# Patient Record
Sex: FEMALE | Race: WHITE | Employment: OTHER | URBAN - METROPOLITAN AREA
[De-identification: names, ages, dates, MRNs, and addresses within clinical notes are randomized per-mention and may not be internally consistent; named-entity substitution may affect disease eponyms.]

---

## 2017-01-12 ENCOUNTER — TELEPHONE (OUTPATIENT)
Dept: PULMONOLOGY | Facility: CLINIC | Age: 77
End: 2017-01-12

## 2017-02-03 ENCOUNTER — LAB ENCOUNTER (OUTPATIENT)
Dept: LAB | Age: 77
End: 2017-02-03
Attending: INTERNAL MEDICINE
Payer: MEDICARE

## 2017-02-03 ENCOUNTER — HOSPITAL ENCOUNTER (OUTPATIENT)
Dept: MAMMOGRAPHY | Age: 77
Discharge: HOME | End: 2017-02-03
Attending: INTERNAL MEDICINE
Payer: MEDICARE

## 2017-02-03 DIAGNOSIS — E03.9 ACQUIRED HYPOTHYROIDISM: ICD-10-CM

## 2017-02-03 DIAGNOSIS — Z12.31 VISIT FOR SCREENING MAMMOGRAM: ICD-10-CM

## 2017-02-03 DIAGNOSIS — Z51.81 ENCOUNTER FOR THERAPEUTIC DRUG MONITORING: ICD-10-CM

## 2017-02-03 DIAGNOSIS — M06.09 SERONEGATIVE RHEUMATOID ARTHRITIS OF MULTIPLE SITES (HCC): ICD-10-CM

## 2017-02-03 DIAGNOSIS — R73.09 ELEVATED GLUCOSE: ICD-10-CM

## 2017-02-03 LAB
ALBUMIN SERPL BCP-MCNC: 4.3 G/DL (ref 3.5–4.8)
AST SERPL-CCNC: 25 U/L (ref 15–41)
BASOPHILS # BLD: 0.1 K/UL (ref 0–0.2)
BASOPHILS NFR BLD: 2 %
CREAT SERPL-MCNC: 0.95 MG/DL (ref 0.5–1.5)
CRP SERPL-MCNC: 0.7 MG/DL (ref 0–0.9)
EOSINOPHIL # BLD: 0.1 K/UL (ref 0–0.7)
EOSINOPHIL NFR BLD: 4 %
ERYTHROCYTE [DISTWIDTH] IN BLOOD BY AUTOMATED COUNT: 14.3 % (ref 11–15)
ERYTHROCYTE [SEDIMENTATION RATE] IN BLOOD: 22 MM/HR (ref 0–30)
HBA1C MFR BLD: 5.6 % (ref 4–6)
HCT VFR BLD AUTO: 37.7 % (ref 35–48)
HGB BLD-MCNC: 12.5 G/DL (ref 12–16)
LYMPHOCYTES # BLD: 1.4 K/UL (ref 1–4)
LYMPHOCYTES NFR BLD: 40 %
MCH RBC QN AUTO: 33 PG (ref 27–32)
MCHC RBC AUTO-ENTMCNC: 33.2 G/DL (ref 32–37)
MCV RBC AUTO: 99.6 FL (ref 80–100)
MONOCYTES # BLD: 0.8 K/UL (ref 0–1)
MONOCYTES NFR BLD: 21 %
NEUTROPHILS # BLD AUTO: 1.2 K/UL (ref 1.8–7.7)
NEUTROPHILS NFR BLD: 34 %
PLATELET # BLD AUTO: 180 K/UL (ref 140–400)
PMV BLD AUTO: 8 FL (ref 7.4–10.3)
RBC # BLD AUTO: 3.78 M/UL (ref 3.7–5.4)
TSH SERPL-ACNC: 0.09 UIU/ML (ref 0.34–5.6)
WBC # BLD AUTO: 3.6 K/UL (ref 4–11)

## 2017-02-03 PROCEDURE — 82565 ASSAY OF CREATININE: CPT

## 2017-02-03 PROCEDURE — 86140 C-REACTIVE PROTEIN: CPT

## 2017-02-03 PROCEDURE — 82040 ASSAY OF SERUM ALBUMIN: CPT

## 2017-02-03 PROCEDURE — 85025 COMPLETE CBC W/AUTO DIFF WBC: CPT

## 2017-02-03 PROCEDURE — 83036 HEMOGLOBIN GLYCOSYLATED A1C: CPT

## 2017-02-03 PROCEDURE — 85652 RBC SED RATE AUTOMATED: CPT

## 2017-02-03 PROCEDURE — 84443 ASSAY THYROID STIM HORMONE: CPT

## 2017-02-03 PROCEDURE — 77067 SCR MAMMO BI INCL CAD: CPT

## 2017-02-03 PROCEDURE — 84450 TRANSFERASE (AST) (SGOT): CPT

## 2017-02-03 PROCEDURE — 36415 COLL VENOUS BLD VENIPUNCTURE: CPT

## 2017-02-10 ENCOUNTER — PRIOR ORIGINAL RECORDS (OUTPATIENT)
Dept: OTHER | Age: 77
End: 2017-02-10

## 2017-02-20 ENCOUNTER — OFFICE VISIT (OUTPATIENT)
Dept: RHEUMATOLOGY | Facility: CLINIC | Age: 77
End: 2017-02-20

## 2017-02-20 VITALS
DIASTOLIC BLOOD PRESSURE: 74 MMHG | HEIGHT: 64 IN | BODY MASS INDEX: 26.17 KG/M2 | SYSTOLIC BLOOD PRESSURE: 119 MMHG | HEART RATE: 60 BPM | WEIGHT: 153.31 LBS

## 2017-02-20 DIAGNOSIS — Z51.81 ENCOUNTER FOR THERAPEUTIC DRUG MONITORING: ICD-10-CM

## 2017-02-20 DIAGNOSIS — M06.09 SERONEGATIVE RHEUMATOID ARTHRITIS OF MULTIPLE SITES (HCC): Primary | ICD-10-CM

## 2017-02-20 DIAGNOSIS — M15.9 PRIMARY OSTEOARTHRITIS INVOLVING MULTIPLE JOINTS: ICD-10-CM

## 2017-02-20 PROCEDURE — 99214 OFFICE O/P EST MOD 30 MIN: CPT | Performed by: INTERNAL MEDICINE

## 2017-02-20 PROCEDURE — G0463 HOSPITAL OUTPT CLINIC VISIT: HCPCS | Performed by: INTERNAL MEDICINE

## 2017-02-20 RX ORDER — HYDROXYCHLOROQUINE SULFATE 200 MG/1
TABLET, FILM COATED ORAL
Qty: 180 TABLET | Refills: 3 | Status: SHIPPED | OUTPATIENT
Start: 2017-02-20 | End: 2018-04-25

## 2017-02-20 NOTE — PROGRESS NOTES
HPI:    Patient ID: Eleazar Centeno is a 67year old female. HPI Comments: Zana Jason is a 50-year-old patient of Dr. Jhonathan Duong with seronegative rheumatoid arthritis.  Since I saw her November 21st of 2016, she has continued Enbrel, methotrexate 20mg weekly, and Oral Tab TAKE 8 TABLETS BY MOUTH EVERY WEEK Disp: 96 tablet Rfl: 1   Hydroxychloroquine Sulfate 200 MG Oral Tab Take two daily. Disp: 180 tablet Rfl: 3   Levothyroxine Sodium 100 MCG Oral Tab Take 1 tablet (100 mcg total) by mouth daily.  Take with the 112 Disp:  Rfl:    aspirin 81 MG Oral Chew Tab Chew  by mouth.  take 1 tablet (81MG)  by oral route  every day Disp:  Rfl:    CENTRUM SILVER OR TABS 1 TABLET DAILY Disp:  Rfl:      Allergies:  Erythromycin Base         Pcn [Bicillin L-A]         PHYSICAL EXAM:

## 2017-02-27 ENCOUNTER — OFFICE VISIT (OUTPATIENT)
Dept: INTERNAL MEDICINE CLINIC | Facility: CLINIC | Age: 77
End: 2017-02-27

## 2017-02-27 VITALS
BODY MASS INDEX: 43.6 KG/M2 | WEIGHT: 255.38 LBS | HEART RATE: 57 BPM | HEIGHT: 64 IN | DIASTOLIC BLOOD PRESSURE: 79 MMHG | SYSTOLIC BLOOD PRESSURE: 136 MMHG | RESPIRATION RATE: 18 BRPM

## 2017-02-27 DIAGNOSIS — R26.9 GAIT ABNORMALITY: Primary | ICD-10-CM

## 2017-02-27 DIAGNOSIS — D84.9 IMMUNOSUPPRESSED STATUS (HCC): ICD-10-CM

## 2017-02-27 DIAGNOSIS — M06.09 SERONEGATIVE RHEUMATOID ARTHRITIS OF MULTIPLE SITES (HCC): ICD-10-CM

## 2017-02-27 DIAGNOSIS — N39.41 URGE INCONTINENCE: ICD-10-CM

## 2017-02-27 PROCEDURE — 99214 OFFICE O/P EST MOD 30 MIN: CPT | Performed by: INTERNAL MEDICINE

## 2017-02-27 PROCEDURE — 99212 OFFICE O/P EST SF 10 MIN: CPT | Performed by: INTERNAL MEDICINE

## 2017-02-27 NOTE — PROGRESS NOTES
HPI:    Patient ID: Brea Reyes is a 67year old female. HPI Comments: Her thyroid dose was too high so I reduced the dose. No problems with that. Her balance is bad. She went for PT. She started that and then she ended up doing heart tests.   Her Rfl: 1   Nebivolol HCl (BYSTOLIC) 5 MG Oral Tab Take 1 tablet (5 mg total) by mouth daily. Disp: 90 tablet Rfl: 1   lisinopril 20 MG Oral Tab Take 1 tablet (20 mg total) by mouth daily.  Disp: 90 tablet Rfl: 1   Pravastatin Sodium 40 MG Oral Tab Take 1 tabl respiratory distress. Neurological: She is alert and oriented to person, place, and time. ASSESSMENT/PLAN:   1.  Gait abnormality  Pt has pain due to RA and weakness due to deconditioning.  - PHYSICAL THERAPY - at [facility] eval and t

## 2017-03-05 RX ORDER — TOLTERODINE 4 MG/1
CAPSULE, EXTENDED RELEASE ORAL
Qty: 90 CAPSULE | Refills: 0 | Status: SHIPPED | OUTPATIENT
Start: 2017-03-05 | End: 2017-05-30

## 2017-03-08 ENCOUNTER — OFFICE VISIT (OUTPATIENT)
Dept: PHYSICAL THERAPY | Age: 77
End: 2017-03-08
Attending: INTERNAL MEDICINE
Payer: MEDICARE

## 2017-03-08 DIAGNOSIS — M06.09 SERONEGATIVE RHEUMATOID ARTHRITIS OF MULTIPLE SITES (HCC): ICD-10-CM

## 2017-03-08 DIAGNOSIS — R26.9 GAIT ABNORMALITY: Primary | ICD-10-CM

## 2017-03-08 PROCEDURE — 97162 PT EVAL MOD COMPLEX 30 MIN: CPT

## 2017-03-08 PROCEDURE — 97112 NEUROMUSCULAR REEDUCATION: CPT

## 2017-03-08 NOTE — PROGRESS NOTES
GAIT/BALANCE EVALUATION:   Referring Physician: Dr. Al Valladares  Date of Onset: A couple years ago. Date of Service: 3/8/2017       ICD-10-CM    1. Gait abnormality R26.9    2.  Serone[facility] ([facility] M[facility].09      PATIENT SUM with standing balance EC > EO with LOB with EC. Pt unable to maintain SLS. Altered gait mechanics with decreased step length and stride length, decreased stance time L to R, absent heel strike and guarded UE posture.  Caution with turns and changes to gait 4 Item Dynamic Gait Index Score: 6/12 Fall Risk: yes  [Scores of 10 or less indicate increased risk for falls]  1. Gait level surface:   Grading: Finesse the lowest category that applies.   (3)  Normal: Walks 20’, no assistive devices, good speed, no elke reaches for wall. 4. Gait with vertical head turns:   Grading: Finesse the lowest category that applies. (3) Normal: Performs head turns smoothly with no change in gait.   (2) Mild Impairment: Performs head turns smoothly with slight change in gait velocit of 10 visits over a 90 day period. Treatment will include: Neuromuscular Re-education; Gait Training; Therapeutic Exercises; Manual Therapy;  Patient education; Home exercise program instruction    Education or treatment limitation: None  Rehab Potential:go

## 2017-03-10 ENCOUNTER — OFFICE VISIT (OUTPATIENT)
Dept: PHYSICAL THERAPY | Age: 77
End: 2017-03-10
Attending: INTERNAL MEDICINE
Payer: MEDICARE

## 2017-03-10 PROCEDURE — 97112 NEUROMUSCULAR REEDUCATION: CPT

## 2017-03-10 PROCEDURE — 97110 THERAPEUTIC EXERCISES: CPT

## 2017-03-10 NOTE — PROGRESS NOTES
1.  Gait abnormality  R26.9      2.  Seronegative rheumatoid arthritis of multiple sites (HCC)  M06.09                  Authorized # of Visits:  2         Next MD visit: none scheduled  Fall Risk: standard         Precautions: n/a           Medication Meagan Castellon with comprehensive HEP to maintain progress achieved in PT              Plan: Assess effects of HEP progressions. Progress as able.     Charges: Neuro Re-ed x2, TherEx 1      Total Timed Treatment: 40 min  Total Treatment Time: 41 min

## 2017-03-13 ENCOUNTER — OFFICE VISIT (OUTPATIENT)
Dept: OTOLARYNGOLOGY | Facility: CLINIC | Age: 77
End: 2017-03-13

## 2017-03-13 VITALS
WEIGHT: 257.38 LBS | TEMPERATURE: 97 F | BODY MASS INDEX: 43.94 KG/M2 | DIASTOLIC BLOOD PRESSURE: 72 MMHG | HEIGHT: 64 IN | SYSTOLIC BLOOD PRESSURE: 130 MMHG

## 2017-03-13 DIAGNOSIS — H61.23 BILATERAL IMPACTED CERUMEN: Primary | ICD-10-CM

## 2017-03-13 PROCEDURE — 99213 OFFICE O/P EST LOW 20 MIN: CPT | Performed by: OTOLARYNGOLOGY

## 2017-03-13 PROCEDURE — 69210 REMOVE IMPACTED EAR WAX UNI: CPT | Performed by: OTOLARYNGOLOGY

## 2017-03-13 RX ORDER — NIACIN 750 MG/1
TABLET, EXTENDED RELEASE ORAL
COMMUNITY
Start: 2017-03-05 | End: 2017-06-26

## 2017-03-14 ENCOUNTER — OFFICE VISIT (OUTPATIENT)
Dept: PHYSICAL THERAPY | Age: 77
End: 2017-03-14
Attending: INTERNAL MEDICINE
Payer: MEDICARE

## 2017-03-14 PROCEDURE — 97112 NEUROMUSCULAR REEDUCATION: CPT

## 2017-03-14 PROCEDURE — 97110 THERAPEUTIC EXERCISES: CPT

## 2017-03-14 NOTE — PROGRESS NOTES
1.  Gait abnormality  R26.9      2.  Seronegative rheumatoid arthritis of multiple sites (HCC)  M06.09                  Authorized # of Visits:  3         Next MD visit: none scheduled  Fall Risk: standard         Precautions: n/a           Medication Jelly Roper be able to squat to  light objects around the house without loss of stability  4. Pt will improve functional hip strength to demonstrate ability to ascend/descend 1 flight of stairs reciprocally with use of UUE on handrail  5.  Pt will be independent

## 2017-03-17 ENCOUNTER — OFFICE VISIT (OUTPATIENT)
Dept: PHYSICAL THERAPY | Age: 77
End: 2017-03-17
Attending: INTERNAL MEDICINE
Payer: MEDICARE

## 2017-03-17 PROCEDURE — 97110 THERAPEUTIC EXERCISES: CPT

## 2017-03-17 PROCEDURE — 97112 NEUROMUSCULAR REEDUCATION: CPT

## 2017-03-17 NOTE — PROGRESS NOTES
1.  Gait abnormality  R26.9      2.  Seronegative rheumatoid arthritis of multiple sites (HCC)  M06.09                  Authorized # of Visits:  4         Next MD visit: none scheduled  Fall Risk: standard         Precautions: n/a           Medication Andi Washington ext 2x10 R/L        Fwd BOSU lunge 10x R/L            Assessment: Progressed to fwd step ups with 4 and 6\" steps. Pt remains cautious R >L although able to perform. Goals     • Therapy Goals      1.  Pt will demonstrate improved SLS to >3 seconds ARMINDA to

## 2017-03-21 ENCOUNTER — APPOINTMENT (OUTPATIENT)
Dept: PHYSICAL THERAPY | Age: 77
End: 2017-03-21
Attending: INTERNAL MEDICINE
Payer: MEDICARE

## 2017-03-23 ENCOUNTER — APPOINTMENT (OUTPATIENT)
Dept: PHYSICAL THERAPY | Age: 77
End: 2017-03-23
Attending: INTERNAL MEDICINE
Payer: MEDICARE

## 2017-03-24 ENCOUNTER — OFFICE VISIT (OUTPATIENT)
Dept: INTERNAL MEDICINE CLINIC | Facility: CLINIC | Age: 77
End: 2017-03-24

## 2017-03-24 VITALS
WEIGHT: 243.63 LBS | DIASTOLIC BLOOD PRESSURE: 58 MMHG | HEART RATE: 66 BPM | RESPIRATION RATE: 20 BRPM | SYSTOLIC BLOOD PRESSURE: 98 MMHG | TEMPERATURE: 99 F | BODY MASS INDEX: 41.59 KG/M2 | HEIGHT: 64 IN

## 2017-03-24 DIAGNOSIS — J06.9 UPPER RESPIRATORY TRACT INFECTION, UNSPECIFIED TYPE: Primary | ICD-10-CM

## 2017-03-24 PROCEDURE — 99213 OFFICE O/P EST LOW 20 MIN: CPT | Performed by: INTERNAL MEDICINE

## 2017-03-24 PROCEDURE — G0463 HOSPITAL OUTPT CLINIC VISIT: HCPCS | Performed by: INTERNAL MEDICINE

## 2017-03-24 RX ORDER — AZITHROMYCIN 250 MG/1
TABLET, FILM COATED ORAL
Qty: 6 TABLET | Refills: 0 | Status: SHIPPED | OUTPATIENT
Start: 2017-03-24 | End: 2017-03-28

## 2017-03-24 NOTE — PROGRESS NOTES
HPI:    Patient ID: Jennifer Bauer is a 67year old female. HPI  Patient is here complaining of 1 week of respiratory symptoms. Started with a cough and now she is feeling more weak. It is a nonproductive cough.   No sinus congestion, sneezing, or runny AmLODIPine Besylate 5 MG Oral Tab Take 1 tablet (5 mg total) by mouth daily.  Disp: 90 tablet Rfl: 1   PANTOPRAZOLE SODIUM 40 MG Oral Tab EC TAKE ONE TABLET BY MOUTH EVERY MORNING BEFORE BREAKFAST Disp: 30 tablet Rfl: 5   Etanercept (ENBREL SURECLICK) 50 today, then one daily.            Imaging & Referrals:  None         [id number]

## 2017-03-27 ENCOUNTER — TELEPHONE (OUTPATIENT)
Dept: INTERNAL MEDICINE CLINIC | Facility: CLINIC | Age: 77
End: 2017-03-27

## 2017-03-27 DIAGNOSIS — R05.9 COUGH: Primary | ICD-10-CM

## 2017-03-27 NOTE — TELEPHONE ENCOUNTER
Pt was seen in the office on 03/24/17 and has not gotten any better. Pt still has cough and feeling weak. Pt available for another appt if needed. She would like to know what to do?

## 2017-03-27 NOTE — TELEPHONE ENCOUNTER
Pt advised of orders and verbalized understanding. She will either go tonight or tomorrow am, advised ER if sxs increase, change, or worsen. Pt verbalized understanding and agrees with the plan.

## 2017-03-27 NOTE — TELEPHONE ENCOUNTER
F/U call seen by Dr Trista Bustamante 3/24/17 prescribed zpack today is day #4. Cough continues, feels lethargic, afebrile. Explained needs to give zpack more time as is effective fro 10 days following post ingestion.   Pt skeptical wants to be seen in clinic again

## 2017-03-27 NOTE — TELEPHONE ENCOUNTER
Patient should come into the first floor for a chest x-ray. I have generated the order. Please do that today or early tomorrow morning.

## 2017-03-28 ENCOUNTER — APPOINTMENT (OUTPATIENT)
Dept: PHYSICAL THERAPY | Age: 77
End: 2017-03-28
Attending: INTERNAL MEDICINE
Payer: MEDICARE

## 2017-03-28 ENCOUNTER — HOSPITAL ENCOUNTER (OUTPATIENT)
Dept: GENERAL RADIOLOGY | Facility: HOSPITAL | Age: 77
Discharge: HOME | End: 2017-03-28
Attending: INTERNAL MEDICINE
Payer: MEDICARE

## 2017-03-28 DIAGNOSIS — R05.9 COUGH: ICD-10-CM

## 2017-03-28 PROCEDURE — 71020 XR CHEST PA + LAT CHEST (CPT=71020): CPT

## 2017-03-30 ENCOUNTER — APPOINTMENT (OUTPATIENT)
Dept: PHYSICAL THERAPY | Age: 77
End: 2017-03-30
Attending: INTERNAL MEDICINE
Payer: MEDICARE

## 2017-04-03 ENCOUNTER — APPOINTMENT (OUTPATIENT)
Dept: PHYSICAL THERAPY | Age: 77
End: 2017-04-03
Attending: INTERNAL MEDICINE
Payer: MEDICARE

## 2017-04-06 ENCOUNTER — OFFICE VISIT (OUTPATIENT)
Dept: PHYSICAL THERAPY | Age: 77
End: 2017-04-06
Attending: INTERNAL MEDICINE
Payer: MEDICARE

## 2017-04-06 PROCEDURE — 97164 PT RE-EVAL EST PLAN CARE: CPT

## 2017-04-06 NOTE — PROGRESS NOTES
Patient Name: Brea Reyes, : 1940, MRN: [id number]   Date:  2017  Referring Physician:  Sherif Becker    Diagnosis: Gait abnormality (R26.9)  Seroneg[facility] (M06.09)    Progress Summary    Pt has EO: 30 sec              - Feet together EC: 30 sec              - Modified Tandem:  30 sec B              - Tandem Stance: 4 sec R foot fwd, 5 sec L foot fwd              - SLS: R 1 sec, L 1 sec                        - Compliant Surface:                   deviations, or no gait deviations but unable to achieve a significant change in velocity, or uses an assistive device.    (1)     Moderate Impairment: Makes only minor adjustments to walking speed, or accomplishes a change in speed with significant gait de demonstrate ability to ambulate safely in crowded and busy environments-NOT  MET  3. Pt will be able to squat to  light objects around the house without loss of stability-NOT MET  4.  Pt will improve functional hip strength to demonstrate ability to

## 2017-04-12 ENCOUNTER — OFFICE VISIT (OUTPATIENT)
Dept: PHYSICAL THERAPY | Age: 77
End: 2017-04-12
Attending: INTERNAL MEDICINE
Payer: MEDICARE

## 2017-04-12 PROCEDURE — 97110 THERAPEUTIC EXERCISES: CPT

## 2017-04-12 PROCEDURE — 97112 NEUROMUSCULAR REEDUCATION: CPT

## 2017-04-12 NOTE — PROGRESS NOTES
1.  Gait abnormality  R26.9      2.  Seronegative rheumatoid arthritis of multiple sites (HCC)  M06.09                  Authorized # of Visits:  6         Next MD visit: none scheduled  Fall Risk: standard         Precautions: n/a           Medication [place] [facility] Prim belt [id number] 2x Standing knee flex 10x R/L Standing hip abd 10x R/L     Standing step taps 6\" 10x R/L Standing on compliant surface + head turns Fwd step ups 4\" 5x R/L Standing hip ext 10x R/L     Seated marches 10x R/L  Fwd step ups 6\" 5x R/L Standing

## 2017-04-14 ENCOUNTER — OFFICE VISIT (OUTPATIENT)
Dept: PHYSICAL THERAPY | Age: 77
End: 2017-04-14
Attending: INTERNAL MEDICINE
Payer: MEDICARE

## 2017-04-14 PROCEDURE — 97112 NEUROMUSCULAR REEDUCATION: CPT

## 2017-04-14 PROCEDURE — 97110 THERAPEUTIC EXERCISES: CPT

## 2017-04-14 NOTE — PROGRESS NOTES
1.  Gait abnormality  R26.9      2.  Seronegative rheumatoid arthritis of multiple sites (HCC)  M06.09                  Authorized # of Visits:  7/10         Next MD visit: none scheduled  Fall Risk: standard         Precautions: n/a           Medication C lou DOYLE finger support 10x R/L    Cone weaving CGA and gait belt Cone weaving CGA and gait belt [id number] Standing hip ext 10x R/L Tandem stance SBA 2x R/L to tolerance Fwd step up 6\" step 2x5 R/L    Stepping over cones CGA and gait belt Stepping over con Total Timed Treatment: 42 min  Total Treatment Time: 43 min

## 2017-04-17 ENCOUNTER — OFFICE VISIT (OUTPATIENT)
Dept: PHYSICAL THERAPY | Age: 77
End: 2017-04-17
Attending: INTERNAL MEDICINE
Payer: MEDICARE

## 2017-04-17 PROCEDURE — 97110 THERAPEUTIC EXERCISES: CPT

## 2017-04-17 PROCEDURE — 97112 NEUROMUSCULAR REEDUCATION: CPT

## 2017-04-17 NOTE — PROGRESS NOTES
1.  Gait abnormality  R26.9      2.  Seronegative rheumatoid arthritis of multiple sites (HCC)  M06.09                  Authorized # of Visits:  8/10         Next MD visit: none scheduled  Fall Risk: standard         Precautions: n/a           Medication C surface rhomberg Standing rhomberg EC Tandem stance balance Seated marches Airex weight shift fwd/neutr and lat Shuttle B leg press 6B 2x15   Seated TrA 10x5\" Standing tandem Standing hip abd 10x R/L Standard and Rhomberg + head turns vert and horiz Airex around the house without loss of stability-NOT MET  4. Pt will improve functional hip strength to demonstrate ability to ascend/descend 1 flight of stairs reciprocally with use of UUE on handrail-NOT MET  5.  Pt will be independent and compliant with compre

## 2017-04-19 ENCOUNTER — OFFICE VISIT (OUTPATIENT)
Dept: PHYSICAL THERAPY | Age: 77
End: 2017-04-19
Attending: INTERNAL MEDICINE
Payer: MEDICARE

## 2017-04-19 PROCEDURE — 97110 THERAPEUTIC EXERCISES: CPT

## 2017-04-19 PROCEDURE — 97112 NEUROMUSCULAR REEDUCATION: CPT

## 2017-04-19 NOTE — PROGRESS NOTES
1.  Gait abnormality  R26.9      2.  Seronegative rheumatoid arthritis of multiple sites (HCC)  M06.09                  Authorized # of Visits:  9/12         Next MD visit: none scheduled  Fall Risk: standard         Precautions: n/a           Medication C marches Airex weight shift fwd/neutr and lat Shuttle B leg press 6B 2x15   Seated march 20x Standing tandem Standing hip abd 10x R/L Standard and Rhomberg + head turns vert and horiz Airex marches B finger support 10x R/L Shuttle SL leg press 2x15 R/L 4B UUE on handrail-NOT MET  5. Pt will be independent and compliant with comprehensive HEP to maintain progress achieved in PT-NOT MET              Plan: Continue to progress strengthening and balance per pt tolerance.     Charges: Neuro Re-ed x1, TherEx 2   T

## 2017-04-27 ENCOUNTER — OFFICE VISIT (OUTPATIENT)
Dept: PHYSICAL THERAPY | Age: 77
End: 2017-04-27
Attending: INTERNAL MEDICINE
Payer: MEDICARE

## 2017-04-27 PROCEDURE — 97110 THERAPEUTIC EXERCISES: CPT

## 2017-04-27 PROCEDURE — 97112 NEUROMUSCULAR REEDUCATION: CPT

## 2017-04-27 NOTE — PROGRESS NOTES
1.  Gait abnormality  R26.9      2.  Seronegative rheumatoid arthritis of multiple sites (HCC)  M06.09                  Authorized # of Visits:  10/12         Next MD visit: none scheduled  Fall Risk: standard         Precautions: n/a            Medication shift a/p and lat Tandem stance balance Seated marches Airex weight shift fwd/neutr and lat Shuttle B leg press 6B 2x15   Seated march 20x Airex neutral stance with head turns horiz and vert Standing hip abd 10x R/L Standard and Rhomberg + head turns vert  light objects around the house without loss of stability-NOT MET  4. Pt will improve functional hip strength to demonstrate ability to ascend/descend 1 flight of stairs reciprocally with use of UUE on handrail-NOT MET  5.  Pt will be independent and

## 2017-05-01 ENCOUNTER — TELEPHONE (OUTPATIENT)
Dept: PULMONOLOGY | Facility: CLINIC | Age: 77
End: 2017-05-01

## 2017-05-01 ENCOUNTER — OFFICE VISIT (OUTPATIENT)
Dept: PULMONOLOGY | Facility: CLINIC | Age: 77
End: 2017-05-01

## 2017-05-01 VITALS
SYSTOLIC BLOOD PRESSURE: 132 MMHG | HEIGHT: 64 IN | HEART RATE: 60 BPM | RESPIRATION RATE: 18 BRPM | WEIGHT: 247 LBS | DIASTOLIC BLOOD PRESSURE: 70 MMHG | BODY MASS INDEX: 42.17 KG/M2 | OXYGEN SATURATION: 96 %

## 2017-05-01 DIAGNOSIS — Z99.89 OSA ON CPAP: Primary | ICD-10-CM

## 2017-05-01 DIAGNOSIS — R06.00 DYSPNEA ON EXERTION: ICD-10-CM

## 2017-05-01 DIAGNOSIS — G47.33 OSA ON CPAP: Primary | ICD-10-CM

## 2017-05-01 PROCEDURE — 99213 OFFICE O/P EST LOW 20 MIN: CPT | Performed by: INTERNAL MEDICINE

## 2017-05-01 PROCEDURE — G0463 HOSPITAL OUTPT CLINIC VISIT: HCPCS | Performed by: INTERNAL MEDICINE

## 2017-05-01 NOTE — TELEPHONE ENCOUNTER
Radha calling to let RN know that they do not have a download for the pt - they mailed a card out to her

## 2017-05-01 NOTE — PROGRESS NOTES
HPI:    Patient ID: Andrew Dobson is a 67year old female.     HPI  Feels better / lost 10 lbs   Less NICOLE   No cough or wheezes   No cp or edema   Very compliant with cpap / no technical issue   Regular time in bed   No daytime sleepiness or fatigue   Revie Zolpidem Tartrate (AMBIEN) 5 MG Oral Tab Take 1 tablet (5 mg total) by mouth nightly as needed for Sleep. Disp: 10 tablet Rfl: 0   Loperamide HCl 2 MG Oral Cap  Disp:  Rfl:    folic acid 1 MG Oral Tab Take one daily.  Disp: 90 tablet Rfl: 3   acetaminophe Visit:  No prescriptions requested or ordered in this encounter    Imaging & Referrals:  None       VO#[id number]

## 2017-05-02 ENCOUNTER — OFFICE VISIT (OUTPATIENT)
Dept: PHYSICAL THERAPY | Age: 77
End: 2017-05-02
Attending: INTERNAL MEDICINE
Payer: MEDICARE

## 2017-05-02 ENCOUNTER — OFFICE VISIT (OUTPATIENT)
Dept: OBGYN CLINIC | Facility: CLINIC | Age: 77
End: 2017-05-02

## 2017-05-02 VITALS
SYSTOLIC BLOOD PRESSURE: 128 MMHG | WEIGHT: 244.63 LBS | DIASTOLIC BLOOD PRESSURE: 75 MMHG | BODY MASS INDEX: 42 KG/M2 | HEART RATE: 65 BPM

## 2017-05-02 DIAGNOSIS — N39.46 MIXED STRESS AND URGE URINARY INCONTINENCE: Primary | ICD-10-CM

## 2017-05-02 PROCEDURE — 99212 OFFICE O/P EST SF 10 MIN: CPT | Performed by: OBSTETRICS & GYNECOLOGY

## 2017-05-02 PROCEDURE — 97110 THERAPEUTIC EXERCISES: CPT

## 2017-05-02 PROCEDURE — 97112 NEUROMUSCULAR REEDUCATION: CPT

## 2017-05-02 NOTE — PROGRESS NOTES
1.  Gait abnormality  R26.9      2.  Seronegative rheumatoid arthritis of multiple sites (HCC)  M06.09                  Authorized # of Visits:  11/12         Next MD visit: none scheduled  Fall Risk: standard         Precautions: n/a            Medication handrail-NOT MET  5. Pt will be independent and compliant with comprehensive HEP to maintain progress achieved in PT-NOT MET              Plan: Re-assess next visit and PN. Continue 1x/wk for 4 weeks.      Charges: Neuro Re-ed x1, TherEx 2   Total Timed Earnest

## 2017-05-03 NOTE — PROGRESS NOTES
Modesta Dudley is a 67year old female  No LMP recorded. Patient has had a hysterectomy. Patient presents with:  Gyn Problem: New Patient- Incontinence    New pt. Referred by Dr Bryan De Luna.   Always had urinary incontinence, but in last 6 months, incont capsule Rfl: 0   methotrexate 2.5 MG Oral Tab TAKE 8 TABLETS BY MOUTH EVERY WEEK Disp: 96 tablet Rfl: 1   Hydroxychloroquine Sulfate 200 MG Oral Tab Take two daily.  Disp: 180 tablet Rfl: 3   Levothyroxine Sodium 100 MCG Oral Tab Take 1 tablet (100 mcg tota every day Disp:  Rfl:    CENTRUM SILVER OR TABS 1 TABLET DAILY Disp:  Rfl:        ALLERGIES:    Erythromycin Base         Pcn [Bicillin L-A]            PHYSICAL EXAM:   /75 mmHg  Pulse 65  Wt 244 lb 9.6 oz (110.95 kg)  Breastfeeding?  No  Body mass in

## 2017-05-04 ENCOUNTER — APPOINTMENT (OUTPATIENT)
Dept: PHYSICAL THERAPY | Age: 77
End: 2017-05-04
Attending: INTERNAL MEDICINE
Payer: MEDICARE

## 2017-05-09 ENCOUNTER — APPOINTMENT (OUTPATIENT)
Dept: PHYSICAL THERAPY | Age: 77
End: 2017-05-09
Attending: INTERNAL MEDICINE
Payer: MEDICARE

## 2017-05-11 ENCOUNTER — APPOINTMENT (OUTPATIENT)
Dept: PHYSICAL THERAPY | Age: 77
End: 2017-05-11
Attending: INTERNAL MEDICINE
Payer: MEDICARE

## 2017-05-16 ENCOUNTER — APPOINTMENT (OUTPATIENT)
Dept: PHYSICAL THERAPY | Age: 77
End: 2017-05-16
Attending: INTERNAL MEDICINE
Payer: MEDICARE

## 2017-05-17 ENCOUNTER — LAB ENCOUNTER (OUTPATIENT)
Dept: LAB | Age: 77
End: 2017-05-17
Attending: INTERNAL MEDICINE
Payer: MEDICARE

## 2017-05-17 DIAGNOSIS — Z51.81 ENCOUNTER FOR THERAPEUTIC DRUG MONITORING: ICD-10-CM

## 2017-05-17 DIAGNOSIS — M06.09 SERONEGATIVE RHEUMATOID ARTHRITIS OF MULTIPLE SITES (HCC): ICD-10-CM

## 2017-05-17 DIAGNOSIS — E03.9 ACQUIRED HYPOTHYROIDISM: ICD-10-CM

## 2017-05-17 PROCEDURE — 36415 COLL VENOUS BLD VENIPUNCTURE: CPT

## 2017-05-17 PROCEDURE — 84450 TRANSFERASE (AST) (SGOT): CPT

## 2017-05-17 PROCEDURE — 85652 RBC SED RATE AUTOMATED: CPT

## 2017-05-17 PROCEDURE — 82040 ASSAY OF SERUM ALBUMIN: CPT

## 2017-05-17 PROCEDURE — 84443 ASSAY THYROID STIM HORMONE: CPT

## 2017-05-17 PROCEDURE — 84439 ASSAY OF FREE THYROXINE: CPT

## 2017-05-17 PROCEDURE — 86140 C-REACTIVE PROTEIN: CPT

## 2017-05-17 PROCEDURE — 82565 ASSAY OF CREATININE: CPT

## 2017-05-17 PROCEDURE — 85025 COMPLETE CBC W/AUTO DIFF WBC: CPT

## 2017-05-22 ENCOUNTER — OFFICE VISIT (OUTPATIENT)
Dept: RHEUMATOLOGY | Facility: CLINIC | Age: 77
End: 2017-05-22

## 2017-05-22 VITALS
HEIGHT: 64 IN | BODY MASS INDEX: 41.94 KG/M2 | HEART RATE: 47 BPM | DIASTOLIC BLOOD PRESSURE: 71 MMHG | SYSTOLIC BLOOD PRESSURE: 147 MMHG | WEIGHT: 245.69 LBS

## 2017-05-22 DIAGNOSIS — Z51.81 ENCOUNTER FOR THERAPEUTIC DRUG MONITORING: ICD-10-CM

## 2017-05-22 DIAGNOSIS — M15.9 PRIMARY OSTEOARTHRITIS INVOLVING MULTIPLE JOINTS: ICD-10-CM

## 2017-05-22 DIAGNOSIS — M06.09 SERONEGATIVE RHEUMATOID ARTHRITIS OF MULTIPLE SITES (HCC): Primary | ICD-10-CM

## 2017-05-22 PROCEDURE — 99214 OFFICE O/P EST MOD 30 MIN: CPT | Performed by: INTERNAL MEDICINE

## 2017-05-22 PROCEDURE — G0463 HOSPITAL OUTPT CLINIC VISIT: HCPCS | Performed by: INTERNAL MEDICINE

## 2017-05-22 NOTE — PROGRESS NOTES
HPI:    Patient ID: Vj Sandhu is a 67year old female. HPI Comments: Harpreet Ball is a 69-year-old patient of Dr. Brent Hernandez with seronegative rheumatoid arthritis.  Since I saw her February 20th of 2017, she has continued Enbrel, methotrexate 20mg weekly, and (200 mcg total) by mouth before breakfast. Disp: 90 tablet Rfl: 0   Niacin ER, Antihyperlipidemic, 750 MG Oral Tab CR  Disp:  Rfl:    TOLTERODINE TARTRATE ER 4 MG Oral Capsule SR 24 Hr Take 1 capsule by mouth  daily Disp: 90 capsule Rfl: 0   methotrexate 2 Physical Exam   Constitutional: She is oriented to person, place, and time. She appears well-developed. HENT:   Head: Normocephalic. Eyes: Conjunctivae are normal.   Cardiovascular: Normal rate, regular rhythm and normal heart sounds.     Pulmonary/Ch

## 2017-05-23 ENCOUNTER — OFFICE VISIT (OUTPATIENT)
Dept: PHYSICAL THERAPY | Age: 77
End: 2017-05-23
Attending: INTERNAL MEDICINE
Payer: MEDICARE

## 2017-05-23 PROCEDURE — 97112 NEUROMUSCULAR REEDUCATION: CPT

## 2017-05-23 PROCEDURE — 97110 THERAPEUTIC EXERCISES: CPT

## 2017-05-23 NOTE — PROGRESS NOTES
Patient Name: Shashi Mccullough, : 1940, MRN: [id number]   Date:  2017  Referring Physician:  Effie Tong    Diagnosis: Gait abnormality (R26.9)  [facility] (M06.09)    Progress Summary    Pt together EC: 30 sec              - Modified Tandem:  30 sec B              - Tandem Stance: 21 sec R foot fwd, 30sec left foot fwd              - SLS: R 3 sec, L 3 sec                        - Compliant Surface:                          - Feet together: 30 mild gait deviations, or no gait deviations but unable to achieve a significant change in velocity, or uses an assistive device.    (1)     Moderate Impairment: Makes only minor adjustments to walking speed, or accomplishes a change in speed with significa 1. Pt will demonstrate improved SLS to >3 seconds ARMINDA to promote safety and decrease risk of falls on uneven surfaces such as grass-improved (3seconds B)  2.  Pt will perform 4-Item DGI with score of 10/12 or greater with least restrictive AD to demonst To:8/21/2017

## 2017-05-27 DIAGNOSIS — E78.2 MIXED HYPERLIPIDEMIA: ICD-10-CM

## 2017-05-27 DIAGNOSIS — I10 ESSENTIAL HYPERTENSION WITH GOAL BLOOD PRESSURE LESS THAN 140/90: ICD-10-CM

## 2017-05-30 ENCOUNTER — APPOINTMENT (OUTPATIENT)
Dept: PHYSICAL THERAPY | Age: 77
End: 2017-05-30
Attending: INTERNAL MEDICINE
Payer: MEDICARE

## 2017-05-30 RX ORDER — TOLTERODINE 4 MG/1
4 CAPSULE, EXTENDED RELEASE ORAL DAILY
Qty: 90 CAPSULE | Refills: 0 | Status: SHIPPED | OUTPATIENT
Start: 2017-05-30 | End: 2017-06-26

## 2017-05-30 RX ORDER — NEBIVOLOL 5 MG/1
5 TABLET ORAL DAILY
Qty: 90 TABLET | Refills: 0 | Status: SHIPPED | OUTPATIENT
Start: 2017-05-30 | End: 2017-06-26

## 2017-05-30 RX ORDER — CHLORTHALIDONE 25 MG/1
750 TABLET ORAL NIGHTLY
Qty: 90 TABLET | Refills: 0 | Status: SHIPPED | OUTPATIENT
Start: 2017-05-30 | End: 2017-06-26

## 2017-05-30 RX ORDER — PANTOPRAZOLE SODIUM 40 MG/1
40 TABLET, DELAYED RELEASE ORAL
Qty: 30 TABLET | Refills: 5 | Status: SHIPPED | OUTPATIENT
Start: 2017-05-30 | End: 2017-06-26

## 2017-05-30 NOTE — TELEPHONE ENCOUNTER
From: Aditi Norris  To: Yohannes Wilkins MD  Sent: 5/27/2017 3:31 PM CDT  Subject: Medication Renewal Request    Original authorizing provider: MD Aditi Ng would like a refill of the following medications:  PANTOPRAZOLE SODIUM 40 MG

## 2017-05-30 NOTE — TELEPHONE ENCOUNTER
From: Rosa Harding  To: Selina Saravia MD  Sent: 5/27/2017 3:30 PM CDT  Subject: Medication Renewal Request    Original authorizing provider: MD Rosa Crawford would like a refill of the following medications:  Niacin  MG Oral Tab

## 2017-06-26 ENCOUNTER — OFFICE VISIT (OUTPATIENT)
Dept: INTERNAL MEDICINE CLINIC | Facility: CLINIC | Age: 77
End: 2017-06-26

## 2017-06-26 VITALS
HEART RATE: 64 BPM | HEIGHT: 64 IN | SYSTOLIC BLOOD PRESSURE: 127 MMHG | RESPIRATION RATE: 18 BRPM | DIASTOLIC BLOOD PRESSURE: 73 MMHG | WEIGHT: 246.63 LBS | BODY MASS INDEX: 42.11 KG/M2

## 2017-06-26 DIAGNOSIS — E66.01 OBESITY, MORBID, BMI 40.0-49.9 (HCC): ICD-10-CM

## 2017-06-26 DIAGNOSIS — E03.9 ACQUIRED HYPOTHYROIDISM: ICD-10-CM

## 2017-06-26 DIAGNOSIS — D84.9 IMMUNOSUPPRESSED STATUS (HCC): ICD-10-CM

## 2017-06-26 DIAGNOSIS — Z91.81 AT RISK FOR FALLS: ICD-10-CM

## 2017-06-26 DIAGNOSIS — M06.09 SERONEGATIVE RHEUMATOID ARTHRITIS OF MULTIPLE SITES (HCC): ICD-10-CM

## 2017-06-26 DIAGNOSIS — I10 ESSENTIAL HYPERTENSION WITH GOAL BLOOD PRESSURE LESS THAN 140/90: ICD-10-CM

## 2017-06-26 DIAGNOSIS — M96.0 NONUNION OF SUBTALAR ARTHRODESIS: ICD-10-CM

## 2017-06-26 DIAGNOSIS — Z00.00 MEDICARE ANNUAL WELLNESS VISIT, SUBSEQUENT: Primary | ICD-10-CM

## 2017-06-26 DIAGNOSIS — E78.2 MIXED HYPERLIPIDEMIA: ICD-10-CM

## 2017-06-26 DIAGNOSIS — M85.80 OSTEOPENIA, UNSPECIFIED LOCATION: ICD-10-CM

## 2017-06-26 PROBLEM — Z51.81 ENCOUNTER FOR THERAPEUTIC DRUG MONITORING: Status: RESOLVED | Noted: 2017-02-20 | Resolved: 2017-06-26

## 2017-06-26 PROCEDURE — 96160 PT-FOCUSED HLTH RISK ASSMT: CPT | Performed by: INTERNAL MEDICINE

## 2017-06-26 PROCEDURE — G0439 PPPS, SUBSEQ VISIT: HCPCS | Performed by: INTERNAL MEDICINE

## 2017-06-26 RX ORDER — CHLORTHALIDONE 25 MG/1
750 TABLET ORAL NIGHTLY
Qty: 90 TABLET | Refills: 1 | Status: SHIPPED | OUTPATIENT
Start: 2017-06-26 | End: 2018-02-27

## 2017-06-26 RX ORDER — AMLODIPINE BESYLATE 5 MG/1
5 TABLET ORAL DAILY
Qty: 90 TABLET | Refills: 1 | Status: SHIPPED | OUTPATIENT
Start: 2017-06-26 | End: 2017-10-18

## 2017-06-26 RX ORDER — HYDROCHLOROTHIAZIDE 12.5 MG/1
12.5 TABLET ORAL DAILY
Qty: 90 TABLET | Refills: 1 | Status: SHIPPED | OUTPATIENT
Start: 2017-06-26 | End: 2018-04-30

## 2017-06-26 RX ORDER — FOLIC ACID 1 MG/1
TABLET ORAL
Qty: 90 TABLET | Refills: 3 | Status: CANCELLED | OUTPATIENT
Start: 2017-06-26

## 2017-06-26 RX ORDER — PANTOPRAZOLE SODIUM 40 MG/1
40 TABLET, DELAYED RELEASE ORAL
Qty: 90 TABLET | Refills: 1 | Status: SHIPPED | OUTPATIENT
Start: 2017-06-26 | End: 2017-12-19

## 2017-06-26 RX ORDER — TOLTERODINE 4 MG/1
4 CAPSULE, EXTENDED RELEASE ORAL DAILY
Qty: 90 CAPSULE | Refills: 1 | Status: SHIPPED | OUTPATIENT
Start: 2017-06-26 | End: 2017-06-29

## 2017-06-26 RX ORDER — PRAVASTATIN SODIUM 40 MG
TABLET ORAL
Qty: 90 TABLET | Refills: 1 | Status: SHIPPED | OUTPATIENT
Start: 2017-06-26 | End: 2017-12-19

## 2017-06-26 RX ORDER — LEVOTHYROXINE SODIUM 0.2 MG/1
200 TABLET ORAL
Qty: 90 TABLET | Refills: 0 | Status: SHIPPED | OUTPATIENT
Start: 2017-06-26 | End: 2017-08-20

## 2017-06-26 RX ORDER — LISINOPRIL 20 MG/1
20 TABLET ORAL DAILY
Qty: 90 TABLET | Refills: 1 | Status: SHIPPED | OUTPATIENT
Start: 2017-06-26 | End: 2017-12-19

## 2017-06-26 RX ORDER — NEBIVOLOL 5 MG/1
5 TABLET ORAL DAILY
Qty: 90 TABLET | Refills: 0 | Status: SHIPPED | OUTPATIENT
Start: 2017-06-26 | End: 2017-11-28

## 2017-06-26 RX ORDER — HYDROXYCHLOROQUINE SULFATE 200 MG/1
TABLET, FILM COATED ORAL
Qty: 180 TABLET | Refills: 3 | Status: CANCELLED | OUTPATIENT
Start: 2017-06-26

## 2017-06-26 NOTE — PROGRESS NOTES
HPI:    Patient ID: Rosa Harding is a 67year old female. Pt is fatigued. She tried PT which helped some. She has less endurance, even with the walker. Tired   This is a chronic problem. The current episode started more than 1 year ago.  The prob 1   hydrochlorothiazide 12.5 MG Oral Tab Take 1 tablet (12.5 mg total) by mouth daily.  Disp: 90 tablet Rfl: 1   Levothyroxine Sodium 200 MCG Oral Tab Take 1 tablet (200 mcg total) by mouth before breakfast. Disp: 90 tablet Rfl: 0   lisinopril 20 MG Oral Ta atraumatic.    Right Ear: Tympanic membrane and ear canal normal.   Left Ear: Tympanic membrane and ear canal normal.   Nose: Nose normal.   Mouth/Throat: Oropharynx is clear and moist.   Eyes: Conjunctivae and EOM are normal. Pupils are equal, round, and r mg total) by mouth daily. Levothyroxine Sodium 200 MCG Oral Tab 90 tablet 0      Sig: Take 1 tablet (200 mcg total) by mouth before breakfast.      lisinopril 20 MG Oral Tab 90 tablet 1      Sig: Take 1 tablet (20 mg total) by mouth daily.       Niacin

## 2017-06-26 NOTE — PATIENT INSTRUCTIONS
Do fasting labs in August.  Fast for 12 hours. Water is okay. Walk in. Please bring power of  and living will if you can find it. We will copy. Use generic Debrox ear drops for 2 weeks to dissolve the wax in your ears.         Recommended Webs bedrooms, and bathrooms.   · Put light switches at the top and bottom of stairs. · Be sure each room and flight of stairs has proper lighting. · Use shades or curtains to cut glare from windows. · Put flashlights in each room. Replace burned-out bulbs. professional medical care. Always follow your healthcare professional's instructions.

## 2017-06-26 NOTE — PROGRESS NOTES
HPI:   Daily Chang is a 67year old female who presents for a MA (Medicare Advantage) Supervisit (Once per calendar year).     SEE ABOVE FOR HPI    Annual Physical due on 11/29/2017        Patient Care Team: Patient Care Team:  Boom Stewart MD as PCP MG/ML Subcutaneous Solution Auto-injector Inject 50 mg into the skin once a week.    Levothyroxine Sodium 200 MCG Oral Tab Take 1 tablet (200 mcg total) by mouth before breakfast.   methotrexate 2.5 MG Oral Tab TAKE 8 TABLETS BY MOUTH EVERY WEEK   Hydroxych She  reports that she has never smoked. She has never used smokeless tobacco. She reports that she drinks about 1.0 oz of alcohol per week . She reports that she does not use drugs.      REVIEW OF SYSTEMS:   See other note    EXAM:   /73 (BP Locatio Influenza, Pneumococcal, Zoster, Tetanus     Immunization History   Administered Date(s) Administered   • Influenza 10/02/2012   • Influenza Vaccine, High Dose, Preserv Free 11/13/2015, 10/27/2016   • Pneumococcal (Prevnar 13) 09/28/2016   • Pneumovax 23 0 unspecified location  Stable. Continue present management. 9. Acquired hypothyroidism  Stable. Continue present management.  - TSH W REFLEX TO FREE T4; Future    10. At risk for falls  Tj written information given to pt via [employer]       Ms. Lali Smith without help    Are you able to afford your medications?: Yes    Hearing Problems?: No     Functional Status     Hearing Problems?: No    Vision Problems? : No    Difficulty walking?: Yes    Difficulty dressing or bathing?: No    Problems with daily [place] ----------  GLUCOSE (P) (mg/dL)   Date Value   10/13/2015 109 (H)   ----------       Cardiovascular Disease Screening     LDL Annually LDL Cholesterol (mg/dL)   Date Value   11/16/2016 84   10/13/2015 82        EKG - w/ Initial Preventative Physical Exam institutions for the mentally retarded   Persons who live in the same house as a HepB virus carrier   Homosexual men   Illicit injectable drug abusers     Tetanus Toxoid  Only covered with a cut with metal- TD and TDaP Not covered by Medicare Part B 12/20/

## 2017-06-29 ENCOUNTER — OFFICE VISIT (OUTPATIENT)
Dept: SURGERY | Facility: CLINIC | Age: 77
End: 2017-06-29

## 2017-06-29 VITALS
SYSTOLIC BLOOD PRESSURE: 147 MMHG | DIASTOLIC BLOOD PRESSURE: 82 MMHG | BODY MASS INDEX: 42 KG/M2 | HEART RATE: 58 BPM | WEIGHT: 246 LBS | HEIGHT: 64 IN

## 2017-06-29 DIAGNOSIS — N39.46 MIXED STRESS AND URGE URINARY INCONTINENCE: Primary | ICD-10-CM

## 2017-06-29 PROCEDURE — 99204 OFFICE O/P NEW MOD 45 MIN: CPT | Performed by: UROLOGY

## 2017-06-29 PROCEDURE — 99212 OFFICE O/P EST SF 10 MIN: CPT | Performed by: UROLOGY

## 2017-06-29 RX ORDER — SOLIFENACIN SUCCINATE 5 MG/1
5 TABLET, FILM COATED ORAL DAILY
Qty: 30 TABLET | Refills: 11 | Status: SHIPPED | OUTPATIENT
Start: 2017-06-29 | End: 2018-02-06

## 2017-06-29 NOTE — PROGRESS NOTES
SUBJECTIVE:  Petra Tucker is a 67year old female who presents for a consultation at the request of, and a copy of this note will be sent to, DR. Wynne, for evaluation of  Urinary incontinence. States can leak with coughing, getting uo from a chair.   Tino Casillas • Cancer Son      leukemia   • Breast Cancer Paternal Aunt      post menopause      Social History: Smoking status: Never Smoker                                                              Smokeless tobacco: Never Used                      Alcohol use: effect of this medication and asked that she follow-up in 4 weeks to review results. Patient understands plan and agrees and will follow up accordingly.   Meds This Visit:  No prescriptions requested or ordered in this encounter    Imaging & Referrals:  No

## 2017-07-26 ENCOUNTER — OFFICE VISIT (OUTPATIENT)
Dept: SURGERY | Facility: CLINIC | Age: 77
End: 2017-07-26

## 2017-07-26 VITALS
WEIGHT: 246 LBS | HEART RATE: 65 BPM | HEIGHT: 64 IN | BODY MASS INDEX: 42 KG/M2 | SYSTOLIC BLOOD PRESSURE: 125 MMHG | DIASTOLIC BLOOD PRESSURE: 81 MMHG | TEMPERATURE: 98 F

## 2017-07-26 DIAGNOSIS — R35.0 URINARY FREQUENCY: ICD-10-CM

## 2017-07-26 DIAGNOSIS — N39.46 MIXED STRESS AND URGE URINARY INCONTINENCE: Primary | ICD-10-CM

## 2017-07-26 PROCEDURE — G0463 HOSPITAL OUTPT CLINIC VISIT: HCPCS | Performed by: UROLOGY

## 2017-07-26 PROCEDURE — 99213 OFFICE O/P EST LOW 20 MIN: CPT | Performed by: UROLOGY

## 2017-07-26 NOTE — PROGRESS NOTES
Eleazar Centeno is a 67year old female.     HPI:   Patient presents with:  Urinary Symptoms (urologic): patient presents for f/u on incontinence currently taking vesicare 5mg with good relief of symptoms       48-year-old female presents in follow-up to a pr • Cancer Son      leukemia   • Breast Cancer Paternal Aunt      post menopause      Social History: Smoking status: Never Smoker                                                              Smokeless tobacco: Never Used                      Alcohol use: Rfl:    Cholecalciferol (D-5000) 5000 UNITS Oral Tab Take  by mouth. Take 1 cap. every day Disp:  Rfl:    aspirin 81 MG Oral Chew Tab Chew  by mouth.  take 1 tablet (81MG)  by oral route  every day Disp:  Rfl:    CENTRUM SILVER OR TABS 1 TABLET DAILY Disp:

## 2017-08-16 ENCOUNTER — LAB ENCOUNTER (OUTPATIENT)
Dept: LAB | Age: 77
End: 2017-08-16
Attending: INTERNAL MEDICINE
Payer: MEDICARE

## 2017-08-16 DIAGNOSIS — R35.0 URINARY FREQUENCY: ICD-10-CM

## 2017-08-16 DIAGNOSIS — E03.9 ACQUIRED HYPOTHYROIDISM: ICD-10-CM

## 2017-08-16 DIAGNOSIS — Z51.81 ENCOUNTER FOR THERAPEUTIC DRUG MONITORING: ICD-10-CM

## 2017-08-16 DIAGNOSIS — M06.09 SERONEGATIVE RHEUMATOID ARTHRITIS OF MULTIPLE SITES (HCC): ICD-10-CM

## 2017-08-16 LAB
BACTERIA UR QL AUTO: NEGATIVE /HPF
BILIRUB UR QL: NEGATIVE
CLARITY UR: CLEAR
COLOR UR: YELLOW
GLUCOSE UR-MCNC: NEGATIVE MG/DL
HGB UR QL STRIP.AUTO: NEGATIVE
KETONES UR-MCNC: NEGATIVE MG/DL
NITRITE UR QL STRIP.AUTO: NEGATIVE
PH UR: 6 [PH] (ref 5–8)
PROT UR-MCNC: NEGATIVE MG/DL
RBC #/AREA URNS AUTO: <1 /HPF
SP GR UR STRIP: 1.01 (ref 1–1.03)
UROBILINOGEN UR STRIP-ACNC: <2
VIT C UR-MCNC: NEGATIVE MG/DL
WBC #/AREA URNS AUTO: 3 /HPF

## 2017-08-16 PROCEDURE — 81001 URINALYSIS AUTO W/SCOPE: CPT

## 2017-08-16 PROCEDURE — 36415 COLL VENOUS BLD VENIPUNCTURE: CPT

## 2017-08-16 PROCEDURE — 84443 ASSAY THYROID STIM HORMONE: CPT

## 2017-08-16 PROCEDURE — 85025 COMPLETE CBC W/AUTO DIFF WBC: CPT

## 2017-08-16 PROCEDURE — 86140 C-REACTIVE PROTEIN: CPT

## 2017-08-16 PROCEDURE — 85652 RBC SED RATE AUTOMATED: CPT

## 2017-08-17 ENCOUNTER — LAB ENCOUNTER (OUTPATIENT)
Dept: LAB | Facility: HOSPITAL | Age: 77
End: 2017-08-17
Attending: INTERNAL MEDICINE
Payer: MEDICARE

## 2017-08-17 DIAGNOSIS — M06.09 RHEUMATOID ARTHRITIS OF MULTIPLE SITES WITHOUT RHEUMATOID FACTOR (HCC): ICD-10-CM

## 2017-08-17 DIAGNOSIS — Z51.81 ENCOUNTER FOR THERAPEUTIC DRUG MONITORING: ICD-10-CM

## 2017-08-17 DIAGNOSIS — E03.9 HYPOTHYROIDISM, ACQUIRED: ICD-10-CM

## 2017-08-17 LAB
ALBUMIN SERPL BCP-MCNC: 4.1 G/DL (ref 3.5–4.8)
AST SERPL-CCNC: 24 U/L (ref 15–41)
BASOPHILS # BLD: 0 K/UL (ref 0–0.2)
BASOPHILS NFR BLD: 1 %
CREAT SERPL-MCNC: 0.94 MG/DL (ref 0.5–1.5)
CRP SERPL-MCNC: 0.6 MG/DL (ref 0–0.9)
EOSINOPHIL # BLD: 0.1 K/UL (ref 0–0.7)
EOSINOPHIL NFR BLD: 4 %
ERYTHROCYTE [DISTWIDTH] IN BLOOD BY AUTOMATED COUNT: 14.2 % (ref 11–15)
ERYTHROCYTE [SEDIMENTATION RATE] IN BLOOD: 8 MM/HR (ref 0–30)
HCT VFR BLD AUTO: 38.2 % (ref 35–48)
HGB BLD-MCNC: 12.6 G/DL (ref 12–16)
LYMPHOCYTES # BLD: 1.9 K/UL (ref 1–4)
LYMPHOCYTES NFR BLD: 45 %
MCH RBC QN AUTO: 32.4 PG (ref 27–32)
MCHC RBC AUTO-ENTMCNC: 32.9 G/DL (ref 32–37)
MCV RBC AUTO: 98.4 FL (ref 80–100)
MONOCYTES # BLD: 0.6 K/UL (ref 0–1)
MONOCYTES NFR BLD: 15 %
NEUTROPHILS # BLD AUTO: 1.5 K/UL (ref 1.8–7.7)
NEUTROPHILS NFR BLD: 35 %
PLATELET # BLD AUTO: 168 K/UL (ref 140–400)
PMV BLD AUTO: 8.5 FL (ref 7.4–10.3)
RBC # BLD AUTO: 3.88 M/UL (ref 3.7–5.4)
T3 SERPL-MCNC: 0.69 NG/ML (ref 0.87–1.78)
T4 FREE SERPL-MCNC: 1.58 NG/DL (ref 0.58–1.64)
TSH SERPL-ACNC: 0.04 UIU/ML (ref 0.45–5.33)
WBC # BLD AUTO: 4.2 K/UL (ref 4–11)

## 2017-08-17 PROCEDURE — 84450 TRANSFERASE (AST) (SGOT): CPT

## 2017-08-17 PROCEDURE — 85652 RBC SED RATE AUTOMATED: CPT

## 2017-08-17 PROCEDURE — 84443 ASSAY THYROID STIM HORMONE: CPT

## 2017-08-17 PROCEDURE — 82040 ASSAY OF SERUM ALBUMIN: CPT

## 2017-08-17 PROCEDURE — 36415 COLL VENOUS BLD VENIPUNCTURE: CPT

## 2017-08-17 PROCEDURE — 85025 COMPLETE CBC W/AUTO DIFF WBC: CPT

## 2017-08-17 PROCEDURE — 84439 ASSAY OF FREE THYROXINE: CPT

## 2017-08-17 PROCEDURE — 86140 C-REACTIVE PROTEIN: CPT

## 2017-08-17 PROCEDURE — 84480 ASSAY TRIIODOTHYRONINE (T3): CPT

## 2017-08-17 PROCEDURE — 82565 ASSAY OF CREATININE: CPT

## 2017-08-21 ENCOUNTER — OFFICE VISIT (OUTPATIENT)
Dept: RHEUMATOLOGY | Facility: CLINIC | Age: 77
End: 2017-08-21

## 2017-08-21 VITALS
WEIGHT: 252.88 LBS | SYSTOLIC BLOOD PRESSURE: 163 MMHG | HEART RATE: 51 BPM | DIASTOLIC BLOOD PRESSURE: 78 MMHG | BODY MASS INDEX: 43.17 KG/M2 | HEIGHT: 64 IN

## 2017-08-21 DIAGNOSIS — M06.09 SERONEGATIVE RHEUMATOID ARTHRITIS OF MULTIPLE SITES (HCC): Primary | ICD-10-CM

## 2017-08-21 DIAGNOSIS — Z51.81 ENCOUNTER FOR THERAPEUTIC DRUG MONITORING: ICD-10-CM

## 2017-08-21 PROCEDURE — 99214 OFFICE O/P EST MOD 30 MIN: CPT | Performed by: INTERNAL MEDICINE

## 2017-08-21 PROCEDURE — G0463 HOSPITAL OUTPT CLINIC VISIT: HCPCS | Performed by: INTERNAL MEDICINE

## 2017-08-21 NOTE — PROGRESS NOTES
Sriram Bagley is a 79-year-old patient of Dr. Cecilia Ruano with seronegative rheumatoid arthritis. Since I saw her May 22nd of 2017, she has continued Enbrel, methotrexate 20mg weekly, and Plaquenil 400mg daily. She feels like her rheumatoid arthritis is controlled.  Kesha Mandujano Rfl: 3   Niacin  MG Oral Tab CR Take 1 tablet (750 mg total) by mouth nightly. Disp: 90 tablet Rfl: 1   Nebivolol HCl (BYSTOLIC) 5 MG Oral Tab Take 1 tablet (5 mg total) by mouth daily.  Disp: 90 tablet Rfl: 1   lisinopril 20 MG Oral Tab Take 1 tablet and no guarding. Musculoskeletal: She exhibits no edema. There is not active synovitis in her wrists or hands. Lymphadenopathy:     She has no cervical adenopathy. Neurological: She is alert and oriented to person, place, and time.    Skin: No rash

## 2017-09-20 ENCOUNTER — OFFICE VISIT (OUTPATIENT)
Dept: OTOLARYNGOLOGY | Facility: CLINIC | Age: 77
End: 2017-09-20

## 2017-09-20 VITALS
TEMPERATURE: 98 F | DIASTOLIC BLOOD PRESSURE: 82 MMHG | SYSTOLIC BLOOD PRESSURE: 150 MMHG | BODY MASS INDEX: 42.68 KG/M2 | HEIGHT: 64 IN | WEIGHT: 250 LBS

## 2017-09-20 DIAGNOSIS — H61.23 BILATERAL IMPACTED CERUMEN: Primary | ICD-10-CM

## 2017-09-20 PROCEDURE — 69210 REMOVE IMPACTED EAR WAX UNI: CPT | Performed by: OTOLARYNGOLOGY

## 2017-09-20 PROCEDURE — 99213 OFFICE O/P EST LOW 20 MIN: CPT | Performed by: OTOLARYNGOLOGY

## 2017-09-20 RX ORDER — NIACIN 750 MG/1
TABLET, EXTENDED RELEASE ORAL
COMMUNITY
Start: 2017-08-30 | End: 2018-03-15 | Stop reason: CLARIF

## 2017-09-20 NOTE — PROGRESS NOTES
Miguel Blackburn is a 67year old female. Patient presents with:  Ear Problem: cleaning         HISTORY OF PRESENT ILLNESS    4/8/16  Here for evaluation of  bilateral hearing loss.  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 Lymph Detail Normal     Eyes Normal Conjunctiva - Right: Normal, Left: Normal. Pupil - Right: Normal, Left: Normal. EOMI.  STACY   Ears Normal  normal to inspection ear canals are nl WEEK, Disp: 96 tablet, Rfl: 1  •  Hydroxychloroquine Sulfate 200 MG Oral Tab, Take two daily. , Disp: 180 tablet, Rfl: 3  •  Loperamide HCl 2 MG Oral Cap, , Disp: , Rfl:   •  folic acid 1 MG Oral Tab, Take one daily. , Disp: 90 tablet, Rfl: 3  •  acetaminoph

## 2017-09-26 ENCOUNTER — APPOINTMENT (OUTPATIENT)
Dept: LAB | Facility: HOSPITAL | Age: 77
End: 2017-09-26
Attending: INTERNAL MEDICINE
Payer: MEDICARE

## 2017-09-26 DIAGNOSIS — E78.2 MIXED HYPERLIPIDEMIA: ICD-10-CM

## 2017-09-26 LAB
ALBUMIN SERPL BCP-MCNC: 3.8 G/DL (ref 3.5–4.8)
ALBUMIN/GLOB SERPL: 1.3 {RATIO} (ref 1–2)
ALP SERPL-CCNC: 57 U/L (ref 32–100)
ALT SERPL-CCNC: 24 U/L (ref 14–54)
ANION GAP SERPL CALC-SCNC: 8 MMOL/L (ref 0–18)
AST SERPL-CCNC: 24 U/L (ref 15–41)
BILIRUB SERPL-MCNC: 0.9 MG/DL (ref 0.3–1.2)
BUN SERPL-MCNC: 16 MG/DL (ref 8–20)
BUN/CREAT SERPL: 18 (ref 10–20)
CALCIUM SERPL-MCNC: 9.3 MG/DL (ref 8.5–10.5)
CHLORIDE SERPL-SCNC: 107 MMOL/L (ref 95–110)
CHOLEST SERPL-MCNC: 166 MG/DL (ref 110–200)
CO2 SERPL-SCNC: 26 MMOL/L (ref 22–32)
CREAT SERPL-MCNC: 0.89 MG/DL (ref 0.5–1.5)
GLOBULIN PLAS-MCNC: 2.9 G/DL (ref 2.5–3.7)
GLUCOSE SERPL-MCNC: 120 MG/DL (ref 70–99)
HDLC SERPL-MCNC: 78 MG/DL
LDLC SERPL CALC-MCNC: 67 MG/DL (ref 0–99)
NONHDLC SERPL-MCNC: 88 MG/DL
OSMOLALITY UR CALC.SUM OF ELEC: 294 MOSM/KG (ref 275–295)
POTASSIUM SERPL-SCNC: 4.5 MMOL/L (ref 3.3–5.1)
PROT SERPL-MCNC: 6.7 G/DL (ref 5.9–8.4)
SODIUM SERPL-SCNC: 141 MMOL/L (ref 136–144)
TRIGL SERPL-MCNC: 103 MG/DL (ref 1–149)

## 2017-09-26 PROCEDURE — 80061 LIPID PANEL: CPT

## 2017-09-26 PROCEDURE — 80053 COMPREHEN METABOLIC PANEL: CPT

## 2017-09-28 RX ORDER — FOLIC ACID 1 MG/1
TABLET ORAL
Qty: 90 TABLET | Refills: 1 | Status: SHIPPED | OUTPATIENT
Start: 2017-09-28 | End: 2018-04-30

## 2017-09-29 NOTE — TELEPHONE ENCOUNTER
LOV: 6/26/17 Last Rx: 9/2/16     No protocol - Please advise in regards to refill request. Thank You

## 2017-10-13 NOTE — TELEPHONE ENCOUNTER
PDA:2-99  Last Filled:2-20, #96 with 1 refill  Labs:9-26, AST 24  Future Appointments  Date Time Provider [place]   11/20/2017 11:00 AM Donna Aguila MD 2014 [facility]       Please Advise

## 2017-10-13 NOTE — TELEPHONE ENCOUNTER
From: Srinath Montenegro  Sent: 10/13/2017 1:00 PM CDT  Subject: Medication Renewal Request    Merly Sutton.  Jesusita Davis would like a refill of the following medications:     methotrexate 2.5 MG Oral Tab Shelby Shelton MD]    Preferred pharmacy: [place] [id number] -

## 2017-10-18 DIAGNOSIS — E78.2 MIXED HYPERLIPIDEMIA: ICD-10-CM

## 2017-10-19 RX ORDER — AMLODIPINE BESYLATE 5 MG/1
TABLET ORAL
Qty: 90 TABLET | Refills: 0 | Status: SHIPPED | OUTPATIENT
Start: 2017-10-19 | End: 2018-02-27

## 2017-10-19 NOTE — TELEPHONE ENCOUNTER
Hypertensive Medications Protocol Passed  Hypertensive Medications  Protocol Criteria:  · Appointment scheduled in the past 6 months or in the next 3 months  · BMP or CMP in the past 12 months  · Creatinine result < 2  Recent Outpatient Visits            4

## 2017-11-15 ENCOUNTER — LAB ENCOUNTER (OUTPATIENT)
Dept: LAB | Facility: HOSPITAL | Age: 77
End: 2017-11-15
Attending: INTERNAL MEDICINE
Payer: MEDICARE

## 2017-11-15 DIAGNOSIS — Z51.81 ENCOUNTER FOR THERAPEUTIC DRUG MONITORING: ICD-10-CM

## 2017-11-15 DIAGNOSIS — E03.9 ACQUIRED HYPOTHYROIDISM: ICD-10-CM

## 2017-11-15 DIAGNOSIS — M06.09 SERONEGATIVE RHEUMATOID ARTHRITIS OF MULTIPLE SITES (HCC): ICD-10-CM

## 2017-11-15 PROCEDURE — 84443 ASSAY THYROID STIM HORMONE: CPT

## 2017-11-15 PROCEDURE — 84480 ASSAY TRIIODOTHYRONINE (T3): CPT

## 2017-11-15 PROCEDURE — 82565 ASSAY OF CREATININE: CPT

## 2017-11-15 PROCEDURE — 36415 COLL VENOUS BLD VENIPUNCTURE: CPT

## 2017-11-15 PROCEDURE — 84450 TRANSFERASE (AST) (SGOT): CPT

## 2017-11-15 PROCEDURE — 85025 COMPLETE CBC W/AUTO DIFF WBC: CPT

## 2017-11-15 PROCEDURE — 85652 RBC SED RATE AUTOMATED: CPT

## 2017-11-15 PROCEDURE — 86140 C-REACTIVE PROTEIN: CPT

## 2017-11-15 PROCEDURE — 82040 ASSAY OF SERUM ALBUMIN: CPT

## 2017-11-15 PROCEDURE — 84439 ASSAY OF FREE THYROXINE: CPT

## 2017-11-24 NOTE — PROGRESS NOTES
Sanjeev Houston is a 58-year-old patient of Dr. Aristides Gusman with seronegative rheumatoid arthritis. Since I saw her August 21st of 2017, she has continued Enbrel, methotrexate 20mg weekly, and Plaquenil 400mg daily. She feels like her rheumatoid arthritis is controlled. Sulfate 200 MG Oral Tab Take two daily. Disp: 180 tablet Rfl: 3   Niacin  MG Oral Tab CR Take 1 tablet (750 mg total) by mouth nightly. Disp: 90 tablet Rfl: 1   Nebivolol HCl (BYSTOLIC) 5 MG Oral Tab Take 1 tablet (5 mg total) by mouth daily.  Disp: 9 exhibits no mass. There is no tenderness. There is no rebound and no guarding. Musculoskeletal: She exhibits no edema. There is not active synovitis in her wrists or hands. Lymphadenopathy:     She has no cervical adenopathy.    Neurological: She is a

## 2017-11-27 ENCOUNTER — OFFICE VISIT (OUTPATIENT)
Dept: RHEUMATOLOGY | Facility: CLINIC | Age: 77
End: 2017-11-27

## 2017-11-27 VITALS
SYSTOLIC BLOOD PRESSURE: 137 MMHG | HEART RATE: 70 BPM | BODY MASS INDEX: 42.17 KG/M2 | HEIGHT: 64 IN | DIASTOLIC BLOOD PRESSURE: 82 MMHG | WEIGHT: 247 LBS

## 2017-11-27 DIAGNOSIS — M06.09 SERONEGATIVE RHEUMATOID ARTHRITIS OF MULTIPLE SITES (HCC): Primary | ICD-10-CM

## 2017-11-27 DIAGNOSIS — Z51.81 ENCOUNTER FOR THERAPEUTIC DRUG MONITORING: ICD-10-CM

## 2017-11-27 DIAGNOSIS — M15.9 PRIMARY OSTEOARTHRITIS INVOLVING MULTIPLE JOINTS: ICD-10-CM

## 2017-11-27 PROCEDURE — 99214 OFFICE O/P EST MOD 30 MIN: CPT | Performed by: INTERNAL MEDICINE

## 2017-11-27 PROCEDURE — G0463 HOSPITAL OUTPT CLINIC VISIT: HCPCS | Performed by: INTERNAL MEDICINE

## 2017-11-27 RX ORDER — RIFAXIMIN 550 MG/1
550 TABLET ORAL 3 TIMES DAILY
COMMUNITY
Start: 2017-11-15 | End: 2018-03-15 | Stop reason: ALTCHOICE

## 2017-11-28 DIAGNOSIS — I10 ESSENTIAL HYPERTENSION WITH GOAL BLOOD PRESSURE LESS THAN 140/90: ICD-10-CM

## 2017-11-29 RX ORDER — NEBIVOLOL HYDROCHLORIDE 5 MG/1
TABLET ORAL
Qty: 90 TABLET | Refills: 0 | Status: SHIPPED | OUTPATIENT
Start: 2017-11-29 | End: 2018-02-27

## 2017-11-29 NOTE — TELEPHONE ENCOUNTER
Hypertensive Medications  Protocol Criteria:  · Appointment scheduled in the past 6 months or in the next 3 months  · BMP or CMP in the past 12 months  · Creatinine result < 2  Recent Outpatient Visits            2 days ago Seronegative rheumatoid arthriti

## 2017-12-04 ENCOUNTER — TELEPHONE (OUTPATIENT)
Dept: RHEUMATOLOGY | Facility: CLINIC | Age: 77
End: 2017-12-04

## 2017-12-04 NOTE — TELEPHONE ENCOUNTER
Received fax from [address] [place] [address] that Specialty Pharmacy has been transitioned to [facility] and to send future refill request there.

## 2017-12-19 DIAGNOSIS — I10 ESSENTIAL HYPERTENSION WITH GOAL BLOOD PRESSURE LESS THAN 140/90: ICD-10-CM

## 2017-12-19 DIAGNOSIS — E78.2 MIXED HYPERLIPIDEMIA: ICD-10-CM

## 2017-12-19 RX ORDER — PRAVASTATIN SODIUM 40 MG
TABLET ORAL
Qty: 90 TABLET | Refills: 0 | Status: SHIPPED | OUTPATIENT
Start: 2017-12-19 | End: 2018-03-15 | Stop reason: CLARIF

## 2017-12-19 RX ORDER — PANTOPRAZOLE SODIUM 40 MG/1
TABLET, DELAYED RELEASE ORAL
Qty: 90 TABLET | Refills: 0 | Status: SHIPPED | OUTPATIENT
Start: 2017-12-19 | End: 2018-03-15

## 2017-12-19 RX ORDER — LISINOPRIL 20 MG/1
TABLET ORAL
Qty: 90 TABLET | Refills: 0 | Status: SHIPPED | OUTPATIENT
Start: 2017-12-19 | End: 2018-03-15

## 2017-12-19 NOTE — TELEPHONE ENCOUNTER
Signed Prescriptions Disp Refills    PRAVASTATIN SODIUM 40 MG Oral Tab 90 tablet 0      Sig: TAKE 1 TABLET BY MOUTH  NIGHTLY        Authorizing Provider: Juana Morfin        Ordering User: Arvrg Soda      PANTOPRAZOLE SODIUM 40 MG Oral Tab EC 90 t [facility] for Jay Arroyo MD    Office Visit    3 months ago Bilateral impacted cerumen    [facility] for Era Lopez MD    Office Visit    4 months [facility] for [place], [place]    Office Visit        Future Appointments       Provider Department Appt Notes    In 2 months Alexus Daily MD [facility], [address] [place] 3 Month

## 2017-12-22 ENCOUNTER — OFFICE VISIT (OUTPATIENT)
Dept: OTOLARYNGOLOGY | Facility: CLINIC | Age: 77
End: 2017-12-22

## 2017-12-22 VITALS — SYSTOLIC BLOOD PRESSURE: 146 MMHG | TEMPERATURE: 97 F | DIASTOLIC BLOOD PRESSURE: 74 MMHG

## 2017-12-22 DIAGNOSIS — H61.23 BILATERAL IMPACTED CERUMEN: Primary | ICD-10-CM

## 2017-12-22 PROCEDURE — 99213 OFFICE O/P EST LOW 20 MIN: CPT | Performed by: OTOLARYNGOLOGY

## 2017-12-22 PROCEDURE — 69210 REMOVE IMPACTED EAR WAX UNI: CPT | Performed by: OTOLARYNGOLOGY

## 2017-12-22 NOTE — PROGRESS NOTES
Mahamed Price is a 65year old female. Patient presents with: Other: pt here for ear cleaning        HISTORY OF PRESENT ILLNESS    4/8/16  Here for evaluation of  bilateral hearing loss.  Patient feels this has worsened over the las months and  is not  ass • Cancer ([facility]     melanoma on back   • Fracture, patella     repair   • High cholesterol    • Hypothyroidism    • Rheumatoid arthritis ([facility]    • Sx.7/21/15, [id number], R Triple Arthrodesis/Poss Medializing Calc Arthrodesis/Lapidus/MONA/FDL to Post Tib T grossly intact. Neck Exam Normal  normal to inspection   Psychiatric Normal   Appropriate mood and affect. Lymph Detail Normal     Eyes Normal Conjunctiva - Right: Normal, Left: Normal. Pupil - Right: Normal, Left: Normal. EOMI.  STACY   Ears Normal  nor total) by mouth daily. , Disp: 90 tablet, Rfl: 1  •  Niacin  MG Oral Tab CR, Take 1 tablet (750 mg total) by mouth nightly., Disp: 90 tablet, Rfl: 1  •  Hydroxychloroquine Sulfate 200 MG Oral Tab, Take two daily. , Disp: 180 tablet, Rfl: 3  •  [place]

## 2018-02-01 ENCOUNTER — LAB ENCOUNTER (OUTPATIENT)
Dept: LAB | Facility: HOSPITAL | Age: 78
End: 2018-02-01
Attending: INTERNAL MEDICINE
Payer: MEDICARE

## 2018-02-01 DIAGNOSIS — E03.9 ACQUIRED HYPOTHYROIDISM: ICD-10-CM

## 2018-02-01 DIAGNOSIS — M06.09 SERONEGATIVE RHEUMATOID ARTHRITIS OF MULTIPLE SITES (HCC): ICD-10-CM

## 2018-02-01 DIAGNOSIS — Z51.81 ENCOUNTER FOR THERAPEUTIC DRUG MONITORING: ICD-10-CM

## 2018-02-01 LAB
ALBUMIN SERPL BCP-MCNC: 4.2 G/DL (ref 3.5–4.8)
ALBUMIN: 4.2 G/DL
AST (SGOT): 27 U/L
AST SERPL-CCNC: 27 U/L (ref 15–41)
BASOPHILS # BLD: 0.1 K/UL (ref 0–0.2)
BASOPHILS NFR BLD: 1 %
CREAT SERPL-MCNC: 0.95 MG/DL (ref 0.5–1.5)
CREATININE, SERUM: 0.95 MG/DL
CRP SERPL-MCNC: 0.7 MG/DL (ref 0–0.9)
EOSINOPHIL # BLD: 0.2 K/UL (ref 0–0.7)
EOSINOPHIL NFR BLD: 4 %
ERYTHROCYTE [DISTWIDTH] IN BLOOD BY AUTOMATED COUNT: 14.9 % (ref 11–15)
ERYTHROCYTE [SEDIMENTATION RATE] IN BLOOD: 8 MM/HR (ref 0–30)
HCT VFR BLD AUTO: 39.4 % (ref 35–48)
HEMATOCRIT: 39.4 %
HEMOGLOBIN: 13.1 G/DL
HGB BLD-MCNC: 13.1 G/DL (ref 12–16)
LYMPHOCYTES # BLD: 1.6 K/UL (ref 1–4)
LYMPHOCYTES NFR BLD: 39 %
MCH RBC QN AUTO: 33.6 PG (ref 27–32)
MCHC RBC AUTO-ENTMCNC: 33.3 G/DL (ref 32–37)
MCV RBC AUTO: 100.8 FL (ref 80–100)
MONOCYTES # BLD: 0.6 K/UL (ref 0–1)
MONOCYTES NFR BLD: 14 %
NEUTROPHILS # BLD AUTO: 1.7 K/UL (ref 1.8–7.7)
NEUTROPHILS NFR BLD: 42 %
PLATELET # BLD AUTO: 163 K/UL (ref 140–400)
PLATELETS: 163 K/UL
PMV BLD AUTO: 8 FL (ref 7.4–10.3)
RBC # BLD AUTO: 3.91 M/UL (ref 3.7–5.4)
RED BLOOD COUNT: 3.91 X 10-6/U
SGOT (AST): 27 IU/L
THYROID STIMULATING HORMONE: 1.16 MLU/L
TSH SERPL-ACNC: 1.16 UIU/ML (ref 0.45–5.33)
WBC # BLD AUTO: 4.1 K/UL (ref 4–11)
WHITE BLOOD COUNT: 4.1 X 10-3/U

## 2018-02-01 PROCEDURE — 86140 C-REACTIVE PROTEIN: CPT

## 2018-02-01 PROCEDURE — 84443 ASSAY THYROID STIM HORMONE: CPT

## 2018-02-01 PROCEDURE — 82040 ASSAY OF SERUM ALBUMIN: CPT

## 2018-02-01 PROCEDURE — 36415 COLL VENOUS BLD VENIPUNCTURE: CPT

## 2018-02-01 PROCEDURE — 85025 COMPLETE CBC W/AUTO DIFF WBC: CPT

## 2018-02-01 PROCEDURE — 84450 TRANSFERASE (AST) (SGOT): CPT

## 2018-02-01 PROCEDURE — 82565 ASSAY OF CREATININE: CPT

## 2018-02-01 PROCEDURE — 85652 RBC SED RATE AUTOMATED: CPT

## 2018-02-02 ENCOUNTER — PRIOR ORIGINAL RECORDS (OUTPATIENT)
Dept: OTHER | Age: 78
End: 2018-02-02

## 2018-02-06 DIAGNOSIS — E03.9 ACQUIRED HYPOTHYROIDISM: ICD-10-CM

## 2018-02-06 RX ORDER — LEVOTHYROXINE SODIUM 137 UG/1
137 TABLET ORAL
Qty: 90 TABLET | Refills: 0 | Status: SHIPPED
Start: 2018-02-06 | End: 2018-03-15

## 2018-02-06 NOTE — TELEPHONE ENCOUNTER
From: Rosa Harding  Sent: 2/6/2018 7:56 AM CST  Subject: Medication Renewal Request    Lew Mckeon.  Carlito More would like a refill of the following medications:     Levothyroxine Sodium 137 MCG Oral Tab Ariana Gramajo MD]    Preferred pharmacy: [place] [facility] [id number]

## 2018-02-06 NOTE — TELEPHONE ENCOUNTER
rx refilled as per written protocol.     Hypothyroid Medications  Protocol Criteria:  Appointment scheduled in the past 12 months or the next 3 months  TSH resulted in the past 12 months that is normal  Recent Outpatient Visits            1 month ago Mansi

## 2018-02-08 ENCOUNTER — MED REC SCAN ONLY (OUTPATIENT)
Dept: INTERNAL MEDICINE CLINIC | Facility: CLINIC | Age: 78
End: 2018-02-08

## 2018-02-08 ENCOUNTER — LAB ENCOUNTER (OUTPATIENT)
Dept: LAB | Facility: HOSPITAL | Age: 78
End: 2018-02-08
Attending: INTERNAL MEDICINE
Payer: MEDICARE

## 2018-02-08 DIAGNOSIS — E78.00 PURE HYPERCHOLESTEROLEMIA: Primary | ICD-10-CM

## 2018-02-08 LAB
CHOLEST SERPL-MCNC: 180 MG/DL (ref 110–200)
HDLC SERPL-MCNC: 92 MG/DL
LDLC SERPL CALC-MCNC: 65 MG/DL (ref 0–99)
NONHDLC SERPL-MCNC: 88 MG/DL
TRIGL SERPL-MCNC: 114 MG/DL (ref 1–149)

## 2018-02-08 PROCEDURE — 80061 LIPID PANEL: CPT

## 2018-02-08 PROCEDURE — 36415 COLL VENOUS BLD VENIPUNCTURE: CPT

## 2018-02-08 PROCEDURE — 82306 VITAMIN D 25 HYDROXY: CPT

## 2018-02-08 NOTE — TELEPHONE ENCOUNTER
From: Aditi Norris  Sent: 2/6/2018 7:56 AM CST  Subject: Medication Renewal Request    Randhawa Knows.  Mojgan North would like a refill of the following medications:     Solifenacin Succinate (VESICARE) 5 MG Oral Tab Jeff Peralta MD]    [facility]erred pharmacy: [facility]

## 2018-02-08 NOTE — TELEPHONE ENCOUNTER
Dr. Jerome Mcburney, pt [address] 7/26/17 pt is contacting us through my chart for refill on her vesicare if you agree please review and sign med, thank you. I copied and pasted part of your last note below.     Patient presents with:  Urinary Symptoms (urologic): patient

## 2018-02-09 ENCOUNTER — TELEPHONE (OUTPATIENT)
Dept: INTERNAL MEDICINE CLINIC | Facility: CLINIC | Age: 78
End: 2018-02-09

## 2018-02-09 DIAGNOSIS — Z12.31 VISIT FOR SCREENING MAMMOGRAM: Primary | ICD-10-CM

## 2018-02-09 LAB — 25(OH)D3 SERPL-MCNC: 22 NG/ML

## 2018-02-12 RX ORDER — SOLIFENACIN SUCCINATE 5 MG/1
5 TABLET, FILM COATED ORAL DAILY
Qty: 30 TABLET | Refills: 11 | Status: SHIPPED
Start: 2018-02-12 | End: 2019-02-12

## 2018-02-12 NOTE — TELEPHONE ENCOUNTER
Contacted pt and informed her that mammogram has been approved, and contact information for central scheduling given.   Pt verbalized understanding

## 2018-02-12 NOTE — TELEPHONE ENCOUNTER
Please call the pt and tell her that I have sent the order to radiology in the computer.   Pt just needs to call [phone] to schedule the mammogram.

## 2018-02-15 ENCOUNTER — HOSPITAL ENCOUNTER (OUTPATIENT)
Dept: MAMMOGRAPHY | Age: 78
Discharge: HOME | End: 2018-02-15
Attending: INTERNAL MEDICINE
Payer: MEDICARE

## 2018-02-15 DIAGNOSIS — Z12.31 VISIT FOR SCREENING MAMMOGRAM: ICD-10-CM

## 2018-02-15 PROCEDURE — 77067 SCR MAMMO BI INCL CAD: CPT | Performed by: INTERNAL MEDICINE

## 2018-02-27 DIAGNOSIS — I10 ESSENTIAL HYPERTENSION WITH GOAL BLOOD PRESSURE LESS THAN 140/90: ICD-10-CM

## 2018-02-27 DIAGNOSIS — E78.2 MIXED HYPERLIPIDEMIA: ICD-10-CM

## 2018-02-28 RX ORDER — AMLODIPINE BESYLATE 5 MG/1
5 TABLET ORAL
Qty: 90 TABLET | Refills: 0 | Status: SHIPPED
Start: 2018-02-28 | End: 2018-03-15

## 2018-02-28 RX ORDER — NEBIVOLOL 5 MG/1
5 TABLET ORAL
Qty: 90 TABLET | Refills: 0 | Status: SHIPPED
Start: 2018-02-28 | End: 2018-03-15

## 2018-02-28 RX ORDER — CHLORTHALIDONE 25 MG/1
750 TABLET ORAL NIGHTLY
Qty: 90 TABLET | Refills: 0 | Status: SHIPPED
Start: 2018-02-28 | End: 2018-03-15

## 2018-02-28 NOTE — TELEPHONE ENCOUNTER
Signed Prescriptions Disp Refills    Niacin  MG Oral Tab CR 90 tablet 0      Sig: Take 1 tablet (750 mg total) by mouth nightly.         Authorizing Provider: Arsenio Savage        Ordering User: Giselle Everett      AmLODIPine Besylate 5 MG Oral Tab Dudley Chapin MD    Office Visit    5 months ago Bilateral impacted cerumen    [facility] for Alex Cuellar MD    Office Visit    6 months ago Seronegative rheumatoid arthritis of multiple sites [facility])    [facility], [facility]

## 2018-03-01 NOTE — PROGRESS NOTES
Jamia Mercedes is a 76-year-old patient of Dr. Tommy Goltz with seronegative rheumatoid arthritis. Since I saw her November 27th of 2017, she has continued Enbrel, methotrexate 20mg weekly, and Plaquenil 400mg daily.   She feels like her rheumatoid arthritis is controlle EVERY WEEK Disp: 96 tablet Rfl: 1   Hydroxychloroquine Sulfate 200 MG Oral Tab Take two daily. Disp: 180 tablet Rfl: 3   Niacin  MG Oral Tab CR Take 1 tablet (750 mg total) by mouth nightly.  Disp: 90 tablet Rfl: 1   Nebivolol HCl (BYSTOLIC) 5 MG Oral normal.   Abdominal: Soft. Bowel sounds are normal. She exhibits no mass. There is no tenderness. There is no rebound and no guarding. Musculoskeletal: She exhibits no edema. There is not active synovitis in her wrists or hands.    Lymphadenopathy:

## 2018-03-02 ENCOUNTER — OFFICE VISIT (OUTPATIENT)
Dept: RHEUMATOLOGY | Facility: CLINIC | Age: 78
End: 2018-03-02

## 2018-03-02 VITALS
DIASTOLIC BLOOD PRESSURE: 73 MMHG | BODY MASS INDEX: 42.82 KG/M2 | SYSTOLIC BLOOD PRESSURE: 137 MMHG | HEART RATE: 49 BPM | HEIGHT: 64 IN | WEIGHT: 250.81 LBS

## 2018-03-02 DIAGNOSIS — M15.9 PRIMARY OSTEOARTHRITIS INVOLVING MULTIPLE JOINTS: ICD-10-CM

## 2018-03-02 DIAGNOSIS — Z51.81 ENCOUNTER FOR THERAPEUTIC DRUG MONITORING: ICD-10-CM

## 2018-03-02 DIAGNOSIS — M06.09 SERONEGATIVE RHEUMATOID ARTHRITIS OF MULTIPLE SITES (HCC): Primary | ICD-10-CM

## 2018-03-02 PROCEDURE — G0463 HOSPITAL OUTPT CLINIC VISIT: HCPCS | Performed by: INTERNAL MEDICINE

## 2018-03-02 PROCEDURE — 99214 OFFICE O/P EST MOD 30 MIN: CPT | Performed by: INTERNAL MEDICINE

## 2018-03-15 ENCOUNTER — OFFICE VISIT (OUTPATIENT)
Dept: INTERNAL MEDICINE CLINIC | Facility: CLINIC | Age: 78
End: 2018-03-15

## 2018-03-15 VITALS
HEIGHT: 64 IN | WEIGHT: 254 LBS | HEART RATE: 53 BPM | BODY MASS INDEX: 43.36 KG/M2 | SYSTOLIC BLOOD PRESSURE: 124 MMHG | DIASTOLIC BLOOD PRESSURE: 76 MMHG

## 2018-03-15 DIAGNOSIS — K58.0 IRRITABLE BOWEL SYNDROME WITH DIARRHEA: ICD-10-CM

## 2018-03-15 DIAGNOSIS — E78.2 MIXED HYPERLIPIDEMIA: ICD-10-CM

## 2018-03-15 DIAGNOSIS — M06.09 SERONEGATIVE RHEUMATOID ARTHRITIS OF MULTIPLE SITES (HCC): ICD-10-CM

## 2018-03-15 DIAGNOSIS — E03.9 ACQUIRED HYPOTHYROIDISM: ICD-10-CM

## 2018-03-15 DIAGNOSIS — G47.33 OSA ON CPAP: ICD-10-CM

## 2018-03-15 DIAGNOSIS — Z00.00 MEDICARE ANNUAL WELLNESS VISIT, SUBSEQUENT: Primary | ICD-10-CM

## 2018-03-15 DIAGNOSIS — I10 ESSENTIAL HYPERTENSION WITH GOAL BLOOD PRESSURE LESS THAN 140/90: ICD-10-CM

## 2018-03-15 DIAGNOSIS — E66.01 MORBID OBESITY WITH BMI OF 40.0-44.9, ADULT (HCC): ICD-10-CM

## 2018-03-15 DIAGNOSIS — M47.812 CERVICAL SPINE ARTHRITIS: ICD-10-CM

## 2018-03-15 DIAGNOSIS — D84.9 IMMUNOSUPPRESSED STATUS (HCC): ICD-10-CM

## 2018-03-15 DIAGNOSIS — Z99.89 OSA ON CPAP: ICD-10-CM

## 2018-03-15 DIAGNOSIS — I70.0 ATHEROSCLEROSIS OF AORTIC ARCH (HCC): ICD-10-CM

## 2018-03-15 DIAGNOSIS — M15.9 PRIMARY OSTEOARTHRITIS INVOLVING MULTIPLE JOINTS: ICD-10-CM

## 2018-03-15 DIAGNOSIS — M96.0 NONUNION OF SUBTALAR ARTHRODESIS: ICD-10-CM

## 2018-03-15 PROCEDURE — 99397 PER PM REEVAL EST PAT 65+ YR: CPT | Performed by: INTERNAL MEDICINE

## 2018-03-15 PROCEDURE — 96160 PT-FOCUSED HLTH RISK ASSMT: CPT | Performed by: INTERNAL MEDICINE

## 2018-03-15 PROCEDURE — G0439 PPPS, SUBSEQ VISIT: HCPCS | Performed by: INTERNAL MEDICINE

## 2018-03-15 RX ORDER — PRAVASTATIN SODIUM 20 MG
20 TABLET ORAL NIGHTLY
COMMUNITY
End: 2018-03-15

## 2018-03-15 RX ORDER — PRAVASTATIN SODIUM 20 MG
20 TABLET ORAL NIGHTLY
Qty: 90 TABLET | Refills: 1 | Status: SHIPPED | OUTPATIENT
Start: 2018-03-15 | End: 2018-07-31

## 2018-03-15 RX ORDER — LISINOPRIL 20 MG/1
20 TABLET ORAL
Qty: 90 TABLET | Refills: 1 | Status: SHIPPED | OUTPATIENT
Start: 2018-03-15 | End: 2018-06-15

## 2018-03-15 RX ORDER — PANTOPRAZOLE SODIUM 40 MG/1
TABLET, DELAYED RELEASE ORAL
Qty: 90 TABLET | Refills: 1 | Status: SHIPPED | OUTPATIENT
Start: 2018-03-15 | End: 2018-07-31

## 2018-03-15 RX ORDER — CHLORTHALIDONE 25 MG/1
750 TABLET ORAL NIGHTLY
Qty: 90 TABLET | Refills: 1 | Status: SHIPPED | OUTPATIENT
Start: 2018-03-15 | End: 2018-07-31

## 2018-03-15 RX ORDER — HYDROCHLOROTHIAZIDE 12.5 MG/1
12.5 TABLET ORAL DAILY
Qty: 90 TABLET | Refills: 1 | Status: CANCELLED | OUTPATIENT
Start: 2018-03-15

## 2018-03-15 RX ORDER — NEBIVOLOL 5 MG/1
5 TABLET ORAL
Qty: 90 TABLET | Refills: 1 | Status: SHIPPED | OUTPATIENT
Start: 2018-03-15 | End: 2018-07-31

## 2018-03-15 RX ORDER — AMLODIPINE BESYLATE 5 MG/1
5 TABLET ORAL
Qty: 90 TABLET | Refills: 1 | Status: SHIPPED | OUTPATIENT
Start: 2018-03-15 | End: 2018-06-15

## 2018-03-15 RX ORDER — FOLIC ACID 1 MG/1
1 TABLET ORAL
Qty: 90 TABLET | Refills: 1 | Status: CANCELLED | OUTPATIENT
Start: 2018-03-15

## 2018-03-15 RX ORDER — LEVOTHYROXINE SODIUM 137 UG/1
137 TABLET ORAL
Qty: 90 TABLET | Refills: 1 | Status: SHIPPED | OUTPATIENT
Start: 2018-03-15 | End: 2018-07-31

## 2018-03-15 NOTE — ASSESSMENT & PLAN NOTE
Unremarkable exam.  Labs were reviewed  Pt is up-to-date on colonoscopy. Immunizations were reviewed. Fall risk assessment was negative.   Hearing assessment was normal.

## 2018-03-15 NOTE — PROGRESS NOTES
HPI:   Esthela Landaverde is a 65year old female who presents for a MA (Medicare Advantage) Supervisit (Once per calendar year). Pt has had a cold. She feels tired. Her arthritis waxes and wanes. Some days are good and some bad.       Annual Physical du medication list.   CAGE Alcohol screening   Rosa Harding was screened for Alcohol abuse and had a score of 0 so is at low risk.     Patient Care Team: Patient Care Team:  Selina Saravia MD as PCP - General (Internal Medicine)  Jesus Vera PT as tablet (5 mg total) by mouth once daily. Nebivolol HCl (BYSTOLIC) 5 MG Oral Tab Take 1 tablet (5 mg total) by mouth once daily. Niacin  MG Oral Tab CR Take 1 tablet (750 mg total) by mouth nightly.    Pravastatin Sodium 20 MG Oral Tab Take 1 table includes Breast Cancer in her paternal aunt and paternal cousin female; Breast Cancer (age of onset: 59) in her mother; Cancer in her father, mother, self, and son; Diabetes in her father; Ovarian Cancer (age of onset: 80) in her mother.    SOCIAL HISTORY: Sometimes I have trouble following the conversations when two or more people are talking at the same time:  No   I have trouble understanding things on the TV:  No I have to strain to understand conversations:  No   I have to worry about missing the teleph heard.  Pulmonary/Chest: Effort normal and breath sounds normal. No respiratory distress. She has no wheezes. She has no rales. Right breast exhibits no inverted nipple, no mass, no nipple discharge and no skin change.  Left breast exhibits no inverted nipp Addressed This Visit     Medicare annual wellness visit, subsequent - Primary     Unremarkable exam.  Labs were reviewed  Pt is up-to-date on colonoscopy. Immunizations were reviewed. Fall risk assessment was negative.   Hearing assessment was normal. PLAN:  The patient indicates understanding of these issues and agrees to the plan. Reinforced healthy diet, lifestyle, and exercise. No Follow-up on file.      Sumanth Ken MD, 3/15/2018     General Health     In the past six months, have you lost No flowsheet data found. Pap and Pelvic      Pap: Every 3 yrs age 21-68 or Pap+HPV every 5 yrs age 33-67, age 72 and older at high risk There are no preventive care reminders to display for this patient.  Update [employer] Money if applicable    Chl 0.82     Creatinine (mg/dL)   Date Value   02/01/2018 0.95     CREATININE (P) (mg/dL)   Date Value   08/17/2016 1.06    No flowsheet data found. Drug Serum Conc  Annually No results found for: DIGOXIN, DIG, VALP No flowsheet data found.                Te

## 2018-03-15 NOTE — PATIENT INSTRUCTIONS
Please get a copy of your power of  and living will for our records. Please get the new recombinant Shingles vaccine at a pharmacy. Follow a low carbohydrate diet. Examples of carbohydrates are sweets, bread, rice, potatoes, and pasta.   E Value   02/08/2018 180   10/13/2015 187     Triglycerides (mg/dL)   Date Value   02/08/2018 114   10/13/2015 168 (H)        EKG - covered if needed at Welcome to Medicare, and non-screening if indicated for medical reasons Electrocardiogram date Routine EK Summary   Pap and Pelvic      Pap: Every 3 yrs age 21-68 or Pap+HPV every 5 yrs age 33-67, Covered every 2 yrs up to age 79 or Yearly if High Risk   There are no preventive care reminders to display for this patient.      Chlamydia  Annually if high risk No Kevin Shen website. http://www. idph.[facility]. [place]/public/books/aggie.htm  A link to the [employer]. This site has a lot of good information including definitions of the different types of Advance Directives.  It also has the

## 2018-03-16 NOTE — ASSESSMENT & PLAN NOTE
Advised pt of the problem and the need to control cardiovascular risk factors including hypercholesterolemia and HTN.   The patient expressed understanding and agreed with the plan

## 2018-03-26 NOTE — TELEPHONE ENCOUNTER
LOV: 3-2-18  Last Refilled:#96, 1rf 10/13/17  Labs:AST 27  2/1/18    Future Appointments  Date Time Provider Taqueria Wise   6/8/2018 10:40 AM Andreina Hong MD [address] [place] [address]       Please advise.

## 2018-04-11 ENCOUNTER — OFFICE VISIT (OUTPATIENT)
Dept: OTOLARYNGOLOGY | Facility: CLINIC | Age: 78
End: 2018-04-11

## 2018-04-11 VITALS
DIASTOLIC BLOOD PRESSURE: 76 MMHG | SYSTOLIC BLOOD PRESSURE: 151 MMHG | TEMPERATURE: 97 F | WEIGHT: 250 LBS | BODY MASS INDEX: 42.16 KG/M2 | HEIGHT: 64.5 IN

## 2018-04-11 DIAGNOSIS — J01.00 ACUTE MAXILLARY SINUSITIS, RECURRENCE NOT SPECIFIED: Primary | ICD-10-CM

## 2018-04-11 DIAGNOSIS — H61.23 BILATERAL IMPACTED CERUMEN: ICD-10-CM

## 2018-04-11 PROCEDURE — 99213 OFFICE O/P EST LOW 20 MIN: CPT | Performed by: OTOLARYNGOLOGY

## 2018-04-11 PROCEDURE — 69210 REMOVE IMPACTED EAR WAX UNI: CPT | Performed by: OTOLARYNGOLOGY

## 2018-04-11 RX ORDER — DEXAMETHASONE 2 MG/1
TABLET ORAL
Qty: 9 TABLET | Refills: 0 | Status: SHIPPED | OUTPATIENT
Start: 2018-04-11 | End: 2019-04-09

## 2018-04-11 RX ORDER — LEVOFLOXACIN 500 MG/1
500 TABLET, FILM COATED ORAL DAILY
Qty: 7 TABLET | Refills: 0 | Status: SHIPPED | OUTPATIENT
Start: 2018-04-11 | End: 2018-04-18

## 2018-04-11 NOTE — PROGRESS NOTES
Darleen Torres is a 65year old female. Patient presents with:  Ear Wax: bilateral ear cleaning   Sore Throat: comes and goes         HISTORY OF PRESENT ILLNESS    4/8/16  Here for evaluation of  bilateral hearing loss.  Patient feels this has worsened over • Cancer Father      stomach   • Cancer Son      leukemia   • Breast Cancer Paternal Aunt      post menopause   • Cancer Self      melanoma   • Breast Cancer Paternal Cousin Female      age after 48     Past Medical History:   Diagnosis Date   • Cancer ( Inspection - Normal. No suspicious lesions bruises or masses.    Constitutional Normal Overall appearance - Normal.   Head/Face Normal Facial features - Normal. Eyebrows - Normal. Skull - Normal.   Nasopharynx Normal External nose - Normal.  .congested nasa daily., Disp: 90 tablet, Rfl: 1  •  Niacin  MG Oral Tab CR, Take 1 tablet (750 mg total) by mouth nightly., Disp: 90 tablet, Rfl: 1  •  Pravastatin Sodium 20 MG Oral Tab, Take 1 tablet (20 mg total) by mouth nightly., Disp: 90 tablet, Rfl: 1  •  Prob resolve.         Estuardo Ham MD  4/11/2018

## 2018-04-23 ENCOUNTER — LAB ENCOUNTER (OUTPATIENT)
Dept: LAB | Facility: HOSPITAL | Age: 78
End: 2018-04-23
Attending: INTERNAL MEDICINE
Payer: MEDICARE

## 2018-04-23 DIAGNOSIS — K62.5 RECTAL BLEED: Primary | ICD-10-CM

## 2018-04-23 DIAGNOSIS — K59.00 CONSTIPATION: ICD-10-CM

## 2018-04-23 PROCEDURE — 85025 COMPLETE CBC W/AUTO DIFF WBC: CPT

## 2018-04-23 PROCEDURE — 36415 COLL VENOUS BLD VENIPUNCTURE: CPT

## 2018-04-23 PROCEDURE — 84443 ASSAY THYROID STIM HORMONE: CPT

## 2018-04-23 PROCEDURE — 80053 COMPREHEN METABOLIC PANEL: CPT

## 2018-04-26 RX ORDER — HYDROXYCHLOROQUINE SULFATE 200 MG/1
TABLET, FILM COATED ORAL
Qty: 180 TABLET | Refills: 3 | Status: SHIPPED
Start: 2018-04-26 | End: 2019-04-09

## 2018-04-26 NOTE — TELEPHONE ENCOUNTER
From: Miguel Blackburn  Sent: 4/25/2018 7:49 AM CDT  Subject: Medication Renewal Request    Elmer Waters.  Terri Medina would like a refill of the following medications:     Hydroxychloroquine Sulfate 200 MG Oral Tab [Mirza Rose MD]    [facility] pharmacy[facility]

## 2018-04-26 NOTE — TELEPHONE ENCOUNTER
LOV:3-2  Last Filled:2-20-17, #180 with 3 refills  Labs:   Future Appointments  Date Time Provider Taqueria Wise   6/8/2018 10:40 AM Patti Alas MD [facility]       Please Advise

## 2018-04-30 RX ORDER — HYDROCHLOROTHIAZIDE 12.5 MG/1
TABLET ORAL
Qty: 90 TABLET | Refills: 0 | Status: SHIPPED | OUTPATIENT
Start: 2018-04-30 | End: 2018-07-31

## 2018-04-30 NOTE — TELEPHONE ENCOUNTER
LOV: 3/15/18 Last Rx: 9/28/17    No protocol     Please advise in regards to refill request. Thank You            Hypertensive Medications  Protocol Criteria:  · Appointment scheduled in the past 6 months or in the next 3 months  · BMP or CMP in the past 1

## 2018-04-30 NOTE — TELEPHONE ENCOUNTER
LOV:3-2  Last Filled:9-28 #90 with 1 refill per Dr. Medeiros Isiah:   Future Appointments  Date Time Provider Taqueria Wise   6/8/2018 10:40 AM Tereso Young MD [facility]       Please Advise

## 2018-05-01 RX ORDER — FOLIC ACID 1 MG/1
TABLET ORAL
Qty: 90 TABLET | Refills: 3 | Status: SHIPPED | OUTPATIENT
Start: 2018-05-01 | End: 2019-03-27

## 2018-06-05 ENCOUNTER — LAB ENCOUNTER (OUTPATIENT)
Dept: LAB | Facility: HOSPITAL | Age: 78
End: 2018-06-05
Attending: INTERNAL MEDICINE
Payer: MEDICARE

## 2018-06-05 DIAGNOSIS — M06.09 SERONEGATIVE RHEUMATOID ARTHRITIS OF MULTIPLE SITES (HCC): ICD-10-CM

## 2018-06-05 DIAGNOSIS — Z51.81 ENCOUNTER FOR THERAPEUTIC DRUG MONITORING: ICD-10-CM

## 2018-06-05 PROCEDURE — 84450 TRANSFERASE (AST) (SGOT): CPT

## 2018-06-05 PROCEDURE — 86140 C-REACTIVE PROTEIN: CPT

## 2018-06-05 PROCEDURE — 85025 COMPLETE CBC W/AUTO DIFF WBC: CPT

## 2018-06-05 PROCEDURE — 82565 ASSAY OF CREATININE: CPT

## 2018-06-05 PROCEDURE — 82040 ASSAY OF SERUM ALBUMIN: CPT

## 2018-06-05 PROCEDURE — 36415 COLL VENOUS BLD VENIPUNCTURE: CPT

## 2018-06-05 PROCEDURE — 85652 RBC SED RATE AUTOMATED: CPT

## 2018-06-07 NOTE — PROGRESS NOTES
Alberta Mccurdy is a 26-year-old patient of Dr. Alondra South with seronegative rheumatoid arthritis. Since I saw her March 2nd of 2018, she has continued Enbrel, methotrexate 20mg weekly, and Plaquenil 400mg daily. She feels like her rheumatoid arthritis is controlled.  Sangita Mi Disp: 180 tablet Rfl: 3   Niacin  MG Oral Tab CR Take 1 tablet (750 mg total) by mouth nightly. Disp: 90 tablet Rfl: 1   Nebivolol HCl (BYSTOLIC) 5 MG Oral Tab Take 1 tablet (5 mg total) by mouth daily.  Disp: 90 tablet Rfl: 1   lisinopril 20 MG Oral There is no rebound and no guarding. Musculoskeletal: She exhibits no edema. There is not active synovitis in her wrists or hands. Lymphadenopathy:     She has no cervical adenopathy.    Neurological: She is alert and oriented to person, place, and ti

## 2018-06-08 ENCOUNTER — OFFICE VISIT (OUTPATIENT)
Dept: RHEUMATOLOGY | Facility: CLINIC | Age: 78
End: 2018-06-08

## 2018-06-08 VITALS
DIASTOLIC BLOOD PRESSURE: 78 MMHG | WEIGHT: 255.88 LBS | HEART RATE: 53 BPM | BODY MASS INDEX: 43.69 KG/M2 | HEIGHT: 64 IN | SYSTOLIC BLOOD PRESSURE: 132 MMHG

## 2018-06-08 DIAGNOSIS — M06.09 SERONEGATIVE RHEUMATOID ARTHRITIS OF MULTIPLE SITES (HCC): Primary | ICD-10-CM

## 2018-06-08 DIAGNOSIS — Z51.81 ENCOUNTER FOR THERAPEUTIC DRUG MONITORING: ICD-10-CM

## 2018-06-08 DIAGNOSIS — M15.9 PRIMARY OSTEOARTHRITIS INVOLVING MULTIPLE JOINTS: ICD-10-CM

## 2018-06-08 PROCEDURE — 99214 OFFICE O/P EST MOD 30 MIN: CPT | Performed by: INTERNAL MEDICINE

## 2018-06-08 PROCEDURE — G0463 HOSPITAL OUTPT CLINIC VISIT: HCPCS | Performed by: INTERNAL MEDICINE

## 2018-06-15 ENCOUNTER — OFFICE VISIT (OUTPATIENT)
Dept: INTERNAL MEDICINE CLINIC | Facility: CLINIC | Age: 78
End: 2018-06-15

## 2018-06-15 VITALS
DIASTOLIC BLOOD PRESSURE: 75 MMHG | HEIGHT: 64 IN | BODY MASS INDEX: 43.91 KG/M2 | WEIGHT: 257.19 LBS | HEART RATE: 56 BPM | SYSTOLIC BLOOD PRESSURE: 125 MMHG

## 2018-06-15 DIAGNOSIS — N18.30 STAGE 3 CHRONIC KIDNEY DISEASE (HCC): ICD-10-CM

## 2018-06-15 DIAGNOSIS — R60.0 LOWER EXTREMITY EDEMA: Primary | ICD-10-CM

## 2018-06-15 DIAGNOSIS — I10 ESSENTIAL HYPERTENSION WITH GOAL BLOOD PRESSURE LESS THAN 140/90: ICD-10-CM

## 2018-06-15 PROCEDURE — 99214 OFFICE O/P EST MOD 30 MIN: CPT | Performed by: INTERNAL MEDICINE

## 2018-06-15 PROCEDURE — G0463 HOSPITAL OUTPT CLINIC VISIT: HCPCS | Performed by: INTERNAL MEDICINE

## 2018-06-15 RX ORDER — LISINOPRIL 40 MG/1
40 TABLET ORAL
Qty: 90 TABLET | Refills: 1 | Status: SHIPPED | OUTPATIENT
Start: 2018-06-15 | End: 2019-03-19

## 2018-06-15 RX ORDER — NIACIN 750 MG/1
TABLET, EXTENDED RELEASE ORAL
COMMUNITY
Start: 2018-05-21 | End: 2019-07-30

## 2018-06-15 RX ORDER — FUROSEMIDE 20 MG/1
20 TABLET ORAL DAILY
Qty: 30 TABLET | Refills: 1 | Status: SHIPPED | OUTPATIENT
Start: 2018-06-15 | End: 2018-07-12

## 2018-06-15 NOTE — PATIENT INSTRUCTIONS
Stop amlodipine. Low-Salt Choices  Eating salt (sodium) can make your body retain too much water. Excess water makes your heart work harder. Canned, packaged, and frozen foods are easy to prepare. But they are often high in sodium.  Here are some ideas

## 2018-06-15 NOTE — PROGRESS NOTES
HPI:    Patient ID: Chuck Nissen is a 65year old female. Pt c/o swollen feet for the past 2 weeks or so. They don't hurt. No change in her SOB. She tries to avoid salt, but she does order out. Swelling   This is a new problem.  The current epis once daily. Disp: 90 tablet Rfl: 1   furosemide 20 MG Oral Tab Take 1 tablet (20 mg total) by mouth daily. Disp: 30 tablet Rfl: 1   Etanercept (ENBREL SURECLICK) 50 MG/ML Subcutaneous Solution Auto-injector Inject 50 mg into the skin once a week.  Disp: 12 day Disp:  Rfl:    CENTRUM SILVER OR TABS 1 TABLET DAILY Disp:  Rfl:        Allergies:  Erythromycin Base         Pcn [Bicillin L-A]           Smoking status: Never Smoker                                                              Smokeless tobacco: Yadira Clock prior to appointment. Relevant Medications    lisinopril 40 MG Oral Tab    furosemide 20 MG Oral Tab       Unprioritized    Stage 3 chronic kidney disease ([facility]     Will need to monitor BMP on lasix and hctz.          Relevant Orders    RENAL FUNCTI

## 2018-06-16 NOTE — ASSESSMENT & PLAN NOTE
Her BP might go up off of the amlodipine. Will increase the lisinopril to 40 mg. Advised pt to monitor. Will f/u in 1 month. Labs prior to appointment.

## 2018-06-21 ENCOUNTER — HOSPITAL ENCOUNTER (OUTPATIENT)
Dept: GENERAL RADIOLOGY | Age: 78
Discharge: HOME | End: 2018-06-21
Attending: ORTHOPAEDIC SURGERY
Payer: MEDICARE

## 2018-06-21 DIAGNOSIS — Z96.659 HISTORY OF KNEE REPLACEMENT: ICD-10-CM

## 2018-06-21 DIAGNOSIS — Z96.649 HISTORY OF HIP REPLACEMENT: ICD-10-CM

## 2018-06-21 PROCEDURE — 73562 X-RAY EXAM OF KNEE 3: CPT | Performed by: ORTHOPAEDIC SURGERY

## 2018-06-21 PROCEDURE — 73502 X-RAY EXAM HIP UNI 2-3 VIEWS: CPT | Performed by: ORTHOPAEDIC SURGERY

## 2018-07-10 ENCOUNTER — LAB ENCOUNTER (OUTPATIENT)
Dept: LAB | Facility: HOSPITAL | Age: 78
End: 2018-07-10
Attending: INTERNAL MEDICINE
Payer: MEDICARE

## 2018-07-10 DIAGNOSIS — N18.30 STAGE 3 CHRONIC KIDNEY DISEASE (HCC): ICD-10-CM

## 2018-07-10 LAB
ALBUMIN SERPL BCP-MCNC: 4.2 G/DL (ref 3.5–4.8)
ANION GAP SERPL CALC-SCNC: 13 MMOL/L (ref 0–18)
BUN SERPL-MCNC: 28 MG/DL (ref 8–20)
BUN/CREAT SERPL: 20.7 (ref 10–20)
CALCIUM SERPL-MCNC: 9.3 MG/DL (ref 8.5–10.5)
CHLORIDE SERPL-SCNC: 99 MMOL/L (ref 95–110)
CO2 SERPL-SCNC: 23 MMOL/L (ref 22–32)
CREAT SERPL-MCNC: 1.35 MG/DL (ref 0.5–1.5)
GLUCOSE SERPL-MCNC: 133 MG/DL (ref 70–99)
OSMOLALITY UR CALC.SUM OF ELEC: 287 MOSM/KG (ref 275–295)
PHOSPHATE SERPL-MCNC: 3.1 MG/DL (ref 2.4–4.7)
POTASSIUM SERPL-SCNC: 4 MMOL/L (ref 3.3–5.1)
SODIUM SERPL-SCNC: 135 MMOL/L (ref 136–144)

## 2018-07-10 PROCEDURE — 36415 COLL VENOUS BLD VENIPUNCTURE: CPT

## 2018-07-10 PROCEDURE — 80069 RENAL FUNCTION PANEL: CPT

## 2018-07-12 ENCOUNTER — MED REC SCAN ONLY (OUTPATIENT)
Dept: INTERNAL MEDICINE CLINIC | Facility: CLINIC | Age: 78
End: 2018-07-12

## 2018-07-12 ENCOUNTER — OFFICE VISIT (OUTPATIENT)
Dept: INTERNAL MEDICINE CLINIC | Facility: CLINIC | Age: 78
End: 2018-07-12

## 2018-07-12 VITALS
HEART RATE: 58 BPM | SYSTOLIC BLOOD PRESSURE: 149 MMHG | WEIGHT: 247.81 LBS | BODY MASS INDEX: 42.3 KG/M2 | DIASTOLIC BLOOD PRESSURE: 86 MMHG | HEIGHT: 64 IN

## 2018-07-12 DIAGNOSIS — K58.0 IRRITABLE BOWEL SYNDROME WITH DIARRHEA: ICD-10-CM

## 2018-07-12 DIAGNOSIS — R60.0 LOWER EXTREMITY EDEMA: ICD-10-CM

## 2018-07-12 DIAGNOSIS — M06.09 SERONEGATIVE RHEUMATOID ARTHRITIS OF MULTIPLE SITES (HCC): Primary | ICD-10-CM

## 2018-07-12 PROCEDURE — G0463 HOSPITAL OUTPT CLINIC VISIT: HCPCS | Performed by: INTERNAL MEDICINE

## 2018-07-12 PROCEDURE — 99214 OFFICE O/P EST MOD 30 MIN: CPT | Performed by: INTERNAL MEDICINE

## 2018-07-12 RX ORDER — FUROSEMIDE 20 MG/1
20 TABLET ORAL EVERY OTHER DAY
Qty: 45 TABLET | Refills: 1 | Status: SHIPPED | OUTPATIENT
Start: 2018-07-12 | End: 2018-12-15

## 2018-07-12 NOTE — ASSESSMENT & PLAN NOTE
Improved with Lasix, however GFR is down. Will decrease Lasix to qod and recheck bmp in 1 month.   F/u 3 months

## 2018-07-12 NOTE — PROGRESS NOTES
HPI:    Patient ID: [place] is a 65year old female. The legs were swollen and this resolved with the furosemide, however the kidney function has been decreasing. Her back still hurts and it is difficult to stand for any length of time.   Her IB tablet Rfl: 1   Niacin ER, Antihyperlipidemic, 750 MG Oral Tab CR  Disp:  Rfl:    lisinopril 40 MG Oral Tab Take 1 tablet (40 mg total) by mouth once daily.  Disp: 90 tablet Rfl: 1   Etanercept (ENBREL SURECLICK) 50 MG/ML Subcutaneous Solution Auto-injector mouth. take 1 tablet (81MG)  by oral route  every day Disp:  Rfl:    CENTRUM SILVER OR TABS 1 TABLET DAILY Disp:  Rfl:        Allergies:  Erythromycin Base         Pcn [Bicillin L-A]           Smoking status: Never Smoker Seronegative rheumatoid arthritis of multiple sites [facility]) - Primary     Fair control. F/u with rheumatology. DMV handicapped placard signed. The patient expressed understanding and agreed with the plan.     Meds This Visit:  Signed Prescript

## 2018-07-31 DIAGNOSIS — I10 ESSENTIAL HYPERTENSION WITH GOAL BLOOD PRESSURE LESS THAN 140/90: ICD-10-CM

## 2018-07-31 DIAGNOSIS — E78.2 MIXED HYPERLIPIDEMIA: ICD-10-CM

## 2018-07-31 DIAGNOSIS — E03.9 ACQUIRED HYPOTHYROIDISM: ICD-10-CM

## 2018-08-01 RX ORDER — HYDROCHLOROTHIAZIDE 12.5 MG/1
TABLET ORAL
Qty: 90 TABLET | Refills: 0 | Status: SHIPPED | OUTPATIENT
Start: 2018-08-01 | End: 2018-11-05

## 2018-08-01 RX ORDER — NIACIN 750 MG/1
TABLET, EXTENDED RELEASE ORAL
Qty: 90 TABLET | Refills: 0 | Status: SHIPPED | OUTPATIENT
Start: 2018-08-01 | End: 2019-03-19

## 2018-08-01 RX ORDER — LEVOTHYROXINE SODIUM 137 UG/1
TABLET ORAL
Qty: 90 TABLET | Refills: 0 | Status: SHIPPED | OUTPATIENT
Start: 2018-08-01 | End: 2018-12-25

## 2018-08-01 RX ORDER — PRAVASTATIN SODIUM 20 MG
TABLET ORAL
Qty: 90 TABLET | Refills: 0 | Status: SHIPPED | OUTPATIENT
Start: 2018-08-01 | End: 2018-12-25

## 2018-08-01 RX ORDER — NEBIVOLOL HYDROCHLORIDE 5 MG/1
TABLET ORAL
Qty: 90 TABLET | Refills: 0 | Status: SHIPPED | OUTPATIENT
Start: 2018-08-01 | End: 2018-10-11

## 2018-08-01 RX ORDER — PANTOPRAZOLE SODIUM 40 MG/1
TABLET, DELAYED RELEASE ORAL
Qty: 90 TABLET | Refills: 0 | Status: SHIPPED | OUTPATIENT
Start: 2018-08-01 | End: 2018-12-25

## 2018-08-02 NOTE — TELEPHONE ENCOUNTER
Hypertensive Medications  Protocol Criteria:  · Appointment scheduled in the past 6 months or in the next 3 months  · BMP or CMP in the past 12 months  · Creatinine result < 2  Recent Outpatient Visits            2 weeks ago Seronegative rheumatoid arthrit months  · ALT & LDL on file in the past 12 months  · ALT result < 80  · LDL result <130   Recent Outpatient Visits            2 weeks ago Seronegative rheumatoid arthritis of multiple sites [facility])    Tiera Alberto MD sinusitis, recurrence not specified    [facility] [place], [address] MD    Office Visit    4 months ago [employer] annual wellness visit, subsequent    Dominick Mcallister MD    Office TSH 1.26 04/23/2018   THYROIDFUNC 1.79 10/13/2015     Hypertensive Medications  Protocol Criteria:  · Appointment scheduled in the past 6 months or in the next 3 months  · BMP or CMP in the past 12 months  · Creatinine result < 2  Recent Outpatient Visit

## 2018-08-13 ENCOUNTER — OFFICE VISIT (OUTPATIENT)
Dept: PULMONOLOGY | Facility: CLINIC | Age: 78
End: 2018-08-13
Payer: COMMERCIAL

## 2018-08-13 VITALS
BODY MASS INDEX: 42.06 KG/M2 | HEIGHT: 64 IN | HEART RATE: 51 BPM | SYSTOLIC BLOOD PRESSURE: 132 MMHG | WEIGHT: 246.38 LBS | DIASTOLIC BLOOD PRESSURE: 67 MMHG | OXYGEN SATURATION: 97 % | RESPIRATION RATE: 19 BRPM

## 2018-08-13 DIAGNOSIS — R06.00 DYSPNEA, UNSPECIFIED TYPE: ICD-10-CM

## 2018-08-13 DIAGNOSIS — G47.33 OSA ON CPAP: Primary | ICD-10-CM

## 2018-08-13 DIAGNOSIS — Z99.89 OSA ON CPAP: Primary | ICD-10-CM

## 2018-08-13 PROCEDURE — 99214 OFFICE O/P EST MOD 30 MIN: CPT | Performed by: INTERNAL MEDICINE

## 2018-08-13 PROCEDURE — G0463 HOSPITAL OUTPT CLINIC VISIT: HCPCS | Performed by: INTERNAL MEDICINE

## 2018-08-13 RX ORDER — ALBUTEROL SULFATE 90 UG/1
1 AEROSOL, METERED RESPIRATORY (INHALATION) EVERY 6 HOURS PRN
Qty: 1 INHALER | Refills: 5 | Status: SHIPPED | OUTPATIENT
Start: 2018-08-13 | End: 2018-08-14

## 2018-08-13 NOTE — PROGRESS NOTES
HPI:    Patient ID: Bekah Dixon is a 65year old female.     HPI  Excellent compliance with CPAP and she cannot sleep without doing well clinically  Chronic dyspnea on exertion for years  Denied cough or wheezes  Denied fever or chills  Chronic lower extr Hydroxychloroquine Sulfate 200 MG Oral Tab Take two daily. Disp: 180 tablet Rfl: 3   METHOTREXATE 2.5 MG Oral Tab TAKE 8 TABLETS BY MOUTH EVERY WEEK Disp: 96 tablet Rfl: 1   Probiotic Product (PROBIOTIC-10) Oral Cap Take 1 tablet by mouth daily.  Disp:  R Awaiting him download   - cpm with cpap 12 CWP / nasal mask    - download iwith next visit   - diet / exercise / physical therapy    avoid sedative /narcotics   Avoid driving if sleepy       Follow-up in 1                  Meds This Visit:  Signed Pres

## 2018-08-14 RX ORDER — ALBUTEROL SULFATE 90 UG/1
1 AEROSOL, METERED RESPIRATORY (INHALATION) EVERY 6 HOURS PRN
Qty: 1 INHALER | Refills: 5 | Status: SHIPPED | OUTPATIENT
Start: 2018-08-14 | End: 2020-01-17

## 2018-08-16 ENCOUNTER — APPOINTMENT (OUTPATIENT)
Dept: LAB | Facility: HOSPITAL | Age: 78
End: 2018-08-16
Attending: INTERNAL MEDICINE
Payer: MEDICARE

## 2018-08-16 DIAGNOSIS — R60.0 LOWER EXTREMITY EDEMA: ICD-10-CM

## 2018-08-16 LAB
ANION GAP SERPL CALC-SCNC: 8 MMOL/L (ref 0–18)
BUN SERPL-MCNC: 14 MG/DL (ref 8–20)
BUN/CREAT SERPL: 14.7 (ref 10–20)
CALCIUM SERPL-MCNC: 9.4 MG/DL (ref 8.5–10.5)
CHLORIDE SERPL-SCNC: 106 MMOL/L (ref 95–110)
CO2 SERPL-SCNC: 26 MMOL/L (ref 22–32)
CREAT SERPL-MCNC: 0.95 MG/DL (ref 0.5–1.5)
GLUCOSE SERPL-MCNC: 111 MG/DL (ref 70–99)
OSMOLALITY UR CALC.SUM OF ELEC: 291 MOSM/KG (ref 275–295)
POTASSIUM SERPL-SCNC: 4.6 MMOL/L (ref 3.3–5.1)
SODIUM SERPL-SCNC: 140 MMOL/L (ref 136–144)

## 2018-08-16 PROCEDURE — 80048 BASIC METABOLIC PNL TOTAL CA: CPT

## 2018-08-16 PROCEDURE — 36415 COLL VENOUS BLD VENIPUNCTURE: CPT

## 2018-09-06 ENCOUNTER — OFFICE VISIT (OUTPATIENT)
Dept: OTOLARYNGOLOGY | Facility: CLINIC | Age: 78
End: 2018-09-06
Payer: COMMERCIAL

## 2018-09-06 ENCOUNTER — LAB ENCOUNTER (OUTPATIENT)
Dept: LAB | Facility: HOSPITAL | Age: 78
End: 2018-09-06
Attending: INTERNAL MEDICINE
Payer: MEDICARE

## 2018-09-06 VITALS
HEIGHT: 64 IN | SYSTOLIC BLOOD PRESSURE: 143 MMHG | WEIGHT: 245 LBS | DIASTOLIC BLOOD PRESSURE: 75 MMHG | TEMPERATURE: 98 F | BODY MASS INDEX: 41.83 KG/M2

## 2018-09-06 DIAGNOSIS — Z51.81 ENCOUNTER FOR THERAPEUTIC DRUG MONITORING: ICD-10-CM

## 2018-09-06 DIAGNOSIS — H61.23 BILATERAL IMPACTED CERUMEN: Primary | ICD-10-CM

## 2018-09-06 DIAGNOSIS — M06.09 SERONEGATIVE RHEUMATOID ARTHRITIS OF MULTIPLE SITES (HCC): ICD-10-CM

## 2018-09-06 LAB
ALBUMIN SERPL BCP-MCNC: 4.2 G/DL (ref 3.5–4.8)
AST SERPL-CCNC: 27 U/L (ref 15–41)
BASOPHILS # BLD: 0.1 K/UL (ref 0–0.2)
BASOPHILS NFR BLD: 1 %
CREAT SERPL-MCNC: 1.16 MG/DL (ref 0.5–1.5)
CRP SERPL-MCNC: 0.6 MG/DL (ref 0–0.9)
EOSINOPHIL # BLD: 0.1 K/UL (ref 0–0.7)
EOSINOPHIL NFR BLD: 3 %
ERYTHROCYTE [DISTWIDTH] IN BLOOD BY AUTOMATED COUNT: 13.8 % (ref 11–15)
ERYTHROCYTE [SEDIMENTATION RATE] IN BLOOD: 9 MM/HR (ref 0–30)
HCT VFR BLD AUTO: 38.3 % (ref 35–48)
HGB BLD-MCNC: 12.7 G/DL (ref 12–16)
LYMPHOCYTES # BLD: 1.4 K/UL (ref 1–4)
LYMPHOCYTES NFR BLD: 35 %
MCH RBC QN AUTO: 33.9 PG (ref 27–32)
MCHC RBC AUTO-ENTMCNC: 33.2 G/DL (ref 32–37)
MCV RBC AUTO: 102 FL (ref 80–100)
MONOCYTES # BLD: 0.5 K/UL (ref 0–1)
MONOCYTES NFR BLD: 12 %
NEUTROPHILS # BLD AUTO: 2 K/UL (ref 1.8–7.7)
NEUTROPHILS NFR BLD: 48 %
PLATELET # BLD AUTO: 182 K/UL (ref 140–400)
PMV BLD AUTO: 8.3 FL (ref 7.4–10.3)
RBC # BLD AUTO: 3.75 M/UL (ref 3.7–5.4)
WBC # BLD AUTO: 4.1 K/UL (ref 4–11)

## 2018-09-06 PROCEDURE — 84450 TRANSFERASE (AST) (SGOT): CPT

## 2018-09-06 PROCEDURE — 99213 OFFICE O/P EST LOW 20 MIN: CPT | Performed by: OTOLARYNGOLOGY

## 2018-09-06 PROCEDURE — G0463 HOSPITAL OUTPT CLINIC VISIT: HCPCS | Performed by: OTOLARYNGOLOGY

## 2018-09-06 PROCEDURE — 82565 ASSAY OF CREATININE: CPT

## 2018-09-06 PROCEDURE — 85652 RBC SED RATE AUTOMATED: CPT

## 2018-09-06 PROCEDURE — 36415 COLL VENOUS BLD VENIPUNCTURE: CPT

## 2018-09-06 PROCEDURE — 82040 ASSAY OF SERUM ALBUMIN: CPT

## 2018-09-06 PROCEDURE — 86140 C-REACTIVE PROTEIN: CPT

## 2018-09-06 PROCEDURE — 85025 COMPLETE CBC W/AUTO DIFF WBC: CPT

## 2018-09-06 NOTE — PROGRESS NOTES
Karishma Weaver is a 65year old female. Patient presents with:  Ear Wax: bilateral ear cleaning         HISTORY OF PRESENT ILLNESS    4/8/16  Here for evaluation of  bilateral hearing loss.  Patient feels this has worsened over the las months and  is not  as death)   • Ovarian Cancer Mother 80   • Diabetes Father    • Cancer Father      stomach   • Cancer Son      leukemia   • Breast Cancer Paternal Aunt      post menopause   • Cancer Self      melanoma   • Breast Cancer Paternal Cousin Female      age after 11 No suspicious lesions bruises or masses.    Constitutional Normal Overall appearance - Normal.   Head/Face Normal Facial features - Normal. Eyebrows - Normal. Skull - Normal.   Nasopharynx Normal External nose - Normal.     Neurological Normal Memory - Norm ER, Antihyperlipidemic, 750 MG Oral Tab CR, , Disp: , Rfl:   •  lisinopril 40 MG Oral Tab, Take 1 tablet (40 mg total) by mouth once daily. , Disp: 90 tablet, Rfl: 1  •  Etanercept (ENBREL SURECLICK) 50 MG/ML Subcutaneous Solution Auto-injector, Inject 50 m

## 2018-09-15 ENCOUNTER — MED REC SCAN ONLY (OUTPATIENT)
Dept: INTERNAL MEDICINE CLINIC | Facility: CLINIC | Age: 78
End: 2018-09-15

## 2018-09-19 NOTE — TELEPHONE ENCOUNTER
LOV:6-8  Last Filled:3-26, #96 with 1 refill  Labs:9-6, AST 27  Future Appointments   Date Time Provider Taqueria Wise   9/26/2018 11:20 AM [facility]   10/11/2018  8:30 AM Nasra Peterson MD [facility]

## 2018-09-23 NOTE — PROGRESS NOTES
[facility] is a 78-year-old patient of Dr. Komal Faye with seronegative rheumatoid arthritis. Since I saw her June 8th of 2018, she has continued Enbrel weekly, methotrexate 20mg weekly, and Plaquenil 400mg daily.   She feels like her rheumatoid arthritis is control 1 tablet (750 mg total) by mouth nightly. Disp: 90 tablet Rfl: 1   Nebivolol HCl (BYSTOLIC) 5 MG Oral Tab Take 1 tablet (5 mg total) by mouth daily. Disp: 90 tablet Rfl: 1   lisinopril 20 MG Oral Tab Take 1 tablet (20 mg total) by mouth daily.  Disp: 90 tab exhibits no edema. There is not active synovitis in her wrists or hands. Bony deformity of left thumb. Lymphadenopathy:     She has no cervical adenopathy. Neurological: She is alert and oriented to person, place, and time. Skin: No rash noted.    Ps

## 2018-09-26 ENCOUNTER — OFFICE VISIT (OUTPATIENT)
Dept: RHEUMATOLOGY | Facility: CLINIC | Age: 78
End: 2018-09-26
Payer: COMMERCIAL

## 2018-09-26 VITALS
SYSTOLIC BLOOD PRESSURE: 158 MMHG | HEIGHT: 64 IN | BODY MASS INDEX: 41.15 KG/M2 | DIASTOLIC BLOOD PRESSURE: 88 MMHG | WEIGHT: 241 LBS | HEART RATE: 47 BPM

## 2018-09-26 DIAGNOSIS — M15.9 PRIMARY OSTEOARTHRITIS INVOLVING MULTIPLE JOINTS: ICD-10-CM

## 2018-09-26 DIAGNOSIS — E66.01 MORBID OBESITY WITH BMI OF 40.0-44.9, ADULT (HCC): ICD-10-CM

## 2018-09-26 DIAGNOSIS — Z51.81 ENCOUNTER FOR THERAPEUTIC DRUG MONITORING: ICD-10-CM

## 2018-09-26 DIAGNOSIS — M06.09 SERONEGATIVE RHEUMATOID ARTHRITIS OF MULTIPLE SITES (HCC): Primary | ICD-10-CM

## 2018-09-26 PROCEDURE — 90653 IIV ADJUVANT VACCINE IM: CPT | Performed by: INTERNAL MEDICINE

## 2018-09-26 PROCEDURE — G0463 HOSPITAL OUTPT CLINIC VISIT: HCPCS | Performed by: INTERNAL MEDICINE

## 2018-09-26 PROCEDURE — 99214 OFFICE O/P EST MOD 30 MIN: CPT | Performed by: INTERNAL MEDICINE

## 2018-09-26 PROCEDURE — G0008 ADMIN INFLUENZA VIRUS VAC: HCPCS | Performed by: INTERNAL MEDICINE

## 2018-10-11 ENCOUNTER — OFFICE VISIT (OUTPATIENT)
Dept: INTERNAL MEDICINE CLINIC | Facility: CLINIC | Age: 78
End: 2018-10-11
Payer: COMMERCIAL

## 2018-10-11 VITALS
BODY MASS INDEX: 41.09 KG/M2 | SYSTOLIC BLOOD PRESSURE: 145 MMHG | HEIGHT: 64 IN | HEART RATE: 50 BPM | WEIGHT: 240.69 LBS | DIASTOLIC BLOOD PRESSURE: 81 MMHG

## 2018-10-11 DIAGNOSIS — R26.9 GAIT ABNORMALITY: ICD-10-CM

## 2018-10-11 DIAGNOSIS — N18.30 STAGE 3 CHRONIC KIDNEY DISEASE (HCC): ICD-10-CM

## 2018-10-11 DIAGNOSIS — R60.0 LOWER EXTREMITY EDEMA: ICD-10-CM

## 2018-10-11 DIAGNOSIS — I10 ESSENTIAL HYPERTENSION WITH GOAL BLOOD PRESSURE LESS THAN 140/90: Primary | ICD-10-CM

## 2018-10-11 PROBLEM — M21.70 ACQUIRED LEG LENGTH DISCREPANCY: Status: ACTIVE | Noted: 2018-10-11

## 2018-10-11 PROCEDURE — 99214 OFFICE O/P EST MOD 30 MIN: CPT | Performed by: INTERNAL MEDICINE

## 2018-10-11 PROCEDURE — G0463 HOSPITAL OUTPT CLINIC VISIT: HCPCS | Performed by: INTERNAL MEDICINE

## 2018-10-11 RX ORDER — NEBIVOLOL 10 MG/1
10 TABLET ORAL
Qty: 90 TABLET | Refills: 1 | Status: SHIPPED | OUTPATIENT
Start: 2018-10-11 | End: 2019-06-20

## 2018-10-11 NOTE — PROGRESS NOTES
HPI:    Patient ID: Petra Tucker is a 65year old female. Her back is bothering her. It hurts if she walks a quarter block. She has trouble sleeping even though she uses the CPAP. The leg swelling is better.   She walks with a limp and feels that one WEEK Disp: 96 tablet Rfl: 1   Albuterol Sulfate  (90 Base) MCG/ACT Inhalation Aero Soln Inhale 1 puff into the lungs every 6 (six) hours as needed for Wheezing.  Disp: 1 Inhaler Rfl: 5   HYDROCHLOROTHIAZIDE 12.5 MG Oral Tab TAKE 1 TABLET BY MOUTH  DA Take 1 cap. every day Disp:  Rfl:    aspirin 81 MG Oral Chew Tab Chew  by mouth.  take 1 tablet (81MG)  by oral route  every day Disp:  Rfl:    CENTRUM SILVER OR TABS 1 TABLET DAILY Disp:  Rfl:        Allergies:  Erythromycin Base         Pcn [Bicillin L-A] BONE LENGTH STUDIES (SCANOGRAM) (CPT=77073)    ORTHOPEDIC - INTERNAL       Medium    Essential hypertension with goal blood pressure less than 140/90 - Primary     The patient's blood pressure is high.   I will increase her Bystolic from 5 mg daily to 10 mg

## 2018-10-11 NOTE — PATIENT INSTRUCTIONS
Check blood pressure at different times of the day once or twice a week. Write down the numbers and bring to next appointment. Call if the top number is greater than 140 or the bottom number is greater than 90 on 3 different occasions.

## 2018-10-12 NOTE — ASSESSMENT & PLAN NOTE
The patient has a gait abnormality and she thinks it may be due to a leg length discrepancy. She has had a hip replacement and bilateral knee replacements.   I will check a leg length x-ray and refer her back to her orthopedist.

## 2018-11-05 RX ORDER — HYDROCHLOROTHIAZIDE 12.5 MG/1
TABLET ORAL
Qty: 90 TABLET | Refills: 0 | Status: SHIPPED | OUTPATIENT
Start: 2018-11-05 | End: 2019-02-25 | Stop reason: ALTCHOICE

## 2018-11-06 ENCOUNTER — HOSPITAL ENCOUNTER (OUTPATIENT)
Dept: GENERAL RADIOLOGY | Facility: HOSPITAL | Age: 78
Discharge: HOME | End: 2018-11-06
Attending: INTERNAL MEDICINE
Payer: MEDICARE

## 2018-11-06 DIAGNOSIS — R26.9 GAIT ABNORMALITY: ICD-10-CM

## 2018-11-06 PROCEDURE — 77073 BONE LENGTH STUDIES: CPT | Performed by: INTERNAL MEDICINE

## 2018-12-15 DIAGNOSIS — R60.0 LOWER EXTREMITY EDEMA: ICD-10-CM

## 2018-12-15 RX ORDER — FUROSEMIDE 20 MG/1
TABLET ORAL
Qty: 45 TABLET | Refills: 0 | Status: SHIPPED | OUTPATIENT
Start: 2018-12-15 | End: 2019-11-05

## 2018-12-25 DIAGNOSIS — E03.9 ACQUIRED HYPOTHYROIDISM: ICD-10-CM

## 2018-12-25 DIAGNOSIS — E78.2 MIXED HYPERLIPIDEMIA: ICD-10-CM

## 2018-12-25 RX ORDER — PANTOPRAZOLE SODIUM 40 MG/1
TABLET, DELAYED RELEASE ORAL
Qty: 90 TABLET | Refills: 0 | Status: SHIPPED | OUTPATIENT
Start: 2018-12-25 | End: 2019-04-02

## 2018-12-25 RX ORDER — PRAVASTATIN SODIUM 20 MG
TABLET ORAL
Qty: 90 TABLET | Refills: 0 | Status: SHIPPED | OUTPATIENT
Start: 2018-12-25 | End: 2019-03-19

## 2018-12-25 RX ORDER — LEVOTHYROXINE SODIUM 137 UG/1
TABLET ORAL
Qty: 90 TABLET | Refills: 0 | Status: SHIPPED | OUTPATIENT
Start: 2018-12-25 | End: 2019-03-19

## 2019-01-04 RX ORDER — MEDROXYPROGESTERONE ACETATE 150 MG/ML
INJECTION, SUSPENSION INTRAMUSCULAR
Qty: 12 SYRINGE | Refills: 1 | Status: SHIPPED | OUTPATIENT
Start: 2019-01-04 | End: 2019-06-16

## 2019-01-04 NOTE — TELEPHONE ENCOUNTER
LOV:9-26  Last Filled:6-8, 12 syringes with 1 refill  Labs:   Future Appointments   Date Time Provider Taqueira Pikei   1/8/2019 11:20 AM Gregg Sheehan MD [facility] [place]   1/17/2019 11:10 AM Salvador Blank MD [facility]       Please Advise

## 2019-01-06 ENCOUNTER — LAB ENCOUNTER (OUTPATIENT)
Dept: LAB | Facility: HOSPITAL | Age: 79
End: 2019-01-06
Attending: INTERNAL MEDICINE
Payer: MEDICARE

## 2019-01-06 DIAGNOSIS — M06.09 SERONEGATIVE RHEUMATOID ARTHRITIS OF MULTIPLE SITES (HCC): ICD-10-CM

## 2019-01-06 DIAGNOSIS — M06.9 RHEUMATOID ARTHRITIS (HCC): Primary | ICD-10-CM

## 2019-01-06 DIAGNOSIS — Z51.81 ENCOUNTER FOR THERAPEUTIC DRUG MONITORING: ICD-10-CM

## 2019-01-06 LAB
ALBUMIN SERPL BCP-MCNC: 4.1 G/DL (ref 3.5–4.8)
ALBUMIN/GLOB SERPL: 1.6 {RATIO} (ref 1–2)
ALP SERPL-CCNC: 50 U/L (ref 32–100)
ALT SERPL-CCNC: 22 U/L (ref 14–54)
ANION GAP SERPL CALC-SCNC: 11 MMOL/L (ref 0–18)
AST SERPL-CCNC: 26 U/L (ref 15–41)
BASOPHILS # BLD: 0.1 K/UL (ref 0–0.2)
BASOPHILS NFR BLD: 1 %
BILIRUB SERPL-MCNC: 1 MG/DL (ref 0.3–1.2)
BUN SERPL-MCNC: 25 MG/DL (ref 8–20)
BUN/CREAT SERPL: 22.1 (ref 10–20)
CALCIUM SERPL-MCNC: 9.2 MG/DL (ref 8.5–10.5)
CHLORIDE SERPL-SCNC: 106 MMOL/L (ref 95–110)
CO2 SERPL-SCNC: 25 MMOL/L (ref 22–32)
CREAT SERPL-MCNC: 1.13 MG/DL (ref 0.5–1.5)
CRP SERPL-MCNC: 0.8 MG/DL (ref 0–0.9)
EOSINOPHIL # BLD: 0.1 K/UL (ref 0–0.7)
EOSINOPHIL NFR BLD: 2 %
ERYTHROCYTE [DISTWIDTH] IN BLOOD BY AUTOMATED COUNT: 14.9 % (ref 11–15)
ERYTHROCYTE [SEDIMENTATION RATE] IN BLOOD: 11 MM/HR (ref 0–30)
GLOBULIN PLAS-MCNC: 2.5 G/DL (ref 2.5–3.7)
GLUCOSE SERPL-MCNC: 97 MG/DL (ref 70–99)
HCT VFR BLD AUTO: 36.3 % (ref 35–48)
HGB BLD-MCNC: 12 G/DL (ref 12–16)
LYMPHOCYTES # BLD: 1.6 K/UL (ref 1–4)
LYMPHOCYTES NFR BLD: 34 %
MCH RBC QN AUTO: 33.7 PG (ref 27–32)
MCHC RBC AUTO-ENTMCNC: 33.1 G/DL (ref 32–37)
MCV RBC AUTO: 101.6 FL (ref 80–100)
MONOCYTES # BLD: 0.7 K/UL (ref 0–1)
MONOCYTES NFR BLD: 14 %
NEUTROPHILS # BLD AUTO: 2.3 K/UL (ref 1.8–7.7)
NEUTROPHILS NFR BLD: 49 %
OSMOLALITY UR CALC.SUM OF ELEC: 298 MOSM/KG (ref 275–295)
PATIENT FASTING: NO
PLATELET # BLD AUTO: 167 K/UL (ref 140–400)
PMV BLD AUTO: 7.6 FL (ref 7.4–10.3)
POTASSIUM SERPL-SCNC: 4.1 MMOL/L (ref 3.3–5.1)
PROT SERPL-MCNC: 6.6 G/DL (ref 5.9–8.4)
RBC # BLD AUTO: 3.57 M/UL (ref 3.7–5.4)
SODIUM SERPL-SCNC: 142 MMOL/L (ref 136–144)
WBC # BLD AUTO: 4.8 K/UL (ref 4–11)

## 2019-01-06 PROCEDURE — 85025 COMPLETE CBC W/AUTO DIFF WBC: CPT

## 2019-01-06 PROCEDURE — 36415 COLL VENOUS BLD VENIPUNCTURE: CPT

## 2019-01-06 PROCEDURE — 85652 RBC SED RATE AUTOMATED: CPT

## 2019-01-06 PROCEDURE — 86140 C-REACTIVE PROTEIN: CPT

## 2019-01-06 PROCEDURE — 80053 COMPREHEN METABOLIC PANEL: CPT

## 2019-01-07 ENCOUNTER — TELEPHONE (OUTPATIENT)
Dept: RHEUMATOLOGY | Facility: CLINIC | Age: 79
End: 2019-01-07

## 2019-01-07 NOTE — TELEPHONE ENCOUNTER
Medication PA Requested:   Enbrel 50 mg pen    reauthorization                                                   Pt insurance/number to contact: form printed off [employer] website  CoverMyMeds Used:    Key:   Insurance ID# and [id number]  Dx.:M06.09  Me

## 2019-01-07 NOTE — PROGRESS NOTES
Thai Lagos is a 78-year-old patient of Dr. Gisselle Pena with seronegative rheumatoid arthritis. Since I saw her September 26th of 2018, she has continued Enbrel weekly, methotrexate 20mg weekly, and Plaquenil 400mg daily.   She feels like her rheumatoid arthritis is c normal. She exhibits no mass. There is no tenderness. There is no rebound and no guarding. Musculoskeletal: She exhibits no edema. There is not active synovitis in her wrists or hands. Bony deformity of left thumb.   Lymphadenopathy:     She has no cerv

## 2019-01-08 ENCOUNTER — OFFICE VISIT (OUTPATIENT)
Dept: RHEUMATOLOGY | Facility: CLINIC | Age: 79
End: 2019-01-08
Payer: COMMERCIAL

## 2019-01-08 VITALS
SYSTOLIC BLOOD PRESSURE: 115 MMHG | DIASTOLIC BLOOD PRESSURE: 62 MMHG | HEIGHT: 64 IN | WEIGHT: 242 LBS | BODY MASS INDEX: 41.32 KG/M2 | HEART RATE: 50 BPM

## 2019-01-08 DIAGNOSIS — M15.9 PRIMARY OSTEOARTHRITIS INVOLVING MULTIPLE JOINTS: ICD-10-CM

## 2019-01-08 DIAGNOSIS — M06.09 SERONEGATIVE RHEUMATOID ARTHRITIS OF MULTIPLE SITES (HCC): Primary | ICD-10-CM

## 2019-01-08 DIAGNOSIS — Z51.81 ENCOUNTER FOR THERAPEUTIC DRUG MONITORING: ICD-10-CM

## 2019-01-08 PROCEDURE — G0463 HOSPITAL OUTPT CLINIC VISIT: HCPCS | Performed by: INTERNAL MEDICINE

## 2019-01-08 PROCEDURE — 99214 OFFICE O/P EST MOD 30 MIN: CPT | Performed by: INTERNAL MEDICINE

## 2019-01-09 ENCOUNTER — PRIOR ORIGINAL RECORDS (OUTPATIENT)
Dept: OTHER | Age: 79
End: 2019-01-09

## 2019-01-11 LAB
BUN: 25 MG/DL
CHLORIDE: 106 MEQ/L
CREATININE, SERUM: 1.13 MG/DL
GLUCOSE: 97 MG/DL
HEMATOCRIT: 36.3 %
HEMOGLOBIN: 12 G/DL
PLATELETS: 167 K/UL
POTASSIUM, SERUM: 4.1 MEQ/L
RED BLOOD COUNT: 3.57 X 10-6/U
SODIUM: 142 MEQ/L
WHITE BLOOD COUNT: 4.8 X 10-3/U

## 2019-01-17 ENCOUNTER — OFFICE VISIT (OUTPATIENT)
Dept: INTERNAL MEDICINE CLINIC | Facility: CLINIC | Age: 79
End: 2019-01-17
Payer: COMMERCIAL

## 2019-01-17 VITALS
BODY MASS INDEX: 41.66 KG/M2 | HEART RATE: 81 BPM | HEIGHT: 64 IN | SYSTOLIC BLOOD PRESSURE: 170 MMHG | WEIGHT: 244 LBS | DIASTOLIC BLOOD PRESSURE: 76 MMHG

## 2019-01-17 DIAGNOSIS — I10 ESSENTIAL HYPERTENSION: Primary | ICD-10-CM

## 2019-01-17 DIAGNOSIS — D84.9 IMMUNOSUPPRESSED STATUS (HCC): ICD-10-CM

## 2019-01-17 DIAGNOSIS — I70.0 ATHEROSCLEROSIS OF AORTIC ARCH (HCC): ICD-10-CM

## 2019-01-17 DIAGNOSIS — Z12.39 SCREENING FOR BREAST CANCER: ICD-10-CM

## 2019-01-17 DIAGNOSIS — E66.01 MORBID OBESITY WITH BMI OF 40.0-44.9, ADULT (HCC): ICD-10-CM

## 2019-01-17 DIAGNOSIS — E78.00 HYPERCHOLESTEROLEMIA: ICD-10-CM

## 2019-01-17 DIAGNOSIS — R26.9 GAIT ABNORMALITY: ICD-10-CM

## 2019-01-17 PROCEDURE — 99214 OFFICE O/P EST MOD 30 MIN: CPT | Performed by: INTERNAL MEDICINE

## 2019-01-17 PROCEDURE — G0463 HOSPITAL OUTPT CLINIC VISIT: HCPCS | Performed by: INTERNAL MEDICINE

## 2019-01-17 NOTE — PROGRESS NOTES
HPI:    Patient ID: Srinath Montenegro is a 77year old female. Her blood pressure is very high today. She doesn't know why. It is normal at home. She still has back pain.   She had the leg length x-ray and she has an appointment with Dr. Sharlyne Leyden (ortho) next WEEK Disp: 12 Syringe Rfl: 1   LEVOTHYROXINE SODIUM 137 MCG Oral Tab TAKE 1 TABLET BY MOUTH  BEFORE BREAKFAST Disp: 90 tablet Rfl: 0   PRAVASTATIN SODIUM 20 MG Oral Tab TAKE 1 TABLET BY MOUTH  NIGHTLY Disp: 90 tablet Rfl: 0   PANTOPRAZOLE SODIUM 40 MG Oral (D-5000) 5000 UNITS Oral Tab Take  by mouth. Take 1 cap. every day Disp:  Rfl:    aspirin 81 MG Oral Chew Tab Chew  by mouth.  take 1 tablet (81MG)  by oral route  every day Disp:  Rfl:    CENTRUM SILVER OR TABS 1 TABLET DAILY Disp:  Rfl:        Allergies: INTERNAL       Medium    Essential hypertension - Primary     The patient's blood pressure is unusually high today and the patient does not know why. She does not think she ate anything salty. We will recheck it at the next office visit.             Tesfaye Covarrubias

## 2019-01-18 ENCOUNTER — MYAURORA ACCOUNT LINK (OUTPATIENT)
Dept: OTHER | Age: 79
End: 2019-01-18

## 2019-01-18 ENCOUNTER — PRIOR ORIGINAL RECORDS (OUTPATIENT)
Dept: OTHER | Age: 79
End: 2019-01-18

## 2019-01-18 NOTE — ASSESSMENT & PLAN NOTE
We will continue to control the patient's risk factors of high blood pressure and high cholesterol to minimize further atherosclerosis.

## 2019-01-18 NOTE — ASSESSMENT & PLAN NOTE
Pt has a leg length discrepancy. She also complains of problems with her balance.   I will refer her for physical therapy and she also has an appointment with the orthopedist.

## 2019-01-18 NOTE — ASSESSMENT & PLAN NOTE
Controlled. Continue present management. She is due for a lipid panel. She will do it with her next blood draw.

## 2019-02-11 ENCOUNTER — TELEPHONE (OUTPATIENT)
Dept: FAMILY MEDICINE CLINIC | Facility: CLINIC | Age: 79
End: 2019-02-11

## 2019-02-11 ENCOUNTER — PRIOR ORIGINAL RECORDS (OUTPATIENT)
Dept: OTHER | Age: 79
End: 2019-02-11

## 2019-02-11 NOTE — TELEPHONE ENCOUNTER
Verified message below with pharmacist. Pharmacist states Merlin is recommending discontinuing Niacin while pt is taking a statin medication unless the triglycerides are extremely elevated. Niacin last filled 8/1/18, Pravastatin 12/25/18.

## 2019-02-11 NOTE — TELEPHONE ENCOUNTER
PER PHARMACY  IF THIS PATIENT IS CURRENTLY ON NIACIN THERAPY AND A STATIN  IT IS RECOMMENDED DISCONTINUATION OF THE NIACIN    IF THE PATIENT IS CURRENTLY ON NIACIN THERAPY BUT NOT A STATIN.  IT IS RECOMMENDED CHANGING THE PATIENT'S THERAPY FROM NIACIN TO ON

## 2019-02-25 ENCOUNTER — OFFICE VISIT (OUTPATIENT)
Dept: OTOLARYNGOLOGY | Facility: CLINIC | Age: 79
End: 2019-02-25
Payer: COMMERCIAL

## 2019-02-25 VITALS
SYSTOLIC BLOOD PRESSURE: 148 MMHG | DIASTOLIC BLOOD PRESSURE: 71 MMHG | HEIGHT: 64 IN | TEMPERATURE: 98 F | WEIGHT: 244 LBS | BODY MASS INDEX: 41.66 KG/M2

## 2019-02-25 DIAGNOSIS — H61.23 BILATERAL IMPACTED CERUMEN: Primary | ICD-10-CM

## 2019-02-25 PROCEDURE — 69210 REMOVE IMPACTED EAR WAX UNI: CPT | Performed by: OTOLARYNGOLOGY

## 2019-02-25 NOTE — PROGRESS NOTES
After informed consent was obtained, the patients ears were examined under the operating microscope. Cerumen impaction was removed from bilateral ears using suction. Tympanic membranes were noted to be normal. Patient tolerated the procedure well.   All que

## 2019-02-28 VITALS
WEIGHT: 244 LBS | OXYGEN SATURATION: 98 % | BODY MASS INDEX: 41.66 KG/M2 | SYSTOLIC BLOOD PRESSURE: 126 MMHG | HEART RATE: 57 BPM | HEIGHT: 64 IN | RESPIRATION RATE: 22 BRPM | DIASTOLIC BLOOD PRESSURE: 70 MMHG

## 2019-02-28 VITALS
HEART RATE: 57 BPM | SYSTOLIC BLOOD PRESSURE: 134 MMHG | OXYGEN SATURATION: 96 % | BODY MASS INDEX: 42.17 KG/M2 | DIASTOLIC BLOOD PRESSURE: 68 MMHG | WEIGHT: 247 LBS | HEIGHT: 64 IN

## 2019-02-28 VITALS
DIASTOLIC BLOOD PRESSURE: 94 MMHG | SYSTOLIC BLOOD PRESSURE: 174 MMHG | BODY MASS INDEX: 41.15 KG/M2 | WEIGHT: 241 LBS | HEART RATE: 60 BPM | OXYGEN SATURATION: 97 % | HEIGHT: 64 IN

## 2019-03-01 ENCOUNTER — MED REC SCAN ONLY (OUTPATIENT)
Dept: INTERNAL MEDICINE CLINIC | Facility: CLINIC | Age: 79
End: 2019-03-01

## 2019-03-01 VITALS
DIASTOLIC BLOOD PRESSURE: 62 MMHG | HEIGHT: 64 IN | OXYGEN SATURATION: 97 % | HEART RATE: 74 BPM | WEIGHT: 251 LBS | SYSTOLIC BLOOD PRESSURE: 118 MMHG | BODY MASS INDEX: 42.85 KG/M2

## 2019-03-07 RX ORDER — SOLIFENACIN SUCCINATE 5 MG/1
TABLET, FILM COATED ORAL
Qty: 30 TABLET | Refills: 10 | OUTPATIENT
Start: 2019-03-07

## 2019-03-07 NOTE — TELEPHONE ENCOUNTER
Pt LOV with Dr. Wilmar Burt 7/26/17 pt pharmacy requesting refill on vesicare. Pt has no upcoming aravind and her last visit was over 1 year ago, refill denied pt needs to make aravind or contact primary.

## 2019-03-11 NOTE — TELEPHONE ENCOUNTER
LOV: 1-8-19  Last Refilled:#96, 1rf 9/19/18  Labs:AST 26  1/6/19      Future Appointments   Date Time Provider Taqueria Wise   3/15/2019  2:20 PM LMB [place] [id number] LMB [facility] [place]   4/9/2019 11:20 AM Heide Barfield MD [facility] [place] [facility] [place]   4/16/201

## 2019-03-14 RX ORDER — SOLIFENACIN SUCCINATE 5 MG/1
TABLET, FILM COATED ORAL
Qty: 30 TABLET | Refills: 10 | OUTPATIENT
Start: 2019-03-14

## 2019-03-14 NOTE — TELEPHONE ENCOUNTER
Pt LOV with Dr. Fco López 7/26/17pt has no upcoming aravind and pharmacy requesting refill on vesicare. Pt needs to make aravind. Refill denied.

## 2019-03-15 ENCOUNTER — HOSPITAL ENCOUNTER (OUTPATIENT)
Dept: MAMMOGRAPHY | Age: 79
Discharge: HOME | End: 2019-03-15
Attending: INTERNAL MEDICINE
Payer: MEDICARE

## 2019-03-15 DIAGNOSIS — Z12.39 SCREENING FOR BREAST CANCER: ICD-10-CM

## 2019-03-15 PROCEDURE — 77063 BREAST TOMOSYNTHESIS BI: CPT | Performed by: INTERNAL MEDICINE

## 2019-03-15 PROCEDURE — 77067 SCR MAMMO BI INCL CAD: CPT | Performed by: INTERNAL MEDICINE

## 2019-03-18 ENCOUNTER — OFFICE VISIT (OUTPATIENT)
Dept: SURGERY | Facility: CLINIC | Age: 79
End: 2019-03-18
Payer: COMMERCIAL

## 2019-03-18 VITALS
SYSTOLIC BLOOD PRESSURE: 160 MMHG | WEIGHT: 244 LBS | BODY MASS INDEX: 42 KG/M2 | HEART RATE: 57 BPM | TEMPERATURE: 98 F | DIASTOLIC BLOOD PRESSURE: 75 MMHG

## 2019-03-18 DIAGNOSIS — N39.46 MIXED INCONTINENCE: ICD-10-CM

## 2019-03-18 DIAGNOSIS — N32.81 OVERACTIVE BLADDER: Primary | ICD-10-CM

## 2019-03-18 PROCEDURE — G0463 HOSPITAL OUTPT CLINIC VISIT: HCPCS | Performed by: NURSE PRACTITIONER

## 2019-03-18 PROCEDURE — 99213 OFFICE O/P EST LOW 20 MIN: CPT | Performed by: NURSE PRACTITIONER

## 2019-03-18 RX ORDER — SOLIFENACIN SUCCINATE 10 MG/1
10 TABLET, FILM COATED ORAL DAILY
Qty: 90 TABLET | Refills: 3 | Status: SHIPPED | OUTPATIENT
Start: 2019-03-18 | End: 2019-11-05 | Stop reason: ALTCHOICE

## 2019-03-18 NOTE — PROGRESS NOTES
HPI:    Patient ID: Marcelo Schirmer is a 77year old female. Patient is a 77year old female who presents to the clinic for a follow up regarding overactive bladder and mixed incontinence. Patient of Dr. Regina Perea last seen in July of 2017.   She has been t MORNING BEFORE  BREAKFAST Disp: 90 tablet Rfl: 0   FUROSEMIDE 20 MG Oral Tab TAKE ONE TABLET BY MOUTH EVERY OTHER DAY  Disp: 45 tablet Rfl: 0   Nebivolol HCl 10 MG Oral Tab Take 1 tablet (10 mg total) by mouth once daily.  Disp: 90 tablet Rfl: 1   Albuterol Arthrodesis/Lapidus/MONA/FDL to Post Tib Transfer, w/, Global Exp.10/19/15 7/23/2015   • Unspecified essential hypertension    • Unspecified sleep apnea       Past Surgical History:   Procedure Laterality Date   • ANGIOGRAM  2013   • CATARACT mood and affect. ASSESSMENT/PLAN:   Overactive bladder  (primary encounter diagnosis)  Mixed incontinence    Patient is a 77year old female who presents to the clinic for a follow up.   She has been taking Vesicare 5 mg daily for her overactive

## 2019-03-19 DIAGNOSIS — E78.2 MIXED HYPERLIPIDEMIA: ICD-10-CM

## 2019-03-19 DIAGNOSIS — I10 ESSENTIAL HYPERTENSION WITH GOAL BLOOD PRESSURE LESS THAN 140/90: ICD-10-CM

## 2019-03-19 DIAGNOSIS — E03.9 ACQUIRED HYPOTHYROIDISM: ICD-10-CM

## 2019-03-20 RX ORDER — LISINOPRIL 40 MG/1
TABLET ORAL
Qty: 90 TABLET | Refills: 1 | Status: SHIPPED | OUTPATIENT
Start: 2019-03-20 | End: 2019-08-19

## 2019-03-20 RX ORDER — LEVOTHYROXINE SODIUM 137 UG/1
TABLET ORAL
Qty: 90 TABLET | Refills: 1 | Status: SHIPPED | OUTPATIENT
Start: 2019-03-20 | End: 2019-07-30

## 2019-03-20 RX ORDER — NIACIN 750 MG/1
TABLET, EXTENDED RELEASE ORAL
Qty: 90 TABLET | Refills: 1 | Status: SHIPPED | OUTPATIENT
Start: 2019-03-20 | End: 2019-07-30

## 2019-03-20 RX ORDER — PRAVASTATIN SODIUM 20 MG
TABLET ORAL
Qty: 90 TABLET | Refills: 1 | Status: SHIPPED | OUTPATIENT
Start: 2019-03-20 | End: 2019-07-30

## 2019-03-22 ENCOUNTER — TELEPHONE (OUTPATIENT)
Dept: INTERNAL MEDICINE CLINIC | Facility: CLINIC | Age: 79
End: 2019-03-22

## 2019-03-22 NOTE — TELEPHONE ENCOUNTER
Per pt, she needs an updated Order for her Physical Therapy that it is on her chart. Pt has an appt on 03/29/2019 in [facility]. Pls advise.

## 2019-03-26 DIAGNOSIS — R26.9 GAIT ABNORMALITY: Primary | ICD-10-CM

## 2019-03-26 NOTE — TELEPHONE ENCOUNTER
I spoke to the patient and she informed me that the PT department informed her that the order was only good for 3 months and she would need an updated order.   Pt states that they informed her that she would need an updated order to be valid, she had never

## 2019-03-27 RX ORDER — FOLIC ACID 1 MG/1
TABLET ORAL
Qty: 90 TABLET | Refills: 3 | Status: SHIPPED | OUTPATIENT
Start: 2019-03-27 | End: 2020-03-16

## 2019-03-27 NOTE — TELEPHONE ENCOUNTER
LOV:1-8  Last Filled:5-1, #90 with 3 refills  Labs:   Future Appointments   Date Time Provider Taqueria Billie   3/29/2019 10:00 AM Kirby Cuevas, PT [facility] [place]   4/8/2019 12:15 PM Wilian Cuevas, PT [facility] [place]   4/9/2019

## 2019-03-29 ENCOUNTER — OFFICE VISIT (OUTPATIENT)
Dept: PHYSICAL THERAPY | Age: 79
End: 2019-03-29
Attending: INTERNAL MEDICINE
Payer: MEDICARE

## 2019-03-29 DIAGNOSIS — R26.9 GAIT ABNORMALITY: ICD-10-CM

## 2019-03-29 PROCEDURE — 97112 NEUROMUSCULAR REEDUCATION: CPT

## 2019-03-29 PROCEDURE — 97162 PT EVAL MOD COMPLEX 30 MIN: CPT

## 2019-03-29 NOTE — PROGRESS NOTES
FALL SCREEN EVALUATION:   Referring Physician: Dr. Latanya Quiroz  Date of Onset: 1-2years ago Date of Service: 3/29/2019   Dx:  Gait abnormality (R26.9)  PATIENT SUMMARY:   Marjorie El is a 77year old y/o female who presents to therapy today with complaints UE support. She presents ambulating with SC with waddling gait pattern with decreased step length, decreased foot clearance, dec heel to toe. Static and dynamic balance tests reveal balance deficits and place pt at increased risk for falls.     Britni kingston applies. (3)  Normal: Walks 20’, no assistive devices, good speed, no evidence of imbalance, normal gait pattern. (2)  Mild Impairment: Walks 20’, uses assistive devices, slower speed, mild gait deviations.   (1)  Moderate Impairment: Walks 20’, slow spee Impairment: Performs head turns smoothly with slight change in gait velocity, i.e., minor disruption to smooth gait path or uses walking aid.   (1) Moderate Impairment: Performs head turns with moderate change in gait velocity, slows down, staggers but nubia Training; Therapeutic Exercises; Manual Therapy;  Patient education; Home exercise program instruction    Education or treatment limitation: None  Rehab Potential:excellent    SALEEM/56    Patient was advised of these findings, precautions, and treatment

## 2019-04-03 ENCOUNTER — OFFICE VISIT (OUTPATIENT)
Dept: PHYSICAL THERAPY | Age: 79
End: 2019-04-03
Attending: INTERNAL MEDICINE
Payer: MEDICARE

## 2019-04-03 PROCEDURE — 97112 NEUROMUSCULAR REEDUCATION: CPT

## 2019-04-03 PROCEDURE — 97110 THERAPEUTIC EXERCISES: CPT

## 2019-04-03 RX ORDER — PANTOPRAZOLE SODIUM 40 MG/1
TABLET, DELAYED RELEASE ORAL
Qty: 90 TABLET | Refills: 0 | Status: SHIPPED | OUTPATIENT
Start: 2019-04-03 | End: 2019-05-22

## 2019-04-03 NOTE — PROGRESS NOTES
Dx:  Gait abnormality (R26.9)  Authorized # of Visits:  10-12 per POC         Next MD visit: none scheduled  Fall Risk: standard         Precautions: n/a           Medication Changes since last visit?: No  Subjective: Patient reports tired today.  Has been to promote safety and decrease risk of falls on uneven surfaces such as grass  · Pt will perform 4-Item DGI with score of 10/12 or greater with least restrictive AD to demonstrate ability to ambulate safely in crowded and busy environments  · PT will impro

## 2019-04-07 RX ORDER — FUROSEMIDE 20 MG/1
20 TABLET ORAL AT BEDTIME
COMMUNITY
End: 2020-07-28 | Stop reason: DRUGHIGH

## 2019-04-07 RX ORDER — PRAVASTATIN SODIUM 20 MG
TABLET ORAL
COMMUNITY

## 2019-04-07 RX ORDER — METHOTREXATE 2.5 MG/1
TABLET ORAL
COMMUNITY

## 2019-04-07 RX ORDER — PRAVASTATIN SODIUM 40 MG
TABLET ORAL
COMMUNITY
End: 2019-04-07 | Stop reason: CLARIF

## 2019-04-07 RX ORDER — LOPERAMIDE HYDROCHLORIDE 2 MG/1
TABLET ORAL
COMMUNITY

## 2019-04-07 RX ORDER — SOLIFENACIN SUCCINATE 5 MG/1
5 TABLET, FILM COATED ORAL DAILY
COMMUNITY

## 2019-04-07 RX ORDER — NEBIVOLOL 10 MG/1
TABLET ORAL
COMMUNITY

## 2019-04-07 RX ORDER — LISINOPRIL 40 MG/1
40 TABLET ORAL DAILY
COMMUNITY

## 2019-04-07 RX ORDER — NIACIN 750 MG/1
TABLET, EXTENDED RELEASE ORAL
COMMUNITY

## 2019-04-07 RX ORDER — HYDROXYCHLOROQUINE SULFATE 200 MG/1
TABLET, FILM COATED ORAL
COMMUNITY

## 2019-04-07 RX ORDER — L.ACID,FERM,PLA,RHA/B.BIF,LONG 126 MG
TABLET, DELAYED AND EXTENDED RELEASE ORAL
COMMUNITY
End: 2019-07-12

## 2019-04-08 ENCOUNTER — OFFICE VISIT (OUTPATIENT)
Dept: PHYSICAL THERAPY | Age: 79
End: 2019-04-08
Attending: INTERNAL MEDICINE
Payer: MEDICARE

## 2019-04-08 ENCOUNTER — LAB ENCOUNTER (OUTPATIENT)
Dept: LAB | Age: 79
End: 2019-04-08
Attending: INTERNAL MEDICINE
Payer: MEDICARE

## 2019-04-08 DIAGNOSIS — Z51.81 ENCOUNTER FOR THERAPEUTIC DRUG MONITORING: ICD-10-CM

## 2019-04-08 DIAGNOSIS — N39.46 MIXED INCONTINENCE: ICD-10-CM

## 2019-04-08 DIAGNOSIS — M06.09 SERONEGATIVE RHEUMATOID ARTHRITIS OF MULTIPLE SITES (HCC): ICD-10-CM

## 2019-04-08 DIAGNOSIS — N32.81 OVERACTIVE BLADDER: ICD-10-CM

## 2019-04-08 PROCEDURE — 85652 RBC SED RATE AUTOMATED: CPT

## 2019-04-08 PROCEDURE — 87086 URINE CULTURE/COLONY COUNT: CPT

## 2019-04-08 PROCEDURE — 82040 ASSAY OF SERUM ALBUMIN: CPT

## 2019-04-08 PROCEDURE — 86140 C-REACTIVE PROTEIN: CPT

## 2019-04-08 PROCEDURE — 82565 ASSAY OF CREATININE: CPT

## 2019-04-08 PROCEDURE — 36415 COLL VENOUS BLD VENIPUNCTURE: CPT

## 2019-04-08 PROCEDURE — 84450 TRANSFERASE (AST) (SGOT): CPT

## 2019-04-08 PROCEDURE — 85025 COMPLETE CBC W/AUTO DIFF WBC: CPT

## 2019-04-08 PROCEDURE — 81001 URINALYSIS AUTO W/SCOPE: CPT

## 2019-04-08 PROCEDURE — 97112 NEUROMUSCULAR REEDUCATION: CPT

## 2019-04-08 PROCEDURE — 97110 THERAPEUTIC EXERCISES: CPT

## 2019-04-08 NOTE — PROGRESS NOTES
Hayley Cuevas is a 54-year-old patient of Dr. Scot Paulson with seronegative rheumatoid arthritis. Since I saw her January 8th of 2019, she has continued Enbrel weekly, methotrexate 20mg weekly, and Plaquenil 400mg daily.   She feels like her rheumatoid arthritis is cont Normal rate, regular rhythm and normal heart sounds. Pulmonary/Chest: Effort normal and breath sounds normal.   Abdominal: Soft. Bowel sounds are normal. She exhibits no mass. There is no tenderness. There is no rebound and no guarding.    Ghislaine Morgan

## 2019-04-08 NOTE — PROGRESS NOTES
Dx:  Gait abnormality (R26.9)  Authorized # of Visits:  10-12 per POC         Next MD visit: none scheduled  Fall Risk: standard         Precautions: n/a           Medication Changes since last visit?: No  Subjective: Patient reports has been \"cheating\" not requiring PT CGA (although CGA continued for safety). Progressed dynamic balance. Pt able to perform all exercises without LOB, although continued CGA required for safety. Minimal, although observable, changes in gait speed noted when cued for change.

## 2019-04-09 ENCOUNTER — OFFICE VISIT (OUTPATIENT)
Dept: RHEUMATOLOGY | Facility: CLINIC | Age: 79
End: 2019-04-09
Payer: COMMERCIAL

## 2019-04-09 ENCOUNTER — APPOINTMENT (OUTPATIENT)
Dept: LAB | Age: 79
End: 2019-04-09
Attending: INTERNAL MEDICINE
Payer: MEDICARE

## 2019-04-09 ENCOUNTER — TELEPHONE (OUTPATIENT)
Dept: SURGERY | Facility: CLINIC | Age: 79
End: 2019-04-09

## 2019-04-09 VITALS
DIASTOLIC BLOOD PRESSURE: 73 MMHG | WEIGHT: 243 LBS | BODY MASS INDEX: 41.48 KG/M2 | SYSTOLIC BLOOD PRESSURE: 116 MMHG | HEART RATE: 61 BPM | HEIGHT: 64 IN

## 2019-04-09 DIAGNOSIS — Z51.81 ENCOUNTER FOR THERAPEUTIC DRUG MONITORING: ICD-10-CM

## 2019-04-09 DIAGNOSIS — M06.09 SERONEGATIVE RHEUMATOID ARTHRITIS OF MULTIPLE SITES (HCC): Primary | ICD-10-CM

## 2019-04-09 DIAGNOSIS — E78.00 HYPERCHOLESTEROLEMIA: ICD-10-CM

## 2019-04-09 DIAGNOSIS — N32.81 OAB (OVERACTIVE BLADDER): Primary | ICD-10-CM

## 2019-04-09 LAB
CHOLEST SERPL-MCNC: 178 MG/DL
CHOLEST/HDLC SERPL: NORMAL {RATIO}
HDLC SERPL-MCNC: 95 MG/DL
LDLC SERPL CALC-MCNC: 63 MG/DL
LENGTH OF FAST TIME PATIENT: NORMAL H
NONHDLC SERPL-MCNC: 83 MG/DL
TRIGL SERPL-MCNC: 100 MG/DL
VLDLC SERPL CALC-MCNC: 20 MG/DL

## 2019-04-09 PROCEDURE — 99214 OFFICE O/P EST MOD 30 MIN: CPT | Performed by: INTERNAL MEDICINE

## 2019-04-09 PROCEDURE — 80061 LIPID PANEL: CPT

## 2019-04-09 PROCEDURE — 84443 ASSAY THYROID STIM HORMONE: CPT

## 2019-04-09 PROCEDURE — G0463 HOSPITAL OUTPT CLINIC VISIT: HCPCS | Performed by: INTERNAL MEDICINE

## 2019-04-09 PROCEDURE — 36415 COLL VENOUS BLD VENIPUNCTURE: CPT

## 2019-04-09 RX ORDER — HYDROXYCHLOROQUINE SULFATE 200 MG/1
TABLET, FILM COATED ORAL
Qty: 180 TABLET | Refills: 3 | Status: SHIPPED | OUTPATIENT
Start: 2019-04-09 | End: 2019-04-16

## 2019-04-09 NOTE — TELEPHONE ENCOUNTER
UA with no significant abnormality. Continue with current plans including vesicare. Patient was agreeable.

## 2019-04-15 RX ORDER — HYDROXYCHLOROQUINE SULFATE 200 MG/1
TABLET, FILM COATED ORAL
Qty: 180 TABLET | Refills: 3 | Status: SHIPPED | OUTPATIENT
Start: 2019-04-15 | End: 2020-03-16

## 2019-04-15 NOTE — TELEPHONE ENCOUNTER
LOV: 4/9/2019  Future Appointments   Date Time Provider Taqueria Wise   4/16/2019 11:10 AM Renata Dugan MD [facility]   4/17/2019 10:00 AM Bousson, Shellice, PT [facility] [place]   4/19/2019 10:00 AM Bousson, Shellice, PT [facility] [place]   4/22/2019  2:30 PM Bousson, Shellice, PT [facility] [place]   4/24/2019  1:00 PM Bousson, Shellice, PT [facility] [place]   4/29/2019  2:30 PM Bousson, Shellice, PT [facility] [place]   5/1/2019  1:00 PM Bousson, Shellice, PT [facility] [place]   5/6/2019  1:00 PM Bousson, Shellice, PT [facility] [place]   7/16/2019 11:00 AM Thomas Rose MD [facility] [place] [facility] [place]     Labs:   Component      Latest Ref Rng & Units 4/8/2019   WBC      4.0 - 11.0 x10(3) uL 4.9   RBC      3.80 - 5.30 x10(6)uL 3.45 (L)   Hemoglobin      12.0 - 16.0 g/dL 11.6 (L)   Hematocrit      35.0 - 48.0 % 36.3   MCV      80.0 - 100.0 fL 105.2 (H)   MCH      26.0 - 34.0 pg 33.6   MCHC      31.0 - 37.0 g/dL 32.0   RDW-SD      35.1 - 46.3 fL 54.7 (H)   RDW      11.0 - 15.0 % 14.3   Platelet Count      951.1 - 450.0 10(3)uL 168.0   Prelim Neutrophil Abs      1.50 - 7.70 x10 (3) uL 2.33   Neutrophils Absolute      1.50 - 7.70 x10(3) uL 2.33   Lymphocytes Absolute      1.00 - 4.00 x10(3) uL 1.52   Monocytes Absolute      0.10 - 1.00 x10(3) uL 0.85   Eosinophils Absolute      0.00 - 0.70 x10(3) uL 0.15   Basophils Absolute      0.00 - 0.20 x10(3) uL 0.05   Immature Granulocyte Absolute      0.00 - 1.00 x10(3) uL 0.00   Neutrophils %      % 47.6   Lymphocytes %      % 31.0   Monocytes %      % 17.3   Eosinophils %      % 3.1   Basophils %      % 1.0   Immature Granulocyte %      % 0.0   CREATININE      0.55 - 1.02 mg/dL 0.96   GFR, Non-      >=60 56 (L)   GFR, -American      >=60 65   SED RATE      0 - 30 mm/Hr 11   C-REACTIVE PROTEIN      <0.30 mg/dL 0.34 (H)   AST (SGOT)      15 - 37 U/L 16   Albumin      3.4 - 5.0 g/dL 3.7

## 2019-04-16 ENCOUNTER — OFFICE VISIT (OUTPATIENT)
Dept: INTERNAL MEDICINE CLINIC | Facility: CLINIC | Age: 79
End: 2019-04-16
Payer: COMMERCIAL

## 2019-04-16 VITALS
RESPIRATION RATE: 16 BRPM | HEIGHT: 64 IN | DIASTOLIC BLOOD PRESSURE: 81 MMHG | WEIGHT: 242.63 LBS | BODY MASS INDEX: 41.42 KG/M2 | HEART RATE: 60 BPM | SYSTOLIC BLOOD PRESSURE: 145 MMHG | TEMPERATURE: 98 F

## 2019-04-16 DIAGNOSIS — M15.9 PRIMARY OSTEOARTHRITIS INVOLVING MULTIPLE JOINTS: ICD-10-CM

## 2019-04-16 DIAGNOSIS — E66.01 MORBID OBESITY WITH BMI OF 40.0-44.9, ADULT (HCC): ICD-10-CM

## 2019-04-16 DIAGNOSIS — R60.0 LOWER EXTREMITY EDEMA: ICD-10-CM

## 2019-04-16 DIAGNOSIS — I10 ESSENTIAL HYPERTENSION: ICD-10-CM

## 2019-04-16 DIAGNOSIS — E03.9 ACQUIRED HYPOTHYROIDISM: ICD-10-CM

## 2019-04-16 DIAGNOSIS — Z00.00 MEDICARE ANNUAL WELLNESS VISIT, SUBSEQUENT: Primary | ICD-10-CM

## 2019-04-16 DIAGNOSIS — I70.0 ATHEROSCLEROSIS OF AORTIC ARCH (HCC): ICD-10-CM

## 2019-04-16 DIAGNOSIS — K58.0 IRRITABLE BOWEL SYNDROME WITH DIARRHEA: ICD-10-CM

## 2019-04-16 DIAGNOSIS — D64.9 ANEMIA, UNSPECIFIED TYPE: ICD-10-CM

## 2019-04-16 DIAGNOSIS — Z23 NEED FOR VACCINATION: ICD-10-CM

## 2019-04-16 DIAGNOSIS — G47.33 OSA ON CPAP: ICD-10-CM

## 2019-04-16 DIAGNOSIS — M06.09 SERONEGATIVE RHEUMATOID ARTHRITIS OF MULTIPLE SITES (HCC): ICD-10-CM

## 2019-04-16 DIAGNOSIS — Z99.89 OSA ON CPAP: ICD-10-CM

## 2019-04-16 DIAGNOSIS — E78.00 HYPERCHOLESTEROLEMIA: ICD-10-CM

## 2019-04-16 DIAGNOSIS — D84.9 IMMUNOSUPPRESSED STATUS (HCC): ICD-10-CM

## 2019-04-16 DIAGNOSIS — R26.9 GAIT ABNORMALITY: ICD-10-CM

## 2019-04-16 DIAGNOSIS — N18.30 STAGE 3 CHRONIC KIDNEY DISEASE (HCC): ICD-10-CM

## 2019-04-16 PROCEDURE — G0439 PPPS, SUBSEQ VISIT: HCPCS | Performed by: INTERNAL MEDICINE

## 2019-04-16 PROCEDURE — G0009 ADMIN PNEUMOCOCCAL VACCINE: HCPCS | Performed by: INTERNAL MEDICINE

## 2019-04-16 PROCEDURE — 90732 PPSV23 VACC 2 YRS+ SUBQ/IM: CPT | Performed by: INTERNAL MEDICINE

## 2019-04-16 PROCEDURE — 96160 PT-FOCUSED HLTH RISK ASSMT: CPT | Performed by: INTERNAL MEDICINE

## 2019-04-16 PROCEDURE — 99397 PER PM REEVAL EST PAT 65+ YR: CPT | Performed by: INTERNAL MEDICINE

## 2019-04-16 NOTE — ASSESSMENT & PLAN NOTE
Unremarkable exam.  Labs were reviewed  Pt is up-to-date on colonoscopy. Immunizations were reviewed.   Fall risk assessment was negative  Hearing assessment was normal.

## 2019-04-16 NOTE — ASSESSMENT & PLAN NOTE
Pt walks with a cane. She has been doing PT for a few sessions. Will continue and f/u after she completes it.

## 2019-04-16 NOTE — ASSESSMENT & PLAN NOTE
Pt states that her blood pressure is normal at home. Will continue current meds and f/u in 3 months.

## 2019-04-16 NOTE — PROGRESS NOTES
HPI:   [place] is a 77year old female who presents for a MA (Medicare Advantage) Supervisit (Once per calendar year). Sometimes she wakes up warm during the night. Last week she was coughing and nasal congestion last week, but it it better.   Jaxon Dupont Problem List:     Osteoarthritis     Hypothyroidism     JUAN on CPAP     IBS (irritable bowel syndrome)     Seronegative rheumatoid arthritis of multiple sites ([facility]     Morbid obesity with BMI of 40.0-44.9, adult ([facility]     Medicare annual wellness visit, s mg total) by mouth daily.    METHOTREXATE 2.5 MG Oral Tab TAKE 8 TABLETS BY MOUTH EVERY WEEK   ENBREL SURECLICK 50 MG/ML Subcutaneous Solution Auto-injector INJECT 1 PREFILLED PEN (50MG) SUBCUTANEOUSLY ONCE A WEEK   FUROSEMIDE 20 MG Oral Tab TAKE ONE TABLET and other surgical history.     Her family history includes Breast Cancer in her paternal aunt and paternal cousin female; Breast Cancer (age of onset: 59) in her mother; Cancer in her father, mother, self, and son; Diabetes in her father; Ovarian Cancer (a 4/16/2019 11:26 AM  Screening Method:  Questionnaire  I have a problem hearing over the telephone:  No I have trouble following the conversations when two or more people are talking at the same time:  No   I have trouble understanding things on the TV:  No breath sounds normal. No respiratory distress. She has no wheezes. She has no rales. Right breast exhibits no inverted nipple, no mass, no nipple discharge, no skin change and no tenderness.  Left breast exhibits no inverted nipple, no mass, no nipple disch Controlled with current meds. F/u with rheumatology. Morbid obesity with BMI of 40.0-44.9, adult ([address]     Counseled regarding the importance of weight loss for overall health.   Advised to start a high protein, low carb diet or join an Elis None  How would you describe your current health state?: Fair  How do you maintain positive mental well-being?: Social Interaction;Puzzles; Visiting Family; Visiting Friends      This section provided for quick review of chart, separate sheet to patient  PRE 03/15/2020 Update Health Maintenance if applicable     Immunizations (Update Immunization Activity if applicable)     Influenza  Covered Annually 09/26/2018 Please get every year    Pneumococcal 13 (Prevnar)  Covered Once after 65 09/28/2016 Please get onc

## 2019-04-16 NOTE — ASSESSMENT & PLAN NOTE
Pt has a slight anemia. Will recheck in 3 months along with iron studies. This may be due to anemia of chronic disease.

## 2019-04-16 NOTE — PATIENT INSTRUCTIONS
[facility] SCREENING SCHEDULE   Tests on this list are recommended by your physician but may not be covered, or covered at this frequency, by your insurer. Please check with your insurance carrier before scheduling to verify coverage.    PREVENTATIVE in their lifetime   • Anyone with a family history    Colorectal Cancer Screening  Covered up to Age 76     Colonoscopy Screen   Covered every 10 years- more often if abnormal Colonoscopy due on 12/05/2026 Update Health Maintenance if applicable    Flex Si year    Pneumococcal 13 (Prevnar)  Covered Once after 65 Orders placed or performed in visit on 09/28/16   • PNEUMOCOCCAL VACC, 13 KATI IM    Please get once after your 65th birthday    Pneumococcal 23 (Pneumovax)  Covered Once after 65 No orders found for

## 2019-04-17 ENCOUNTER — OFFICE VISIT (OUTPATIENT)
Dept: PHYSICAL THERAPY | Age: 79
End: 2019-04-17
Attending: INTERNAL MEDICINE
Payer: MEDICARE

## 2019-04-17 PROCEDURE — 97112 NEUROMUSCULAR REEDUCATION: CPT

## 2019-04-17 PROCEDURE — 97110 THERAPEUTIC EXERCISES: CPT

## 2019-04-17 NOTE — PROGRESS NOTES
Dx:  Gait abnormality (R26.9)  Authorized # of Visits:  10-12 per POC         Next MD visit: none scheduled  Fall Risk: standard         Precautions: n/a           Medication Changes since last visit?: No  Subjective: Patient reports feeling ok.  Tried new head turns vert and horiz   - With changing gait speeds Tandem stance to tolerance    Rhomberg stance with head turns vert and horiz 10x ea    Standing on foam weight shift a/p, lat 10x ea    Standing march on foam UE support    Ambulation:   - Normal gait Treatment: 42 min  Total Treatment Time: 42 min

## 2019-04-19 ENCOUNTER — OFFICE VISIT (OUTPATIENT)
Dept: PHYSICAL THERAPY | Age: 79
End: 2019-04-19
Attending: INTERNAL MEDICINE
Payer: MEDICARE

## 2019-04-19 PROCEDURE — 97112 NEUROMUSCULAR REEDUCATION: CPT

## 2019-04-19 PROCEDURE — 97110 THERAPEUTIC EXERCISES: CPT

## 2019-04-19 NOTE — PROGRESS NOTES
Dx:  Gait abnormality (R26.9)  Authorized # of Visits:  10-12 per POC         Next MD visit: none scheduled  Fall Risk: standard         Precautions: n/a           Medication Changes since last visit?: No  Subjective: Patient reports feeling ok.  Has been d - Normal gait mechanics    Standing fwd lunge with UUE support for increased step length   **All standing exercises performed with gait belt and CGA    Assessment: Continued with progressive strengthening and balance.  Added controlled sit<>stand without UE

## 2019-04-22 ENCOUNTER — OFFICE VISIT (OUTPATIENT)
Dept: PHYSICAL THERAPY | Age: 79
End: 2019-04-22
Attending: INTERNAL MEDICINE
Payer: MEDICARE

## 2019-04-22 PROCEDURE — 97112 NEUROMUSCULAR REEDUCATION: CPT

## 2019-04-22 PROCEDURE — 97110 THERAPEUTIC EXERCISES: CPT

## 2019-04-22 NOTE — PROGRESS NOTES
Dx:  Gait abnormality (R26.9)  Authorized # of Visits:  10-12 per POC         Next MD visit: none scheduled  Fall Risk: standard         Precautions: n/a           Medication Changes since last visit?: No  Subjective: Patient reports feeling tired today. Discuss heel lift.      Goals     • Therapy Goals      Goals:  · Pt will demonstrate improved SLS to >3 seconds ARMINDA to promote safety and decrease risk of falls on uneven surfaces such as grass - in progress  · Pt will perform 4-Item DGI with score of 10/12

## 2019-04-24 ENCOUNTER — APPOINTMENT (OUTPATIENT)
Dept: PHYSICAL THERAPY | Age: 79
End: 2019-04-24
Attending: INTERNAL MEDICINE
Payer: MEDICARE

## 2019-04-25 ENCOUNTER — OFFICE VISIT (OUTPATIENT)
Dept: PHYSICAL THERAPY | Age: 79
End: 2019-04-25
Attending: INTERNAL MEDICINE
Payer: MEDICARE

## 2019-04-25 PROCEDURE — 97110 THERAPEUTIC EXERCISES: CPT

## 2019-04-25 PROCEDURE — 97112 NEUROMUSCULAR REEDUCATION: CPT

## 2019-04-25 NOTE — PROGRESS NOTES
Dx:  Gait abnormality (R26.9)  Authorized # of Visits:  10-12 per POC         Next MD visit: none scheduled  Fall Risk: standard         Precautions: n/a           Medication Changes since last visit?: No  Subjective: Patient reports her knees were very sw alt LE no UE support 10x   **All standing exercises performed with gait belt and CGA    Assessment: Continued with progressive mobility, strengthening and balance. Pt required less rest breaks this visit. Patient Education: Reviewed current HEP.  Progres

## 2019-05-01 ENCOUNTER — APPOINTMENT (OUTPATIENT)
Dept: PHYSICAL THERAPY | Age: 79
End: 2019-05-01
Attending: INTERNAL MEDICINE
Payer: MEDICARE

## 2019-05-06 ENCOUNTER — OFFICE VISIT (OUTPATIENT)
Dept: PHYSICAL THERAPY | Age: 79
End: 2019-05-06
Attending: INTERNAL MEDICINE
Payer: MEDICARE

## 2019-05-06 PROCEDURE — 97110 THERAPEUTIC EXERCISES: CPT

## 2019-05-06 PROCEDURE — 97112 NEUROMUSCULAR REEDUCATION: CPT

## 2019-05-06 NOTE — PROGRESS NOTES
Dx:  Gait abnormality (R26.9)  Authorized # of Visits:  10-12 per POC         Next MD visit: none scheduled  Fall Risk: standard         Precautions: n/a           Medication Changes since last visit?: No  Subjective: Patient reports feeling ok.  She was ab lat 10x ea   - Marches alt LE no UE support 10x     Standing marches alt LE no UE support 10x   **All standing exercises performed with gait belt and CGA    Assessment: Progressed strengthening with addition of resisted hip strengthening and added reps for

## 2019-05-14 ENCOUNTER — OFFICE VISIT (OUTPATIENT)
Dept: PHYSICAL THERAPY | Age: 79
End: 2019-05-14
Attending: INTERNAL MEDICINE
Payer: MEDICARE

## 2019-05-14 PROCEDURE — 97112 NEUROMUSCULAR REEDUCATION: CPT

## 2019-05-14 PROCEDURE — 97110 THERAPEUTIC EXERCISES: CPT

## 2019-05-14 NOTE — PROGRESS NOTES
Dx:  Gait abnormality (R26.9)  Authorized # of Visits:  10-12 per POC         Next MD visit: none scheduled  Fall Risk: standard         Precautions: n/a           Medication Changes since last visit?: No  Subjective: Patient reports did a lot of activity strengthening. Continued with strengthening and balance work. Pt requires shorter rest breaks between exercises. Patient Education: Reviewed current HEP. Advised pt on core recruitment with ambulation.     Current HEP: lateral and fwd weight shifting, t

## 2019-05-15 ENCOUNTER — APPOINTMENT (OUTPATIENT)
Dept: PHYSICAL THERAPY | Age: 79
End: 2019-05-15
Attending: INTERNAL MEDICINE
Payer: MEDICARE

## 2019-05-16 ENCOUNTER — TELEPHONE (OUTPATIENT)
Dept: PHYSICAL THERAPY | Age: 79
End: 2019-05-16

## 2019-05-16 NOTE — TELEPHONE ENCOUNTER
Called to follow up with patient as she cancelled today's visit due to weather and today was her last scheduled visit. Pt stated would like to be discharged at this time and would not like to rescheduled missed visit. Confirmed discharge with patient.

## 2019-05-22 RX ORDER — PANTOPRAZOLE SODIUM 40 MG/1
TABLET, DELAYED RELEASE ORAL
Qty: 90 TABLET | Refills: 1 | Status: SHIPPED | OUTPATIENT
Start: 2019-05-22 | End: 2019-11-05

## 2019-05-22 NOTE — TELEPHONE ENCOUNTER
Refill passed per [facility] protocol.     Requested Prescriptions   Pending Prescriptions Disp Refills   • PANTOPRAZOLE SODIUM 40 MG Oral Tab EC [Pharmacy Med Name: PANTOPRAZOLE SOD 40MG EC TABLET] 90 tablet 0     Sig: TAKE 1 TABLET BY MOUTH  EVERY MO

## 2019-06-12 RX ORDER — HYDROCHLOROTHIAZIDE 12.5 MG/1
TABLET ORAL
Qty: 90 TABLET | Refills: 1 | Status: SHIPPED | OUTPATIENT
Start: 2019-06-12 | End: 2019-09-23

## 2019-06-12 NOTE — TELEPHONE ENCOUNTER
Refill passed per [facility] protocol.   Hypertensive Medications  Protocol Criteria:  · Appointment scheduled in the past 6 months or in the next 3 months  · BMP or CMP in the past 12 months  · Creatinine result < 2  Recent Outpatient Visits

## 2019-06-17 RX ORDER — ETANERCEPT 50 MG/ML
SOLUTION SUBCUTANEOUS
Qty: 12 SYRINGE | Refills: 1 | Status: SHIPPED | OUTPATIENT
Start: 2019-06-17 | End: 2019-11-25

## 2019-06-17 NOTE — TELEPHONE ENCOUNTER
ENBREL SURECLICK 50 MG/ML   Last filled: 1/4/19 #12 syringe with 1 refill  LOV: 4/9/19   Future Appointments   Date Time Provider Taqueria Wise   7/16/2019 11:00 AM Devendra Christine MD [facility] [place] [facility] [place]   7/30/2019 10:10 AM Julia Meyer MD  and knees. 4.  Low white count. Resolved. Borderline anemia. 5.  Renal insufficiency. Stable. Please advise.

## 2019-06-20 DIAGNOSIS — I10 ESSENTIAL HYPERTENSION WITH GOAL BLOOD PRESSURE LESS THAN 140/90: ICD-10-CM

## 2019-06-20 RX ORDER — NEBIVOLOL HYDROCHLORIDE 10 MG/1
TABLET ORAL
Qty: 90 TABLET | Refills: 1 | Status: SHIPPED | OUTPATIENT
Start: 2019-06-20 | End: 2019-07-30

## 2019-06-24 ENCOUNTER — OFFICE VISIT (OUTPATIENT)
Dept: OTOLARYNGOLOGY | Facility: CLINIC | Age: 79
End: 2019-06-24
Payer: COMMERCIAL

## 2019-06-24 VITALS — TEMPERATURE: 97 F | SYSTOLIC BLOOD PRESSURE: 155 MMHG | DIASTOLIC BLOOD PRESSURE: 85 MMHG

## 2019-06-24 DIAGNOSIS — H61.23 BILATERAL IMPACTED CERUMEN: Primary | ICD-10-CM

## 2019-06-24 PROCEDURE — 69210 REMOVE IMPACTED EAR WAX UNI: CPT | Performed by: OTOLARYNGOLOGY

## 2019-06-24 NOTE — PROGRESS NOTES
Daily Chang is a 77year old female. Patient presents with:  Cerumen Impaction: ear wax both ears        HISTORY OF PRESENT ILLNESS    4/8/16  Here for evaluation of  bilateral hearing loss.  Patient feels this has worsened over the las months and  is not Substance and Sexual Activity      Alcohol use:  Yes        Alcohol/week: 1.0 oz        Types: 2 Glasses of wine per week        Comment: wine - 1 glass weekly      Drug use: No      Sexual activity: Never    Lifestyle      Physical activity:        Days pe midfoot arthrodesis ([facility] 5/25/2016   • Rheumatoid arthritis ([facility]    • Sx.7/21/15, [id number], R Triple Arthrodesis/Poss Medializing Calc Arthrodesis/Lapidus/MONA/FDL to Post Tib Transfer, w/, Global Exp.10/19/15 7/23/2015   • Unspecified essenti Eyes Normal Conjunctiva - Right: Normal, Left: Normal. Pupil - Right: Normal, Left: Normal. EOMI.  STACY   Ears Normal  normal to inspection ear canals are nl tympanic membranes are retracted     Audiogram was not done today       PROCEDURE: After informed EVERY  DAY), Disp: 45 tablet, Rfl: 0  •  Albuterol Sulfate  (90 Base) MCG/ACT Inhalation Aero Soln, Inhale 1 puff into the lungs every 6 (six) hours as needed for Wheezing., Disp: 1 Inhaler, Rfl: 5  •  Niacin ER, Antihyperlipidemic, 750 MG Oral Tab

## 2019-07-08 RX ORDER — HYDROCHLOROTHIAZIDE 12.5 MG/1
TABLET ORAL
COMMUNITY
Start: 2016-08-19 | End: 2019-07-12 | Stop reason: SDUPTHER

## 2019-07-08 RX ORDER — ACETAMINOPHEN 500 MG
500 TABLET ORAL PRN
COMMUNITY
End: 2021-02-09

## 2019-07-08 RX ORDER — FOLIC ACID 1 MG/1
TABLET ORAL
COMMUNITY
Start: 2019-03-27

## 2019-07-08 RX ORDER — ALBUTEROL SULFATE 90 UG/1
1 AEROSOL, METERED RESPIRATORY (INHALATION) PRN
COMMUNITY
Start: 2018-08-14 | End: 2021-02-09

## 2019-07-08 RX ORDER — PANTOPRAZOLE SODIUM 40 MG/1
TABLET, DELAYED RELEASE ORAL
COMMUNITY
Start: 2016-07-22

## 2019-07-08 RX ORDER — LEVOTHYROXINE SODIUM 137 UG/1
TABLET ORAL
COMMUNITY
Start: 2018-02-02

## 2019-07-12 ENCOUNTER — OFFICE VISIT (OUTPATIENT)
Dept: CARDIOLOGY | Age: 79
End: 2019-07-12

## 2019-07-12 VITALS
BODY MASS INDEX: 42.34 KG/M2 | HEART RATE: 64 BPM | DIASTOLIC BLOOD PRESSURE: 76 MMHG | HEIGHT: 64 IN | OXYGEN SATURATION: 96 % | SYSTOLIC BLOOD PRESSURE: 132 MMHG | WEIGHT: 248 LBS

## 2019-07-12 DIAGNOSIS — I10 ESSENTIAL HYPERTENSION: Primary | ICD-10-CM

## 2019-07-12 DIAGNOSIS — E78.5 DYSLIPIDEMIA: ICD-10-CM

## 2019-07-12 DIAGNOSIS — I25.10 NON-OCCLUSIVE CORONARY ARTERY DISEASE: ICD-10-CM

## 2019-07-12 PROCEDURE — 99214 OFFICE O/P EST MOD 30 MIN: CPT | Performed by: INTERNAL MEDICINE

## 2019-07-12 PROCEDURE — 3078F DIAST BP <80 MM HG: CPT | Performed by: INTERNAL MEDICINE

## 2019-07-12 RX ORDER — AMOXICILLIN 500 MG
1200 CAPSULE ORAL DAILY
COMMUNITY

## 2019-07-12 ASSESSMENT — PATIENT HEALTH QUESTIONNAIRE - PHQ9
SUM OF ALL RESPONSES TO PHQ9 QUESTIONS 1 AND 2: 0
1. LITTLE INTEREST OR PLEASURE IN DOING THINGS: NOT AT ALL
2. FEELING DOWN, DEPRESSED OR HOPELESS: NOT AT ALL
SUM OF ALL RESPONSES TO PHQ9 QUESTIONS 1 AND 2: 0

## 2019-07-17 ENCOUNTER — MED REC SCAN ONLY (OUTPATIENT)
Dept: INTERNAL MEDICINE CLINIC | Facility: CLINIC | Age: 79
End: 2019-07-17

## 2019-07-19 ENCOUNTER — LAB ENCOUNTER (OUTPATIENT)
Dept: LAB | Facility: HOSPITAL | Age: 79
End: 2019-07-19
Attending: INTERNAL MEDICINE
Payer: MEDICARE

## 2019-07-19 DIAGNOSIS — D64.9 ANEMIA, UNSPECIFIED TYPE: ICD-10-CM

## 2019-07-19 DIAGNOSIS — M06.09 SERONEGATIVE RHEUMATOID ARTHRITIS OF MULTIPLE SITES (HCC): ICD-10-CM

## 2019-07-19 DIAGNOSIS — Z51.81 ENCOUNTER FOR THERAPEUTIC DRUG MONITORING: ICD-10-CM

## 2019-07-19 LAB
ABSOLUTE IMMATURE GRANULOCYTES (OFFPRE24): NORMAL
ALBUMIN SERPL-MCNC: 3.7 G/DL (ref 3.4–5)
AST SERPL-CCNC: 17 U/L (ref 15–37)
BASO+EOS+MONOS # BLD: NORMAL 10*3/UL
BASO+EOS+MONOS NFR BLD: NORMAL %
BASOPHILS # BLD AUTO: 0.04 X10(3) UL (ref 0–0.2)
BASOPHILS # BLD: NORMAL 10*3/UL
BASOPHILS NFR BLD AUTO: 0.7 %
BASOPHILS NFR BLD: NORMAL %
CREAT BLD-MCNC: 1.23 MG/DL (ref 0.55–1.02)
CRP SERPL-MCNC: 0.68 MG/DL (ref ?–0.3)
DEPRECATED HBV CORE AB SER IA-ACNC: 81.1 NG/ML (ref 18–340)
DEPRECATED RDW RBC AUTO: 55.5 FL (ref 35.1–46.3)
DIFFERENTIAL METHOD BLD: NORMAL
EOSINOPHIL # BLD AUTO: 0.12 X10(3) UL (ref 0–0.7)
EOSINOPHIL # BLD: NORMAL 10*3/UL
EOSINOPHIL NFR BLD AUTO: 2.1 %
EOSINOPHIL NFR BLD: NORMAL %
ERYTHROCYTE [DISTWIDTH] IN BLOOD BY AUTOMATED COUNT: 14.8 % (ref 11–15)
ERYTHROCYTE [DISTWIDTH] IN BLOOD: NORMAL %
ERYTHROCYTE [SEDIMENTATION RATE] IN BLOOD: 14 MM/HR (ref 0–30)
FOLATE SERPL-MCNC: >20 NG/ML (ref 8.7–?)
HCT VFR BLD AUTO: 36.1 % (ref 35–48)
HCT VFR BLD CALC: 36.1 %
HGB BLD-MCNC: 11.6 G/DL
HGB BLD-MCNC: 11.6 G/DL (ref 12–16)
IMM GRANULOCYTES # BLD AUTO: 0.02 X10(3) UL (ref 0–1)
IMM GRANULOCYTES NFR BLD: 0.3 %
IMMATURE GRANULOCYTES (OFFPRE25): NORMAL
IRON SATURATION: 16 % (ref 15–50)
IRON SERPL-MCNC: 58 UG/DL (ref 50–170)
LYMPHOCYTES # BLD AUTO: 1.48 X10(3) UL (ref 1–4)
LYMPHOCYTES # BLD: NORMAL 10*3/UL
LYMPHOCYTES NFR BLD AUTO: 25.3 %
LYMPHOCYTES NFR BLD: NORMAL %
MCH RBC QN AUTO: 33.2 PG
MCH RBC QN AUTO: 33.2 PG (ref 26–34)
MCHC RBC AUTO-ENTMCNC: 32.1 G/DL
MCHC RBC AUTO-ENTMCNC: 32.1 G/DL (ref 31–37)
MCV RBC AUTO: 103.4 FL
MCV RBC AUTO: 103.4 FL (ref 80–100)
MONOCYTES # BLD AUTO: 0.76 X10(3) UL (ref 0.1–1)
MONOCYTES # BLD: NORMAL 10*3/UL
MONOCYTES NFR BLD AUTO: 13 %
MONOCYTES NFR BLD: NORMAL %
MPV (OFFPRE2): NORMAL
NEUTROPHILS # BLD AUTO: 3.42 X10 (3) UL (ref 1.5–7.7)
NEUTROPHILS # BLD AUTO: 3.42 X10(3) UL (ref 1.5–7.7)
NEUTROPHILS # BLD: NORMAL 10*3/UL
NEUTROPHILS NFR BLD AUTO: 58.6 %
NEUTROPHILS NFR BLD: NORMAL %
NRBC BLD MANUAL-RTO: NORMAL %
PLAT MORPH BLD: NORMAL
PLATELET # BLD AUTO: 160 10(3)UL (ref 150–450)
PLATELET # BLD: 160 10*3/UL
RBC # BLD AUTO: 3.49 X10(6)UL (ref 3.8–5.3)
RBC # BLD: 3.49 10*6/UL
RBC MORPH BLD: NORMAL
TOTAL IRON BINDING CAPACITY: 373 UG/DL (ref 240–450)
TRANSFERRIN SERPL-MCNC: 250 MG/DL (ref 200–360)
VIT B12 SERPL-MCNC: 587 PG/ML (ref 193–986)
WBC # BLD AUTO: 5.8 X10(3) UL (ref 4–11)
WBC # BLD: 5.8 10*3/UL
WBC MORPH BLD: NORMAL

## 2019-07-19 PROCEDURE — 82040 ASSAY OF SERUM ALBUMIN: CPT

## 2019-07-19 PROCEDURE — 86140 C-REACTIVE PROTEIN: CPT

## 2019-07-19 PROCEDURE — 82728 ASSAY OF FERRITIN: CPT

## 2019-07-19 PROCEDURE — 83540 ASSAY OF IRON: CPT

## 2019-07-19 PROCEDURE — 84450 TRANSFERASE (AST) (SGOT): CPT

## 2019-07-19 PROCEDURE — 85652 RBC SED RATE AUTOMATED: CPT

## 2019-07-19 PROCEDURE — 82607 VITAMIN B-12: CPT

## 2019-07-19 PROCEDURE — 84466 ASSAY OF TRANSFERRIN: CPT

## 2019-07-19 PROCEDURE — 82565 ASSAY OF CREATININE: CPT

## 2019-07-19 PROCEDURE — 85025 COMPLETE CBC W/AUTO DIFF WBC: CPT

## 2019-07-19 PROCEDURE — 82746 ASSAY OF FOLIC ACID SERUM: CPT

## 2019-07-19 PROCEDURE — 36415 COLL VENOUS BLD VENIPUNCTURE: CPT

## 2019-07-23 NOTE — PROGRESS NOTES
Kamlesh Beckwith is a 14-year-old patient of Dr. Wade Sanchez with seronegative rheumatoid arthritis. Since I saw her April 9th of 2019, she has continued Enbrel weekly, methotrexate 20mg weekly, and Plaquenil 400mg daily.   She feels like her rheumatoid arthritis is contro normal and breath sounnormal.   Abdominal: Soft. Bowel sounds are normal. She exhibits no mass. There is no tenderness. There is no rebound and no guarding. Musculoskeletal: She exhibits no edema. There is not active synovitis in her wrists or hands.  B

## 2019-07-24 ENCOUNTER — OFFICE VISIT (OUTPATIENT)
Dept: RHEUMATOLOGY | Facility: CLINIC | Age: 79
End: 2019-07-24
Payer: COMMERCIAL

## 2019-07-24 VITALS
WEIGHT: 251 LBS | BODY MASS INDEX: 42.85 KG/M2 | SYSTOLIC BLOOD PRESSURE: 174 MMHG | DIASTOLIC BLOOD PRESSURE: 83 MMHG | HEIGHT: 64 IN | HEART RATE: 53 BPM

## 2019-07-24 DIAGNOSIS — M15.9 PRIMARY OSTEOARTHRITIS INVOLVING MULTIPLE JOINTS: ICD-10-CM

## 2019-07-24 DIAGNOSIS — M06.09 SERONEGATIVE RHEUMATOID ARTHRITIS OF MULTIPLE SITES (HCC): Primary | ICD-10-CM

## 2019-07-24 DIAGNOSIS — K58.0 IRRITABLE BOWEL SYNDROME WITH DIARRHEA: ICD-10-CM

## 2019-07-24 DIAGNOSIS — Z51.81 ENCOUNTER FOR THERAPEUTIC DRUG MONITORING: ICD-10-CM

## 2019-07-24 PROCEDURE — 99214 OFFICE O/P EST MOD 30 MIN: CPT | Performed by: INTERNAL MEDICINE

## 2019-07-24 PROCEDURE — G0463 HOSPITAL OUTPT CLINIC VISIT: HCPCS | Performed by: INTERNAL MEDICINE

## 2019-07-30 ENCOUNTER — OFFICE VISIT (OUTPATIENT)
Dept: INTERNAL MEDICINE CLINIC | Facility: CLINIC | Age: 79
End: 2019-07-30
Payer: COMMERCIAL

## 2019-07-30 VITALS
HEART RATE: 50 BPM | SYSTOLIC BLOOD PRESSURE: 182 MMHG | BODY MASS INDEX: 42.76 KG/M2 | HEIGHT: 64 IN | DIASTOLIC BLOOD PRESSURE: 78 MMHG | WEIGHT: 250.5 LBS

## 2019-07-30 DIAGNOSIS — E03.9 ACQUIRED HYPOTHYROIDISM: ICD-10-CM

## 2019-07-30 DIAGNOSIS — E78.2 MIXED HYPERLIPIDEMIA: ICD-10-CM

## 2019-07-30 DIAGNOSIS — D63.8 ANEMIA, CHRONIC DISEASE: ICD-10-CM

## 2019-07-30 DIAGNOSIS — E78.00 HYPERCHOLESTEROLEMIA: ICD-10-CM

## 2019-07-30 DIAGNOSIS — G25.0 ESSENTIAL TREMOR: Primary | ICD-10-CM

## 2019-07-30 DIAGNOSIS — I10 ESSENTIAL HYPERTENSION WITH GOAL BLOOD PRESSURE LESS THAN 140/90: ICD-10-CM

## 2019-07-30 DIAGNOSIS — K58.0 IRRITABLE BOWEL SYNDROME WITH DIARRHEA: ICD-10-CM

## 2019-07-30 PROCEDURE — 99214 OFFICE O/P EST MOD 30 MIN: CPT | Performed by: INTERNAL MEDICINE

## 2019-07-30 PROCEDURE — G0463 HOSPITAL OUTPT CLINIC VISIT: HCPCS | Performed by: INTERNAL MEDICINE

## 2019-07-30 RX ORDER — LEVOTHYROXINE SODIUM 137 UG/1
TABLET ORAL
Qty: 90 TABLET | Refills: 1 | Status: SHIPPED | OUTPATIENT
Start: 2019-07-30 | End: 2019-09-23

## 2019-07-30 RX ORDER — NIACIN 750 MG/1
TABLET, EXTENDED RELEASE ORAL
Qty: 90 TABLET | Refills: 1 | OUTPATIENT
Start: 2019-07-30

## 2019-07-30 RX ORDER — PRAVASTATIN SODIUM 20 MG
TABLET ORAL
Qty: 90 TABLET | Refills: 1 | Status: SHIPPED | OUTPATIENT
Start: 2019-07-30 | End: 2019-09-23

## 2019-07-30 RX ORDER — NIACIN 750 MG/1
750 TABLET, EXTENDED RELEASE ORAL NIGHTLY
Qty: 90 TABLET | Refills: 1 | Status: SHIPPED | OUTPATIENT
Start: 2019-07-30 | End: 2020-03-15

## 2019-07-30 RX ORDER — NEBIVOLOL 20 MG/1
20 TABLET ORAL
Qty: 90 TABLET | Refills: 1 | Status: SHIPPED | OUTPATIENT
Start: 2019-07-30 | End: 2019-09-23

## 2019-07-30 NOTE — ASSESSMENT & PLAN NOTE
The patient is concerned because she has a family history of Parkinson's disease, however  her tremor is a symmetrical action tremor which is most consistent with essential tremor.   We will increase her Bystolic both for her blood pressure control and to s

## 2019-07-30 NOTE — ASSESSMENT & PLAN NOTE
I reviewed her recent blood tests from April. Her cholesterol is well controlled. Continue current medications.

## 2019-07-30 NOTE — PROGRESS NOTES
HPI:    Patient ID: [place] is a 77year old female. Her blood pressure runs 135-140/70s. Pt c/o a lot of gas. She has a tingling every so often in her arms and feels like she is hot. It lasts 10-15 min and then goes away.   It is not just when SUBCUTANEOUSLY ONCE A WEEK Disp: 12 Syringe Rfl: 1   HYDROCHLOROTHIAZIDE 12.5 MG Oral Tab TAKE 1 TABLET BY MOUTH  DAILY Disp: 90 tablet Rfl: 1   PANTOPRAZOLE SODIUM 40 MG Oral Tab EC TAKE 1 TABLET BY MOUTH  EVERY MORNING BEFORE  BREAKFAST Disp: 90 tablet R Pcn [Bicillin L-A]           Social History    Tobacco Use      Smoking status: Never Smoker      Smokeless tobacco: Never Used    Alcohol use:  Yes      Alcohol/week: 1.7 standard drinks      Types: 2 Glasses of wine per week      Comment: wine - 1 gla complains of gas. I advised her to try Gas-X.         Essential hypertension with goal blood pressure less than 140/90     The patient states that she has whitecoat syndrome, however her blood pressure today and last week was quite high.   We will try to i

## 2019-07-30 NOTE — ASSESSMENT & PLAN NOTE
I reviewed her recent blood test results and her CBC is going back to 2012. Her hemoglobin has been ranging between 11.5 and 13 over the past 6 years. Her iron studies are normal.  Her results are consistent with anemia of chronic disease.

## 2019-07-30 NOTE — ASSESSMENT & PLAN NOTE
The patient states that she has whitecoat syndrome, however her blood pressure today and last week was quite high. We will try to increase her Bystolic to 20 mg a day and follow-up in 3 months.

## 2019-07-30 NOTE — TELEPHONE ENCOUNTER
Hypothyroid Medications- Refill passed per [address] protocol.     Protocol Criteria:  Appointment scheduled in the past 12 months or the next 3 months  TSH resulted in the past 12 months that is normal  Recent Outpatient Visits            Today     [facility], Lynette Chavez MD    Office Visit    6 days ago Seronegative rheumatoid arthritis of multiple sites [facility])    [address], [facility], Vazquez Roy MD    Office Visit    1 month ago Bilateral impacted cerumen    [facility], [address], Jay Ma MD    Office Visit    2 months ago     [facility] in [place], [place]    Office Visit    2 months ago     [facility] in [place], [place]    Office Visit        Future Appointments       Provider Department Appt Notes    Today Mirlande Patterson MD [address], [address], [place] 3 month f/u    In 3 months Jen Farr MD [address], [address] [place] [address] 3 mos follow up        Lab Results   Component Value Date    TSH 1.490 04/09/2019    THYROIDFUNC 1.79 10/13/2015

## 2019-08-19 DIAGNOSIS — I10 ESSENTIAL HYPERTENSION WITH GOAL BLOOD PRESSURE LESS THAN 140/90: ICD-10-CM

## 2019-08-21 RX ORDER — LISINOPRIL 40 MG/1
TABLET ORAL
Qty: 90 TABLET | Refills: 1 | Status: SHIPPED | OUTPATIENT
Start: 2019-08-21 | End: 2019-09-23

## 2019-08-21 NOTE — TELEPHONE ENCOUNTER
Refill passed per [facility] protocol.   Hypertensive Medications  Protocol Criteria:  · Appointment scheduled in the past 6 months or in the next 3 months  · BMP or CMP in the past 12 months  · Creatinine result < 2  Recent Outpatient Visits

## 2019-09-03 NOTE — TELEPHONE ENCOUNTER
LOV: 7/24/19  Last Refilled:#96, 1rf 3/11/19  Labs:AST 17  7/19/19    Future Appointments   Date Time Provider Taqueria Wise   10/30/2019 10:40 AM Teresa Burnett MD [facility] [place]   11/5/2019  8:50 AM Boom Stewart MD [facility]

## 2019-09-20 ENCOUNTER — NURSE TRIAGE (OUTPATIENT)
Dept: INTERNAL MEDICINE CLINIC | Facility: CLINIC | Age: 79
End: 2019-09-20

## 2019-09-20 RX ORDER — NEBIVOLOL 10 MG/1
10 TABLET ORAL DAILY
Qty: 90 TABLET | Refills: 1 | Status: SHIPPED | OUTPATIENT
Start: 2019-09-20 | End: 2020-07-21

## 2019-09-20 NOTE — TELEPHONE ENCOUNTER
Requested Prescriptions     Signed Prescriptions Disp Refills   • Nebivolol HCl 10 MG Oral Tab 90 tablet 1     Sig: Take 1 tablet (10 mg total) by mouth daily.      Authorizing Provider: Rehana España

## 2019-09-20 NOTE — TELEPHONE ENCOUNTER
Relayed Dr Sandra Ramirez keep appt, Pt stated she don't think she can break in half, requesting new rx

## 2019-09-20 NOTE — TELEPHONE ENCOUNTER
Please decrease the nebivolol back to 10 mg daily. Can she break them in half? If not, I will send a new script. I still want to see her on the 23rd.

## 2019-09-20 NOTE — TELEPHONE ENCOUNTER
Pt called in she stated PCP changed her BP medication to [facility] to 20 mg and since the 1st to numbers of the BP came down and the 3rd # into the 40 and a insurance rep came out and advised her to speak with her PCP

## 2019-09-20 NOTE — TELEPHONE ENCOUNTER
Action Requested: Summary for Provider     []  Critical Lab, Recommendations Needed  [] Need Additional Advice  [x]   FYI    []   Need Orders  [] Need Medications Sent to Pharmacy  []  Other     SUMMARY:  Per protocol advised OV within 2 weeks.  Pt agreed a

## 2019-09-23 ENCOUNTER — OFFICE VISIT (OUTPATIENT)
Dept: INTERNAL MEDICINE CLINIC | Facility: CLINIC | Age: 79
End: 2019-09-23
Payer: COMMERCIAL

## 2019-09-23 VITALS
HEIGHT: 64 IN | SYSTOLIC BLOOD PRESSURE: 160 MMHG | TEMPERATURE: 99 F | WEIGHT: 250 LBS | BODY MASS INDEX: 42.68 KG/M2 | DIASTOLIC BLOOD PRESSURE: 80 MMHG | HEART RATE: 60 BPM

## 2019-09-23 DIAGNOSIS — R53.83 FATIGUE, UNSPECIFIED TYPE: Primary | ICD-10-CM

## 2019-09-23 DIAGNOSIS — E78.2 MIXED HYPERLIPIDEMIA: ICD-10-CM

## 2019-09-23 DIAGNOSIS — E03.9 ACQUIRED HYPOTHYROIDISM: ICD-10-CM

## 2019-09-23 DIAGNOSIS — I10 ESSENTIAL HYPERTENSION WITH GOAL BLOOD PRESSURE LESS THAN 140/90: ICD-10-CM

## 2019-09-23 LAB — TSH SERPL-ACNC: 3.26 M[IU]/L

## 2019-09-23 PROCEDURE — 99214 OFFICE O/P EST MOD 30 MIN: CPT | Performed by: INTERNAL MEDICINE

## 2019-09-23 PROCEDURE — 90662 IIV NO PRSV INCREASED AG IM: CPT | Performed by: INTERNAL MEDICINE

## 2019-09-23 PROCEDURE — G0463 HOSPITAL OUTPT CLINIC VISIT: HCPCS | Performed by: INTERNAL MEDICINE

## 2019-09-23 PROCEDURE — G0008 ADMIN INFLUENZA VIRUS VAC: HCPCS | Performed by: INTERNAL MEDICINE

## 2019-09-23 RX ORDER — LEVOTHYROXINE SODIUM 137 UG/1
TABLET ORAL
Qty: 90 TABLET | Refills: 1 | Status: SHIPPED | OUTPATIENT
Start: 2019-09-23 | End: 2020-03-24

## 2019-09-23 RX ORDER — LISINOPRIL 40 MG/1
40 TABLET ORAL
Qty: 90 TABLET | Refills: 1 | Status: SHIPPED | OUTPATIENT
Start: 2019-09-23 | End: 2020-02-10

## 2019-09-23 RX ORDER — PRAVASTATIN SODIUM 20 MG
TABLET ORAL
Qty: 90 TABLET | Refills: 1 | Status: SHIPPED | OUTPATIENT
Start: 2019-09-23 | End: 2020-02-10

## 2019-09-23 RX ORDER — HYDROCHLOROTHIAZIDE 12.5 MG/1
12.5 TABLET ORAL
Qty: 90 TABLET | Refills: 1 | Status: SHIPPED | OUTPATIENT
Start: 2019-09-23 | End: 2020-02-10

## 2019-09-23 NOTE — PROGRESS NOTES
HPI:    Patient ID: Serge Webber is a 77year old female. She took the higher dose bystolic and her pulse went down below 50. Three days ago when she called we decreased the dose back down. She was fatigued prior to increasing that medicine.   She has HISTORY      pins right foot   • TEAR DUCT SYSTEM SURG UNLISTED  1968   • TONSILLECTOMY              Current Outpatient Medications:  Pravastatin Sodium 20 MG Oral Tab TAKE 1 TABLET BY MOUTH  NIGHTLY Disp: 90 tablet Rfl: 1   Levothyroxine Sodium 137 MCG Or acetaminophen (TYLENOL EXTRA STRENGTH) 500 MG Oral Tab Take 500 mg by mouth every 6 (six) hours as needed. Disp:  Rfl:    Cholecalciferol (D-5000) 5000 UNITS Oral Tab Take  by mouth. Take 1 cap.  every day Disp:  Rfl:    aspirin 81 MG Oral Chew Tab Chew patient wakes every 2-3 hours at night. She will schedule an appointment with pulmonary regarding her sleep apnea. Relevant Orders    VITAMIN B12       Unprioritized    Hypothyroidism     Stable. Continue present management.          Relevant Medi

## 2019-09-24 NOTE — ASSESSMENT & PLAN NOTE
Unfortunately I had to decrease the Bystolic back down to 10 mg because her pulse went below 50. However the patient tells me that she stopped the hydrochlorothiazide a few months ago when she was put on furosemide.   We will try resuming the hydrochloroth

## 2019-09-24 NOTE — ASSESSMENT & PLAN NOTE
This is likely due to poor sleep, since the patient wakes every 2-3 hours at night. She will schedule an appointment with pulmonary regarding her sleep apnea.

## 2019-10-07 ENCOUNTER — OFFICE VISIT (OUTPATIENT)
Dept: OTOLARYNGOLOGY | Facility: CLINIC | Age: 79
End: 2019-10-07
Payer: COMMERCIAL

## 2019-10-07 VITALS
HEIGHT: 64 IN | SYSTOLIC BLOOD PRESSURE: 132 MMHG | TEMPERATURE: 98 F | DIASTOLIC BLOOD PRESSURE: 79 MMHG | WEIGHT: 250 LBS | BODY MASS INDEX: 42.68 KG/M2

## 2019-10-07 DIAGNOSIS — H61.23 BILATERAL IMPACTED CERUMEN: Primary | ICD-10-CM

## 2019-10-07 PROCEDURE — 69210 REMOVE IMPACTED EAR WAX UNI: CPT | Performed by: OTOLARYNGOLOGY

## 2019-10-07 NOTE — PROGRESS NOTES
Moisés Cortez is a 77year old female. Patient presents with:  Ear Wax: both ears        HISTORY OF PRESENT ILLNESS  10/7/2019   Here for evaluation of *** hearing loss.  Patient feels this has worsened over the las {days to years:81894} and  {IS / IS NO:31 Not Asked        Blood Transfusions: Not Asked        Caffeine Concern: No        Occupational Exposure: Not Asked        Hobby Hazards: Not Asked        Sleep Concern: Not Asked        Stress Concern: Not Asked        Weight Concern: Not Asked        Spec Blurred vision and vision changes. Respiratory Negative Dyspnea and wheezing. Cardio Negative Chest pain, irregular heartbeat   GI Negative Abdominal pain and diarrhea. Endocrine Negative Cold intolerance and heat intolerance.    Neuro Negative Tremor MOUTH  BEFORE BREAKFAST, Disp: 90 tablet, Rfl: 1  •  lisinopril 40 MG Oral Tab, Take 1 tablet (40 mg total) by mouth once daily. , Disp: 90 tablet, Rfl: 1  •  hydrochlorothiazide 12.5 MG Oral Tab, Take 1 tablet (12.5 mg total) by mouth once daily. , Disp: 90 Disp: , Rfl:   •  aspirin 81 MG Oral Chew Tab, Chew  by mouth. take 1 tablet (81MG)  by oral route  every day, Disp: , Rfl:   •  CENTRUM SILVER OR TABS, 1 TABLET DAILY, Disp: , Rfl:     ASSESSMENT AND PLAN  1.  Bilateral impacted cerumen  ***      Demar Seals

## 2019-10-18 ENCOUNTER — OFFICE VISIT (OUTPATIENT)
Dept: CARDIOLOGY | Age: 79
End: 2019-10-18

## 2019-10-18 VITALS
WEIGHT: 249 LBS | BODY MASS INDEX: 42.51 KG/M2 | SYSTOLIC BLOOD PRESSURE: 146 MMHG | DIASTOLIC BLOOD PRESSURE: 70 MMHG | HEIGHT: 64 IN | OXYGEN SATURATION: 95 % | HEART RATE: 63 BPM

## 2019-10-18 DIAGNOSIS — E78.5 DYSLIPIDEMIA: ICD-10-CM

## 2019-10-18 DIAGNOSIS — I10 ESSENTIAL HYPERTENSION: Primary | ICD-10-CM

## 2019-10-18 PROCEDURE — 99214 OFFICE O/P EST MOD 30 MIN: CPT | Performed by: NURSE PRACTITIONER

## 2019-10-18 RX ORDER — HYDROCHLOROTHIAZIDE 12.5 MG/1
CAPSULE, GELATIN COATED ORAL
Qty: 30 CAPSULE | Refills: 4 | Status: SHIPPED | OUTPATIENT
Start: 2019-10-18

## 2019-10-18 ASSESSMENT — PATIENT HEALTH QUESTIONNAIRE - PHQ9
SUM OF ALL RESPONSES TO PHQ9 QUESTIONS 1 AND 2: 0
SUM OF ALL RESPONSES TO PHQ9 QUESTIONS 1 AND 2: 0
2. FEELING DOWN, DEPRESSED OR HOPELESS: NOT AT ALL
1. LITTLE INTEREST OR PLEASURE IN DOING THINGS: NOT AT ALL

## 2019-10-26 NOTE — PROGRESS NOTES
Johnnie Salamanca is a 69-year-old patient of Dr. Kami Boucher with seronegative rheumatoid arthritis. Since I saw her July 24th of 2019, she has continued Enbrel weekly, methotrexate 20mg weekly, and Plaquenil 400mg daily.   She feels like her rheumatoid arthritis is contro normal heart sounds. Pulmonary/Chest: Effort normal and breath sounnormal.   Abdominal: Soft. Bowel sounds are normal. She exhibits no mass. There is no tenderness. There is no rebound and no guarding. Musculoskeletal: She exhibits no edema.    There i

## 2019-10-27 ENCOUNTER — LAB ENCOUNTER (OUTPATIENT)
Dept: LAB | Facility: HOSPITAL | Age: 79
End: 2019-10-27
Attending: INTERNAL MEDICINE
Payer: MEDICARE

## 2019-10-27 DIAGNOSIS — I10 ESSENTIAL HYPERTENSION WITH GOAL BLOOD PRESSURE LESS THAN 140/90: ICD-10-CM

## 2019-10-27 DIAGNOSIS — Z51.81 ENCOUNTER FOR THERAPEUTIC DRUG MONITORING: ICD-10-CM

## 2019-10-27 DIAGNOSIS — M06.09 SERONEGATIVE RHEUMATOID ARTHRITIS OF MULTIPLE SITES (HCC): ICD-10-CM

## 2019-10-27 LAB
ABSOLUTE IMMATURE GRANULOCYTES (OFFPRE24): NORMAL
ALBUMIN SERPL-MCNC: 3.8 G/DL
ALBUMIN/GLOB SERPL: NORMAL {RATIO}
ALP SERPL-CCNC: 63 U/L
ALT SERPL-CCNC: 35 U/L
ANION GAP SERPL CALC-SCNC: NORMAL MMOL/L
AST SERPL-CCNC: 25 U/L
BASO+EOS+MONOS # BLD: NORMAL 10*3/UL
BASO+EOS+MONOS NFR BLD: NORMAL %
BASOPHILS # BLD: NORMAL 10*3/UL
BASOPHILS NFR BLD: NORMAL %
BILIRUB SERPL-MCNC: 1 MG/DL
BUN SERPL-MCNC: 23 MG/DL
BUN/CREAT SERPL: NORMAL
CALCIUM SERPL-MCNC: 9.3 MG/DL
CHLORIDE SERPL-SCNC: 108 MMOL/L
CO2 SERPL-SCNC: NORMAL MMOL/L
CREAT SERPL-MCNC: 1.2 MG/DL
DIFFERENTIAL METHOD BLD: NORMAL
EOSINOPHIL # BLD: NORMAL 10*3/UL
EOSINOPHIL NFR BLD: NORMAL %
ERYTHROCYTE [DISTWIDTH] IN BLOOD: NORMAL %
GLOBULIN SER-MCNC: 3.3 G/DL
GLUCOSE SERPL-MCNC: 101 MG/DL
HCT VFR BLD CALC: 37.3 %
HGB BLD-MCNC: 12.2 G/DL
IMMATURE GRANULOCYTES (OFFPRE25): NORMAL
LENGTH OF FAST TIME PATIENT: NORMAL H
LYMPHOCYTES # BLD: NORMAL 10*3/UL
LYMPHOCYTES NFR BLD: NORMAL %
MCH RBC QN AUTO: 34.1 PG
MCHC RBC AUTO-ENTMCNC: 32.7 G/DL
MCV RBC AUTO: 104.2 FL
MONOCYTES # BLD: NORMAL 10*3/UL
MONOCYTES NFR BLD: NORMAL %
MPV (OFFPRE2): NORMAL
NEUTROPHILS # BLD: NORMAL 10*3/UL
NEUTROPHILS NFR BLD: NORMAL %
NRBC BLD MANUAL-RTO: NORMAL %
PLAT MORPH BLD: NORMAL
PLATELET # BLD: 157 10*3/UL
POTASSIUM SERPL-SCNC: 4.5 MMOL/L
PROT SERPL-MCNC: 7.1 G/DL
RBC # BLD: 3.58 10*6/UL
RBC MORPH BLD: NORMAL
SODIUM SERPL-SCNC: 140 MMOL/L
WBC # BLD: 4.3 10*3/UL
WBC MORPH BLD: NORMAL

## 2019-10-27 PROCEDURE — 86140 C-REACTIVE PROTEIN: CPT

## 2019-10-27 PROCEDURE — 85025 COMPLETE CBC W/AUTO DIFF WBC: CPT

## 2019-10-27 PROCEDURE — 84443 ASSAY THYROID STIM HORMONE: CPT | Performed by: INTERNAL MEDICINE

## 2019-10-27 PROCEDURE — 85652 RBC SED RATE AUTOMATED: CPT

## 2019-10-27 PROCEDURE — 82607 VITAMIN B-12: CPT | Performed by: INTERNAL MEDICINE

## 2019-10-27 PROCEDURE — 36415 COLL VENOUS BLD VENIPUNCTURE: CPT

## 2019-10-27 PROCEDURE — 80053 COMPREHEN METABOLIC PANEL: CPT | Performed by: INTERNAL MEDICINE

## 2019-10-28 ENCOUNTER — CLINICAL ABSTRACT (OUTPATIENT)
Dept: CARDIOLOGY | Age: 79
End: 2019-10-28

## 2019-10-30 ENCOUNTER — OFFICE VISIT (OUTPATIENT)
Dept: RHEUMATOLOGY | Facility: CLINIC | Age: 79
End: 2019-10-30
Payer: COMMERCIAL

## 2019-10-30 VITALS
BODY MASS INDEX: 42.34 KG/M2 | DIASTOLIC BLOOD PRESSURE: 81 MMHG | HEART RATE: 52 BPM | SYSTOLIC BLOOD PRESSURE: 150 MMHG | HEIGHT: 64 IN | WEIGHT: 248 LBS

## 2019-10-30 DIAGNOSIS — Z51.81 THERAPEUTIC DRUG MONITORING: ICD-10-CM

## 2019-10-30 DIAGNOSIS — M06.00 RHEUMATOID ARTHRITIS WITH NEGATIVE RHEUMATOID FACTOR, INVOLVING UNSPECIFIED SITE (HCC): Primary | ICD-10-CM

## 2019-10-30 PROCEDURE — G0463 HOSPITAL OUTPT CLINIC VISIT: HCPCS | Performed by: INTERNAL MEDICINE

## 2019-10-30 PROCEDURE — 99214 OFFICE O/P EST MOD 30 MIN: CPT | Performed by: INTERNAL MEDICINE

## 2019-11-05 ENCOUNTER — OFFICE VISIT (OUTPATIENT)
Dept: INTERNAL MEDICINE CLINIC | Facility: CLINIC | Age: 79
End: 2019-11-05
Payer: COMMERCIAL

## 2019-11-05 VITALS
DIASTOLIC BLOOD PRESSURE: 81 MMHG | BODY MASS INDEX: 42.29 KG/M2 | WEIGHT: 247.69 LBS | SYSTOLIC BLOOD PRESSURE: 155 MMHG | HEART RATE: 60 BPM | HEIGHT: 64 IN

## 2019-11-05 DIAGNOSIS — I10 ESSENTIAL HYPERTENSION: Primary | ICD-10-CM

## 2019-11-05 DIAGNOSIS — R60.0 LOWER EXTREMITY EDEMA: ICD-10-CM

## 2019-11-05 DIAGNOSIS — E03.9 ACQUIRED HYPOTHYROIDISM: ICD-10-CM

## 2019-11-05 DIAGNOSIS — K21.9 GASTROESOPHAGEAL REFLUX DISEASE, ESOPHAGITIS PRESENCE NOT SPECIFIED: ICD-10-CM

## 2019-11-05 DIAGNOSIS — N18.30 STAGE 3 CHRONIC KIDNEY DISEASE (HCC): ICD-10-CM

## 2019-11-05 PROCEDURE — 99214 OFFICE O/P EST MOD 30 MIN: CPT | Performed by: INTERNAL MEDICINE

## 2019-11-05 PROCEDURE — G0463 HOSPITAL OUTPT CLINIC VISIT: HCPCS | Performed by: INTERNAL MEDICINE

## 2019-11-05 RX ORDER — FUROSEMIDE 20 MG/1
20 TABLET ORAL
Qty: 90 TABLET | Refills: 1 | Status: SHIPPED | OUTPATIENT
Start: 2019-11-05 | End: 2020-02-04

## 2019-11-05 RX ORDER — CLONIDINE 0.1 MG/24H
1 PATCH, EXTENDED RELEASE TRANSDERMAL WEEKLY
Qty: 4 PATCH | Refills: 3 | Status: SHIPPED | OUTPATIENT
Start: 2019-11-05 | End: 2020-03-22

## 2019-11-05 RX ORDER — PANTOPRAZOLE SODIUM 40 MG/1
TABLET, DELAYED RELEASE ORAL
Qty: 90 TABLET | Refills: 1 | Status: SHIPPED | OUTPATIENT
Start: 2019-11-05 | End: 2019-11-18

## 2019-11-05 NOTE — ASSESSMENT & PLAN NOTE
Patient's blood pressure is not optimally controlled. The options for treatment are very limited due to side effects that she has had. She cannot tolerate a higher dose of the nebivolol due to fatigue and low heart rate.   She is on maximal dose of milan

## 2019-11-05 NOTE — PROGRESS NOTES
HPI:    Patient ID: Salty Babcock is a 77year old female. Pt says it is a chore to get up and do things during the day due to SOB, fatigue and back pain. She belongs to a gym, but stopped going to water aerobics 2 years ago when her  .   Her Oral Tab EC TAKE 1 TABLET BY MOUTH  EVERY MORNING BEFORE  BREAKFAST 90 tablet 1   • cloNIDine 0.1 MG/24HR Transdermal Patch Weekly Place 1 patch onto the skin once a week.  4 patch 3   • Pravastatin Sodium 20 MG Oral Tab TAKE 1 TABLET BY MOUTH  NIGHTLY 90 t Smoker      Smokeless tobacco: Never Used    Alcohol use:  Yes      Alcohol/week: 1.7 standard drinks      Types: 2 Glasses of wine per week      Comment: wine - 1 glass weekly    Drug use: No    Family History   Problem Relation Age of Onset   • Breast Can the form of patch. Follow-up in 2 to 3 months. Relevant Medications    furosemide 20 MG Oral Tab    cloNIDine 0.1 MG/24HR Transdermal Patch Weekly       Unprioritized    Hypothyroidism     Controlled. Continue present management.          Lower

## 2019-11-18 DIAGNOSIS — K21.9 GASTROESOPHAGEAL REFLUX DISEASE, ESOPHAGITIS PRESENCE NOT SPECIFIED: ICD-10-CM

## 2019-11-18 RX ORDER — PANTOPRAZOLE SODIUM 40 MG/1
TABLET, DELAYED RELEASE ORAL
Qty: 90 TABLET | Refills: 1 | Status: SHIPPED | OUTPATIENT
Start: 2019-11-18 | End: 2020-11-06

## 2019-11-25 RX ORDER — ETANERCEPT 50 MG/ML
SOLUTION SUBCUTANEOUS
Qty: 12 ML | Refills: 1 | Status: SHIPPED | OUTPATIENT
Start: 2019-11-25 | End: 2020-05-12

## 2019-11-25 NOTE — TELEPHONE ENCOUNTER
LOV: 10/30/2019  Future Appointments   Date Time Provider Taqueria Wise   2/4/2020  8:50 AM Patsy Estevez MD [facility]   2/4/2020  2:30 PM Tereso Young MD [facility] [place]     Labs:   Component      Latest Ref Rng & Units 10/27/2019 PHOSPHATASE      55 - 142 U/L 63    Total Bilirubin      0.1 - 2.0 mg/dL 1.0    TOTAL PROTEIN      6.4 - 8.2 g/dL 7.1    Albumin      3.4 - 5.0 g/dL 3.8    Globulin      2.8 - 4.4 g/dL 3.3    A/G Ratio      1.0 - 2.0 1.2    Patient Fasting?        Yes    SE

## 2019-12-07 ENCOUNTER — TELEPHONE (OUTPATIENT)
Dept: RHEUMATOLOGY | Facility: CLINIC | Age: 79
End: 2019-12-07

## 2019-12-07 ENCOUNTER — HOSPITAL ENCOUNTER (OUTPATIENT)
Age: 79
Discharge: HOME | End: 2019-12-07
Attending: FAMILY MEDICINE
Payer: MEDICARE

## 2019-12-07 VITALS
TEMPERATURE: 98 F | HEIGHT: 65 IN | HEART RATE: 52 BPM | OXYGEN SATURATION: 99 % | SYSTOLIC BLOOD PRESSURE: 176 MMHG | WEIGHT: 245 LBS | DIASTOLIC BLOOD PRESSURE: 72 MMHG | BODY MASS INDEX: 40.82 KG/M2 | RESPIRATION RATE: 20 BRPM

## 2019-12-07 DIAGNOSIS — J06.9 VIRAL URI WITH COUGH: Primary | ICD-10-CM

## 2019-12-07 PROCEDURE — 99202 OFFICE O/P NEW SF 15 MIN: CPT

## 2019-12-07 PROCEDURE — 99212 OFFICE O/P EST SF 10 MIN: CPT

## 2019-12-07 NOTE — ED INITIAL ASSESSMENT (HPI)
PATIENT ARRIVED AMBULATORY TO ROOM C/O SYMPTOMS THAT STARTED 3 DAYS AGO. +NASAL CONGESTION. SLIGHT COUGH. NO FEVERS. NO N/V/D. EASY NON LABORED RESPIRATIONS.

## 2019-12-07 NOTE — ED PROVIDER NOTES
Patient presents with:  Cough/URI      HPI:     Raymond Hernandez is a [de-identified]year old female who presents with for chief complaint of nasal congestion, mild sore throat, chest congestion, cough. X 3 days. No high fevers, chills, purulent phlegm.    The patient except as noted above. PSFH elements reviewed from today and agreed except as otherwise stated in HPI. Physical Exam:     Nursing note and vitals reviewed.     Findings:    BP (!) 176/72   Pulse 52   Temp 97.9 °F (36.6 °C) (Oral)   Resp 20   Ht 16

## 2019-12-07 NOTE — TELEPHONE ENCOUNTER
This communication is to inform you that we have made several attempts to contact the above patient to fill his or her ENBREL SURECLICK 26CC/EF  Please notify us if there have been changes to this patient's therapy or updated contact number on file for [facility]

## 2019-12-09 NOTE — TELEPHONE ENCOUNTER
Contacted pt and informed of message below. She will contact [facility] to set up delivery.

## 2019-12-30 ENCOUNTER — TELEPHONE (OUTPATIENT)
Dept: PULMONOLOGY | Facility: CLINIC | Age: 79
End: 2019-12-30

## 2019-12-30 NOTE — TELEPHONE ENCOUNTER
Dr.A Alonso reviewed CPAP data download and wrote excellent on report. Letter mailed to pt. Download send to HIM for scanning.

## 2020-01-16 RX ORDER — CLONIDINE 0.1 MG/24H
1 PATCH, EXTENDED RELEASE TRANSDERMAL
COMMUNITY
Start: 2019-11-05

## 2020-01-17 ENCOUNTER — OFFICE VISIT (OUTPATIENT)
Dept: CARDIOLOGY | Age: 80
End: 2020-01-17

## 2020-01-17 VITALS
BODY MASS INDEX: 42.68 KG/M2 | HEART RATE: 49 BPM | SYSTOLIC BLOOD PRESSURE: 144 MMHG | HEIGHT: 64 IN | OXYGEN SATURATION: 97 % | DIASTOLIC BLOOD PRESSURE: 72 MMHG | WEIGHT: 250 LBS

## 2020-01-17 DIAGNOSIS — I25.10 NON-OCCLUSIVE CORONARY ARTERY DISEASE: Primary | ICD-10-CM

## 2020-01-17 DIAGNOSIS — I27.81 COR PULMONALE, CHRONIC (CMD): ICD-10-CM

## 2020-01-17 DIAGNOSIS — E78.00 HYPERCHOLESTEREMIA: ICD-10-CM

## 2020-01-17 DIAGNOSIS — I10 ESSENTIAL HYPERTENSION: ICD-10-CM

## 2020-01-17 PROCEDURE — 3078F DIAST BP <80 MM HG: CPT | Performed by: INTERNAL MEDICINE

## 2020-01-17 PROCEDURE — 99214 OFFICE O/P EST MOD 30 MIN: CPT | Performed by: INTERNAL MEDICINE

## 2020-01-17 RX ORDER — ALBUTEROL SULFATE 90 UG/1
1 AEROSOL, METERED RESPIRATORY (INHALATION) EVERY 6 HOURS PRN
Qty: 1 INHALER | Refills: 5 | Status: SHIPPED | OUTPATIENT
Start: 2020-01-17 | End: 2021-11-23

## 2020-01-17 NOTE — TELEPHONE ENCOUNTER
Patient scheduled appointment for her yearly follow up, first available 3/18/2020, patient request script refill for:Albuterol Sulfate, patient is out. Please call at:[phone],thanks.   *[place]/pharmacy-[place]    Current Outpatient Medications:   •  Alb

## 2020-01-17 NOTE — TELEPHONE ENCOUNTER
Attempted to contact pt,LMTCB.      Last seen 8-13-18   Last refill 8-14-18   Routed to [facility] for sign off 78

## 2020-01-21 ENCOUNTER — MED REC SCAN ONLY (OUTPATIENT)
Dept: INTERNAL MEDICINE CLINIC | Facility: CLINIC | Age: 80
End: 2020-01-21

## 2020-02-02 ENCOUNTER — TELEPHONE (OUTPATIENT)
Dept: RHEUMATOLOGY | Facility: CLINIC | Age: 80
End: 2020-02-02

## 2020-02-02 ENCOUNTER — LAB ENCOUNTER (OUTPATIENT)
Dept: LAB | Facility: HOSPITAL | Age: 80
End: 2020-02-02
Attending: INTERNAL MEDICINE
Payer: MEDICARE

## 2020-02-02 DIAGNOSIS — Z51.81 THERAPEUTIC DRUG MONITORING: ICD-10-CM

## 2020-02-02 DIAGNOSIS — M06.00 RHEUMATOID ARTHRITIS WITH NEGATIVE RHEUMATOID FACTOR, INVOLVING UNSPECIFIED SITE (HCC): ICD-10-CM

## 2020-02-02 DIAGNOSIS — N28.9 RENAL INSUFFICIENCY: Primary | ICD-10-CM

## 2020-02-02 LAB
ALBUMIN SERPL-MCNC: 3.8 G/DL (ref 3.4–5)
AST SERPL-CCNC: 22 U/L (ref 15–37)
BASOPHILS # BLD AUTO: 0.03 X10(3) UL (ref 0–0.2)
BASOPHILS NFR BLD AUTO: 0.5 %
CREAT BLD-MCNC: 1.52 MG/DL (ref 0.55–1.02)
CRP SERPL-MCNC: 1.05 MG/DL (ref ?–0.3)
DEPRECATED RDW RBC AUTO: 53.4 FL (ref 35.1–46.3)
EOSINOPHIL # BLD AUTO: 0.12 X10(3) UL (ref 0–0.7)
EOSINOPHIL NFR BLD AUTO: 2.2 %
ERYTHROCYTE [DISTWIDTH] IN BLOOD BY AUTOMATED COUNT: 14.2 % (ref 11–15)
ERYTHROCYTE [SEDIMENTATION RATE] IN BLOOD: 15 MM/HR (ref 0–30)
HCT VFR BLD AUTO: 37.4 % (ref 35–48)
HGB BLD-MCNC: 12.3 G/DL (ref 12–16)
IMM GRANULOCYTES # BLD AUTO: 0.02 X10(3) UL (ref 0–1)
IMM GRANULOCYTES NFR BLD: 0.4 %
LYMPHOCYTES # BLD AUTO: 1.61 X10(3) UL (ref 1–4)
LYMPHOCYTES NFR BLD AUTO: 29.1 %
MCH RBC QN AUTO: 33.8 PG (ref 26–34)
MCHC RBC AUTO-ENTMCNC: 32.9 G/DL (ref 31–37)
MCV RBC AUTO: 102.7 FL (ref 80–100)
MONOCYTES # BLD AUTO: 0.94 X10(3) UL (ref 0.1–1)
MONOCYTES NFR BLD AUTO: 17 %
NEUTROPHILS # BLD AUTO: 2.82 X10 (3) UL (ref 1.5–7.7)
NEUTROPHILS # BLD AUTO: 2.82 X10(3) UL (ref 1.5–7.7)
NEUTROPHILS NFR BLD AUTO: 50.8 %
PLATELET # BLD AUTO: 165 10(3)UL (ref 150–450)
RBC # BLD AUTO: 3.64 X10(6)UL (ref 3.8–5.3)
WBC # BLD AUTO: 5.5 X10(3) UL (ref 4–11)

## 2020-02-02 PROCEDURE — 82040 ASSAY OF SERUM ALBUMIN: CPT

## 2020-02-02 PROCEDURE — 85025 COMPLETE CBC W/AUTO DIFF WBC: CPT

## 2020-02-02 PROCEDURE — 86140 C-REACTIVE PROTEIN: CPT

## 2020-02-02 PROCEDURE — 85652 RBC SED RATE AUTOMATED: CPT

## 2020-02-02 PROCEDURE — 36415 COLL VENOUS BLD VENIPUNCTURE: CPT

## 2020-02-02 PROCEDURE — 82565 ASSAY OF CREATININE: CPT

## 2020-02-02 PROCEDURE — 84450 TRANSFERASE (AST) (SGOT): CPT

## 2020-02-03 NOTE — TELEPHONE ENCOUNTER
Name and  verified. Pt given results and provider's recommendation. Pt verbalized understanding and agreeable with plan.

## 2020-02-03 NOTE — TELEPHONE ENCOUNTER
Please let her know that her kidney function test (creatinine) is more abnormal than usual. Hopefully it is because of dehydration. I ordered a repeat creatinine, which she needs to have done before her f/u appointment on February 26th.      Her blood count

## 2020-02-04 ENCOUNTER — OFFICE VISIT (OUTPATIENT)
Dept: INTERNAL MEDICINE CLINIC | Facility: CLINIC | Age: 80
End: 2020-02-04
Payer: COMMERCIAL

## 2020-02-04 VITALS
SYSTOLIC BLOOD PRESSURE: 144 MMHG | HEART RATE: 49 BPM | DIASTOLIC BLOOD PRESSURE: 62 MMHG | BODY MASS INDEX: 41.04 KG/M2 | WEIGHT: 246.31 LBS | HEIGHT: 65 IN

## 2020-02-04 DIAGNOSIS — I10 ESSENTIAL HYPERTENSION: ICD-10-CM

## 2020-02-04 DIAGNOSIS — N18.30 STAGE 3 CHRONIC KIDNEY DISEASE (HCC): Primary | ICD-10-CM

## 2020-02-04 DIAGNOSIS — Z12.31 ENCOUNTER FOR SCREENING MAMMOGRAM FOR MALIGNANT NEOPLASM OF BREAST: ICD-10-CM

## 2020-02-04 DIAGNOSIS — D84.9 IMMUNOSUPPRESSED STATUS (HCC): ICD-10-CM

## 2020-02-04 DIAGNOSIS — H10.022 PINK EYE DISEASE OF LEFT EYE: ICD-10-CM

## 2020-02-04 PROCEDURE — 99214 OFFICE O/P EST MOD 30 MIN: CPT | Performed by: INTERNAL MEDICINE

## 2020-02-04 PROCEDURE — G0463 HOSPITAL OUTPT CLINIC VISIT: HCPCS | Performed by: INTERNAL MEDICINE

## 2020-02-04 RX ORDER — SULFACETAMIDE SODIUM 100 MG/ML
2 SOLUTION/ DROPS OPHTHALMIC 4 TIMES DAILY
Qty: 10 ML | Refills: 0 | Status: SHIPPED | OUTPATIENT
Start: 2020-02-04 | End: 2020-02-11

## 2020-02-04 RX ORDER — SOLIFENACIN SUCCINATE 10 MG/1
TABLET, FILM COATED ORAL
COMMUNITY
Start: 2020-01-29 | End: 2020-03-27

## 2020-02-04 NOTE — PROGRESS NOTES
HPI:    Patient ID: Ana Lilia Barriga is a [de-identified]year old female. She had blood tests and her kidney function was a little worse. She has had mucus and discharge in the left eye for the last couple days. HTN   This is a chronic problem.  The current episode drops to eye 4 (four) times daily for 7 days. 10 mL 0   • Albuterol Sulfate  (90 Base) MCG/ACT Inhalation Aero Soln Inhale 1 puff into the lungs every 6 (six) hours as needed for Wheezing.  1 Inhaler 5   • ENBREL SURECLICK 50 MG/ML Subcutaneous Solut Social History    Tobacco Use      Smoking status: Never Smoker      Smokeless tobacco: Never Used    Alcohol use: Yes      Alcohol/week: 1.7 standard drinks      Types: 2 Glasses of wine per week      Comment: wine - 1 glass weekly    Drug use:  No may have more issues when the weather gets warmer. Recheck BMP in 1 month. Relevant Orders    BASIC METABOLIC PANEL (8)    Pink eye disease of left eye     Will rx Sulamyd eye drops. If no improvement in a few days, she will see her eye doctor.

## 2020-02-05 NOTE — ASSESSMENT & PLAN NOTE
I reviewed all the patient's medications with her. Her kidney function is likely worse due to the Lasix. We will stop the Lasix since her swelling is somewhat better now during the winter. She may have more issues when the weather gets warmer.   Recheck

## 2020-02-10 DIAGNOSIS — E78.2 MIXED HYPERLIPIDEMIA: ICD-10-CM

## 2020-02-10 DIAGNOSIS — I10 ESSENTIAL HYPERTENSION WITH GOAL BLOOD PRESSURE LESS THAN 140/90: ICD-10-CM

## 2020-02-10 RX ORDER — PRAVASTATIN SODIUM 20 MG
TABLET ORAL
Qty: 90 TABLET | Refills: 1 | Status: SHIPPED | OUTPATIENT
Start: 2020-02-10 | End: 2020-07-21

## 2020-02-10 RX ORDER — HYDROCHLOROTHIAZIDE 12.5 MG/1
TABLET ORAL
Qty: 90 TABLET | Refills: 1 | Status: SHIPPED | OUTPATIENT
Start: 2020-02-10 | End: 2020-07-21

## 2020-02-10 RX ORDER — LISINOPRIL 40 MG/1
TABLET ORAL
Qty: 90 TABLET | Refills: 1 | Status: SHIPPED | OUTPATIENT
Start: 2020-02-10 | End: 2020-07-21

## 2020-02-11 NOTE — TELEPHONE ENCOUNTER
Refill passed per [facility] protocol.   Cholesterol Medications  Protocol Criteria:  · Appointment scheduled in the past 12 months or in the next 3 months  · ALT & LDL on file in the past 12 months  · ALT result < 80  · LDL result <130   Recent Outpat

## 2020-02-11 NOTE — TELEPHONE ENCOUNTER
Please review; protocol failed.     Hypertensive Medications Protocol Failed2/10 4:02 AM   GFR Non- > 50

## 2020-02-14 RX ORDER — METHOTREXATE 2.5 MG/1
TABLET ORAL
Qty: 96 TABLET | Refills: 0 | Status: SHIPPED | OUTPATIENT
Start: 2020-02-14 | End: 2020-05-26

## 2020-02-20 NOTE — PROGRESS NOTES
Sarah Brumfield is an 56-year-old patient of Dr. Jannet Haile with seronegative rheumatoid arthritis. Since I saw her October 30th of 2019, she has continued Enbrel weekly, methotrexate 20mg weekly, and Plaquenil 400mg daily.   She feels like her rheumatoid arthritis is co appears well-developed. HENT:   Head: Normocephalic. Eyes: Conjunctival injection left eye. Cardiovascular: Normal rate, regular rhythm and normal heart sounds. Pulmonary/Chest: Effort normal and breath sounnormal.   Abdominal: Soft.  Bowel sounds

## 2020-02-23 ENCOUNTER — APPOINTMENT (OUTPATIENT)
Dept: LAB | Facility: HOSPITAL | Age: 80
End: 2020-02-23
Attending: INTERNAL MEDICINE
Payer: MEDICARE

## 2020-02-23 DIAGNOSIS — N18.30 STAGE 3 CHRONIC KIDNEY DISEASE (HCC): ICD-10-CM

## 2020-02-23 LAB
ANION GAP SERPL CALC-SCNC: 5 MMOL/L
ANION GAP SERPL CALC-SCNC: 5 MMOL/L (ref 0–18)
BUN BLD-MCNC: 18 MG/DL (ref 7–18)
BUN SERPL-MCNC: 18 MG/DL
BUN/CREAT SERPL: 17
BUN/CREAT SERPL: 17 (ref 10–20)
CALCIUM BLD-MCNC: 9.4 MG/DL (ref 8.5–10.1)
CALCIUM SERPL-MCNC: 9.4 MG/DL
CHLORIDE SERPL-SCNC: 105 MMOL/L
CHLORIDE SERPL-SCNC: 105 MMOL/L (ref 98–112)
CO2 SERPL-SCNC: 28 MMOL/L
CO2 SERPL-SCNC: 28 MMOL/L (ref 21–32)
CREAT BLD-MCNC: 1.06 MG/DL (ref 0.55–1.02)
CREAT SERPL-MCNC: 1.06 MG/DL
GLUCOSE BLD-MCNC: 103 MG/DL (ref 70–99)
GLUCOSE SERPL-MCNC: 103 MG/DL
OSMOLALITY SERPL CALC.SUM OF ELEC: 288 MOSM/KG (ref 275–295)
PATIENT FASTING Y/N/NP: YES
POTASSIUM SERPL-SCNC: 4.3 MMOL/L
POTASSIUM SERPL-SCNC: 4.3 MMOL/L (ref 3.5–5.1)
SODIUM SERPL-SCNC: 138 MMOL/L
SODIUM SERPL-SCNC: 138 MMOL/L (ref 136–145)

## 2020-02-23 PROCEDURE — 80048 BASIC METABOLIC PNL TOTAL CA: CPT

## 2020-02-23 PROCEDURE — 36415 COLL VENOUS BLD VENIPUNCTURE: CPT

## 2020-02-26 ENCOUNTER — OFFICE VISIT (OUTPATIENT)
Dept: RHEUMATOLOGY | Facility: CLINIC | Age: 80
End: 2020-02-26
Payer: COMMERCIAL

## 2020-02-26 VITALS
DIASTOLIC BLOOD PRESSURE: 79 MMHG | HEART RATE: 57 BPM | HEIGHT: 65 IN | WEIGHT: 245 LBS | BODY MASS INDEX: 40.82 KG/M2 | SYSTOLIC BLOOD PRESSURE: 158 MMHG

## 2020-02-26 DIAGNOSIS — Z51.81 ENCOUNTER FOR THERAPEUTIC DRUG MONITORING: ICD-10-CM

## 2020-02-26 DIAGNOSIS — M06.09 RHEUMATOID ARTHRITIS OF MULTIPLE SITES WITH NEGATIVE RHEUMATOID FACTOR (HCC): Primary | ICD-10-CM

## 2020-02-26 PROCEDURE — 99214 OFFICE O/P EST MOD 30 MIN: CPT | Performed by: INTERNAL MEDICINE

## 2020-02-26 PROCEDURE — G0463 HOSPITAL OUTPT CLINIC VISIT: HCPCS | Performed by: INTERNAL MEDICINE

## 2020-03-14 DIAGNOSIS — E78.00 HYPERCHOLESTEROLEMIA: ICD-10-CM

## 2020-03-15 RX ORDER — NIACIN 750 MG/1
TABLET, EXTENDED RELEASE ORAL
Qty: 90 TABLET | Refills: 1 | Status: SHIPPED | OUTPATIENT
Start: 2020-03-15 | End: 2020-07-24

## 2020-03-15 NOTE — TELEPHONE ENCOUNTER
Review pended refill request as it does not fall under a protocol.     Last Rx: 7/30/19  LOV: 1 month ago

## 2020-03-16 RX ORDER — FOLIC ACID 1 MG/1
TABLET ORAL
Qty: 90 TABLET | Refills: 3 | Status: SHIPPED | OUTPATIENT
Start: 2020-03-16 | End: 2021-03-09

## 2020-03-16 RX ORDER — HYDROXYCHLOROQUINE SULFATE 200 MG/1
TABLET, FILM COATED ORAL
Qty: 180 TABLET | Refills: 3 | Status: SHIPPED | OUTPATIENT
Start: 2020-03-16 | End: 2021-03-02

## 2020-03-17 DIAGNOSIS — I10 ESSENTIAL HYPERTENSION WITH GOAL BLOOD PRESSURE LESS THAN 140/90: ICD-10-CM

## 2020-03-17 RX ORDER — NEBIVOLOL HYDROCHLORIDE 20 MG/1
TABLET ORAL
Qty: 90 TABLET | Refills: 1 | Status: SHIPPED | OUTPATIENT
Start: 2020-03-17 | End: 2020-10-21

## 2020-03-21 DIAGNOSIS — I10 ESSENTIAL HYPERTENSION: ICD-10-CM

## 2020-03-22 RX ORDER — CLONIDINE 0.1 MG/24H
1 PATCH, EXTENDED RELEASE TRANSDERMAL WEEKLY
Qty: 12 PATCH | Refills: 0 | Status: SHIPPED | OUTPATIENT
Start: 2020-03-22 | End: 2020-06-18

## 2020-03-24 DIAGNOSIS — E03.9 ACQUIRED HYPOTHYROIDISM: ICD-10-CM

## 2020-03-24 RX ORDER — LEVOTHYROXINE SODIUM 137 UG/1
TABLET ORAL
Qty: 90 TABLET | Refills: 1 | Status: SHIPPED | OUTPATIENT
Start: 2020-03-24 | End: 2020-07-21

## 2020-03-27 RX ORDER — SOLIFENACIN SUCCINATE 10 MG/1
TABLET, FILM COATED ORAL
Qty: 90 TABLET | Refills: 3 | Status: SHIPPED | OUTPATIENT
Start: 2020-03-27 | End: 2021-05-11

## 2020-05-07 ENCOUNTER — HOSPITAL ENCOUNTER (OUTPATIENT)
Dept: MAMMOGRAPHY | Age: 80
Discharge: HOME | End: 2020-05-07
Attending: INTERNAL MEDICINE
Payer: MEDICARE

## 2020-05-07 DIAGNOSIS — Z12.31 ENCOUNTER FOR SCREENING MAMMOGRAM FOR MALIGNANT NEOPLASM OF BREAST: ICD-10-CM

## 2020-05-07 PROCEDURE — 77063 BREAST TOMOSYNTHESIS BI: CPT | Performed by: INTERNAL MEDICINE

## 2020-05-07 PROCEDURE — 77067 SCR MAMMO BI INCL CAD: CPT | Performed by: INTERNAL MEDICINE

## 2020-05-12 RX ORDER — ETANERCEPT 50 MG/ML
SOLUTION SUBCUTANEOUS
Qty: 12 ML | Refills: 1 | Status: SHIPPED | OUTPATIENT
Start: 2020-05-12 | End: 2020-11-21

## 2020-05-26 NOTE — TELEPHONE ENCOUNTER
Requested Prescriptions     Pending Prescriptions Disp Refills   • METHOTREXATE 2.5 MG Oral Tab [Pharmacy Med Name: Methotrexate 2.5 Mg Tab [facility]] 96 tablet 0     Sig: TAKE 8 TABLETS BY MOUTH EVERY WEEK     Last filled: 2/14/20 #90 tab w/ 0 rf    LOV:   Fut 275 - 295 mOsm/kg 288    eGFR NON-AFR. AMERICAN      >=60 50 (L) 32 (L)   eGFR       >=60 57 (L) 37 (L)   Patient Fasting?        Yes    Albumin      3.4 - 5.0 g/dL  3.8   AST (SGOT)      15 - 37 U/L  22   C-REACTIVE PROTEIN      <0.30 mg/dL

## 2020-06-07 ENCOUNTER — LAB ENCOUNTER (OUTPATIENT)
Dept: LAB | Facility: HOSPITAL | Age: 80
End: 2020-06-07
Attending: PEDIATRICS
Payer: MEDICARE

## 2020-06-07 DIAGNOSIS — Z51.81 THERAPEUTIC DRUG MONITORING: ICD-10-CM

## 2020-06-07 DIAGNOSIS — N28.9 RENAL INSUFFICIENCY: ICD-10-CM

## 2020-06-07 DIAGNOSIS — M06.00 RHEUMATOID ARTHRITIS WITH NEGATIVE RHEUMATOID FACTOR, INVOLVING UNSPECIFIED SITE (HCC): ICD-10-CM

## 2020-06-07 LAB
AST SERPL-CCNC: 25 UNITS/L
HCT VFR BLD CALC: 35.8 %
HGB BLD-MCNC: 12 G/DL
PLATELET # BLD: 179 K/MCL
RBC # BLD: 3.45 10*6/UL
WBC # BLD: 4.3 K/MCL

## 2020-06-07 PROCEDURE — 85025 COMPLETE CBC W/AUTO DIFF WBC: CPT

## 2020-06-07 PROCEDURE — 86140 C-REACTIVE PROTEIN: CPT

## 2020-06-07 PROCEDURE — 82565 ASSAY OF CREATININE: CPT

## 2020-06-07 PROCEDURE — 85652 RBC SED RATE AUTOMATED: CPT

## 2020-06-07 PROCEDURE — 82040 ASSAY OF SERUM ALBUMIN: CPT

## 2020-06-07 PROCEDURE — 84450 TRANSFERASE (AST) (SGOT): CPT

## 2020-06-07 PROCEDURE — 36415 COLL VENOUS BLD VENIPUNCTURE: CPT

## 2020-06-07 NOTE — PROGRESS NOTES
[facility] is an 79-year-old patient of Dr. Papi Reyes with seronegative rheumatoid arthritis. Since I last saw her February 26th of 2020, she has continued Enbrel weekly, methotrexate 20mg weekly, and Plaquenil 400mg daily.   She feels like her rheumatoid arthritis Cardiovascular: Normal rate, regular rhythm and normal heart sounds. Pulmonary/Chest: Effort normal and breath sounnormal.   Abdominal: Soft. Bowel sounds are normal. She exhibits no mass. There is no tenderness. There is no rebound and no guarding.

## 2020-06-09 ENCOUNTER — OFFICE VISIT (OUTPATIENT)
Dept: RHEUMATOLOGY | Facility: CLINIC | Age: 80
End: 2020-06-09
Payer: COMMERCIAL

## 2020-06-09 VITALS
WEIGHT: 255 LBS | DIASTOLIC BLOOD PRESSURE: 84 MMHG | BODY MASS INDEX: 42.49 KG/M2 | HEIGHT: 65 IN | SYSTOLIC BLOOD PRESSURE: 162 MMHG | HEART RATE: 50 BPM

## 2020-06-09 DIAGNOSIS — E66.01 MORBID OBESITY WITH BMI OF 40.0-44.9, ADULT (HCC): ICD-10-CM

## 2020-06-09 DIAGNOSIS — Z51.81 THERAPEUTIC DRUG MONITORING: ICD-10-CM

## 2020-06-09 DIAGNOSIS — M06.09 RHEUMATOID ARTHRITIS OF MULTIPLE SITES WITH NEGATIVE RHEUMATOID FACTOR (HCC): Primary | ICD-10-CM

## 2020-06-09 PROCEDURE — G0463 HOSPITAL OUTPT CLINIC VISIT: HCPCS | Performed by: INTERNAL MEDICINE

## 2020-06-09 PROCEDURE — 99214 OFFICE O/P EST MOD 30 MIN: CPT | Performed by: INTERNAL MEDICINE

## 2020-06-18 DIAGNOSIS — I10 ESSENTIAL HYPERTENSION: ICD-10-CM

## 2020-06-18 RX ORDER — CLONIDINE 0.1 MG/24H
1 PATCH, EXTENDED RELEASE TRANSDERMAL WEEKLY
Qty: 12 PATCH | Refills: 0 | Status: SHIPPED | OUTPATIENT
Start: 2020-06-18 | End: 2020-09-14

## 2020-06-29 ENCOUNTER — OFFICE VISIT (OUTPATIENT)
Dept: OTOLARYNGOLOGY | Facility: CLINIC | Age: 80
End: 2020-06-29
Payer: COMMERCIAL

## 2020-06-29 VITALS
HEIGHT: 65 IN | SYSTOLIC BLOOD PRESSURE: 142 MMHG | WEIGHT: 255 LBS | BODY MASS INDEX: 42.49 KG/M2 | TEMPERATURE: 98 F | DIASTOLIC BLOOD PRESSURE: 82 MMHG

## 2020-06-29 DIAGNOSIS — H61.23 BILATERAL IMPACTED CERUMEN: Primary | ICD-10-CM

## 2020-06-29 PROCEDURE — 69210 REMOVE IMPACTED EAR WAX UNI: CPT | Performed by: OTOLARYNGOLOGY

## 2020-06-29 NOTE — PROGRESS NOTES
After informed consent was obtained, the patients ears were examined under the operating microscope. Cerumen impaction was removed from bilateral ears using suction. Tympanic membranes were noted to be normal. Patient tolerated the procedure well.   All que No

## 2020-07-08 ENCOUNTER — OFFICE VISIT (OUTPATIENT)
Dept: PULMONOLOGY | Facility: CLINIC | Age: 80
End: 2020-07-08
Payer: COMMERCIAL

## 2020-07-08 VITALS
WEIGHT: 253 LBS | HEART RATE: 54 BPM | SYSTOLIC BLOOD PRESSURE: 146 MMHG | HEIGHT: 65 IN | RESPIRATION RATE: 18 BRPM | DIASTOLIC BLOOD PRESSURE: 76 MMHG | OXYGEN SATURATION: 98 % | BODY MASS INDEX: 42.15 KG/M2

## 2020-07-08 DIAGNOSIS — R06.00 DYSPNEA, UNSPECIFIED TYPE: ICD-10-CM

## 2020-07-08 DIAGNOSIS — G47.33 OSA ON CPAP: Primary | ICD-10-CM

## 2020-07-08 DIAGNOSIS — Z99.89 OSA ON CPAP: Primary | ICD-10-CM

## 2020-07-08 PROCEDURE — 99214 OFFICE O/P EST MOD 30 MIN: CPT | Performed by: INTERNAL MEDICINE

## 2020-07-08 PROCEDURE — G0463 HOSPITAL OUTPT CLINIC VISIT: HCPCS | Performed by: INTERNAL MEDICINE

## 2020-07-08 NOTE — PROGRESS NOTES
HPI:    Patient ID: Jennifer Bauer is a [de-identified]year old female.     HPI  Uses CPAP every night with no technical problem and feels better with the use of a CPAP  Wakes up couple times at night to go to the bathroom since she is taking diuretics  Chronic lower ex Sulfate  (90 Base) MCG/ACT Inhalation Aero Soln Inhale 1 puff into the lungs every 6 (six) hours as needed for Wheezing.  1 Inhaler 5   • PANTOPRAZOLE SODIUM 40 MG Oral Tab EC TAKE 1 TABLET BY MOUTH  EVERY MORNING BEFORE  BREAKFAST 90 tablet 1   • Ne Follow-up in 1       Meds This Visit:  Requested Prescriptions      No prescriptions requested or ordered in this encounter       Imaging & Referrals:  None       #[id number]

## 2020-07-21 ENCOUNTER — TELEPHONE (OUTPATIENT)
Dept: INTERNAL MEDICINE CLINIC | Facility: CLINIC | Age: 80
End: 2020-07-21

## 2020-07-21 ENCOUNTER — OFFICE VISIT (OUTPATIENT)
Dept: INTERNAL MEDICINE CLINIC | Facility: CLINIC | Age: 80
End: 2020-07-21
Payer: COMMERCIAL

## 2020-07-21 VITALS
BODY MASS INDEX: 42.15 KG/M2 | WEIGHT: 253 LBS | HEIGHT: 65 IN | HEART RATE: 65 BPM | DIASTOLIC BLOOD PRESSURE: 83 MMHG | SYSTOLIC BLOOD PRESSURE: 152 MMHG

## 2020-07-21 DIAGNOSIS — D84.9 IMMUNOSUPPRESSED STATUS (HCC): ICD-10-CM

## 2020-07-21 DIAGNOSIS — I25.10 ATHEROSCLEROSIS OF NATIVE CORONARY ARTERY OF NATIVE HEART WITHOUT ANGINA PECTORIS: ICD-10-CM

## 2020-07-21 DIAGNOSIS — G25.0 ESSENTIAL TREMOR: ICD-10-CM

## 2020-07-21 DIAGNOSIS — Z99.89 OSA ON CPAP: ICD-10-CM

## 2020-07-21 DIAGNOSIS — E78.2 MIXED HYPERLIPIDEMIA: ICD-10-CM

## 2020-07-21 DIAGNOSIS — G47.33 OSA ON CPAP: ICD-10-CM

## 2020-07-21 DIAGNOSIS — R26.9 GAIT ABNORMALITY: ICD-10-CM

## 2020-07-21 DIAGNOSIS — K21.9 GASTROESOPHAGEAL REFLUX DISEASE, ESOPHAGITIS PRESENCE NOT SPECIFIED: ICD-10-CM

## 2020-07-21 DIAGNOSIS — E03.9 ACQUIRED HYPOTHYROIDISM: ICD-10-CM

## 2020-07-21 DIAGNOSIS — N18.30 STAGE 3 CHRONIC KIDNEY DISEASE (HCC): ICD-10-CM

## 2020-07-21 DIAGNOSIS — D63.8 ANEMIA, CHRONIC DISEASE: ICD-10-CM

## 2020-07-21 DIAGNOSIS — I10 ESSENTIAL HYPERTENSION WITH GOAL BLOOD PRESSURE LESS THAN 140/90: ICD-10-CM

## 2020-07-21 DIAGNOSIS — E66.01 MORBID OBESITY WITH BMI OF 40.0-44.9, ADULT (HCC): ICD-10-CM

## 2020-07-21 DIAGNOSIS — M15.9 PRIMARY OSTEOARTHRITIS INVOLVING MULTIPLE JOINTS: ICD-10-CM

## 2020-07-21 DIAGNOSIS — K58.0 IRRITABLE BOWEL SYNDROME WITH DIARRHEA: ICD-10-CM

## 2020-07-21 DIAGNOSIS — Z00.00 ENCOUNTER FOR MEDICARE ANNUAL WELLNESS EXAM: Primary | ICD-10-CM

## 2020-07-21 DIAGNOSIS — M06.09 RHEUMATOID ARTHRITIS OF MULTIPLE SITES WITH NEGATIVE RHEUMATOID FACTOR (HCC): ICD-10-CM

## 2020-07-21 DIAGNOSIS — R06.00 DOE (DYSPNEA ON EXERTION): ICD-10-CM

## 2020-07-21 DIAGNOSIS — R60.0 LOWER EXTREMITY EDEMA: ICD-10-CM

## 2020-07-21 DIAGNOSIS — I70.0 ATHEROSCLEROSIS OF AORTIC ARCH (HCC): ICD-10-CM

## 2020-07-21 PROBLEM — H10.022 PINK EYE DISEASE OF LEFT EYE: Status: RESOLVED | Noted: 2020-02-04 | Resolved: 2020-07-21

## 2020-07-21 PROBLEM — R06.09 DOE (DYSPNEA ON EXERTION): Status: ACTIVE | Noted: 2020-07-21

## 2020-07-21 PROCEDURE — 96160 PT-FOCUSED HLTH RISK ASSMT: CPT | Performed by: INTERNAL MEDICINE

## 2020-07-21 PROCEDURE — 3008F BODY MASS INDEX DOCD: CPT | Performed by: INTERNAL MEDICINE

## 2020-07-21 PROCEDURE — 3077F SYST BP >= 140 MM HG: CPT | Performed by: INTERNAL MEDICINE

## 2020-07-21 PROCEDURE — 3079F DIAST BP 80-89 MM HG: CPT | Performed by: INTERNAL MEDICINE

## 2020-07-21 PROCEDURE — 99397 PER PM REEVAL EST PAT 65+ YR: CPT | Performed by: INTERNAL MEDICINE

## 2020-07-21 PROCEDURE — G0439 PPPS, SUBSEQ VISIT: HCPCS | Performed by: INTERNAL MEDICINE

## 2020-07-21 RX ORDER — NEBIVOLOL 10 MG/1
10 TABLET ORAL DAILY
Qty: 90 TABLET | Refills: 1 | Status: SHIPPED | OUTPATIENT
Start: 2020-07-21 | End: 2020-07-22 | Stop reason: CLARIF

## 2020-07-21 RX ORDER — PRAVASTATIN SODIUM 20 MG
20 TABLET ORAL NIGHTLY
Qty: 90 TABLET | Refills: 1 | Status: SHIPPED | OUTPATIENT
Start: 2020-07-21 | End: 2020-12-20

## 2020-07-21 RX ORDER — LISINOPRIL 40 MG/1
40 TABLET ORAL DAILY
Qty: 90 TABLET | Refills: 1 | Status: SHIPPED | OUTPATIENT
Start: 2020-07-21 | End: 2020-12-20

## 2020-07-21 RX ORDER — HYDROCHLOROTHIAZIDE 12.5 MG/1
12.5 TABLET ORAL DAILY
Qty: 90 TABLET | Refills: 1 | Status: SHIPPED | OUTPATIENT
Start: 2020-07-21 | End: 2020-12-20

## 2020-07-21 RX ORDER — LEVOTHYROXINE SODIUM 137 UG/1
137 TABLET ORAL
Qty: 90 TABLET | Refills: 1 | Status: SHIPPED | OUTPATIENT
Start: 2020-07-21 | End: 2020-11-14

## 2020-07-21 NOTE — PROGRESS NOTES
HPI:   Joan Adkins is a [de-identified]year old female who presents for a MA (Medicare Advantage) Supervisit (Once per calendar year). Pt c/o worsening NICOLE for the past few months. She saw Dr. Jeronimo Rios and he didn't think it was a lung problem.   Her balance isn't the last two weeks)?: Not at all  Feeling down, depressed, or hopeless (over the last two weeks)?: Not at all  PHQ-2 SCORE: 0     Advanced Directive:   She does have a Living Will but we do NOT have it on file in [address] [place] [address] Rd.    The patient has this document but w CREATSERUM 1.08 (H) 06/07/2020     (H) 02/23/2020        CBC  (most recent labs)   Lab Results   Component Value Date    WBC 4.3 06/07/2020    HGB 12.0 06/07/2020    .0 06/07/2020        ALLERGIES:   She is allergic to erythromycin base an Oral Tab, Take  by mouth. Take 1 cap. every day  aspirin 81 MG Oral Chew Tab, Chew  by mouth.  take 1 tablet (81MG)  by oral route  every day  CENTRUM SILVER OR TABS, 1 TABLET DAILY       MEDICAL INFORMATION:   She  has a past medical history of Cancer ([facility] [address]. blood in stool. Endocrine: Negative for cold intolerance and heat intolerance. Genitourinary: Negative for dysuria and hematuria. Musculoskeletal: Positive for back pain and joint pain. Skin: Negative for rash.    Allergic/Immunologic: Negative for members and friends have told me they think I may have hearing loss: No                Visual Acuity                           Physical Exam   Nursing note and vitals reviewed. Constitutional: She is oriented to person, place, and time and obese.  She ap • Influenza 10/02/2012   • Pneumococcal (Prevnar 13) 09/28/2016   • Pneumovax 23 01/01/2005, 04/16/2019   • TD 12/20/2004   • Zoster Vaccine Recombinant Adjuvanted (Shingrix) 11/19/2018, 01/24/2019        ASSESSMENT AND OTHER RELEVANT CHRONIC CONDITIONS: hydrochlorothiazide 12.5 MG Oral Tab    lisinopril 40 MG Oral Tab    Other Relevant Orders    CARDIO - INTERNAL    Atherosclerosis of aortic arch (HCC)     Stable. Continue present management.          Lower extremity edema     Patient bilateral ankle swel Lab or Procedure External Lab or Procedure   Diabetes Screening      HbgA1C   Annually GLYCOHEMOGLOBIN (HgA1c) (L) (%)   Date Value   09/22/2014 5.5     Glycohemoglobin (HgA1c) (%)   Date Value   02/03/2017 5.6    No flowsheet data found.     Fasting Blood after your 65th birthday    Hepatitis B for Moderate/High Risk No vaccine history found Medium/high risk factors:   End-stage renal disease   Hemophiliacs who received Factor VIII or IX concentrates   Clients of institutions for the mentally retarded   Per

## 2020-07-21 NOTE — PATIENT INSTRUCTIONS
Please schedule your next appointment in October. Do fasting labs 2-4 days before that appointment. Fast for 12 hours. Water and meds are okay. At this time, they are asking patients to schedule blood tests .   Please call central registration at 6

## 2020-07-22 ENCOUNTER — TELEPHONE (OUTPATIENT)
Dept: CARDIOLOGY | Age: 80
End: 2020-07-22

## 2020-07-22 NOTE — ASSESSMENT & PLAN NOTE
Unremarkable exam.  Labs were reviewed  Immunizations were reviewed. Pt is at risk for falls. Pt was given written information for fall prevention in the after visit summary.   Hearing assessment was normal.

## 2020-07-22 NOTE — TELEPHONE ENCOUNTER
Pt did not know off hand which dose she has been taking but states is only taking one. Had pt check Bystolic medication bottle and reports is taking Bystolic 20 mg daily.  Should 10 mg be d/c'd?

## 2020-07-22 NOTE — TELEPHONE ENCOUNTER
Please call patient. I would like to clarify her dose of nebivolol (Bystolic). Is she taking 20 mg daily or 10 mg daily? I sent a prescription for 10 mg on July 21. I sent a prescription for 20 mg on March 17 with 1 refill. Is she taking both of them?

## 2020-07-22 NOTE — TELEPHONE ENCOUNTER
Called [facility] and informed technician [facility] to cancel Rx sent yesterday for Nobivolol 10 mg. States Rx did get mailed today and it is too late to stop it; will cancel any further refills/Rx for the 10 mg tabs.      Called pt and informed of Dr Flor Reyes

## 2020-07-22 NOTE — ASSESSMENT & PLAN NOTE
Patient's lipids have been controlled in the past.  She is due for a lipid panel. Continue current medication.

## 2020-07-22 NOTE — ASSESSMENT & PLAN NOTE
Patient bilateral ankle swelling is recently worse.   We will check a chest x-ray and an echocardiogram.

## 2020-07-22 NOTE — ASSESSMENT & PLAN NOTE
Patient has worsening dyspnea on exertion. This may be due to deconditioning and obesity, however I will check a chest x-ray and an echocardiogram.  Follow-up in 2 months.

## 2020-07-23 ENCOUNTER — HOSPITAL ENCOUNTER (OUTPATIENT)
Dept: GENERAL RADIOLOGY | Facility: HOSPITAL | Age: 80
Discharge: HOME | End: 2020-07-23
Attending: INTERNAL MEDICINE
Payer: MEDICARE

## 2020-07-23 ENCOUNTER — HOSPITAL ENCOUNTER (OUTPATIENT)
Dept: CV DIAGNOSTICS | Facility: HOSPITAL | Age: 80
Discharge: HOME | End: 2020-07-23
Attending: INTERNAL MEDICINE
Payer: MEDICARE

## 2020-07-23 ENCOUNTER — APPOINTMENT (OUTPATIENT)
Dept: GENERAL RADIOLOGY | Facility: HOSPITAL | Age: 80
End: 2020-07-23
Attending: INTERNAL MEDICINE
Payer: MEDICARE

## 2020-07-23 DIAGNOSIS — I25.10 ATHEROSCLEROSIS OF NATIVE CORONARY ARTERY OF NATIVE HEART WITHOUT ANGINA PECTORIS: ICD-10-CM

## 2020-07-23 DIAGNOSIS — R06.00 DOE (DYSPNEA ON EXERTION): ICD-10-CM

## 2020-07-23 DIAGNOSIS — R06.00 DYSPNEA ON EXERTION: ICD-10-CM

## 2020-07-23 PROCEDURE — 71046 X-RAY EXAM CHEST 2 VIEWS: CPT | Performed by: INTERNAL MEDICINE

## 2020-07-23 PROCEDURE — 93306 TTE W/DOPPLER COMPLETE: CPT | Performed by: INTERNAL MEDICINE

## 2020-07-24 DIAGNOSIS — E78.00 HYPERCHOLESTEROLEMIA: ICD-10-CM

## 2020-07-24 RX ORDER — NIACIN 750 MG/1
TABLET, EXTENDED RELEASE ORAL
Qty: 90 TABLET | Refills: 3 | Status: SHIPPED | OUTPATIENT
Start: 2020-07-24 | End: 2021-09-08

## 2020-07-24 NOTE — PROGRESS NOTES
Please call pt: Your echo showed that your heart squeezes normally, but it does not relax the way it should. That means that I cannot fill with blood and it could cause backflow of blood into the lungs which can cause shortness of breath.   There is also

## 2020-07-28 ENCOUNTER — OFFICE VISIT (OUTPATIENT)
Dept: CARDIOLOGY | Age: 80
End: 2020-07-28

## 2020-07-28 ENCOUNTER — TELEPHONE (OUTPATIENT)
Dept: CARDIOLOGY | Age: 80
End: 2020-07-28

## 2020-07-28 VITALS
HEIGHT: 64 IN | DIASTOLIC BLOOD PRESSURE: 74 MMHG | OXYGEN SATURATION: 97 % | WEIGHT: 251 LBS | SYSTOLIC BLOOD PRESSURE: 168 MMHG | HEART RATE: 58 BPM | BODY MASS INDEX: 42.85 KG/M2

## 2020-07-28 DIAGNOSIS — I10 ESSENTIAL HYPERTENSION: ICD-10-CM

## 2020-07-28 DIAGNOSIS — I34.0 NONRHEUMATIC MITRAL VALVE REGURGITATION: ICD-10-CM

## 2020-07-28 DIAGNOSIS — I27.20 PULMONARY HYPERTENSION (CMD): Primary | ICD-10-CM

## 2020-07-28 PROCEDURE — 3077F SYST BP >= 140 MM HG: CPT | Performed by: NURSE PRACTITIONER

## 2020-07-28 PROCEDURE — 99214 OFFICE O/P EST MOD 30 MIN: CPT | Performed by: NURSE PRACTITIONER

## 2020-07-28 PROCEDURE — 3078F DIAST BP <80 MM HG: CPT | Performed by: NURSE PRACTITIONER

## 2020-07-28 RX ORDER — AMLODIPINE BESYLATE 5 MG/1
5 TABLET ORAL DAILY
Qty: 30 TABLET | Refills: 3 | Status: SHIPPED | OUTPATIENT
Start: 2020-07-28 | End: 2020-08-11 | Stop reason: DRUGHIGH

## 2020-07-28 RX ORDER — FUROSEMIDE 40 MG/1
40 TABLET ORAL DAILY
Qty: 30 TABLET | Refills: 6 | Status: SHIPPED | OUTPATIENT
Start: 2020-07-28 | End: 2021-01-22 | Stop reason: SDUPTHER

## 2020-07-28 SDOH — HEALTH STABILITY: MENTAL HEALTH: HOW MANY STANDARD DRINKS CONTAINING ALCOHOL DO YOU HAVE ON A TYPICAL DAY?: 1 OR 2

## 2020-07-28 SDOH — HEALTH STABILITY: MENTAL HEALTH: HOW OFTEN DO YOU HAVE A DRINK CONTAINING ALCOHOL?: MONTHLY OR LESS

## 2020-07-28 ASSESSMENT — PATIENT HEALTH QUESTIONNAIRE - PHQ9
SUM OF ALL RESPONSES TO PHQ9 QUESTIONS 1 AND 2: 0
CLINICAL INTERPRETATION OF PHQ2 SCORE: NO FURTHER SCREENING NEEDED
2. FEELING DOWN, DEPRESSED OR HOPELESS: NOT AT ALL
1. LITTLE INTEREST OR PLEASURE IN DOING THINGS: NOT AT ALL
CLINICAL INTERPRETATION OF PHQ9 SCORE: NO FURTHER SCREENING NEEDED
SUM OF ALL RESPONSES TO PHQ9 QUESTIONS 1 AND 2: 0

## 2020-08-09 ENCOUNTER — LAB ENCOUNTER (OUTPATIENT)
Dept: LAB | Facility: HOSPITAL | Age: 80
End: 2020-08-09
Attending: NURSE PRACTITIONER
Payer: MEDICARE

## 2020-08-09 DIAGNOSIS — I27.20 PROGRESSIVE PULMONARY HYPERTENSION (HCC): Primary | ICD-10-CM

## 2020-08-09 LAB
ANION GAP SERPL CALC-SCNC: 7 MMOL/L
ANION GAP SERPL CALC-SCNC: 7 MMOL/L (ref 0–18)
BUN BLD-MCNC: 28 MG/DL (ref 7–18)
BUN SERPL-MCNC: 28 MG/DL
BUN/CREAT SERPL: 21.2
BUN/CREAT SERPL: 21.2 (ref 10–20)
CALCIUM BLD-MCNC: 9.4 MG/DL (ref 8.5–10.1)
CALCIUM SERPL-MCNC: 9.4 MG/DL
CHLORIDE SERPL-SCNC: 107 MMOL/L
CHLORIDE SERPL-SCNC: 107 MMOL/L (ref 98–112)
CO2 SERPL-SCNC: 27 MMOL/L
CO2 SERPL-SCNC: 27 MMOL/L (ref 21–32)
CREAT BLD-MCNC: 1.32 MG/DL (ref 0.55–1.02)
CREAT SERPL-MCNC: 1.32 MG/DL
GLUCOSE BLD-MCNC: 101 MG/DL (ref 70–99)
GLUCOSE SERPL-MCNC: 101 MG/DL
LENGTH OF FAST TIME PATIENT: YES H
OSMOLALITY SERPL CALC.SUM OF ELEC: 298 MOSM/KG (ref 275–295)
PATIENT FASTING Y/N/NP: YES
POTASSIUM SERPL-SCNC: 3.8 MMOL/L
POTASSIUM SERPL-SCNC: 3.8 MMOL/L (ref 3.5–5.1)
SODIUM SERPL-SCNC: 141 MMOL/L
SODIUM SERPL-SCNC: 141 MMOL/L (ref 136–145)

## 2020-08-09 PROCEDURE — 36415 COLL VENOUS BLD VENIPUNCTURE: CPT

## 2020-08-09 PROCEDURE — 80048 BASIC METABOLIC PNL TOTAL CA: CPT

## 2020-08-10 ENCOUNTER — CLINICAL ABSTRACT (OUTPATIENT)
Dept: CARDIOLOGY | Age: 80
End: 2020-08-10

## 2020-08-11 ENCOUNTER — OFFICE VISIT (OUTPATIENT)
Dept: CARDIOLOGY | Age: 80
End: 2020-08-11

## 2020-08-11 VITALS
HEART RATE: 49 BPM | DIASTOLIC BLOOD PRESSURE: 80 MMHG | SYSTOLIC BLOOD PRESSURE: 148 MMHG | HEIGHT: 64 IN | WEIGHT: 245 LBS | BODY MASS INDEX: 41.83 KG/M2 | OXYGEN SATURATION: 95 %

## 2020-08-11 DIAGNOSIS — I25.10 NON-OCCLUSIVE CORONARY ARTERY DISEASE: ICD-10-CM

## 2020-08-11 DIAGNOSIS — I34.0 NONRHEUMATIC MITRAL VALVE REGURGITATION: ICD-10-CM

## 2020-08-11 DIAGNOSIS — I27.20 PULMONARY HYPERTENSION (CMD): Primary | ICD-10-CM

## 2020-08-11 DIAGNOSIS — I10 ESSENTIAL HYPERTENSION: ICD-10-CM

## 2020-08-11 PROCEDURE — 3079F DIAST BP 80-89 MM HG: CPT | Performed by: NURSE PRACTITIONER

## 2020-08-11 PROCEDURE — 99214 OFFICE O/P EST MOD 30 MIN: CPT | Performed by: NURSE PRACTITIONER

## 2020-08-11 PROCEDURE — 3077F SYST BP >= 140 MM HG: CPT | Performed by: NURSE PRACTITIONER

## 2020-08-11 RX ORDER — AMLODIPINE BESYLATE 10 MG/1
10 TABLET ORAL DAILY
Qty: 30 TABLET | Refills: 6 | Status: SHIPPED | OUTPATIENT
Start: 2020-08-11

## 2020-08-11 SDOH — HEALTH STABILITY: MENTAL HEALTH: HOW MANY STANDARD DRINKS CONTAINING ALCOHOL DO YOU HAVE ON A TYPICAL DAY?: 1 OR 2

## 2020-08-11 SDOH — HEALTH STABILITY: MENTAL HEALTH: HOW OFTEN DO YOU HAVE A DRINK CONTAINING ALCOHOL?: MONTHLY OR LESS

## 2020-08-11 ASSESSMENT — PATIENT HEALTH QUESTIONNAIRE - PHQ9
SUM OF ALL RESPONSES TO PHQ9 QUESTIONS 1 AND 2: 0
CLINICAL INTERPRETATION OF PHQ2 SCORE: NO FURTHER SCREENING NEEDED
CLINICAL INTERPRETATION OF PHQ9 SCORE: NO FURTHER SCREENING NEEDED
2. FEELING DOWN, DEPRESSED OR HOPELESS: NOT AT ALL
1. LITTLE INTEREST OR PLEASURE IN DOING THINGS: NOT AT ALL
SUM OF ALL RESPONSES TO PHQ9 QUESTIONS 1 AND 2: 0

## 2020-08-25 ENCOUNTER — DOCUMENTATION (OUTPATIENT)
Dept: CARDIOLOGY | Age: 80
End: 2020-08-25

## 2020-08-27 ENCOUNTER — ANCILLARY PROCEDURE (OUTPATIENT)
Dept: CARDIOLOGY | Age: 80
End: 2020-08-27
Attending: NURSE PRACTITIONER

## 2020-08-27 VITALS — BODY MASS INDEX: 40.97 KG/M2 | WEIGHT: 240 LBS | HEIGHT: 64 IN

## 2020-08-27 DIAGNOSIS — I25.10 NON-OCCLUSIVE CORONARY ARTERY DISEASE: ICD-10-CM

## 2020-08-27 PROCEDURE — A9502 TC99M TETROFOSMIN: HCPCS | Performed by: INTERNAL MEDICINE

## 2020-08-27 PROCEDURE — 78452 HT MUSCLE IMAGE SPECT MULT: CPT | Performed by: INTERNAL MEDICINE

## 2020-08-27 PROCEDURE — 93015 CV STRESS TEST SUPVJ I&R: CPT | Performed by: INTERNAL MEDICINE

## 2020-08-27 RX ORDER — REGADENOSON 0.08 MG/ML
0.4 INJECTION, SOLUTION INTRAVENOUS ONCE
Status: COMPLETED | OUTPATIENT
Start: 2020-08-27 | End: 2020-08-27

## 2020-08-27 RX ADMIN — REGADENOSON 0.4 MG: 0.08 INJECTION, SOLUTION INTRAVENOUS at 09:20

## 2020-08-27 ASSESSMENT — EXERCISE STRESS TEST
PEAK_BP: 110/60
STAGE_CATEGORIES: RECOVERY 2
PEAK_HR: 51
PEAK_HR: 64
PEAK_BP: 120/60
COMMENTS: 2 MIN RECOVERY
STAGE_CATEGORIES: 1
PEAK_RPP: 7308
PEAK_RPP: 5800
COMMENTS: IMMEDIATE RECOVERY
PEAK_RPP: 7680
STOPPAGE_REASON: PROTOCOL COMPLETE
PEAK_RPP: 6710
PEAK_BP: 116/60
STAGE_CATEGORIES: RECOVERY 0
STAGE_CATEGORIES: RESTING
PEAK_HR: 63
PEAK_HR: 50
PEAK_HR: 61
STAGE_CATEGORIES: RECOVERY 1
PEAK_BP: 116/58

## 2020-08-28 ENCOUNTER — TELEPHONE (OUTPATIENT)
Dept: CARDIOLOGY | Age: 80
End: 2020-08-28

## 2020-08-28 LAB
LV EF: 66 %
RESTING HR ACHIEVED: 50 BPM
STRESS BASELINE BP: NORMAL MMHG
STRESS POST EXERCISE DUR MIN: 0 MIN
STRESS POST EXERCISE DUR SEC: 37 SEC
STRESS TARGET HR: 140 BPM

## 2020-08-31 ENCOUNTER — TELEPHONE (OUTPATIENT)
Dept: RHEUMATOLOGY | Facility: CLINIC | Age: 80
End: 2020-08-31

## 2020-08-31 ENCOUNTER — DOCUMENTATION (OUTPATIENT)
Dept: CARDIOLOGY | Age: 80
End: 2020-08-31

## 2020-08-31 NOTE — TELEPHONE ENCOUNTER
Fax received at [address] [place] [address] with following message. This communication is to inform you that we have made several attempts to contact the above patient to fill her prescription.     Please notify us if there have been changes to this patient's therapy or upd

## 2020-09-01 NOTE — TELEPHONE ENCOUNTER
Spoke with patient and she still have 3 pens left. She will call pharmacy when she is ready to schedule delivery.

## 2020-09-03 NOTE — PROGRESS NOTES
Christiana Reyes is an 50-year-old patient of Dr. Zakia Quintero with seronegative rheumatoid arthritis. Since I last saw her June 9th of 2020, she has continued Enbrel weekly, methotrexate 20mg weekly, and Plaquenil 400mg daily.   She feels like her rheumatoid arthritis is c adenopathy. Allergies:  Erythromycin Base         Pcn [Bicillin L-A]           PHYSICAL EXAM:   Blood pressure 112/65, pulse 57, height 5' 5\" (1.651 m), weight 246 lb (111.6 kg), not currently breastfeeding.   Constitutional: She is oriented to person,

## 2020-09-04 RX ORDER — METHOTREXATE 2.5 MG/1
TABLET ORAL
Qty: 96 TABLET | Refills: 1 | Status: SHIPPED | OUTPATIENT
Start: 2020-09-04 | End: 2020-12-08

## 2020-09-06 ENCOUNTER — LAB ENCOUNTER (OUTPATIENT)
Dept: LAB | Facility: HOSPITAL | Age: 80
End: 2020-09-06
Attending: INTERNAL MEDICINE
Payer: MEDICARE

## 2020-09-06 DIAGNOSIS — Z51.81 THERAPEUTIC DRUG MONITORING: ICD-10-CM

## 2020-09-06 DIAGNOSIS — E03.9 ACQUIRED HYPOTHYROIDISM: ICD-10-CM

## 2020-09-06 DIAGNOSIS — M06.00 RHEUMATOID ARTHRITIS WITH NEGATIVE RHEUMATOID FACTOR, INVOLVING UNSPECIFIED SITE (HCC): ICD-10-CM

## 2020-09-06 DIAGNOSIS — E78.2 MIXED HYPERLIPIDEMIA: ICD-10-CM

## 2020-09-06 LAB
ALBUMIN SERPL-MCNC: 3.9 G/DL
ALBUMIN SERPL-MCNC: 3.9 G/DL (ref 3.4–5)
ALBUMIN/GLOB SERPL: 1.1 {RATIO}
ALBUMIN/GLOB SERPL: 1.1 {RATIO} (ref 1–2)
ALP LIVER SERPL-CCNC: 59 U/L (ref 55–142)
ALP SERPL-CCNC: 59 U/L
ALT SERPL-CCNC: 25 U/L (ref 13–56)
ALT SERPL-CCNC: 25 UNITS/L
ANION GAP SERPL CALC-SCNC: 0.7 MMOL/L
ANION GAP SERPL CALC-SCNC: 7 MMOL/L (ref 0–18)
AST SERPL-CCNC: 19 U/L (ref 15–37)
AST SERPL-CCNC: 19 UNITS/L
BASOPHILS # BLD AUTO: 0.04 X10(3) UL (ref 0–0.2)
BASOPHILS NFR BLD AUTO: 0.6 %
BILIRUB SERPL-MCNC: 0.8 MG/DL
BILIRUB SERPL-MCNC: 0.8 MG/DL (ref 0.1–2)
BUN BLD-MCNC: 27 MG/DL (ref 7–18)
BUN SERPL-MCNC: 27 MG/DL
BUN/CREAT SERPL: 18.5
BUN/CREAT SERPL: 18.5 (ref 10–20)
CALCIUM BLD-MCNC: 9.7 MG/DL (ref 8.5–10.1)
CALCIUM SERPL-MCNC: 9.7 MG/DL
CHLORIDE SERPL-SCNC: 106 MMOL/L
CHLORIDE SERPL-SCNC: 106 MMOL/L (ref 98–112)
CHOLEST SERPL-MCNC: 178 MG/DL
CHOLEST SMN-MCNC: 178 MG/DL (ref ?–200)
CO2 SERPL-SCNC: 27 MMOL/L
CO2 SERPL-SCNC: 27 MMOL/L (ref 21–32)
CREAT BLD-MCNC: 1.46 MG/DL (ref 0.55–1.02)
CREAT SERPL-MCNC: 1.46 MG/DL
CRP SERPL-MCNC: <0.29 MG/DL (ref ?–0.3)
DEPRECATED RDW RBC AUTO: 49.6 FL (ref 35.1–46.3)
EOSINOPHIL # BLD AUTO: 0.19 X10(3) UL (ref 0–0.7)
EOSINOPHIL NFR BLD AUTO: 3 %
ERYTHROCYTE [DISTWIDTH] IN BLOOD BY AUTOMATED COUNT: 13.6 % (ref 11–15)
ERYTHROCYTE [DISTWIDTH] IN BLOOD BY AUTOMATED COUNT: 49.6 %
ERYTHROCYTE [DISTWIDTH] IN BLOOD: 13.6 %
ERYTHROCYTE [SEDIMENTATION RATE] IN BLOOD: 9 MM/HR (ref 0–30)
GLOBULIN PLAS-MCNC: 3.4 G/DL (ref 2.8–4.4)
GLOBULIN SER-MCNC: 3.4 G/DL
GLUCOSE BLD-MCNC: 106 MG/DL (ref 70–99)
GLUCOSE SERPL-MCNC: 106 MG/DL
HCT VFR BLD AUTO: 34.8 % (ref 35–48)
HCT VFR BLD CALC: 34.8 %
HDLC SERPL-MCNC: 99 MG/DL
HDLC SERPL-MCNC: 99 MG/DL (ref 40–59)
HGB BLD-MCNC: 11.6 G/DL
HGB BLD-MCNC: 11.6 G/DL (ref 12–16)
IMM GRANULOCYTES # BLD AUTO: 0.02 X10(3) UL (ref 0–1)
IMM GRANULOCYTES NFR BLD: 0.3 %
LDLC SERPL CALC-MCNC: 58 MG/DL
LDLC SERPL CALC-MCNC: 58 MG/DL (ref ?–100)
LENGTH OF FAST TIME PATIENT: YES H
LENGTH OF FAST TIME PATIENT: YES H
LYMPHOCYTES # BLD AUTO: 2.36 X10(3) UL (ref 1–4)
LYMPHOCYTES NFR BLD AUTO: 37.5 %
M PROTEIN MFR SERPL ELPH: 7.3 G/DL (ref 6.4–8.2)
MCH RBC QN AUTO: 34.1 PG
MCH RBC QN AUTO: 34.1 PG (ref 26–34)
MCHC RBC AUTO-ENTMCNC: 33.3 G/DL
MCHC RBC AUTO-ENTMCNC: 33.3 G/DL (ref 31–37)
MCV RBC AUTO: 102.4 FL
MCV RBC AUTO: 102.4 FL (ref 80–100)
MONOCYTES # BLD AUTO: 0.96 X10(3) UL (ref 0.1–1)
MONOCYTES NFR BLD AUTO: 15.3 %
NEUTROPHILS # BLD AUTO: 2.72 X10 (3) UL (ref 1.5–7.7)
NEUTROPHILS # BLD AUTO: 2.72 X10(3) UL (ref 1.5–7.7)
NEUTROPHILS NFR BLD AUTO: 43.3 %
NONHDLC SERPL-MCNC: 79 MG/DL
NONHDLC SERPL-MCNC: 79 MG/DL (ref ?–130)
OSMOLALITY SERPL CALC.SUM OF ELEC: 296 MOSM/KG (ref 275–295)
PATIENT FASTING Y/N/NP: YES
PATIENT FASTING Y/N/NP: YES
PLATELET # BLD AUTO: 147 10(3)UL (ref 150–450)
PLATELET # BLD: 147 K/MCL
POTASSIUM SERPL-SCNC: 4.8 MMOL/L
POTASSIUM SERPL-SCNC: 4.8 MMOL/L (ref 3.5–5.1)
PROT SERPL-MCNC: 7.3 G/DL
RBC # BLD AUTO: 3.4 X10(6)UL (ref 3.8–5.3)
RBC # BLD: 3.4 10*6/UL
SODIUM SERPL-SCNC: 140 MMOL/L
SODIUM SERPL-SCNC: 140 MMOL/L (ref 136–145)
TRIGL SERPL-MCNC: 106 MG/DL
TRIGL SERPL-MCNC: 106 MG/DL (ref 30–149)
TSH SERPL-ACNC: 0.83 MCUNITS/ML
TSI SER-ACNC: 0.83 MIU/ML (ref 0.36–3.74)
VLDLC SERPL CALC-MCNC: 21 MG/DL
VLDLC SERPL CALC-MCNC: 21 MG/DL (ref 0–30)
WBC # BLD AUTO: 6.3 X10(3) UL (ref 4–11)
WBC # BLD: 6.3 K/MCL

## 2020-09-06 PROCEDURE — 86140 C-REACTIVE PROTEIN: CPT

## 2020-09-06 PROCEDURE — 80053 COMPREHEN METABOLIC PANEL: CPT

## 2020-09-06 PROCEDURE — 80061 LIPID PANEL: CPT

## 2020-09-06 PROCEDURE — 84443 ASSAY THYROID STIM HORMONE: CPT

## 2020-09-06 PROCEDURE — 85025 COMPLETE CBC W/AUTO DIFF WBC: CPT

## 2020-09-06 PROCEDURE — 36415 COLL VENOUS BLD VENIPUNCTURE: CPT

## 2020-09-06 PROCEDURE — 85652 RBC SED RATE AUTOMATED: CPT

## 2020-09-08 ENCOUNTER — OFFICE VISIT (OUTPATIENT)
Dept: RHEUMATOLOGY | Facility: CLINIC | Age: 80
End: 2020-09-08
Payer: COMMERCIAL

## 2020-09-08 VITALS
WEIGHT: 246 LBS | BODY MASS INDEX: 40.98 KG/M2 | DIASTOLIC BLOOD PRESSURE: 65 MMHG | HEIGHT: 65 IN | HEART RATE: 57 BPM | SYSTOLIC BLOOD PRESSURE: 112 MMHG

## 2020-09-08 DIAGNOSIS — M06.09 RHEUMATOID ARTHRITIS OF MULTIPLE SITES WITH NEGATIVE RHEUMATOID FACTOR (HCC): Primary | ICD-10-CM

## 2020-09-08 DIAGNOSIS — Z51.81 THERAPEUTIC DRUG MONITORING: ICD-10-CM

## 2020-09-08 PROCEDURE — 3008F BODY MASS INDEX DOCD: CPT | Performed by: INTERNAL MEDICINE

## 2020-09-08 PROCEDURE — 3078F DIAST BP <80 MM HG: CPT | Performed by: INTERNAL MEDICINE

## 2020-09-08 PROCEDURE — 3074F SYST BP LT 130 MM HG: CPT | Performed by: INTERNAL MEDICINE

## 2020-09-08 PROCEDURE — G0463 HOSPITAL OUTPT CLINIC VISIT: HCPCS | Performed by: INTERNAL MEDICINE

## 2020-09-08 PROCEDURE — 99214 OFFICE O/P EST MOD 30 MIN: CPT | Performed by: INTERNAL MEDICINE

## 2020-09-08 RX ORDER — AMLODIPINE BESYLATE 10 MG/1
10 TABLET ORAL DAILY
COMMUNITY
Start: 2020-08-11 | End: 2021-05-18

## 2020-09-08 RX ORDER — FUROSEMIDE 40 MG/1
40 TABLET ORAL DAILY
COMMUNITY
Start: 2020-08-25

## 2020-09-14 DIAGNOSIS — I10 ESSENTIAL HYPERTENSION: ICD-10-CM

## 2020-09-14 RX ORDER — CLONIDINE 0.1 MG/24H
1 PATCH, EXTENDED RELEASE TRANSDERMAL WEEKLY
Qty: 12 PATCH | Refills: 1 | Status: SHIPPED | OUTPATIENT
Start: 2020-09-14 | End: 2021-01-14

## 2020-10-08 ENCOUNTER — OFFICE VISIT (OUTPATIENT)
Dept: INTERNAL MEDICINE CLINIC | Facility: CLINIC | Age: 80
End: 2020-10-08
Payer: COMMERCIAL

## 2020-10-08 VITALS
HEIGHT: 65 IN | HEART RATE: 50 BPM | DIASTOLIC BLOOD PRESSURE: 61 MMHG | SYSTOLIC BLOOD PRESSURE: 127 MMHG | BODY MASS INDEX: 40.51 KG/M2 | WEIGHT: 243.13 LBS

## 2020-10-08 DIAGNOSIS — R06.00 DOE (DYSPNEA ON EXERTION): Primary | ICD-10-CM

## 2020-10-08 DIAGNOSIS — N18.32 STAGE 3B CHRONIC KIDNEY DISEASE (HCC): ICD-10-CM

## 2020-10-08 DIAGNOSIS — R26.9 GAIT ABNORMALITY: ICD-10-CM

## 2020-10-08 DIAGNOSIS — D69.6 THROMBOCYTOPENIA (HCC): Chronic | ICD-10-CM

## 2020-10-08 DIAGNOSIS — I51.89 DIASTOLIC DYSFUNCTION: ICD-10-CM

## 2020-10-08 DIAGNOSIS — J43.9 PULMONARY EMPHYSEMA, UNSPECIFIED EMPHYSEMA TYPE (HCC): ICD-10-CM

## 2020-10-08 PROCEDURE — G0463 HOSPITAL OUTPT CLINIC VISIT: HCPCS | Performed by: INTERNAL MEDICINE

## 2020-10-08 PROCEDURE — 99214 OFFICE O/P EST MOD 30 MIN: CPT | Performed by: INTERNAL MEDICINE

## 2020-10-08 PROCEDURE — 3078F DIAST BP <80 MM HG: CPT | Performed by: INTERNAL MEDICINE

## 2020-10-08 PROCEDURE — 3074F SYST BP LT 130 MM HG: CPT | Performed by: INTERNAL MEDICINE

## 2020-10-08 PROCEDURE — 3008F BODY MASS INDEX DOCD: CPT | Performed by: INTERNAL MEDICINE

## 2020-10-08 NOTE — ASSESSMENT & PLAN NOTE
I reviewed the patient's recent chest x-ray which showed emphysema. Her pulmonary function test from 4 years ago also show emphysema. Since the patient has worsening shortness of breath, we will repeat the pulmonary function tests.

## 2020-10-08 NOTE — PROGRESS NOTES
HPI:    Patient ID: Ana Lilia Barriga is a [de-identified]year old female. Pt is more SOB lately. She gets SOB with minimal exertion, even with walking around her house. She saw Dr. Janina Romo, but it wasn't bothering her then.   She had a recent echo and stress test.  Sh Take 10 mg by mouth daily. • furosemide 40 MG Oral Tab Take 40 mg by mouth daily.      • METHOTREXATE 2.5 MG Oral Tab TAKE 8 TABLETS BY MOUTH EVERY WEEK 96 tablet 1   • NIACIN ER, ANTIHYPERLIPIDEMIC, 750 MG Oral Tab CR TAKE 1 TABLET BY MOUTH IN  THE SANTINO Allergies:  Erythromycin Base         Pcn [Bicillin L-A]           Social History    Tobacco Use      Smoking status: Never Smoker      Smokeless tobacco: Never Used    Alcohol use:  Yes      Alcohol/week: 1.7 standard drinks      Types: 2 Glasses of will repeat the pulmonary function tests.          Relevant Orders    COMPLETE PFT    SARS-COV-2 BY PCR ()    PULMONARY - INTERNAL       Medium    NICOLE (dyspnea on exertion) - Primary     The patient has recent onset of worsening of her dyspnea on exert

## 2020-10-08 NOTE — ASSESSMENT & PLAN NOTE
The patient has recent onset of worsening of her dyspnea on exertion. Since she gets relief with her albuterol inhaler it is likely to be more of a pulmonary problem, then cardiac, although she does have diastolic dysfunction.   I advised her to use the al

## 2020-10-08 NOTE — ASSESSMENT & PLAN NOTE
The patient's echocardiogram shows grade 2 diastolic dysfunction. This may be contributing to her shortness of breath. She will follow-up with cardiology.

## 2020-10-09 ENCOUNTER — OFFICE VISIT (OUTPATIENT)
Dept: CARDIOLOGY | Age: 80
End: 2020-10-09

## 2020-10-09 VITALS
WEIGHT: 243 LBS | HEIGHT: 64 IN | DIASTOLIC BLOOD PRESSURE: 70 MMHG | OXYGEN SATURATION: 97 % | HEART RATE: 53 BPM | BODY MASS INDEX: 41.48 KG/M2 | SYSTOLIC BLOOD PRESSURE: 112 MMHG

## 2020-10-09 DIAGNOSIS — I27.81 COR PULMONALE, CHRONIC (CMD): ICD-10-CM

## 2020-10-09 DIAGNOSIS — I10 ESSENTIAL HYPERTENSION: ICD-10-CM

## 2020-10-09 DIAGNOSIS — I25.10 NON-OCCLUSIVE CORONARY ARTERY DISEASE: Primary | ICD-10-CM

## 2020-10-09 DIAGNOSIS — E78.2 MIXED HYPERLIPIDEMIA: ICD-10-CM

## 2020-10-09 PROCEDURE — 3074F SYST BP LT 130 MM HG: CPT | Performed by: INTERNAL MEDICINE

## 2020-10-09 PROCEDURE — 99214 OFFICE O/P EST MOD 30 MIN: CPT | Performed by: INTERNAL MEDICINE

## 2020-10-09 PROCEDURE — 3078F DIAST BP <80 MM HG: CPT | Performed by: INTERNAL MEDICINE

## 2020-10-09 SDOH — HEALTH STABILITY: MENTAL HEALTH: HOW MANY STANDARD DRINKS CONTAINING ALCOHOL DO YOU HAVE ON A TYPICAL DAY?: 1 OR 2

## 2020-10-09 SDOH — HEALTH STABILITY: MENTAL HEALTH: HOW OFTEN DO YOU HAVE A DRINK CONTAINING ALCOHOL?: MONTHLY OR LESS

## 2020-10-09 ASSESSMENT — PATIENT HEALTH QUESTIONNAIRE - PHQ9
CLINICAL INTERPRETATION OF PHQ2 SCORE: NO FURTHER SCREENING NEEDED
CLINICAL INTERPRETATION OF PHQ9 SCORE: NO FURTHER SCREENING NEEDED
2. FEELING DOWN, DEPRESSED OR HOPELESS: NOT AT ALL
SUM OF ALL RESPONSES TO PHQ9 QUESTIONS 1 AND 2: 0
1. LITTLE INTEREST OR PLEASURE IN DOING THINGS: NOT AT ALL
SUM OF ALL RESPONSES TO PHQ9 QUESTIONS 1 AND 2: 0

## 2020-10-10 ENCOUNTER — LAB ENCOUNTER (OUTPATIENT)
Dept: LAB | Age: 80
End: 2020-10-10
Attending: INTERNAL MEDICINE
Payer: MEDICARE

## 2020-10-10 DIAGNOSIS — J43.9 PULMONARY EMPHYSEMA, UNSPECIFIED EMPHYSEMA TYPE (HCC): ICD-10-CM

## 2020-10-13 ENCOUNTER — HOSPITAL ENCOUNTER (OUTPATIENT)
Dept: RESPIRATORY THERAPY | Facility: HOSPITAL | Age: 80
Discharge: HOME | End: 2020-10-13
Attending: INTERNAL MEDICINE
Payer: MEDICARE

## 2020-10-13 DIAGNOSIS — R06.00 DOE (DYSPNEA ON EXERTION): ICD-10-CM

## 2020-10-13 DIAGNOSIS — J43.9 PULMONARY EMPHYSEMA, UNSPECIFIED EMPHYSEMA TYPE (HCC): ICD-10-CM

## 2020-10-13 PROCEDURE — 94060 EVALUATION OF WHEEZING: CPT | Performed by: INTERNAL MEDICINE

## 2020-10-13 PROCEDURE — 94726 PLETHYSMOGRAPHY LUNG VOLUMES: CPT | Performed by: INTERNAL MEDICINE

## 2020-10-13 PROCEDURE — 94729 DIFFUSING CAPACITY: CPT | Performed by: INTERNAL MEDICINE

## 2020-10-13 NOTE — PROCEDURES
[address] [facility] 1940 MRN [id number]   Height  59in Age [de-identified]year old   Weight  243 lb  Sex Female         Spirometry:   FEV1 1.58 L which is 81%  The ratio of FEV1/FVC is reduced to 66% indica

## 2020-10-20 DIAGNOSIS — I10 ESSENTIAL HYPERTENSION WITH GOAL BLOOD PRESSURE LESS THAN 140/90: ICD-10-CM

## 2020-10-20 NOTE — TELEPHONE ENCOUNTER
Fax received at [address] [place] [address]. Refill request on the following.     Current Outpatient Medications   Medication Sig Dispense Refill   •       •       •       •       •       •       •       •       •       •       •       • BYSTOLIC 20 MG Oral Tab TAKE 1 TABLET B

## 2020-10-21 RX ORDER — NEBIVOLOL 20 MG/1
TABLET ORAL
Qty: 90 TABLET | Refills: 1 | Status: SHIPPED | OUTPATIENT
Start: 2020-10-21 | End: 2021-01-14

## 2020-10-23 ENCOUNTER — TELEPHONE (OUTPATIENT)
Dept: PULMONOLOGY | Facility: CLINIC | Age: 80
End: 2020-10-23

## 2020-10-23 NOTE — TELEPHONE ENCOUNTER
Called and spoke with pt to informed that pt's compliance report is excellent. Pt voiced understanding. Sleep report sent to scanning.

## 2020-10-28 ENCOUNTER — OFFICE VISIT (OUTPATIENT)
Dept: PULMONOLOGY | Facility: CLINIC | Age: 80
End: 2020-10-28
Payer: COMMERCIAL

## 2020-10-28 VITALS
OXYGEN SATURATION: 92 % | SYSTOLIC BLOOD PRESSURE: 117 MMHG | DIASTOLIC BLOOD PRESSURE: 70 MMHG | RESPIRATION RATE: 18 BRPM | HEART RATE: 64 BPM | WEIGHT: 240 LBS | TEMPERATURE: 97 F | HEIGHT: 65 IN | BODY MASS INDEX: 39.99 KG/M2

## 2020-10-28 DIAGNOSIS — R06.00 DYSPNEA, UNSPECIFIED TYPE: ICD-10-CM

## 2020-10-28 DIAGNOSIS — Z99.89 OSA ON CPAP: Primary | ICD-10-CM

## 2020-10-28 DIAGNOSIS — J44.9 CHRONIC OBSTRUCTIVE PULMONARY DISEASE, UNSPECIFIED COPD TYPE (HCC): ICD-10-CM

## 2020-10-28 DIAGNOSIS — G47.33 OSA ON CPAP: Primary | ICD-10-CM

## 2020-10-28 PROCEDURE — 99214 OFFICE O/P EST MOD 30 MIN: CPT | Performed by: INTERNAL MEDICINE

## 2020-10-28 PROCEDURE — 3078F DIAST BP <80 MM HG: CPT | Performed by: INTERNAL MEDICINE

## 2020-10-28 PROCEDURE — G0463 HOSPITAL OUTPT CLINIC VISIT: HCPCS | Performed by: INTERNAL MEDICINE

## 2020-10-28 PROCEDURE — 3074F SYST BP LT 130 MM HG: CPT | Performed by: INTERNAL MEDICINE

## 2020-10-28 PROCEDURE — 3008F BODY MASS INDEX DOCD: CPT | Performed by: INTERNAL MEDICINE

## 2020-10-28 RX ORDER — FLUTICASONE FUROATE AND VILANTEROL TRIFENATATE 100; 25 UG/1; UG/1
1 POWDER RESPIRATORY (INHALATION) DAILY
Qty: 1 EACH | Refills: 2 | Status: SHIPPED | OUTPATIENT
Start: 2020-10-28 | End: 2021-01-26

## 2020-10-28 NOTE — PROGRESS NOTES
HPI:    Patient ID: Joan Adkins is a [de-identified]year old female.     HPI  Using CPAP nightly and doing well with the use of a CPAP with no technical problems  Still wakes up at night 2-3 times to the bathroom  No morning headache and feel refreshed in the morning Auto-injector INJECT 50MG SUBCUTANEOUSLY  EVERY WEEK 12 mL 1   • SOLIFENACIN SUCCINATE 10 MG Oral Tab TAKE ONE TABLET BY MOUTH ONE TIME DAILY  90 tablet 3   • Hydroxychloroquine Sulfate 200 MG Oral Tab TAKE 2 TABLETS BY MOUTH  EVERY  tablet 3   • FO disease, unspecified copd type (hcc)  Dyspnea, unspecified type    1- JUAN   Doing very well on cpap 12 cpm   Excellent  subjective and objective compliance compliant     - cpm with cpap 12 CWP / nasal mask    - diet / exercise / physical therapy    avoid s

## 2020-11-06 DIAGNOSIS — K21.9 GASTROESOPHAGEAL REFLUX DISEASE: ICD-10-CM

## 2020-11-06 RX ORDER — PANTOPRAZOLE SODIUM 40 MG/1
TABLET, DELAYED RELEASE ORAL
Qty: 90 TABLET | Refills: 0 | Status: SHIPPED | OUTPATIENT
Start: 2020-11-06 | End: 2021-03-02

## 2020-11-14 DIAGNOSIS — E03.9 ACQUIRED HYPOTHYROIDISM: ICD-10-CM

## 2020-11-14 RX ORDER — LEVOTHYROXINE SODIUM 137 UG/1
137 TABLET ORAL
Qty: 90 TABLET | Refills: 1 | Status: SHIPPED | OUTPATIENT
Start: 2020-11-14 | End: 2021-05-18

## 2020-11-21 RX ORDER — ETANERCEPT 50 MG/ML
SOLUTION SUBCUTANEOUS
Qty: 12 ML | Refills: 1 | Status: ON HOLD | OUTPATIENT
Start: 2020-11-21 | End: 2021-05-03

## 2020-11-21 NOTE — TELEPHONE ENCOUNTER
Requested Prescriptions     Pending Prescriptions Disp Refills   • ENBREL SURECLICK 50 MG/ML Subcutaneous Solution Auto-injector [Pharmacy Med Name: Souleymane Mejía [id number]/HX] 12 mL 1     Sig: INJECT 50MG SUBCUTANEOUSLY  WEEKLY     LF: 5/12/20 #12ML W/ 1 RF mOsm/kg 296 (H)   eGFR NON-AFR.  AMERICAN      >=60 34 (L)   eGFR       >=60 39 (L)   ALT (SGPT)      13 - 56 U/L 25   AST (SGOT)      15 - 37 U/L 19   ALKALINE PHOSPHATASE      55 - 142 U/L 59   Total Bilirubin      0.1 - 2.0 mg/dL 0.8   TO

## 2020-11-27 ENCOUNTER — OFFICE VISIT (OUTPATIENT)
Dept: INTERNAL MEDICINE CLINIC | Facility: CLINIC | Age: 80
End: 2020-11-27
Payer: COMMERCIAL

## 2020-11-27 ENCOUNTER — NURSE TRIAGE (OUTPATIENT)
Dept: INTERNAL MEDICINE CLINIC | Facility: CLINIC | Age: 80
End: 2020-11-27

## 2020-11-27 VITALS
RESPIRATION RATE: 19 BRPM | BODY MASS INDEX: 40.48 KG/M2 | HEART RATE: 56 BPM | SYSTOLIC BLOOD PRESSURE: 130 MMHG | DIASTOLIC BLOOD PRESSURE: 85 MMHG | HEIGHT: 65 IN | WEIGHT: 243 LBS

## 2020-11-27 DIAGNOSIS — M54.42 ACUTE BILATERAL LOW BACK PAIN WITH BILATERAL SCIATICA: Primary | ICD-10-CM

## 2020-11-27 DIAGNOSIS — M54.41 ACUTE BILATERAL LOW BACK PAIN WITH BILATERAL SCIATICA: Primary | ICD-10-CM

## 2020-11-27 PROCEDURE — 99213 OFFICE O/P EST LOW 20 MIN: CPT | Performed by: INTERNAL MEDICINE

## 2020-11-27 PROCEDURE — G0463 HOSPITAL OUTPT CLINIC VISIT: HCPCS | Performed by: INTERNAL MEDICINE

## 2020-11-27 PROCEDURE — 3008F BODY MASS INDEX DOCD: CPT | Performed by: INTERNAL MEDICINE

## 2020-11-27 PROCEDURE — 3075F SYST BP GE 130 - 139MM HG: CPT | Performed by: INTERNAL MEDICINE

## 2020-11-27 PROCEDURE — 3079F DIAST BP 80-89 MM HG: CPT | Performed by: INTERNAL MEDICINE

## 2020-11-27 RX ORDER — PREDNISONE 20 MG/1
40 TABLET ORAL DAILY
Qty: 10 TABLET | Refills: 0 | Status: SHIPPED | OUTPATIENT
Start: 2020-11-27 | End: 2020-12-18

## 2020-11-27 NOTE — PATIENT INSTRUCTIONS
Lumbar Extension (Flexibility)    1. Lie face down on your stomach, forehead on the floor. You can lie on a mat or towel. 2. Bend your arms next to your body and lift your upper body up on your forearms.  Your palms and forearms should be flat on the aminata

## 2020-11-27 NOTE — TELEPHONE ENCOUNTER
Patient calling and  States she is having severe back pain and it is going into her legs especially the right one     Please advise

## 2020-11-27 NOTE — PROGRESS NOTES
Patient ID: Miguel Blackburn is a 79year old female. Patient presents with:  Back Pain       HISTORY OF PRESENT ILLNESS:   HPI  Patient presents for above. Here with low back pain the past 2 weeks.   Radiating mainly down her right lower extremity but occa total) by mouth daily for 5 days. , Disp: 10 tablet, Rfl: 0  •  ENBREL SURECLICK 50 MG/ML Subcutaneous Solution Auto-injector, INJECT 50MG SUBCUTANEOUSLY  WEEKLY, Disp: 12 mL, Rfl: 1  •  Levothyroxine Sodium 137 MCG Oral Tab, Take 137 mcg by mouth every mor hours as needed for Wheezing., Disp: 1 Inhaler, Rfl: 5  •  Ciclopirox 8 % External Solution, , Disp: , Rfl:   •  Probiotic Product (PROBIOTIC-10) Oral Cap, Take 1 tablet by mouth daily. , Disp: , Rfl:   •  Omega-3 Fatty Acids (FISH OIL OR), Take 1 tablet by file        Relationship status: Not on file      Intimate partner violence        Fear of current or ex partner: Not on file        Emotionally abused: Not on file        Physically abused: Not on file        Forced sexual activity: Not on file    Other T

## 2020-12-02 ENCOUNTER — HOSPITAL ENCOUNTER (OUTPATIENT)
Dept: GENERAL RADIOLOGY | Age: 80
Discharge: HOME | End: 2020-12-02
Attending: INTERNAL MEDICINE
Payer: MEDICARE

## 2020-12-02 ENCOUNTER — LAB ENCOUNTER (OUTPATIENT)
Dept: LAB | Age: 80
End: 2020-12-02
Attending: INTERNAL MEDICINE
Payer: MEDICARE

## 2020-12-02 DIAGNOSIS — M06.09 RHEUMATOID ARTHRITIS OF MULTIPLE SITES WITH NEGATIVE RHEUMATOID FACTOR (HCC): ICD-10-CM

## 2020-12-02 DIAGNOSIS — M54.42 ACUTE BILATERAL LOW BACK PAIN WITH BILATERAL SCIATICA: ICD-10-CM

## 2020-12-02 DIAGNOSIS — M54.41 ACUTE BILATERAL LOW BACK PAIN WITH BILATERAL SCIATICA: ICD-10-CM

## 2020-12-02 DIAGNOSIS — Z51.81 THERAPEUTIC DRUG MONITORING: ICD-10-CM

## 2020-12-02 LAB
AST SERPL-CCNC: 18 UNITS/L
ERYTHROCYTE [DISTWIDTH] IN BLOOD BY AUTOMATED COUNT: 53.1 %
ERYTHROCYTE [DISTWIDTH] IN BLOOD: 14.1 %
HCT VFR BLD CALC: 33.6 %
HGB BLD-MCNC: 11.1 G/DL
MCH RBC QN AUTO: 34.2 PG
MCHC RBC AUTO-ENTMCNC: 33 G/DL
MCV RBC AUTO: 103.4 FL
PLATELET # BLD: 209 K/MCL
RBC # BLD: 3.25 10*6/UL
WBC # BLD: 6.7 K/MCL

## 2020-12-02 PROCEDURE — 84450 TRANSFERASE (AST) (SGOT): CPT

## 2020-12-02 PROCEDURE — 82040 ASSAY OF SERUM ALBUMIN: CPT

## 2020-12-02 PROCEDURE — 72110 X-RAY EXAM L-2 SPINE 4/>VWS: CPT | Performed by: INTERNAL MEDICINE

## 2020-12-02 PROCEDURE — 82565 ASSAY OF CREATININE: CPT

## 2020-12-02 PROCEDURE — 85025 COMPLETE CBC W/AUTO DIFF WBC: CPT

## 2020-12-02 PROCEDURE — 86140 C-REACTIVE PROTEIN: CPT

## 2020-12-02 PROCEDURE — 85652 RBC SED RATE AUTOMATED: CPT

## 2020-12-02 PROCEDURE — 36415 COLL VENOUS BLD VENIPUNCTURE: CPT

## 2020-12-04 ENCOUNTER — TELEPHONE (OUTPATIENT)
Dept: INTERNAL MEDICINE CLINIC | Facility: CLINIC | Age: 80
End: 2020-12-04

## 2020-12-04 RX ORDER — ACETAMINOPHEN AND CODEINE PHOSPHATE 300; 30 MG/1; MG/1
TABLET ORAL
Qty: 40 TABLET | Refills: 2 | Status: SHIPPED | OUTPATIENT
Start: 2020-12-04 | End: 2021-04-29 | Stop reason: ALTCHOICE

## 2020-12-04 NOTE — TELEPHONE ENCOUNTER
Spoke with patient ( verified) and relayed Dr. Koko Stark message below--patient verbalizes understanding and agrees to Tylenol w/codeine to pharmacy    Pharmacy verified for Rx--pended for QID PRN and quantity    F/U back pain doximity appt made for [facility]

## 2020-12-04 NOTE — TELEPHONE ENCOUNTER
I can give her Tylenol with codeine, 1 tab 4 times daily as needed, if she can tolerate it. She would not be able to take more than 2 additional plain tylenol daily for a maximum of 6 tylenol daily.   I would like to do a Doximity appointment with her on

## 2020-12-04 NOTE — TELEPHONE ENCOUNTER
Spoke with patient ( verified)--reports pain has improved some after Prednisone. \"My back is still sore--in the daytime, it's a 4-5, but by the end of the day, it's an 8-9/10. It' not as bad as it was--I'm not in tears or anything. \"    Patient did com

## 2020-12-06 NOTE — PROGRESS NOTES
Evi Paris is an 79-year-old patient of Dr. Gege Morales with seronegative rheumatoid arthritis. Since I last saw her September 8th of 2020, she has continued Enbrel weekly, methotrexate 20mg weekly, and Plaquenil 400mg daily.   She feels like her rheumatoid arthritis Negative for chest pain. Gastrointestinal: Negative for abdominal pain. Musculoskeletal: Positive for back and leg pain. Skin: Negative for rash. Hematological: Negative for adenopathy.      Allergies:  Erythromycin Base         Pcn [Bicillin L-A] insufficiency. Worsened, possibly from diuresis. Josse Qiu 6.  Dyspnea on exertion. BETH. Follow-up with cardiology.

## 2020-12-07 ENCOUNTER — TELEMEDICINE (OUTPATIENT)
Dept: INTERNAL MEDICINE CLINIC | Facility: CLINIC | Age: 80
End: 2020-12-07
Payer: COMMERCIAL

## 2020-12-07 DIAGNOSIS — M54.31 BILATERAL SCIATICA: Primary | ICD-10-CM

## 2020-12-07 DIAGNOSIS — N18.32 STAGE 3B CHRONIC KIDNEY DISEASE (HCC): ICD-10-CM

## 2020-12-07 DIAGNOSIS — R26.9 GAIT ABNORMALITY: ICD-10-CM

## 2020-12-07 DIAGNOSIS — M54.32 BILATERAL SCIATICA: Primary | ICD-10-CM

## 2020-12-07 PROBLEM — R26.89 POOR BALANCE: Status: ACTIVE | Noted: 2020-12-07

## 2020-12-07 PROCEDURE — 99214 OFFICE O/P EST MOD 30 MIN: CPT | Performed by: INTERNAL MEDICINE

## 2020-12-07 NOTE — ASSESSMENT & PLAN NOTE
I reviewed her recent labs. Her kidney function has worsened slightly. We will continue to monitor this. Push fluids.

## 2020-12-07 NOTE — PROGRESS NOTES
Video Progress Note  Telehealth Verbal Consent   I conducted a telehealth visit with Daily Chang today, 12/07/20, which was completed using two-way, real-time interactive audio and video communication.  This has been done in [place] to provide continui spine. The quality of the pain is described as aching. The pain is mild. The symptoms are aggravated by lying down. Pertinent negatives include no numbness. Treatments tried: steroids, Codeine. The treatment provided significant relief.        Past Surgical (EQUIVALENT TO  NEBIVOLOL) 90 tablet 1   • CLONIDINE 0.1 MG/24HR Transdermal Patch Weekly PLACE 1 PATCH ONTO THE SKIN ONCE A WEEK. 12 patch 1   • amLODIPine Besylate 10 MG Oral Tab Take 10 mg by mouth daily.      • furosemide 40 MG Oral Tab Take 40 mg by mo week      Comment: wine - 1 glass weekly    Drug use: No    Family History   Problem Relation Age of Onset   • Breast Cancer Mother 59   • Cancer Mother         ovarian (cause of death)   • Ovarian Cancer Mother 80   • Diabetes Father    • Cancer Father

## 2020-12-07 NOTE — ASSESSMENT & PLAN NOTE
The patient will consider physical therapy in the spring when the Covid pandemic has improved somewhat. She is using the walker more so that she will not fall.

## 2020-12-08 ENCOUNTER — OFFICE VISIT (OUTPATIENT)
Dept: RHEUMATOLOGY | Facility: CLINIC | Age: 80
End: 2020-12-08
Payer: COMMERCIAL

## 2020-12-08 VITALS
DIASTOLIC BLOOD PRESSURE: 69 MMHG | HEART RATE: 54 BPM | BODY MASS INDEX: 39.82 KG/M2 | HEIGHT: 65 IN | SYSTOLIC BLOOD PRESSURE: 115 MMHG | WEIGHT: 239 LBS

## 2020-12-08 DIAGNOSIS — M06.09 RHEUMATOID ARTHRITIS OF MULTIPLE SITES WITH NEGATIVE RHEUMATOID FACTOR (HCC): Primary | ICD-10-CM

## 2020-12-08 DIAGNOSIS — Z51.81 ENCOUNTER FOR THERAPEUTIC DRUG MONITORING: ICD-10-CM

## 2020-12-08 PROCEDURE — G0463 HOSPITAL OUTPT CLINIC VISIT: HCPCS | Performed by: INTERNAL MEDICINE

## 2020-12-08 PROCEDURE — 3074F SYST BP LT 130 MM HG: CPT | Performed by: INTERNAL MEDICINE

## 2020-12-08 PROCEDURE — 3078F DIAST BP <80 MM HG: CPT | Performed by: INTERNAL MEDICINE

## 2020-12-08 PROCEDURE — 99214 OFFICE O/P EST MOD 30 MIN: CPT | Performed by: INTERNAL MEDICINE

## 2020-12-08 PROCEDURE — 3008F BODY MASS INDEX DOCD: CPT | Performed by: INTERNAL MEDICINE

## 2020-12-18 ENCOUNTER — TELEPHONE (OUTPATIENT)
Dept: INTERNAL MEDICINE CLINIC | Facility: CLINIC | Age: 80
End: 2020-12-18

## 2020-12-18 DIAGNOSIS — M54.41 ACUTE BILATERAL LOW BACK PAIN WITH BILATERAL SCIATICA: ICD-10-CM

## 2020-12-18 DIAGNOSIS — M54.42 ACUTE BILATERAL LOW BACK PAIN WITH BILATERAL SCIATICA: ICD-10-CM

## 2020-12-18 RX ORDER — PREDNISONE 20 MG/1
40 TABLET ORAL DAILY
Qty: 10 TABLET | Refills: 0 | Status: SHIPPED | OUTPATIENT
Start: 2020-12-18 | End: 2020-12-23

## 2020-12-18 NOTE — TELEPHONE ENCOUNTER
Patient informed of provider's response/recommendations below and voiced understating and agrees with all. Pt info provided.

## 2020-12-18 NOTE — TELEPHONE ENCOUNTER
Yes.  She should also go for physical therapy. I will write the order.     To schedule Physical Therapy at any of the [facility] facilities, please call [phone]    Requested Prescriptions     Signed Prescriptions Disp Refills   • p

## 2020-12-18 NOTE — TELEPHONE ENCOUNTER
Pt states had recent virtual visit with Dr Yohana Connell. Pt states back pain has improved, but not leg pain. Per pt \"the last few days they have been aching so much\"    Pt is asking if she should take another round of steroids.        Please advise

## 2020-12-20 DIAGNOSIS — I10 ESSENTIAL HYPERTENSION WITH GOAL BLOOD PRESSURE LESS THAN 140/90: ICD-10-CM

## 2020-12-20 DIAGNOSIS — E78.2 MIXED HYPERLIPIDEMIA: ICD-10-CM

## 2020-12-20 RX ORDER — PRAVASTATIN SODIUM 20 MG
TABLET ORAL
Qty: 90 TABLET | Refills: 1 | Status: SHIPPED | OUTPATIENT
Start: 2020-12-20 | End: 2021-05-18

## 2020-12-20 RX ORDER — HYDROCHLOROTHIAZIDE 12.5 MG/1
TABLET ORAL
Qty: 90 TABLET | Refills: 1 | Status: SHIPPED | OUTPATIENT
Start: 2020-12-20 | End: 2021-05-11

## 2020-12-20 RX ORDER — LISINOPRIL 40 MG/1
TABLET ORAL
Qty: 90 TABLET | Refills: 1 | Status: SHIPPED | OUTPATIENT
Start: 2020-12-20 | End: 2021-05-18

## 2021-01-01 ENCOUNTER — EXTERNAL RECORD (OUTPATIENT)
Dept: HEALTH INFORMATION MANAGEMENT | Facility: OTHER | Age: 81
End: 2021-01-01

## 2021-01-03 LAB — AMB EXT COVID-19 RESULT: DETECTED

## 2021-01-04 ENCOUNTER — APPOINTMENT (OUTPATIENT)
Dept: PHYSICAL THERAPY | Age: 81
End: 2021-01-04
Attending: INTERNAL MEDICINE
Payer: MEDICARE

## 2021-01-04 ENCOUNTER — TELEPHONE (OUTPATIENT)
Dept: PHYSICAL THERAPY | Facility: HOSPITAL | Age: 81
End: 2021-01-04

## 2021-01-05 ENCOUNTER — TELEMEDICINE (OUTPATIENT)
Dept: INTERNAL MEDICINE CLINIC | Facility: CLINIC | Age: 81
End: 2021-01-05
Payer: COMMERCIAL

## 2021-01-05 ENCOUNTER — HOSPITAL ENCOUNTER (EMERGENCY)
Facility: HOSPITAL | Age: 81
Discharge: HOME | End: 2021-01-05
Attending: EMERGENCY MEDICINE
Payer: MEDICARE

## 2021-01-05 ENCOUNTER — TELEPHONE (OUTPATIENT)
Dept: INTERNAL MEDICINE CLINIC | Facility: CLINIC | Age: 81
End: 2021-01-05

## 2021-01-05 VITALS
OXYGEN SATURATION: 96 % | HEART RATE: 51 BPM | DIASTOLIC BLOOD PRESSURE: 78 MMHG | SYSTOLIC BLOOD PRESSURE: 129 MMHG | TEMPERATURE: 99 F | RESPIRATION RATE: 18 BRPM

## 2021-01-05 DIAGNOSIS — U07.1 COVID-19: Primary | ICD-10-CM

## 2021-01-05 PROBLEM — N18.4 CKD (CHRONIC KIDNEY DISEASE) STAGE 4, GFR 15-29 ML/MIN (HCC): Chronic | Status: ACTIVE | Noted: 2021-01-05

## 2021-01-05 PROCEDURE — 99453 REM MNTR PHYSIOL PARAM SETUP: CPT

## 2021-01-05 PROCEDURE — 99213 OFFICE O/P EST LOW 20 MIN: CPT | Performed by: NURSE PRACTITIONER

## 2021-01-05 PROCEDURE — 99284 EMERGENCY DEPT VISIT MOD MDM: CPT

## 2021-01-05 NOTE — PROGRESS NOTES
HPI:    Patient ID: Anuj Bennett is a 79year old female. Please note that the following visit was completed using two-way, real-time interactive audio and video communication.   This has been done in [place] to provide continuity of care in the best i Arthrodesis/Lapidus/MONA/FDL to Post Tib Transfer, w/, Global Exp.10/19/15 7/23/2015   • Unspecified essential hypertension    • Unspecified sleep apnea       Past Surgical History:   Procedure Laterality Date   • Angiogram  2013   • Cataract headaches. Hematological: Does not bruise/bleed easily. Psychiatric/Behavioral: The patient is not nervous/anxious.              Current Outpatient Medications   Medication Sig Dispense Refill   • LISINOPRIL 40 MG Oral Tab TAKE 1 TABLET BY MOUTH  DAILY as needed for Wheezing. 1 Inhaler 5   • Ciclopirox 8 % External Solution      • Probiotic Product (PROBIOTIC-10) Oral Cap Take 1 tablet by mouth daily. • Omega-3 Fatty Acids (FISH OIL OR) Take 1 tablet by mouth daily.      • acetaminophen (TYLENOL EXTRA No follow-ups on file. No orders of the defined types were placed in this encounter.       Meds This Visit:  Requested Prescriptions      No prescriptions requested or ordered in this encounter       Imaging & Referrals:  None         Madelyn Joseph

## 2021-01-05 NOTE — ASSESSMENT & PLAN NOTE
A/P 58-year-old female who was tested positive [facility] the [facility] for [facility]. Her son daughter and 3 grandchildren are also positive. Her fever is 99 at this video visit. She is extremely fatigue. She also has diarrhea.   She meets the criteria to receive b

## 2021-01-05 NOTE — TELEPHONE ENCOUNTER
Dr Dolores Pfeiffer, do you want to do video visit with patient today or schedule with another provider? Patient said she tested covid+ on 1/3/2021, [facility].  She has symptoms on 12/31/2020, feelings of fatigue, cough now/then, isaias

## 2021-01-05 NOTE — PATIENT INSTRUCTIONS
Patient Instructions for Home Pulse Oximetry due to COVID-19 Pneumonia  Overview and Terminology  A pulse oximeter a small device that clips on a finger and measures oxygen saturation levels along with your heart rate.  It works by passing small beams of [id number]         [facility] Monitoring Program    Your provider has placed an order for the [address] Monitoring program.  James Lat will be receiving a follow up phone call during business hours, soon after discharge.   If

## 2021-01-05 NOTE — ED NOTES
Assumed care to this pt who came in ambulatory from triage to room 18 with walker for Bamla infusion, pt sent here by her Doctor. Pt tested Positive for COVID Monday.  + SOB. Pt is A/O x 4, breathing si non labored at this time. Pt hooked to monitor.   Fabricio Alberts

## 2021-01-05 NOTE — TELEPHONE ENCOUNTER
CSS=please call and assist for video visit -see Dr Chapo Webb note below,.-schedule for another provider -COVID symptoms.     Future Appointments   Date Time Provider Taqueria Wise   1/14/2021 10:30 AM Tori Pimentel MD [facility] OF THE [facility]     TRIAGE team wi

## 2021-01-05 NOTE — ED INITIAL ASSESSMENT (HPI)
Patient states she is here to receive bamlanivimab infusion, dx with covid 1/3/21, complains of fatigue and fever at home

## 2021-01-05 NOTE — ED PROVIDER NOTES
Patient Seen in: [facility] AND [facility] Emergency Department    History   Patient presents with:  Covid    Stated Complaint: covid+ sunday. sent for [facility]    HPI    Patient is here because she was sent in to get treatment with monoclonal antibody.   She is damion #3) 300-30 MG Oral Tab,  One tablet 4 times daily as needed for pain   ENBREL SURECLICK 50 MG/ML Subcutaneous Solution Auto-injector,  INJECT 50MG SUBCUTANEOUSLY  WEEKLY   Levothyroxine Sodium 137 MCG Oral Tab,  Take 137 mcg by mouth every morning before b pain    Positive for stated complaint: covid+ sunday. sent for [facility]  Other systems are as noted in HPI. Constitutional and vital signs reviewed. All other systems reviewed and negative except as noted above.       Physical Exam     ED Triage Vitals [01 diagnosis)    Disposition:  Discharge    Follow-up:  MD Juanito Linaresais 1696 [phone]    In 1 week  For re-check      Medications Prescribed:  Current Discharge Medication List

## 2021-01-06 ENCOUNTER — TELEPHONE (OUTPATIENT)
Dept: CASE MANAGEMENT | Age: 81
End: 2021-01-06

## 2021-01-06 ENCOUNTER — PATIENT OUTREACH (OUTPATIENT)
Dept: CASE MANAGEMENT | Age: 81
End: 2021-01-06

## 2021-01-06 ENCOUNTER — APPOINTMENT (OUTPATIENT)
Dept: PHYSICAL THERAPY | Age: 81
End: 2021-01-06
Attending: INTERNAL MEDICINE
Payer: MEDICARE

## 2021-01-06 NOTE — TELEPHONE ENCOUNTER
Discussed in detail w/patient. Patient verbalized understanding. We will continue to reach out to patient on a daily basis for monitoring.

## 2021-01-06 NOTE — TELEPHONE ENCOUNTER
Patient tested positive for COVID on 1/3/21. She will be due for a final virtual visit on 1/14/21. Patient is already scheduled for an in person 3 mos follow up on that day.  Please advise whether she should keep the in office visit or cancel that and sched

## 2021-01-06 NOTE — PROGRESS NOTES
Home Monitoring Condition Update    Covid19+ test date: 1/3/20      Consent Verification:  Assessment Completed With: Patient  HIPAA Verified?   Yes    COVID-19 HOME MONITORING 1/6/2021   Temperature 98.3   Reading From Forehead   SPO2 94   Pulse taken fr pain, rashes, itching, tongue or lip swelling or wheezing? No  If yes, we will contact your doctor/on call provider or if you are experiencing a severe reaction, please call 911 for emergency assistance.    If no, and you develop any symptoms, please contac

## 2021-01-06 NOTE — ED NOTES
Pt discharged ambulatory with the use of her walker. Pt verbalizes understanding on the discharge instruction given. Pt was provided with pulse oximeter and incentive spirometry, pt educated on how to use the pulse oximeter and incentive spirometer.  Pt

## 2021-01-06 NOTE — TELEPHONE ENCOUNTER
Her symptoms started on 12/31, so she will be 2 weeks out. As long as she is getting better, I would like to see her in the office, since she would then meet criteria for ending quarantine.   Please ask her to call back if she is not much better by the corinne

## 2021-01-07 ENCOUNTER — PATIENT OUTREACH (OUTPATIENT)
Dept: CASE MANAGEMENT | Age: 81
End: 2021-01-07

## 2021-01-07 NOTE — PROGRESS NOTES
Home Monitoring Condition Update    Covid19+ test date: 1/3/21      Consent Verification:  Assessment Completed With: Patient  HIPAA Verified?   Yes    COVID-19 HOME MONITORING 1/7/2021   Temperature 99.3   Reading From Forehead   SPO2 95   Pulse 52   Pul are you experiencing any of the following: fever, weakness, difficulty breathing, hives, joint pain, rashes, itching, tongue or lip swelling or wheezing?  NO  If yes, we will contact your doctor/on call provider or if you are experiencing a severe reaction,

## 2021-01-08 ENCOUNTER — PATIENT OUTREACH (OUTPATIENT)
Dept: CASE MANAGEMENT | Age: 81
End: 2021-01-08

## 2021-01-08 NOTE — PROGRESS NOTES
Home Monitoring Condition Update    Covid19+ test date: 1/3/21      Consent Verification:  Assessment Completed With: Patient  HIPAA Verified?   Yes    COVID-19 HOME MONITORING 1/8/2021   Temperature 98.3   Reading From Forehead   SPO2 95   Pulse -   Puls call provider or if you are experiencing a severe reaction, please go to nearest ER or call 911 for emergency assistance. Advised patient to monitor temp and HR twice/day for trends, get plenty of rest, push fluids and take Tylenol 1,000 mg q 6 hr PRN.

## 2021-01-11 ENCOUNTER — PATIENT OUTREACH (OUTPATIENT)
Dept: CASE MANAGEMENT | Age: 81
End: 2021-01-11

## 2021-01-11 ENCOUNTER — APPOINTMENT (OUTPATIENT)
Dept: PHYSICAL THERAPY | Age: 81
End: 2021-01-11
Attending: INTERNAL MEDICINE
Payer: MEDICARE

## 2021-01-11 ENCOUNTER — TELEPHONE (OUTPATIENT)
Dept: CASE MANAGEMENT | Age: 81
End: 2021-01-11

## 2021-01-11 NOTE — PROGRESS NOTES
Home Monitoring Condition Update    Covid19+ test date: 1/3/20      Consent Verification:  Assessment Completed With: Patient  HIPAA Verified?   Yes    COVID-19 HOME MONITORING 1/11/2021   Temperature 99   Reading From Forehead   SPO2 96   Pulse 44   Puls positive for COVID-19. The patient was also directed to continue to isolate away from other household members when possible and stay completely isolated from the general public 3 days after symptoms resolve or 10 days total (whichever is longer).   Brina Lr

## 2021-01-11 NOTE — TELEPHONE ENCOUNTER
I called patient for 4 of Covid home monitoring. She c/o increased weakness, fatigue,dry mouth and decreased appetite. Patient states that these symptoms are worse than last week. I recommended that patient try to drink Gatorade/Pedialyte. Please advise.

## 2021-01-11 NOTE — TELEPHONE ENCOUNTER
Patient states her biggest problem is fatigue, and that she can wait to see Dr. Miguel A Rhodes on Thursday. Patient now hydrating with Gatorade.

## 2021-01-12 ENCOUNTER — PATIENT OUTREACH (OUTPATIENT)
Dept: CASE MANAGEMENT | Age: 81
End: 2021-01-12

## 2021-01-12 NOTE — PROGRESS NOTES
Home Monitoring Condition Update    Covid19+ test date: 1/3/21      Consent Verification:  Assessment Completed With: Patient  HIPAA Verified?   Yes    COVID-19 HOME MONITORING 1/12/2021   Temperature 98.8   Reading From Forehead   SPO2 95   Pulse 52   Pu doctor/on call provider or if you are experiencing a severe reaction, please go to nearest ER or call 911 for emergency assistance.      Advised patient to monitor temp and HR twice/day for trends, get plenty of rest, push fluids and take Tylenol 1,000 mg q

## 2021-01-13 ENCOUNTER — PATIENT OUTREACH (OUTPATIENT)
Dept: CASE MANAGEMENT | Age: 81
End: 2021-01-13

## 2021-01-13 ENCOUNTER — APPOINTMENT (OUTPATIENT)
Dept: PHYSICAL THERAPY | Age: 81
End: 2021-01-13
Attending: INTERNAL MEDICINE
Payer: MEDICARE

## 2021-01-13 NOTE — PROGRESS NOTES
Home Monitoring Condition Update    Covid19+ test date: 1/3/21      Consent Verification:  Assessment Completed With: Patient  HIPAA Verified?   Yes    COVID-19 HOME MONITORING 1/13/2021   Temperature 98.9   Reading From Forehead   SPO2 96   Pulse 50   Pu you are experiencing a severe reaction, please go to nearest ER or call 911 for emergency assistance. Advised patient to monitor temp and HR twice/day for trends, get plenty of rest, push fluids and take Tylenol 1,000 mg q 6 hr PRN.   Patient verbalized

## 2021-01-14 ENCOUNTER — TELEPHONE (OUTPATIENT)
Dept: CASE MANAGEMENT | Age: 81
End: 2021-01-14

## 2021-01-14 ENCOUNTER — OFFICE VISIT (OUTPATIENT)
Dept: INTERNAL MEDICINE CLINIC | Facility: CLINIC | Age: 81
End: 2021-01-14
Payer: COMMERCIAL

## 2021-01-14 VITALS
WEIGHT: 230.5 LBS | DIASTOLIC BLOOD PRESSURE: 72 MMHG | HEART RATE: 81 BPM | OXYGEN SATURATION: 98 % | BODY MASS INDEX: 38.4 KG/M2 | HEIGHT: 65 IN | SYSTOLIC BLOOD PRESSURE: 110 MMHG

## 2021-01-14 DIAGNOSIS — N18.4 CKD (CHRONIC KIDNEY DISEASE) STAGE 4, GFR 15-29 ML/MIN (HCC): ICD-10-CM

## 2021-01-14 DIAGNOSIS — Z12.31 BREAST CANCER SCREENING BY MAMMOGRAM: ICD-10-CM

## 2021-01-14 DIAGNOSIS — U07.1 COVID-19 VIRUS INFECTION: Primary | ICD-10-CM

## 2021-01-14 DIAGNOSIS — I10 ESSENTIAL HYPERTENSION: ICD-10-CM

## 2021-01-14 DIAGNOSIS — D84.9 IMMUNOSUPPRESSED STATUS (HCC): ICD-10-CM

## 2021-01-14 DIAGNOSIS — J44.9 CHRONIC OBSTRUCTIVE PULMONARY DISEASE, UNSPECIFIED COPD TYPE (HCC): ICD-10-CM

## 2021-01-14 PROBLEM — I27.20 PROGRESSIVE PULMONARY HYPERTENSION (HCC): Status: ACTIVE | Noted: 2021-01-14

## 2021-01-14 PROCEDURE — 3078F DIAST BP <80 MM HG: CPT | Performed by: INTERNAL MEDICINE

## 2021-01-14 PROCEDURE — 3074F SYST BP LT 130 MM HG: CPT | Performed by: INTERNAL MEDICINE

## 2021-01-14 PROCEDURE — 3008F BODY MASS INDEX DOCD: CPT | Performed by: INTERNAL MEDICINE

## 2021-01-14 PROCEDURE — 99214 OFFICE O/P EST MOD 30 MIN: CPT | Performed by: INTERNAL MEDICINE

## 2021-01-14 RX ORDER — NEBIVOLOL 20 MG/1
TABLET ORAL
Qty: 90 TABLET | Refills: 1 | Status: SHIPPED | OUTPATIENT
Start: 2021-01-14 | End: 2021-06-09

## 2021-01-14 RX ORDER — CLONIDINE 0.1 MG/24H
1 PATCH, EXTENDED RELEASE TRANSDERMAL WEEKLY
Qty: 12 PATCH | Refills: 1 | Status: SHIPPED | OUTPATIENT
Start: 2021-01-14 | End: 2021-02-15

## 2021-01-14 NOTE — PROGRESS NOTES
HPI:    Patient ID: [place] is a 79year old female. Pt started having COVID symptoms on 1/31. She had a slight cough and severe weakness. She tested positive on 1/3/21. She feels better, but she is still weak.   She hasn't done anything in the every Thursday.  78 tablet 1   • Acetaminophen-Codeine (TYLENOL WITH CODEINE #3) 300-30 MG Oral Tab One tablet 4 times daily as needed for pain 40 tablet 2   • ENBREL SURECLICK 50 MG/ML Subcutaneous Solution Auto-injector INJECT 50MG SUBCUTANEOUSLY  WEEKLY Smoker      Smokeless tobacco: Never Used    Alcohol use:  Yes      Alcohol/week: 1.7 standard drinks      Types: 2 Glasses of wine per week      Comment: wine - 1 glass weekly    Drug use: No    Family History   Problem Relation Age of Onset   • Breast Can recovering from her Covid infection. Essential hypertension     Controlled. Continue present management.          Relevant Medications    cloNIDine 0.1 MG/24HR Transdermal Patch Weekly    Nebivolol HCl (BYSTOLIC) 20 MG Oral Tab    Chronic obstructi

## 2021-01-14 NOTE — ASSESSMENT & PLAN NOTE
Patient's GFR recently declined. This is likely due to her illness and dehydration. She has trying to push fluids. We will recheck her with the next office visit.

## 2021-01-16 ENCOUNTER — TELEPHONE (OUTPATIENT)
Dept: OTOLARYNGOLOGY | Facility: CLINIC | Age: 81
End: 2021-01-16

## 2021-01-18 ENCOUNTER — OFFICE VISIT (OUTPATIENT)
Dept: OTOLARYNGOLOGY | Facility: CLINIC | Age: 81
End: 2021-01-18
Payer: COMMERCIAL

## 2021-01-18 VITALS — TEMPERATURE: 98 F | BODY MASS INDEX: 38.41 KG/M2 | WEIGHT: 230.56 LBS | HEIGHT: 65 IN

## 2021-01-18 DIAGNOSIS — H61.23 BILATERAL IMPACTED CERUMEN: Primary | ICD-10-CM

## 2021-01-18 PROCEDURE — 69210 REMOVE IMPACTED EAR WAX UNI: CPT | Performed by: OTOLARYNGOLOGY

## 2021-01-18 PROCEDURE — 3008F BODY MASS INDEX DOCD: CPT | Performed by: OTOLARYNGOLOGY

## 2021-01-18 RX ORDER — FLUTICASONE FUROATE AND VILANTEROL TRIFENATATE 100; 25 UG/1; UG/1
1 POWDER RESPIRATORY (INHALATION) DAILY
COMMUNITY
Start: 2020-10-28 | End: 2021-04-23

## 2021-01-18 RX ORDER — ACETAMINOPHEN AND CODEINE PHOSPHATE 300; 30 MG/1; MG/1
TABLET ORAL 4 TIMES DAILY PRN
COMMUNITY
Start: 2020-12-04 | End: 2021-02-09

## 2021-01-19 ENCOUNTER — APPOINTMENT (OUTPATIENT)
Dept: PHYSICAL THERAPY | Age: 81
End: 2021-01-19
Attending: INTERNAL MEDICINE
Payer: MEDICARE

## 2021-01-22 ENCOUNTER — APPOINTMENT (OUTPATIENT)
Dept: CARDIOLOGY | Age: 81
End: 2021-01-22

## 2021-01-22 ENCOUNTER — APPOINTMENT (OUTPATIENT)
Dept: PHYSICAL THERAPY | Age: 81
End: 2021-01-22
Attending: INTERNAL MEDICINE
Payer: MEDICARE

## 2021-01-22 ENCOUNTER — OFFICE VISIT (OUTPATIENT)
Dept: CARDIOLOGY | Age: 81
End: 2021-01-22

## 2021-01-22 VITALS
HEIGHT: 64 IN | HEART RATE: 53 BPM | BODY MASS INDEX: 39.78 KG/M2 | OXYGEN SATURATION: 96 % | DIASTOLIC BLOOD PRESSURE: 80 MMHG | WEIGHT: 233 LBS | SYSTOLIC BLOOD PRESSURE: 132 MMHG

## 2021-01-22 DIAGNOSIS — I27.20 PULMONARY HYPERTENSION (CMD): ICD-10-CM

## 2021-01-22 DIAGNOSIS — E78.2 MIXED HYPERLIPIDEMIA: ICD-10-CM

## 2021-01-22 DIAGNOSIS — I25.10 NON-OCCLUSIVE CORONARY ARTERY DISEASE: ICD-10-CM

## 2021-01-22 DIAGNOSIS — I10 ESSENTIAL HYPERTENSION: Primary | ICD-10-CM

## 2021-01-22 PROCEDURE — 99214 OFFICE O/P EST MOD 30 MIN: CPT | Performed by: INTERNAL MEDICINE

## 2021-01-22 RX ORDER — FUROSEMIDE 40 MG/1
40 TABLET ORAL 2 TIMES DAILY
Qty: 30 TABLET | Refills: 6 | Status: SHIPPED | OUTPATIENT
Start: 2021-01-22 | End: 2021-02-09 | Stop reason: DRUGHIGH

## 2021-01-22 SDOH — HEALTH STABILITY: MENTAL HEALTH: HOW OFTEN DO YOU HAVE A DRINK CONTAINING ALCOHOL?: MONTHLY OR LESS

## 2021-01-22 SDOH — HEALTH STABILITY: MENTAL HEALTH: HOW MANY STANDARD DRINKS CONTAINING ALCOHOL DO YOU HAVE ON A TYPICAL DAY?: 1 OR 2

## 2021-01-22 ASSESSMENT — PATIENT HEALTH QUESTIONNAIRE - PHQ9
SUM OF ALL RESPONSES TO PHQ9 QUESTIONS 1 AND 2: 0
1. LITTLE INTEREST OR PLEASURE IN DOING THINGS: NOT AT ALL
CLINICAL INTERPRETATION OF PHQ2 SCORE: NO FURTHER SCREENING NEEDED
CLINICAL INTERPRETATION OF PHQ9 SCORE: NO FURTHER SCREENING NEEDED
2. FEELING DOWN, DEPRESSED OR HOPELESS: NOT AT ALL
SUM OF ALL RESPONSES TO PHQ9 QUESTIONS 1 AND 2: 0

## 2021-01-26 ENCOUNTER — APPOINTMENT (OUTPATIENT)
Dept: PHYSICAL THERAPY | Age: 81
End: 2021-01-26
Attending: INTERNAL MEDICINE
Payer: MEDICARE

## 2021-01-26 ENCOUNTER — MED REC SCAN ONLY (OUTPATIENT)
Dept: INTERNAL MEDICINE CLINIC | Facility: CLINIC | Age: 81
End: 2021-01-26

## 2021-01-26 DIAGNOSIS — Z23 NEED FOR VACCINATION: ICD-10-CM

## 2021-01-26 RX ORDER — FLUTICASONE FUROATE AND VILANTEROL TRIFENATATE 100; 25 UG/1; UG/1
1 POWDER RESPIRATORY (INHALATION) DAILY
Qty: 60 EACH | Refills: 0 | Status: SHIPPED | OUTPATIENT
Start: 2021-01-26 | End: 2021-04-29

## 2021-01-29 ENCOUNTER — APPOINTMENT (OUTPATIENT)
Dept: PHYSICAL THERAPY | Age: 81
End: 2021-01-29
Attending: INTERNAL MEDICINE
Payer: MEDICARE

## 2021-01-29 ENCOUNTER — TELEPHONE (OUTPATIENT)
Dept: PHYSICAL THERAPY | Facility: HOSPITAL | Age: 81
End: 2021-01-29

## 2021-02-06 ENCOUNTER — LAB ENCOUNTER (OUTPATIENT)
Dept: LAB | Facility: HOSPITAL | Age: 81
End: 2021-02-06
Attending: INTERNAL MEDICINE
Payer: MEDICARE

## 2021-02-06 DIAGNOSIS — I27.20 PROGRESSIVE PULMONARY HYPERTENSION (HCC): Primary | ICD-10-CM

## 2021-02-06 LAB
ANION GAP SERPL CALC-SCNC: 7 MMOL/L (ref 0–18)
BUN BLD-MCNC: 31 MG/DL (ref 7–18)
BUN SERPL-MCNC: 31 MG/DL
BUN/CREAT SERPL: 20.7 (ref 10–20)
CALCIUM BLD-MCNC: 9.7 MG/DL (ref 8.5–10.1)
CALCIUM SERPL-MCNC: 9.7 MG/DL
CHLORIDE SERPL-SCNC: 105 MMOL/L
CHLORIDE SERPL-SCNC: 105 MMOL/L (ref 98–112)
CO2 SERPL-SCNC: 28 MMOL/L (ref 21–32)
CREAT BLD-MCNC: 1.5 MG/DL
CREAT SERPL-MCNC: 1.5 MG/DL
GLUCOSE BLD-MCNC: 100 MG/DL (ref 70–99)
GLUCOSE SERPL-MCNC: 100 MG/DL
OSMOLALITY SERPL CALC.SUM OF ELEC: 297 MOSM/KG (ref 275–295)
PATIENT FASTING Y/N/NP: NO
POTASSIUM SERPL-SCNC: 3.7 MMOL/L
POTASSIUM SERPL-SCNC: 3.7 MMOL/L (ref 3.5–5.1)
SODIUM SERPL-SCNC: 140 MMOL/L
SODIUM SERPL-SCNC: 140 MMOL/L (ref 136–145)

## 2021-02-06 PROCEDURE — 36415 COLL VENOUS BLD VENIPUNCTURE: CPT

## 2021-02-06 PROCEDURE — 80048 BASIC METABOLIC PNL TOTAL CA: CPT

## 2021-02-08 ENCOUNTER — CLINICAL ABSTRACT (OUTPATIENT)
Dept: CARDIOLOGY | Age: 81
End: 2021-02-08

## 2021-02-08 ENCOUNTER — TELEPHONE (OUTPATIENT)
Dept: INTERNAL MEDICINE CLINIC | Facility: CLINIC | Age: 81
End: 2021-02-08

## 2021-02-08 ENCOUNTER — TELEPHONE (OUTPATIENT)
Dept: CARDIOLOGY | Age: 81
End: 2021-02-08

## 2021-02-08 NOTE — TELEPHONE ENCOUNTER
Patient states she had a Bamlanivimab infusion on 1/5 has an appointment for her covid vaccine on 2/11. Asking if it is ok for her to receive it.

## 2021-02-08 NOTE — TELEPHONE ENCOUNTER
I recommend that she reschedule the vaccine for 3 months from the onset of Covid. Therefore she should get it in late March.

## 2021-02-08 NOTE — TELEPHONE ENCOUNTER
Patient states she is scheduled for covid vaccine on 2/11. She will like to know if there is any interactions because of her being on Enbrel.  Please advise

## 2021-02-09 ENCOUNTER — OFFICE VISIT (OUTPATIENT)
Dept: CARDIOLOGY | Age: 81
End: 2021-02-09

## 2021-02-09 ENCOUNTER — TELEPHONE (OUTPATIENT)
Dept: CARDIOLOGY | Age: 81
End: 2021-02-09

## 2021-02-09 VITALS
HEART RATE: 56 BPM | BODY MASS INDEX: 39.27 KG/M2 | WEIGHT: 230 LBS | SYSTOLIC BLOOD PRESSURE: 98 MMHG | HEIGHT: 64 IN | DIASTOLIC BLOOD PRESSURE: 62 MMHG | OXYGEN SATURATION: 99 %

## 2021-02-09 DIAGNOSIS — I27.81 COR PULMONALE, CHRONIC (CMD): ICD-10-CM

## 2021-02-09 DIAGNOSIS — I27.20 PULMONARY HYPERTENSION (CMD): Primary | ICD-10-CM

## 2021-02-09 PROBLEM — U07.1 COVID-19 VIRUS INFECTION: Status: ACTIVE | Noted: 2021-02-09

## 2021-02-09 PROCEDURE — 3074F SYST BP LT 130 MM HG: CPT | Performed by: NURSE PRACTITIONER

## 2021-02-09 PROCEDURE — 3078F DIAST BP <80 MM HG: CPT | Performed by: NURSE PRACTITIONER

## 2021-02-09 PROCEDURE — 99213 OFFICE O/P EST LOW 20 MIN: CPT | Performed by: NURSE PRACTITIONER

## 2021-02-09 RX ORDER — FUROSEMIDE 40 MG/1
40 TABLET ORAL DAILY
Qty: 30 TABLET | Refills: 6 | Status: SHIPPED | COMMUNITY
Start: 2021-02-09

## 2021-02-09 ASSESSMENT — PATIENT HEALTH QUESTIONNAIRE - PHQ9
2. FEELING DOWN, DEPRESSED OR HOPELESS: NOT AT ALL
CLINICAL INTERPRETATION OF PHQ9 SCORE: NO FURTHER SCREENING NEEDED
SUM OF ALL RESPONSES TO PHQ9 QUESTIONS 1 AND 2: 0
CLINICAL INTERPRETATION OF PHQ2 SCORE: NO FURTHER SCREENING NEEDED
SUM OF ALL RESPONSES TO PHQ9 QUESTIONS 1 AND 2: 0
1. LITTLE INTEREST OR PLEASURE IN DOING THINGS: NOT AT ALL

## 2021-02-10 ENCOUNTER — MED REC SCAN ONLY (OUTPATIENT)
Dept: INTERNAL MEDICINE CLINIC | Facility: CLINIC | Age: 81
End: 2021-02-10

## 2021-02-15 ENCOUNTER — TELEPHONE (OUTPATIENT)
Dept: CARDIOLOGY | Age: 81
End: 2021-02-15

## 2021-02-15 DIAGNOSIS — I10 ESSENTIAL HYPERTENSION: ICD-10-CM

## 2021-02-15 DIAGNOSIS — I34.0 NONRHEUMATIC MITRAL VALVE REGURGITATION: ICD-10-CM

## 2021-02-15 DIAGNOSIS — I27.81 COR PULMONALE, CHRONIC (CMD): ICD-10-CM

## 2021-02-15 DIAGNOSIS — I27.20 PULMONARY HYPERTENSION (CMD): Primary | ICD-10-CM

## 2021-02-15 RX ORDER — CLONIDINE 0.1 MG/24H
1 PATCH, EXTENDED RELEASE TRANSDERMAL WEEKLY
Qty: 12 PATCH | Refills: 1 | Status: SHIPPED | OUTPATIENT
Start: 2021-02-15 | End: 2021-06-05

## 2021-02-15 NOTE — H&P
The above referenced H&P was reviewed by Leopoldo Smothers, MD on 2/18/2021, the patient was examined and no significant changes have occurred in the patient's condition since the H&P was performed.   Risks and benefits were discussed, proceed with procedure as

## 2021-02-16 NOTE — PROGRESS NOTES
No chest xray needed per MD. Dr Jakob Montesinos aware pt covid positive on 1/3-test to be repeated per md request.

## 2021-02-18 ENCOUNTER — CLINICAL ABSTRACT (OUTPATIENT)
Dept: CARDIOLOGY | Age: 81
End: 2021-02-18

## 2021-02-18 ENCOUNTER — HOSPITAL ENCOUNTER (OUTPATIENT)
Dept: INTERVENTIONAL RADIOLOGY/VASCULAR | Facility: HOSPITAL | Age: 81
Discharge: HOME | End: 2021-02-18
Attending: INTERNAL MEDICINE | Admitting: INTERNAL MEDICINE
Payer: MEDICARE

## 2021-02-18 VITALS
WEIGHT: 230 LBS | SYSTOLIC BLOOD PRESSURE: 102 MMHG | OXYGEN SATURATION: 96 % | TEMPERATURE: 98 F | DIASTOLIC BLOOD PRESSURE: 46 MMHG | BODY MASS INDEX: 38 KG/M2 | RESPIRATION RATE: 19 BRPM | HEART RATE: 52 BPM

## 2021-02-18 DIAGNOSIS — R06.02 SOB (SHORTNESS OF BREATH): ICD-10-CM

## 2021-02-18 DIAGNOSIS — R60.0 LOWER EXTREMITY EDEMA: ICD-10-CM

## 2021-02-18 DIAGNOSIS — Z01.818 PRE-OP TESTING: Primary | ICD-10-CM

## 2021-02-18 LAB
ANION GAP SERPL CALC-SCNC: 9 MMOL/L (ref 0–18)
BUN BLD-MCNC: 24 MG/DL (ref 7–18)
BUN SERPL-MCNC: 24 MG/DL
BUN/CREAT SERPL: 18.5 (ref 10–20)
CALCIUM BLD-MCNC: 9.5 MG/DL (ref 8.5–10.1)
CALCIUM SERPL-MCNC: 9.5 MG/DL
CHLORIDE SERPL-SCNC: 106 MMOL/L
CHLORIDE SERPL-SCNC: 106 MMOL/L (ref 98–112)
CO2 SERPL-SCNC: 24 MMOL/L (ref 21–32)
CREAT BLD-MCNC: 1.3 MG/DL
CREAT SERPL-MCNC: 1.3 MG/DL
DEPRECATED RDW RBC AUTO: 53 FL (ref 35.1–46.3)
ERYTHROCYTE [DISTWIDTH] IN BLOOD BY AUTOMATED COUNT: 14.3 % (ref 11–15)
GLUCOSE BLD-MCNC: 105 MG/DL (ref 70–99)
GLUCOSE SERPL-MCNC: 105 MG/DL
HCT VFR BLD AUTO: 32.9 %
HCT VFR BLD CALC: 32.9 %
HGB BLD-MCNC: 11.1 G/DL
HGB BLD-MCNC: 11.1 G/DL
MCH RBC QN AUTO: 34.2 PG (ref 26–34)
MCHC RBC AUTO-ENTMCNC: 33.7 G/DL (ref 31–37)
MCV RBC AUTO: 101.2 FL
OSMOLALITY SERPL CALC.SUM OF ELEC: 292 MOSM/KG (ref 275–295)
PLATELET # BLD AUTO: 189 10(3)UL (ref 150–450)
PLATELET # BLD: 189 K/MCL
POTASSIUM SERPL-SCNC: 3.7 MMOL/L
POTASSIUM SERPL-SCNC: 3.7 MMOL/L (ref 3.5–5.1)
RBC # BLD AUTO: 3.25 X10(6)UL
RBC # BLD: 3.25 10*6/UL
SARS-COV-2 RNA RESP QL NAA+PROBE: NOT DETECTED
SODIUM SERPL-SCNC: 139 MMOL/L
SODIUM SERPL-SCNC: 139 MMOL/L (ref 136–145)
WBC # BLD AUTO: 5.3 X10(3) UL (ref 4–11)
WBC # BLD: 5.3 K/MCL

## 2021-02-18 PROCEDURE — 36415 COLL VENOUS BLD VENIPUNCTURE: CPT

## 2021-02-18 PROCEDURE — 80048 BASIC METABOLIC PNL TOTAL CA: CPT | Performed by: INTERNAL MEDICINE

## 2021-02-18 PROCEDURE — 85027 COMPLETE CBC AUTOMATED: CPT | Performed by: INTERNAL MEDICINE

## 2021-02-18 PROCEDURE — 4A023N6 MEASUREMENT OF CARDIAC SAMPLING AND PRESSURE, RIGHT HEART, PERCUTANEOUS APPROACH: ICD-10-PCS | Performed by: INTERNAL MEDICINE

## 2021-02-18 PROCEDURE — 93451 RIGHT HEART CATH: CPT

## 2021-02-18 RX ORDER — SODIUM CHLORIDE 9 MG/ML
INJECTION, SOLUTION INTRAVENOUS
Status: DISCONTINUED | OUTPATIENT
Start: 2021-02-18 | End: 2021-02-18

## 2021-02-18 RX ORDER — LIDOCAINE HYDROCHLORIDE 20 MG/ML
INJECTION, SOLUTION EPIDURAL; INFILTRATION; INTRACAUDAL; PERINEURAL
Status: COMPLETED
Start: 2021-02-18 | End: 2021-02-18

## 2021-02-18 RX ORDER — DIPHENHYDRAMINE HYDROCHLORIDE 50 MG/ML
INJECTION INTRAMUSCULAR; INTRAVENOUS
Status: COMPLETED
Start: 2021-02-18 | End: 2021-02-18

## 2021-02-18 NOTE — PROCEDURES
[facility]    [facility] Cardiac Cath Procedure Note  Erroll [place] Patient Status:  Outpatient in a Bed    1940 MRN [id number]   Location [facility] Attending Steve Samaniego, [address] [facility] Day # 0 PCP Ana Peralta

## 2021-02-18 NOTE — IVS NOTE
Pt is able to sit up and ambulate without any difficulty. Procedure site remains dry and intact with no signs of bleeding and hematoma. Pt tolerated food/fluid. Instructions given. Pt/Family verbalized understanding. VSS as recorded.  MD came and saw patie

## 2021-02-24 ENCOUNTER — OFFICE VISIT (OUTPATIENT)
Dept: PULMONOLOGY | Facility: CLINIC | Age: 81
End: 2021-02-24
Payer: COMMERCIAL

## 2021-02-24 VITALS
HEART RATE: 55 BPM | DIASTOLIC BLOOD PRESSURE: 50 MMHG | RESPIRATION RATE: 18 BRPM | SYSTOLIC BLOOD PRESSURE: 91 MMHG | WEIGHT: 234 LBS | OXYGEN SATURATION: 95 % | BODY MASS INDEX: 39 KG/M2

## 2021-02-24 DIAGNOSIS — R06.00 DYSPNEA, UNSPECIFIED TYPE: ICD-10-CM

## 2021-02-24 DIAGNOSIS — Z99.89 OSA ON CPAP: ICD-10-CM

## 2021-02-24 DIAGNOSIS — G47.33 OSA ON CPAP: ICD-10-CM

## 2021-02-24 DIAGNOSIS — J44.9 CHRONIC OBSTRUCTIVE PULMONARY DISEASE, UNSPECIFIED COPD TYPE (HCC): Primary | ICD-10-CM

## 2021-02-24 PROCEDURE — 3078F DIAST BP <80 MM HG: CPT | Performed by: INTERNAL MEDICINE

## 2021-02-24 PROCEDURE — 1111F DSCHRG MED/CURRENT MED MERGE: CPT | Performed by: INTERNAL MEDICINE

## 2021-02-24 PROCEDURE — 99214 OFFICE O/P EST MOD 30 MIN: CPT | Performed by: INTERNAL MEDICINE

## 2021-02-24 PROCEDURE — 3074F SYST BP LT 130 MM HG: CPT | Performed by: INTERNAL MEDICINE

## 2021-02-24 NOTE — PROGRESS NOTES
HPI:    Patient ID: Daily Chang is a 79year old female.     HPI  Chronic dyspnea upon exertion  No shortness of breath with activity of daily living  About 2 blocks when she gets short of breath  Even climbing stairs of 6 steps no problem  No cough or wh Besylate 10 MG Oral Tab Take 10 mg by mouth daily. • furosemide 40 MG Oral Tab Take 40 mg by mouth daily.      • NIACIN ER, ANTIHYPERLIPIDEMIC, 750 MG Oral Tab CR TAKE 1 TABLET BY MOUTH IN  THE EVENING 90 tablet 3   • SOLIFENACIN SUCCINATE 10 MG Oral Ta exhibits no distension. There is no abdominal tenderness. Musculoskeletal:         General: Edema present. Lymphadenopathy:     She has no cervical adenopathy. Neurological: She is oriented to person, place, and time.               ASSESSMENT/PLAN:

## 2021-03-02 DIAGNOSIS — K21.9 GASTROESOPHAGEAL REFLUX DISEASE: ICD-10-CM

## 2021-03-02 RX ORDER — HYDROXYCHLOROQUINE SULFATE 200 MG/1
TABLET, FILM COATED ORAL
Qty: 180 TABLET | Refills: 3 | Status: SHIPPED | OUTPATIENT
Start: 2021-03-02

## 2021-03-02 RX ORDER — PANTOPRAZOLE SODIUM 40 MG/1
TABLET, DELAYED RELEASE ORAL
Qty: 90 TABLET | Refills: 1 | Status: SHIPPED | OUTPATIENT
Start: 2021-03-02 | End: 2021-09-26

## 2021-03-06 NOTE — PROGRESS NOTES
Hayley Cuevas is an 60-year-old patient of Dr. Scot Paulson with seronegative rheumatoid arthritis. Since I last saw her December 8th of 2020, she has continued Enbrel weekly, methotrexate 15 mg weekly, and Plaquenil 400mg daily.   She feels like her rheumatoid arthritis chest pain. Gastrointestinal: Negative for abdominal pain. Musculoskeletal: Positive for back and leg pain. Skin: Negative for rash. Hematological: Negative for adenopathy.      Allergies:  Erythromycin Base         Pcn [Bicillin L-A]           PHYS stockings.

## 2021-03-07 ENCOUNTER — LAB ENCOUNTER (OUTPATIENT)
Dept: LAB | Facility: HOSPITAL | Age: 81
End: 2021-03-07
Attending: INTERNAL MEDICINE
Payer: MEDICARE

## 2021-03-07 DIAGNOSIS — M06.09 RHEUMATOID ARTHRITIS OF MULTIPLE SITES WITH NEGATIVE RHEUMATOID FACTOR (HCC): ICD-10-CM

## 2021-03-07 DIAGNOSIS — Z51.81 THERAPEUTIC DRUG MONITORING: ICD-10-CM

## 2021-03-07 LAB
ALBUMIN SERPL-MCNC: 4.1 G/DL (ref 3.4–5)
AST SERPL-CCNC: 17 U/L (ref 15–37)
BASOPHILS # BLD AUTO: 0.06 X10(3) UL (ref 0–0.2)
BASOPHILS NFR BLD AUTO: 0.9 %
CREAT BLD-MCNC: 1.43 MG/DL
CRP SERPL-MCNC: <0.29 MG/DL (ref ?–0.3)
DEPRECATED RDW RBC AUTO: 55 FL (ref 35.1–46.3)
EOSINOPHIL # BLD AUTO: 0.17 X10(3) UL (ref 0–0.7)
EOSINOPHIL NFR BLD AUTO: 2.7 %
ERYTHROCYTE [DISTWIDTH] IN BLOOD BY AUTOMATED COUNT: 14.3 % (ref 11–15)
ERYTHROCYTE [SEDIMENTATION RATE] IN BLOOD: 13 MM/HR
HCT VFR BLD AUTO: 34 %
HGB BLD-MCNC: 10.9 G/DL
IMM GRANULOCYTES # BLD AUTO: 0.02 X10(3) UL (ref 0–1)
IMM GRANULOCYTES NFR BLD: 0.3 %
LYMPHOCYTES # BLD AUTO: 2.43 X10(3) UL (ref 1–4)
LYMPHOCYTES NFR BLD AUTO: 38.1 %
MCH RBC QN AUTO: 33.4 PG (ref 26–34)
MCHC RBC AUTO-ENTMCNC: 32.1 G/DL (ref 31–37)
MCV RBC AUTO: 104.3 FL
MONOCYTES # BLD AUTO: 0.85 X10(3) UL (ref 0.1–1)
MONOCYTES NFR BLD AUTO: 13.3 %
NEUTROPHILS # BLD AUTO: 2.85 X10 (3) UL (ref 1.5–7.7)
NEUTROPHILS # BLD AUTO: 2.85 X10(3) UL (ref 1.5–7.7)
NEUTROPHILS NFR BLD AUTO: 44.7 %
PLATELET # BLD AUTO: 186 10(3)UL (ref 150–450)
RBC # BLD AUTO: 3.26 X10(6)UL
WBC # BLD AUTO: 6.4 X10(3) UL (ref 4–11)

## 2021-03-07 PROCEDURE — 85025 COMPLETE CBC W/AUTO DIFF WBC: CPT

## 2021-03-07 PROCEDURE — 85652 RBC SED RATE AUTOMATED: CPT

## 2021-03-07 PROCEDURE — 84450 TRANSFERASE (AST) (SGOT): CPT

## 2021-03-07 PROCEDURE — 82040 ASSAY OF SERUM ALBUMIN: CPT

## 2021-03-07 PROCEDURE — 86140 C-REACTIVE PROTEIN: CPT

## 2021-03-07 PROCEDURE — 36415 COLL VENOUS BLD VENIPUNCTURE: CPT

## 2021-03-07 PROCEDURE — 82565 ASSAY OF CREATININE: CPT

## 2021-03-09 ENCOUNTER — OFFICE VISIT (OUTPATIENT)
Dept: RHEUMATOLOGY | Facility: CLINIC | Age: 81
End: 2021-03-09
Payer: COMMERCIAL

## 2021-03-09 VITALS
SYSTOLIC BLOOD PRESSURE: 102 MMHG | HEART RATE: 54 BPM | HEIGHT: 65 IN | WEIGHT: 232 LBS | DIASTOLIC BLOOD PRESSURE: 63 MMHG | BODY MASS INDEX: 38.65 KG/M2

## 2021-03-09 DIAGNOSIS — M06.09 RHEUMATOID ARTHRITIS OF MULTIPLE SITES WITH NEGATIVE RHEUMATOID FACTOR (HCC): Primary | ICD-10-CM

## 2021-03-09 DIAGNOSIS — Z51.81 ENCOUNTER FOR THERAPEUTIC DRUG MONITORING: ICD-10-CM

## 2021-03-09 PROCEDURE — 3078F DIAST BP <80 MM HG: CPT | Performed by: INTERNAL MEDICINE

## 2021-03-09 PROCEDURE — 3008F BODY MASS INDEX DOCD: CPT | Performed by: INTERNAL MEDICINE

## 2021-03-09 PROCEDURE — 99214 OFFICE O/P EST MOD 30 MIN: CPT | Performed by: INTERNAL MEDICINE

## 2021-03-09 PROCEDURE — 3074F SYST BP LT 130 MM HG: CPT | Performed by: INTERNAL MEDICINE

## 2021-03-09 RX ORDER — FOLIC ACID 1 MG/1
1 TABLET ORAL DAILY
Qty: 90 TABLET | Refills: 3 | Status: SHIPPED | OUTPATIENT
Start: 2021-03-09

## 2021-03-11 ENCOUNTER — TELEPHONE (OUTPATIENT)
Dept: PULMONOLOGY | Facility: CLINIC | Age: 81
End: 2021-03-11

## 2021-03-11 NOTE — TELEPHONE ENCOUNTER
Giselle/[facility] states order needs to be changed for Medicare coverage . She did also fax something to this office. Please call.

## 2021-03-22 NOTE — TELEPHONE ENCOUNTER
Cornelio Velez from [facility] called in to follow up on this. She states the patient is waiting and would like to expedite this.  Please follow up ph # [phone]

## 2021-03-22 NOTE — TELEPHONE ENCOUNTER
Dr. Jaylin Jacques- [facility] complete pulmonary rehab physician referral in your folder. Thanks.

## 2021-03-24 NOTE — TELEPHONE ENCOUNTER
[facility] is aware of below Yuan Piggs unavailable today). Instructed her to let us know if we may be of further assistance. She voiced understanding.

## 2021-03-24 NOTE — TELEPHONE ENCOUNTER
Completed [address] [facility] pulmonary rehabilitation physician referral form, office visit enctr 2/24/21, & PFT results 10/13/20 faxed to [facility] in Cardiopulmonary Rehab at #[phone]. Confirmation rcvd.

## 2021-03-25 ENCOUNTER — ORDER TRANSCRIPTION (OUTPATIENT)
Dept: CARDIAC REHAB | Facility: HOSPITAL | Age: 81
End: 2021-03-25

## 2021-03-25 DIAGNOSIS — I27.20 PULMONARY HYPERTENSION (HCC): Primary | ICD-10-CM

## 2021-04-06 ENCOUNTER — IMMUNIZATION (OUTPATIENT)
Dept: LAB | Facility: HOSPITAL | Age: 81
End: 2021-04-06
Attending: HOSPITALIST
Payer: MEDICARE

## 2021-04-06 DIAGNOSIS — Z23 NEED FOR VACCINATION: Primary | ICD-10-CM

## 2021-04-06 PROCEDURE — 0011A SARSCOV2 VAC 100MCG/0.5ML IM: CPT

## 2021-04-08 NOTE — TELEPHONE ENCOUNTER
COLLATERAL     Writer attempted to reach out the pts therapist at [facility],  stated that the therapist was in a session and would call back. Requested Prescriptions     Signed Prescriptions Disp Refills   • Acetaminophen-Codeine (TYLENOL WITH CODEINE #3) 300-30 MG Oral Tab 40 tablet 2     Sig: One tablet 4 times daily as needed for pain     Authorizing Provider: José Luis Suero

## 2021-04-09 RX ORDER — METHOTREXATE 2.5 MG/1
TABLET ORAL
Qty: 96 TABLET | Refills: 1 | Status: SHIPPED | OUTPATIENT
Start: 2021-04-09 | End: 2022-01-12

## 2021-04-12 ENCOUNTER — CARDPULM VISIT (OUTPATIENT)
Dept: CARDIAC REHAB | Facility: HOSPITAL | Age: 81
End: 2021-04-12
Attending: INTERNAL MEDICINE
Payer: MEDICARE

## 2021-04-12 DIAGNOSIS — I27.20 PULMONARY HYPERTENSION (HCC): ICD-10-CM

## 2021-04-15 ENCOUNTER — CARDPULM VISIT (OUTPATIENT)
Dept: CARDIAC REHAB | Facility: HOSPITAL | Age: 81
End: 2021-04-15
Attending: INTERNAL MEDICINE
Payer: MEDICARE

## 2021-04-15 ENCOUNTER — TELEPHONE (OUTPATIENT)
Dept: CARDIOLOGY | Age: 81
End: 2021-04-15

## 2021-04-16 RX ORDER — SOLIFENACIN SUCCINATE 10 MG/1
TABLET, FILM COATED ORAL
Qty: 90 TABLET | Refills: 0 | OUTPATIENT
Start: 2021-04-16

## 2021-04-20 ENCOUNTER — CARDPULM VISIT (OUTPATIENT)
Dept: CARDIAC REHAB | Facility: HOSPITAL | Age: 81
End: 2021-04-20
Attending: INTERNAL MEDICINE
Payer: MEDICARE

## 2021-04-22 ENCOUNTER — LAB ENCOUNTER (OUTPATIENT)
Dept: LAB | Facility: HOSPITAL | Age: 81
End: 2021-04-22
Attending: INTERNAL MEDICINE
Payer: MEDICARE

## 2021-04-22 ENCOUNTER — TELEPHONE (OUTPATIENT)
Dept: CARDIOLOGY | Age: 81
End: 2021-04-22

## 2021-04-22 ENCOUNTER — CARDPULM VISIT (OUTPATIENT)
Dept: CARDIAC REHAB | Facility: HOSPITAL | Age: 81
End: 2021-04-22
Attending: INTERNAL MEDICINE
Payer: MEDICARE

## 2021-04-22 DIAGNOSIS — I27.20 PULMONARY HYPERTENSION (HCC): ICD-10-CM

## 2021-04-22 DIAGNOSIS — Z51.81 THERAPEUTIC DRUG MONITORING: ICD-10-CM

## 2021-04-22 DIAGNOSIS — I27.20 PORTOPULMONARY HYPERTENSION (HCC): Primary | ICD-10-CM

## 2021-04-22 DIAGNOSIS — M06.09 RHEUMATOID ARTHRITIS OF MULTIPLE SITES WITH NEGATIVE RHEUMATOID FACTOR (HCC): ICD-10-CM

## 2021-04-22 DIAGNOSIS — K76.6 PORTOPULMONARY HYPERTENSION (HCC): Primary | ICD-10-CM

## 2021-04-22 LAB
BUN SERPL-MCNC: 19 MG/DL
CALCIUM SERPL-MCNC: 9.4 MG/DL
CHLORIDE SERPL-SCNC: 99 MMOL/L
CREAT SERPL-MCNC: 1.05 MG/DL
GLUCOSE SERPL-MCNC: 90 MG/DL
POTASSIUM SERPL-SCNC: 4 MMOL/L
SODIUM SERPL-SCNC: 134 MMOL/L

## 2021-04-22 PROCEDURE — 80048 BASIC METABOLIC PNL TOTAL CA: CPT

## 2021-04-22 PROCEDURE — 36415 COLL VENOUS BLD VENIPUNCTURE: CPT

## 2021-04-23 ENCOUNTER — CLINICAL ABSTRACT (OUTPATIENT)
Dept: CARDIOLOGY | Age: 81
End: 2021-04-23

## 2021-04-23 ENCOUNTER — OFFICE VISIT (OUTPATIENT)
Dept: CARDIOLOGY | Age: 81
End: 2021-04-23

## 2021-04-23 ENCOUNTER — TELEPHONE (OUTPATIENT)
Dept: INTERNAL MEDICINE CLINIC | Facility: CLINIC | Age: 81
End: 2021-04-23

## 2021-04-23 VITALS
DIASTOLIC BLOOD PRESSURE: 80 MMHG | HEART RATE: 50 BPM | HEIGHT: 64 IN | WEIGHT: 234 LBS | BODY MASS INDEX: 39.95 KG/M2 | SYSTOLIC BLOOD PRESSURE: 130 MMHG

## 2021-04-23 DIAGNOSIS — I25.10 NON-OCCLUSIVE CORONARY ARTERY DISEASE: Primary | ICD-10-CM

## 2021-04-23 DIAGNOSIS — I10 ESSENTIAL HYPERTENSION: ICD-10-CM

## 2021-04-23 DIAGNOSIS — L97.516 ULCER OF RIGHT FOOT WITH BONE INVOLVEMENT WITHOUT EVIDENCE OF NECROSIS (CMD): ICD-10-CM

## 2021-04-23 DIAGNOSIS — E78.5 DYSLIPIDEMIA: ICD-10-CM

## 2021-04-23 PROCEDURE — 99214 OFFICE O/P EST MOD 30 MIN: CPT | Performed by: INTERNAL MEDICINE

## 2021-04-23 ASSESSMENT — PATIENT HEALTH QUESTIONNAIRE - PHQ9
2. FEELING DOWN, DEPRESSED OR HOPELESS: NOT AT ALL
CLINICAL INTERPRETATION OF PHQ2 SCORE: NO FURTHER SCREENING NEEDED
CLINICAL INTERPRETATION OF PHQ9 SCORE: NO FURTHER SCREENING NEEDED
SUM OF ALL RESPONSES TO PHQ9 QUESTIONS 1 AND 2: 0
SUM OF ALL RESPONSES TO PHQ9 QUESTIONS 1 AND 2: 0
1. LITTLE INTEREST OR PLEASURE IN DOING THINGS: NOT AT ALL

## 2021-04-23 NOTE — TELEPHONE ENCOUNTER
Received call from podiatrist Dr. Flaco Moreno. Patient has osteomyelitis of the toe. She will be seeing ID and getting an MRI of the toe and foot. She will likely need amputation at least of the toe. Patient does not need admission at this time.

## 2021-04-26 ENCOUNTER — HOSPITAL ENCOUNTER (OUTPATIENT)
Dept: GENERAL RADIOLOGY | Facility: HOSPITAL | Age: 81
Discharge: HOME | DRG: 516 | End: 2021-04-26
Attending: PODIATRIST
Payer: MEDICARE

## 2021-04-26 ENCOUNTER — LAB ENCOUNTER (OUTPATIENT)
Dept: LAB | Facility: HOSPITAL | Age: 81
DRG: 516 | End: 2021-04-26
Attending: PODIATRIST
Payer: MEDICARE

## 2021-04-26 DIAGNOSIS — M86.171 OTHER ACUTE OSTEOMYELITIS OF RIGHT FOOT (HCC): ICD-10-CM

## 2021-04-26 PROCEDURE — 73630 X-RAY EXAM OF FOOT: CPT | Performed by: PODIATRIST

## 2021-04-26 PROCEDURE — 85652 RBC SED RATE AUTOMATED: CPT

## 2021-04-26 PROCEDURE — 85025 COMPLETE CBC W/AUTO DIFF WBC: CPT

## 2021-04-26 PROCEDURE — 36415 COLL VENOUS BLD VENIPUNCTURE: CPT

## 2021-04-26 PROCEDURE — 86141 C-REACTIVE PROTEIN HS: CPT

## 2021-04-27 ENCOUNTER — CARDPULM VISIT (OUTPATIENT)
Dept: CARDIAC REHAB | Facility: HOSPITAL | Age: 81
End: 2021-04-27
Attending: INTERNAL MEDICINE
Payer: MEDICARE

## 2021-04-29 ENCOUNTER — HOSPITAL ENCOUNTER (INPATIENT)
Facility: HOSPITAL | Age: 81
LOS: 5 days | Discharge: HOME | DRG: 516 | End: 2021-05-04
Attending: EMERGENCY MEDICINE | Admitting: HOSPITALIST
Payer: MEDICARE

## 2021-04-29 ENCOUNTER — APPOINTMENT (OUTPATIENT)
Dept: MRI IMAGING | Facility: HOSPITAL | Age: 81
DRG: 516 | End: 2021-04-29
Attending: EMERGENCY MEDICINE
Payer: MEDICARE

## 2021-04-29 DIAGNOSIS — M00.9: Primary | ICD-10-CM

## 2021-04-29 PROCEDURE — 99222 1ST HOSP IP/OBS MODERATE 55: CPT | Performed by: HOSPITALIST

## 2021-04-29 PROCEDURE — 73718 MRI LOWER EXTREMITY W/O DYE: CPT | Performed by: EMERGENCY MEDICINE

## 2021-04-29 RX ORDER — LISINOPRIL 40 MG/1
40 TABLET ORAL DAILY
Status: DISCONTINUED | OUTPATIENT
Start: 2021-04-30 | End: 2021-05-04

## 2021-04-29 RX ORDER — MORPHINE SULFATE 4 MG/ML
4 INJECTION, SOLUTION INTRAMUSCULAR; INTRAVENOUS EVERY 2 HOUR PRN
Status: DISCONTINUED | OUTPATIENT
Start: 2021-04-29 | End: 2021-05-04

## 2021-04-29 RX ORDER — VANCOMYCIN 2 GRAM/500 ML IN 0.9 % SODIUM CHLORIDE INTRAVENOUS
25 ONCE
Status: COMPLETED | OUTPATIENT
Start: 2021-04-29 | End: 2021-04-29

## 2021-04-29 RX ORDER — ONDANSETRON 2 MG/ML
4 INJECTION INTRAMUSCULAR; INTRAVENOUS EVERY 6 HOURS PRN
Status: DISCONTINUED | OUTPATIENT
Start: 2021-04-29 | End: 2021-05-04

## 2021-04-29 RX ORDER — PANTOPRAZOLE SODIUM 40 MG/1
40 TABLET, DELAYED RELEASE ORAL
Status: DISCONTINUED | OUTPATIENT
Start: 2021-04-30 | End: 2021-05-04

## 2021-04-29 RX ORDER — HEPARIN SODIUM 5000 [USP'U]/ML
5000 INJECTION, SOLUTION INTRAVENOUS; SUBCUTANEOUS EVERY 12 HOURS SCHEDULED
Status: DISCONTINUED | OUTPATIENT
Start: 2021-04-29 | End: 2021-05-04

## 2021-04-29 RX ORDER — MORPHINE SULFATE 2 MG/ML
1 INJECTION, SOLUTION INTRAMUSCULAR; INTRAVENOUS EVERY 2 HOUR PRN
Status: DISCONTINUED | OUTPATIENT
Start: 2021-04-29 | End: 2021-05-04

## 2021-04-29 RX ORDER — LOPERAMIDE HYDROCHLORIDE 2 MG/1
2 CAPSULE ORAL 4 TIMES DAILY PRN
COMMUNITY

## 2021-04-29 RX ORDER — HYDROCODONE BITARTRATE AND ACETAMINOPHEN 5; 325 MG/1; MG/1
2 TABLET ORAL EVERY 4 HOURS PRN
Status: DISCONTINUED | OUTPATIENT
Start: 2021-04-29 | End: 2021-05-04

## 2021-04-29 RX ORDER — ALBUTEROL SULFATE 90 UG/1
1 AEROSOL, METERED RESPIRATORY (INHALATION) EVERY 6 HOURS PRN
Status: DISCONTINUED | OUTPATIENT
Start: 2021-04-29 | End: 2021-05-04

## 2021-04-29 RX ORDER — HYDROCHLOROTHIAZIDE 25 MG/1
12.5 TABLET ORAL DAILY
Status: DISCONTINUED | OUTPATIENT
Start: 2021-04-30 | End: 2021-05-04

## 2021-04-29 RX ORDER — ACETAMINOPHEN 325 MG/1
650 TABLET ORAL EVERY 4 HOURS PRN
Status: DISCONTINUED | OUTPATIENT
Start: 2021-04-29 | End: 2021-05-04

## 2021-04-29 RX ORDER — HYDROXYCHLOROQUINE SULFATE 200 MG/1
400 TABLET, FILM COATED ORAL DAILY
Status: DISCONTINUED | OUTPATIENT
Start: 2021-04-30 | End: 2021-05-04

## 2021-04-29 RX ORDER — LOPERAMIDE HYDROCHLORIDE 2 MG/1
2 CAPSULE ORAL 4 TIMES DAILY PRN
Status: DISCONTINUED | OUTPATIENT
Start: 2021-04-29 | End: 2021-05-04

## 2021-04-29 RX ORDER — NEBIVOLOL 5 MG/1
20 TABLET ORAL
Status: DISCONTINUED | OUTPATIENT
Start: 2021-04-30 | End: 2021-05-04

## 2021-04-29 RX ORDER — FOLIC ACID 1 MG/1
1 TABLET ORAL DAILY
Status: DISCONTINUED | OUTPATIENT
Start: 2021-04-30 | End: 2021-05-04

## 2021-04-29 RX ORDER — MORPHINE SULFATE 2 MG/ML
2 INJECTION, SOLUTION INTRAMUSCULAR; INTRAVENOUS EVERY 2 HOUR PRN
Status: DISCONTINUED | OUTPATIENT
Start: 2021-04-29 | End: 2021-05-04

## 2021-04-29 RX ORDER — OXYBUTYNIN CHLORIDE 10 MG/1
10 TABLET, EXTENDED RELEASE ORAL NIGHTLY
Status: DISCONTINUED | OUTPATIENT
Start: 2021-04-29 | End: 2021-05-04

## 2021-04-29 RX ORDER — PYRIDOXINE HCL (VITAMIN B6) 100 MG
1 TABLET ORAL DAILY
COMMUNITY

## 2021-04-29 RX ORDER — PRAVASTATIN SODIUM 20 MG
20 TABLET ORAL NIGHTLY
Status: DISCONTINUED | OUTPATIENT
Start: 2021-04-29 | End: 2021-05-04

## 2021-04-29 RX ORDER — AMLODIPINE BESYLATE 10 MG/1
10 TABLET ORAL DAILY
Status: DISCONTINUED | OUTPATIENT
Start: 2021-04-30 | End: 2021-05-04

## 2021-04-29 RX ORDER — ACETAMINOPHEN 325 MG/1
650 TABLET ORAL EVERY 6 HOURS PRN
Status: DISCONTINUED | OUTPATIENT
Start: 2021-04-29 | End: 2021-05-04

## 2021-04-29 RX ORDER — HYDROCODONE BITARTRATE AND ACETAMINOPHEN 5; 325 MG/1; MG/1
1 TABLET ORAL EVERY 4 HOURS PRN
Status: DISCONTINUED | OUTPATIENT
Start: 2021-04-29 | End: 2021-05-04

## 2021-04-29 RX ORDER — METOCLOPRAMIDE HYDROCHLORIDE 5 MG/ML
5 INJECTION INTRAMUSCULAR; INTRAVENOUS EVERY 8 HOURS PRN
Status: DISCONTINUED | OUTPATIENT
Start: 2021-04-29 | End: 2021-05-04

## 2021-04-29 NOTE — ED PROVIDER NOTES
Patient Seen in: [facility] AND [facility] Emergency Department      History   Patient presents with:  Cellulitis    Stated Complaint: states she needs a toe MRI    HPI/Subjective:   HPI    79year old female with multiple medical issues including skin cancer, C Smoking status: Never Smoker      Smokeless tobacco: Never Used    Vaping Use      Vaping Use: Never assessed    Alcohol use:  Yes      Alcohol/week: 1.7 standard drinks      Types: 2 Glasses of wine per week      Comment: wine - 1 glass weekly    Drug use: Neurological:      Mental Status: She is alert and oriented to person, place, and time. Sensory: No sensory deficit. Psychiatric:         Behavior: Behavior normal. Behavior is cooperative.            ED Course     Labs Reviewed   BASIC METABOLIC PAN D/w Dr Edison Quach - will admit  D/w Dr Marc Aguirre - [facility]    Admission disposition: 4/29/2021  2:25 PM           Disposition and Plan     Clinical Impression:  Septic arthritis of IP joint of toe, right ([facility] [place] [address]  (primary encounter diagnos

## 2021-04-29 NOTE — PLAN OF CARE
Patient received from ED in stable condition. Patient oriented to room and call light. Patient instructed to call if needing to get up. Bed maintained in lowest position, non-skid socks on. Call light within reach.  Will continue to monitor  Problem: Micky pre-medicate as appropriate  Outcome: Progressing     Problem: RISK FOR INFECTION - ADULT  Goal: Absence of fever/infection during anticipated neutropenic period  Description: INTERVENTIONS  - Monitor WBC  - Administer growth factors as ordered  - [place]

## 2021-04-29 NOTE — PROGRESS NOTES
[facility] Dosing Service    Initial Pharmacokinetic Consult for Vancomycin Dosing     Brea Reyes is a 79year old patient who is being treated for cellulitis.   Pharmacy has been asked to dose Vancomycin by Dr. Blanca Gant    Allergies:  Erythromycin Bas

## 2021-04-29 NOTE — RESPIRATORY THERAPY NOTE
Pt uses CPAP at home. Pt uses setting of 10.5 with nasal prongs. Pt agreed to use CPAP during this hospital stay.

## 2021-04-29 NOTE — ED QUICK NOTES
Orders for admission, patient is aware of plan and ready to go upstairs.  Any questions, please call ED MUNA don at extension [phone]  Type of COVID test sent: rapid  COVID Suspicion level: low negative    Titratable drug(s) infusing:  Rate:    LOC at time

## 2021-04-29 NOTE — ED INITIAL ASSESSMENT (HPI)
Patient states she was sent by ID for an MRI of her infected right 3rd toe. Concern for osteomyelitis. Outpatient MRI could not be done until next month. Denies fever/chills.

## 2021-04-30 ENCOUNTER — ANESTHESIA EVENT (OUTPATIENT)
Dept: SURGERY | Facility: HOSPITAL | Age: 81
DRG: 516 | End: 2021-04-30
Payer: MEDICARE

## 2021-04-30 ENCOUNTER — ANESTHESIA (OUTPATIENT)
Dept: SURGERY | Facility: HOSPITAL | Age: 81
DRG: 516 | End: 2021-04-30
Payer: MEDICARE

## 2021-04-30 ENCOUNTER — APPOINTMENT (OUTPATIENT)
Dept: GENERAL RADIOLOGY | Facility: HOSPITAL | Age: 81
DRG: 516 | End: 2021-04-30
Attending: PODIATRIST
Payer: MEDICARE

## 2021-04-30 PROCEDURE — 73630 X-RAY EXAM OF FOOT: CPT | Performed by: PODIATRIST

## 2021-04-30 PROCEDURE — 99233 SBSQ HOSP IP/OBS HIGH 50: CPT | Performed by: HOSPITALIST

## 2021-04-30 PROCEDURE — 0QBQ0ZZ EXCISION OF RIGHT TOE PHALANX, OPEN APPROACH: ICD-10-PCS | Performed by: PODIATRIST

## 2021-04-30 RX ORDER — CEFTRIAXONE 1 G/1
INJECTION, POWDER, FOR SOLUTION INTRAMUSCULAR; INTRAVENOUS AS NEEDED
Status: DISCONTINUED | OUTPATIENT
Start: 2021-04-30 | End: 2021-04-30 | Stop reason: SURG

## 2021-04-30 RX ORDER — POLYMYXIN B SULFATE AND TRIMETHOPRIM 1; 10000 MG/ML; [USP'U]/ML
2 SOLUTION OPHTHALMIC 4 TIMES DAILY
Status: DISCONTINUED | OUTPATIENT
Start: 2021-04-30 | End: 2021-04-30

## 2021-04-30 RX ORDER — HYDROMORPHONE HYDROCHLORIDE 1 MG/ML
0.2 INJECTION, SOLUTION INTRAMUSCULAR; INTRAVENOUS; SUBCUTANEOUS EVERY 5 MIN PRN
Status: DISCONTINUED | OUTPATIENT
Start: 2021-04-30 | End: 2021-04-30 | Stop reason: HOSPADM

## 2021-04-30 RX ORDER — HYDROMORPHONE HYDROCHLORIDE 1 MG/ML
0.6 INJECTION, SOLUTION INTRAMUSCULAR; INTRAVENOUS; SUBCUTANEOUS EVERY 5 MIN PRN
Status: DISCONTINUED | OUTPATIENT
Start: 2021-04-30 | End: 2021-04-30 | Stop reason: HOSPADM

## 2021-04-30 RX ORDER — BUPIVACAINE HYDROCHLORIDE 5 MG/ML
INJECTION, SOLUTION EPIDURAL; INTRACAUDAL AS NEEDED
Status: DISCONTINUED | OUTPATIENT
Start: 2021-04-30 | End: 2021-04-30 | Stop reason: HOSPADM

## 2021-04-30 RX ORDER — MORPHINE SULFATE 4 MG/ML
2 INJECTION, SOLUTION INTRAMUSCULAR; INTRAVENOUS EVERY 10 MIN PRN
Status: DISCONTINUED | OUTPATIENT
Start: 2021-04-30 | End: 2021-04-30 | Stop reason: HOSPADM

## 2021-04-30 RX ORDER — HALOPERIDOL 5 MG/ML
0.25 INJECTION INTRAMUSCULAR ONCE AS NEEDED
Status: DISCONTINUED | OUTPATIENT
Start: 2021-04-30 | End: 2021-04-30 | Stop reason: HOSPADM

## 2021-04-30 RX ORDER — MORPHINE SULFATE 4 MG/ML
4 INJECTION, SOLUTION INTRAMUSCULAR; INTRAVENOUS EVERY 10 MIN PRN
Status: DISCONTINUED | OUTPATIENT
Start: 2021-04-30 | End: 2021-04-30 | Stop reason: HOSPADM

## 2021-04-30 RX ORDER — LIDOCAINE HYDROCHLORIDE 10 MG/ML
INJECTION, SOLUTION EPIDURAL; INFILTRATION; INTRACAUDAL; PERINEURAL AS NEEDED
Status: DISCONTINUED | OUTPATIENT
Start: 2021-04-30 | End: 2021-04-30 | Stop reason: HOSPADM

## 2021-04-30 RX ORDER — NALOXONE HYDROCHLORIDE 0.4 MG/ML
80 INJECTION, SOLUTION INTRAMUSCULAR; INTRAVENOUS; SUBCUTANEOUS AS NEEDED
Status: DISCONTINUED | OUTPATIENT
Start: 2021-04-30 | End: 2021-04-30 | Stop reason: HOSPADM

## 2021-04-30 RX ORDER — SODIUM CHLORIDE, SODIUM LACTATE, POTASSIUM CHLORIDE, CALCIUM CHLORIDE 600; 310; 30; 20 MG/100ML; MG/100ML; MG/100ML; MG/100ML
INJECTION, SOLUTION INTRAVENOUS CONTINUOUS
Status: DISCONTINUED | OUTPATIENT
Start: 2021-04-30 | End: 2021-04-30 | Stop reason: HOSPADM

## 2021-04-30 RX ORDER — POLYMYXIN B SULFATE AND TRIMETHOPRIM 1; 10000 MG/ML; [USP'U]/ML
1 SOLUTION OPHTHALMIC 4 TIMES DAILY
Status: DISCONTINUED | OUTPATIENT
Start: 2021-04-30 | End: 2021-05-04

## 2021-04-30 RX ORDER — HYDROCODONE BITARTRATE AND ACETAMINOPHEN 5; 325 MG/1; MG/1
2 TABLET ORAL AS NEEDED
Status: DISCONTINUED | OUTPATIENT
Start: 2021-04-30 | End: 2021-04-30 | Stop reason: HOSPADM

## 2021-04-30 RX ORDER — LIDOCAINE HYDROCHLORIDE 10 MG/ML
INJECTION, SOLUTION EPIDURAL; INFILTRATION; INTRACAUDAL; PERINEURAL AS NEEDED
Status: DISCONTINUED | OUTPATIENT
Start: 2021-04-30 | End: 2021-04-30 | Stop reason: SURG

## 2021-04-30 RX ORDER — ONDANSETRON 2 MG/ML
4 INJECTION INTRAMUSCULAR; INTRAVENOUS ONCE AS NEEDED
Status: DISCONTINUED | OUTPATIENT
Start: 2021-04-30 | End: 2021-04-30 | Stop reason: HOSPADM

## 2021-04-30 RX ORDER — HYDROCODONE BITARTRATE AND ACETAMINOPHEN 5; 325 MG/1; MG/1
1 TABLET ORAL AS NEEDED
Status: DISCONTINUED | OUTPATIENT
Start: 2021-04-30 | End: 2021-04-30 | Stop reason: HOSPADM

## 2021-04-30 RX ORDER — HYDROMORPHONE HYDROCHLORIDE 1 MG/ML
0.4 INJECTION, SOLUTION INTRAMUSCULAR; INTRAVENOUS; SUBCUTANEOUS EVERY 5 MIN PRN
Status: DISCONTINUED | OUTPATIENT
Start: 2021-04-30 | End: 2021-04-30 | Stop reason: HOSPADM

## 2021-04-30 RX ORDER — METOPROLOL TARTRATE 5 MG/5ML
2.5 INJECTION INTRAVENOUS ONCE
Status: DISCONTINUED | OUTPATIENT
Start: 2021-04-30 | End: 2021-04-30 | Stop reason: HOSPADM

## 2021-04-30 RX ORDER — PROCHLORPERAZINE EDISYLATE 5 MG/ML
5 INJECTION INTRAMUSCULAR; INTRAVENOUS ONCE AS NEEDED
Status: DISCONTINUED | OUTPATIENT
Start: 2021-04-30 | End: 2021-04-30 | Stop reason: HOSPADM

## 2021-04-30 RX ORDER — MORPHINE SULFATE 10 MG/ML
6 INJECTION, SOLUTION INTRAMUSCULAR; INTRAVENOUS EVERY 10 MIN PRN
Status: DISCONTINUED | OUTPATIENT
Start: 2021-04-30 | End: 2021-04-30 | Stop reason: HOSPADM

## 2021-04-30 RX ORDER — SODIUM CHLORIDE 9 MG/ML
INJECTION, SOLUTION INTRAVENOUS CONTINUOUS PRN
Status: DISCONTINUED | OUTPATIENT
Start: 2021-04-30 | End: 2021-04-30 | Stop reason: SURG

## 2021-04-30 RX ADMIN — SODIUM CHLORIDE: 9 INJECTION, SOLUTION INTRAVENOUS at 13:11:00

## 2021-04-30 RX ADMIN — CEFTRIAXONE 2 G: 1 INJECTION, POWDER, FOR SOLUTION INTRAMUSCULAR; INTRAVENOUS at 13:25:00

## 2021-04-30 RX ADMIN — LIDOCAINE HYDROCHLORIDE 50 MG: 10 INJECTION, SOLUTION EPIDURAL; INFILTRATION; INTRACAUDAL; PERINEURAL at 13:15:00

## 2021-04-30 RX ADMIN — SODIUM CHLORIDE: 9 INJECTION, SOLUTION INTRAVENOUS at 13:52:00

## 2021-04-30 NOTE — PAYOR COMM NOTE
Appropriate for inpatient status per guidelines for progressively worsening toe infection - septic arthritis vs OM.      --------------  ADMISSION REVIEW     Payor: [address] [place] [address] #:  [id number]  Authorization Number: [id number] • KNEE REPLACEMENT SURGERY      total - right and left   • BLAKE LOCALIZATION WIRE 1 SITE RIGHT (CPT=19281)  1994   • OTHER SURGICAL HISTORY      pins right foot   • TEAR DUCT SYSTEM SURG UNLISTED  1968   • TONSILLECTOMY         Review of Systems    Positive alert and oriented to person, place, and time. Sensory: No sensory deficit. Psychiatric:         Behavior: Behavior normal. Behavior is cooperative.            ED Course     Labs Reviewed   BASIC METABOLIC PANEL (8) - Abnormal; Notable for the follow 10.4, no left shift. Chemistry showed GFR 43, which is around her baseline. Started empirically on IV antibiotics. She will be admitted to the hospital for further management.     REVIEW OF SYSTEMS:  The patient reports that she always has a callus at th pain control. Further recommendations to follow. 4/29 PODIATRY    Impression:     Septic arthritis of IP joint of toe, right (HCC)     Recommendations:  Examined feet b/l  Reviewed radiographs and MRI.   Results of Xray are suggestive of osteomyel

## 2021-04-30 NOTE — CONSULTS
[facility]    Report of Consultation    [facility] Patient Status:  Inpatient    1940 MRN [id number]   Location [facility] 5SW/SE Attending Saige Ayala MD   Hosp Day # 0 PCP John Dolan MD     Date of Admission: • TEAR DUCT SYSTEM SURG UNLISTED  1968   • TONSILLECTOMY         Family History  Family History   Problem Relation Age of Onset   • Breast Cancer Mother 59   • Cancer Mother         ovarian (cause of death)   • Ovarian Cancer Mother 80   • Diabetes Fathe Daily  [START ON 4/30/2021] hydrochlorothiazide (HYDRODIURIL) tab 12.5 mg, 12.5 mg, Oral, Daily  [START ON 4/30/2021] Hydroxychloroquine Sulfate (PLAQUENIL) tab 400 mg, 400 mg, Oral, Daily  [START ON 4/30/2021] Levothyroxine Sodium tab 137 mcg, 137 mcg, Or Besylate 10 MG Oral Tab, Take 10 mg by mouth daily. furosemide 40 MG Oral Tab, Take 40 mg by mouth daily.   NIACIN ER, ANTIHYPERLIPIDEMIC, 750 MG Oral Tab CR, TAKE 1 TABLET BY MOUTH IN  THE EVENING  SOLIFENACIN SUCCINATE 10 MG Oral Tab, TAKE ONE TABLET BY present    MUSCULOSKELETAL: laterally deviated hallux b/l with flexible dorsally contracted digits 2-5 b/l, muscle strength 5/5 to all lower extremity muscle groups, calf warm and supple b/l    INTEGUMENT: left foot with no open lesions, no macerations. Impression:     Septic arthritis of IP joint of toe, right (HCC)    Recommendations:  Examined feet b/l  Reviewed radiographs and MRI.   Results of Xray are suggestive of osteomyelitis while the MRI read indicates septic arthritis or arthropathy  Disc

## 2021-04-30 NOTE — CONSULTS
[facility] ID CONSULT NOTE    Toy Patter Patient Status:  Inpatient    1940 MRN [id number]   Location [address] [facility] Attending Eliseo Stanley MD   Hosp Day # 1 PCP Ginette Estrada MD arthrodesis ([address].) 5/25/2016   • Pulmonary HTN ([address]    • Rheumatoid arthritis ([address]    • Sx.7/21/15, [id number], R Triple Arthrodesis/Poss Medializing Calc Arthrodesis/Lapidus/MONA/FDL to Post Tib Transfer, w/, Global Exp.10/19/15 7/23/2015   • Un Tartrate (LOPRESSOR) injection 2.5 mg, 2.5 mg, Intravenous, Once  •  fentaNYL citrate (SUBLIMAZE) 0.05 MG/ML injection 25 mcg, 25 mcg, Intravenous, Q5 Min PRN  •  fentaNYL citrate (SUBLIMAZE) 0.05 MG/ML injection 50 mcg, 50 mcg, Intravenous, Q5 Min PRN  • mg, Intravenous, Q2H PRN **OR** [MAR Hold] morphINE sulfate (PF) 2 MG/ML injection 2 mg, 2 mg, Intravenous, Q2H PRN **OR** [MAR Hold] morphINE sulfate (PF) 4 MG/ML injection 4 mg, 4 mg, Intravenous, Q2H PRN  •  [MAR Hold] Albuterol Sulfate  (90 Base signs: Blood pressure 121/77, pulse 51, temperature 98 °F (36.7 °C), resp. rate 15, height 5' 4\" (1.626 m), weight 228 lb 3.2 oz (103.5 kg), SpO2 100 %, not currently breastfeeding. General: Alert, oriented, NAD  HEENT: Moist mucous membranes.  EOMI  Ne third toe as well as some bleeding starting about two days ago without any purulence. Has been able to bear weight. On arrival, afebrile, wbc 3.8, MRI R foot with erosive changes third toe, plans for I&D with podiatry.  Started on vancomycin and ceftriaxone

## 2021-04-30 NOTE — OPERATIVE REPORT
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

## 2021-04-30 NOTE — H&P
[facility]    PATIENT'S NAME: Ashlee Ingram   ATTENDING PHYSICIAN: Winston Kenney MD   PATIENT ACCOUNT#:   [id number]    LOCATION:  [address] [place] [address]  MEDICAL RECORD #:   [id number]       YOB: 1940  ADMISSION DATE:       04/29/2021 third toe. For the last several weeks, she has been noticing increased swelling and discomfort at the mid section area with sporadic drainage of serosanguineous fluid. No fever or chills. No significant pain. Other 12-point review of systems negative.

## 2021-04-30 NOTE — ANESTHESIA POSTPROCEDURE EVALUATION
Patient:  Shashi Mccullough    Procedure Summary     Date: 04/30/21 Room / Location: [facility] MAIN OR 05 / [address] [place] [address] MAIN OR    Anesthesia Start: 3779 Anesthesia Stop: 7110    Procedure: DEBRIDEMENT TO BONE, 3RD DIGIT RIGHT FOOT (Right Foot) Diagnosis: (osteomyelitis 3rd

## 2021-04-30 NOTE — PLAN OF CARE
No acute changes. NPO since midnight per orders. Up with walker. Safety precautions in reach. CPAP at night.   Problem: Patient Centered Care  Goal: Patient preferences are identified and integrated in the patient's plan of care  Description: Interventions: anticipated neutropenic period  Description: INTERVENTIONS  - Monitor WBC  - Administer growth factors as ordered  - Implement neutropenic guidelines  Outcome: Progressing     Problem: SAFETY ADULT - FALL  Goal: Free from fall injury  Description: Elisha Henley

## 2021-04-30 NOTE — OPERATIVE REPORT
[address] UNIT  Operative Note     Toy Patter Location: OR   CSN [id number] MRN [id number]   Admission Date 4/29/2021 Operation Date 4/30/2021   Attending Physician Eliseo Stanley MD Operating Physician Jermaine James, was taken of the remaining bone and joint and sent for C&S. The joint was irrigated with copios amounts of sterile saline infused with clindamycin. The capsule was copated with 3-0 vicryl and the incision was coapted with 4-0 nylon.   A total of 10mL of 0

## 2021-04-30 NOTE — PROGRESS NOTES
[facility]    Report of Consultation    [facility] Patient Status:  Inpatient    1940 MRN [id number]   Location [facility] 5SW/SE Attending Saige Ayala MD   Hosp Day # 1 PCP John Dolan MD     Date of Admission: Diabetes Father    • Cancer Father         stomach   • Cancer Son         leukemia   • Breast Cancer Paternal Aunt         post menopause   • Cancer Self         melanoma   • Breast Cancer Paternal Cousin Female         age after 48       Social History  P mg, 20 mg, Oral, Daily Beta Blocker  Pantoprazole Sodium (PROTONIX) EC tab 40 mg, 40 mg, Oral, QAM AC  Pravastatin Sodium (PRAVACHOL) tab 20 mg, 20 mg, Oral, Nightly  Oxybutynin Chloride ER (DITROPAN-XL) 24 hr tab 10 mg, 10 mg, Oral, Nightly      Calcium 5 needed for Wheezing. Probiotic Product (PROBIOTIC-10) Oral Cap, Take 1 tablet by mouth daily. Omega-3 Fatty Acids (FISH OIL OR), Take 1 tablet by mouth daily.   acetaminophen (TYLENOL EXTRA STRENGTH) 500 MG Oral Tab, Take 500 mg by mouth every 6 (six) nathalia proximal to the level of the 3rd MPJ, no purulent drainage noted only serous drainage at this time, no odor noted. Erythema does not ascend past the level of the MPJ. Pt prepared today for surgical intervention.     Results:     Laboratory Data:  Lab Result participate in the care of your patient.     CAREY ROMO  4/30/2021

## 2021-04-30 NOTE — PLAN OF CARE
Problem: Patient Centered Care  Goal: Patient preferences are identified and integrated in the patient's plan of care  Description: Interventions:  - What would you like us to know as we care for you?  From home  - Provide timely, complete, and accurate i ordered  - Implement neutropenic guidelines  Outcome: Progressing     Problem: SAFETY ADULT - FALL  Goal: Free from fall injury  Description: INTERVENTIONS:  - Assess pt frequently for physical needs  - Identify cognitive and physical deficits and behavior

## 2021-04-30 NOTE — ANESTHESIA PREPROCEDURE EVALUATION
Anesthesia PreOp Note    HPI:     Shashi Mccullough is a 79year old female who presents for preoperative consultation requested by: Nilson Estes DPM    Date of Surgery: 4/29/2021 - 4/30/2021    Procedure(s):  DEBRIDEMENT TO BONE, 3RD DIGIT RIGHT FOOT obesity with BMI of 40.0-44.9, adult [facility])         Date Noted: 11/19/2015      Rheumatoid arthritis with negative rheumatoid factor ([facility] [address]         Date Noted: 11/13/2015      Hypothyroidism         Date Noted: 08/21/2014      JUAN on CPAP         Date Noted: tablet, Rfl: 1  folic acid 1 MG Oral Tab, Take 1 tablet (1 mg total) by mouth daily. , Disp: 90 tablet, Rfl: 3, 4/29/2021 at Unknown time  Hydroxychloroquine Sulfate 200 MG Oral Tab, TAKE 2 TABLETS BY MOUTH  DAILY, Disp: 180 tablet, Rfl: 3, 4/29/2021 at [facility] time  Albuterol Sulfate  (90 Base) MCG/ACT Inhalation Aero Soln, Inhale 1 puff into the lungs every 6 (six) hours as needed for Wheezing., Disp: 1 Inhaler, Rfl: 5  Probiotic Product (PROBIOTIC-10) Oral Cap, Take 1 tablet by mouth daily. , Disp: , Rfl Deidre Mueller MD   Or  morphINE sulfate (PF) 2 MG/ML injection 2 mg, 2 mg, Intravenous, Q2H PRN, Abdi Zhu MD   Or  morphINE sulfate (PF) 4 MG/ML injection 4 mg, 4 mg, Intravenous, Q2H PRN, Abdi Zhu MD  Albuterol Sulfate  (90 Base) MCG/ACT i       Spouse name: Not on file      Number of children: Not on file      Years of education: Not on file      Highest education level: Not on file    Occupational History      Not on file    Tobacco Use      Smoking status: Never Smoker      Smokele Abused:     Available pre-op labs reviewed.   Lab Results   Component Value Date    WBC 4.0 04/30/2021    RBC 2.82 (L) 04/30/2021    HGB 9.6 (L) 04/30/2021    HCT 28.9 (L) 04/30/2021    .5 (H) 04/30/2021    MCH 34.0 04/30/2021    MCHC 33.2 04/30/2021 Consent Plan and Risks Discussed With:  Patient      I have informed Hawkins Ronald and/or legal guardian or family member of the nature of the anesthetic plan, benefits, risks including possible dental damage if relevant, major complications, and any alter

## 2021-04-30 NOTE — PROGRESS NOTES
Pt has had problems with a middle toe for some time, anticipating a debridement, and hoping that it won't end in a need for amputation of the toe. Pt indicates that she misses being able to go to [place], and is currently only watching it on TV.   Her two chi

## 2021-04-30 NOTE — PROGRESS NOTES
[facility]    Progress Note    Rutfabi Massing Patient Status:  Inpatient    1940 MRN [id number]   Location [facility] 5SW/SE Attending Teena Spann, [address] [facility] Day # 1 PCP Long Mac MD       Subjective:     Pain contro (CST): Garrett Newberry MD on 4/29/2021 at 3:00 PM     Finalized by (CST): Garrett Newberry MD on 4/29/2021 at 3:24 PM                Assessment and Plan:     Right foot third toe middle phalangeal osteomyelitis changes on x-ray.    Septic arthritis vs cy

## 2021-05-01 PROCEDURE — 99233 SBSQ HOSP IP/OBS HIGH 50: CPT | Performed by: HOSPITALIST

## 2021-05-01 RX ORDER — VANCOMYCIN HYDROCHLORIDE
1250 EVERY 24 HOURS
Status: DISCONTINUED | OUTPATIENT
Start: 2021-05-01 | End: 2021-05-04

## 2021-05-01 RX ORDER — VANCOMYCIN HYDROCHLORIDE
1250 EVERY 24 HOURS
Status: DISCONTINUED | OUTPATIENT
Start: 2021-05-02 | End: 2021-05-01

## 2021-05-01 NOTE — PLAN OF CARE
Patient had surgical debridement yesterday with partial weight bearing on right foot with a walker. Patient given immodium for diarrhea. Patient on vanco and rocephin. Vanco dose increased based on trough taken today.  Patient on general diet and very pleas patient reports new pain  - Anticipate increased pain with activity and pre-medicate as appropriate  Outcome: Progressing     Problem: RISK FOR INFECTION - ADULT  Goal: Absence of fever/infection during anticipated neutropenic period  Description: Ann Salazar Maintains optimal cardiac output and hemodynamic stability  Description: INTERVENTIONS:  - Monitor vital signs, rhythm, and trends  - Monitor for bleeding, hypotension and signs of decreased cardiac output  - Evaluate effectiveness of vasoactive medication

## 2021-05-01 NOTE — PROGRESS NOTES
[facility] [facility] - [facility] ID PROGRESS NOTE    Erroll [place] Patient Status:  Inpatient    1940 MRN [id number]   Location [facility] 5SW/SE Attending Dav Akhtar, [address] [facility] Day # 2 PCP John Dolan MD     Subjective: obstructive pulmonary disease (HCC)     Diastolic dysfunction     Bilateral sciatica     Poor balance     COVID-19 virus infection     CKD (chronic kidney disease) stage 4, GFR 15-29 ml/min (HCC)     Progressive pulmonary hypertension (HCC)     Septic arth patient, RN.     [facility] PA-C  [facility]  [phone]  5/1/2021

## 2021-05-01 NOTE — PROGRESS NOTES
[facility] dosing service    Follow-up Pharmacokinetic Consult for Vancomycin Dosing     Esthela Landaverde is a 79year old patient who is being treated for  R third toe ulceration likely gout r/o septic arthritis .  Patient is on day 3 of Vancomycin and is c Trough of 8.3 ug/mL, pharmacokinetics, adjusted body weight of 74.2 kg and renal function)    2. Will re-check Vancomycin trough levels in 5-7 days. Goal trough level 15-20 ug/mL.     3.  Pharmacy will need Scr daily while on Vancomycin to assess renal fu

## 2021-05-01 NOTE — PLAN OF CARE
Patient is alert and oriented. Denies pain. Voids freely. Had BM, stool collect and sent to lab for Cdiff. Ambulates with one assist and walker, tolerates well. Tolerates oral intake. Safety precautions in place. Cpap worn at night.      Problem: Patient Ce pre-medicate as appropriate  Outcome: Progressing     Problem: RISK FOR INFECTION - ADULT  Goal: Absence of fever/infection during anticipated neutropenic period  Description: INTERVENTIONS  - Monitor WBC  - Administer growth factors as ordered  - [place] INTERVENTIONS:  - Monitor vital signs, rhythm, and trends  - Monitor for bleeding, hypotension and signs of decreased cardiac output  - Evaluate effectiveness of vasoactive medications to optimize hemodynamic stability  - Monitor arterial and/or venous pun

## 2021-05-01 NOTE — PROGRESS NOTES
[facility]    Report of Consultation    Justus Tapia Patient Status:  Inpatient    1940 MRN [id number]   Location [facility] 5SW/SE Attending Alyssa Piña MD   Hosp Day # 2 PCP Janessa Dallas MD     Date of Admission: stomach   • Cancer Son         leukemia   • Breast Cancer Paternal Aunt         post menopause   • Cancer Self         melanoma   • Breast Cancer Paternal Cousin Female         age after 48       Social History  Patient Guardians:  Not on file    Other Top tab 20 mg, 20 mg, Oral, Daily Beta Blocker  Pantoprazole Sodium (PROTONIX) EC tab 40 mg, 40 mg, Oral, QAM AC  Pravastatin Sodium (PRAVACHOL) tab 20 mg, 20 mg, Oral, Nightly  Oxybutynin Chloride ER (DITROPAN-XL) 24 hr tab 10 mg, 10 mg, Oral, Nightly      Ca hours as needed for Wheezing. Probiotic Product (PROBIOTIC-10) Oral Cap, Take 1 tablet by mouth daily. Omega-3 Fatty Acids (FISH OIL OR), Take 1 tablet by mouth daily.   acetaminophen (TYLENOL EXTRA STRENGTH) 500 MG Oral Tab, Take 500 mg by mouth every 6 unchanged. Extensive postoperative changes and sequela of neuropathic arthropathy within the midfoot, unchanged.      Dictated by (CST): Dayne Aschoff, MD on 4/30/2021 at 8:17 PM     Finalized by (CST): Dayne Aschoff, MD on 4/30/2021 at 8:20 PM          MRI FO

## 2021-05-01 NOTE — PROGRESS NOTES
[facility] - [facility]    Progress Note    Ross Cardenas Patient Status:  Inpatient    1940 MRN [id number]   Location [facility] 5SW/SE Attending Jena Garner, [address] [facility] Day # 2 PCP Angelica Salazar MD       Subjective:      Foot pain c changes on x-ray. Septic arthritis vs cyrstal arthropathy of Right 3rd toe IP joint.   POD #1 s/p DEBRIDEMENT TO BONE, 3RD DIGIT RIGHT FOOT  - ID and podiatry on consult  - f/u cultures  - f/u path from surgery  - cont IV vancomycin and ceftriaxone  - sedrick

## 2021-05-02 PROCEDURE — 99233 SBSQ HOSP IP/OBS HIGH 50: CPT | Performed by: HOSPITALIST

## 2021-05-02 NOTE — PLAN OF CARE
Patient complaint of mild pain in right leg, given PRN tylenol. Order for remote tele to be discontinued. Purewick in place at night with good output. Dressing in place on right foot post surgical debridement.  Vital signs stable, safety precautions in roberto carlos Anticipate increased pain with activity and pre-medicate as appropriate  Outcome: Progressing     Problem: RISK FOR INFECTION - ADULT  Goal: Absence of fever/infection during anticipated neutropenic period  Description: INTERVENTIONS  - Monitor WBC  - Admi and hemodynamic stability  Description: INTERVENTIONS:  - Monitor vital signs, rhythm, and trends  - Monitor for bleeding, hypotension and signs of decreased cardiac output  - Evaluate effectiveness of vasoactive medications to optimize hemodynamic stabili

## 2021-05-02 NOTE — PLAN OF CARE
Patient is feeling good today, ambulating by self with front wheel walker. Podiatry came and changed dressing and cleared patient to discharge and follow up on Thursday. Awaiting ID recommendations and hopefully home tomorrow.  Patient received vanco and ro relaxation techniques  - Monitor for opioid side effects  - Notify MD/LIP if interventions unsuccessful or patient reports new pain  - Anticipate increased pain with activity and pre-medicate as appropriate  Outcome: Progressing     Problem: RISK FOR INFEC cognitive ability or social support system  Outcome: Progressing     Problem: CARDIOVASCULAR - ADULT  Goal: Maintains optimal cardiac output and hemodynamic stability  Description: INTERVENTIONS:  - Monitor vital signs, rhythm, and trends  - Monitor for bl

## 2021-05-02 NOTE — PROGRESS NOTES
[facility]    Progress Note    Karishma Owen Patient Status:  Inpatient    1940 MRN [id number]   Location [facility] 5SW/SE Attending Janet Rosado, [address] [facility] Day # 3 PCP Carmelo Pruett MD       Subjective:     Pain contro phalangeal osteomyelitis changes on x-ray.   Septic arthritis vs cyrstal arthropathy of Right 3rd toe IP joint.   POD #2 s/p DEBRIDEMENT TO BONE, 3RD DIGIT RIGHT FOOT  - ID and podiatry on consult  - f/u cultures  - f/u path from surgery  - cont IV vancomyc

## 2021-05-02 NOTE — PROGRESS NOTES
[facility]    Report of Consultation    Rosa Harding Patient Status:  Inpatient    1940 MRN [id number]   Location [facility] 5SW/SE Attending Gertrude Caruso MD   [facility] Day # 3 PCP Ariana Gramajo MD     Date of Admission: stomach   • Cancer Son         leukemia   • Breast Cancer Paternal Aunt         post menopause   • Cancer Self         melanoma   • Breast Cancer Paternal Cousin Female         age after 48       Social History  Patient Guardians:  Not on file    Other Top tab 20 mg, 20 mg, Oral, Daily Beta Blocker  Pantoprazole Sodium (PROTONIX) EC tab 40 mg, 40 mg, Oral, QAM AC  Pravastatin Sodium (PRAVACHOL) tab 20 mg, 20 mg, Oral, Nightly  Oxybutynin Chloride ER (DITROPAN-XL) 24 hr tab 10 mg, 10 mg, Oral, Nightly      Ca hours as needed for Wheezing. Probiotic Product (PROBIOTIC-10) Oral Cap, Take 1 tablet by mouth daily. Omega-3 Fatty Acids (FISH OIL OR), Take 1 tablet by mouth daily.   acetaminophen (TYLENOL EXTRA STRENGTH) 500 MG Oral Tab, Take 500 mg by mouth every 6 CREATSERUM 0.96 05/01/2021    BUN 17 05/01/2021     05/01/2021    K 4.1 05/01/2021     05/01/2021    CO2 28.0 05/01/2021    GLU 95 05/01/2021    CA 9.5 05/01/2021    ALB 4.1 03/07/2021    ALKPHO 59 09/06/2020    TP 7.3 09/06/2020    AST 17 03/0

## 2021-05-03 PROCEDURE — 99233 SBSQ HOSP IP/OBS HIGH 50: CPT | Performed by: HOSPITALIST

## 2021-05-03 RX ORDER — ETANERCEPT 50 MG/ML
SOLUTION SUBCUTANEOUS
Qty: 12 ML | Refills: 1 | Status: SHIPPED | OUTPATIENT
Start: 2021-05-03 | End: 2021-12-06

## 2021-05-03 NOTE — TELEPHONE ENCOUNTER
LOV:3/9/21    Last Refilled:#12ml, 1rf  11/21/20    Future Appointments   Date Time Provider Taqueria Wise   5/4/2021 10:40 AM [address] VACCINE RESOURCE [address]  EM [place]   5/4/2021  1:30 PM CF PULM PHASE 2 CF CP REHAB EM [facility]   5 PULM PHASE 2 [facility] CP REHAB EM [facility]   8/3/2021  1:30 PM [facility] PULM PHASE 2 [facility] CP REHAB EM [facility]   8/5/2021  1:30 PM [facility] PULM PHASE 2 [facility] CP REHAB EM [facility]   8/10/2021  1:30 PM [facility] PULM PHASE 2 [facility] CP REHAB EM [facility]   8/12/2021  1:30 PM [facility] PULM PHASE 2 [facility] CP REHAB

## 2021-05-03 NOTE — PLAN OF CARE
Podiatry came in and changed dressing today, right leg elevated. Vital signs stable. Patient up to bathroom, partial weight bearing. Outpatient appointment with podiatry planned for Thursday. Possible discharge tomorrow. Will continue to monitor.   Problem: and pre-medicate as appropriate  Outcome: Progressing     Problem: RISK FOR INFECTION - ADULT  Goal: Absence of fever/infection during anticipated neutropenic period  Description: INTERVENTIONS  - Monitor WBC  - Administer growth factors as ordered  - Impl INTERVENTIONS:  - Monitor vital signs, rhythm, and trends  - Monitor for bleeding, hypotension and signs of decreased cardiac output  - Evaluate effectiveness of vasoactive medications to optimize hemodynamic stability  - Monitor arterial and/or venous pun

## 2021-05-03 NOTE — PROGRESS NOTES
[place] [facility]    Geovanni Portal Patient Status:  Inpatient    1940 MRN [id number]   Location [facility] 5SW/SE Attending Coral Hooker, [address] [facility] Day # 4 PCP Emily Buckner MD     Subjective: Chronic obstructive pulmonary disease (HCC)     Diastolic dysfunction     Bilateral sciatica     Poor balance     COVID-19 virus infection     CKD (chronic kidney disease) stage 4, GFR 15-29 ml/min (HCC)     Progressive pulmonary hypertension ([facility] [address]     Sep -  Case d/w patient, RN.     Jefferson Elizabeth PA-C  [facility]  [phone]

## 2021-05-03 NOTE — PLAN OF CARE
Problem: Patient Centered Care  Goal: Patient preferences are identified and integrated in the patient's plan of care  Description: Interventions:  - What would you like us to know as we care for you?  From home  - Provide timely, complete, and accurate i ordered  - Implement neutropenic guidelines  Outcome: Progressing     Problem: SAFETY ADULT - FALL  Goal: Free from fall injury  Description: INTERVENTIONS:  - Assess pt frequently for physical needs  - Identify cognitive and physical deficits and behavior and/or venous puncture sites for bleeding and/or hematoma  - Assess quality of pulses, skin color and temperature  - Assess for signs of decreased coronary artery perfusion - ex.  Angina  - Evaluate fluid balance, assess for edema, trend weights  Outcome: P

## 2021-05-03 NOTE — CM/SW NOTE
SW initiated self referral for discharge planning. Patient was discussed during rounds and RN reports that patient may benefit from [facility] RN to assist with wound care/dressing changes. SW entered F2F/Orders and initiated tentative [facility] referral via Aidin.

## 2021-05-04 VITALS
WEIGHT: 228.19 LBS | OXYGEN SATURATION: 100 % | HEART RATE: 53 BPM | HEIGHT: 64 IN | BODY MASS INDEX: 38.96 KG/M2 | SYSTOLIC BLOOD PRESSURE: 120 MMHG | TEMPERATURE: 97 F | DIASTOLIC BLOOD PRESSURE: 66 MMHG | RESPIRATION RATE: 20 BRPM

## 2021-05-04 PROCEDURE — 99239 HOSP IP/OBS DSCHRG MGMT >30: CPT | Performed by: HOSPITALIST

## 2021-05-04 NOTE — PROGRESS NOTES
[facility]    This note is for 5/3/2021    Progress Note    Karishma Weaver Patient Status:  Inpatient    1940 MRN [id number]   Location [facility] 5SW/SE Attending Janet Rosado MD   Hosp Day # 5 PCP Brittany Bradshaw MD negative  - imodium prn     Hx of hypothyroidism - cont levothyroxine      dvt proph:    heparin       Code status:    Full       >35 minutes spent with >50% of time spent on counseling and coordination of care.     Adrianna Monae MD  5/4/2021

## 2021-05-04 NOTE — CM/SW NOTE
Per nsg rounds pt is set up for weekly Dabavancin at [facility] office, need appt time. CM called liaison Leslye who confirms pt is approved for IOI. Appt time is 1:45pm on 5/5/21 at [facility] location.  Info added to AVS.    PLAN  Home, weekly Dalbavancin at

## 2021-05-04 NOTE — PROGRESS NOTES
[place] [facility] - [facility] ID PROGRESS NOTE    Aidan Stern Patient Status:  Inpatient    1940 MRN [id number]   Location [facility] 5SW/SE Attending Deb Reyes, [address] [facility] Day # 5 PCP Patti Calderon MD     Subjective: disease ([facility] [address]     Diastolic dysfunction     Bilateral sciatica     Poor balance     COVID-19 virus infection     CKD (chronic kidney disease) stage 4, GFR 15-29 ml/min (HCC)     Progressive pulmonary hypertension (HCC)     Septic arthritis of IP joint of t Hold methotrexate and enbrel while on IV abx.  -  Follow fever curve, wbc. -  Reviewed labs, micro, imaging reports  -  Case d/w patient, RN.     Melyssa Briseno PA-C  [facility]  [phone]

## 2021-05-04 NOTE — PLAN OF CARE
Pt is aware of infusion appointment tomorrow. Problem: Patient Centered Care  Goal: Patient preferences are identified and integrated in the patient's plan of care  Description: Interventions:  - What would you like us to know as we care for you?  From Monitor WBC  - Administer growth factors as ordered  - Implement neutropenic guidelines  Outcome: Progressing     Problem: SAFETY ADULT - FALL  Goal: Free from fall injury  Description: INTERVENTIONS:  - Assess pt frequently for physical needs  - Identify hemodynamic stability  - Monitor arterial and/or venous puncture sites for bleeding and/or hematoma  - Assess quality of pulses, skin color and temperature  - Assess for signs of decreased coronary artery perfusion - ex.  Angina  - Evaluate fluid balance, a

## 2021-05-04 NOTE — PLAN OF CARE
Problem: Patient Centered Care  Goal: Patient preferences are identified and integrated in the patient's plan of care  Description: Interventions:  - What would you like us to know as we care for you?  From home  - Provide timely, complete, and accurate i unsuccessful or patient reports new pain  - Anticipate increased pain with activity and pre-medicate as appropriate  5/4/2021 1159 by Sarika Osborn RN  Outcome: Adequate for Discharge  5/4/2021 1157 by Sarika Osborn RN  Outcome: Good Gomes responsible for managing their own health  - Refer to Case Management Department for coordinating discharge planning if the patient needs post-hospital services based on physician/LIP order or complex needs related to functional status, cognitive ability o Ensure adequate protection for wounds/incisions during mobilization  - Obtain PT/OT consults as needed  - Advance activity as appropriate  - Communicate ordered activity level and limitations with patient/family  5/4/2021 1159 by MUNA Hdez

## 2021-05-04 NOTE — DISCHARGE SUMMARY
[place] [facility] - [facility]    Discharge Summary    Marcelo Schirmer Patient Status:  Inpatient    1940 MRN [id number]   Location [facility] 5SW/SE Attending Giovanni Santillan MD   [facility] Day # 5 PCP Thu Correa MD     Date of Admission:  97.3 °F (36.3 °C) (Oral)   Resp 20   Ht 5' 4\" (1.626 m)   Wt 228 lb 3.2 oz (103.5 kg)   SpO2 100%   BMI 39.17 kg/m²     Gen:   NAD.  A and O x 3  Body mass index is 39.17 kg/m².   CV:   RRR, no m/g/r  Pulm:   CTA bilat  Abd:   +bs, soft, NT, ND  LE:   No hypothyroidism - cont levothyroxine      dvt proph:    heparin       Code status:    Full       Consultations:   ID, podiatry    Discharge Condition:  Good    Discharge Medications:      Discharge Medications      CHANGE how you take these medications Tabs  Commonly known as: FOLVITE      Take 1 tablet (1 mg total) by mouth daily. Quantity: 90 tablet  Refills: 3     furosemide 40 MG Tabs  Commonly known as: LASIX      Take 40 mg by mouth daily.    Refills: 0     hydrochlorothiazide 12.5 MG Tabs  Common information:  [address]  [phone]             Amado Tiwari, CRISTOPHER On 5/11/2021.     Specialty: [employer] Dancer information:  [id number]

## 2021-05-05 ENCOUNTER — PATIENT OUTREACH (OUTPATIENT)
Dept: CASE MANAGEMENT | Age: 81
End: 2021-05-05

## 2021-05-05 DIAGNOSIS — R26.9 GAIT ABNORMALITY: ICD-10-CM

## 2021-05-05 DIAGNOSIS — I27.20 PROGRESSIVE PULMONARY HYPERTENSION (HCC): ICD-10-CM

## 2021-05-05 DIAGNOSIS — Z02.9 ENCOUNTERS FOR UNSPECIFIED ADMINISTRATIVE PURPOSE: ICD-10-CM

## 2021-05-05 DIAGNOSIS — M00.9: ICD-10-CM

## 2021-05-05 PROCEDURE — 1111F DSCHRG MED/CURRENT MED MERGE: CPT

## 2021-05-05 NOTE — PROGRESS NOTES
NCM attempted to call patient for TCM message received: Memory full, unable to leave a VM. NCM will call later.

## 2021-05-05 NOTE — PROGRESS NOTES
Initial Post Discharge Follow Up   Discharge Date: 5/4/21  Contact Date: 5/5/2021    Consent Verification:  Assessment Completed With: Patient  HIPAA Verified?   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 yes  • (NCM) Was patient given a different diet per AVS? no, patient state she is eating her regular diet.       Medications:   Current Outpatient Medications   Medication Sig Dispense Refill   • dalbavancin HCl 500 MG Intravenous Recon Soln TAKE ONE TABLET BY MOUTH ONE TIME DAILY  (Patient taking differently: Take 10 mg by mouth nightly.  ) 90 tablet 3   • Albuterol Sulfate  (90 Base) MCG/ACT Inhalation Aero Soln Inhale 1 puff into the lungs every 6 (six) hours as needed for Wheezing. Appointment Department [facility])    May 06, 2021  1:30 PM CDT [place] Cardiac Pulmonary Phase 2 Rehab with [facility] OF THE [facility] PUL PHASE [address] Cardiopulmonary Rehab ([address] [facility])        May 08, 2021  7:30 AM CDT MRI FOOT R Cardiac Pulmonary Phase 2 Rehab with [facility] OF THE [facility] PHASE [address] Cardiopulmonary Rehab ([address] [facility])        May 25, 2021  1:30 PM BRETTT [place] Cardiac Pulmonary Phase 2 Rehab with [facility] OF THE [facility] PHASE [address] [place] [address] screening mammogram to ensure your insurance plan will cover the cost, unless you are experiencing breast related symptoms. Do NOT use deodorant, talcum powder, lotions, or creams on your breasts or underarms.  They leave a coating that may be picked up Phase 2 Rehab with [address]ULM [address] Cardiopulmonary Rehab ([address] [facility])        Jul 13, 2021  1:30 PM CDT [place] Cardiac Pulmonary Phase 2 Rehab with [address] PULM [address])        Aug 17, 2021  1:30 PM CDT [place] Cardiac Pulmonary Phase 2 Rehab with [facility] OF THE [facility] PUL PHASE [address] Cardiopulmonary Rehab ([address] [facility] [address])        Aug 19, 2021  1:30 PM CDT [place] [facility]  [address] [address]  [phone]          PCP TCM/HFU appointment: scheduled at D/C within 7-14 days no     NCM Reviewed/scheduled/rescheduled PCP TCM/HFU appointment wit

## 2021-05-06 ENCOUNTER — TELEPHONE (OUTPATIENT)
Dept: CARDIOLOGY | Age: 81
End: 2021-05-06

## 2021-05-06 NOTE — PAYOR COMM NOTE
Discharge Notification    Patient Name: Mynor Fish: [address] [place] [address] #: [id number]  Authorization Number: [id number]  Admit Date/Time: 4/29/2021 11:40 AM  Discharge Date/Time: 5/4/2021 1:07 PM

## 2021-05-11 ENCOUNTER — APPOINTMENT (OUTPATIENT)
Dept: CARDIAC REHAB | Facility: HOSPITAL | Age: 81
End: 2021-05-11
Attending: INTERNAL MEDICINE
Payer: MEDICARE

## 2021-05-11 ENCOUNTER — LAB ENCOUNTER (OUTPATIENT)
Dept: LAB | Facility: HOSPITAL | Age: 81
End: 2021-05-11
Attending: INTERNAL MEDICINE
Payer: MEDICARE

## 2021-05-11 ENCOUNTER — OFFICE VISIT (OUTPATIENT)
Dept: INTERNAL MEDICINE CLINIC | Facility: CLINIC | Age: 81
End: 2021-05-11
Payer: COMMERCIAL

## 2021-05-11 VITALS
DIASTOLIC BLOOD PRESSURE: 66 MMHG | WEIGHT: 229 LBS | HEART RATE: 47 BPM | BODY MASS INDEX: 39.09 KG/M2 | RESPIRATION RATE: 18 BRPM | SYSTOLIC BLOOD PRESSURE: 101 MMHG | HEIGHT: 64 IN

## 2021-05-11 DIAGNOSIS — R32 URINARY INCONTINENCE, UNSPECIFIED TYPE: ICD-10-CM

## 2021-05-11 DIAGNOSIS — D64.9 ANEMIA, UNSPECIFIED TYPE: ICD-10-CM

## 2021-05-11 DIAGNOSIS — I10 ESSENTIAL HYPERTENSION: ICD-10-CM

## 2021-05-11 DIAGNOSIS — M1A.9XX1 CHRONIC GOUT INVOLVING TOE OF RIGHT FOOT WITH TOPHUS, UNSPECIFIED CAUSE: ICD-10-CM

## 2021-05-11 DIAGNOSIS — M00.9: Primary | ICD-10-CM

## 2021-05-11 DIAGNOSIS — I70.0 ATHEROSCLEROSIS OF AORTIC ARCH (HCC): ICD-10-CM

## 2021-05-11 DIAGNOSIS — M06.09 RHEUMATOID ARTHRITIS OF MULTIPLE SITES WITH NEGATIVE RHEUMATOID FACTOR (HCC): ICD-10-CM

## 2021-05-11 PROBLEM — N18.4 CKD (CHRONIC KIDNEY DISEASE) STAGE 4, GFR 15-29 ML/MIN (HCC): Chronic | Status: RESOLVED | Noted: 2021-01-05 | Resolved: 2021-05-11

## 2021-05-11 PROBLEM — D69.6 THROMBOCYTOPENIA (HCC): Chronic | Status: RESOLVED | Noted: 2020-10-08 | Resolved: 2021-05-11

## 2021-05-11 PROBLEM — D69.6 THROMBOCYTOPENIA: Chronic | Status: RESOLVED | Noted: 2020-10-08 | Resolved: 2021-05-11

## 2021-05-11 PROCEDURE — 99496 TRANSJ CARE MGMT HIGH F2F 7D: CPT | Performed by: INTERNAL MEDICINE

## 2021-05-11 PROCEDURE — 3008F BODY MASS INDEX DOCD: CPT | Performed by: INTERNAL MEDICINE

## 2021-05-11 PROCEDURE — 82607 VITAMIN B-12: CPT

## 2021-05-11 PROCEDURE — 3078F DIAST BP <80 MM HG: CPT | Performed by: INTERNAL MEDICINE

## 2021-05-11 PROCEDURE — 3074F SYST BP LT 130 MM HG: CPT | Performed by: INTERNAL MEDICINE

## 2021-05-11 PROCEDURE — 36415 COLL VENOUS BLD VENIPUNCTURE: CPT

## 2021-05-11 PROCEDURE — 1111F DSCHRG MED/CURRENT MED MERGE: CPT | Performed by: INTERNAL MEDICINE

## 2021-05-11 PROCEDURE — 82746 ASSAY OF FOLIC ACID SERUM: CPT

## 2021-05-11 PROCEDURE — 82728 ASSAY OF FERRITIN: CPT

## 2021-05-11 PROCEDURE — 84466 ASSAY OF TRANSFERRIN: CPT

## 2021-05-11 PROCEDURE — 85025 COMPLETE CBC W/AUTO DIFF WBC: CPT

## 2021-05-11 PROCEDURE — 83540 ASSAY OF IRON: CPT

## 2021-05-11 RX ORDER — SOLIFENACIN SUCCINATE 10 MG/1
10 TABLET, FILM COATED ORAL NIGHTLY
Qty: 90 TABLET | Refills: 1 | Status: SHIPPED | OUTPATIENT
Start: 2021-05-11 | End: 2021-10-15

## 2021-05-11 NOTE — PROGRESS NOTES
HPI:    Daily Chang is a 79year old female here today for hospital follow up.    She was discharged from Inpatient hospital, [facility]  to Home   Admission Date: 4/29/21   Discharge Date: 5/4/21  Hospital Discharge Diagnoses (since 4/11/2021) pulmonary rehab. She has not resumed it because of her foot. She does not have any significant pain. She has chronic lower extremity edema which has been treated with furosemide. She is also on hydrochlorothiazide for her blood pressure.     Allergies UNITS Oral Tab, Take 1 capsule by mouth daily. Take 1 cap. every day   aspirin 81 MG Oral Chew Tab, Chew  by mouth.  take 1 tablet (81MG)  by oral route  every day  CENTRUM SILVER OR TABS, 1 TABLET DAILY  ENBREL SURECLICK 50 MG/ML Subcutaneous Solution Auto week. She reports that she does not use drugs. ROS:   Review of Systems   Respiratory: Negative for cough, shortness of breath and wheezing. Cardiovascular: Negative for chest pain and palpitations.    Musculoskeletal: Positive for arthralgias and ga hydrochlorothiazide because of her gout. Continue other blood pressure medication and follow-up at the next office visit. Atherosclerosis of aortic arch (HCC)     Pt has aortic atherosclerosis.   We will continue to control her CV risk factors, inc minutes    Reviewing/Obtaining: 15 minutes      Medical Exam: 15 minutes    Plan: 5 minutes      Notes: 15 minutes    Counseling/Education: 5 minutes      Referring/Communicating:   minutes    Ind Interpretation:   minutes      Care Coordination:   minutes

## 2021-05-11 NOTE — ASSESSMENT & PLAN NOTE
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 We will have her stop the hydrochlorothiazide, but she does need the furosemide because of her significant edema.   Patient will get gout- specific medication from her podiatrist or rheumatologist.

## 2021-05-11 NOTE — PATIENT INSTRUCTIONS
Ask Dr. Oliver Schilling:  Can you do an exercise bike? Can she please treat the gout? (I stopped the hydrochlorothiazide, but not furosemide.

## 2021-05-12 ENCOUNTER — HOSPITAL ENCOUNTER (OUTPATIENT)
Dept: ULTRASOUND IMAGING | Facility: HOSPITAL | Age: 81
Discharge: HOME | End: 2021-05-12
Attending: INTERNAL MEDICINE
Payer: MEDICARE

## 2021-05-12 ENCOUNTER — TELEPHONE (OUTPATIENT)
Dept: CARDIOLOGY | Age: 81
End: 2021-05-12

## 2021-05-12 DIAGNOSIS — L97.516 ULCER OF RIGHT FOOT WITH BONE INVOLVEMENT WITHOUT EVIDENCE OF NECROSIS (HCC): ICD-10-CM

## 2021-05-12 PROCEDURE — 93926 LOWER EXTREMITY STUDY: CPT | Performed by: INTERNAL MEDICINE

## 2021-05-12 NOTE — ASSESSMENT & PLAN NOTE
I reviewed the patient's blood pressures for the past year. Her blood pressure has been well controlled on her current regimen, but we need to stop the hydrochlorothiazide because of her gout.   Continue other blood pressure medication and follow-up at the

## 2021-05-12 NOTE — ASSESSMENT & PLAN NOTE
Patient had acute on chronic anemia. This may have been due to frequent blood draws in the hospital.  We will recheck this today and start iron if needed.

## 2021-05-12 NOTE — ASSESSMENT & PLAN NOTE
Pt has aortic atherosclerosis. We will continue to control her CV risk factors, including her HTN and hypercholesterolemia.

## 2021-05-18 ENCOUNTER — OFFICE VISIT (OUTPATIENT)
Dept: INTERNAL MEDICINE CLINIC | Facility: CLINIC | Age: 81
End: 2021-05-18
Payer: COMMERCIAL

## 2021-05-18 VITALS
RESPIRATION RATE: 16 BRPM | BODY MASS INDEX: 39.18 KG/M2 | HEIGHT: 64 IN | SYSTOLIC BLOOD PRESSURE: 112 MMHG | WEIGHT: 229.5 LBS | DIASTOLIC BLOOD PRESSURE: 74 MMHG | HEART RATE: 64 BPM

## 2021-05-18 DIAGNOSIS — M00.9: ICD-10-CM

## 2021-05-18 DIAGNOSIS — I70.0 ATHEROSCLEROSIS OF AORTIC ARCH (HCC): ICD-10-CM

## 2021-05-18 DIAGNOSIS — K58.0 IRRITABLE BOWEL SYNDROME WITH DIARRHEA: ICD-10-CM

## 2021-05-18 DIAGNOSIS — R26.9 GAIT ABNORMALITY: ICD-10-CM

## 2021-05-18 DIAGNOSIS — Z00.00 ENCOUNTER FOR MEDICARE ANNUAL WELLNESS EXAM: Primary | ICD-10-CM

## 2021-05-18 DIAGNOSIS — D64.9 ANEMIA, UNSPECIFIED TYPE: ICD-10-CM

## 2021-05-18 DIAGNOSIS — J44.9 CHRONIC OBSTRUCTIVE PULMONARY DISEASE, UNSPECIFIED COPD TYPE (HCC): ICD-10-CM

## 2021-05-18 DIAGNOSIS — R60.0 LOWER EXTREMITY EDEMA: ICD-10-CM

## 2021-05-18 DIAGNOSIS — M15.9 PRIMARY OSTEOARTHRITIS INVOLVING MULTIPLE JOINTS: ICD-10-CM

## 2021-05-18 DIAGNOSIS — Z12.31 VISIT FOR SCREENING MAMMOGRAM: ICD-10-CM

## 2021-05-18 DIAGNOSIS — Z99.89 OSA ON CPAP: ICD-10-CM

## 2021-05-18 DIAGNOSIS — G47.33 OSA ON CPAP: ICD-10-CM

## 2021-05-18 DIAGNOSIS — E78.2 MIXED HYPERLIPIDEMIA: ICD-10-CM

## 2021-05-18 DIAGNOSIS — D84.9 IMMUNOSUPPRESSED STATUS (HCC): ICD-10-CM

## 2021-05-18 DIAGNOSIS — E03.9 ACQUIRED HYPOTHYROIDISM: ICD-10-CM

## 2021-05-18 DIAGNOSIS — K21.9 GASTROESOPHAGEAL REFLUX DISEASE, UNSPECIFIED WHETHER ESOPHAGITIS PRESENT: ICD-10-CM

## 2021-05-18 DIAGNOSIS — R32 URINARY INCONTINENCE, UNSPECIFIED TYPE: ICD-10-CM

## 2021-05-18 DIAGNOSIS — I25.10 ATHEROSCLEROSIS OF NATIVE CORONARY ARTERY OF NATIVE HEART WITHOUT ANGINA PECTORIS: ICD-10-CM

## 2021-05-18 DIAGNOSIS — M06.09 RHEUMATOID ARTHRITIS OF MULTIPLE SITES WITH NEGATIVE RHEUMATOID FACTOR (HCC): ICD-10-CM

## 2021-05-18 DIAGNOSIS — M1A.9XX1 CHRONIC GOUT INVOLVING TOE OF RIGHT FOOT WITH TOPHUS, UNSPECIFIED CAUSE: ICD-10-CM

## 2021-05-18 DIAGNOSIS — I10 ESSENTIAL HYPERTENSION WITH GOAL BLOOD PRESSURE LESS THAN 140/90: ICD-10-CM

## 2021-05-18 DIAGNOSIS — I27.20 PULMONARY HYPERTENSION (HCC): ICD-10-CM

## 2021-05-18 DIAGNOSIS — N18.32 STAGE 3B CHRONIC KIDNEY DISEASE (HCC): ICD-10-CM

## 2021-05-18 PROBLEM — G25.0 ESSENTIAL TREMOR: Status: RESOLVED | Noted: 2019-07-30 | Resolved: 2021-05-18

## 2021-05-18 PROBLEM — R26.89 POOR BALANCE: Status: RESOLVED | Noted: 2020-12-07 | Resolved: 2021-05-18

## 2021-05-18 PROBLEM — U07.1 COVID-19 VIRUS INFECTION: Status: RESOLVED | Noted: 2021-01-05 | Resolved: 2021-05-18

## 2021-05-18 PROBLEM — R06.09 DOE (DYSPNEA ON EXERTION): Status: RESOLVED | Noted: 2020-07-21 | Resolved: 2021-05-18

## 2021-05-18 PROBLEM — I51.89 DIASTOLIC DYSFUNCTION: Status: RESOLVED | Noted: 2020-10-08 | Resolved: 2021-05-18

## 2021-05-18 PROBLEM — M54.31 BILATERAL SCIATICA: Status: RESOLVED | Noted: 2020-12-07 | Resolved: 2021-05-18

## 2021-05-18 PROBLEM — M54.32 BILATERAL SCIATICA: Status: RESOLVED | Noted: 2020-12-07 | Resolved: 2021-05-18

## 2021-05-18 PROBLEM — R06.00 DOE (DYSPNEA ON EXERTION): Status: RESOLVED | Noted: 2020-07-21 | Resolved: 2021-05-18

## 2021-05-18 PROCEDURE — G0439 PPPS, SUBSEQ VISIT: HCPCS | Performed by: INTERNAL MEDICINE

## 2021-05-18 PROCEDURE — 3078F DIAST BP <80 MM HG: CPT | Performed by: INTERNAL MEDICINE

## 2021-05-18 PROCEDURE — 99397 PER PM REEVAL EST PAT 65+ YR: CPT | Performed by: INTERNAL MEDICINE

## 2021-05-18 PROCEDURE — 96160 PT-FOCUSED HLTH RISK ASSMT: CPT | Performed by: INTERNAL MEDICINE

## 2021-05-18 PROCEDURE — 3008F BODY MASS INDEX DOCD: CPT | Performed by: INTERNAL MEDICINE

## 2021-05-18 PROCEDURE — 3074F SYST BP LT 130 MM HG: CPT | Performed by: INTERNAL MEDICINE

## 2021-05-18 RX ORDER — LISINOPRIL 40 MG/1
40 TABLET ORAL DAILY
Qty: 90 TABLET | Refills: 1 | Status: SHIPPED | OUTPATIENT
Start: 2021-05-18 | End: 2021-10-15

## 2021-05-18 RX ORDER — LEVOTHYROXINE SODIUM 137 UG/1
137 TABLET ORAL
Qty: 90 TABLET | Refills: 1 | Status: SHIPPED | OUTPATIENT
Start: 2021-05-18 | End: 2021-10-15

## 2021-05-18 RX ORDER — PRAVASTATIN SODIUM 20 MG
20 TABLET ORAL NIGHTLY
Qty: 90 TABLET | Refills: 1 | Status: SHIPPED | OUTPATIENT
Start: 2021-05-18 | End: 2021-10-15

## 2021-05-18 RX ORDER — AMLODIPINE BESYLATE 10 MG/1
10 TABLET ORAL DAILY
Qty: 90 TABLET | Refills: 1 | Status: SHIPPED | OUTPATIENT
Start: 2021-05-18 | End: 2021-10-15

## 2021-05-18 NOTE — PATIENT INSTRUCTIONS
[address] [facility] SCREENING SCHEDULE   Tests on this list are recommended by your physician but may not be covered, or covered at this frequency, by your insurer. Please check with your insurance carrier before scheduling to verify coverage.    PREVENTATIVE Colorectal Cancer Screening  Covered up to Age 76     Colonoscopy Screen   Covered every 10 years- more often if abnormal Colonoscopy due on 12/05/2026 Update Health Maintenance if applicable    Flex Sigmoidoscopy Screen  Covered every 5 years No results INC ANTIG    Please get every year    Pneumococcal 13 (Prevnar)  Covered Once after 65 Orders placed or performed in visit on 09/28/16   • PNEUMOCOCCAL VACC, 13 KATI IM    Please get once after your 65th birthday    Pneumococcal 23 (Pneumovax)  Covered Once

## 2021-05-18 NOTE — PROGRESS NOTES
HPI:   Justus Tapia is a 79year old female who presents for a MA (Medicare Advantage) Supervisit (Once per calendar year). HPI:  She will get the stitches out of her foot tomorrow. It doesn't bother her. She will see ID this afternoon.   Her breathing Essential hypertension with goal blood pressure less than 140/90     Atherosclerosis of aortic arch (HCC)     Lower extremity edema     Stage 3b chronic kidney disease (HCC)     Gait abnormality     Anemia     Mixed hyperlipidemia     Gastroesophageal refl mouth 4 (four) times daily as needed for Diarrhea. folic acid 1 MG Oral Tab, Take 1 tablet (1 mg total) by mouth daily.   Hydroxychloroquine Sulfate 200 MG Oral Tab, TAKE 2 TABLETS BY MOUTH  DAILY  Pantoprazole Sodium 40 MG Oral Tab EC, TAKE 1 TABLET BY MO surgery (Right); knee replacement surgery; hand/finger surgery unlisted (Right, 2011); angiogram (2013); nichole localization wire 1 site right (cpt=19281) (1994); tonsillectomy; and other surgical history.     Her family history includes Breast Cancer in her p m).    Weight as of this encounter: 229 lb 8 oz (104.1 kg).     Medicare Hearing Assessment  (Required for AWV/SWV)    Hearing Screening    Time taken: 5/18/2021 10:09 AM  Screening Method: Questionnaire  I have a problem hearing over the telephone: Sometim thyromegaly. Cardiovascular:      Rate and Rhythm: Normal rate and regular rhythm. Heart sounds: Normal heart sounds. No murmur heard. Pulmonary:      Effort: No respiratory distress. Breath sounds: Normal breath sounds.  No wheezing or rale Assessment. PLAN SUMMARY:     Problem List Items Addressed This Visit        High    Encounter for Medicare annual wellness exam - Primary     Unremarkable exam.  Labs were reviewed  Immunizations were reviewed. Fall risk assessment was negative.   Annelise disease. Mixed hyperlipidemia     Controlled. Continue present management. Relevant Medications    Pravastatin Sodium 20 MG Oral Tab    Gastroesophageal reflux disease     Controlled. Continue present management.          Atherosclerosis o GLYCOHEMOGLOBIN (HgA1c) (L) (%)   Date Value   09/22/2014 5.5     Glycohemoglobin (HgA1c) (%)   Date Value   02/03/2017 5.6    No flowsheet data found.     Fasting Blood Sugar (FSB)Annually Glucose (mg/dL)   Date Value   05/01/2021 95   09/19/2011 91 Medium/high risk factors:   End-stage renal disease   Hemophiliacs who received Factor VIII or IX concentrates   Clients of institutions for the mentally retarded   Persons who live in the same house as a HepB virus carrier   Homosexual men   Illicit injec

## 2021-05-19 NOTE — ASSESSMENT & PLAN NOTE
Patient is usually on Enbrel and methotrexate but this is temporarily on hold because of her toe infection. I recommend that the patient schedule her second Covid vaccine.

## 2021-05-19 NOTE — ASSESSMENT & PLAN NOTE
Patient's blood pressure is okay off of the hydrochlorothiazide. She does need the furosemide for edema control. She will see Dr. Joanna Beyer in a few weeks and will discuss additional treatment for this.

## 2021-05-19 NOTE — ASSESSMENT & PLAN NOTE
Controlled. Continue present management. Group Therapy Note    Date: 4/29/2021    Group Start Time: 1000  Group End Time: 1030  Group Topic: Psychotherapy    [facility]    Colleen Herrera        Group Therapy Note    Attendees: 8/18         Patient's Goal:  PT will demonstrate increased interpersonal interaction and a clear understanding on multiple types of coping skills relating to the here-and-now therapeutic practice. Notes:  Patient is making progress, AEB participating in group discussion, actively listening, and supporting other group members. PT participates in group and encourages others to participate     Status After Intervention:  Improved    Participation Level: Active Listener and Interactive    Participation Quality: Appropriate, Attentive and Sharing      Speech:  normal      Thought Process/Content: Logical      Affective Functioning: Flat      Mood: depressed      Level of consciousness:  Alert, Oriented x4 and Attentive      Response to Learning: Able to verbalize/acknowledge new learning and Progressing to goal      Endings: None Reported    Modes of Intervention: Support, Socialization, Exploration, Clarifying and Problem-solving      Discipline Responsible: /Counselor      Signature:   Colleen Herrera

## 2021-05-20 ENCOUNTER — APPOINTMENT (OUTPATIENT)
Dept: CARDIAC REHAB | Facility: HOSPITAL | Age: 81
End: 2021-05-20
Attending: INTERNAL MEDICINE
Payer: MEDICARE

## 2021-05-27 ENCOUNTER — APPOINTMENT (OUTPATIENT)
Dept: CARDIAC REHAB | Facility: HOSPITAL | Age: 81
End: 2021-05-27
Attending: INTERNAL MEDICINE
Payer: MEDICARE

## 2021-06-01 ENCOUNTER — APPOINTMENT (OUTPATIENT)
Dept: CARDIAC REHAB | Facility: HOSPITAL | Age: 81
End: 2021-06-01
Attending: INTERNAL MEDICINE
Payer: MEDICARE

## 2021-06-03 ENCOUNTER — APPOINTMENT (OUTPATIENT)
Dept: CARDIAC REHAB | Facility: HOSPITAL | Age: 81
End: 2021-06-03
Attending: INTERNAL MEDICINE
Payer: MEDICARE

## 2021-06-03 NOTE — PROGRESS NOTES
Indu Arias is an 76-year-old patient of Dr. Rosalee Molina with seronegative rheumatoid arthritis. Since I last saw her March 9th of 2021, she has continued Enbrel weekly, methotrexate 15 mg weekly, and Plaquenil 400mg daily.   She feels like her rheumatoid arthritis is for shortness of breath. Cardiovascular: Negative for chest pain. Gastrointestinal: Negative for abdominal pain. Musculoskeletal: Positive for back and leg pain. Skin: Negative for rash. Hematological: Negative for adenopathy.      Allergies:  Er thrombocytopenia. Her counts will be followed closely. 5.  Renal insufficiency. Worsened by diuresis. She will be getting support stockings.   6.  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

## 2021-06-05 ENCOUNTER — LAB ENCOUNTER (OUTPATIENT)
Dept: LAB | Facility: HOSPITAL | Age: 81
End: 2021-06-05
Attending: INTERNAL MEDICINE
Payer: MEDICARE

## 2021-06-05 ENCOUNTER — TELEPHONE (OUTPATIENT)
Dept: RHEUMATOLOGY | Facility: CLINIC | Age: 81
End: 2021-06-05

## 2021-06-05 DIAGNOSIS — Z51.81 THERAPEUTIC DRUG MONITORING: ICD-10-CM

## 2021-06-05 DIAGNOSIS — M06.09 RHEUMATOID ARTHRITIS OF MULTIPLE SITES WITH NEGATIVE RHEUMATOID FACTOR (HCC): ICD-10-CM

## 2021-06-05 DIAGNOSIS — I10 ESSENTIAL HYPERTENSION: ICD-10-CM

## 2021-06-05 PROCEDURE — 84450 TRANSFERASE (AST) (SGOT): CPT

## 2021-06-05 PROCEDURE — 82565 ASSAY OF CREATININE: CPT

## 2021-06-05 PROCEDURE — 85652 RBC SED RATE AUTOMATED: CPT

## 2021-06-05 PROCEDURE — 82040 ASSAY OF SERUM ALBUMIN: CPT

## 2021-06-05 PROCEDURE — 36415 COLL VENOUS BLD VENIPUNCTURE: CPT

## 2021-06-05 PROCEDURE — 85025 COMPLETE CBC W/AUTO DIFF WBC: CPT

## 2021-06-05 PROCEDURE — 86140 C-REACTIVE PROTEIN: CPT

## 2021-06-05 RX ORDER — CLONIDINE 0.1 MG/24H
PATCH, EXTENDED RELEASE TRANSDERMAL
Qty: 12 PATCH | Refills: 1 | Status: SHIPPED | OUTPATIENT
Start: 2021-06-05 | End: 2021-11-23

## 2021-06-05 NOTE — TELEPHONE ENCOUNTER
[facility] said she was in the hospital a month ago for infection in bone and it turned out to be gout. Pt wanted Dr to look at records for upcoming appt 6/8.  Please advise

## 2021-06-07 NOTE — TELEPHONE ENCOUNTER
Noted.     Dr. Hortencia Norris. ED to [facility] 04/29/2021. Surgery debridement to bone, 3rd digit right foot 04/30/2021. MRI and X-Ray 04/29/2021 and 04/30/2021.        Future Appointments   Date Time Provider Taqueria Wise   6/8/2021 10:20 AM Cot

## 2021-06-08 ENCOUNTER — MED REC SCAN ONLY (OUTPATIENT)
Dept: INTERNAL MEDICINE CLINIC | Facility: CLINIC | Age: 81
End: 2021-06-08

## 2021-06-08 ENCOUNTER — APPOINTMENT (OUTPATIENT)
Dept: CARDIAC REHAB | Facility: HOSPITAL | Age: 81
End: 2021-06-08
Attending: INTERNAL MEDICINE
Payer: MEDICARE

## 2021-06-08 ENCOUNTER — OFFICE VISIT (OUTPATIENT)
Dept: RHEUMATOLOGY | Facility: CLINIC | Age: 81
End: 2021-06-08
Payer: COMMERCIAL

## 2021-06-08 VITALS
BODY MASS INDEX: 39.44 KG/M2 | WEIGHT: 231 LBS | HEART RATE: 70 BPM | SYSTOLIC BLOOD PRESSURE: 116 MMHG | DIASTOLIC BLOOD PRESSURE: 76 MMHG | HEIGHT: 64 IN

## 2021-06-08 DIAGNOSIS — Z51.81 ENCOUNTER FOR THERAPEUTIC DRUG MONITORING: ICD-10-CM

## 2021-06-08 DIAGNOSIS — M1A.9XX1 CHRONIC TOPHACEOUS GOUT OF RIGHT FOOT: ICD-10-CM

## 2021-06-08 DIAGNOSIS — M06.09 RHEUMATOID ARTHRITIS OF MULTIPLE SITES WITH NEGATIVE RHEUMATOID FACTOR (HCC): Primary | ICD-10-CM

## 2021-06-08 DIAGNOSIS — N28.9 RENAL INSUFFICIENCY: ICD-10-CM

## 2021-06-08 PROCEDURE — 3008F BODY MASS INDEX DOCD: CPT | Performed by: INTERNAL MEDICINE

## 2021-06-08 PROCEDURE — 3074F SYST BP LT 130 MM HG: CPT | Performed by: INTERNAL MEDICINE

## 2021-06-08 PROCEDURE — 3078F DIAST BP <80 MM HG: CPT | Performed by: INTERNAL MEDICINE

## 2021-06-08 PROCEDURE — 99214 OFFICE O/P EST MOD 30 MIN: CPT | Performed by: INTERNAL MEDICINE

## 2021-06-08 RX ORDER — COLCHICINE 0.6 MG/1
TABLET ORAL
Qty: 15 TABLET | Refills: 2 | Status: SHIPPED | OUTPATIENT
Start: 2021-06-08 | End: 2021-09-20

## 2021-06-08 RX ORDER — ALLOPURINOL 100 MG/1
TABLET ORAL
Qty: 30 TABLET | Refills: 2 | Status: SHIPPED | OUTPATIENT
Start: 2021-06-08 | End: 2021-07-14

## 2021-06-09 ENCOUNTER — HOSPITAL ENCOUNTER (OUTPATIENT)
Dept: MAMMOGRAPHY | Facility: HOSPITAL | Age: 81
Discharge: HOME | End: 2021-06-09
Attending: INTERNAL MEDICINE
Payer: MEDICARE

## 2021-06-09 DIAGNOSIS — Z12.31 BREAST CANCER SCREENING BY MAMMOGRAM: ICD-10-CM

## 2021-06-09 PROCEDURE — 77067 SCR MAMMO BI INCL CAD: CPT | Performed by: INTERNAL MEDICINE

## 2021-06-09 PROCEDURE — 77063 BREAST TOMOSYNTHESIS BI: CPT | Performed by: INTERNAL MEDICINE

## 2021-06-09 RX ORDER — NEBIVOLOL 20 MG/1
TABLET ORAL
Qty: 90 TABLET | Refills: 3 | Status: ON HOLD | OUTPATIENT
Start: 2021-06-09 | End: 2021-11-22

## 2021-06-10 ENCOUNTER — IMMUNIZATION (OUTPATIENT)
Dept: LAB | Facility: HOSPITAL | Age: 81
End: 2021-06-10
Attending: EMERGENCY MEDICINE
Payer: MEDICARE

## 2021-06-10 ENCOUNTER — APPOINTMENT (OUTPATIENT)
Dept: CARDIAC REHAB | Facility: HOSPITAL | Age: 81
End: 2021-06-10
Attending: INTERNAL MEDICINE
Payer: MEDICARE

## 2021-06-10 DIAGNOSIS — Z23 NEED FOR VACCINATION: Primary | ICD-10-CM

## 2021-06-10 PROCEDURE — 0012A SARSCOV2 VAC 100MCG/0.5ML IM: CPT

## 2021-06-15 ENCOUNTER — APPOINTMENT (OUTPATIENT)
Dept: CARDIAC REHAB | Facility: HOSPITAL | Age: 81
End: 2021-06-15
Attending: INTERNAL MEDICINE
Payer: MEDICARE

## 2021-06-17 ENCOUNTER — APPOINTMENT (OUTPATIENT)
Dept: CARDIAC REHAB | Facility: HOSPITAL | Age: 81
End: 2021-06-17
Attending: INTERNAL MEDICINE
Payer: MEDICARE

## 2021-06-22 ENCOUNTER — APPOINTMENT (OUTPATIENT)
Dept: CARDIAC REHAB | Facility: HOSPITAL | Age: 81
End: 2021-06-22
Attending: INTERNAL MEDICINE
Payer: MEDICARE

## 2021-06-24 ENCOUNTER — APPOINTMENT (OUTPATIENT)
Dept: CARDIAC REHAB | Facility: HOSPITAL | Age: 81
End: 2021-06-24
Attending: INTERNAL MEDICINE
Payer: MEDICARE

## 2021-06-29 ENCOUNTER — APPOINTMENT (OUTPATIENT)
Dept: CARDIAC REHAB | Facility: HOSPITAL | Age: 81
End: 2021-06-29
Attending: INTERNAL MEDICINE
Payer: MEDICARE

## 2021-06-29 ENCOUNTER — TELEPHONE (OUTPATIENT)
Dept: INTERNAL MEDICINE CLINIC | Facility: CLINIC | Age: 81
End: 2021-06-29

## 2021-06-29 NOTE — TELEPHONE ENCOUNTER
Refill request on the following.     Current Outpatient Medications   Medication Sig Dispense Refill   • Nebivolol HCl (BYSTOLIC) 20 MG Oral Tab TAKE 1 TABLET BY MOUTH ONCE DAILY EQUIVALENT TO NEBIVOLOL 90 tablet 3

## 2021-06-29 NOTE — TELEPHONE ENCOUNTER
Phone rings then go to busy tone.  If patient calls back please ask her if she is requesting her refill to go to [facility] because it was already sent to [facility] Rx on 6/9/21

## 2021-07-01 ENCOUNTER — APPOINTMENT (OUTPATIENT)
Dept: CARDIAC REHAB | Facility: HOSPITAL | Age: 81
End: 2021-07-01
Attending: INTERNAL MEDICINE
Payer: MEDICARE

## 2021-07-01 NOTE — TELEPHONE ENCOUNTER
Patient never received the medication from [facility]. Spoke to Kathleen from [facility] and she states the Bystolic was on hold because they had just sent her a prescription not too long ago.  She took the hold off and is sending the medication to the patient

## 2021-07-06 ENCOUNTER — APPOINTMENT (OUTPATIENT)
Dept: CARDIAC REHAB | Facility: HOSPITAL | Age: 81
End: 2021-07-06
Attending: INTERNAL MEDICINE
Payer: MEDICARE

## 2021-07-08 ENCOUNTER — APPOINTMENT (OUTPATIENT)
Dept: CARDIAC REHAB | Facility: HOSPITAL | Age: 81
End: 2021-07-08
Attending: INTERNAL MEDICINE
Payer: MEDICARE

## 2021-07-09 NOTE — PROGRESS NOTES
Anna Cobos is an 65-year-old patient of Dr. Miguel A Rhodes with seronegative rheumatoid arthritis. Since I last saw her June 8th of 2021, she has continued Enbrel weekly, methotrexate 15 mg weekly, and Plaquenil 400mg daily.   She feels like her rheumatoid arthritis is regurgitation, mildly to moderately dilated left atrium, moderate pulmonary hypertension. Review of Systems   Constitutional: Positive for fatigue. Negative for fever, chills and diaphoresis. Respiratory: Negative for shortness of breath.     Gorge Trotter successful right ankle surgery. She is continuing to have mechanical problems with her low back and knees. Recent sciatica, doing better after steroids. 4. Low white count. Resolved. Again anemia. H/o borderline thrombocytopenia.   Her counts will be f Affect and characteristics of appearance, verbalizations, behaviors are appropriate

## 2021-07-10 ENCOUNTER — LAB ENCOUNTER (OUTPATIENT)
Dept: LAB | Facility: HOSPITAL | Age: 81
End: 2021-07-10
Attending: INTERNAL MEDICINE
Payer: MEDICARE

## 2021-07-10 DIAGNOSIS — N28.9 RENAL INSUFFICIENCY: ICD-10-CM

## 2021-07-10 DIAGNOSIS — M1A.9XX1 CHRONIC TOPHACEOUS GOUT OF RIGHT FOOT: ICD-10-CM

## 2021-07-10 LAB
ANION GAP SERPL CALC-SCNC: 6 MMOL/L (ref 0–18)
BUN BLD-MCNC: 23 MG/DL (ref 7–18)
BUN/CREAT SERPL: 17.6 (ref 10–20)
CALCIUM BLD-MCNC: 9.6 MG/DL (ref 8.5–10.1)
CHLORIDE SERPL-SCNC: 111 MMOL/L (ref 98–112)
CO2 SERPL-SCNC: 26 MMOL/L (ref 21–32)
CREAT BLD-MCNC: 1.31 MG/DL
GLUCOSE BLD-MCNC: 109 MG/DL (ref 70–99)
OSMOLALITY SERPL CALC.SUM OF ELEC: 300 MOSM/KG (ref 275–295)
PATIENT FASTING Y/N/NP: NO
POTASSIUM SERPL-SCNC: 4 MMOL/L (ref 3.5–5.1)
SODIUM SERPL-SCNC: 143 MMOL/L (ref 136–145)
URATE SERPL-MCNC: 7.6 MG/DL

## 2021-07-10 PROCEDURE — 84550 ASSAY OF BLOOD/URIC ACID: CPT

## 2021-07-10 PROCEDURE — 36415 COLL VENOUS BLD VENIPUNCTURE: CPT

## 2021-07-10 PROCEDURE — 80048 BASIC METABOLIC PNL TOTAL CA: CPT

## 2021-07-13 ENCOUNTER — APPOINTMENT (OUTPATIENT)
Dept: CARDIAC REHAB | Facility: HOSPITAL | Age: 81
End: 2021-07-13
Attending: INTERNAL MEDICINE
Payer: MEDICARE

## 2021-07-14 ENCOUNTER — OFFICE VISIT (OUTPATIENT)
Dept: RHEUMATOLOGY | Facility: CLINIC | Age: 81
End: 2021-07-14
Payer: COMMERCIAL

## 2021-07-14 VITALS
SYSTOLIC BLOOD PRESSURE: 95 MMHG | HEART RATE: 81 BPM | HEIGHT: 64 IN | WEIGHT: 231 LBS | BODY MASS INDEX: 39.44 KG/M2 | DIASTOLIC BLOOD PRESSURE: 61 MMHG

## 2021-07-14 DIAGNOSIS — M1A.9XX1 CHRONIC TOPHACEOUS GOUT OF RIGHT FOOT: Primary | ICD-10-CM

## 2021-07-14 DIAGNOSIS — M06.09 RHEUMATOID ARTHRITIS OF MULTIPLE SITES WITH NEGATIVE RHEUMATOID FACTOR (HCC): ICD-10-CM

## 2021-07-14 PROCEDURE — 3074F SYST BP LT 130 MM HG: CPT | Performed by: INTERNAL MEDICINE

## 2021-07-14 PROCEDURE — 3008F BODY MASS INDEX DOCD: CPT | Performed by: INTERNAL MEDICINE

## 2021-07-14 PROCEDURE — 3078F DIAST BP <80 MM HG: CPT | Performed by: INTERNAL MEDICINE

## 2021-07-14 PROCEDURE — 99213 OFFICE O/P EST LOW 20 MIN: CPT | Performed by: INTERNAL MEDICINE

## 2021-07-14 RX ORDER — ALLOPURINOL 100 MG/1
TABLET ORAL
Qty: 60 TABLET | Refills: 2 | Status: SHIPPED | OUTPATIENT
Start: 2021-07-14 | End: 2021-08-24

## 2021-07-15 ENCOUNTER — APPOINTMENT (OUTPATIENT)
Dept: CARDIAC REHAB | Facility: HOSPITAL | Age: 81
End: 2021-07-15
Attending: INTERNAL MEDICINE
Payer: MEDICARE

## 2021-07-20 ENCOUNTER — APPOINTMENT (OUTPATIENT)
Dept: CARDIAC REHAB | Facility: HOSPITAL | Age: 81
End: 2021-07-20
Attending: INTERNAL MEDICINE
Payer: MEDICARE

## 2021-07-21 ENCOUNTER — TELEPHONE (OUTPATIENT)
Dept: PHYSICAL THERAPY | Facility: HOSPITAL | Age: 81
End: 2021-07-21

## 2021-07-22 ENCOUNTER — APPOINTMENT (OUTPATIENT)
Dept: CARDIAC REHAB | Facility: HOSPITAL | Age: 81
End: 2021-07-22
Attending: INTERNAL MEDICINE
Payer: MEDICARE

## 2021-07-22 ENCOUNTER — OFFICE VISIT (OUTPATIENT)
Dept: PHYSICAL THERAPY | Age: 81
End: 2021-07-22
Attending: INTERNAL MEDICINE
Payer: MEDICARE

## 2021-07-22 DIAGNOSIS — M06.09 RHEUMATOID ARTHRITIS OF MULTIPLE SITES WITH NEGATIVE RHEUMATOID FACTOR (HCC): ICD-10-CM

## 2021-07-22 DIAGNOSIS — R26.9 GAIT ABNORMALITY: ICD-10-CM

## 2021-07-22 PROCEDURE — 97110 THERAPEUTIC EXERCISES: CPT

## 2021-07-22 PROCEDURE — 97163 PT EVAL HIGH COMPLEX 45 MIN: CPT

## 2021-07-22 NOTE — PROGRESS NOTES
PHYSICAL THERAPY EVALUATION   Referring Physician: Dr. 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 Pt voiced understanding and performs HEP correctly without reported pain. Instructions were provided on how to modify as need or when to stop.     Skilled Ph 15, Women: 7   73-73 y/o Men: 6, Women: 5   80-79 y/o Men: 8, Women: 5   80-79 y/o Men: 6, Women: 8   80-79 y/o Men 7, Women: 4]        Today’s Treatment and Response:   1. TrA with marches  2x10  2.  TrA with hip abd x10 RTB  3. HIp add squeezes x10 se options and has agreed to actively participate in planning and for this course of care. Thank you for your referral. Please co-sign or sign and return this letter via fax as soon as possible to [phone].  If you have any questions, please contact me

## 2021-07-26 ENCOUNTER — OFFICE VISIT (OUTPATIENT)
Dept: PHYSICAL THERAPY | Age: 81
End: 2021-07-26
Attending: INTERNAL MEDICINE
Payer: MEDICARE

## 2021-07-26 PROCEDURE — 97110 THERAPEUTIC EXERCISES: CPT

## 2021-07-26 NOTE — PROGRESS NOTES
Dx:  Rheumatoid arthritis of multiple sites with negative rheumatoid factor (HCC) (M06.09)  Gait abnormality (R26.9)           Insurance   [facility]         Authorized  # visits by insurance  :  10   Expiration date  of Authorization: (90 days from eval otherwis bands 2x10  Shuttle R/LE LE    standing on foam with feet apart 1 min x2 CGA of 1 no UE as able  standing on foam with feet together 1 min x2 CGA of 1 no UE as able       Manual: HR inc as above  5x sit to stand with RW then inc to HR at 80 plus       Gait

## 2021-07-27 ENCOUNTER — APPOINTMENT (OUTPATIENT)
Dept: CARDIAC REHAB | Facility: HOSPITAL | Age: 81
End: 2021-07-27
Attending: INTERNAL MEDICINE
Payer: MEDICARE

## 2021-07-29 ENCOUNTER — APPOINTMENT (OUTPATIENT)
Dept: CARDIAC REHAB | Facility: HOSPITAL | Age: 81
End: 2021-07-29
Attending: INTERNAL MEDICINE
Payer: MEDICARE

## 2021-07-29 ENCOUNTER — TELEPHONE (OUTPATIENT)
Dept: PHYSICAL THERAPY | Facility: HOSPITAL | Age: 81
End: 2021-07-29

## 2021-07-29 ENCOUNTER — APPOINTMENT (OUTPATIENT)
Dept: PHYSICAL THERAPY | Age: 81
End: 2021-07-29
Attending: INTERNAL MEDICINE
Payer: MEDICARE

## 2021-08-02 ENCOUNTER — OFFICE VISIT (OUTPATIENT)
Dept: PHYSICAL THERAPY | Age: 81
End: 2021-08-02
Attending: INTERNAL MEDICINE
Payer: MEDICARE

## 2021-08-02 PROCEDURE — 97110 THERAPEUTIC EXERCISES: CPT

## 2021-08-02 NOTE — PROGRESS NOTES
Dx:  Rheumatoid arthritis of multiple sites with negative rheumatoid factor (HCC) (M06.09)  Gait abnormality (R26.9)           Insurance   [facility]         Authorized  # visits by insurance  :  10   Expiration date  of Authorization: (90 days from eval otherwis TX#: 4/ Date:               TX#: 5/  FOTO Date:    Tx#: 6/   Theraex: Nu step level 5 x6 mins  TrA marches x20  TrA with hip abd 2x10  TrA with hip add 10 secs x10  TrA with SLR x10  Shuttle BLE 5 bands 2x10  Shuttle R/LE LE    standing on foam wit

## 2021-08-03 ENCOUNTER — APPOINTMENT (OUTPATIENT)
Dept: CARDIAC REHAB | Facility: HOSPITAL | Age: 81
End: 2021-08-03
Attending: INTERNAL MEDICINE
Payer: MEDICARE

## 2021-08-05 ENCOUNTER — APPOINTMENT (OUTPATIENT)
Dept: CARDIAC REHAB | Facility: HOSPITAL | Age: 81
End: 2021-08-05
Attending: INTERNAL MEDICINE
Payer: MEDICARE

## 2021-08-05 ENCOUNTER — OFFICE VISIT (OUTPATIENT)
Dept: PHYSICAL THERAPY | Age: 81
End: 2021-08-05
Attending: INTERNAL MEDICINE
Payer: MEDICARE

## 2021-08-05 PROCEDURE — 97110 THERAPEUTIC EXERCISES: CPT

## 2021-08-05 NOTE — PROGRESS NOTES
Dx:  Rheumatoid arthritis of multiple sites with negative rheumatoid factor (HCC) (M06.09)  Gait abnormality (R26.9)           Insurance   [facility]         Authorized  # visits by insurance  :  10   Expiration date  of Authorization: (90 days from eval otherwis TX#: 5/  FOTO Date:    Tx#: 6/   Theraex: Nu step level 5 x6 mins  TrA marches x20  TrA with hip abd 2x10  TrA with hip add 10 secs x10  TrA with SLR x10  Shuttle BLE 5 bands 2x10  Shuttle R/LE LE    standing on foam with feet apart 1 min x2 CGA of 1 no U
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
## 2021-08-09 ENCOUNTER — OFFICE VISIT (OUTPATIENT)
Dept: OTOLARYNGOLOGY | Facility: CLINIC | Age: 81
End: 2021-08-09
Payer: COMMERCIAL

## 2021-08-09 ENCOUNTER — OFFICE VISIT (OUTPATIENT)
Dept: PHYSICAL THERAPY | Age: 81
End: 2021-08-09
Attending: INTERNAL MEDICINE
Payer: MEDICARE

## 2021-08-09 VITALS — HEIGHT: 64 IN | WEIGHT: 230 LBS | BODY MASS INDEX: 39.27 KG/M2

## 2021-08-09 DIAGNOSIS — H61.23 BILATERAL IMPACTED CERUMEN: Primary | ICD-10-CM

## 2021-08-09 PROCEDURE — 69210 REMOVE IMPACTED EAR WAX UNI: CPT | Performed by: OTOLARYNGOLOGY

## 2021-08-09 PROCEDURE — 97110 THERAPEUTIC EXERCISES: CPT

## 2021-08-09 PROCEDURE — 3008F BODY MASS INDEX DOCD: CPT | Performed by: OTOLARYNGOLOGY

## 2021-08-09 NOTE — PROGRESS NOTES
Dx:  Rheumatoid arthritis of multiple sites with negative rheumatoid factor (HCC) (M06.09)  Gait abnormality (R26.9)           Insurance   [facility]         Authorized  # visits by insurance  :  10   Expiration date  of Authorization: (90 days from eval otherwis this day as noted above  Goals: (to be met in 10 visits)     1. Pt will demonstrate improved SLS to >3 seconds ARMINDA to promote safety and decrease risk of falls on uneven surfaces such as grass: on going  2.  Pt will report improvement in her functional acti inc as above  5x sit to stand with RW then inc to HR at 80 plus   5x sit to stand with RW then inc to HR at 78 plus    Gait/NMRed:        Modalities         HEP GIVEN: written copy given unless otherwise indicated   8/5/21: standing hip exercises      Mary

## 2021-08-10 ENCOUNTER — APPOINTMENT (OUTPATIENT)
Dept: CARDIAC REHAB | Facility: HOSPITAL | Age: 81
End: 2021-08-10
Attending: INTERNAL MEDICINE
Payer: MEDICARE

## 2021-08-12 ENCOUNTER — APPOINTMENT (OUTPATIENT)
Dept: CARDIAC REHAB | Facility: HOSPITAL | Age: 81
End: 2021-08-12
Attending: INTERNAL MEDICINE
Payer: MEDICARE

## 2021-08-12 ENCOUNTER — OFFICE VISIT (OUTPATIENT)
Dept: PHYSICAL THERAPY | Age: 81
End: 2021-08-12
Attending: INTERNAL MEDICINE
Payer: MEDICARE

## 2021-08-12 PROCEDURE — 97110 THERAPEUTIC EXERCISES: CPT

## 2021-08-12 NOTE — PROGRESS NOTES
Dx:  Rheumatoid arthritis of multiple sites with negative rheumatoid factor (HCC) (M06.09)  Gait abnormality (R26.9)           Insurance   [facility]         Authorized  # visits by insurance  :  10   Expiration date  of Authorization: (90 days from eval otherwis visits)     1. Pt will demonstrate improved SLS to >3 seconds ARMINDA to promote safety and decrease risk of falls on uneven surfaces such as grass: on going  2.  Pt will report improvement in her functional activity tolerance ability with walking  And standing 2x10  Shuttle R/ LE 4 bands 2x10  Standing hip ex YTB 2x10: flex/abd/ext  1/2 of 4 square test :side to side and  Mini squats 2x10  Modified tadem stance 10 secs x2  Tandem stnce 10 secs x2: LOB after 5 secs   Manual: HR inc as above  5x sit to stand with

## 2021-08-16 ENCOUNTER — OFFICE VISIT (OUTPATIENT)
Dept: PHYSICAL THERAPY | Age: 81
End: 2021-08-16
Attending: INTERNAL MEDICINE
Payer: MEDICARE

## 2021-08-16 PROCEDURE — 97110 THERAPEUTIC EXERCISES: CPT

## 2021-08-16 NOTE — PROGRESS NOTES
Dx:  Rheumatoid arthritis of multiple sites with negative rheumatoid factor (HCC) (M06.09)  Gait abnormality (R26.9)           Insurance   [facility]         Authorized  # visits by insurance  :  10   Expiration date  of Authorization: (90 days from eval otherwis sec sit<>stand: 7 now at 8      With UE support 1 side               Fall Risk: yes  [Below average score indicates high risk for falls; norms by age > or = to. ..                 63-63 y/o Men: 15, Women: 15                 65-70 y/o Men: 15, Women: 11 8/9/21             TX#: 5/10   Date: 8/12/21  Tx#: 6/10  FOTO   Theraex: Nu step level 5 x6 mins  Sit to stand test and tandem test  Shuttle BLE 6 bands 3x10  Shuttle R/ LE 4 bands 3x10  Standing hip ex RTB 2x10: flex/abd/ext on foam  Rocking board side to

## 2021-08-17 ENCOUNTER — APPOINTMENT (OUTPATIENT)
Dept: CARDIAC REHAB | Facility: HOSPITAL | Age: 81
End: 2021-08-17
Attending: INTERNAL MEDICINE
Payer: MEDICARE

## 2021-08-19 ENCOUNTER — OFFICE VISIT (OUTPATIENT)
Dept: PHYSICAL THERAPY | Age: 81
End: 2021-08-19
Attending: INTERNAL MEDICINE
Payer: MEDICARE

## 2021-08-19 ENCOUNTER — APPOINTMENT (OUTPATIENT)
Dept: CARDIAC REHAB | Facility: HOSPITAL | Age: 81
End: 2021-08-19
Attending: INTERNAL MEDICINE
Payer: MEDICARE

## 2021-08-19 ENCOUNTER — TELEPHONE (OUTPATIENT)
Dept: INTERNAL MEDICINE CLINIC | Facility: CLINIC | Age: 81
End: 2021-08-19

## 2021-08-19 ENCOUNTER — LAB ENCOUNTER (OUTPATIENT)
Dept: LAB | Age: 81
End: 2021-08-19
Attending: INTERNAL MEDICINE
Payer: MEDICARE

## 2021-08-19 ENCOUNTER — HOSPITAL ENCOUNTER (OUTPATIENT)
Dept: GENERAL RADIOLOGY | Facility: HOSPITAL | Age: 81
Discharge: HOME | End: 2021-08-19
Attending: INTERNAL MEDICINE
Payer: MEDICARE

## 2021-08-19 ENCOUNTER — OFFICE VISIT (OUTPATIENT)
Dept: INTERNAL MEDICINE CLINIC | Facility: CLINIC | Age: 81
End: 2021-08-19
Payer: COMMERCIAL

## 2021-08-19 ENCOUNTER — TELEPHONE (OUTPATIENT)
Dept: HEMATOLOGY/ONCOLOGY | Facility: HOSPITAL | Age: 81
End: 2021-08-19

## 2021-08-19 VITALS
BODY MASS INDEX: 39.69 KG/M2 | HEART RATE: 76 BPM | HEIGHT: 64 IN | TEMPERATURE: 99 F | SYSTOLIC BLOOD PRESSURE: 111 MMHG | WEIGHT: 232.5 LBS | DIASTOLIC BLOOD PRESSURE: 74 MMHG

## 2021-08-19 DIAGNOSIS — E78.2 MIXED HYPERLIPIDEMIA: ICD-10-CM

## 2021-08-19 DIAGNOSIS — M1A.9XX1 CHRONIC TOPHACEOUS GOUT OF RIGHT FOOT: ICD-10-CM

## 2021-08-19 DIAGNOSIS — Z51.81 THERAPEUTIC DRUG MONITORING: ICD-10-CM

## 2021-08-19 DIAGNOSIS — M06.09 RHEUMATOID ARTHRITIS OF MULTIPLE SITES WITH NEGATIVE RHEUMATOID FACTOR (HCC): ICD-10-CM

## 2021-08-19 DIAGNOSIS — Z80.3 FAMILY HISTORY OF BREAST CANCER: ICD-10-CM

## 2021-08-19 DIAGNOSIS — J44.9 CHRONIC OBSTRUCTIVE PULMONARY DISEASE, UNSPECIFIED COPD TYPE (HCC): ICD-10-CM

## 2021-08-19 DIAGNOSIS — R26.9 GAIT ABNORMALITY: ICD-10-CM

## 2021-08-19 DIAGNOSIS — J44.9 CHRONIC OBSTRUCTIVE PULMONARY DISEASE, UNSPECIFIED COPD TYPE (HCC): Primary | ICD-10-CM

## 2021-08-19 DIAGNOSIS — I10 ESSENTIAL HYPERTENSION WITH GOAL BLOOD PRESSURE LESS THAN 140/90: ICD-10-CM

## 2021-08-19 DIAGNOSIS — R32 URINARY INCONTINENCE, UNSPECIFIED TYPE: ICD-10-CM

## 2021-08-19 PROBLEM — R60.0 LOWER EXTREMITY EDEMA: Status: RESOLVED | Noted: 2018-06-15 | Resolved: 2021-08-19

## 2021-08-19 LAB
ALBUMIN SERPL-MCNC: 3.8 G/DL (ref 3.4–5)
AST SERPL-CCNC: 28 U/L (ref 15–37)
BASOPHILS # BLD AUTO: 0.04 X10(3) UL (ref 0–0.2)
BASOPHILS NFR BLD AUTO: 1 %
CREAT BLD-MCNC: 1.16 MG/DL
CRP SERPL-MCNC: <0.29 MG/DL (ref ?–0.3)
DEPRECATED RDW RBC AUTO: 61 FL (ref 35.1–46.3)
EOSINOPHIL # BLD AUTO: 0.11 X10(3) UL (ref 0–0.7)
EOSINOPHIL NFR BLD AUTO: 2.6 %
ERYTHROCYTE [DISTWIDTH] IN BLOOD BY AUTOMATED COUNT: 15.9 % (ref 11–15)
ERYTHROCYTE [SEDIMENTATION RATE] IN BLOOD: 12 MM/HR
HCT VFR BLD AUTO: 33.7 %
HGB BLD-MCNC: 10.8 G/DL
IMM GRANULOCYTES # BLD AUTO: 0.01 X10(3) UL (ref 0–1)
IMM GRANULOCYTES NFR BLD: 0.2 %
LDLC SERPL DIRECT ASSAY-MCNC: 52 MG/DL (ref ?–100)
LYMPHOCYTES # BLD AUTO: 1.55 X10(3) UL (ref 1–4)
LYMPHOCYTES NFR BLD AUTO: 37 %
MCH RBC QN AUTO: 33.1 PG (ref 26–34)
MCHC RBC AUTO-ENTMCNC: 32 G/DL (ref 31–37)
MCV RBC AUTO: 103.4 FL
MONOCYTES # BLD AUTO: 0.47 X10(3) UL (ref 0.1–1)
MONOCYTES NFR BLD AUTO: 11.2 %
NEUTROPHILS # BLD AUTO: 2.01 X10 (3) UL (ref 1.5–7.7)
NEUTROPHILS # BLD AUTO: 2.01 X10(3) UL (ref 1.5–7.7)
NEUTROPHILS NFR BLD AUTO: 48 %
PLATELET # BLD AUTO: 139 10(3)UL (ref 150–450)
RBC # BLD AUTO: 3.26 X10(6)UL
URATE SERPL-MCNC: 6.5 MG/DL
WBC # BLD AUTO: 4.2 X10(3) UL (ref 4–11)

## 2021-08-19 PROCEDURE — 3078F DIAST BP <80 MM HG: CPT | Performed by: INTERNAL MEDICINE

## 2021-08-19 PROCEDURE — 99214 OFFICE O/P EST MOD 30 MIN: CPT | Performed by: INTERNAL MEDICINE

## 2021-08-19 PROCEDURE — 97110 THERAPEUTIC EXERCISES: CPT

## 2021-08-19 PROCEDURE — 82040 ASSAY OF SERUM ALBUMIN: CPT

## 2021-08-19 PROCEDURE — 84550 ASSAY OF BLOOD/URIC ACID: CPT

## 2021-08-19 PROCEDURE — 85652 RBC SED RATE AUTOMATED: CPT

## 2021-08-19 PROCEDURE — 85025 COMPLETE CBC W/AUTO DIFF WBC: CPT

## 2021-08-19 PROCEDURE — 3008F BODY MASS INDEX DOCD: CPT | Performed by: INTERNAL MEDICINE

## 2021-08-19 PROCEDURE — 84450 TRANSFERASE (AST) (SGOT): CPT

## 2021-08-19 PROCEDURE — 71046 X-RAY EXAM CHEST 2 VIEWS: CPT | Performed by: INTERNAL MEDICINE

## 2021-08-19 PROCEDURE — 82565 ASSAY OF CREATININE: CPT

## 2021-08-19 PROCEDURE — 36415 COLL VENOUS BLD VENIPUNCTURE: CPT

## 2021-08-19 PROCEDURE — 3074F SYST BP LT 130 MM HG: CPT | Performed by: INTERNAL MEDICINE

## 2021-08-19 PROCEDURE — 86140 C-REACTIVE PROTEIN: CPT

## 2021-08-19 PROCEDURE — 83721 ASSAY OF BLOOD LIPOPROTEIN: CPT

## 2021-08-19 NOTE — ASSESSMENT & PLAN NOTE
Patient's dyspnea on exertion is likely due to her COPD. We will check a chest x-ray today. Patient has an appointment with pulmonary later this week.

## 2021-08-19 NOTE — TELEPHONE ENCOUNTER
Attempted to reach patient to discuss referral to the [address] [place] [address]. Unable to leave a message. Will attempt again at another time.

## 2021-08-19 NOTE — TELEPHONE ENCOUNTER
Beau Bell,  I received this message on the patient's recent mammogram report. She states she has a family history of breast cancer. I am not sure if I can put in a referral for you or what would be recommended.   LVetrone    Your patient's answers to the heal

## 2021-08-19 NOTE — PROGRESS NOTES
HPI:    Patient ID: Joan Adkins is a 79year old female. HPI:  Pt c/o NICOLE--minimal exertion. She will see Dr. Jeronimo Rios next week. She also gets some palpitations when she exercises. She is going for PT for her balance and gait.   She can get in and o daily. 90 tablet 1   • Pravastatin Sodium 20 MG Oral Tab Take 1 tablet (20 mg total) by mouth nightly.  90 tablet 1   • Levothyroxine Sodium 137 MCG Oral Tab Take 137 mcg by mouth every morning before breakfast. 90 tablet 1   • amLODIPine Besylate 10 MG Ora TABS 1 TABLET DAILY         Allergies:  Erythromycin Base         Pcn [Bicillin L-A]        Lactose                 DIARRHEA     Social History    Tobacco Use      Smoking status: Never Smoker      Smokeless tobacco: Never Used    Vaping Use      Vaping "[employer] management. Gait abnormality     Patient has noted some improvement with physical therapy. Arian Mcneill She will continue this. Mixed hyperlipidemia     Controlled. Continue present management.          Relevant Orders    DIRECT LDL    LIPID PANEL

## 2021-08-19 NOTE — ASSESSMENT & PLAN NOTE
As per the recommendation of the radiologist, I will refer patient to the breast cancer prevention nurse.

## 2021-08-19 NOTE — PATIENT INSTRUCTIONS
Do fasting labs with next blood draw. Fast for 12 hours. Water is okay. You can walk-in or schedule an appointment.   Do not take vitamins or supplements for 3 days prior to the blood test.

## 2021-08-19 NOTE — TELEPHONE ENCOUNTER
Yes, please. I told her that you probably would call her, but I didn't give her your direct phone number, because I wasn't sure what the protocol was.

## 2021-08-22 NOTE — PROGRESS NOTES
Evi Paris is an 41-year-old patient of Dr. Gege Morales with seronegative rheumatoid arthritis. Since I last saw her July 14th of 2021, she has continued Enbrel weekly, methotrexate 15 mg weekly, and Plaquenil 400mg daily.   She feels like her rheumatoid arthritis is Negative for fever, chills and diaphoresis. Respiratory: Negative for shortness of breath. Cardiovascular: Negative for chest pain. Gastrointestinal: Negative for abdominal pain. Musculoskeletal: Positive for back and leg pain.    Skin: Negative fo steroids. 4. Low white count. Resolved. Again anemia. H/o borderline thrombocytopenia. Her counts will be followed closely. 5.  Renal insufficiency. Worsened by diuresis. She will be getting support stockings.   6.  Status post acute tophaceous gout

## 2021-08-24 ENCOUNTER — APPOINTMENT (OUTPATIENT)
Dept: CARDIAC REHAB | Facility: HOSPITAL | Age: 81
End: 2021-08-24
Attending: INTERNAL MEDICINE
Payer: MEDICARE

## 2021-08-24 ENCOUNTER — OFFICE VISIT (OUTPATIENT)
Dept: RHEUMATOLOGY | Facility: CLINIC | Age: 81
End: 2021-08-24
Payer: COMMERCIAL

## 2021-08-24 ENCOUNTER — TELEPHONE (OUTPATIENT)
Dept: HEMATOLOGY/ONCOLOGY | Facility: HOSPITAL | Age: 81
End: 2021-08-24

## 2021-08-24 VITALS
HEIGHT: 64 IN | DIASTOLIC BLOOD PRESSURE: 61 MMHG | WEIGHT: 230 LBS | BODY MASS INDEX: 39.27 KG/M2 | HEART RATE: 79 BPM | SYSTOLIC BLOOD PRESSURE: 103 MMHG

## 2021-08-24 DIAGNOSIS — N28.9 RENAL INSUFFICIENCY: ICD-10-CM

## 2021-08-24 DIAGNOSIS — M1A.9XX1 CHRONIC TOPHACEOUS GOUT OF RIGHT FOOT: Primary | ICD-10-CM

## 2021-08-24 DIAGNOSIS — M06.09 RHEUMATOID ARTHRITIS OF MULTIPLE SITES WITH NEGATIVE RHEUMATOID FACTOR (HCC): ICD-10-CM

## 2021-08-24 DIAGNOSIS — Z51.81 ENCOUNTER FOR THERAPEUTIC DRUG MONITORING: ICD-10-CM

## 2021-08-24 PROCEDURE — 3074F SYST BP LT 130 MM HG: CPT | Performed by: INTERNAL MEDICINE

## 2021-08-24 PROCEDURE — 3008F BODY MASS INDEX DOCD: CPT | Performed by: INTERNAL MEDICINE

## 2021-08-24 PROCEDURE — 3078F DIAST BP <80 MM HG: CPT | Performed by: INTERNAL MEDICINE

## 2021-08-24 PROCEDURE — 99213 OFFICE O/P EST LOW 20 MIN: CPT | Performed by: INTERNAL MEDICINE

## 2021-08-24 RX ORDER — ALLOPURINOL 300 MG/1
TABLET ORAL
Qty: 90 TABLET | Refills: 3 | Status: SHIPPED | OUTPATIENT
Start: 2021-08-24

## 2021-08-24 NOTE — TELEPHONE ENCOUNTER
Phoned patient to discuss referral to the [address] [place] [address]. Introduced myself to patient as Breast RN Navigator. Shared with patient the Clinic and what that entails.   Patient understands she was referred by Dr. Ainsley Cartagena due to her family history w

## 2021-08-25 ENCOUNTER — OFFICE VISIT (OUTPATIENT)
Dept: PULMONOLOGY | Facility: CLINIC | Age: 81
End: 2021-08-25
Payer: COMMERCIAL

## 2021-08-25 VITALS
HEIGHT: 64 IN | TEMPERATURE: 98 F | OXYGEN SATURATION: 98 % | BODY MASS INDEX: 39.84 KG/M2 | WEIGHT: 233.38 LBS | DIASTOLIC BLOOD PRESSURE: 73 MMHG | HEART RATE: 60 BPM | RESPIRATION RATE: 18 BRPM | SYSTOLIC BLOOD PRESSURE: 121 MMHG

## 2021-08-25 DIAGNOSIS — Z99.89 OSA ON CPAP: Primary | ICD-10-CM

## 2021-08-25 DIAGNOSIS — G47.33 OSA ON CPAP: Primary | ICD-10-CM

## 2021-08-25 PROCEDURE — 3074F SYST BP LT 130 MM HG: CPT | Performed by: INTERNAL MEDICINE

## 2021-08-25 PROCEDURE — 99214 OFFICE O/P EST MOD 30 MIN: CPT | Performed by: INTERNAL MEDICINE

## 2021-08-25 PROCEDURE — 3008F BODY MASS INDEX DOCD: CPT | Performed by: INTERNAL MEDICINE

## 2021-08-25 PROCEDURE — 3078F DIAST BP <80 MM HG: CPT | Performed by: INTERNAL MEDICINE

## 2021-08-25 RX ORDER — ALBUTEROL SULFATE 90 UG/1
2 AEROSOL, METERED RESPIRATORY (INHALATION) EVERY 6 HOURS PRN
Qty: 1 EACH | Refills: 1 | Status: SHIPPED | OUTPATIENT
Start: 2021-08-25

## 2021-08-25 NOTE — PROGRESS NOTES
HPI/Subjective:   Patient ID: Blake Juarez is a 79year old female.     HPI    Using CPAP every night with no technical problem and very compliant  No dyspnea at rest  Doing well with activity of daily living and overall still active  Dyspnea upon exertion tablet by mouth daily. • Loperamide HCl 2 MG Oral Cap Take 2 mg by mouth 4 (four) times daily as needed for Diarrhea.      • methotrexate 2.5 MG Oral Tab TAKE 8 TABLETS BY MOUTH EVERY WEEK 96 tablet 1   • folic acid 1 MG Oral Tab Take 1 tablet (1 mg tot Pulmonary:      Effort: No respiratory distress. Breath sounds: Normal breath sounds. No stridor. No wheezing, rhonchi or rales. Abdominal:      Palpations: Abdomen is soft. There is no mass.    Musculoskeletal:      Cervical back: Normal range of

## 2021-08-26 ENCOUNTER — APPOINTMENT (OUTPATIENT)
Dept: PHYSICAL THERAPY | Age: 81
End: 2021-08-26
Attending: INTERNAL MEDICINE
Payer: MEDICARE

## 2021-08-26 ENCOUNTER — APPOINTMENT (OUTPATIENT)
Dept: CARDIAC REHAB | Facility: HOSPITAL | Age: 81
End: 2021-08-26
Attending: INTERNAL MEDICINE
Payer: MEDICARE

## 2021-08-26 ENCOUNTER — TELEPHONE (OUTPATIENT)
Dept: PHYSICAL THERAPY | Facility: HOSPITAL | Age: 81
End: 2021-08-26

## 2021-08-31 ENCOUNTER — APPOINTMENT (OUTPATIENT)
Dept: CARDIAC REHAB | Facility: HOSPITAL | Age: 81
End: 2021-08-31
Attending: INTERNAL MEDICINE
Payer: MEDICARE

## 2021-09-02 ENCOUNTER — OFFICE VISIT (OUTPATIENT)
Dept: PHYSICAL THERAPY | Age: 81
End: 2021-09-02
Attending: INTERNAL MEDICINE
Payer: MEDICARE

## 2021-09-02 PROCEDURE — 97110 THERAPEUTIC EXERCISES: CPT

## 2021-09-02 NOTE — PROGRESS NOTES
Dx:  Rheumatoid arthritis of multiple sites with negative rheumatoid factor (HCC) (M06.09)  Gait abnormality (R26.9)           Insurance   [facility]         Authorized  # visits by insurance  :  10   Expiration date  of Authorization: (90 days from eval otherwis norms by age > or = to. ..                 63-63 y/o Men: 15, Women: 16                 69-71 y/o Men: 15, Women: 7                 75-74 y/o Men: 15, Women: 8                 71-74 y/o Men: 6, Women: 8                 80-79 y/o Men: 8, Women: 9 /10   Date: 8/12/21  Tx#: 6/10  FOTO   Theraex: Nu step level 5 x6 mins  Sit to stand test and tandem test  Shuttle BLE 6 bands 3x10  Shuttle R/ LE 4 bands 3x10  Standing hip ex RTB 2x10: flex/abd/ext on foam  Rocking board side to side WB  Step over disc

## 2021-09-08 DIAGNOSIS — E78.00 HYPERCHOLESTEROLEMIA: ICD-10-CM

## 2021-09-08 RX ORDER — NIACIN 750 MG/1
750 TABLET, EXTENDED RELEASE ORAL EVERY EVENING
Qty: 90 TABLET | Refills: 3 | Status: SHIPPED | OUTPATIENT
Start: 2021-09-08

## 2021-09-08 NOTE — TELEPHONE ENCOUNTER
Please review. Protocol failed / No Protocol.     Requested Prescriptions   Pending Prescriptions Disp Refills    NIACIN ER, ANTIHYPERLIPIDEMIC, 750 MG Oral Tab CR [Pharmacy Med Name: NIACIN  750MG  TAB  EXTENDED RELEASE] 90 tablet 3     Sig: TAKE 1 TABLET

## 2021-09-17 ENCOUNTER — TELEPHONE (OUTPATIENT)
Dept: INTERNAL MEDICINE CLINIC | Facility: CLINIC | Age: 81
End: 2021-09-17

## 2021-09-17 NOTE — TELEPHONE ENCOUNTER
Reached patient for medication adherence consult. Past due for refills on lisinopril and pravastatin; last filled 5/18/21 for 90 day supply.  Patient states she recently received the pravastatin in the mail and thinks she still has plenty lisinopril lef

## 2021-09-20 RX ORDER — COLCHICINE 0.6 MG/1
TABLET ORAL
Qty: 45 TABLET | Refills: 0 | Status: SHIPPED | OUTPATIENT
Start: 2021-09-20 | End: 2021-12-20

## 2021-09-26 DIAGNOSIS — K21.9 GASTROESOPHAGEAL REFLUX DISEASE: ICD-10-CM

## 2021-09-26 RX ORDER — PANTOPRAZOLE SODIUM 40 MG/1
TABLET, DELAYED RELEASE ORAL
Qty: 90 TABLET | Refills: 1 | Status: SHIPPED | OUTPATIENT
Start: 2021-09-26

## 2021-10-04 ENCOUNTER — LAB ENCOUNTER (OUTPATIENT)
Dept: LAB | Facility: HOSPITAL | Age: 81
End: 2021-10-04
Attending: INTERNAL MEDICINE
Payer: MEDICARE

## 2021-10-04 ENCOUNTER — TELEPHONE (OUTPATIENT)
Dept: CARDIOLOGY | Age: 81
End: 2021-10-04

## 2021-10-04 DIAGNOSIS — E78.2 MIXED HYPERLIPIDEMIA: ICD-10-CM

## 2021-10-04 DIAGNOSIS — M1A.9XX1 CHRONIC TOPHACEOUS GOUT OF RIGHT FOOT: ICD-10-CM

## 2021-10-04 PROCEDURE — 80061 LIPID PANEL: CPT

## 2021-10-04 PROCEDURE — 84443 ASSAY THYROID STIM HORMONE: CPT

## 2021-10-04 PROCEDURE — 36415 COLL VENOUS BLD VENIPUNCTURE: CPT

## 2021-10-04 PROCEDURE — 84550 ASSAY OF BLOOD/URIC ACID: CPT

## 2021-10-04 PROCEDURE — 80053 COMPREHEN METABOLIC PANEL: CPT

## 2021-10-04 NOTE — PROGRESS NOTES
[facility] is an 71-year-old patient of Dr. Nancy Gonzales with seronegative rheumatoid arthritis. Since I last saw her August 24th of 2021, she has continued Enbrel weekly, methotrexate 15 mg weekly, and Plaquenil 400mg daily.   She feels like her rheumatoid arthritis moderate pulmonary hypertension. She becomes tachycardiac with exercise, but her oxygen saturations are good. Review of Systems   Constitutional: Positive for fatigue. Negative for fever, chills and diaphoresis.    Respiratory:Positive for shortness of b drug monitoring. 3. Primary osteoarthritis. Status post successful right ankle surgery. She is continuing to have mechanical problems with her low back and knees. Recent sciatica, doing better after steroids. 4. Low white count. Resolved.  Again anemi

## 2021-10-05 ENCOUNTER — OFFICE VISIT (OUTPATIENT)
Dept: RHEUMATOLOGY | Facility: CLINIC | Age: 81
End: 2021-10-05
Payer: COMMERCIAL

## 2021-10-05 VITALS
WEIGHT: 239 LBS | SYSTOLIC BLOOD PRESSURE: 101 MMHG | DIASTOLIC BLOOD PRESSURE: 58 MMHG | BODY MASS INDEX: 40.8 KG/M2 | HEART RATE: 86 BPM | HEIGHT: 64 IN

## 2021-10-05 DIAGNOSIS — M06.09 RHEUMATOID ARTHRITIS OF MULTIPLE SITES WITH NEGATIVE RHEUMATOID FACTOR (HCC): Primary | ICD-10-CM

## 2021-10-05 DIAGNOSIS — Z51.81 ENCOUNTER FOR THERAPEUTIC DRUG MONITORING: ICD-10-CM

## 2021-10-05 DIAGNOSIS — M1A.9XX1 CHRONIC TOPHACEOUS GOUT OF RIGHT FOOT: ICD-10-CM

## 2021-10-05 PROCEDURE — 3078F DIAST BP <80 MM HG: CPT | Performed by: INTERNAL MEDICINE

## 2021-10-05 PROCEDURE — 3008F BODY MASS INDEX DOCD: CPT | Performed by: INTERNAL MEDICINE

## 2021-10-05 PROCEDURE — 99214 OFFICE O/P EST MOD 30 MIN: CPT | Performed by: INTERNAL MEDICINE

## 2021-10-05 PROCEDURE — 3074F SYST BP LT 130 MM HG: CPT | Performed by: INTERNAL MEDICINE

## 2021-10-07 ENCOUNTER — APPOINTMENT (OUTPATIENT)
Dept: URBAN - METROPOLITAN AREA CLINIC 321 | Age: 81
Setting detail: DERMATOLOGY
End: 2021-10-07

## 2021-10-07 DIAGNOSIS — L81.4 OTHER MELANIN HYPERPIGMENTATION: ICD-10-CM

## 2021-10-07 DIAGNOSIS — I87.2 VENOUS INSUFFICIENCY (CHRONIC) (PERIPHERAL): ICD-10-CM

## 2021-10-07 DIAGNOSIS — Z85.828 PERSONAL HISTORY OF OTHER MALIGNANT NEOPLASM OF SKIN: ICD-10-CM

## 2021-10-07 DIAGNOSIS — L82.1 OTHER SEBORRHEIC KERATOSIS: ICD-10-CM

## 2021-10-07 DIAGNOSIS — D22 MELANOCYTIC NEVI: ICD-10-CM

## 2021-10-07 PROBLEM — D22.5 MELANOCYTIC NEVI OF TRUNK: Status: ACTIVE | Noted: 2021-10-07

## 2021-10-07 PROCEDURE — 99213 OFFICE O/P EST LOW 20 MIN: CPT

## 2021-10-07 PROCEDURE — OTHER COUNSELING: OTHER

## 2021-10-07 ASSESSMENT — LOCATION DETAILED DESCRIPTION DERM
LOCATION DETAILED: LEFT DISTAL PRETIBIAL REGION
LOCATION DETAILED: RIGHT MID-UPPER BACK
LOCATION DETAILED: RIGHT SUPERIOR MEDIAL UPPER BACK
LOCATION DETAILED: LEFT ANTERIOR PROXIMAL UPPER ARM
LOCATION DETAILED: UPPER STERNUM
LOCATION DETAILED: LEFT MEDIAL UPPER BACK
LOCATION DETAILED: RIGHT SUPERIOR UPPER BACK
LOCATION DETAILED: RIGHT DISTAL PRETIBIAL REGION

## 2021-10-07 ASSESSMENT — LOCATION SIMPLE DESCRIPTION DERM
LOCATION SIMPLE: LEFT UPPER BACK
LOCATION SIMPLE: RIGHT PRETIBIAL REGION
LOCATION SIMPLE: CHEST
LOCATION SIMPLE: RIGHT UPPER BACK
LOCATION SIMPLE: LEFT PRETIBIAL REGION
LOCATION SIMPLE: LEFT UPPER ARM

## 2021-10-07 ASSESSMENT — LOCATION ZONE DERM
LOCATION ZONE: TRUNK
LOCATION ZONE: ARM
LOCATION ZONE: LEG

## 2021-10-13 ENCOUNTER — LAB ENCOUNTER (OUTPATIENT)
Dept: LAB | Facility: HOSPITAL | Age: 81
End: 2021-10-13
Attending: INTERNAL MEDICINE
Payer: MEDICARE

## 2021-10-13 DIAGNOSIS — I70.0 ATHEROSCLEROSIS OF AORTA (HCC): ICD-10-CM

## 2021-10-13 DIAGNOSIS — I25.110 ATHEROSCLEROTIC HEART DISEASE OF NATIVE CORONARY ARTERY WITH UNSTABLE ANGINA PECTORIS (HCC): ICD-10-CM

## 2021-10-13 DIAGNOSIS — I10 HTN (HYPERTENSION): Primary | ICD-10-CM

## 2021-10-13 DIAGNOSIS — I51.9 DIASTOLIC DYSFUNCTION, LEFT VENTRICLE: ICD-10-CM

## 2021-10-13 PROCEDURE — 36415 COLL VENOUS BLD VENIPUNCTURE: CPT

## 2021-10-13 PROCEDURE — 80048 BASIC METABOLIC PNL TOTAL CA: CPT

## 2021-10-14 DIAGNOSIS — E78.2 MIXED HYPERLIPIDEMIA: ICD-10-CM

## 2021-10-14 DIAGNOSIS — R32 URINARY INCONTINENCE, UNSPECIFIED TYPE: ICD-10-CM

## 2021-10-14 DIAGNOSIS — E03.9 ACQUIRED HYPOTHYROIDISM: ICD-10-CM

## 2021-10-14 DIAGNOSIS — I10 ESSENTIAL HYPERTENSION WITH GOAL BLOOD PRESSURE LESS THAN 140/90: ICD-10-CM

## 2021-10-15 RX ORDER — LEVOTHYROXINE SODIUM 137 UG/1
TABLET ORAL
Qty: 90 TABLET | Refills: 3 | Status: SHIPPED | OUTPATIENT
Start: 2021-10-15

## 2021-10-15 RX ORDER — PRAVASTATIN SODIUM 20 MG
TABLET ORAL
Qty: 90 TABLET | Refills: 3 | Status: SHIPPED | OUTPATIENT
Start: 2021-10-15

## 2021-10-15 RX ORDER — SOLIFENACIN SUCCINATE 10 MG/1
TABLET, FILM COATED ORAL
Qty: 90 TABLET | Refills: 3 | Status: SHIPPED | OUTPATIENT
Start: 2021-10-15

## 2021-10-15 RX ORDER — LISINOPRIL 40 MG/1
TABLET ORAL
Qty: 90 TABLET | Refills: 3 | Status: SHIPPED | OUTPATIENT
Start: 2021-10-15

## 2021-10-15 RX ORDER — AMLODIPINE BESYLATE 10 MG/1
TABLET ORAL
Qty: 90 TABLET | Refills: 3 | Status: SHIPPED | OUTPATIENT
Start: 2021-10-15

## 2021-10-29 ENCOUNTER — APPOINTMENT (OUTPATIENT)
Dept: CARDIOLOGY | Age: 81
End: 2021-10-29

## 2021-11-02 PROBLEM — I48.91 ATRIAL FIBRILLATION (HCC): Status: ACTIVE | Noted: 2021-11-02

## 2021-11-17 NOTE — H&P
The above referenced H&P was reviewed by Popeye Rm MD on 11/22/2021, the patient was examined and no significant changes have occurred in the patient's condition since the H&P was performed.   Risks and benefits were discussed, proceed with procedure a

## 2021-11-19 ENCOUNTER — NURSE ONLY (OUTPATIENT)
Dept: LAB | Facility: HOSPITAL | Age: 81
End: 2021-11-19
Attending: INTERNAL MEDICINE
Payer: MEDICARE

## 2021-11-19 DIAGNOSIS — Z01.818 PREOP TESTING: ICD-10-CM

## 2021-11-19 DIAGNOSIS — R79.89 ABNORMAL LIVER FUNCTION TESTS: ICD-10-CM

## 2021-11-19 DIAGNOSIS — E87.6 HYPOKALEMIA: ICD-10-CM

## 2021-11-19 PROCEDURE — 86803 HEPATITIS C AB TEST: CPT

## 2021-11-19 PROCEDURE — 80048 BASIC METABOLIC PNL TOTAL CA: CPT

## 2021-11-19 PROCEDURE — 86706 HEP B SURFACE ANTIBODY: CPT

## 2021-11-19 PROCEDURE — 80500 HEPATITIS B PROFILE: CPT

## 2021-11-19 PROCEDURE — 80076 HEPATIC FUNCTION PANEL: CPT

## 2021-11-19 PROCEDURE — 87340 HEPATITIS B SURFACE AG IA: CPT

## 2021-11-19 PROCEDURE — 86704 HEP B CORE ANTIBODY TOTAL: CPT

## 2021-11-19 PROCEDURE — 36415 COLL VENOUS BLD VENIPUNCTURE: CPT

## 2021-11-22 ENCOUNTER — HOSPITAL ENCOUNTER (OUTPATIENT)
Dept: INTERVENTIONAL RADIOLOGY/VASCULAR | Facility: HOSPITAL | Age: 81
Discharge: HOME | End: 2021-11-22
Attending: INTERNAL MEDICINE | Admitting: INTERNAL MEDICINE
Payer: MEDICARE

## 2021-11-22 VITALS
BODY MASS INDEX: 39 KG/M2 | SYSTOLIC BLOOD PRESSURE: 107 MMHG | HEART RATE: 52 BPM | WEIGHT: 225 LBS | RESPIRATION RATE: 14 BRPM | OXYGEN SATURATION: 99 % | DIASTOLIC BLOOD PRESSURE: 58 MMHG | TEMPERATURE: 98 F

## 2021-11-22 DIAGNOSIS — I48.91 ATRIAL FIBRILLATION (HCC): ICD-10-CM

## 2021-11-22 DIAGNOSIS — Z01.818 PREOP TESTING: Primary | ICD-10-CM

## 2021-11-22 PROCEDURE — 36415 COLL VENOUS BLD VENIPUNCTURE: CPT

## 2021-11-22 PROCEDURE — 93005 ELECTROCARDIOGRAM TRACING: CPT

## 2021-11-22 PROCEDURE — 5A2204Z RESTORATION OF CARDIAC RHYTHM, SINGLE: ICD-10-PCS | Performed by: INTERNAL MEDICINE

## 2021-11-22 PROCEDURE — 92960 CARDIOVERSION ELECTRIC EXT: CPT

## 2021-11-22 PROCEDURE — 93010 ELECTROCARDIOGRAM REPORT: CPT | Performed by: INTERNAL MEDICINE

## 2021-11-22 RX ORDER — SODIUM CHLORIDE 9 MG/ML
INJECTION, SOLUTION INTRAVENOUS CONTINUOUS
Status: DISCONTINUED | OUTPATIENT
Start: 2021-11-22 | End: 2021-11-22

## 2021-11-22 RX ORDER — NEBIVOLOL 10 MG/1
TABLET ORAL
Qty: 30 TABLET | Refills: 5 | Status: SHIPPED | OUTPATIENT
Start: 2021-11-22

## 2021-11-22 NOTE — IVS NOTE
Post Procedure: CARDIOVERSION    Sedation: Brevital - 50mg in divided doses: please see bedside sedation flowsheet.   Pre-Procedure Rhythm: Afib  Post-Procedure Rhythm:NSR  Complications: None  EKG: Yes  Patient tolerance: Patient tolerated the procedure we

## 2021-11-22 NOTE — PROCEDURES
PROCEDURE(S) PERFORMED:    1. Cardioversion. 2.     Sedation     :  Nabila Duque MD    ANESTHESIA:  IV sedation.   Moderate conscious sedation for this procedure was administered and personally monitored from 2:30 PM until 2:45 PM.     INDICAT

## 2021-11-23 ENCOUNTER — OFFICE VISIT (OUTPATIENT)
Dept: INTERNAL MEDICINE CLINIC | Facility: CLINIC | Age: 81
End: 2021-11-23
Payer: COMMERCIAL

## 2021-11-23 VITALS
DIASTOLIC BLOOD PRESSURE: 66 MMHG | BODY MASS INDEX: 38.7 KG/M2 | RESPIRATION RATE: 16 BRPM | SYSTOLIC BLOOD PRESSURE: 127 MMHG | HEART RATE: 56 BPM | WEIGHT: 226.69 LBS | HEIGHT: 64 IN

## 2021-11-23 DIAGNOSIS — N18.32 STAGE 3B CHRONIC KIDNEY DISEASE (HCC): ICD-10-CM

## 2021-11-23 DIAGNOSIS — L98.8 SKIN LESION OF BREAST: ICD-10-CM

## 2021-11-23 DIAGNOSIS — I10 ESSENTIAL HYPERTENSION: Primary | ICD-10-CM

## 2021-11-23 DIAGNOSIS — Z86.79 HISTORY OF ATRIAL FIBRILLATION: ICD-10-CM

## 2021-11-23 PROCEDURE — 90662 IIV NO PRSV INCREASED AG IM: CPT | Performed by: INTERNAL MEDICINE

## 2021-11-23 PROCEDURE — 3008F BODY MASS INDEX DOCD: CPT | Performed by: INTERNAL MEDICINE

## 2021-11-23 PROCEDURE — 3074F SYST BP LT 130 MM HG: CPT | Performed by: INTERNAL MEDICINE

## 2021-11-23 PROCEDURE — 99214 OFFICE O/P EST MOD 30 MIN: CPT | Performed by: INTERNAL MEDICINE

## 2021-11-23 PROCEDURE — 3078F DIAST BP <80 MM HG: CPT | Performed by: INTERNAL MEDICINE

## 2021-11-23 PROCEDURE — G0008 ADMIN INFLUENZA VIRUS VAC: HCPCS | Performed by: INTERNAL MEDICINE

## 2021-11-23 PROCEDURE — 1111F DSCHRG MED/CURRENT MED MERGE: CPT | Performed by: INTERNAL MEDICINE

## 2021-11-23 RX ORDER — CLONIDINE 0.1 MG/24H
PATCH, EXTENDED RELEASE TRANSDERMAL
Qty: 12 PATCH | Refills: 1 | Status: SHIPPED | OUTPATIENT
Start: 2021-11-23

## 2021-11-23 RX ORDER — APIXABAN 5 MG/1
TABLET, FILM COATED ORAL
COMMUNITY
Start: 2021-11-17

## 2021-11-23 RX ORDER — BACITRACIN 500 [USP'U]/G
OINTMENT OPHTHALMIC
COMMUNITY
Start: 2021-11-11

## 2021-11-23 NOTE — ASSESSMENT & PLAN NOTE
Patient is in normal sinus rhythm today after the cardioversion yesterday.   She will monitor her symptoms and follow-up with a cardiologist.

## 2021-11-23 NOTE — PROGRESS NOTES
HPI:    Patient ID: Ivan Overall is a 79year old female. HPI:  Pt had a cardioversion yesterday. It was done because of NICOLE and extreme fatigue. She was able to walk to the car at the end of the driveway. Her legs have been a little more swollen. tablet 5   • potassium chloride 10 MEQ Oral Tab CR Take 1 tablet (10 mEq total) by mouth daily.  90 tablet 0   • LEVOTHYROXINE 137 MCG Oral Tab TAKE 1 TABLET BY MOUTH IN  THE MORNING BEFORE  BREAKFAST 90 tablet 3   • LISINOPRIL 40 MG Oral Tab TAKE 1 TABLET acetaminophen (TYLENOL EXTRA STRENGTH) 500 MG Oral Tab Take 500 mg by mouth every 6 (six) hours as needed. • Cholecalciferol 125 MCG (5000 UT) Oral Tab Take 1 capsule by mouth daily. Take 1 cap.  every day      • aspirin 81 MG Oral Chew Tab Chew 81 mg b nipple discharge or tenderness. Left: No inverted nipple, mass, nipple discharge, skin change or tenderness. Neurological:      Mental Status: She is alert and oriented to person, place, and time.                  ASSESSMENT/PLAN:     Problem L

## 2021-11-23 NOTE — ASSESSMENT & PLAN NOTE
Advised patient to drink plenty of water. Her kidney function is stable.   Recheck renal function panel with next blood test.

## 2021-11-23 NOTE — ASSESSMENT & PLAN NOTE
Patient has an inflamed papule on her left nipple which bled slightly last week. No breast masses or axillary lymphadenopathy. We will do a diagnostic mammogram and ultrasound. Follow-up in 4 weeks.

## 2021-11-30 ENCOUNTER — HOSPITAL ENCOUNTER (OUTPATIENT)
Dept: ULTRASOUND IMAGING | Facility: HOSPITAL | Age: 81
Discharge: HOME | End: 2021-11-30
Attending: INTERNAL MEDICINE
Payer: MEDICARE

## 2021-11-30 ENCOUNTER — HOSPITAL ENCOUNTER (OUTPATIENT)
Dept: MAMMOGRAPHY | Facility: HOSPITAL | Age: 81
Discharge: HOME | End: 2021-11-30
Attending: INTERNAL MEDICINE
Payer: MEDICARE

## 2021-11-30 DIAGNOSIS — L98.8 SKIN LESION OF BREAST: ICD-10-CM

## 2021-11-30 PROCEDURE — 77065 DX MAMMO INCL CAD UNI: CPT | Performed by: INTERNAL MEDICINE

## 2021-11-30 PROCEDURE — 77061 BREAST TOMOSYNTHESIS UNI: CPT | Performed by: INTERNAL MEDICINE

## 2021-11-30 PROCEDURE — 76642 ULTRASOUND BREAST LIMITED: CPT | Performed by: INTERNAL MEDICINE

## 2021-12-06 RX ORDER — ETANERCEPT 50 MG/ML
SOLUTION SUBCUTANEOUS
Qty: 12 ML | Refills: 1 | Status: SHIPPED
Start: 2021-12-06 | End: 2021-12-08

## 2021-12-06 NOTE — TELEPHONE ENCOUNTER
LOV: 10/5/21  Future Appointments   Date Time Provider Taqueria Wise   12/21/2021 10:50 AM Yohannes Wilkins MD [facility]   1/11/2022 10:00 AM Beatriz Rose MD [facility] [place] [facility] [place]    LABS:  Component      Latest Ref Rng & Units 8/19/2021 7/1 surgery. She is continuing to have mechanical problems with her low back and knees. Recent sciatica, doing better after steroids. 4. Low white count. Resolved. Again anemia. H/o borderline thrombocytopenia. Her counts will be followed closely.   5.  R

## 2021-12-08 DIAGNOSIS — M06.09 RHEUMATOID ARTHRITIS OF MULTIPLE SITES WITH NEGATIVE RHEUMATOID FACTOR (HCC): Primary | ICD-10-CM

## 2021-12-08 RX ORDER — ETANERCEPT 50 MG/ML
SOLUTION SUBCUTANEOUS
Qty: 12 ML | Refills: 1 | Status: SHIPPED | OUTPATIENT
Start: 2021-12-08

## 2021-12-08 NOTE — TELEPHONE ENCOUNTER
Resending script.    ENBREL SURECLICK 50 MG/ML Subcutaneous Solution Auto-injector 12 mL 1 12/6/2021     Sig: INJECT 50MG SUBCUTANEOUSLY  WEEKLY    Sent to pharmacy as: Enbrel SureClick 50 MG/ML Subcutaneous Solution Auto-injector (Etanercept)    Class: Fax

## 2021-12-10 ENCOUNTER — APPOINTMENT (OUTPATIENT)
Dept: URBAN - METROPOLITAN AREA CLINIC 321 | Age: 81
Setting detail: DERMATOLOGY
End: 2021-12-10

## 2021-12-10 DIAGNOSIS — R21 RASH AND OTHER NONSPECIFIC SKIN ERUPTION: ICD-10-CM

## 2021-12-10 PROCEDURE — 99213 OFFICE O/P EST LOW 20 MIN: CPT

## 2021-12-10 PROCEDURE — OTHER ADDITIONAL NOTES: OTHER

## 2021-12-10 PROCEDURE — OTHER COUNSELING: OTHER

## 2021-12-10 ASSESSMENT — LOCATION SIMPLE DESCRIPTION DERM: LOCATION SIMPLE: LEFT BREAST

## 2021-12-10 ASSESSMENT — LOCATION ZONE DERM: LOCATION ZONE: TRUNK

## 2021-12-10 ASSESSMENT — LOCATION DETAILED DESCRIPTION DERM: LOCATION DETAILED: LEFT NIPPLE

## 2021-12-10 NOTE — PROCEDURE: ADDITIONAL NOTES
Render Risk Assessment In Note?: no
Additional Notes: Pt will RTC if rash reoccurs
Detail Level: Simple

## 2021-12-10 NOTE — PROCEDURE: COUNSELING
Detail Level: Detailed
Patient Specific Counseling (Will Not Stick From Patient To Patient): Etiology unclear - possible fixed drug, but no likely candidate drug.  RTC same day if rash recurs

## 2021-12-16 RX ORDER — HYDROCHLOROTHIAZIDE 12.5 MG/1
TABLET ORAL
Qty: 90 TABLET | Refills: 3 | Status: SHIPPED | OUTPATIENT
Start: 2021-12-16 | End: 2022-01-25

## 2021-12-20 RX ORDER — COLCHICINE 0.6 MG/1
TABLET ORAL
Qty: 45 TABLET | Refills: 1 | Status: SHIPPED | OUTPATIENT
Start: 2021-12-20 | End: 2022-01-25

## 2021-12-21 ENCOUNTER — OFFICE VISIT (OUTPATIENT)
Dept: INTERNAL MEDICINE CLINIC | Facility: CLINIC | Age: 81
End: 2021-12-21
Payer: COMMERCIAL

## 2021-12-21 VITALS
WEIGHT: 221.31 LBS | BODY MASS INDEX: 37.78 KG/M2 | DIASTOLIC BLOOD PRESSURE: 86 MMHG | HEART RATE: 73 BPM | RESPIRATION RATE: 16 BRPM | SYSTOLIC BLOOD PRESSURE: 126 MMHG | HEIGHT: 64 IN

## 2021-12-21 DIAGNOSIS — R53.83 FATIGUE, UNSPECIFIED TYPE: ICD-10-CM

## 2021-12-21 DIAGNOSIS — D64.9 ANEMIA, UNSPECIFIED TYPE: ICD-10-CM

## 2021-12-21 DIAGNOSIS — Z99.89 OSA ON CPAP: Primary | ICD-10-CM

## 2021-12-21 DIAGNOSIS — M15.9 PRIMARY OSTEOARTHRITIS INVOLVING MULTIPLE JOINTS: ICD-10-CM

## 2021-12-21 DIAGNOSIS — G47.33 OSA ON CPAP: Primary | ICD-10-CM

## 2021-12-21 DIAGNOSIS — I10 ESSENTIAL HYPERTENSION: ICD-10-CM

## 2021-12-21 PROBLEM — M00.9: Status: RESOLVED | Noted: 2021-04-29 | Resolved: 2021-12-21

## 2021-12-21 PROBLEM — L98.8 SKIN LESION OF BREAST: Status: RESOLVED | Noted: 2021-11-23 | Resolved: 2021-12-21

## 2021-12-21 PROCEDURE — 3079F DIAST BP 80-89 MM HG: CPT | Performed by: INTERNAL MEDICINE

## 2021-12-21 PROCEDURE — 3074F SYST BP LT 130 MM HG: CPT | Performed by: INTERNAL MEDICINE

## 2021-12-21 PROCEDURE — 99214 OFFICE O/P EST MOD 30 MIN: CPT | Performed by: INTERNAL MEDICINE

## 2021-12-21 PROCEDURE — 3008F BODY MASS INDEX DOCD: CPT | Performed by: INTERNAL MEDICINE

## 2021-12-21 NOTE — PATIENT INSTRUCTIONS
You can schedule a COVID vaccine appointment on the [facility] website, on [address], or by calling [phone] and choosing option #3 (schedule an appointment).  The scheduling telephone line is available Monday – Friday, 7 a.m. to 6 p.m. and Saturday, 8 a.m. to 1

## 2021-12-21 NOTE — ASSESSMENT & PLAN NOTE
Patient's fatigue is also likely multifactorial.  This may be due to both heart failure and poor sleep. She will be having a stress test at [facility]. Follow-up with cardiology.

## 2021-12-21 NOTE — ASSESSMENT & PLAN NOTE
Patient does not sleep well. This may be due to frequent urination, however I recommend that with Dr. Emili Miller to make sure that her CPAP is optimized.

## 2021-12-21 NOTE — PROGRESS NOTES
HPI:    Patient ID: Andrew Dobson is a 79year old female. HPI:  Pt's skin lesion on her breast resolved. She is still extremely tired, even though she had the cardioversion. She has been tired for a few months. She has mild arthritis pain every day. Apply a 1-2mm strip along both lids before bed for 7-10 days.      • cloNIDine 0.1 MG/24HR Transdermal Patch Weekly APPLY 1 PATCH TOPICALLY TO  SKIN ONCE WEEKLY 12 patch 1   • nebivolol (BYSTOLIC) 10 MG Oral Tab TAKE 1 TABLET BY MOUTH ONCE DAILY EQUIVALENT (TYLENOL EXTRA STRENGTH) 500 MG Oral Tab Take 500 mg by mouth every 6 (six) hours as needed. • Cholecalciferol 125 MCG (5000 UT) Oral Tab Take 1 capsule by mouth daily. Take 1 cap.  every day      • aspirin 81 MG Oral Chew Tab Chew 81 mg by mouth nightl normal.   Cardiovascular:      Rate and Rhythm: Normal rate and regular rhythm. Heart sounds: Normal heart sounds. No murmur heard. Pulmonary:      Effort: Pulmonary effort is normal. No respiratory distress.       Breath sounds: Normal breath cuate

## 2021-12-21 NOTE — ASSESSMENT & PLAN NOTE
Controlled. Continue present management. Patient's blood pressure medications were recently refilled.

## 2021-12-29 ENCOUNTER — IMMUNIZATION (OUTPATIENT)
Dept: LAB | Facility: HOSPITAL | Age: 81
End: 2021-12-29
Attending: EMERGENCY MEDICINE
Payer: MEDICARE

## 2021-12-29 DIAGNOSIS — Z23 NEED FOR VACCINATION: Primary | ICD-10-CM

## 2021-12-29 PROCEDURE — 0064A SARSCOV2 VAC 50MCG/0.25ML IM: CPT

## 2022-01-12 RX ORDER — METHOTREXATE 2.5 MG/1
TABLET ORAL
Qty: 78 TABLET | Refills: 1 | Status: SHIPPED | OUTPATIENT
Start: 2022-01-12 | End: 2022-05-03

## 2022-01-13 NOTE — TELEPHONE ENCOUNTER
Last filled: 4/9/21 #96 tab with 1 refill   LOV: 10/5/21  Future Appointments   Date Time Provider Taqueria Wise   1/25/2022 10:20 AM Bessy Del Toro MD [facility] [place]   4/26/2022 10:50 AM Sanjuana Pierre MD [facility]

## 2022-01-21 ENCOUNTER — LAB ENCOUNTER (OUTPATIENT)
Dept: LAB | Facility: HOSPITAL | Age: 82
End: 2022-01-21
Attending: INTERNAL MEDICINE
Payer: MEDICARE

## 2022-01-21 DIAGNOSIS — D64.9 ANEMIA, UNSPECIFIED TYPE: ICD-10-CM

## 2022-01-21 DIAGNOSIS — M06.09 RHEUMATOID ARTHRITIS OF MULTIPLE SITES WITH NEGATIVE RHEUMATOID FACTOR (HCC): ICD-10-CM

## 2022-01-21 DIAGNOSIS — N18.32 STAGE 3B CHRONIC KIDNEY DISEASE (HCC): ICD-10-CM

## 2022-01-21 DIAGNOSIS — Z51.81 ENCOUNTER FOR THERAPEUTIC DRUG MONITORING: ICD-10-CM

## 2022-01-21 LAB
ALBUMIN SERPL-MCNC: 3.9 G/DL (ref 3.4–5)
ANION GAP SERPL CALC-SCNC: 6 MMOL/L (ref 0–18)
AST SERPL-CCNC: 24 U/L (ref 15–37)
BASOPHILS # BLD AUTO: 0.05 X10(3) UL (ref 0–0.2)
BASOPHILS NFR BLD AUTO: 0.8 %
BUN BLD-MCNC: 30 MG/DL (ref 7–18)
BUN/CREAT SERPL: 18.3 (ref 10–20)
CALCIUM BLD-MCNC: 10 MG/DL (ref 8.5–10.1)
CHLORIDE SERPL-SCNC: 103 MMOL/L (ref 98–112)
CO2 SERPL-SCNC: 26 MMOL/L (ref 21–32)
CREAT BLD-MCNC: 1.64 MG/DL
CRP SERPL-MCNC: <0.29 MG/DL (ref ?–0.3)
DEPRECATED RDW RBC AUTO: 58.4 FL (ref 35.1–46.3)
EOSINOPHIL # BLD AUTO: 0.14 X10(3) UL (ref 0–0.7)
EOSINOPHIL NFR BLD AUTO: 2.4 %
ERYTHROCYTE [DISTWIDTH] IN BLOOD BY AUTOMATED COUNT: 15.3 % (ref 11–15)
ERYTHROCYTE [SEDIMENTATION RATE] IN BLOOD: 27 MM/HR
GLUCOSE BLD-MCNC: 105 MG/DL (ref 70–99)
HCT VFR BLD AUTO: 33.8 %
HGB BLD-MCNC: 11 G/DL
IMM GRANULOCYTES # BLD AUTO: 0.03 X10(3) UL (ref 0–1)
IMM GRANULOCYTES NFR BLD: 0.5 %
LYMPHOCYTES # BLD AUTO: 2.24 X10(3) UL (ref 1–4)
LYMPHOCYTES NFR BLD AUTO: 37.8 %
MCH RBC QN AUTO: 34.2 PG (ref 26–34)
MCHC RBC AUTO-ENTMCNC: 32.5 G/DL (ref 31–37)
MCV RBC AUTO: 105 FL
MONOCYTES # BLD AUTO: 0.9 X10(3) UL (ref 0.1–1)
MONOCYTES NFR BLD AUTO: 15.2 %
NEUTROPHILS # BLD AUTO: 2.56 X10 (3) UL (ref 1.5–7.7)
NEUTROPHILS # BLD AUTO: 2.56 X10(3) UL (ref 1.5–7.7)
NEUTROPHILS NFR BLD AUTO: 43.3 %
OSMOLALITY SERPL CALC.SUM OF ELEC: 287 MOSM/KG (ref 275–295)
PHOSPHATE SERPL-MCNC: 3.9 MG/DL (ref 2.5–4.9)
PLATELET # BLD AUTO: 181 10(3)UL (ref 150–450)
POTASSIUM SERPL-SCNC: 4.1 MMOL/L (ref 3.5–5.1)
RBC # BLD AUTO: 3.22 X10(6)UL
SODIUM SERPL-SCNC: 135 MMOL/L (ref 136–145)
WBC # BLD AUTO: 5.9 X10(3) UL (ref 4–11)

## 2022-01-21 PROCEDURE — 86140 C-REACTIVE PROTEIN: CPT

## 2022-01-21 PROCEDURE — 36415 COLL VENOUS BLD VENIPUNCTURE: CPT

## 2022-01-21 PROCEDURE — 85652 RBC SED RATE AUTOMATED: CPT

## 2022-01-21 PROCEDURE — 84450 TRANSFERASE (AST) (SGOT): CPT

## 2022-01-21 PROCEDURE — 85025 COMPLETE CBC W/AUTO DIFF WBC: CPT

## 2022-01-21 PROCEDURE — 80069 RENAL FUNCTION PANEL: CPT

## 2022-01-22 NOTE — PROGRESS NOTES
Collin Gómez is an 43-year-old patient of Dr. Bib Patel with seronegative rheumatoid arthritis. Since I last saw her October 5th of 2021, she has continued Enbrel weekly, methotrexate 15 mg weekly, and Plaquenil 400mg daily.   She feels like her rheumatoid arthritis Systems   Constitutional: Positive for fatigue. Negative for fever, chills and diaphoresis. Respiratory:Positive for shortness of breath. Cardiovascular: Negative for chest pain. Gastrointestinal: Negative for abdominal pain.    Musculoskeletal: Posi osteoarthritis. Status post successful right ankle surgery. She is continuing to have mechanical problems with her low back and knees. Recent sciatica, doing better after steroids. Probable iliotibial band syndrome. She will work on stretching.   4.  Low

## 2022-01-25 ENCOUNTER — OFFICE VISIT (OUTPATIENT)
Dept: RHEUMATOLOGY | Facility: CLINIC | Age: 82
End: 2022-01-25
Payer: COMMERCIAL

## 2022-01-25 VITALS
BODY MASS INDEX: 36.19 KG/M2 | WEIGHT: 212 LBS | SYSTOLIC BLOOD PRESSURE: 101 MMHG | HEART RATE: 64 BPM | HEIGHT: 64 IN | DIASTOLIC BLOOD PRESSURE: 61 MMHG

## 2022-01-25 DIAGNOSIS — M06.09 RHEUMATOID ARTHRITIS OF MULTIPLE SITES WITH NEGATIVE RHEUMATOID FACTOR (HCC): Primary | ICD-10-CM

## 2022-01-25 DIAGNOSIS — Z51.81 ENCOUNTER FOR THERAPEUTIC DRUG MONITORING: ICD-10-CM

## 2022-01-25 DIAGNOSIS — M1A.9XX1 CHRONIC TOPHACEOUS GOUT OF RIGHT FOOT: ICD-10-CM

## 2022-01-25 DIAGNOSIS — N28.9 RENAL INSUFFICIENCY: ICD-10-CM

## 2022-01-25 PROBLEM — N18.4 CHRONIC RENAL DISEASE, STAGE IV (HCC): Status: ACTIVE | Noted: 2022-01-25

## 2022-01-25 PROCEDURE — 99214 OFFICE O/P EST MOD 30 MIN: CPT | Performed by: INTERNAL MEDICINE

## 2022-01-25 PROCEDURE — 3074F SYST BP LT 130 MM HG: CPT | Performed by: INTERNAL MEDICINE

## 2022-01-25 PROCEDURE — 3078F DIAST BP <80 MM HG: CPT | Performed by: INTERNAL MEDICINE

## 2022-01-25 PROCEDURE — 3008F BODY MASS INDEX DOCD: CPT | Performed by: INTERNAL MEDICINE

## 2022-01-26 DIAGNOSIS — E87.6 HYPOKALEMIA: ICD-10-CM

## 2022-01-26 NOTE — TELEPHONE ENCOUNTER
Please review. Protocol failed / No protocol.    Requested Prescriptions   Pending Prescriptions Disp Refills    POTASSIUM CHLORIDE 10 MEQ Oral Tab CR [Pharmacy Med Name: Potassium Chloride Er 10 Meq Tab Amne] 90 tablet 0     Sig: TAKE ONE TABLET BY MOUTH

## 2022-01-27 RX ORDER — POTASSIUM CHLORIDE 750 MG/1
10 TABLET, EXTENDED RELEASE ORAL DAILY
Qty: 90 TABLET | Refills: 1 | Status: SHIPPED | OUTPATIENT
Start: 2022-01-27

## 2022-02-03 ENCOUNTER — APPOINTMENT (OUTPATIENT)
Dept: URBAN - METROPOLITAN AREA CLINIC 321 | Age: 82
Setting detail: DERMATOLOGY
End: 2022-02-09

## 2022-02-03 DIAGNOSIS — M31.0 HYPERSENSITIVITY ANGIITIS: ICD-10-CM

## 2022-02-03 DIAGNOSIS — L95.8 OTHER VASCULITIS LIMITED TO THE SKIN: ICD-10-CM

## 2022-02-03 PROCEDURE — OTHER PRESCRIPTION: OTHER

## 2022-02-03 PROCEDURE — OTHER ORDER TESTS: OTHER

## 2022-02-03 PROCEDURE — 99214 OFFICE O/P EST MOD 30 MIN: CPT

## 2022-02-03 PROCEDURE — OTHER COUNSELING: OTHER

## 2022-02-03 RX ORDER — TRIAMCINOLONE ACETONIDE 1 MG/G
OINTMENT TOPICAL
Qty: 453.6 | Refills: 0 | Status: ERX | COMMUNITY
Start: 2022-02-03

## 2022-02-03 ASSESSMENT — LOCATION DETAILED DESCRIPTION DERM
LOCATION DETAILED: RIGHT PROXIMAL PRETIBIAL REGION
LOCATION DETAILED: LEFT MEDIAL SUPERIOR CHEST
LOCATION DETAILED: UPPER STERNUM
LOCATION DETAILED: LEFT LATERAL ABDOMEN
LOCATION DETAILED: RIGHT DISTAL PRETIBIAL REGION
LOCATION DETAILED: RIGHT LATERAL ABDOMEN

## 2022-02-03 ASSESSMENT — LOCATION SIMPLE DESCRIPTION DERM
LOCATION SIMPLE: ABDOMEN
LOCATION SIMPLE: CHEST
LOCATION SIMPLE: RIGHT PRETIBIAL REGION

## 2022-02-03 ASSESSMENT — LOCATION ZONE DERM
LOCATION ZONE: LEG
LOCATION ZONE: TRUNK

## 2022-02-07 ENCOUNTER — APPOINTMENT (OUTPATIENT)
Dept: URBAN - METROPOLITAN AREA CLINIC 321 | Age: 82
Setting detail: DERMATOLOGY
End: 2022-02-09

## 2022-02-07 DIAGNOSIS — M31.0 HYPERSENSITIVITY ANGIITIS: ICD-10-CM

## 2022-02-07 PROCEDURE — 11105 PUNCH BX SKIN EA SEP/ADDL: CPT

## 2022-02-07 PROCEDURE — OTHER BIOPSY BY PUNCH METHOD FOR DIF: OTHER

## 2022-02-07 PROCEDURE — 11104 PUNCH BX SKIN SINGLE LESION: CPT

## 2022-02-07 PROCEDURE — OTHER COUNSELING: OTHER

## 2022-02-07 PROCEDURE — OTHER BIOPSY BY PUNCH METHOD: OTHER

## 2022-02-07 ASSESSMENT — LOCATION DETAILED DESCRIPTION DERM
LOCATION DETAILED: RIGHT LATERAL ABDOMEN
LOCATION DETAILED: RIGHT DISTAL PRETIBIAL REGION
LOCATION DETAILED: UPPER STERNUM
LOCATION DETAILED: LEFT MEDIAL SUPERIOR CHEST
LOCATION DETAILED: PERIUMBILICAL SKIN
LOCATION DETAILED: RIGHT PROXIMAL PRETIBIAL REGION
LOCATION DETAILED: LEFT LATERAL ABDOMEN

## 2022-02-07 ASSESSMENT — LOCATION SIMPLE DESCRIPTION DERM
LOCATION SIMPLE: CHEST
LOCATION SIMPLE: ABDOMEN
LOCATION SIMPLE: RIGHT PRETIBIAL REGION

## 2022-02-07 ASSESSMENT — LOCATION ZONE DERM
LOCATION ZONE: TRUNK
LOCATION ZONE: LEG

## 2022-02-07 NOTE — PROCEDURE: BIOPSY BY PUNCH METHOD
X Size Of Lesion In Cm (Optional): 0
Validate Anticipated Plan: No
Epidermal Sutures: 4-0 Nylon
Anesthesia Volume In Cc (Will Not Render If 0): 0.5
Render Path Notes In Note?: Yes
Notification Instructions: Patient will be notified of biopsy results. However, patient instructed to call the office if not contacted within 2 weeks.
Punch Size In Mm: 3
Suture Removal: 14 days
Consent: Written consent was obtained and risks were reviewed including but not limited to scarring, infection, bleeding, scabbing, incomplete removal, nerve damage and allergy to anesthesia.
Dressing: bandage
Information: Selecting Yes will display possible errors in your note based on the variables you have selected. This validation is only offered as a suggestion for you. PLEASE NOTE THAT THE VALIDATION TEXT WILL BE REMOVED WHEN YOU FINALIZE YOUR NOTE. IF YOU WANT TO FAX A PRELIMINARY NOTE YOU WILL NEED TO TOGGLE THIS TO 'NO' IF YOU DO NOT WANT IT IN YOUR FAXED NOTE.
Wound Care: Petrolatum
Home Suture Removal Text: Patient was provided a home suture removal kit and will remove their sutures at home.  If they have any questions or difficulties they will call the office.
Detail Level: Detailed
Hemostasis: None
Post-Care Instructions: I reviewed with the patient in detail post-care instructions. Patient is to keep the biopsy site dry overnight, and then apply vaseline once-twice daily until healed.
Anesthesia Type: 1% lidocaine with epinephrine
Biopsy Type: H and E
Body Location Override (Optional - Billing Will Still Be Based On Selected Body Map Location If Applicable): L lower abdomen sup
Billing Type: Third-Party Bill

## 2022-02-07 NOTE — PROCEDURE: BIOPSY BY PUNCH METHOD FOR DIF
Billing Type: Third-Party Bill
Post-Care Instructions: I reviewed with the patient in detail post-care instructions. Patient is to keep the biopsy site dry overnight, and then apply bacitracin twice daily until healed. Patient may apply hydrogen peroxide soaks to remove any crusting.
Wound Care: Petrolatum
X Depth Of Punch In Cm (Optional): 0
Was A Bandage Applied: Yes
Home Suture Removal Text: Patient was provided a home suture removal kit and will remove their sutures at home.  If they have any questions or difficulties they will call the office.
Consent: Written consent was obtained and risks were reviewed including but not limited to scarring, infection, bleeding, scabbing, incomplete removal, nerve damage and allergy to anesthesia.
Epidermal Sutures: 4-0 Nylon
Patient Will Remove Sutures At Home?: No
Punch Size In Mm: 3
Anesthesia Type: 1% lidocaine with epinephrine
Anesthesia Volume In Cc (Will Not Render If 0): 0.5
Suture Removal: 14 days
Notification Instructions: Patient will be notified of biopsy results. However, patient instructed to call the office if not contacted within 2 weeks.
Body Location Override (Optional - Billing Will Still Be Based On Selected Body Map Location If Applicable): L lower abdomen
Hemostasis: None
Biopsy Type: DIF (perilesional)
Detail Level: Detailed
Dressing: bandage

## 2022-02-11 ENCOUNTER — OFFICE VISIT (OUTPATIENT)
Dept: OTOLARYNGOLOGY | Facility: CLINIC | Age: 82
End: 2022-02-11
Payer: COMMERCIAL

## 2022-02-11 ENCOUNTER — LAB ENCOUNTER (OUTPATIENT)
Dept: LAB | Facility: HOSPITAL | Age: 82
End: 2022-02-11
Attending: STUDENT IN AN ORGANIZED HEALTH CARE EDUCATION/TRAINING PROGRAM
Payer: MEDICARE

## 2022-02-11 VITALS — WEIGHT: 212 LBS | HEIGHT: 64 IN | BODY MASS INDEX: 36.19 KG/M2

## 2022-02-11 DIAGNOSIS — L95.8 NEUTROPHILIC VASCULITIS OF SKIN: Primary | ICD-10-CM

## 2022-02-11 DIAGNOSIS — H61.23 BILATERAL IMPACTED CERUMEN: Primary | ICD-10-CM

## 2022-02-11 LAB
ALBUMIN SERPL-MCNC: 3.9 G/DL (ref 3.4–5)
ALBUMIN/GLOB SERPL: 1.3 {RATIO} (ref 1–2)
ALP LIVER SERPL-CCNC: 88 U/L
ALT SERPL-CCNC: 25 U/L
ANION GAP SERPL CALC-SCNC: 7 MMOL/L (ref 0–18)
AST SERPL-CCNC: 19 U/L (ref 15–37)
BILIRUB SERPL-MCNC: 0.6 MG/DL (ref 0.1–2)
BILIRUB UR QL: NEGATIVE
BUN BLD-MCNC: 28 MG/DL (ref 7–18)
BUN/CREAT SERPL: 17.9 (ref 10–20)
C3 SERPL-MCNC: 117 MG/DL (ref 90–180)
C4 SERPL-MCNC: 32.1 MG/DL (ref 10–40)
CALCIUM BLD-MCNC: 9.7 MG/DL (ref 8.5–10.1)
CHLORIDE SERPL-SCNC: 106 MMOL/L (ref 98–112)
CO2 SERPL-SCNC: 26 MMOL/L (ref 21–32)
COLOR UR: YELLOW
CREAT BLD-MCNC: 1.56 MG/DL
FASTING STATUS PATIENT QL REPORTED: NO
GLOBULIN PLAS-MCNC: 3 G/DL (ref 2.8–4.4)
GLUCOSE BLD-MCNC: 85 MG/DL (ref 70–99)
GLUCOSE UR-MCNC: NEGATIVE MG/DL
HBV SURFACE AB SER QL: NONREACTIVE
HBV SURFACE AB SERPL IA-ACNC: <3.1 MIU/ML
HBV SURFACE AG SER-ACNC: <0.1 [IU]/L
HBV SURFACE AG SERPL QL IA: NONREACTIVE
HCV AB SERPL QL IA: NONREACTIVE
HGB UR QL STRIP.AUTO: NEGATIVE
HYALINE CASTS #/AREA URNS AUTO: PRESENT /LPF
KETONES UR-MCNC: NEGATIVE MG/DL
NITRITE UR QL STRIP.AUTO: NEGATIVE
OSMOLALITY SERPL CALC.SUM OF ELEC: 293 MOSM/KG (ref 275–295)
PH UR: 6 [PH] (ref 5–8)
POTASSIUM SERPL-SCNC: 4.8 MMOL/L (ref 3.5–5.1)
PROT SERPL-MCNC: 6.9 G/DL (ref 6.4–8.2)
PROT UR-MCNC: 10.8 MG/DL
PROT UR-MCNC: NEGATIVE MG/DL
RHEUMATOID FACT SERPL-ACNC: 51 IU/ML (ref ?–15)
SODIUM SERPL-SCNC: 139 MMOL/L (ref 136–145)
SP GR UR STRIP: 1.01 (ref 1–1.03)
UROBILINOGEN UR STRIP-ACNC: <2

## 2022-02-11 PROCEDURE — 69210 REMOVE IMPACTED EAR WAX UNI: CPT | Performed by: OTOLARYNGOLOGY

## 2022-02-11 PROCEDURE — 86036 ANCA SCREEN EACH ANTIBODY: CPT

## 2022-02-11 PROCEDURE — 86235 NUCLEAR ANTIGEN ANTIBODY: CPT

## 2022-02-11 PROCEDURE — 87340 HEPATITIS B SURFACE AG IA: CPT

## 2022-02-11 PROCEDURE — 80053 COMPREHEN METABOLIC PANEL: CPT

## 2022-02-11 PROCEDURE — 86039 ANTINUCLEAR ANTIBODIES (ANA): CPT

## 2022-02-11 PROCEDURE — 83516 IMMUNOASSAY NONANTIBODY: CPT

## 2022-02-11 PROCEDURE — 86706 HEP B SURFACE ANTIBODY: CPT

## 2022-02-11 PROCEDURE — 84156 ASSAY OF PROTEIN URINE: CPT

## 2022-02-11 PROCEDURE — 86160 COMPLEMENT ANTIGEN: CPT

## 2022-02-11 PROCEDURE — 3008F BODY MASS INDEX DOCD: CPT | Performed by: OTOLARYNGOLOGY

## 2022-02-11 PROCEDURE — 84166 PROTEIN E-PHORESIS/URINE/CSF: CPT

## 2022-02-11 PROCEDURE — 86162 COMPLEMENT TOTAL (CH50): CPT

## 2022-02-11 PROCEDURE — 82595 ASSAY OF CRYOGLOBULIN: CPT

## 2022-02-11 PROCEDURE — 36415 COLL VENOUS BLD VENIPUNCTURE: CPT

## 2022-02-11 PROCEDURE — 86225 DNA ANTIBODY NATIVE: CPT

## 2022-02-11 PROCEDURE — 86038 ANTINUCLEAR ANTIBODIES: CPT

## 2022-02-11 PROCEDURE — 86335 IMMUNFIX E-PHORSIS/URINE/CSF: CPT

## 2022-02-11 PROCEDURE — 86705 HEP B CORE ANTIBODY IGM: CPT

## 2022-02-11 PROCEDURE — 86803 HEPATITIS C AB TEST: CPT

## 2022-02-11 PROCEDURE — 81001 URINALYSIS AUTO W/SCOPE: CPT

## 2022-02-11 PROCEDURE — 84165 PROTEIN E-PHORESIS SERUM: CPT

## 2022-02-11 PROCEDURE — 86431 RHEUMATOID FACTOR QUANT: CPT

## 2022-02-12 LAB — HYALINE CASTS #/AREA URNS AUTO: PRESENT /LPF

## 2022-02-14 LAB — NUCLEAR IGG TITR SER IF: POSITIVE {TITER}

## 2022-02-15 LAB
ALBUMIN SERPL ELPH-MCNC: 4.13 G/DL (ref 3.75–5.21)
ALPHA1 GLOB SERPL ELPH-MCNC: 0.35 G/DL (ref 0.19–0.46)
ALPHA2 GLOB SERPL ELPH-MCNC: 0.63 G/DL (ref 0.48–1.05)
ANA NUCLEOLAR TITR SER IF: 160 {TITER}
B-GLOBULIN SERPL ELPH-MCNC: 0.81 G/DL (ref 0.68–1.23)
DSDNA AB TITR SER: <10 {TITER}
GAMMA GLOB SERPL ELPH-MCNC: 0.99 G/DL (ref 0.62–1.7)
PROT SERPL-MCNC: 6.9 G/DL (ref 6.4–8.2)

## 2022-02-16 ENCOUNTER — APPOINTMENT (OUTPATIENT)
Dept: URBAN - METROPOLITAN AREA CLINIC 321 | Age: 82
Setting detail: DERMATOLOGY
End: 2022-02-20

## 2022-02-16 DIAGNOSIS — M31.0 HYPERSENSITIVITY ANGIITIS: ICD-10-CM

## 2022-02-16 LAB
COMPLEMENT ACTIVITY, TOTAL EIA: 60.7 U/ML
ENA SM IGG SER QL: NEGATIVE
ENA SM+RNP AB SER QL: NEGATIVE
ENA SS-A AB SER QL IA: NEGATIVE
ENA SS-B AB SER QL IA: NEGATIVE
MYELOPEROX ANTIBODIES, IGG: 0 AU/ML
SERINE PROTEASE 3, IGG: 0 AU/ML

## 2022-02-16 PROCEDURE — OTHER SUTURE REMOVAL (NO GLOBAL PERIOD): OTHER

## 2022-02-16 PROCEDURE — OTHER PATHOLOGY DISCUSSION: OTHER

## 2022-02-16 PROCEDURE — 99213 OFFICE O/P EST LOW 20 MIN: CPT

## 2022-02-16 PROCEDURE — OTHER PRESCRIPTION MEDICATION MANAGEMENT: OTHER

## 2022-02-16 PROCEDURE — OTHER COUNSELING: OTHER

## 2022-02-16 ASSESSMENT — LOCATION DETAILED DESCRIPTION DERM
LOCATION DETAILED: LEFT LATERAL ABDOMEN
LOCATION DETAILED: LEFT MEDIAL SUPERIOR CHEST
LOCATION DETAILED: RIGHT PROXIMAL PRETIBIAL REGION
LOCATION DETAILED: PERIUMBILICAL SKIN
LOCATION DETAILED: RIGHT LATERAL ABDOMEN
LOCATION DETAILED: UPPER STERNUM
LOCATION DETAILED: RIGHT DISTAL PRETIBIAL REGION

## 2022-02-16 ASSESSMENT — LOCATION SIMPLE DESCRIPTION DERM
LOCATION SIMPLE: ABDOMEN
LOCATION SIMPLE: CHEST
LOCATION SIMPLE: RIGHT PRETIBIAL REGION

## 2022-02-16 ASSESSMENT — LOCATION ZONE DERM
LOCATION ZONE: TRUNK
LOCATION ZONE: LEG

## 2022-02-16 NOTE — H&P
The above referenced H&P was reviewed by Michael Real MD on 2/21/2022, the patient was examined and no significant changes have occurred in the patient's condition since the H&P was performed. Risks and benefits were discussed, proceed with procedure as planned.

## 2022-02-18 ENCOUNTER — NURSE ONLY (OUTPATIENT)
Dept: LAB | Facility: HOSPITAL | Age: 82
End: 2022-02-18
Attending: INTERNAL MEDICINE
Payer: MEDICARE

## 2022-02-18 ENCOUNTER — HOSPITAL ENCOUNTER (OUTPATIENT)
Dept: GENERAL RADIOLOGY | Facility: HOSPITAL | Age: 82
Discharge: HOME | End: 2022-02-18
Attending: INTERNAL MEDICINE
Payer: MEDICARE

## 2022-02-18 DIAGNOSIS — Z01.818 PRE-OP TESTING: ICD-10-CM

## 2022-02-18 LAB
DEPRECATED RDW RBC AUTO: 65.1 FL (ref 35.1–46.3)
ERYTHROCYTE [DISTWIDTH] IN BLOOD BY AUTOMATED COUNT: 16.5 % (ref 11–15)
HCT VFR BLD AUTO: 32.2 %
HGB BLD-MCNC: 10.5 G/DL
INR BLD: 1.37 (ref 0.8–1.2)
MCH RBC QN AUTO: 35.2 PG (ref 26–34)
MCHC RBC AUTO-ENTMCNC: 32.6 G/DL (ref 31–37)
MCV RBC AUTO: 108.1 FL
PLATELET # BLD AUTO: 189 10(3)UL (ref 150–450)
PROTHROMBIN TIME: 17.1 SECONDS (ref 11.6–14.8)
RBC # BLD AUTO: 2.98 X10(6)UL
SARS-COV-2 RNA RESP QL NAA+PROBE: NOT DETECTED
WBC # BLD AUTO: 5.5 X10(3) UL (ref 4–11)

## 2022-02-18 PROCEDURE — 85610 PROTHROMBIN TIME: CPT

## 2022-02-18 PROCEDURE — 85027 COMPLETE CBC AUTOMATED: CPT

## 2022-02-18 PROCEDURE — 71046 X-RAY EXAM CHEST 2 VIEWS: CPT | Performed by: INTERNAL MEDICINE

## 2022-02-18 PROCEDURE — 93005 ELECTROCARDIOGRAM TRACING: CPT

## 2022-02-18 PROCEDURE — 36415 COLL VENOUS BLD VENIPUNCTURE: CPT

## 2022-02-18 PROCEDURE — 93010 ELECTROCARDIOGRAM REPORT: CPT | Performed by: INTERNAL MEDICINE

## 2022-02-19 ENCOUNTER — TELEPHONE (OUTPATIENT)
Dept: INTERNAL MEDICINE CLINIC | Facility: CLINIC | Age: 82
End: 2022-02-19

## 2022-02-19 NOTE — TELEPHONE ENCOUNTER
Patient had to cancel her one month follow up appointment on 2/22/2022 due to her having an angiogram the previous day. In attempting to reschedule, nothing was available until May.  Declined alternate providor

## 2022-02-21 ENCOUNTER — HOSPITAL ENCOUNTER (OUTPATIENT)
Dept: INTERVENTIONAL RADIOLOGY/VASCULAR | Facility: HOSPITAL | Age: 82
Discharge: HOME | End: 2022-02-21
Attending: INTERNAL MEDICINE | Admitting: INTERNAL MEDICINE
Payer: MEDICARE

## 2022-02-21 VITALS
DIASTOLIC BLOOD PRESSURE: 59 MMHG | HEART RATE: 57 BPM | HEIGHT: 64 IN | TEMPERATURE: 98 F | BODY MASS INDEX: 36.7 KG/M2 | RESPIRATION RATE: 18 BRPM | OXYGEN SATURATION: 99 % | SYSTOLIC BLOOD PRESSURE: 106 MMHG | WEIGHT: 215 LBS

## 2022-02-21 DIAGNOSIS — R06.00 DOE (DYSPNEA ON EXERTION): ICD-10-CM

## 2022-02-21 DIAGNOSIS — R94.39 ABNORMAL FINDING ON THALLIUM STRESS TEST: ICD-10-CM

## 2022-02-21 DIAGNOSIS — Z01.818 PRE-OP TESTING: Primary | ICD-10-CM

## 2022-02-21 DIAGNOSIS — I25.10 CAD (CORONARY ARTERY DISEASE): ICD-10-CM

## 2022-02-21 PROCEDURE — 4A023N8 MEASUREMENT OF CARDIAC SAMPLING AND PRESSURE, BILATERAL, PERCUTANEOUS APPROACH: ICD-10-PCS | Performed by: INTERNAL MEDICINE

## 2022-02-21 PROCEDURE — 93460 R&L HRT ART/VENTRICLE ANGIO: CPT

## 2022-02-21 PROCEDURE — B2141ZZ FLUOROSCOPY OF RIGHT HEART USING LOW OSMOLAR CONTRAST: ICD-10-PCS | Performed by: INTERNAL MEDICINE

## 2022-02-21 PROCEDURE — B2151ZZ FLUOROSCOPY OF LEFT HEART USING LOW OSMOLAR CONTRAST: ICD-10-PCS | Performed by: INTERNAL MEDICINE

## 2022-02-21 PROCEDURE — 99153 MOD SED SAME PHYS/QHP EA: CPT

## 2022-02-21 PROCEDURE — B2111ZZ FLUOROSCOPY OF MULTIPLE CORONARY ARTERIES USING LOW OSMOLAR CONTRAST: ICD-10-PCS | Performed by: INTERNAL MEDICINE

## 2022-02-21 PROCEDURE — 99152 MOD SED SAME PHYS/QHP 5/>YRS: CPT

## 2022-02-21 PROCEDURE — 36415 COLL VENOUS BLD VENIPUNCTURE: CPT

## 2022-02-21 RX ORDER — LIDOCAINE HYDROCHLORIDE 20 MG/ML
INJECTION, SOLUTION EPIDURAL; INFILTRATION; INTRACAUDAL; PERINEURAL
Status: COMPLETED
Start: 2022-02-21 | End: 2022-02-21

## 2022-02-21 RX ORDER — MIDAZOLAM HYDROCHLORIDE 1 MG/ML
INJECTION INTRAMUSCULAR; INTRAVENOUS
Status: COMPLETED
Start: 2022-02-21 | End: 2022-02-21

## 2022-02-21 RX ORDER — NITROGLYCERIN 20 MG/100ML
INJECTION INTRAVENOUS
Status: DISCONTINUED
Start: 2022-02-21 | End: 2022-02-21 | Stop reason: WASHOUT

## 2022-02-21 RX ORDER — SODIUM CHLORIDE 9 MG/ML
INJECTION, SOLUTION INTRAVENOUS
Status: DISCONTINUED | OUTPATIENT
Start: 2022-02-21 | End: 2022-02-21

## 2022-02-21 RX ORDER — VERAPAMIL HYDROCHLORIDE 2.5 MG/ML
INJECTION, SOLUTION INTRAVENOUS
Status: DISCONTINUED
Start: 2022-02-21 | End: 2022-02-21 | Stop reason: WASHOUT

## 2022-02-21 RX ORDER — HEPARIN SODIUM 1000 [USP'U]/ML
INJECTION, SOLUTION INTRAVENOUS; SUBCUTANEOUS
Status: DISCONTINUED
Start: 2022-02-21 | End: 2022-02-21 | Stop reason: WASHOUT

## 2022-02-21 NOTE — TELEPHONE ENCOUNTER
Spoke with patient this morning and scheduled her for March 1, 2022 at 2:20pm res24 ok to use per Dr. Ondina Noriega

## 2022-02-21 NOTE — IVS NOTE
DISCHARGE NOTE     Pt is able to sit up and ambulate without difficulty. Pt voided and tolerated fluids and food. Procedural site remains dry and intact with good circulation, motion, and sensation. No signs and symptoms of bleeding/hematoma noted. IV access removed  Instruction provided, patient/family verbalizes understanding. Dr. Emir Perdomo spoke with patient/family post procedure.      Pt discharge via wheelchair to [address] [place] [address] B     Follow up Appointment: as already scheduled in 1 week with [facility]    New Prescription: none

## 2022-02-22 ENCOUNTER — MED REC SCAN ONLY (OUTPATIENT)
Dept: INTERNAL MEDICINE CLINIC | Facility: CLINIC | Age: 82
End: 2022-02-22

## 2022-02-22 PROBLEM — M31.0 LEUKOCYTOCLASTIC VASCULITIS (HCC): Status: ACTIVE | Noted: 2022-02-22

## 2022-03-01 ENCOUNTER — OFFICE VISIT (OUTPATIENT)
Dept: INTERNAL MEDICINE CLINIC | Facility: CLINIC | Age: 82
End: 2022-03-01
Payer: COMMERCIAL

## 2022-03-01 VITALS
HEIGHT: 64 IN | TEMPERATURE: 98 F | HEART RATE: 50 BPM | WEIGHT: 216.13 LBS | DIASTOLIC BLOOD PRESSURE: 58 MMHG | SYSTOLIC BLOOD PRESSURE: 95 MMHG | BODY MASS INDEX: 36.9 KG/M2 | RESPIRATION RATE: 16 BRPM

## 2022-03-01 DIAGNOSIS — Z99.89 OBSTRUCTIVE SLEEP APNEA ON CPAP: ICD-10-CM

## 2022-03-01 DIAGNOSIS — I70.0 ATHEROSCLEROSIS OF AORTA (HCC): ICD-10-CM

## 2022-03-01 DIAGNOSIS — N18.32 STAGE 3B CHRONIC KIDNEY DISEASE (HCC): ICD-10-CM

## 2022-03-01 DIAGNOSIS — G47.33 OBSTRUCTIVE SLEEP APNEA ON CPAP: ICD-10-CM

## 2022-03-01 DIAGNOSIS — D84.9 IMMUNOSUPPRESSED STATUS (HCC): ICD-10-CM

## 2022-03-01 DIAGNOSIS — J44.9 CHRONIC OBSTRUCTIVE PULMONARY DISEASE, UNSPECIFIED COPD TYPE (HCC): ICD-10-CM

## 2022-03-01 DIAGNOSIS — R06.00 DOE (DYSPNEA ON EXERTION): Primary | ICD-10-CM

## 2022-03-01 DIAGNOSIS — I48.0 PAROXYSMAL ATRIAL FIBRILLATION (HCC): ICD-10-CM

## 2022-03-01 PROBLEM — I27.20 PULMONARY HYPERTENSION (HCC): Status: RESOLVED | Noted: 2021-01-14 | Resolved: 2022-03-01

## 2022-03-01 PROCEDURE — 3078F DIAST BP <80 MM HG: CPT | Performed by: INTERNAL MEDICINE

## 2022-03-01 PROCEDURE — 3008F BODY MASS INDEX DOCD: CPT | Performed by: INTERNAL MEDICINE

## 2022-03-01 PROCEDURE — 99215 OFFICE O/P EST HI 40 MIN: CPT | Performed by: INTERNAL MEDICINE

## 2022-03-01 PROCEDURE — 3074F SYST BP LT 130 MM HG: CPT | Performed by: INTERNAL MEDICINE

## 2022-03-01 PROCEDURE — 1111F DSCHRG MED/CURRENT MED MERGE: CPT | Performed by: INTERNAL MEDICINE

## 2022-03-02 NOTE — ASSESSMENT & PLAN NOTE
I reviewed the patient's pulmonary function tests from October 2021. It showed mild COPD with no response to bronchodilator. We will repeat  PFTs and asked patient to follow-up with pulmonary.

## 2022-03-02 NOTE — ASSESSMENT & PLAN NOTE
Patient wakes multiple times during the night despite wearing CPAP. She will follow up with pulmonary.

## 2022-03-02 NOTE — ASSESSMENT & PLAN NOTE
Patient had a complete cardiac work-up including a cardiac catheterization. The cardiologist feels that her shortness of breath is not a cardiac problem. I will refer the patient to pulmonary.

## 2022-03-07 ENCOUNTER — LAB ENCOUNTER (OUTPATIENT)
Dept: LAB | Facility: HOSPITAL | Age: 82
End: 2022-03-07
Attending: INTERNAL MEDICINE
Payer: MEDICARE

## 2022-03-07 DIAGNOSIS — J44.9 CHRONIC OBSTRUCTIVE PULMONARY DISEASE, UNSPECIFIED COPD TYPE (HCC): ICD-10-CM

## 2022-03-08 LAB — SARS-COV-2 RNA RESP QL NAA+PROBE: NOT DETECTED

## 2022-03-10 ENCOUNTER — HOSPITAL ENCOUNTER (OUTPATIENT)
Dept: RESPIRATORY THERAPY | Facility: HOSPITAL | Age: 82
Discharge: HOME | End: 2022-03-10
Attending: INTERNAL MEDICINE
Payer: MEDICARE

## 2022-03-10 DIAGNOSIS — J44.9 CHRONIC OBSTRUCTIVE PULMONARY DISEASE, UNSPECIFIED COPD TYPE (HCC): ICD-10-CM

## 2022-03-10 PROCEDURE — 94060 EVALUATION OF WHEEZING: CPT | Performed by: INTERNAL MEDICINE

## 2022-03-10 PROCEDURE — 94729 DIFFUSING CAPACITY: CPT | Performed by: INTERNAL MEDICINE

## 2022-03-10 PROCEDURE — 94726 PLETHYSMOGRAPHY LUNG VOLUMES: CPT | Performed by: INTERNAL MEDICINE

## 2022-03-15 RX ORDER — HYDROXYCHLOROQUINE SULFATE 200 MG/1
TABLET, FILM COATED ORAL
Qty: 180 TABLET | Refills: 3 | Status: SHIPPED | OUTPATIENT
Start: 2022-03-15

## 2022-03-15 RX ORDER — PANTOPRAZOLE SODIUM 40 MG/1
TABLET, DELAYED RELEASE ORAL
Qty: 90 TABLET | Refills: 1 | Status: SHIPPED | OUTPATIENT
Start: 2022-03-15

## 2022-03-15 RX ORDER — FOLIC ACID 1 MG/1
TABLET ORAL
Qty: 90 TABLET | Refills: 3 | Status: SHIPPED | OUTPATIENT
Start: 2022-03-15

## 2022-03-15 NOTE — PROCEDURES
[facility]    Patient's Name Jemima Maki MRN [id number]    1940 Pulmonologist Luis Esqueda MD   Location [facility] PCP Anika Michel MD     IMPRESSION:    The PFTs are Abnormal.    There is mild airway obstruction. There is improvement with bronchodilator challenge. There is no restriction. The diffusing capacity is low normal.    Please click on scan link to see primary data and the flow volume loops.

## 2022-03-15 NOTE — TELEPHONE ENCOUNTER
Folic acid  Last filled: 3/9/21 #90 tab with 3 refills     Hydroxychloroquine:  Last filled: 3/2/21 #180 tab with 3 refills     LOV: 1/25/22  Future Appointments   Date Time Provider Taqueria Wise   3/24/2022  3:00 PM Jose Pereyra MD [facility]   4/13/2022  1:45 PM Michelle Rojas MD [facility]   4/26/2022 10:50 AM Frank Shaikh MD [facility]   5/3/2022  9:40 AM Yohannes Singh MD . [address] Valdez [address] [facility] [place]     Labs:  1/21/22

## 2022-03-15 NOTE — ADDENDUM NOTE
Encounter addended by: Kaila Kang MD on: 3/14/2022 9:19 PM   Actions taken: Clinical Note Signed, Charge Capture section accepted

## 2022-03-15 NOTE — TELEPHONE ENCOUNTER
Refill passed per [place] [facility] protocol.   Requested Prescriptions   Pending Prescriptions Disp Refills    PANTOPRAZOLE 40 MG Oral Tab EC [Pharmacy Med Name: Pantoprazole Sodium 40 MG Oral Tablet Delayed Release] 90 tablet 3     Sig: TAKE 1 TABLET BY MOUTH IN  THE MORNING BEFORE  BREAKFAST        Gastrointestional Medication Protocol Passed - 3/15/2022  3:42 AM        Passed - Appointment in past 12 or next 3 months            Recent Outpatient Visits              2 weeks ago NICOLE (dyspnea on exertion)    [facility], [address], Audrey Bond MD    Office Visit    3 weeks ago Pre-op testing    [facility]    Nurse Only    3 weeks ago Pre-op testing    [facility]    Nurse Only    1 month ago Bilateral impacted cerumen    [facility], [address], Rafael Anderson MD    Office Visit    1 month ago Rheumatoid arthritis of multiple sites with negative rheumatoid factor [facility])    [facility], [address] [facility] [address], Kristy Segura MD    Office Visit          Future Appointments         Provider Department Appt Notes    In 1 week Jaspreet Edwards MD [place] [facility], [address] [place] [address] Stage 3b chronic kidney disease  - policy informed    In 4 weeks Joel Last MD [facility], [address] [place] [address], [address] infformed     In 1 month An Lim MD [facility], [address], [place] [employer] [address]    In 1 month Sameera Thomason MD [facility], [address] [facility], [place] 3 [facility] F/U  care partner policy informed

## 2022-03-24 ENCOUNTER — OFFICE VISIT (OUTPATIENT)
Dept: NEPHROLOGY | Facility: CLINIC | Age: 82
End: 2022-03-24
Payer: COMMERCIAL

## 2022-03-24 VITALS
SYSTOLIC BLOOD PRESSURE: 96 MMHG | HEIGHT: 64 IN | RESPIRATION RATE: 16 BRPM | WEIGHT: 214 LBS | HEART RATE: 59 BPM | DIASTOLIC BLOOD PRESSURE: 66 MMHG | BODY MASS INDEX: 36.54 KG/M2

## 2022-03-24 DIAGNOSIS — N18.32 STAGE 3B CHRONIC KIDNEY DISEASE (HCC): ICD-10-CM

## 2022-03-24 DIAGNOSIS — D84.9 IMMUNOSUPPRESSED STATUS (HCC): Primary | ICD-10-CM

## 2022-03-24 PROCEDURE — 3074F SYST BP LT 130 MM HG: CPT | Performed by: INTERNAL MEDICINE

## 2022-03-24 PROCEDURE — 3008F BODY MASS INDEX DOCD: CPT | Performed by: INTERNAL MEDICINE

## 2022-03-24 PROCEDURE — 3078F DIAST BP <80 MM HG: CPT | Performed by: INTERNAL MEDICINE

## 2022-03-24 PROCEDURE — 99205 OFFICE O/P NEW HI 60 MIN: CPT | Performed by: INTERNAL MEDICINE

## 2022-03-24 NOTE — PATIENT INSTRUCTIONS
Follow up in 8 weeks   Lab tests prior to next visit  Ultrasound of kidney - call to make an appointment

## 2022-04-12 ENCOUNTER — HOSPITAL ENCOUNTER (OUTPATIENT)
Dept: ULTRASOUND IMAGING | Facility: HOSPITAL | Age: 82
Discharge: HOME | End: 2022-04-12
Attending: INTERNAL MEDICINE
Payer: MEDICARE

## 2022-04-12 DIAGNOSIS — N18.32 STAGE 3B CHRONIC KIDNEY DISEASE (HCC): ICD-10-CM

## 2022-04-12 DIAGNOSIS — D84.9 IMMUNOSUPPRESSED STATUS (HCC): ICD-10-CM

## 2022-04-12 PROCEDURE — 76770 US EXAM ABDO BACK WALL COMP: CPT | Performed by: INTERNAL MEDICINE

## 2022-04-13 ENCOUNTER — OFFICE VISIT (OUTPATIENT)
Dept: PULMONOLOGY | Facility: CLINIC | Age: 82
End: 2022-04-13
Payer: COMMERCIAL

## 2022-04-13 VITALS
HEIGHT: 64 IN | DIASTOLIC BLOOD PRESSURE: 56 MMHG | OXYGEN SATURATION: 99 % | SYSTOLIC BLOOD PRESSURE: 104 MMHG | RESPIRATION RATE: 16 BRPM | WEIGHT: 214 LBS | HEART RATE: 53 BPM | BODY MASS INDEX: 36.54 KG/M2

## 2022-04-13 DIAGNOSIS — Z99.89 OSA ON CPAP: ICD-10-CM

## 2022-04-13 DIAGNOSIS — J44.9 CHRONIC OBSTRUCTIVE PULMONARY DISEASE, UNSPECIFIED COPD TYPE (HCC): Primary | ICD-10-CM

## 2022-04-13 DIAGNOSIS — G47.33 OSA ON CPAP: ICD-10-CM

## 2022-04-13 PROCEDURE — 99214 OFFICE O/P EST MOD 30 MIN: CPT | Performed by: INTERNAL MEDICINE

## 2022-04-13 PROCEDURE — 3008F BODY MASS INDEX DOCD: CPT | Performed by: INTERNAL MEDICINE

## 2022-04-13 PROCEDURE — 3074F SYST BP LT 130 MM HG: CPT | Performed by: INTERNAL MEDICINE

## 2022-04-13 PROCEDURE — 3078F DIAST BP <80 MM HG: CPT | Performed by: INTERNAL MEDICINE

## 2022-04-13 RX ORDER — FLUTICASONE FUROATE AND VILANTEROL TRIFENATATE 100; 25 UG/1; UG/1
1 POWDER RESPIRATORY (INHALATION) DAILY
Qty: 1 EACH | Refills: 1 | Status: SHIPPED | OUTPATIENT
Start: 2022-04-13

## 2022-04-13 RX ORDER — DOXYCYCLINE 100 MG/1
TABLET ORAL 2 TIMES DAILY
COMMUNITY
Start: 2022-04-05

## 2022-04-13 RX ORDER — OFLOXACIN 3 MG/ML
1 SOLUTION/ DROPS OPHTHALMIC 3 TIMES DAILY
COMMUNITY
Start: 2022-03-25

## 2022-04-21 ENCOUNTER — LAB ENCOUNTER (OUTPATIENT)
Dept: LAB | Facility: HOSPITAL | Age: 82
End: 2022-04-21
Attending: INTERNAL MEDICINE
Payer: MEDICARE

## 2022-04-21 DIAGNOSIS — D84.9 IMMUNOSUPPRESSED STATUS (HCC): ICD-10-CM

## 2022-04-21 DIAGNOSIS — N18.32 STAGE 3B CHRONIC KIDNEY DISEASE (HCC): ICD-10-CM

## 2022-04-21 DIAGNOSIS — Z51.81 ENCOUNTER FOR THERAPEUTIC DRUG MONITORING: ICD-10-CM

## 2022-04-21 DIAGNOSIS — M06.09 RHEUMATOID ARTHRITIS OF MULTIPLE SITES WITH NEGATIVE RHEUMATOID FACTOR (HCC): ICD-10-CM

## 2022-04-21 LAB
ALBUMIN SERPL-MCNC: 3.8 G/DL (ref 3.4–5)
ANION GAP SERPL CALC-SCNC: 8 MMOL/L (ref 0–18)
AST SERPL-CCNC: 19 U/L (ref 15–37)
BASOPHILS # BLD AUTO: 0.06 X10(3) UL (ref 0–0.2)
BASOPHILS NFR BLD AUTO: 1.1 %
BILIRUB UR QL: NEGATIVE
BUN BLD-MCNC: 40 MG/DL (ref 7–18)
BUN/CREAT SERPL: 26.5 (ref 10–20)
CALCIUM BLD-MCNC: 9.9 MG/DL (ref 8.5–10.1)
CHLORIDE SERPL-SCNC: 107 MMOL/L (ref 98–112)
CO2 SERPL-SCNC: 26 MMOL/L (ref 21–32)
COLOR UR: YELLOW
CREAT BLD-MCNC: 1.51 MG/DL
CRP SERPL-MCNC: <0.29 MG/DL (ref ?–0.3)
DEPRECATED RDW RBC AUTO: 58.9 FL (ref 35.1–46.3)
EOSINOPHIL # BLD AUTO: 0.12 X10(3) UL (ref 0–0.7)
EOSINOPHIL NFR BLD AUTO: 2.3 %
ERYTHROCYTE [DISTWIDTH] IN BLOOD BY AUTOMATED COUNT: 14.5 % (ref 11–15)
ERYTHROCYTE [SEDIMENTATION RATE] IN BLOOD: 22 MM/HR
GLUCOSE BLD-MCNC: 116 MG/DL (ref 70–99)
GLUCOSE UR-MCNC: NEGATIVE MG/DL
HCT VFR BLD AUTO: 32.4 %
HGB BLD-MCNC: 10.5 G/DL
HGB UR QL STRIP.AUTO: NEGATIVE
IMM GRANULOCYTES # BLD AUTO: 0.02 X10(3) UL (ref 0–1)
IMM GRANULOCYTES NFR BLD: 0.4 %
KETONES UR-MCNC: NEGATIVE MG/DL
LYMPHOCYTES # BLD AUTO: 2.17 X10(3) UL (ref 1–4)
LYMPHOCYTES NFR BLD AUTO: 41.4 %
MCH RBC QN AUTO: 36.1 PG (ref 26–34)
MCHC RBC AUTO-ENTMCNC: 32.4 G/DL (ref 31–37)
MCV RBC AUTO: 111.3 FL
MONOCYTES # BLD AUTO: 0.75 X10(3) UL (ref 0.1–1)
MONOCYTES NFR BLD AUTO: 14.3 %
NEUTROPHILS # BLD AUTO: 2.12 X10 (3) UL (ref 1.5–7.7)
NEUTROPHILS # BLD AUTO: 2.12 X10(3) UL (ref 1.5–7.7)
NEUTROPHILS NFR BLD AUTO: 40.5 %
NITRITE UR QL STRIP.AUTO: NEGATIVE
OSMOLALITY SERPL CALC.SUM OF ELEC: 303 MOSM/KG (ref 275–295)
PH UR: 5 [PH] (ref 5–8)
PHOSPHATE SERPL-MCNC: 3.8 MG/DL (ref 2.5–4.9)
PLATELET # BLD AUTO: 154 10(3)UL (ref 150–450)
POTASSIUM SERPL-SCNC: 4.4 MMOL/L (ref 3.5–5.1)
PROT UR-MCNC: NEGATIVE MG/DL
PTH-INTACT SERPL-MCNC: 116.9 PG/ML (ref 18.5–88)
RBC # BLD AUTO: 2.91 X10(6)UL
SODIUM SERPL-SCNC: 141 MMOL/L (ref 136–145)
SP GR UR STRIP: 1.02 (ref 1–1.03)
UROBILINOGEN UR STRIP-ACNC: 0.2
WBC # BLD AUTO: 5.2 X10(3) UL (ref 4–11)

## 2022-04-21 PROCEDURE — 86140 C-REACTIVE PROTEIN: CPT

## 2022-04-21 PROCEDURE — 83970 ASSAY OF PARATHORMONE: CPT

## 2022-04-21 PROCEDURE — 36415 COLL VENOUS BLD VENIPUNCTURE: CPT

## 2022-04-21 PROCEDURE — 80069 RENAL FUNCTION PANEL: CPT

## 2022-04-21 PROCEDURE — 85652 RBC SED RATE AUTOMATED: CPT

## 2022-04-21 PROCEDURE — 85060 BLOOD SMEAR INTERPRETATION: CPT

## 2022-04-21 PROCEDURE — 84450 TRANSFERASE (AST) (SGOT): CPT

## 2022-04-21 PROCEDURE — 85025 COMPLETE CBC W/AUTO DIFF WBC: CPT

## 2022-04-21 PROCEDURE — 81015 MICROSCOPIC EXAM OF URINE: CPT

## 2022-04-21 PROCEDURE — 83516 IMMUNOASSAY NONANTIBODY: CPT

## 2022-04-21 PROCEDURE — 86036 ANCA SCREEN EACH ANTIBODY: CPT

## 2022-04-21 PROCEDURE — 81001 URINALYSIS AUTO W/SCOPE: CPT

## 2022-04-25 LAB
MYELOPEROX ANTIBODIES, IGG: 0 AU/ML
SERINE PROTEASE 3, IGG: 0 AU/ML

## 2022-04-26 ENCOUNTER — OFFICE VISIT (OUTPATIENT)
Dept: INTERNAL MEDICINE CLINIC | Facility: CLINIC | Age: 82
End: 2022-04-26
Payer: COMMERCIAL

## 2022-04-26 VITALS
HEART RATE: 56 BPM | WEIGHT: 212.31 LBS | HEIGHT: 64 IN | SYSTOLIC BLOOD PRESSURE: 96 MMHG | RESPIRATION RATE: 16 BRPM | DIASTOLIC BLOOD PRESSURE: 55 MMHG | BODY MASS INDEX: 36.25 KG/M2

## 2022-04-26 DIAGNOSIS — Z78.0 ASYMPTOMATIC MENOPAUSAL STATE: ICD-10-CM

## 2022-04-26 DIAGNOSIS — R53.83 FATIGUE, UNSPECIFIED TYPE: ICD-10-CM

## 2022-04-26 DIAGNOSIS — D64.9 ANEMIA, UNSPECIFIED TYPE: ICD-10-CM

## 2022-04-26 DIAGNOSIS — N18.4 CKD (CHRONIC KIDNEY DISEASE) STAGE 4, GFR 15-29 ML/MIN (HCC): ICD-10-CM

## 2022-04-26 DIAGNOSIS — I10 ESSENTIAL HYPERTENSION: ICD-10-CM

## 2022-04-26 DIAGNOSIS — Z00.00 ENCOUNTER FOR MEDICARE ANNUAL WELLNESS EXAM: Primary | ICD-10-CM

## 2022-04-26 DIAGNOSIS — M1A.9XX1 CHRONIC TOPHACEOUS GOUT OF RIGHT FOOT: ICD-10-CM

## 2022-04-26 DIAGNOSIS — D84.9 IMMUNOSUPPRESSED STATUS (HCC): ICD-10-CM

## 2022-04-26 DIAGNOSIS — M06.09 RHEUMATOID ARTHRITIS OF MULTIPLE SITES WITH NEGATIVE RHEUMATOID FACTOR (HCC): ICD-10-CM

## 2022-04-26 DIAGNOSIS — I70.0 ATHEROSCLEROSIS OF AORTA (HCC): ICD-10-CM

## 2022-04-26 DIAGNOSIS — K58.0 IRRITABLE BOWEL SYNDROME WITH DIARRHEA: ICD-10-CM

## 2022-04-26 DIAGNOSIS — R32 URINARY INCONTINENCE, UNSPECIFIED TYPE: ICD-10-CM

## 2022-04-26 DIAGNOSIS — M15.9 PRIMARY OSTEOARTHRITIS INVOLVING MULTIPLE JOINTS: ICD-10-CM

## 2022-04-26 DIAGNOSIS — I25.10 ATHEROSCLEROSIS OF NATIVE CORONARY ARTERY OF NATIVE HEART WITHOUT ANGINA PECTORIS: ICD-10-CM

## 2022-04-26 DIAGNOSIS — I48.0 PAROXYSMAL ATRIAL FIBRILLATION (HCC): ICD-10-CM

## 2022-04-26 DIAGNOSIS — M31.0 LEUKOCYTOCLASTIC VASCULITIS (HCC): ICD-10-CM

## 2022-04-26 DIAGNOSIS — I10 ESSENTIAL HYPERTENSION WITH GOAL BLOOD PRESSURE LESS THAN 140/90: ICD-10-CM

## 2022-04-26 DIAGNOSIS — H04.552 ACQUIRED OBSTRUCTION OF LEFT NASOLACRIMAL DUCT: ICD-10-CM

## 2022-04-26 DIAGNOSIS — G47.33 OBSTRUCTIVE SLEEP APNEA ON CPAP: ICD-10-CM

## 2022-04-26 DIAGNOSIS — J44.9 CHRONIC OBSTRUCTIVE PULMONARY DISEASE, UNSPECIFIED COPD TYPE (HCC): ICD-10-CM

## 2022-04-26 DIAGNOSIS — N18.32 STAGE 3B CHRONIC KIDNEY DISEASE (HCC): ICD-10-CM

## 2022-04-26 DIAGNOSIS — K21.9 GASTROESOPHAGEAL REFLUX DISEASE: ICD-10-CM

## 2022-04-26 DIAGNOSIS — E03.9 ACQUIRED HYPOTHYROIDISM: ICD-10-CM

## 2022-04-26 DIAGNOSIS — Z99.89 OBSTRUCTIVE SLEEP APNEA ON CPAP: ICD-10-CM

## 2022-04-26 DIAGNOSIS — R26.9 GAIT ABNORMALITY: ICD-10-CM

## 2022-04-26 DIAGNOSIS — R06.00 DOE (DYSPNEA ON EXERTION): ICD-10-CM

## 2022-04-26 DIAGNOSIS — E78.2 MIXED HYPERLIPIDEMIA: ICD-10-CM

## 2022-04-26 PROBLEM — Z86.79 HISTORY OF ATRIAL FIBRILLATION: Status: RESOLVED | Noted: 2021-11-23 | Resolved: 2022-04-26

## 2022-04-26 PROCEDURE — 96160 PT-FOCUSED HLTH RISK ASSMT: CPT | Performed by: INTERNAL MEDICINE

## 2022-04-26 PROCEDURE — 3008F BODY MASS INDEX DOCD: CPT | Performed by: INTERNAL MEDICINE

## 2022-04-26 PROCEDURE — 99397 PER PM REEVAL EST PAT 65+ YR: CPT | Performed by: INTERNAL MEDICINE

## 2022-04-26 PROCEDURE — 3074F SYST BP LT 130 MM HG: CPT | Performed by: INTERNAL MEDICINE

## 2022-04-26 PROCEDURE — G0439 PPPS, SUBSEQ VISIT: HCPCS | Performed by: INTERNAL MEDICINE

## 2022-04-26 PROCEDURE — 3078F DIAST BP <80 MM HG: CPT | Performed by: INTERNAL MEDICINE

## 2022-04-26 RX ORDER — LEVOTHYROXINE SODIUM 137 UG/1
137 TABLET ORAL
Qty: 90 TABLET | Refills: 1 | Status: SHIPPED | OUTPATIENT
Start: 2022-04-26

## 2022-04-26 RX ORDER — PRAVASTATIN SODIUM 20 MG
20 TABLET ORAL NIGHTLY
Qty: 90 TABLET | Refills: 1 | Status: SHIPPED | OUTPATIENT
Start: 2022-04-26

## 2022-04-26 RX ORDER — SOLIFENACIN SUCCINATE 10 MG/1
10 TABLET, FILM COATED ORAL NIGHTLY
Qty: 90 TABLET | Refills: 3 | Status: SHIPPED | OUTPATIENT
Start: 2022-04-26

## 2022-04-26 RX ORDER — COLCHICINE 0.6 MG/1
TABLET ORAL
COMMUNITY
Start: 2021-10-06

## 2022-04-26 RX ORDER — AMLODIPINE BESYLATE 5 MG/1
5 TABLET ORAL DAILY
COMMUNITY
Start: 2022-04-04

## 2022-04-26 RX ORDER — LISINOPRIL 20 MG/1
20 TABLET ORAL DAILY
Qty: 90 TABLET | Refills: 1 | Status: SHIPPED | OUTPATIENT
Start: 2022-04-26

## 2022-04-26 NOTE — ASSESSMENT & PLAN NOTE
Patient has been fatigued and her blood pressure is on the low side. We will decrease her lisinopril to 20 mg daily. Follow-up in 3 months.

## 2022-04-26 NOTE — ASSESSMENT & PLAN NOTE
Patient's cholesterol is well controlled with pravastatin 20 mg and niacin 750 mg. To simplify her regimen, we will stop the niacin. Recheck her lipids in 3 months.

## 2022-04-30 NOTE — PROGRESS NOTES
Venus Teague is an 79-year-old patient of Dr. Rachel Diaz with seronegative rheumatoid arthritis. Since I last saw her January 25th of 2022, she has continued Enbrel weekly, methotrexate 15 mg weekly, and Plaquenil 400mg daily. She feels like her rheumatoid arthritis is controlled. Her joints aren't more swollen in the morning generally. Her morning stiffness is minimal.     Her back continues to slow her down. Lumbar spine x-rays show moderate scoliosis and moderate to severe spondylosis. She has discomfort from her lateral hips down her lateral thighs. She has discomfort in her knees and thumb bases. She has a left Z thumb deformity. She had right thumb surgery. Her knees have both been replaced. Her Plaquenil eye exams have been negative for retinal toxicity. She had right ankle surgery on July 21st of 2015. She has discomfort above the ankle. It is getting harder for her to get around. She uses both a cane and walker. She is using her CPAP machine for sleep apnea. She was admitted to the hospital April 29th through May 4th of 2021, with an acutely inflamed right third toe, which was originally felt to be an infection. Surgery was done, and tophi were seen in the bone. Her uric acid was 9.1. She has continued allopurinol 300 mg daily. She has not had any more acute attacks. Uric acid was repeated October 4th of 2021, and is down to 5.3. Labs were repeated April 21st of 2022. CBC normal except hemoglobin and hematocrit of 10.5 and 32.4. Creatinine is again high at 1.51 (32). She is being evaluated by nephrology. AST normal.  C-reactive protein and sed rate both normal.      She has dyspnea on exertion. She follows with cardiology. Stress test is scheduled. Echocardiogram on July 23rd of 2020 showed mildly dilated left ventricle, normal systolic function, grade 2 diastolic dysfunction, moderate mitral regurgitation, mildly to moderately dilated left atrium, moderate pulmonary hypertension. Review of Systems   Constitutional: Positive for fatigue. Negative for fever, chills and diaphoresis. Respiratory:Positive for shortness of breath. Cardiovascular: Negative for chest pain. Gastrointestinal: Negative for abdominal pain. Musculoskeletal: Positive for back and leg pain. Skin: Negative for rash. Hematological: Negative for adenopathy. Allergies:  Erythromycin Base         Pcn [Bicillin L-A]           PHYSICAL EXAM:   Blood pressure 96/57, pulse (!) 49, resp. rate 16, height 5' 4\" (1.626 m), weight 212 lb (96.2 kg), not currently breastfeeding. Constitutional: She is oriented to person, place, and time. She appears well-developed. HENT:   Head: Normocephalic. Eyes: Conjunctival injection left eye. Cardiovascular: Normal rate, regular rhythm and normal heart sounds. Pulmonary/Chest: Effort normal. Scattered crackles in her lung bases. Abdominal: Soft. Bowel sounds are normal. She exhibits no mass. There is no tenderness. There is no rebound and no guarding. Musculoskeletal: She has significant pitting edema in her distal lower extremities. There is not active synovitis in her wrists or hands. Bony deformity of left thumb. Right wrist ROM is limited with discomfort, and without obvious synovitis. Her right third toe was not acutely inflamed. There is a surgical scar. No obvious tophi. Her hips move well. There is some tenderness over the lateral hips and iliotibial bands. Lymphadenopathy:     She has no cervical adenopathy. Neurological: She is alert and oriented to person, place, and time. Skin: No rash noted. Psychiatric: She has a normal mood and affect. ASSESSMENT/PLAN:     1. Seronegative rheumatoid arthritis of multiple sites. It is well controlled on Plaquenil, Enbrel, and methotrexate 15 mg weekly. Because of her renal insufficiency, her methotrexate will be decreased to 10 mg weekly. She will continue every 3 month monitoring.    2. Encounter for therapeutic drug monitoring. 3. Primary osteoarthritis. Status post successful right ankle surgery. She is continuing to have mechanical problems with her low back and knees. Recent sciatica, doing better after steroids. Probable iliotibial band syndrome. She will work on stretching. Encouraged her to be more active if possible. 4. Low white count. Resolved. Again anemia. H/o borderline thrombocytopenia. Renal insufficiency. 5.  Renal insufficiency. Dyspnea on exertion. She follows with cardiology. Seeing Nephrology. 6.  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 It was not found to be infection, but tophaceous gout. Her uric acid is down to 5.3 on 300 mg of allopurinol daily, which she will continue for life.

## 2022-05-01 PROBLEM — Z80.3 FAMILY HISTORY OF BREAST CANCER: Status: RESOLVED | Noted: 2021-08-19 | Resolved: 2022-05-01

## 2022-05-03 ENCOUNTER — OFFICE VISIT (OUTPATIENT)
Dept: RHEUMATOLOGY | Facility: CLINIC | Age: 82
End: 2022-05-03
Payer: COMMERCIAL

## 2022-05-03 VITALS
HEIGHT: 64 IN | BODY MASS INDEX: 36.19 KG/M2 | RESPIRATION RATE: 16 BRPM | WEIGHT: 212 LBS | DIASTOLIC BLOOD PRESSURE: 57 MMHG | HEART RATE: 49 BPM | SYSTOLIC BLOOD PRESSURE: 96 MMHG

## 2022-05-03 DIAGNOSIS — M1A.9XX1 CHRONIC TOPHACEOUS GOUT OF RIGHT FOOT: ICD-10-CM

## 2022-05-03 DIAGNOSIS — Z51.81 THERAPEUTIC DRUG MONITORING: ICD-10-CM

## 2022-05-03 DIAGNOSIS — M06.09 RHEUMATOID ARTHRITIS OF MULTIPLE SITES WITH NEGATIVE RHEUMATOID FACTOR (HCC): Primary | ICD-10-CM

## 2022-05-03 PROCEDURE — 3008F BODY MASS INDEX DOCD: CPT | Performed by: INTERNAL MEDICINE

## 2022-05-03 PROCEDURE — 3078F DIAST BP <80 MM HG: CPT | Performed by: INTERNAL MEDICINE

## 2022-05-03 PROCEDURE — 99214 OFFICE O/P EST MOD 30 MIN: CPT | Performed by: INTERNAL MEDICINE

## 2022-05-03 PROCEDURE — 3074F SYST BP LT 130 MM HG: CPT | Performed by: INTERNAL MEDICINE

## 2022-05-04 RX ORDER — CLONIDINE 0.1 MG/24H
PATCH, EXTENDED RELEASE TRANSDERMAL
Qty: 12 PATCH | Refills: 3 | Status: SHIPPED | OUTPATIENT
Start: 2022-05-04

## 2022-05-04 NOTE — TELEPHONE ENCOUNTER
Please review.  Protocol Failed or has no Protocol  Requested Prescriptions   Pending Prescriptions Disp Refills    CLONIDINE 0.1 MG/24HR Transdermal Patch Weekly [Pharmacy Med Name: CLONIDINE  0.1MG  PATCH  24 HR]  3     Sig: APPLY 1 PATCH TOPICALLY  ONCE A WEEK        Hypertensive Medications Protocol Failed - 5/4/2022 11:20 AM        Failed - GFR Non- > 50     Lab Results   Component Value Date    GFRNAA 32 (L) 04/21/2022                 Passed - CMP or BMP in past 12 months        Passed - Appointment in past 6 or next 3 months            Future Appointments         Provider Department Appt Notes    In 2 weeks Clem Campos MD [facility], [address] [place] [address] 8 weeks    In 3 months Gregorio Barth MD [facility], [address], [place] 3 mo f/u    In 3 months Evan Kang MD [facility], [address] [place] [address], [facility] [address] 3 month f/u    In 5 months Jason Gardiner MD [address] 6 months          Recent Outpatient Visits              Yesterday Rheumatoid arthritis of multiple sites with negative rheumatoid factor [facility])    [facility], [address] [place] [address], Vonnie Gonsalves MD    Office Visit    1 week ago Encounter for [employer] annual wellness exam    Deedee Meneses MD    Office Visit    3 weeks ago Chronic obstructive pulmonary disease, unspecified COPD type [facility])    [address], Lexy Monzon MD    Office Visit    1 month ago Immunosuppressed status [facility])    Brant VargasmhFili Swift MD    Office Visit    2 months ago NICOLE (dyspnea on exertion)    Lilo Waterman MD    Office Visit

## 2022-05-10 ENCOUNTER — APPOINTMENT (OUTPATIENT)
Dept: URBAN - METROPOLITAN AREA CLINIC 321 | Age: 82
Setting detail: DERMATOLOGY
End: 2022-05-11

## 2022-05-10 DIAGNOSIS — M31.0 HYPERSENSITIVITY ANGIITIS: ICD-10-CM

## 2022-05-10 DIAGNOSIS — L53.8 OTHER SPECIFIED ERYTHEMATOUS CONDITIONS: ICD-10-CM

## 2022-05-10 DIAGNOSIS — R20.2 PARESTHESIA OF SKIN: ICD-10-CM

## 2022-05-10 PROCEDURE — 99214 OFFICE O/P EST MOD 30 MIN: CPT

## 2022-05-10 PROCEDURE — OTHER PRESCRIPTION MEDICATION MANAGEMENT: OTHER

## 2022-05-10 PROCEDURE — OTHER COUNSELING: OTHER

## 2022-05-10 ASSESSMENT — LOCATION SIMPLE DESCRIPTION DERM
LOCATION SIMPLE: RIGHT PLANTAR SURFACE
LOCATION SIMPLE: RIGHT FOOT
LOCATION SIMPLE: LEFT FOOT
LOCATION SIMPLE: PLANTAR SURFACE OF RIGHT 1ST TOE
LOCATION SIMPLE: LEFT PRETIBIAL REGION

## 2022-05-10 ASSESSMENT — LOCATION ZONE DERM
LOCATION ZONE: TOE
LOCATION ZONE: LEG
LOCATION ZONE: FEET

## 2022-05-10 ASSESSMENT — LOCATION DETAILED DESCRIPTION DERM
LOCATION DETAILED: LEFT DORSAL FOOT
LOCATION DETAILED: RIGHT DORSAL FOOT
LOCATION DETAILED: LEFT DISTAL PRETIBIAL REGION
LOCATION DETAILED: RIGHT PLANTAR FOREFOOT OVERLYING 1ST METATARSAL
LOCATION DETAILED: RIGHT MEDIAL PLANTAR 1ST TOE

## 2022-05-20 ENCOUNTER — OFFICE VISIT (OUTPATIENT)
Dept: NEPHROLOGY | Facility: CLINIC | Age: 82
End: 2022-05-20
Payer: COMMERCIAL

## 2022-05-20 VITALS
WEIGHT: 215 LBS | DIASTOLIC BLOOD PRESSURE: 56 MMHG | HEART RATE: 50 BPM | BODY MASS INDEX: 36.7 KG/M2 | SYSTOLIC BLOOD PRESSURE: 96 MMHG | HEIGHT: 64 IN

## 2022-05-20 DIAGNOSIS — E55.9 VITAMIN D DEFICIENCY: ICD-10-CM

## 2022-05-20 DIAGNOSIS — N28.1 CYST OF LEFT KIDNEY: ICD-10-CM

## 2022-05-20 DIAGNOSIS — N18.32 STAGE 3B CHRONIC KIDNEY DISEASE (HCC): Primary | ICD-10-CM

## 2022-05-20 PROCEDURE — 3074F SYST BP LT 130 MM HG: CPT | Performed by: INTERNAL MEDICINE

## 2022-05-20 PROCEDURE — 3078F DIAST BP <80 MM HG: CPT | Performed by: INTERNAL MEDICINE

## 2022-05-20 PROCEDURE — 99214 OFFICE O/P EST MOD 30 MIN: CPT | Performed by: INTERNAL MEDICINE

## 2022-05-20 PROCEDURE — 3008F BODY MASS INDEX DOCD: CPT | Performed by: INTERNAL MEDICINE

## 2022-05-20 NOTE — PATIENT INSTRUCTIONS
Take vitamin D3 at 2000 units daily   Hold lisinopril and furosemide - on day of CT scan and day before   CT Scan of kidneys as ordered to evaluate left kidney cyst   Follow up in 8 weeks   Blood test prior to next visit - no need to fast  Reduce lisinopril to 10 mg daily - monitor blood pressure at home

## 2022-05-30 DIAGNOSIS — M06.09 RHEUMATOID ARTHRITIS OF MULTIPLE SITES WITH NEGATIVE RHEUMATOID FACTOR (HCC): ICD-10-CM

## 2022-05-31 ENCOUNTER — HOSPITAL ENCOUNTER (OUTPATIENT)
Dept: BONE DENSITY | Facility: HOSPITAL | Age: 82
Discharge: HOME | End: 2022-05-31
Attending: INTERNAL MEDICINE
Payer: MEDICARE

## 2022-05-31 ENCOUNTER — TELEPHONE (OUTPATIENT)
Dept: NEPHROLOGY | Facility: CLINIC | Age: 82
End: 2022-05-31

## 2022-05-31 DIAGNOSIS — Z78.0 ASYMPTOMATIC MENOPAUSAL STATE: ICD-10-CM

## 2022-05-31 PROCEDURE — 77080 DXA BONE DENSITY AXIAL: CPT | Performed by: INTERNAL MEDICINE

## 2022-05-31 RX ORDER — ETANERCEPT 50 MG/ML
SOLUTION SUBCUTANEOUS
Qty: 12 ML | Refills: 1 | Status: SHIPPED | OUTPATIENT
Start: 2022-05-31

## 2022-05-31 NOTE — TELEPHONE ENCOUNTER
Spoke with patient and let her know to continue with 2000 international units per Dr. Rosa M Castro.

## 2022-05-31 NOTE — TELEPHONE ENCOUNTER
Patient is calling to ask if provider wants her to double her vitamin d since she is already taking 2000 units of it and provider wanted her to increase. Please call.

## 2022-05-31 NOTE — TELEPHONE ENCOUNTER
Dr Gerardo Funez- see message below and let us know your recommendations for pt's Vitamin D supplementation. Thank you.

## 2022-05-31 NOTE — TELEPHONE ENCOUNTER
LOV: 5/3/2022  Future Appointments   Date Time Provider Taqueria Wise   2022  1:40 PM [facility]A RM1 [facility]A EM [facility]   2022  8:30 AM [facility] CT RM3 [facility] CT EM [facility] [place]   7/15/2022  9:50 AM Milton Stephenson [facility]   2022 10:10 AM Valentina Rowley MD [facility]   8/3/2022  9:20 AM Litzy Kelly MD [address] [place]   10/12/2022 12:45 PM Zoraida Apgar, MD [facility]     Labs:   Component      Latest Ref Rng & Units 2022   WBC      4.0 - 11.0 x10(3) uL 5.2   RBC      3.80 - 5.30 x10(6)uL 2.91 (L)   Hemoglobin      12.0 - 16.0 g/dL 10.5 (L)   Hematocrit      35.0 - 48.0 % 32.4 (L)   MCV      80.0 - 100.0 fL 111.3 (H)   MCH      26.0 - 34.0 pg 36.1 (H)   MCHC      31.0 - 37.0 g/dL 32.4   RDW-SD      35.1 - 46.3 fL 58.9 (H)   RDW      11.0 - 15.0 % 14.5   Platelet Count      988.8 - 450.0 10(3)uL 154.0   Prelim Neutrophil Abs      1.50 - 7.70 x10 (3) uL 2.12   Neutrophils Absolute      1.50 - 7.70 x10(3) uL 2.12   Lymphocytes Absolute      1.00 - 4.00 x10(3) uL 2.17   Monocytes Absolute      0.10 - 1.00 x10(3) uL 0.75   Eosinophils Absolute      0.00 - 0.70 x10(3) uL 0.12   Basophils Absolute      0.00 - 0.20 x10(3) uL 0.06   Immature Granulocyte Absolute      0.00 - 1.00 x10(3) uL 0.02   Neutrophils %      % 40.5   Lymphocytes %      % 41.4   Monocytes %      % 14.3   Eosinophils %      % 2.3   Basophils %      % 1.1   Immature Granulocyte %      % 0.4   SED RATE      0 - 30 mm/Hr 22   C-REACTIVE PROTEIN      <0.30 mg/dL <0.29   AST (SGOT)      15 - 37 U/L 19

## 2022-06-13 ENCOUNTER — HOSPITAL ENCOUNTER (OUTPATIENT)
Dept: CT IMAGING | Facility: HOSPITAL | Age: 82
Discharge: HOME | End: 2022-06-13
Attending: INTERNAL MEDICINE
Payer: MEDICARE

## 2022-06-13 DIAGNOSIS — N18.32 STAGE 3B CHRONIC KIDNEY DISEASE (HCC): ICD-10-CM

## 2022-06-13 DIAGNOSIS — N28.1 CYST OF LEFT KIDNEY: ICD-10-CM

## 2022-06-13 LAB — CREAT BLD-MCNC: 1.1 MG/DL

## 2022-06-13 PROCEDURE — 74170 CT ABD WO CNTRST FLWD CNTRST: CPT | Performed by: INTERNAL MEDICINE

## 2022-06-13 PROCEDURE — 82565 ASSAY OF CREATININE: CPT

## 2022-07-14 ENCOUNTER — LAB ENCOUNTER (OUTPATIENT)
Dept: LAB | Facility: HOSPITAL | Age: 82
End: 2022-07-14
Attending: INTERNAL MEDICINE
Payer: MEDICARE

## 2022-07-14 DIAGNOSIS — N18.32 STAGE 3B CHRONIC KIDNEY DISEASE (HCC): ICD-10-CM

## 2022-07-14 DIAGNOSIS — Z51.81 ENCOUNTER FOR THERAPEUTIC DRUG MONITORING: ICD-10-CM

## 2022-07-14 DIAGNOSIS — M06.09 RHEUMATOID ARTHRITIS OF MULTIPLE SITES WITH NEGATIVE RHEUMATOID FACTOR (HCC): ICD-10-CM

## 2022-07-14 DIAGNOSIS — E55.9 VITAMIN D DEFICIENCY: ICD-10-CM

## 2022-07-14 LAB
ALBUMIN SERPL-MCNC: 3.4 G/DL (ref 3.4–5)
ANION GAP SERPL CALC-SCNC: 5 MMOL/L (ref 0–18)
AST SERPL-CCNC: 19 U/L (ref 15–37)
BASOPHILS # BLD AUTO: 0.07 X10(3) UL (ref 0–0.2)
BASOPHILS NFR BLD AUTO: 1.3 %
BUN BLD-MCNC: 26 MG/DL (ref 7–18)
BUN/CREAT SERPL: 21.5 (ref 10–20)
CALCIUM BLD-MCNC: 9.6 MG/DL (ref 8.5–10.1)
CHLORIDE SERPL-SCNC: 111 MMOL/L (ref 98–112)
CO2 SERPL-SCNC: 28 MMOL/L (ref 21–32)
CREAT BLD-MCNC: 1.21 MG/DL
CRP SERPL-MCNC: <0.29 MG/DL (ref ?–0.3)
DEPRECATED RDW RBC AUTO: 60 FL (ref 35.1–46.3)
EOSINOPHIL # BLD AUTO: 0.16 X10(3) UL (ref 0–0.7)
EOSINOPHIL NFR BLD AUTO: 3 %
ERYTHROCYTE [DISTWIDTH] IN BLOOD BY AUTOMATED COUNT: 15.3 % (ref 11–15)
ERYTHROCYTE [SEDIMENTATION RATE] IN BLOOD: 26 MM/HR
FASTING STATUS PATIENT QL REPORTED: NO
GLUCOSE BLD-MCNC: 103 MG/DL (ref 70–99)
HCT VFR BLD AUTO: 31.7 %
HGB BLD-MCNC: 10.1 G/DL
IMM GRANULOCYTES # BLD AUTO: 0.02 X10(3) UL (ref 0–1)
IMM GRANULOCYTES NFR BLD: 0.4 %
LYMPHOCYTES # BLD AUTO: 1.79 X10(3) UL (ref 1–4)
LYMPHOCYTES NFR BLD AUTO: 33.5 %
MCH RBC QN AUTO: 34.6 PG (ref 26–34)
MCHC RBC AUTO-ENTMCNC: 31.9 G/DL (ref 31–37)
MCV RBC AUTO: 108.6 FL
MONOCYTES # BLD AUTO: 0.69 X10(3) UL (ref 0.1–1)
MONOCYTES NFR BLD AUTO: 12.9 %
NEUTROPHILS # BLD AUTO: 2.62 X10 (3) UL (ref 1.5–7.7)
NEUTROPHILS # BLD AUTO: 2.62 X10(3) UL (ref 1.5–7.7)
NEUTROPHILS NFR BLD AUTO: 48.9 %
OSMOLALITY SERPL CALC.SUM OF ELEC: 303 MOSM/KG (ref 275–295)
PLATELET # BLD AUTO: 175 10(3)UL (ref 150–450)
POTASSIUM SERPL-SCNC: 4.5 MMOL/L (ref 3.5–5.1)
RBC # BLD AUTO: 2.92 X10(6)UL
SODIUM SERPL-SCNC: 144 MMOL/L (ref 136–145)
VIT D+METAB SERPL-MCNC: 33.3 NG/ML (ref 30–100)
WBC # BLD AUTO: 5.4 X10(3) UL (ref 4–11)

## 2022-07-14 PROCEDURE — 86140 C-REACTIVE PROTEIN: CPT

## 2022-07-14 PROCEDURE — 82040 ASSAY OF SERUM ALBUMIN: CPT

## 2022-07-14 PROCEDURE — 80048 BASIC METABOLIC PNL TOTAL CA: CPT

## 2022-07-14 PROCEDURE — 85025 COMPLETE CBC W/AUTO DIFF WBC: CPT

## 2022-07-14 PROCEDURE — 82306 VITAMIN D 25 HYDROXY: CPT

## 2022-07-14 PROCEDURE — 84450 TRANSFERASE (AST) (SGOT): CPT

## 2022-07-14 PROCEDURE — 36415 COLL VENOUS BLD VENIPUNCTURE: CPT

## 2022-07-14 PROCEDURE — 85652 RBC SED RATE AUTOMATED: CPT

## 2022-07-15 ENCOUNTER — OFFICE VISIT (OUTPATIENT)
Dept: NEPHROLOGY | Facility: CLINIC | Age: 82
End: 2022-07-15
Payer: COMMERCIAL

## 2022-07-15 VITALS
HEIGHT: 64 IN | DIASTOLIC BLOOD PRESSURE: 57 MMHG | BODY MASS INDEX: 37.9 KG/M2 | HEART RATE: 42 BPM | WEIGHT: 222 LBS | SYSTOLIC BLOOD PRESSURE: 107 MMHG

## 2022-07-15 DIAGNOSIS — N18.32 STAGE 3B CHRONIC KIDNEY DISEASE (HCC): Primary | ICD-10-CM

## 2022-07-15 PROCEDURE — 99214 OFFICE O/P EST MOD 30 MIN: CPT | Performed by: INTERNAL MEDICINE

## 2022-07-15 PROCEDURE — 3074F SYST BP LT 130 MM HG: CPT | Performed by: INTERNAL MEDICINE

## 2022-07-15 PROCEDURE — 3078F DIAST BP <80 MM HG: CPT | Performed by: INTERNAL MEDICINE

## 2022-07-15 PROCEDURE — 3008F BODY MASS INDEX DOCD: CPT | Performed by: INTERNAL MEDICINE

## 2022-07-15 NOTE — PATIENT INSTRUCTIONS
Reduce bystolic to 5 mg daily dose   Stop clonidine   Monitor blood pressure and heart rate - send the readings in 7-10 days  Follow up in 4 months   Lab tests prior to next visit

## 2022-07-20 ENCOUNTER — TELEPHONE (OUTPATIENT)
Dept: INTERNAL MEDICINE CLINIC | Facility: CLINIC | Age: 82
End: 2022-07-20

## 2022-07-26 ENCOUNTER — TELEPHONE (OUTPATIENT)
Dept: NEPHROLOGY | Facility: CLINIC | Age: 82
End: 2022-07-26

## 2022-07-26 NOTE — TELEPHONE ENCOUNTER
Patient dropped off at the [facility] a copy of her recorded Heart rate for the last 10 days.

## 2022-08-02 ENCOUNTER — OFFICE VISIT (OUTPATIENT)
Dept: INTERNAL MEDICINE CLINIC | Facility: CLINIC | Age: 82
End: 2022-08-02
Payer: COMMERCIAL

## 2022-08-02 VITALS
DIASTOLIC BLOOD PRESSURE: 72 MMHG | RESPIRATION RATE: 20 BRPM | BODY MASS INDEX: 37.56 KG/M2 | TEMPERATURE: 97 F | SYSTOLIC BLOOD PRESSURE: 134 MMHG | HEIGHT: 64 IN | WEIGHT: 220 LBS | HEART RATE: 52 BPM

## 2022-08-02 DIAGNOSIS — E03.9 ACQUIRED HYPOTHYROIDISM: ICD-10-CM

## 2022-08-02 DIAGNOSIS — R26.9 GAIT ABNORMALITY: Primary | ICD-10-CM

## 2022-08-02 DIAGNOSIS — R73.09 ELEVATED GLUCOSE: ICD-10-CM

## 2022-08-02 DIAGNOSIS — R22.41 LUMP OF SKIN OF LOWER EXTREMITY, RIGHT: ICD-10-CM

## 2022-08-02 DIAGNOSIS — E78.2 MIXED HYPERLIPIDEMIA: ICD-10-CM

## 2022-08-02 DIAGNOSIS — I10 ESSENTIAL HYPERTENSION: ICD-10-CM

## 2022-08-02 DIAGNOSIS — E55.9 VITAMIN D DEFICIENCY: ICD-10-CM

## 2022-08-02 PROCEDURE — 3075F SYST BP GE 130 - 139MM HG: CPT | Performed by: INTERNAL MEDICINE

## 2022-08-02 PROCEDURE — 1126F AMNT PAIN NOTED NONE PRSNT: CPT | Performed by: INTERNAL MEDICINE

## 2022-08-02 PROCEDURE — 3008F BODY MASS INDEX DOCD: CPT | Performed by: INTERNAL MEDICINE

## 2022-08-02 PROCEDURE — 99214 OFFICE O/P EST MOD 30 MIN: CPT | Performed by: INTERNAL MEDICINE

## 2022-08-02 PROCEDURE — 3078F DIAST BP <80 MM HG: CPT | Performed by: INTERNAL MEDICINE

## 2022-08-02 RX ORDER — POTASSIUM CHLORIDE 20 MEQ/1
1 TABLET, EXTENDED RELEASE ORAL DAILY
COMMUNITY
Start: 2021-10-15 | End: 2022-08-02

## 2022-08-02 RX ORDER — POTASSIUM CHLORIDE 750 MG/1
10 TABLET, FILM COATED, EXTENDED RELEASE ORAL DAILY
COMMUNITY
Start: 2022-05-18

## 2022-08-02 NOTE — PATIENT INSTRUCTIONS
Do fasting labs in October. Fast for 12 hours. Water is okay. You can walk-in or schedule an appointment.   Do not take vitamins or supplements for 3 days prior to the blood test.

## 2022-08-04 NOTE — PROGRESS NOTES
Sebastián Nagy is an 71-year-old patient of Dr. Adolfo Us with seronegative rheumatoid arthritis. Since I last saw her May 3rd of 2022, she has continued Enbrel weekly, has been down to methotrexate 10 mg weekly, and has continued Plaquenil 400mg daily. She feels like her rheumatoid arthritis is controlled. Her joints aren't more swollen in the morning. Her morning stiffness is minimal.     Her back continues to slow her down. Lumbar spine x-rays show moderate scoliosis and moderate to severe spondylosis. She has discomfort from her lateral hips down her lateral thighs. She has discomfort in her knees and thumb bases. She has a left Z thumb deformity. She had right thumb surgery. Her knees have both been replaced. Her Plaquenil eye exams have been negative for retinal toxicity. She had right ankle surgery on July 21st of 2015. She has discomfort above the ankle. It is getting harder for her to get around. She uses both a cane and walker. She is using her CPAP machine for sleep apnea. She was admitted to the hospital April 29th through May 4th of 2021, with an acutely inflamed right third toe, which was originally felt to be an infection. Surgery was done, and tophi were seen in the bone. Her uric acid was 9.1. She has continued allopurinol 300 mg daily. She has not had any more acute attacks. Uric acid was repeated October 4th of 2021, and is down to 5.3. Labs were repeated July 14th of 2022. CBC normal, except hemoglobin and hematocrit of 10.1 and 31.7. Creatinine improved at 1.21 (42). She is seeing nephrology. AST and albumin normal.  C-reactive protein and sed rate both normal.      She has dyspnea on exertion. She follows with cardiology. Stress test is scheduled.   Echocardiogram on July 23rd of 2020 showed mildly dilated left ventricle, normal systolic function, grade 2 diastolic dysfunction, moderate mitral regurgitation, mildly to moderately dilated left atrium, moderate pulmonary hypertension. Review of Systems   Constitutional: Positive for fatigue. Negative for fever, chills and diaphoresis. Respiratory:Positive for shortness of breath. Cardiovascular: Negative for chest pain. Gastrointestinal: Negative for abdominal pain. Musculoskeletal: Positive for back and leg pain. Skin: Negative for rash. Hematological: Negative for adenopathy. Allergies:  Erythromycin Base         Pcn [Bicillin L-A]           PHYSICAL EXAM:   Blood pressure 105/63, pulse 60, height 5' 4\" (1.626 m), weight 221 lb (100.2 kg), not currently breastfeeding. Constitutional: She is oriented to person, place, and time. She appears well-developed. HENT:   Head: Normocephalic. Eyes: Conjunctival injection left eye. Cardiovascular: Normal rate, regular rhythm and normal heart sounds. Pulmonary/Chest: Effort normal. Scattered crackles in her lung bases. Abdominal: Soft. Bowel sounds are normal. She exhibits no mass. There is no tenderness. There is no rebound and no guarding. Musculoskeletal: She has significant pitting edema in her distal lower extremities. There is not active synovitis in her wrists or hands. Bony deformity of left thumb. Right wrist ROM is limited with discomfort, and without obvious synovitis. Her right third toe was not acutely inflamed. There is a surgical scar. No obvious tophi. Her hips move well. There is some tenderness over the lateral hips and iliotibial bands. Lymphadenopathy:     She has no cervical adenopathy. Neurological: She is alert and oriented to person, place, and time. Skin: No rash noted. Psychiatric: She has a normal mood and affect. ASSESSMENT/PLAN:     1. Seronegative rheumatoid arthritis of multiple sites. It is well controlled on Plaquenil, Enbrel, and methotrexate 10 mg weekly. She will continue every 3 month monitoring. 2. Encounter for therapeutic drug monitoring. 3. Primary osteoarthritis.  Status post successful right ankle surgery. She is continuing to have mechanical problems with her low back and knees. Recent sciatica, doing better after steroids. Probable iliotibial band syndrome. She will work on stretching. Encouraged her to be more active if possible. 4. Low white count. Resolved. Again anemia. H/o borderline thrombocytopenia. Renal insufficiency. 5.  Renal insufficiency. Dyspnea on exertion. She follows with cardiology. Seeing Nephrology. 6.  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 It was not found to be infection, but tophaceous gout. Her uric acid is down to 5.3 on 300 mg of allopurinol daily, which she will continue for life.

## 2022-08-08 ENCOUNTER — OFFICE VISIT (OUTPATIENT)
Dept: OTOLARYNGOLOGY | Facility: CLINIC | Age: 82
End: 2022-08-08
Payer: COMMERCIAL

## 2022-08-08 VITALS — WEIGHT: 220 LBS | BODY MASS INDEX: 37.56 KG/M2 | HEIGHT: 64 IN

## 2022-08-08 DIAGNOSIS — H61.23 BILATERAL IMPACTED CERUMEN: Primary | ICD-10-CM

## 2022-08-09 ENCOUNTER — OFFICE VISIT (OUTPATIENT)
Dept: RHEUMATOLOGY | Facility: CLINIC | Age: 82
End: 2022-08-09
Payer: COMMERCIAL

## 2022-08-09 VITALS
BODY MASS INDEX: 37.73 KG/M2 | WEIGHT: 221 LBS | DIASTOLIC BLOOD PRESSURE: 63 MMHG | SYSTOLIC BLOOD PRESSURE: 105 MMHG | HEIGHT: 64 IN | HEART RATE: 60 BPM

## 2022-08-09 DIAGNOSIS — M1A.9XX1 CHRONIC TOPHACEOUS GOUT OF RIGHT FOOT: ICD-10-CM

## 2022-08-09 DIAGNOSIS — M06.09 RHEUMATOID ARTHRITIS OF MULTIPLE SITES WITH NEGATIVE RHEUMATOID FACTOR (HCC): Primary | ICD-10-CM

## 2022-08-09 DIAGNOSIS — Z51.81 THERAPEUTIC DRUG MONITORING: ICD-10-CM

## 2022-08-09 PROCEDURE — 99214 OFFICE O/P EST MOD 30 MIN: CPT | Performed by: INTERNAL MEDICINE

## 2022-08-09 PROCEDURE — 3078F DIAST BP <80 MM HG: CPT | Performed by: INTERNAL MEDICINE

## 2022-08-09 PROCEDURE — 3074F SYST BP LT 130 MM HG: CPT | Performed by: INTERNAL MEDICINE

## 2022-08-09 PROCEDURE — 1125F AMNT PAIN NOTED PAIN PRSNT: CPT | Performed by: INTERNAL MEDICINE

## 2022-08-09 PROCEDURE — 3008F BODY MASS INDEX DOCD: CPT | Performed by: INTERNAL MEDICINE

## 2022-08-09 RX ORDER — ALLOPURINOL 300 MG/1
TABLET ORAL
Qty: 90 TABLET | Refills: 3 | Status: SHIPPED | OUTPATIENT
Start: 2022-08-09

## 2022-08-11 RX ORDER — FOLIC ACID 1 MG/1
1 TABLET ORAL DAILY
Qty: 90 TABLET | Refills: 3 | Status: SHIPPED | OUTPATIENT
Start: 2022-08-11

## 2022-08-11 NOTE — TELEPHONE ENCOUNTER
Patient states 28 Suze Villalba is out of Folic Acid; would Dr. Landa Carry office please send to the [facility] in [facility].  Call patient to confirm.

## 2022-08-11 NOTE — TELEPHONE ENCOUNTER
LOV: 8/9/22  Future Appointments   Date Time Provider [employer] [facility]   10/12/2022 12:45 PM Michela Alonso MD [facility]   11/3/2022 10:30 AM Colt Mendoza MD [facility]   11/15/2022  9:40 AM Yohannes Buchanan MD [facility] [place]   11/18/2022  9:50 AM Jessica Chan MD [facility]    LABS:    ASSESSMENT/PLAN:   1. Seronegative rheumatoid arthritis of multiple sites. It is well controlled on Plaquenil, Enbrel, and methotrexate 10 mg weekly. She will continue every 3 month monitoring. 2. Encounter for therapeutic drug monitoring. 3. Primary osteoarthritis. Status post successful right ankle surgery. She is continuing to have mechanical problems with her low back and knees. Recent sciatica, doing better after steroids. Probable iliotibial band syndrome. She will work on stretching. Encouraged her to be more active if possible. 4. Low white count. Resolved. Again anemia. H/o borderline thrombocytopenia. Renal insufficiency. 5.  Renal insufficiency. Dyspnea on exertion. She follows with cardiology. Seeing Nephrology. 6.  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 It was not found to be infection, but tophaceous gout. Her uric acid is down to 5.3 on 300 mg of allopurinol daily, which she will continue for life.

## 2022-08-26 ENCOUNTER — TELEPHONE (OUTPATIENT)
Dept: PULMONOLOGY | Facility: CLINIC | Age: 82
End: 2022-08-26

## 2022-08-26 RX ORDER — FLUTICASONE FUROATE AND VILANTEROL TRIFENATATE 100; 25 UG/1; UG/1
1 POWDER RESPIRATORY (INHALATION) DAILY
Qty: 60 EACH | Refills: 5 | Status: SHIPPED | OUTPATIENT
Start: 2022-08-26

## 2022-09-07 ENCOUNTER — TELEPHONE (OUTPATIENT)
Dept: PHYSICAL THERAPY | Facility: HOSPITAL | Age: 82
End: 2022-09-07

## 2022-09-08 ENCOUNTER — TELEPHONE (OUTPATIENT)
Dept: PHYSICAL THERAPY | Facility: HOSPITAL | Age: 82
End: 2022-09-08

## 2022-09-08 ENCOUNTER — APPOINTMENT (OUTPATIENT)
Dept: PHYSICAL THERAPY | Age: 82
End: 2022-09-08
Attending: INTERNAL MEDICINE
Payer: MEDICARE

## 2022-09-09 NOTE — TELEPHONE ENCOUNTER
LOV: 8/9/22  Future Appointments   Date Time Provider [employer] [facility]   9/14/2022  9:15 AM Meribeth Maffucci, PT [facility] [place]   9/16/2022  9:15 AM Meribeth Maffucci, PT [facility] [place]   9/21/2022  9:15 AM Meribeth Maffucci, PT [facility] [place]   9/23/2022 10:00 AM Meribeth Maffucci, PT [facility] [place]   9/28/2022 10:00 AM Meribeth Maffucci, PT [facility] [place]   10/12/2022 12:45 PM Evelyn Alonso MD [facility]   11/3/2022 10:50 AM Carolyn Bermudez MD [facility] [place]   11/15/2022  9:40 AM Paxton Monroe MD [facility] [place]   11/18/2022  9:50 AM Erich Villanueva MD [facility]    LABS:  Component      Latest Ref Rng & Units 7/14/2022   WBC      4.0 - 11.0 x10(3) uL 5.4   RBC      3.80 - 5.30 x10(6)uL 2.92 (L)   Hemoglobin      12.0 - 16.0 g/dL 10.1 (L)   Hematocrit      35.0 - 48.0 % 31.7 (L)   MCV      80.0 - 100.0 fL 108.6 (H)   MCH      26.0 - 34.0 pg 34.6 (H)   MCHC      31.0 - 37.0 g/dL 31.9   RDW-SD      35.1 - 46.3 fL 60.0 (H)   RDW      11.0 - 15.0 % 15.3 (H)   Platelet Count      095.4 - 450.0 10(3)uL 175.0   Prelim Neutrophil Abs      1.50 - 7.70 x10 (3) uL 2.62   Neutrophils Absolute      1.50 - 7.70 x10(3) uL 2.62   Lymphocytes Absolute      1.00 - 4.00 x10(3) uL 1.79   Monocytes Absolute      0.10 - 1.00 x10(3) uL 0.69   Eosinophils Absolute      0.00 - 0.70 x10(3) uL 0.16   Basophils Absolute      0.00 - 0.20 x10(3) uL 0.07   Immature Granulocyte Absolute      0.00 - 1.00 x10(3) uL 0.02   Neutrophils %      % 48.9   Lymphocytes %      % 33.5   Monocytes %      % 12.9   Eosinophils %      % 3.0   Basophils %      % 1.3   Immature Granulocyte %      % 0.4   SED RATE      0 - 30 mm/Hr 26   C-REACTIVE PROTEIN      <0.30 mg/dL <0.29   AST (SGOT)      15 - 37 U/L 19   Albumin      3.4 - 5.0 g/dL 3.4     ASSESSMENT/PLAN:   1. Seronegative rheumatoid arthritis of multiple sites. It is well controlled on Plaquenil, Enbrel, and methotrexate 10 mg weekly. She will continue every 3 month monitoring. 2. Encounter for therapeutic drug monitoring. 3. Primary osteoarthritis. Status post successful right ankle surgery. She is continuing to have mechanical problems with her low back and knees. Recent sciatica, doing better after steroids. Probable iliotibial band syndrome. She will work on stretching. Encouraged her to be more active if possible. 4. Low white count. Resolved. Again anemia. H/o borderline thrombocytopenia. Renal insufficiency. 5.  Renal insufficiency. Dyspnea on exertion. She follows with cardiology. Seeing Nephrology. 6.  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 It was not found to be infection, but tophaceous gout. Her uric acid is down to 5.3 on 300 mg of allopurinol daily, which she will continue for life.

## 2022-09-14 ENCOUNTER — OFFICE VISIT (OUTPATIENT)
Dept: PHYSICAL THERAPY | Age: 82
End: 2022-09-14
Attending: INTERNAL MEDICINE
Payer: MEDICARE

## 2022-09-14 PROCEDURE — 97110 THERAPEUTIC EXERCISES: CPT

## 2022-09-14 PROCEDURE — 97163 PT EVAL HIGH COMPLEX 45 MIN: CPT

## 2022-09-16 ENCOUNTER — OFFICE VISIT (OUTPATIENT)
Dept: PHYSICAL THERAPY | Age: 82
End: 2022-09-16
Attending: INTERNAL MEDICINE
Payer: MEDICARE

## 2022-09-16 PROCEDURE — 97110 THERAPEUTIC EXERCISES: CPT

## 2022-09-16 PROCEDURE — 97112 NEUROMUSCULAR REEDUCATION: CPT

## 2022-09-20 DIAGNOSIS — I10 ESSENTIAL HYPERTENSION WITH GOAL BLOOD PRESSURE LESS THAN 140/90: ICD-10-CM

## 2022-09-21 ENCOUNTER — OFFICE VISIT (OUTPATIENT)
Dept: PHYSICAL THERAPY | Age: 82
End: 2022-09-21
Attending: INTERNAL MEDICINE
Payer: MEDICARE

## 2022-09-21 PROCEDURE — 97112 NEUROMUSCULAR REEDUCATION: CPT

## 2022-09-21 PROCEDURE — 97110 THERAPEUTIC EXERCISES: CPT

## 2022-09-21 RX ORDER — LISINOPRIL 20 MG/1
20 TABLET ORAL DAILY
Qty: 90 TABLET | Refills: 1 | Status: SHIPPED | OUTPATIENT
Start: 2022-09-21

## 2022-09-21 RX ORDER — POTASSIUM CHLORIDE 750 MG/1
10 TABLET, FILM COATED, EXTENDED RELEASE ORAL DAILY
Qty: 90 TABLET | Refills: 1 | Status: SHIPPED | OUTPATIENT
Start: 2022-09-21

## 2022-09-21 NOTE — TELEPHONE ENCOUNTER
Please review; protocol failed/no protocol.      Requested Prescriptions   Pending Prescriptions Disp Refills    POTASSIUM CHLORIDE ER 10 MEQ Oral Tab CR [Pharmacy Med Name: Potassium Chloride Er 10 Meq Tab Kate] 90 tablet 0     Sig: TAKE ONE TABLET BY MOUTH ONE TIME DAILY        There is no refill protocol information for this order            Recent Outpatient Visits              Today     [address] [place] [address] in [place], [place]    Office Visit    5 days ago     [address] [place] [address] in [place], [place]    Office Visit    1 week ago     [address] [place] [address] in [place], [place]    Office Visit    1 month ago Rheumatoid arthritis of multiple sites with negative rheumatoid factor [facility])    [address], [address] [place] [address], Damaso Dobson MD    Office Visit    1 month ago Bilateral impacted cerumen    [facility] for [address] [place] [address], Jany Kuhn MD    Office Visit             Future Appointments         Provider Department Appt Notes    In 2 days Jesusita [address] in [facility] PPO  no c/p, no auth, no limit    In 1 week Jesus[address] in [facility] PPO  no c/p, no auth, no limit    In 1 week [address]ita [address] in [facility] PPO  no c/p, no auth, no limit    In 2 weeks Jesusita [address] in [facility] PPO  no c/p, no auth, no limit    In 3 weeks Jesusita [address] in [facility] PPO  no c/p, no auth, no limit    In 3 weeks MD Ricarda Faustin [address] 6 months    In 1 month Sebastian Donnelly MD [address], 12 [facility], [address] 3 mo f/u    In 1 month Karli Frye MD [address], 97 [facility] 3 mos follow up    In 1 month JocelinvallastraMD Jonatan [address] [place] [address] 4 months

## 2022-09-23 ENCOUNTER — OFFICE VISIT (OUTPATIENT)
Dept: PHYSICAL THERAPY | Age: 82
End: 2022-09-23
Attending: INTERNAL MEDICINE
Payer: MEDICARE

## 2022-09-23 PROCEDURE — 97112 NEUROMUSCULAR REEDUCATION: CPT

## 2022-09-23 PROCEDURE — 97110 THERAPEUTIC EXERCISES: CPT

## 2022-09-26 DIAGNOSIS — E03.9 ACQUIRED HYPOTHYROIDISM: ICD-10-CM

## 2022-09-26 DIAGNOSIS — K21.9 GASTROESOPHAGEAL REFLUX DISEASE: ICD-10-CM

## 2022-09-26 RX ORDER — LEVOTHYROXINE SODIUM 137 UG/1
137 TABLET ORAL
Qty: 90 TABLET | Refills: 1 | Status: SHIPPED | OUTPATIENT
Start: 2022-09-26 | End: 2023-03-01

## 2022-09-26 RX ORDER — PANTOPRAZOLE SODIUM 40 MG/1
TABLET, DELAYED RELEASE ORAL
Qty: 90 TABLET | Refills: 1 | Status: SHIPPED | OUTPATIENT
Start: 2022-09-26 | End: 2023-03-01

## 2022-09-26 NOTE — TELEPHONE ENCOUNTER
Refill passed per [facility] protocol.     Requested Prescriptions   Pending Prescriptions Disp Refills    LEVOTHYROXINE 137 MCG Oral Tab [Pharmacy Med Name: Levothyroxine Sodium 137 MCG Oral Tablet] 90 tablet 3     Sig: TAKE 1 TABLET BY MOUTH  BEFORE BREAKFAST        Thyroid Medication Protocol Passed - 9/26/2022  4:59 AM        Passed - TSH in past 12 months        Passed - Last TSH value is normal     Lab Results   Component Value Date    TSH 0.623 10/04/2021    [facility] 1.79 10/13/2015                 Passed - In person appointment or virtual visit in the past 12 mos or appointment in next 3 mos       Recent Outpatient Visits              3 days ago     [facility] in [place], [place]    Office Visit    5 days ago     [facility] in [facility] 18    1 week ago     [facility] in [facility] 18    1 week ago     [facility] in [place], [place]    Office Visit    1 month ago Rheumatoid arthritis of multiple sites with negative rheumatoid factor [facility])    [facility], [address] [place], [place] [address]Rocio MD    Office Visit     Future Appointments         Provider Department Appt Notes    In 2 days Davidpaula Weiner, [address] in [facility] PPO  no c/p, no auth, no limit    In 4 days David Regine, [address] in [facility] PPO  no c/p, no auth, no limit    In 1 week Davidpaula Weiner, [address] in [facility] PPO  no c/p, no auth, no limit    In 2 weeks Davidpaula Weiner [address] in [facility] PPO  no c/p, no auth, no limit    In 2 weeks Marsha Holder MD [facility], [address] [place] [address] 6 months    In 1 month Geovanna Lester MD [facility], [address], [facility] 3 mo f/u    In 1 month Pérez Oh MD [facility], [address] [place] [address] [place] 3 mos follow up    In  month Jose Aviles MD [place] [employer] [facility], [address] 4 months                  PANTOPRAZOLE 40 MG Oral Tab EC [Pharmacy Med Name: Pantoprazole Sodium 40 MG Oral Tablet Delayed Release] 90 tablet 3     Sig: TAKE 1 TABLET BY MOUTH IN  THE MORNING BEFORE  BREAKFAST        Gastrointestional Medication Protocol Passed - 9/26/2022  4:59 AM        Passed - In person appointment or virtual visit in the past 12 mos or appointment in next 3 mos       Recent Outpatient Visits              3 days ago     [address] [place]    Office Visit    5 days ago     [address] [place]    Office Visit    1 week ago     [address] [place]    Office Visit    1 week ago     [address] [place]    Office Visit    1 month ago Rheumatoid arthritis of multiple sites with negative rheumatoid factor [facility])    [facility], [facility], [address] [place] [address] MD Emanuel    Office Visit     Future Appointments         Provider Department Appt Notes    In 2 days Amaryllis Risser, [address] in [facility] PPO  no c/p, no auth, no limit    In 4 days Amaryllis Risser, [address] in [facility] PPO  no c/p, no auth, no limit    In 1 week Amaryllis Risser, [address] in [facility] PPO  no c/p, no auth, no limit    In 2 weeks Amaryllis Risser, [address] in [facility] PPO  no c/p, no auth, no limit    In 2 weeks Sofía Mack MD [place], [address] 6 months    In 1 month Yael Woodruff MD [place] [employer] [facility], [address] [place] [address] 3 mo f/u    In 1 month Andreina Mejia MD [place] [employer] [facility], [address] [place], [place] [address] 3 mos follow up    In 1 month Jose Aviles MD [place] [employer] [facility], [address] 4 months                     Recent Outpatient Visits              3 days ago [facility] in [place], [place]    Office Visit    5 days ago     [facility] in [place], [place]    Office Visit    1 week ago     [facility] in [place], [place]    Office Visit    1 week ago     [facility] in [place], [place]    Office Visit    1 month ago Rheumatoid arthritis of multiple sites with negative rheumatoid factor [facility])    [facility], [address] [place] [address], Yousuf Reaves MD    Office Visit            Future Appointments         Provider Department Appt Notes    In 2 days GeneJesusita [address] in [facility] PPO  no c/p, no auth, no limit    In 4 days Geneedd [address]cal [address] in [facility] PPO  no c/p, no auth, no limit    In 1 week [address]d [address] in [facility] PPO  no c/p, no auth, no limit    In 2 weeks [address] in [facility] PPO  no c/p, no auth, no limit    In 2 weeks MD Ricarda Kent [address] 6 months    In 1 month Jazlyn Maldonado MD [facility], [address] [place] [address], [address] 3 mo f/u    In 1 month Hussein Morrison MD [facility], [address] [place] [address] 3 mos follow up    In 1 month Garrett Parra MD [facility] 4 months

## 2022-09-28 ENCOUNTER — OFFICE VISIT (OUTPATIENT)
Dept: PHYSICAL THERAPY | Age: 82
End: 2022-09-28
Attending: INTERNAL MEDICINE
Payer: MEDICARE

## 2022-09-28 PROCEDURE — 97110 THERAPEUTIC EXERCISES: CPT

## 2022-09-28 PROCEDURE — 97112 NEUROMUSCULAR REEDUCATION: CPT

## 2022-09-30 ENCOUNTER — OFFICE VISIT (OUTPATIENT)
Dept: PHYSICAL THERAPY | Age: 82
End: 2022-09-30
Attending: INTERNAL MEDICINE
Payer: MEDICARE

## 2022-09-30 PROCEDURE — 97112 NEUROMUSCULAR REEDUCATION: CPT

## 2022-09-30 PROCEDURE — 97110 THERAPEUTIC EXERCISES: CPT

## 2022-10-05 ENCOUNTER — OFFICE VISIT (OUTPATIENT)
Dept: PHYSICAL THERAPY | Age: 82
End: 2022-10-05
Attending: INTERNAL MEDICINE
Payer: MEDICARE

## 2022-10-05 PROCEDURE — 97112 NEUROMUSCULAR REEDUCATION: CPT

## 2022-10-05 PROCEDURE — 97110 THERAPEUTIC EXERCISES: CPT

## 2022-10-07 ENCOUNTER — OFFICE VISIT (OUTPATIENT)
Dept: PHYSICAL THERAPY | Age: 82
End: 2022-10-07
Attending: INTERNAL MEDICINE
Payer: MEDICARE

## 2022-10-07 PROCEDURE — 97110 THERAPEUTIC EXERCISES: CPT

## 2022-10-07 PROCEDURE — 97112 NEUROMUSCULAR REEDUCATION: CPT

## 2022-10-12 ENCOUNTER — OFFICE VISIT (OUTPATIENT)
Dept: PHYSICAL THERAPY | Age: 82
End: 2022-10-12
Attending: INTERNAL MEDICINE
Payer: MEDICARE

## 2022-10-12 ENCOUNTER — OFFICE VISIT (OUTPATIENT)
Dept: PULMONOLOGY | Facility: CLINIC | Age: 82
End: 2022-10-12
Payer: COMMERCIAL

## 2022-10-12 VITALS
BODY MASS INDEX: 39 KG/M2 | WEIGHT: 225 LBS | OXYGEN SATURATION: 98 % | DIASTOLIC BLOOD PRESSURE: 60 MMHG | SYSTOLIC BLOOD PRESSURE: 113 MMHG | HEART RATE: 53 BPM

## 2022-10-12 DIAGNOSIS — G47.33 OSA ON CPAP: Primary | ICD-10-CM

## 2022-10-12 DIAGNOSIS — Z99.89 OSA ON CPAP: Primary | ICD-10-CM

## 2022-10-12 PROCEDURE — 97112 NEUROMUSCULAR REEDUCATION: CPT

## 2022-10-12 PROCEDURE — 99214 OFFICE O/P EST MOD 30 MIN: CPT | Performed by: INTERNAL MEDICINE

## 2022-10-12 PROCEDURE — 3078F DIAST BP <80 MM HG: CPT | Performed by: INTERNAL MEDICINE

## 2022-10-12 PROCEDURE — 3074F SYST BP LT 130 MM HG: CPT | Performed by: INTERNAL MEDICINE

## 2022-10-12 PROCEDURE — 97110 THERAPEUTIC EXERCISES: CPT

## 2022-10-18 ENCOUNTER — OFFICE VISIT (OUTPATIENT)
Dept: PHYSICAL THERAPY | Age: 82
End: 2022-10-18
Attending: INTERNAL MEDICINE
Payer: MEDICARE

## 2022-10-18 PROCEDURE — 97110 THERAPEUTIC EXERCISES: CPT

## 2022-10-20 ENCOUNTER — APPOINTMENT (OUTPATIENT)
Dept: URBAN - METROPOLITAN AREA CLINIC 244 | Age: 82
Setting detail: DERMATOLOGY
End: 2022-10-21

## 2022-10-20 DIAGNOSIS — L65.9 NONSCARRING HAIR LOSS, UNSPECIFIED: ICD-10-CM

## 2022-10-20 DIAGNOSIS — D22 MELANOCYTIC NEVI: ICD-10-CM

## 2022-10-20 DIAGNOSIS — Z85.828 PERSONAL HISTORY OF OTHER MALIGNANT NEOPLASM OF SKIN: ICD-10-CM

## 2022-10-20 DIAGNOSIS — L82.1 OTHER SEBORRHEIC KERATOSIS: ICD-10-CM

## 2022-10-20 DIAGNOSIS — L81.4 OTHER MELANIN HYPERPIGMENTATION: ICD-10-CM

## 2022-10-20 PROBLEM — D48.5 NEOPLASM OF UNCERTAIN BEHAVIOR OF SKIN: Status: ACTIVE | Noted: 2022-10-20

## 2022-10-20 PROBLEM — D22.5 MELANOCYTIC NEVI OF TRUNK: Status: ACTIVE | Noted: 2022-10-20

## 2022-10-20 PROCEDURE — OTHER BIOPSY BY SHAVE METHOD: OTHER

## 2022-10-20 PROCEDURE — 11102 TANGNTL BX SKIN SINGLE LES: CPT

## 2022-10-20 PROCEDURE — OTHER COUNSELING: OTHER

## 2022-10-20 PROCEDURE — 99213 OFFICE O/P EST LOW 20 MIN: CPT | Mod: 25

## 2022-10-20 ASSESSMENT — LOCATION SIMPLE DESCRIPTION DERM
LOCATION SIMPLE: RIGHT UPPER BACK
LOCATION SIMPLE: FRONTAL SCALP
LOCATION SIMPLE: CHEST
LOCATION SIMPLE: LEFT UPPER BACK
LOCATION SIMPLE: LEFT UPPER ARM

## 2022-10-20 ASSESSMENT — LOCATION DETAILED DESCRIPTION DERM
LOCATION DETAILED: LEFT ANTERIOR PROXIMAL UPPER ARM
LOCATION DETAILED: LEFT MEDIAL UPPER BACK
LOCATION DETAILED: RIGHT MID-UPPER BACK
LOCATION DETAILED: MEDIAL FRONTAL SCALP
LOCATION DETAILED: RIGHT SUPERIOR UPPER BACK
LOCATION DETAILED: RIGHT SUPERIOR MEDIAL UPPER BACK
LOCATION DETAILED: UPPER STERNUM

## 2022-10-20 ASSESSMENT — LOCATION ZONE DERM
LOCATION ZONE: SCALP
LOCATION ZONE: ARM
LOCATION ZONE: TRUNK

## 2022-10-20 NOTE — PROCEDURE: BIOPSY BY SHAVE METHOD
Anesthesia Type: 0.5% lidocaine with 1:200,000 epinephrine and a 1:10 solution of 8.4% sodium bicarbonate
Validate That Anesthesia Is Not 0: No
Additional Anesthesia Volume In Cc (Will Not Render If 0): 0
Depth Of Biopsy: dermis
Electrodesiccation Text: The wound bed was treated with electrodesiccation after the biopsy was performed.
Information: Selecting Yes will display possible errors in your note based on the variables you have selected. This validation is only offered as a suggestion for you. PLEASE NOTE THAT THE VALIDATION TEXT WILL BE REMOVED WHEN YOU FINALIZE YOUR NOTE. IF YOU WANT TO FAX A PRELIMINARY NOTE YOU WILL NEED TO TOGGLE THIS TO 'NO' IF YOU DO NOT WANT IT IN YOUR FAXED NOTE.
Wound Care: Petrolatum
Cryotherapy Text: The wound bed was treated with cryotherapy after the biopsy was performed.
Was A Bandage Applied: Yes
Post-Care Instructions: I reviewed with the patient in detail post-care instructions. Patient is to keep the biopsy site dry overnight, and then apply Petrolatum twice daily until healed, or after a night or two leave area open and dry overnight and a scab will form which will fall off on its own in a few weeks.
Notification Instructions: Patient will be notified of biopsy results. However, patient instructed to call the office if not contacted within 2 weeks.
Type Of Destruction Used: Curettage
Dressing: bandage
Hemostasis: Drysol
Biopsy Type: H and E
Electrodesiccation And Curettage Text: The wound bed was treated with electrodesiccation and curettage after the biopsy was performed.
Anesthesia Volume In Cc (Will Not Render If 0): 0.5
Silver Nitrate Text: The wound bed was treated with silver nitrate after the biopsy was performed.
Biopsy Method: Dermablade
Detail Level: Detailed
Curettage Text: The wound bed was treated with curettage after the biopsy was performed.
Consent: Written consent was obtained and risks were reviewed including but not limited to scarring, infection, bleeding, scabbing, incomplete removal, nerve damage and allergy to anesthesia.
Billing Type: Third-Party Bill

## 2022-10-21 ENCOUNTER — APPOINTMENT (OUTPATIENT)
Dept: PHYSICAL THERAPY | Age: 82
End: 2022-10-21
Attending: INTERNAL MEDICINE
Payer: MEDICARE

## 2022-10-25 ENCOUNTER — OFFICE VISIT (OUTPATIENT)
Dept: PHYSICAL THERAPY | Age: 82
End: 2022-10-25
Attending: INTERNAL MEDICINE
Payer: MEDICARE

## 2022-10-25 PROCEDURE — 97110 THERAPEUTIC EXERCISES: CPT

## 2022-10-25 PROCEDURE — 97112 NEUROMUSCULAR REEDUCATION: CPT

## 2022-10-28 ENCOUNTER — OFFICE VISIT (OUTPATIENT)
Dept: PHYSICAL THERAPY | Age: 82
End: 2022-10-28
Attending: INTERNAL MEDICINE
Payer: MEDICARE

## 2022-10-28 PROCEDURE — 97112 NEUROMUSCULAR REEDUCATION: CPT

## 2022-10-28 PROCEDURE — 97110 THERAPEUTIC EXERCISES: CPT

## 2022-11-02 ENCOUNTER — TELEPHONE (OUTPATIENT)
Dept: PHYSICAL THERAPY | Facility: HOSPITAL | Age: 82
End: 2022-11-02

## 2022-11-02 ENCOUNTER — APPOINTMENT (OUTPATIENT)
Dept: PHYSICAL THERAPY | Age: 82
End: 2022-11-02
Attending: INTERNAL MEDICINE
Payer: MEDICARE

## 2022-11-04 ENCOUNTER — OFFICE VISIT (OUTPATIENT)
Dept: PHYSICAL THERAPY | Age: 82
End: 2022-11-04
Attending: INTERNAL MEDICINE
Payer: MEDICARE

## 2022-11-04 PROCEDURE — 97112 NEUROMUSCULAR REEDUCATION: CPT

## 2022-11-04 PROCEDURE — 97110 THERAPEUTIC EXERCISES: CPT

## 2022-11-09 ENCOUNTER — TELEPHONE (OUTPATIENT)
Dept: PULMONOLOGY | Facility: CLINIC | Age: 82
End: 2022-11-09

## 2022-11-09 ENCOUNTER — APPOINTMENT (OUTPATIENT)
Dept: PHYSICAL THERAPY | Age: 82
End: 2022-11-09
Attending: INTERNAL MEDICINE
Payer: MEDICARE

## 2022-11-09 DIAGNOSIS — Z99.89 OSA ON CPAP: Primary | ICD-10-CM

## 2022-11-09 DIAGNOSIS — G47.33 OSA ON CPAP: Primary | ICD-10-CM

## 2022-11-09 NOTE — TELEPHONE ENCOUNTER
Pt is calling because her CPAP machine stopped working. Motor stopped working. She tried calling [facility] and they didn't aid her with that. She's not sure if she needs to have another sleep test to order a new one. She may not be home so please leave a detailed voicemail.

## 2022-11-10 NOTE — TELEPHONE ENCOUNTER
LOV 10/12/22    Per pt her CPAP machine is not working, it says something about the motor exceeding usage, & she has been waiting for a call back from [address] since Monday. Explained will f/u once MD signs off on rx for repair &/or replacement of CPAP machine. She voiced understanding. Confirmed pt's CPAP settings have not changed (12 CWP). Dr. Davion Jarquin- [facility] sign rx if agreeable.

## 2022-11-11 ENCOUNTER — OFFICE VISIT (OUTPATIENT)
Dept: PHYSICAL THERAPY | Age: 82
End: 2022-11-11
Attending: INTERNAL MEDICINE
Payer: MEDICARE

## 2022-11-11 PROCEDURE — 97112 NEUROMUSCULAR REEDUCATION: CPT

## 2022-11-11 PROCEDURE — 97110 THERAPEUTIC EXERCISES: CPT

## 2022-11-12 ENCOUNTER — LAB ENCOUNTER (OUTPATIENT)
Dept: LAB | Facility: HOSPITAL | Age: 82
End: 2022-11-12
Attending: INTERNAL MEDICINE
Payer: MEDICARE

## 2022-11-12 DIAGNOSIS — Z51.81 THERAPEUTIC DRUG MONITORING: ICD-10-CM

## 2022-11-12 DIAGNOSIS — E03.9 ACQUIRED HYPOTHYROIDISM: ICD-10-CM

## 2022-11-12 DIAGNOSIS — E78.2 MIXED HYPERLIPIDEMIA: ICD-10-CM

## 2022-11-12 DIAGNOSIS — R73.09 ELEVATED GLUCOSE: ICD-10-CM

## 2022-11-12 DIAGNOSIS — N18.32 STAGE 3B CHRONIC KIDNEY DISEASE (HCC): ICD-10-CM

## 2022-11-12 DIAGNOSIS — E55.9 VITAMIN D DEFICIENCY: ICD-10-CM

## 2022-11-12 DIAGNOSIS — M06.09 RHEUMATOID ARTHRITIS OF MULTIPLE SITES WITH NEGATIVE RHEUMATOID FACTOR (HCC): ICD-10-CM

## 2022-11-12 LAB
ALBUMIN SERPL-MCNC: 3.6 G/DL (ref 3.4–5)
ANION GAP SERPL CALC-SCNC: 7 MMOL/L (ref 0–18)
AST SERPL-CCNC: 21 U/L (ref 15–37)
BASOPHILS # BLD AUTO: 0.05 X10(3) UL (ref 0–0.2)
BASOPHILS NFR BLD AUTO: 0.9 %
BUN BLD-MCNC: 26 MG/DL (ref 7–18)
BUN/CREAT SERPL: 19.7 (ref 10–20)
CALCIUM BLD-MCNC: 9.5 MG/DL (ref 8.5–10.1)
CHLORIDE SERPL-SCNC: 110 MMOL/L (ref 98–112)
CHOLEST SERPL-MCNC: 182 MG/DL (ref ?–200)
CO2 SERPL-SCNC: 27 MMOL/L (ref 21–32)
CREAT BLD-MCNC: 1.32 MG/DL
CREAT UR-SCNC: 31 MG/DL
CRP SERPL-MCNC: 0.36 MG/DL (ref ?–0.3)
DEPRECATED RDW RBC AUTO: 60.1 FL (ref 35.1–46.3)
EOSINOPHIL # BLD AUTO: 0.12 X10(3) UL (ref 0–0.7)
EOSINOPHIL NFR BLD AUTO: 2.1 %
ERYTHROCYTE [DISTWIDTH] IN BLOOD BY AUTOMATED COUNT: 15.2 % (ref 11–15)
ERYTHROCYTE [SEDIMENTATION RATE] IN BLOOD: 30 MM/HR
EST. AVERAGE GLUCOSE BLD GHB EST-MCNC: 117 MG/DL (ref 68–126)
FASTING PATIENT LIPID ANSWER: YES
FASTING STATUS PATIENT QL REPORTED: YES
GFR SERPLBLD BASED ON 1.73 SQ M-ARVRAT: 40 ML/MIN/1.73M2 (ref 60–?)
GLUCOSE BLD-MCNC: 97 MG/DL (ref 70–99)
HBA1C MFR BLD: 5.7 % (ref ?–5.7)
HCT VFR BLD AUTO: 36.4 %
HDLC SERPL-MCNC: 92 MG/DL (ref 40–59)
HGB BLD-MCNC: 11.3 G/DL
IMM GRANULOCYTES # BLD AUTO: 0.02 X10(3) UL (ref 0–1)
IMM GRANULOCYTES NFR BLD: 0.3 %
LDLC SERPL CALC-MCNC: 72 MG/DL (ref ?–100)
LYMPHOCYTES # BLD AUTO: 1.96 X10(3) UL (ref 1–4)
LYMPHOCYTES NFR BLD AUTO: 34 %
MCH RBC QN AUTO: 33.6 PG (ref 26–34)
MCHC RBC AUTO-ENTMCNC: 31 G/DL (ref 31–37)
MCV RBC AUTO: 108.3 FL
MICROALBUMIN UR-MCNC: 0.88 MG/DL
MICROALBUMIN/CREAT 24H UR-RTO: 28.4 UG/MG (ref ?–30)
MONOCYTES # BLD AUTO: 0.71 X10(3) UL (ref 0.1–1)
MONOCYTES NFR BLD AUTO: 12.3 %
NEUTROPHILS # BLD AUTO: 2.9 X10 (3) UL (ref 1.5–7.7)
NEUTROPHILS # BLD AUTO: 2.9 X10(3) UL (ref 1.5–7.7)
NEUTROPHILS NFR BLD AUTO: 50.4 %
NONHDLC SERPL-MCNC: 90 MG/DL (ref ?–130)
OSMOLALITY SERPL CALC.SUM OF ELEC: 303 MOSM/KG (ref 275–295)
PLATELET # BLD AUTO: 212 10(3)UL (ref 150–450)
POTASSIUM SERPL-SCNC: 4.3 MMOL/L (ref 3.5–5.1)
RBC # BLD AUTO: 3.36 X10(6)UL
SODIUM SERPL-SCNC: 144 MMOL/L (ref 136–145)
TRIGL SERPL-MCNC: 105 MG/DL (ref 30–149)
TSI SER-ACNC: 0.6 MIU/ML (ref 0.36–3.74)
VIT D+METAB SERPL-MCNC: 34.4 NG/ML (ref 30–100)
VLDLC SERPL CALC-MCNC: 16 MG/DL (ref 0–30)
WBC # BLD AUTO: 5.8 X10(3) UL (ref 4–11)

## 2022-11-12 PROCEDURE — 82570 ASSAY OF URINE CREATININE: CPT

## 2022-11-12 PROCEDURE — 80048 BASIC METABOLIC PNL TOTAL CA: CPT

## 2022-11-12 PROCEDURE — 82043 UR ALBUMIN QUANTITATIVE: CPT

## 2022-11-12 PROCEDURE — 84450 TRANSFERASE (AST) (SGOT): CPT

## 2022-11-12 PROCEDURE — 83036 HEMOGLOBIN GLYCOSYLATED A1C: CPT

## 2022-11-12 PROCEDURE — 80061 LIPID PANEL: CPT

## 2022-11-12 PROCEDURE — 85652 RBC SED RATE AUTOMATED: CPT

## 2022-11-12 PROCEDURE — 36415 COLL VENOUS BLD VENIPUNCTURE: CPT

## 2022-11-12 PROCEDURE — 86140 C-REACTIVE PROTEIN: CPT

## 2022-11-12 PROCEDURE — 85025 COMPLETE CBC W/AUTO DIFF WBC: CPT

## 2022-11-12 PROCEDURE — 82040 ASSAY OF SERUM ALBUMIN: CPT

## 2022-11-12 PROCEDURE — 82306 VITAMIN D 25 HYDROXY: CPT

## 2022-11-12 PROCEDURE — 84443 ASSAY THYROID STIM HORMONE: CPT

## 2022-11-14 NOTE — TELEPHONE ENCOUNTER
Pt notified of below. Instructed her to contact [facility] if she doesn't hear from them shortly. Their phone # was given. She voiced understanding. Pt aware to contact our office if we may be of further assistance.

## 2022-11-15 ENCOUNTER — OFFICE VISIT (OUTPATIENT)
Facility: CLINIC | Age: 82
End: 2022-11-15
Payer: COMMERCIAL

## 2022-11-15 ENCOUNTER — HOSPITAL ENCOUNTER (OUTPATIENT)
Dept: GENERAL RADIOLOGY | Age: 82
Discharge: HOME | End: 2022-11-15
Attending: INTERNAL MEDICINE
Payer: MEDICARE

## 2022-11-15 VITALS
WEIGHT: 226 LBS | RESPIRATION RATE: 18 BRPM | HEART RATE: 67 BPM | DIASTOLIC BLOOD PRESSURE: 64 MMHG | OXYGEN SATURATION: 96 % | HEIGHT: 64 IN | BODY MASS INDEX: 38.58 KG/M2 | SYSTOLIC BLOOD PRESSURE: 110 MMHG

## 2022-11-15 DIAGNOSIS — Z01.818 PRE-OP EXAM: Primary | ICD-10-CM

## 2022-11-15 DIAGNOSIS — J44.9 CHRONIC OBSTRUCTIVE PULMONARY DISEASE, UNSPECIFIED COPD TYPE (HCC): ICD-10-CM

## 2022-11-15 DIAGNOSIS — I48.0 PAROXYSMAL ATRIAL FIBRILLATION (HCC): ICD-10-CM

## 2022-11-15 DIAGNOSIS — H04.222 EPIPHORA DUE TO INSUFFICIENT DRAINAGE OF LEFT SIDE: ICD-10-CM

## 2022-11-15 DIAGNOSIS — M06.09 RHEUMATOID ARTHRITIS OF MULTIPLE SITES WITH NEGATIVE RHEUMATOID FACTOR (HCC): ICD-10-CM

## 2022-11-15 DIAGNOSIS — Z12.31 BREAST CANCER SCREENING BY MAMMOGRAM: ICD-10-CM

## 2022-11-15 DIAGNOSIS — H04.302 DACROCYSTITIS, LEFT: ICD-10-CM

## 2022-11-15 DIAGNOSIS — G47.33 OBSTRUCTIVE SLEEP APNEA ON CPAP: ICD-10-CM

## 2022-11-15 DIAGNOSIS — Z99.89 OBSTRUCTIVE SLEEP APNEA ON CPAP: ICD-10-CM

## 2022-11-15 PROBLEM — R22.41: Status: RESOLVED | Noted: 2022-08-02 | Resolved: 2022-11-15

## 2022-11-15 PROCEDURE — 72050 X-RAY EXAM NECK SPINE 4/5VWS: CPT | Performed by: INTERNAL MEDICINE

## 2022-11-15 PROCEDURE — 1125F AMNT PAIN NOTED PAIN PRSNT: CPT | Performed by: INTERNAL MEDICINE

## 2022-11-15 PROCEDURE — 99215 OFFICE O/P EST HI 40 MIN: CPT | Performed by: INTERNAL MEDICINE

## 2022-11-15 PROCEDURE — 3008F BODY MASS INDEX DOCD: CPT | Performed by: INTERNAL MEDICINE

## 2022-11-15 PROCEDURE — 3074F SYST BP LT 130 MM HG: CPT | Performed by: INTERNAL MEDICINE

## 2022-11-15 PROCEDURE — 3078F DIAST BP <80 MM HG: CPT | Performed by: INTERNAL MEDICINE

## 2022-11-15 RX ORDER — FLECAINIDE ACETATE 100 MG/1
100 TABLET ORAL 2 TIMES DAILY
COMMUNITY
Start: 2022-10-26

## 2022-11-16 ENCOUNTER — TELEPHONE (OUTPATIENT)
Facility: CLINIC | Age: 82
End: 2022-11-16

## 2022-11-16 NOTE — TELEPHONE ENCOUNTER
Called to speak with Dr. Venegas Smoker. He will call back. Pt needs cardiac clearance for surgery. 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 X-ray shows:  widening of the atlantodental interval on flexion, suggesting ligamentous instability.

## 2022-11-17 NOTE — TELEPHONE ENCOUNTER
Notified Dr. Johnny Jacome regarding c-spine instability and need for cardiac clearance. Pt has an appointment with her cardiologist in the next 2 weeks.

## 2022-11-18 ENCOUNTER — OFFICE VISIT (OUTPATIENT)
Dept: NEPHROLOGY | Facility: CLINIC | Age: 82
End: 2022-11-18
Payer: COMMERCIAL

## 2022-11-18 VITALS
HEART RATE: 65 BPM | DIASTOLIC BLOOD PRESSURE: 71 MMHG | WEIGHT: 226 LBS | SYSTOLIC BLOOD PRESSURE: 110 MMHG | BODY MASS INDEX: 39 KG/M2

## 2022-11-18 DIAGNOSIS — N18.32 STAGE 3B CHRONIC KIDNEY DISEASE (HCC): Primary | ICD-10-CM

## 2022-11-18 PROCEDURE — 99214 OFFICE O/P EST MOD 30 MIN: CPT | Performed by: INTERNAL MEDICINE

## 2022-11-18 PROCEDURE — 3078F DIAST BP <80 MM HG: CPT | Performed by: INTERNAL MEDICINE

## 2022-11-18 PROCEDURE — 3074F SYST BP LT 130 MM HG: CPT | Performed by: INTERNAL MEDICINE

## 2022-11-18 RX ORDER — CHOLECALCIFEROL (VITAMIN D3) 50 MCG
TABLET ORAL
COMMUNITY

## 2022-11-23 ENCOUNTER — APPOINTMENT (OUTPATIENT)
Dept: URBAN - METROPOLITAN AREA CLINIC 244 | Age: 82
Setting detail: DERMATOLOGY
End: 2022-11-28

## 2022-11-23 PROBLEM — C44.619 BASAL CELL CARCINOMA OF SKIN OF LEFT UPPER LIMB, INCLUDING SHOULDER: Status: ACTIVE | Noted: 2022-11-23

## 2022-11-23 PROCEDURE — OTHER CURETTAGE AND DESTRUCTION: OTHER

## 2022-11-23 PROCEDURE — 17262 DSTRJ MAL LES T/A/L 1.1-2.0: CPT

## 2022-11-23 PROCEDURE — OTHER COUNSELING: OTHER

## 2022-11-23 NOTE — PROCEDURE: CURETTAGE AND DESTRUCTION
Additional Information: (Optional): The wound was cleaned, and a pressure dressing was applied.  The patient received detailed post-op instructions.
Add Ability To Document Additional Intralesional Injection: No
Biopsy Photograph Reviewed: Yes
Anesthesia Volume In Cc: 3
Size Of Lesion After Curettage: 1.1
Total Volume (Ccs): 1
What Was Performed First?: Curettage
Final Size Statement: The size of the lesion after curettage was
Bill As A Line Item Or As Units: Line Item
Cautery Type: electrodesiccation
Detail Level: Simple
Anesthesia Type: 1% lidocaine with epinephrine and a 1:10 solution of 8.4% sodium bicarbonate
Consent was obtained from the patient. The risks, benefits and alternatives to therapy were discussed in detail. Specifically, the risks of infection, scarring, bleeding, prolonged wound healing, nerve injury, incomplete removal, allergy to anesthesia and recurrence were addressed. Alternatives to ED&C, such as: surgical removal and XRT were also discussed.  Prior to the procedure, the treatment site was clearly identified and confirmed by the patient. All components of Universal Protocol/PAUSE Rule completed.
Post-Care Instructions: I reviewed with the patient in detail post-care instructions. Patient is to keep the area dry for 48 hours, and not to engage in any swimming until the area is healed. Should the patient develop any fevers, chills, bleeding, severe pain patient will contact the office immediately.

## 2022-11-25 DIAGNOSIS — M06.09 RHEUMATOID ARTHRITIS OF MULTIPLE SITES WITH NEGATIVE RHEUMATOID FACTOR (HCC): ICD-10-CM

## 2022-11-25 RX ORDER — ETANERCEPT 50 MG/ML
SOLUTION SUBCUTANEOUS
Qty: 12 ML | Refills: 1 | Status: SHIPPED | OUTPATIENT
Start: 2022-11-25

## 2022-11-25 NOTE — TELEPHONE ENCOUNTER
LOV: 8/9/22  Last Refilled:#12ml, 1rf 5/31/22    Future Appointments   Date Time Provider [employer]   5/19/2023  9:30 AM Jerica Guerra MD [facility] Monika GRANADOS       Please advise.

## 2022-11-26 DIAGNOSIS — E78.2 MIXED HYPERLIPIDEMIA: ICD-10-CM

## 2022-11-26 RX ORDER — PRAVASTATIN SODIUM 20 MG
20 TABLET ORAL NIGHTLY
Qty: 90 TABLET | Refills: 1 | Status: SHIPPED | OUTPATIENT
Start: 2022-11-26

## 2022-11-26 NOTE — TELEPHONE ENCOUNTER
Refill passed per [facility] protocol.   Requested Prescriptions   Pending Prescriptions Disp Refills    PRAVASTATIN 20 MG Oral Tab [Pharmacy Med Name: Pravastatin Sodium 20 MG Oral Tablet] 90 tablet 3     Sig: TAKE 1 TABLET BY MOUTH AT  NIGHT       Cholesterol Medication Protocol Passed - 11/26/2022  3:28 AM        Passed - ALT in past 12 months        Passed - LDL in past 12 months        Passed - Last ALT < 80     Lab Results   Component Value Date    ALT 25 02/11/2022             Passed - Last LDL < 130     Lab Results   Component Value Date    LDL 72 11/12/2022             Passed - In person appointment or virtual visit in the past 12 mos or appointment in next 3 mos     Recent Outpatient Visits              1 week ago Stage 3b chronic kidney disease [facility])    Jen Jackson MD    Office Visit    1 week ago Pre-op exam    [facility], [address] floor, Andrea Grant MD    Office Visit    2 weeks ago     [address] in [facility], [address]    Office Visit    3 weeks ago     [address] [place] [address] Road in [facility], [facility]    4 weeks ago     [address] in [facility], [address]    Office Visit          Future Appointments         Provider Department Appt Notes    In 3 days Lexi Toledo MD [facility], [address] [facility] 3 month follow up/care partner policy informed to pt    In 5 months Clint Noe MD [facility], [address] [place] [address], [facility] 6 months                  Recent Outpatient Visits              1 week ago Stage 3b chronic kidney disease [facility])    [facility], [address] [place] [address], Charissa Slaughter MD    Office Visit    1 week ago Pre-op exam    [facility], [address] floor, Andrea Grant MD    Office Visit    2 weeks ago     [address] in [facility], [address]    Office Visit    3 weeks ago [facility] in [place], [place]    Office Visit    4 weeks ago     [facility] in [place], [place]    Office Visit          Future Appointments         Provider Department Appt Notes    In 3 days Suma Velez MD [place] [employer] [place], [facility], [address] 3 month follow up/care partner policy informed to pt    In 5 months Rocio Junior MD [facility], [facility], [address] [place] [address] 6 months

## 2022-11-29 ENCOUNTER — OFFICE VISIT (OUTPATIENT)
Dept: RHEUMATOLOGY | Facility: CLINIC | Age: 82
End: 2022-11-29
Payer: COMMERCIAL

## 2022-11-29 VITALS
HEART RATE: 70 BPM | DIASTOLIC BLOOD PRESSURE: 64 MMHG | BODY MASS INDEX: 39.09 KG/M2 | HEIGHT: 64 IN | WEIGHT: 229 LBS | SYSTOLIC BLOOD PRESSURE: 106 MMHG

## 2022-11-29 DIAGNOSIS — M06.09 RHEUMATOID ARTHRITIS OF MULTIPLE SITES WITH NEGATIVE RHEUMATOID FACTOR (HCC): Primary | ICD-10-CM

## 2022-11-29 DIAGNOSIS — N28.9 RENAL INSUFFICIENCY: ICD-10-CM

## 2022-11-29 DIAGNOSIS — Z51.81 THERAPEUTIC DRUG MONITORING: ICD-10-CM

## 2022-11-29 PROCEDURE — 3078F DIAST BP <80 MM HG: CPT | Performed by: INTERNAL MEDICINE

## 2022-11-29 PROCEDURE — 99214 OFFICE O/P EST MOD 30 MIN: CPT | Performed by: INTERNAL MEDICINE

## 2022-11-29 PROCEDURE — 3074F SYST BP LT 130 MM HG: CPT | Performed by: INTERNAL MEDICINE

## 2022-11-29 PROCEDURE — 1126F AMNT PAIN NOTED NONE PRSNT: CPT | Performed by: INTERNAL MEDICINE

## 2022-11-29 PROCEDURE — 3008F BODY MASS INDEX DOCD: CPT | Performed by: INTERNAL MEDICINE

## 2022-11-29 NOTE — PROGRESS NOTES
Radha Charles is an 41-year-old patient of Dr. Susu Mayer with seronegative rheumatoid arthritis. Since I last saw her August 9th of 2022, she has continued Enbrel weekly, methotrexate 10 mg weekly, and Plaquenil 400mg daily. She feels like her rheumatoid arthritis is controlled. Her joints aren't more swollen in the morning. Her morning stiffness is minimal.     Her back continues to slow her down. Lumbar spine x-rays show moderate scoliosis and moderate to severe spondylosis. She has discomfort from her lateral hips down her lateral thighs. She has discomfort in her knees and thumb bases. She has a left Z thumb deformity. She had right thumb surgery. Her knees have both been replaced. Her Plaquenil eye exams have been negative for retinal toxicity. She had right ankle surgery on July 21st of 2015. She has discomfort above the ankle. It is getting harder for her to get around. She uses both a cane and walker. She is using her CPAP machine for sleep apnea. She was admitted to the hospital April 29th through May 4th of 2021, with an acutely inflamed right third toe, which was originally felt to be an infection. Surgery was done, and tophi were seen in the bone. Her uric acid was 9.1. She has continued allopurinol 300 mg daily. She has not had any more acute attacks. Uric acid was repeated October 4th of 2021, and is down to 5.3. Labs were repeated November 12th of 2022. CBC normal, except hemoglobin and hematocrit of 11.1 and 36.4. Creatinine stable at 1.32 (40). She has seen  nephrology. AST and albumin normal.  C-reactive protein borderline at 0.36 and sed rate normal at 30. She has dyspnea on exertion. She follows with cardiology. She has had rhythm problems, and is on a different antiarrhythmic.   Echocardiogram on July 23rd of 2020 showed mildly dilated left ventricle, normal systolic function, grade 2 diastolic dysfunction, moderate mitral regurgitation, mildly to moderately dilated left atrium, moderate pulmonary hypertension. She is very fatigued all of the time. Review of Systems   Constitutional: Positive for fatigue. Negative for fever, chills and diaphoresis. Respiratory:Positive for shortness of breath. Cardiovascular: Negative for chest pain. Gastrointestinal: Negative for abdominal pain. Musculoskeletal: Positive for back and leg pain. Skin: Negative for rash. Hematological: Negative for adenopathy. Allergies:  Erythromycin Base         Pcn [Bicillin L-A]           PHYSICAL EXAM:   Pleasant woman in no acute distress. Blood pressure 106/64, pulse 70, height 5' 4\" (1.626 m), weight 229 lb (103.9 kg), not currently breastfeeding. Constitutional: She is oriented to person, place, and time. She appears well-developed. HENT:   Head: Normocephalic. Eyes: Conjunctival injection left eye. Cardiovascular: Normal rate, regular rhythm and normal heart sounds. Pulmonary/Chest: Effort normal. Scattered crackles in her lung bases. Abdominal: Soft. Bowel sounds are normal. She exhibits no mass. There is no tenderness. There is no rebound and no guarding. Musculoskeletal: She has significant pitting edema in her distal lower extremities. There is not active synovitis in her wrists or hands. Bony deformity of left thumb. Right wrist ROM is limited with discomfort, and without obvious synovitis. Her right third toe was not acutely inflamed. There is a surgical scar. No obvious tophi. Her hips move well. There is some tenderness over the lateral hips and iliotibial bands. Lymphadenopathy:     She has no cervical adenopathy. Neurological: She is alert and oriented to person, place, and time. Skin: No rash noted. Psychiatric: She has a normal mood and affect. ASSESSMENT/PLAN:     1. Seronegative rheumatoid arthritis of multiple sites. It is well controlled on Plaquenil, Enbrel, and methotrexate 10 mg weekly. She will continue every 3-4 month monitoring.    2. Encounter for therapeutic drug monitoring. 3. Primary osteoarthritis. Status post successful right ankle surgery. She is continuing to have mechanical problems with her low back and knees. Recent sciatica, doing better after steroids. Probable iliotibial band syndrome. Stretching. Again encouraged her to be more active if possible. 4. Low white count. Resolved. Again anemia. H/o borderline thrombocytopenia. Renal insufficiency. 5.  Renal insufficiency. Dyspnea on exertion. She follows with cardiology. Seeing Nephrology. 6.  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 It was not found to be infection, but tophaceous gout. Her uric acid is down to 5.3 on 300 mg of allopurinol daily, which she will continue for life.

## 2022-12-02 ENCOUNTER — MED REC SCAN ONLY (OUTPATIENT)
Dept: INTERNAL MEDICINE CLINIC | Facility: CLINIC | Age: 82
End: 2022-12-02

## 2022-12-19 ENCOUNTER — HOSPITAL ENCOUNTER (OUTPATIENT)
Dept: MRI IMAGING | Facility: HOSPITAL | Age: 82
Discharge: HOME | End: 2022-12-19
Attending: PHYSICIAN ASSISTANT
Payer: MEDICARE

## 2022-12-19 DIAGNOSIS — M53.2X1 ATLANTOAXIAL INSTABILITY: ICD-10-CM

## 2022-12-19 DIAGNOSIS — M47.12 CERVICAL ARTHRITIS WITH MYELOPATHY: ICD-10-CM

## 2022-12-19 DIAGNOSIS — M43.12 ACQUIRED SPONDYLOLISTHESIS OF CERVICAL VERTEBRA: ICD-10-CM

## 2022-12-19 PROCEDURE — 72141 MRI NECK SPINE W/O DYE: CPT | Performed by: PHYSICIAN ASSISTANT

## 2022-12-21 ENCOUNTER — HOSPITAL ENCOUNTER (OUTPATIENT)
Dept: CT IMAGING | Facility: HOSPITAL | Age: 82
Discharge: HOME | End: 2022-12-21
Attending: PHYSICIAN ASSISTANT
Payer: MEDICARE

## 2022-12-21 DIAGNOSIS — R29.2 HYPERREFLEXIA: ICD-10-CM

## 2022-12-21 DIAGNOSIS — R29.818 NEUROLOGICAL DEFICIT PRESENT: ICD-10-CM

## 2022-12-21 DIAGNOSIS — M54.2 NECK PAIN: ICD-10-CM

## 2022-12-21 PROCEDURE — 72125 CT NECK SPINE W/O DYE: CPT | Performed by: PHYSICIAN ASSISTANT

## 2022-12-28 ENCOUNTER — TELEPHONE (OUTPATIENT)
Dept: INTERNAL MEDICINE CLINIC | Facility: CLINIC | Age: 82
End: 2022-12-28

## 2022-12-28 ENCOUNTER — HOSPITAL ENCOUNTER (OUTPATIENT)
Dept: MAMMOGRAPHY | Age: 82
Discharge: HOME | End: 2022-12-28
Attending: INTERNAL MEDICINE
Payer: MEDICARE

## 2022-12-28 DIAGNOSIS — Z12.31 BREAST CANCER SCREENING BY MAMMOGRAM: ICD-10-CM

## 2022-12-28 PROCEDURE — 77067 SCR MAMMO BI INCL CAD: CPT | Performed by: INTERNAL MEDICINE

## 2022-12-28 PROCEDURE — 77063 BREAST TOMOSYNTHESIS BI: CPT | Performed by: INTERNAL MEDICINE

## 2022-12-28 NOTE — TELEPHONE ENCOUNTER
Pt dropped Handicap placard form to be filled out . Form was put in Dr Zulema Gonzalez box at [facility]. Pt was informed Dr Mickie Reyes would not be at [facility] office until Jan 5th.

## 2022-12-28 NOTE — TELEPHONE ENCOUNTER
Rec'd handicap placard form and realized that pt did not sign form. S/w pt and informed her that her signature was missing from the form. I'll place form at the  [facility] location for her to sign. Once we get her signature, and doctors signature/completion we can mail it for her. She verbalized understanding.

## 2022-12-31 NOTE — ASSESSMENT & PLAN NOTE
Patient's fatigue is likely due to poor sleep, which is likely due to her incontinence. I recommend that she try wearing a diaper at night and not get up during the night. We will also decrease her lisinopril. Discharged

## 2023-01-03 NOTE — TELEPHONE ENCOUNTER
Patient denies any cough ,SOB,fever,sore throat,just weak ,and wanted to have her ear clean ,was cleared by Dr Chela Richard to off quarantine on 1/14/21.
Rn tried to call patient to assess since patient was positive COVID on 1/3/2021 but cannot leave a message ,will try again later. ,please inform patient to come later tas Dr Judi Joyce office will start at 10 am.
yes

## 2023-01-05 NOTE — TELEPHONE ENCOUNTER
Parking placard completed, signed by MD. Kp Catalan out for mailing. Copy made and sent to scan.  Left voice message for patient letting her know

## 2023-01-10 ENCOUNTER — TELEPHONE (OUTPATIENT)
Dept: PULMONOLOGY | Facility: CLINIC | Age: 83
End: 2023-01-10

## 2023-01-10 NOTE — TELEPHONE ENCOUNTER
Received fax from [facility] requesting physician's order for CPAP supplies. Placed in Dr. Nuñez Single folder for review.

## 2023-01-25 ENCOUNTER — OFFICE VISIT (OUTPATIENT)
Dept: OTOLARYNGOLOGY | Facility: CLINIC | Age: 83
End: 2023-01-25

## 2023-01-25 DIAGNOSIS — H61.23 BILATERAL IMPACTED CERUMEN: Primary | ICD-10-CM

## 2023-01-25 PROCEDURE — 69210 REMOVE IMPACTED EAR WAX UNI: CPT | Performed by: SPECIALIST

## 2023-01-25 NOTE — PROGRESS NOTES
Ears = bilateral cerumen occlussions. Fully cleaned under microscope using instrumentation and suctioning. Normal tympanic membranes. Follow-up in 4 to 6 months time, sooner if problems.   Can consider equal parts of white vinegar and rubbing alcohol to make the cerumen less moist.

## 2023-01-25 NOTE — PATIENT INSTRUCTIONS
Cerumen was fully cleaned from both your ears. Follow-up in 4 to 6 months time, sooner if problems. Can consider equal parts of white vinegar and rubbing alcohol to make the cerumen less wet.

## 2023-02-23 ENCOUNTER — APPOINTMENT (OUTPATIENT)
Dept: URBAN - METROPOLITAN AREA CLINIC 244 | Age: 83
Setting detail: DERMATOLOGY
End: 2023-02-23

## 2023-02-23 DIAGNOSIS — L64.8 OTHER ANDROGENIC ALOPECIA: ICD-10-CM

## 2023-02-23 PROBLEM — D48.5 NEOPLASM OF UNCERTAIN BEHAVIOR OF SKIN: Status: ACTIVE | Noted: 2023-02-23

## 2023-02-23 PROCEDURE — OTHER BIOPSY BY SHAVE METHOD: OTHER

## 2023-02-23 PROCEDURE — OTHER COUNSELING: OTHER

## 2023-02-23 PROCEDURE — OTHER ORDER TESTS: OTHER

## 2023-02-23 PROCEDURE — 99214 OFFICE O/P EST MOD 30 MIN: CPT | Mod: 25

## 2023-02-23 PROCEDURE — 11102 TANGNTL BX SKIN SINGLE LES: CPT

## 2023-02-23 NOTE — PROCEDURE: BIOPSY BY SHAVE METHOD
Silver Nitrate Text: The wound bed was treated with silver nitrate after the biopsy was performed.
Depth Of Biopsy: dermis
Hemostasis: Drysol
Hide Topical Anesthesia?: No
Information: Selecting Yes will display possible errors in your note based on the variables you have selected. This validation is only offered as a suggestion for you. PLEASE NOTE THAT THE VALIDATION TEXT WILL BE REMOVED WHEN YOU FINALIZE YOUR NOTE. IF YOU WANT TO FAX A PRELIMINARY NOTE YOU WILL NEED TO TOGGLE THIS TO 'NO' IF YOU DO NOT WANT IT IN YOUR FAXED NOTE.
Additional Anesthesia Volume In Cc (Will Not Render If 0): 0
Detail Level: Detailed
Notification Instructions: Patient will be notified of biopsy results. However, patient instructed to call the office if not contacted within 2 weeks.
Electrodesiccation Text: The wound bed was treated with electrodesiccation after the biopsy was performed.
Type Of Destruction Used: Curettage
Curettage Text: The wound bed was treated with curettage after the biopsy was performed.
Electrodesiccation And Curettage Text: The wound bed was treated with electrodesiccation and curettage after the biopsy was performed.
Wound Care: Petrolatum
Post-Care Instructions: I reviewed with the patient in detail post-care instructions. Patient is to keep the biopsy site dry overnight, and then apply Petrolatum twice daily until healed, or after a night or two leave area open and dry overnight and a scab will form which will fall off on its own in a few weeks.
Billing Type: Third-Party Bill
Dressing: bandage
Anesthesia Type: 0.5% lidocaine with 1:200,000 epinephrine and a 1:10 solution of 8.4% sodium bicarbonate
Cryotherapy Text: The wound bed was treated with cryotherapy after the biopsy was performed.
Consent: Written consent was obtained and risks were reviewed including but not limited to scarring, infection, bleeding, scabbing, incomplete removal, nerve damage and allergy to anesthesia.
Anesthesia Volume In Cc (Will Not Render If 0): 0.5
Biopsy Type: H and E
Was A Bandage Applied: Yes
Biopsy Method: Dermablade

## 2023-02-24 NOTE — PROGRESS NOTES
Zacarias Mullen is an 50-year-old patient of Dr. Tequila Jason with seronegative rheumatoid arthritis. Since I last saw her 11/29/2022, she has continued Enbrel weekly, methotrexate 10 mg weekly, and Plaquenil 400mg daily. She feels like her rheumatoid arthritis is controlled. Her joints aren't more swollen in the morning. Her morning stiffness is minimal.     Her back continues to slow her down. Lumbar spine x-rays show moderate scoliosis and moderate to severe spondylosis. She has discomfort from her lateral hips down her lateral thighs. She has discomfort in her knees and thumb bases. She has a left Z thumb deformity. She had right thumb surgery. Her knees have both been replaced. She has pain in her neck. Imaging studies showed severe arthritis in her neck so she called neurosurgery to be cleared for surgery for a left tear duct. She was cleared. She has been losing hair. Her Plaquenil eye exams have been negative for retinal toxicity. She had right ankle surgery on July 21st of 2015. She has discomfort above the ankle. It is getting harder for her to get around. She uses both a cane and walker. She is using her CPAP machine for sleep apnea. She was admitted to the hospital April 29th through May 4th of 2021, with an acutely inflamed right third toe, which was originally felt to be an infection. Surgery was done, and tophi were seen in the bone. Her uric acid was 9.1. She has continued allopurinol 300 mg daily. She has not had any more acute attacks. Uric acid was repeated October 4th of 2021, and is down to 5.3. Labs were repeated 2/28/2023. CBC normal, except hemoglobin of 11.4. Creatinine stable at 1.15. She has seen  nephrology. AST and albumin normal.  C-reactive protein normal at less than 0.29, and sed rate normal at 11. She has dyspnea on exertion. She follows with cardiology. She has had rhythm problems.  Echocardiogram on July 23rd of 2020 showed mildly dilated left ventricle, normal systolic function, grade 2 diastolic dysfunction, moderate mitral regurgitation, mildly to moderately dilated left atrium, moderate pulmonary hypertension. She is very fatigued all of the time. Review of Systems   Constitutional: Positive for fatigue. Negative for fever, chills and diaphoresis. Respiratory:Positive for shortness of breath. Cardiovascular: Negative for chest pain. Gastrointestinal: Negative for abdominal pain. Musculoskeletal: Positive for back and leg pain. Skin: Negative for rash. Hematological: Negative for adenopathy. Allergies:  Erythromycin Base         Pcn [Bicillin L-A]           PHYSICAL EXAM:   Pleasant woman in no acute distress. Blood pressure 113/66, pulse 77, height 5' 4\" (1.626 m), weight 234 lb (106.1 kg), not currently breastfeeding. Constitutional: She is oriented to person, place, and time. She appears well-developed. HENT:   Head: Normocephalic. Eyes: Conjunctival injection left eye. Cardiovascular: Normal rate, regular rhythm and normal heart sounds. Pulmonary/Chest: Effort normal. Scattered crackles in her lung bases. Abdominal: Soft. Bowel sounds are normal. She exhibits no mass. There is no tenderness. There is no rebound and no guarding. Musculoskeletal: She has significant pitting edema in her distal lower extremities. There is not active synovitis in her wrists or hands. Bony deformity of left thumb. Right wrist ROM is limited with discomfort, and without obvious synovitis. Lymphadenopathy:     She has no cervical adenopathy. Neurological: She is alert and oriented to person, place, and time. Skin: No rash noted. Psychiatric: She has a normal mood and affect. ASSESSMENT/PLAN:     1. Seronegative rheumatoid arthritis of multiple sites. It is well controlled on Plaquenil, Enbrel, and methotrexate 10 mg weekly. She will continue every 3-4 month monitoring. 2. Encounter for therapeutic drug monitoring. 3. Primary osteoarthritis. Status post successful right ankle surgery. She is continuing to have mechanical problems with her low back and knees. Recent sciatica, doing better after steroids. Probable iliotibial band syndrome. Stretching. Again encouraged her to be more active if possible. 4. Low white count. Resolved. Again anemia. H/o borderline thrombocytopenia. Renal insufficiency. 5.  Renal insufficiency. Dyspnea on exertion. She follows with cardiology. Seeing Nephrology. 6.  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 It was not found to be infection, but tophaceous gout. Her uric acid is down to 5.3 on 300 mg of allopurinol daily, which she will continue for life.

## 2023-02-28 ENCOUNTER — LAB ENCOUNTER (OUTPATIENT)
Dept: LAB | Facility: HOSPITAL | Age: 83
End: 2023-02-28
Attending: INTERNAL MEDICINE
Payer: MEDICARE

## 2023-02-28 DIAGNOSIS — Z51.81 THERAPEUTIC DRUG MONITORING: ICD-10-CM

## 2023-02-28 DIAGNOSIS — K21.9 GASTROESOPHAGEAL REFLUX DISEASE: ICD-10-CM

## 2023-02-28 DIAGNOSIS — L64.8 OTHER ANDROGENIC ALOPECIA: Primary | ICD-10-CM

## 2023-02-28 DIAGNOSIS — E03.9 ACQUIRED HYPOTHYROIDISM: ICD-10-CM

## 2023-02-28 DIAGNOSIS — M06.09 RHEUMATOID ARTHRITIS OF MULTIPLE SITES WITH NEGATIVE RHEUMATOID FACTOR (HCC): ICD-10-CM

## 2023-02-28 DIAGNOSIS — I10 ESSENTIAL HYPERTENSION WITH GOAL BLOOD PRESSURE LESS THAN 140/90: ICD-10-CM

## 2023-02-28 LAB
ALBUMIN SERPL-MCNC: 3.6 G/DL (ref 3.4–5)
AST SERPL-CCNC: 32 U/L (ref 15–37)
BASOPHILS # BLD AUTO: 0.05 X10(3) UL (ref 0–0.2)
BASOPHILS NFR BLD AUTO: 0.8 %
CREAT BLD-MCNC: 1.15 MG/DL
CRP SERPL-MCNC: <0.29 MG/DL (ref ?–0.3)
DEPRECATED HBV CORE AB SER IA-ACNC: 93.1 NG/ML
DEPRECATED RDW RBC AUTO: 57.9 FL (ref 35.1–46.3)
EOSINOPHIL # BLD AUTO: 0.14 X10(3) UL (ref 0–0.7)
EOSINOPHIL NFR BLD AUTO: 2.3 %
ERYTHROCYTE [DISTWIDTH] IN BLOOD BY AUTOMATED COUNT: 15.1 % (ref 11–15)
ERYTHROCYTE [SEDIMENTATION RATE] IN BLOOD: 11 MM/HR
GFR SERPLBLD BASED ON 1.73 SQ M-ARVRAT: 47 ML/MIN/1.73M2 (ref 60–?)
HCT VFR BLD AUTO: 35 %
HGB BLD-MCNC: 11.4 G/DL
IMM GRANULOCYTES # BLD AUTO: 0.01 X10(3) UL (ref 0–1)
IMM GRANULOCYTES NFR BLD: 0.2 %
IRON SERPL-MCNC: 97 UG/DL
LYMPHOCYTES # BLD AUTO: 1.77 X10(3) UL (ref 1–4)
LYMPHOCYTES NFR BLD AUTO: 29.4 %
MCH RBC QN AUTO: 34.2 PG (ref 26–34)
MCHC RBC AUTO-ENTMCNC: 32.6 G/DL (ref 31–37)
MCV RBC AUTO: 105.1 FL
MONOCYTES # BLD AUTO: 0.69 X10(3) UL (ref 0.1–1)
MONOCYTES NFR BLD AUTO: 11.4 %
NEUTROPHILS # BLD AUTO: 3.37 X10 (3) UL (ref 1.5–7.7)
NEUTROPHILS # BLD AUTO: 3.37 X10(3) UL (ref 1.5–7.7)
NEUTROPHILS NFR BLD AUTO: 55.9 %
PLATELET # BLD AUTO: 182 10(3)UL (ref 150–450)
RBC # BLD AUTO: 3.33 X10(6)UL
T4 FREE SERPL-MCNC: 1.2 NG/DL (ref 0.8–1.7)
TSI SER-ACNC: 1.06 MIU/ML (ref 0.36–3.74)
WBC # BLD AUTO: 6 X10(3) UL (ref 4–11)

## 2023-02-28 PROCEDURE — 82728 ASSAY OF FERRITIN: CPT

## 2023-02-28 PROCEDURE — 82040 ASSAY OF SERUM ALBUMIN: CPT

## 2023-02-28 PROCEDURE — 85652 RBC SED RATE AUTOMATED: CPT

## 2023-02-28 PROCEDURE — 85025 COMPLETE CBC W/AUTO DIFF WBC: CPT

## 2023-02-28 PROCEDURE — 82565 ASSAY OF CREATININE: CPT

## 2023-02-28 PROCEDURE — 84439 ASSAY OF FREE THYROXINE: CPT

## 2023-02-28 PROCEDURE — 36415 COLL VENOUS BLD VENIPUNCTURE: CPT

## 2023-02-28 PROCEDURE — 83540 ASSAY OF IRON: CPT

## 2023-02-28 PROCEDURE — 84443 ASSAY THYROID STIM HORMONE: CPT

## 2023-02-28 PROCEDURE — 84450 TRANSFERASE (AST) (SGOT): CPT

## 2023-02-28 PROCEDURE — 86140 C-REACTIVE PROTEIN: CPT

## 2023-02-28 RX ORDER — HYDROXYCHLOROQUINE SULFATE 200 MG/1
TABLET, FILM COATED ORAL
Qty: 180 TABLET | Refills: 3 | Status: SHIPPED | OUTPATIENT
Start: 2023-02-28

## 2023-03-01 DIAGNOSIS — M79.671 PAIN IN RIGHT FOOT: Primary | ICD-10-CM

## 2023-03-01 DIAGNOSIS — M25.571 PAIN IN RIGHT ANKLE AND JOINTS OF RIGHT FOOT: ICD-10-CM

## 2023-03-01 RX ORDER — PANTOPRAZOLE SODIUM 40 MG/1
TABLET, DELAYED RELEASE ORAL
Qty: 90 TABLET | Refills: 3 | Status: SHIPPED | OUTPATIENT
Start: 2023-03-01

## 2023-03-01 RX ORDER — LEVOTHYROXINE SODIUM 137 UG/1
TABLET ORAL
Qty: 90 TABLET | Refills: 3 | Status: SHIPPED | OUTPATIENT
Start: 2023-03-01

## 2023-03-01 RX ORDER — LISINOPRIL 20 MG/1
TABLET ORAL
Qty: 90 TABLET | Refills: 0 | Status: SHIPPED | OUTPATIENT
Start: 2023-03-01

## 2023-03-01 NOTE — TELEPHONE ENCOUNTER
Refill passed per [place] [facility] protocol.       Requested Prescriptions   Pending Prescriptions Disp Refills    PANTOPRAZOLE 40 MG Oral Tab EC [Pharmacy Med Name: Pantoprazole Sodium 40 MG Oral Tablet Delayed Release] 90 tablet 3     Sig: TAKE 1 TABLET BY MOUTH IN  THE MORNING BEFORE  BREAKFAST       Gastrointestional Medication Protocol Passed - 2/28/2023  4:20 AM        Passed - In person appointment or virtual visit in the past 12 mos or appointment in next 3 mos     Recent Outpatient Visits              1 month ago Bilateral impacted Grayson Shukla MD    Office Visit    3 months ago Rheumatoid arthritis of multiple sites with negative rheumatoid factor ([facility] [address]    [address], [facility]    Office Visit    3 months ago Stage 3b chronic kidney disease [facility])    Kasandra Barrios Demetrio Morones, MD    Office Visit    3 months ago Pre-op exam    [address], Chapo Villegas MD    Office Visit    3 months ago     [address] [facility] in [place], [place]    Office Visit          Future Appointments         Provider Department Appt Notes    In 6 days David Smith MD [address], [place] 3 mos follow up    In 2 months Meka Dixon MD [address], [address] 6 months                 LEVOTHYROXINE 137 MCG Oral Tab [Pharmacy Med Name: Levothyroxine Sodium 137 MCG Oral Tablet] 90 tablet 3     Sig: TAKE 1 TABLET BY MOUTH  BEFORE BREAKFAST       Thyroid Medication Protocol Passed - 2/28/2023  4:20 AM        Passed - TSH in past 12 months        Passed - Last TSH value is normal     Lab Results   Component Value Date    TSH 1.060 02/28/2023    [facility] 1.79 10/13/2015                 Passed - In person appointment or virtual visit in the past 12 mos or appointment in next 3 mos     Recent Outpatient Visits              1 month ago Bilateral impacted cerumen    Susan Portillo, [address], Monika Vidales MD    Office Visit    3 months ago Rheumatoid arthritis of multiple sites with negative rheumatoid factor ([facility] [address]    Earleen Hoa, [address], Tellis Landau, MD    Office Visit    3 months ago Stage 3b chronic kidney disease [facility])    Kasandra Silva Starlette Murdoch, MD    Office Visit    3 months ago Pre-op exam    Evelyne Gross MD    Office Visit    3 months ago     [facility] in [place], [place]    Office Visit          Future Appointments         Provider Department Appt Notes    In 6 days MD Susan Lance [address], [place] 3 mos follow up    In 2 months Yecenia Cruz MD [address] 6 months                 LISINOPRIL 20 MG Oral Tab [Pharmacy Med Name: Lisinopril 20 MG Oral Tablet] 90 tablet 3     Sig: TAKE 1 TABLET BY MOUTH  DAILY       Hypertensive Medications Protocol Failed - 2/28/2023  4:20 AM        Failed - EGFRCR or GFRNAA > 50     GFR Evaluation  EGFRCR: 47 , resulted on 2/28/2023          Passed - In person appointment in the past 12 or next 3 months     Recent Outpatient Visits              1 month ago Bilateral impacted Nadine Derek, [address], Monika Vidales MD    Office Visit    3 months ago Rheumatoid arthritis of multiple sites with negative rheumatoid factor ([facility] [address]    Earleen Hoa, [address], Tellis Landau, MD    Office Visit    3 months ago Stage 3b chronic kidney disease [facility])    Kasandra Silva Starlette Murdoch, MD    Office Visit    3 months ago Pre-op exam Geremias Zamorano MD    Office Visit    3 months ago     [address] [facility] in [place], [place]    Office Visit          Future Appointments         Provider Department Appt Notes    In 6 days Alysa Artis MD [address], [facility], [place] 3 mos follow up    In 2 months Marquis Nicholson MD [facility] 6 months               Passed - Last BP reading less than 140/90     BP Readings from Last 1 Encounters:  11/29/22 : 106/64              Passed - CMP or BMP in past 6 months     Recent Results (from the past 4392 hour(s))   BASIC METABOLIC PANEL (8)    Collection Time: 11/12/22  9:33 AM   Result Value Ref Range    Glucose 97 70 - 99 mg/dL    Sodium 144 136 - 145 mmol/L    Potassium 4.3 3.5 - 5.1 mmol/L    Chloride 110 98 - 112 mmol/L    CO2 27.0 21.0 - 32.0 mmol/L    Anion Gap 7 0 - 18 mmol/L    BUN 26 (H) 7 - 18 mg/dL    Creatinine 1.32 (H) 0.55 - 1.02 mg/dL    BUN/CREA Ratio 19.7 10.0 - 20.0    Calcium, Total 9.5 8.5 - 10.1 mg/dL    Calculated Osmolality 303 (H) 275 - 295 mOsm/kg    eGFR-Cr 40 (L) >=60 mL/min/1.73m2    Patient Fasting for BMP? Yes      *Note: Due to a large number of results and/or encounters for the requested time period, some results have not been displayed. A complete set of results can be found in Results Review.                Passed - In person appointment or virtual visit in the past 6 months     Recent Outpatient Visits              1 month ago Bilateral impacted cerumen    Oliva Carter MD    Office Visit    3 months ago Rheumatoid arthritis of multiple sites with negative rheumatoid factor ([facility] [address]    [address], [facility], Martín Mar MD    Office Visit    3 months ago Stage 3b chronic kidney disease [facility])    [address], [facility], Ana Concepcion MD    Office Visit    3 months ago Pre-op exam    Patricio Freeman, [address] Nila Del Rosario MD    Office Visit    3 months ago     [address] [place] [address] in [place], [place]    Office Visit          Future Appointments         Provider Department Appt Notes    In 6 days MD Chivo French [address], [place] 3 mos follow up    In 2 months MD Chivo Gallardo, [address] [place] [address] 6 months                     Future Appointments         Provider Department Appt Notes    In 6 days MD Chivo French [address], [place] 3 mos follow up    In 2 months MD Chivo Gallardo, [address] [place] [address] 6 months            Recent Outpatient Visits              1 month ago Bilateral impacted Tommie Ileanaer, [address], Barb Cowan MD    Office Visit    3 months ago Rheumatoid arthritis of multiple sites with negative rheumatoid factor [facility])    Chivo Crabtree, [facility], Cornell Georges MD    Office Visit    3 months ago Stage 3b chronic kidney disease [facility])    Lacie Angeles, PattersonNiurka Gallo MD    Office Visit    3 months ago Pre-op exam    Cori Ignacio MD    Office Visit    3 months ago     [address] [place] [address] in [place], [place]    Office Visit

## 2023-03-01 NOTE — TELEPHONE ENCOUNTER
Please review. Protocol failed/ No protocol      Requested Prescriptions   Pending Prescriptions Disp Refills    LISINOPRIL 20 MG Oral Tab [Pharmacy Med Name: Lisinopril 20 MG Oral Tablet] 90 tablet 3     Sig: TAKE 1 TABLET BY MOUTH  DAILY       Hypertensive Medications Protocol Failed - 2/28/2023  4:20 AM        Failed - EGFRCR or GFRNAA > 50     GFR Evaluation  EGFRCR: 47 , resulted on 2/28/2023          Passed - In person appointment in the past 12 or next 3 months     Recent Outpatient Visits              1 month ago Bilateral impacted cerumen    [address] [facility], Chelsea Bullard MD    Office Visit    3 months ago Rheumatoid arthritis of multiple sites with negative rheumatoid factor ([facility] [address]    [address]    Office Visit    3 months ago Stage 3b chronic kidney disease [facility])    [address], [address] [facility] Aida Espinal MD    Office Visit    3 months ago Pre-op exam    [address],[facility], [address] Lisa Flores MD    Office Visit    3 months ago     [address] [place] [address] in [place], [place]    Office Visit          Future Appointments         Provider Department Appt Notes    In 6 days Yohannes Buchanan MD [address], [place] 3 mos follow up    In 2 months Jessica Chan MD [address], [address] [place] [address] 6 months               Passed - Last BP reading less than 140/90     BP Readings from Last 1 Encounters:  11/29/22 : 106/64              Passed - CMP or BMP in past 6 months     Recent Results (from the past 4392 hour(s))   BASIC METABOLIC PANEL (8)    Collection Time: 11/12/22  9:33 AM   Result Value Ref Range    Glucose 97 70 - 99 mg/dL    Sodium 144 136 - 145 mmol/L    Potassium 4.3 3.5 - 5.1 mmol/L    Chloride 110 98 - 112 mmol/L    CO2 27.0 21.0 - 32.0 mmol/L    Anion Gap 7 0 - 18 mmol/L    BUN 26 (H) 7 - 18 mg/dL    Creatinine 1.32 (H) 0.55 - 1.02 mg/dL    BUN/CREA Ratio 19.7 10.0 - 20.0    Calcium, Total 9.5 8.5 - 10.1 mg/dL    Calculated Osmolality 303 (H) 275 - 295 mOsm/kg    eGFR-Cr 40 (L) >=60 mL/min/1.73m2    Patient Fasting for BMP? Yes      *Note: Due to a large number of results and/or encounters for the requested time period, some results have not been displayed. A complete set of results can be found in Results Review.                Passed - In person appointment or virtual visit in the past 6 months     Recent Outpatient Visits              1 month ago Bilateral impacted cerumen    [employer], [address] [place] [address],[address], Nadine Dean MD    Office Visit    3 months ago Rheumatoid arthritis of multiple sites with negative rheumatoid factor ([facility] [address]    [employer], [facility], Kristy Segura MD    Office Visit    3 months ago Stage 3b chronic kidney disease [facility]    [employer], [address] [facility]    Office Visit    3 months ago Pre-op exam    Lundy Curling, MD    Office Visit    3 months ago     [facility] [facility]          Future Appointments         Provider Department Appt Notes    In 6 days Sameera Thomason MD [employer], [facility] [address], [place] 3 mos follow up    In 2 months Jaspreet Edwards MD [facility], [address] 6 months                Signed Prescriptions Disp Refills    PANTOPRAZOLE 40 MG Oral Tab EC 90 tablet 3     Sig: TAKE 1 TABLET BY MOUTH IN  THE MORNING BEFORE  BREAKFAST       Gastrointestional Medication Protocol Passed - 2/28/2023  4:20 AM        Passed - In person appointment or virtual visit in the past 12 mos or appointment in next 3 mos     Recent Outpatient Visits              1 month ago Bilateral impacted Haidee Harada, [address], Johnnie De La Cruz MD    Office Visit    3 months ago Rheumatoid arthritis of multiple sites with negative rheumatoid factor ([facility] [address]    [address], [address] [place], Jocelyn Scales MD    Office Visit    3 months ago Stage 3b chronic kidney disease [facility])    Kasandra Jimenez, Fadumo Russell MD    Office Visit    3 months ago Pre-op exam    [address], Terrence Roberson MD    Office Visit    3 months ago     Jaylene in [place], [place]    Office Visit          Future Appointments         Provider Department Appt Notes    In 6 days Reid Cortes MD [address], [facility] [address], [place] 3 mos follow up    In 2 months Wendie Ugarte MD [address] 6 months                 LEVOTHYROXINE 137 MCG Oral Tab 90 tablet 3     Sig: TAKE 1 TABLET BY MOUTH  BEFORE BREAKFAST       Thyroid Medication Protocol Passed - 2/28/2023  4:20 AM        Passed - TSH in past 12 months        Passed - Last TSH value is normal     Lab Results   Component Value Date    TSH 1.060 02/28/2023    [facility] 1.79 10/13/2015                 Passed - In person appointment or virtual visit in the past 12 mos or appointment in next 3 mos     Recent Outpatient Visits              1 month ago Bilateral impacted Haidee Harada, [address], Johnnie De La Cruz MD    Office Visit    3 months ago Rheumatoid arthritis of multiple sites with negative rheumatoid factor [facility])    [address], [place], Jocelyn Scales MD    Office Visit    3 months ago Stage 3b chronic kidney disease [facility])    Kasandra Jimenez, Fadumo uRssell MD    Office Visit    3 months ago Pre-op exam    [facility] Pina Richey MD    Office Visit    3 months ago     Jaylene in [place], [place]    Office Visit          Future Appointments         Provider Department Appt Notes    In 6 days Rashaad Lo MD [address], [address], [place] 3 mos follow up    In 2 months Kareem Lund MD [address], [address] [facility] 6 months                    Future Appointments         Provider Department Appt Notes    In 6 days Rashaad Lo MD [address], [address], [place] 3 mos follow up    In 2 months Kareem Lund MD [address], [facility] 6 months             Recent Outpatient Visits              1 month ago Bilateral impacted Terrie Nones, [address] [facility] [address], Handy Rodriguez MD    Office Visit    3 months ago Rheumatoid arthritis of multiple sites with negative rheumatoid factor [facility])    [address], [facility], Padilla Heredia MD    Office Visit    3 months ago Stage 3b chronic kidney disease [facility])    Brant Christiansenmhalejandra Gillespie, Roberta Perez MD    Office Visit    3 months ago Pre-op exam    Gildardo Petit MD    Office Visit    3 months ago     Jaylene in [place], [place]    Office Visit

## 2023-03-07 ENCOUNTER — OFFICE VISIT (OUTPATIENT)
Dept: RHEUMATOLOGY | Facility: CLINIC | Age: 83
End: 2023-03-07

## 2023-03-07 VITALS
SYSTOLIC BLOOD PRESSURE: 113 MMHG | DIASTOLIC BLOOD PRESSURE: 66 MMHG | BODY MASS INDEX: 39.95 KG/M2 | HEART RATE: 77 BPM | WEIGHT: 234 LBS | HEIGHT: 64 IN

## 2023-03-07 DIAGNOSIS — M06.09 RHEUMATOID ARTHRITIS OF MULTIPLE SITES WITH NEGATIVE RHEUMATOID FACTOR (HCC): Primary | ICD-10-CM

## 2023-03-07 DIAGNOSIS — M1A.9XX1 CHRONIC TOPHACEOUS GOUT OF RIGHT FOOT: ICD-10-CM

## 2023-03-07 DIAGNOSIS — N28.9 RENAL INSUFFICIENCY: ICD-10-CM

## 2023-03-07 DIAGNOSIS — Z51.81 THERAPEUTIC DRUG MONITORING: ICD-10-CM

## 2023-03-07 PROCEDURE — 99214 OFFICE O/P EST MOD 30 MIN: CPT | Performed by: INTERNAL MEDICINE

## 2023-03-07 PROCEDURE — 3008F BODY MASS INDEX DOCD: CPT | Performed by: INTERNAL MEDICINE

## 2023-03-07 PROCEDURE — 3078F DIAST BP <80 MM HG: CPT | Performed by: INTERNAL MEDICINE

## 2023-03-07 PROCEDURE — 1125F AMNT PAIN NOTED PAIN PRSNT: CPT | Performed by: INTERNAL MEDICINE

## 2023-03-07 PROCEDURE — 3074F SYST BP LT 130 MM HG: CPT | Performed by: INTERNAL MEDICINE

## 2023-03-07 RX ORDER — DOXYCYCLINE HYCLATE 100 MG/1
100 CAPSULE ORAL 2 TIMES DAILY
COMMUNITY
Start: 2023-03-03

## 2023-03-08 ENCOUNTER — HOSPITAL ENCOUNTER (OUTPATIENT)
Dept: GENERAL RADIOLOGY | Facility: HOSPITAL | Age: 83
Discharge: HOME | End: 2023-03-08
Attending: PODIATRIST
Payer: MEDICARE

## 2023-03-08 DIAGNOSIS — M79.671 PAIN IN RIGHT FOOT: ICD-10-CM

## 2023-03-08 DIAGNOSIS — M25.571 PAIN IN RIGHT ANKLE AND JOINTS OF RIGHT FOOT: ICD-10-CM

## 2023-03-08 PROCEDURE — 73610 X-RAY EXAM OF ANKLE: CPT | Performed by: PODIATRIST

## 2023-03-08 PROCEDURE — 73630 X-RAY EXAM OF FOOT: CPT | Performed by: PODIATRIST

## 2023-03-23 ENCOUNTER — APPOINTMENT (OUTPATIENT)
Dept: URBAN - METROPOLITAN AREA CLINIC 244 | Age: 83
Setting detail: DERMATOLOGY
End: 2023-03-23

## 2023-03-23 PROBLEM — C44.319 BASAL CELL CARCINOMA OF SKIN OF OTHER PARTS OF FACE: Status: ACTIVE | Noted: 2023-03-23

## 2023-03-23 PROCEDURE — 11643 EXC F/E/E/N/L MAL+MRG 2.1-3: CPT

## 2023-03-23 PROCEDURE — 13132 CMPLX RPR F/C/C/M/N/AX/G/H/F: CPT

## 2023-03-23 PROCEDURE — OTHER EXCISION: OTHER

## 2023-03-23 NOTE — PROCEDURE: EXCISION
Composite Graft Text: The defect edges were debeveled with a #15 scalpel blade.  Given the location of the defect, shape of the defect, the proximity to free margins and the fact the defect was full thickness a composite graft was deemed most appropriate.  The defect was outline and then transferred to the donor site.  A full thickness graft was then excised from the donor site. The graft was then placed in the primary defect, oriented appropriately and then sutured into place.  The secondary defect was then repaired using a primary closure.
Orbicularis Oris Muscle Flap Text: The defect edges were debeveled with a #15 scalpel blade.  Given that the defect affected the competency of the oral sphincter an orbicularis oris muscle flap was deemed most appropriate to restore this competency and normal muscle function.  Using a sterile surgical marker, an appropriate flap was drawn incorporating the defect. The area thus outlined was incised with a #15 scalpel blade.
Hatchet Flap Text: The defect edges were debeveled with a #15 scalpel blade.  Given the location of the defect, shape of the defect and the proximity to free margins a hatchet flap was deemed most appropriate.  Using a sterile surgical marker, an appropriate hatchet flap was drawn incorporating the defect and placing the expected incisions within the relaxed skin tension lines where possible.    The area thus outlined was incised deep to adipose tissue with a #15 scalpel blade.  The skin margins were undermined to an appropriate distance in all directions utilizing iris scissors.
Retention Suture Bite Size: 3 mm
Ear Star Wedge Flap Text: The defect edges were debeveled with a #15 blade scalpel.  Given the location of the defect and the proximity to free margins (helical rim) an ear star wedge flap was deemed most appropriate.  Using a sterile surgical marker, the appropriate flap was drawn incorporating the defect and placing the expected incisions between the helical rim and antihelix where possible.  The area thus outlined was incised through and through with a #15 scalpel blade.
Advancement-Rotation Flap Text: The defect edges were debeveled with a #15 scalpel blade.  Given the location of the defect, shape of the defect and the proximity to free margins an advancement-rotation flap was deemed most appropriate.  Using a sterile surgical marker, an appropriate flap was drawn incorporating the defect and placing the expected incisions within the relaxed skin tension lines where possible. The area thus outlined was incised deep to adipose tissue with a #15 scalpel blade.  The skin margins were undermined to an appropriate distance in all directions utilizing iris scissors.
No Repair - Repaired With Adjacent Surgical Defect Text (Leave Blank If You Do Not Want): After the excision the defect was repaired concurrently with another surgical defect which was in close approximation.
Partial Purse String (Simple) Text: Given the location of the defect and the characteristics of the surrounding skin a simple purse string closure was deemed most appropriate.  Undermining was performed circumferentially around the surgical defect.  A purse string suture was then placed and tightened. Wound tension of the circular defect prevented complete closure of the wound.
Complex Repair And Tissue Cultured Epidermal Autograft Text: The defect edges were debeveled with a #15 scalpel blade.  The primary defect was closed partially with a complex linear closure.  Given the location of the defect, shape of the defect and the proximity to free margins an tissue cultured epidermal autograft was deemed most appropriate to repair the remaining defect.  The graft was trimmed to fit the size of the remaining defect.  The graft was then placed in the primary defect, oriented appropriately, and sutured into place.
Mustarde Flap Text: The defect edges were debeveled with a #15 scalpel blade.  Given the size, depth and location of the defect and the proximity to free margins a Mustarde flap was deemed most appropriate.  Using a sterile surgical marker, an appropriate flap was drawn incorporating the defect. The area thus outlined was incised with a #15 scalpel blade.  The skin margins were undermined to an appropriate distance in all directions utilizing iris scissors.
Complex Repair Preamble Text (Leave Blank If You Do Not Want): Extensive wide undermining was performed.
Xenograft Text: The defect edges were debeveled with a #15 scalpel blade.  Given the location of the defect, shape of the defect and the proximity to free margins a xenograft was deemed most appropriate.  The graft was then trimmed to fit the size of the defect.  The graft was then placed in the primary defect and oriented appropriately.
Nasalis-Muscle-Based Myocutaneous Island Pedicle Flap Text: Using a #15 blade, an incision was made around the donor flap to the level of the nasalis muscle. Wide lateral undermining was then performed in both the subcutaneous plane above the nasalis muscle, and in a submuscular plane just above periosteum. This allowed the formation of a free nasalis muscle axial pedicle (based on the angular artery) which was still attached to the actual cutaneous flap, increasing its mobility and vascular viability. Hemostasis was obtained with pinpoint electrocoagulation. The flap was mobilized into position and the pivotal anchor points positioned and stabilized with buried interrupted sutures. Subcutaneous and dermal tissues were closed in a multilayered fashion with sutures. Tissue redundancies were excised, and the epidermal edges were apposed without significant tension and sutured with sutures.
Show Repair Anesthesia Variables: Yes
Debridement Text: The wound edges were debrided prior to proceeding with the closure to facilitate wound healing.
Anesthesia Volume In Cc: 10
Anesthesia Volume In Cc: 6
Zygomaticofacial Flap Text: Given the location of the defect, shape of the defect and the proximity to free margins a zygomaticofacial flap was deemed most appropriate for repair.  Using a sterile surgical marker, the appropriate flap was drawn incorporating the defect and placing the expected incisions within the relaxed skin tension lines where possible. The area thus outlined was incised deep to adipose tissue with a #15 scalpel blade with preservation of a vascular pedicle.  The skin margins were undermined to an appropriate distance in all directions utilizing iris scissors.  The flap was then placed into the defect and anchored with interrupted buried subcutaneous sutures.
Secondary Defect Length (In Cm): 0
O-T Advancement Flap Text: The defect edges were debeveled with a #15 scalpel blade.  Given the location of the defect, shape of the defect and the proximity to free margins an O-T advancement flap was deemed most appropriate.  Using a sterile surgical marker, an appropriate advancement flap was drawn incorporating the defect and placing the expected incisions within the relaxed skin tension lines where possible.    The area thus outlined was incised deep to adipose tissue with a #15 scalpel blade.  The skin margins were undermined to an appropriate distance in all directions utilizing iris scissors.
Estlander Flap (Lower To Upper Lip) Text: The defect of the lower lip was assessed and measured.  Given the location and size of the defect, an Estlander flap was deemed most appropriate.  Using a sterile surgical marker, an appropriate Estlander flap was measured and drawn on the upper lip. Local anesthesia was then infiltrated. A scalpel was then used to incise the lateral aspect of the flap, through skin, muscle and mucosa, leaving the flap pedicled medially.  The flap was then rotated and positioned to fill the lower lip defect.  The flap was then sutured into place with a three layer technique, closing the orbicularis oris muscle layer with subcutaneous buried sutures, followed by a mucosal layer and an epidermal layer.
Include Hemigard In Note?: No
Rhombic Flap Text: The defect edges were debeveled with a #15 scalpel blade.  Given the location of the defect and the proximity to free margins a rhombic flap was deemed most appropriate.  Using a sterile surgical marker, an appropriate rhombic flap was drawn incorporating the defect.    The area thus outlined was incised deep to adipose tissue with a #15 scalpel blade.  The skin margins were undermined to an appropriate distance in all directions utilizing iris scissors.
Slit Excision Additional Text (Leave Blank If You Do Not Want): A linear line was drawn on the skin overlying the lesion. An incision was made slowly until the lesion was visualized.  Once visualized, the lesion was removed with blunt dissection.
Anesthesia Type: 1% lidocaine with epinephrine
Lazy S Intermediate Repair Preamble Text (Leave Blank If You Do Not Want): Undermining was performed with blunt dissection.
Epidermal Closure: running
Post-Care Instructions: I reviewed with the patient in detail post-care instructions. Patient is not to engage in any heavy lifting, exercise, or swimming for the next 14 days. Should the patient develop any fevers, chills, bleeding, severe pain patient will contact the office immediately.
Complex Repair And Double M Plasty Text: The defect edges were debeveled with a #15 scalpel blade.  The primary defect was closed partially with a complex linear closure.  Given the location of the remaining defect, shape of the defect and the proximity to free margins a double M plasty was deemed most appropriate for complete closure of the defect.  Using a sterile surgical marker, an appropriate advancement flap was drawn incorporating the defect and placing the expected incisions within the relaxed skin tension lines where possible.    The area thus outlined was incised deep to adipose tissue with a #15 scalpel blade.  The skin margins were undermined to an appropriate distance in all directions utilizing iris scissors.
Anesthesia Type: 0.5% lidocaine with 1:200,000 epinephrine and a 1:10 solution of 8.4% sodium bicarbonate
Complex Repair And Rhombic Flap Text: The defect edges were debeveled with a #15 scalpel blade.  The primary defect was closed partially with a complex linear closure.  Given the location of the remaining defect, shape of the defect and the proximity to free margins a rhombic flap was deemed most appropriate for complete closure of the defect.  Using a sterile surgical marker, an appropriate advancement flap was drawn incorporating the defect and placing the expected incisions within the relaxed skin tension lines where possible.    The area thus outlined was incised deep to adipose tissue with a #15 scalpel blade.  The skin margins were undermined to an appropriate distance in all directions utilizing iris scissors.
Hemostasis: Electrocautery
Complex Repair And Melolabial Flap Text: The defect edges were debeveled with a #15 scalpel blade.  The primary defect was closed partially with a complex linear closure.  Given the location of the remaining defect, shape of the defect and the proximity to free margins a melolabial flap was deemed most appropriate for complete closure of the defect.  Using a sterile surgical marker, an appropriate advancement flap was drawn incorporating the defect and placing the expected incisions within the relaxed skin tension lines where possible.    The area thus outlined was incised deep to adipose tissue with a #15 scalpel blade.  The skin margins were undermined to an appropriate distance in all directions utilizing iris scissors.
Chonodrocutaneous Helical Advancement Flap Text: The defect edges were debeveled with a #15 scalpel blade.  Given the location of the defect and the proximity to free margins a chondrocutaneous helical advancement flap was deemed most appropriate.  Using a sterile surgical marker, the appropriate advancement flap was drawn incorporating the defect and placing the expected incisions within the relaxed skin tension lines where possible.    The area thus outlined was incised deep to adipose tissue with a #15 scalpel blade.  The skin margins were undermined to an appropriate distance in all directions utilizing iris scissors.
Epidermal Sutures: 6-0 Nylon
Staged Advancement Flap Text: The defect edges were debeveled with a #15 scalpel blade.  Given the location of the defect, shape of the defect and the proximity to free margins a staged advancement flap was deemed most appropriate.  Using a sterile surgical marker, an appropriate advancement flap was drawn incorporating the defect and placing the expected incisions within the relaxed skin tension lines where possible. The area thus outlined was incised deep to adipose tissue with a #15 scalpel blade.  The skin margins were undermined to an appropriate distance in all directions utilizing iris scissors.
Vermilion Border Text: The closure involved the vermilion border.
Tissue Cultured Epidermal Autograft Text: The defect edges were debeveled with a #15 scalpel blade.  Given the location of the defect, shape of the defect and the proximity to free margins a tissue cultured epidermal autograft was deemed most appropriate.  The graft was then trimmed to fit the size of the defect.  The graft was then placed in the primary defect and oriented appropriately.
Banner Transposition Flap Text: The defect edges were debeveled with a #15 scalpel blade.  Given the location of the defect and the proximity to free margins a Banner transposition flap was deemed most appropriate.  Using a sterile surgical marker, an appropriate flap drawn around the defect. The area thus outlined was incised deep to adipose tissue with a #15 scalpel blade.  The skin margins were undermined to an appropriate distance in all directions utilizing iris scissors.
Fusiform Excision Additional Text (Leave Blank If You Do Not Want): The margin was drawn around the clinically apparent lesion.  A fusiform shape was then drawn on the skin incorporating the lesion and margins.  Incisions were then made along these lines to the appropriate tissue plane and the lesion was extirpated.
Rhomboid Transposition Flap Text: The defect edges were debeveled with a #15 scalpel blade.  Given the location of the defect and the proximity to free margins a rhomboid transposition flap was deemed most appropriate.  Using a sterile surgical marker, an appropriate rhomboid flap was drawn incorporating the defect.    The area thus outlined was incised deep to adipose tissue with a #15 scalpel blade.  The skin margins were undermined to an appropriate distance in all directions utilizing iris scissors.
Estimated Blood Loss (Cc): minimal
Complex Repair And Ftsg Text: The defect edges were debeveled with a #15 scalpel blade.  The primary defect was closed partially with a complex linear closure.  Given the location of the defect, shape of the defect and the proximity to free margins a full thickness skin graft was deemed most appropriate to repair the remaining defect.  The graft was trimmed to fit the size of the remaining defect.  The graft was then placed in the primary defect, oriented appropriately, and sutured into place.
Partial Purse String (Intermediate) Text: Given the location of the defect and the characteristics of the surrounding skin an intermediate purse string closure was deemed most appropriate.  Undermining was performed circumferentially around the surgical defect.  A purse string suture was then placed and tightened. Wound tension of the circular defect prevented complete closure of the wound.
V-Y Plasty Text: The defect edges were debeveled with a #15 scalpel blade.  Given the location of the defect, shape of the defect and the proximity to free margins an V-Y advancement flap was deemed most appropriate.  Using a sterile surgical marker, an appropriate advancement flap was drawn incorporating the defect and placing the expected incisions within the relaxed skin tension lines where possible.    The area thus outlined was incised deep to adipose tissue with a #15 scalpel blade.  The skin margins were undermined to an appropriate distance in all directions utilizing iris scissors.
Nostril Rim Text: The closure involved the nostril rim.
Alar Island Pedicle Flap Text: The defect edges were debeveled with a #15 scalpel blade.  Given the location of the defect, shape of the defect and the proximity to the alar rim an island pedicle advancement flap was deemed most appropriate.  Using a sterile surgical marker, an appropriate advancement flap was drawn incorporating the defect, outlining the appropriate donor tissue and placing the expected incisions within the nasal ala running parallel to the alar rim. The area thus outlined was incised with a #15 scalpel blade.  The skin margins were undermined minimally to an appropriate distance in all directions around the primary defect and laterally outward around the island pedicle utilizing iris scissors.  There was minimal undermining beneath the pedicle flap.
Complex Repair And Modified Advancement Flap Text: The defect edges were debeveled with a #15 scalpel blade.  The primary defect was closed partially with a complex linear closure.  Given the location of the remaining defect, shape of the defect and the proximity to free margins a modified advancement flap was deemed most appropriate for complete closure of the defect.  Using a sterile surgical marker, an appropriate advancement flap was drawn incorporating the defect and placing the expected incisions within the relaxed skin tension lines where possible.    The area thus outlined was incised deep to adipose tissue with a #15 scalpel blade.  The skin margins were undermined to an appropriate distance in all directions utilizing iris scissors.
Mastoid Interpolation Flap Text: A decision was made to reconstruct the defect utilizing an interpolation axial flap and a staged reconstruction.  A telfa template was made of the defect.  This telfa template was then used to outline the mastoid interpolation flap.  The donor area for the pedicle flap was then injected with anesthesia.  The flap was excised through the skin and subcutaneous tissue down to the layer of the underlying musculature.  The pedicle flap was carefully excised within this deep plane to maintain its blood supply.  The edges of the donor site were undermined.   The donor site was closed in a primary fashion.  The pedicle was then rotated into position and sutured.  Once the tube was sutured into place, adequate blood supply was confirmed with blanching and refill.  The pedicle was then wrapped with xeroform gauze and dressed appropriately with a telfa and gauze bandage to ensure continued blood supply and protect the attached pedicle.
A-T Advancement Flap Text: The defect edges were debeveled with a #15 scalpel blade.  Given the location of the defect, shape of the defect and the proximity to free margins an A-T advancement flap was deemed most appropriate.  Using a sterile surgical marker, an appropriate advancement flap was drawn incorporating the defect and placing the expected incisions within the relaxed skin tension lines where possible.    The area thus outlined was incised deep to adipose tissue with a #15 scalpel blade.  The skin margins were undermined to an appropriate distance in all directions utilizing iris scissors.
Excision Depth: adipose tissue
Suturegard Body: The suture ends were repeatedly re-tightened and re-clamped to achieve the desired tissue expansion.
Complex Repair And Transposition Flap Text: The defect edges were debeveled with a #15 scalpel blade.  The primary defect was closed partially with a complex linear closure.  Given the location of the remaining defect, shape of the defect and the proximity to free margins a transposition flap was deemed most appropriate for complete closure of the defect.  Using a sterile surgical marker, an appropriate advancement flap was drawn incorporating the defect and placing the expected incisions within the relaxed skin tension lines where possible.    The area thus outlined was incised deep to adipose tissue with a #15 scalpel blade.  The skin margins were undermined to an appropriate distance in all directions utilizing iris scissors.
Ftsg Text: The defect edges were debeveled with a #15 scalpel blade.  Given the location of the defect, shape of the defect and the proximity to free margins a full thickness skin graft was deemed most appropriate.  Using a sterile surgical marker, the primary defect shape was transferred to the donor site. The area thus outlined was incised deep to adipose tissue with a #15 scalpel blade.  The harvested graft was then trimmed of adipose tissue until only dermis and epidermis was left.  The skin margins of the secondary defect were undermined to an appropriate distance in all directions utilizing iris scissors.  The secondary defect was closed with interrupted buried subcutaneous sutures.  The skin edges were then re-apposed with running  sutures.  The skin graft was then placed in the primary defect and oriented appropriately.
Spiral Flap Text: The defect edges were debeveled with a #15 scalpel blade.  Given the location of the defect, shape of the defect and the proximity to free margins a spiral flap was deemed most appropriate.  Using a sterile surgical marker, an appropriate rotation flap was drawn incorporating the defect and placing the expected incisions within the relaxed skin tension lines where possible. The area thus outlined was incised deep to adipose tissue with a #15 scalpel blade.  The skin margins were undermined to an appropriate distance in all directions utilizing iris scissors.
Rotation Flap Text: The defect edges were debeveled with a #15 scalpel blade.  Given the location of the defect, shape of the defect and the proximity to free margins a rotation flap was deemed most appropriate.  Using a sterile surgical marker, an appropriate rotation flap was drawn incorporating the defect and placing the expected incisions within the relaxed skin tension lines where possible.    The area thus outlined was incised deep to adipose tissue with a #15 scalpel blade.  The skin margins were undermined to an appropriate distance in all directions utilizing iris scissors.
Island Pedicle Flap With Canthal Suspension Text: The defect edges were debeveled with a #15 scalpel blade.  Given the location of the defect, shape of the defect and the proximity to free margins an island pedicle advancement flap was deemed most appropriate.  Using a sterile surgical marker, an appropriate advancement flap was drawn incorporating the defect, outlining the appropriate donor tissue and placing the expected incisions within the relaxed skin tension lines where possible. The area thus outlined was incised deep to adipose tissue with a #15 scalpel blade.  The skin margins were undermined to an appropriate distance in all directions around the primary defect and laterally outward around the island pedicle utilizing iris scissors.  There was minimal undermining beneath the pedicle flap. A suspension suture was placed in the canthal tendon to prevent tension and prevent ectropion.
Suture Removal: 7 days
Elliptical Excision Additional Text (Leave Blank If You Do Not Want): The margin was drawn around the clinically apparent lesion.  An elliptical shape was then drawn on the skin incorporating the lesion and margins.  Incisions were then made along these lines to the appropriate tissue plane and the lesion was extirpated.
Pre-Excision Curettage Text (Leave Blank If You Do Not Want): Prior to drawing the surgical margin the visible lesion was removed with electrodesiccation and curettage to clearly define the lesion size.
Intermediate / Complex Repair - Final Wound Length In Cm: 4.6
Complex Repair And Z Plasty Text: The defect edges were debeveled with a #15 scalpel blade.  The primary defect was closed partially with a complex linear closure.  Given the location of the remaining defect, shape of the defect and the proximity to free margins a Z plasty was deemed most appropriate for complete closure of the defect.  Using a sterile surgical marker, an appropriate advancement flap was drawn incorporating the defect and placing the expected incisions within the relaxed skin tension lines where possible.    The area thus outlined was incised deep to adipose tissue with a #15 scalpel blade.  The skin margins were undermined to an appropriate distance in all directions utilizing iris scissors.
Hemigard Retention Suture: 2-0 Nylon
Epidermal Closure Graft Donor Site (Optional): simple interrupted
Peng Advancement Flap Text: The defect edges were debeveled with a #15 scalpel blade.  Given the location of the defect, shape of the defect and the proximity to free margins a Peng advancement flap was deemed most appropriate.  Using a sterile surgical marker, an appropriate advancement flap was drawn incorporating the defect and placing the expected incisions within the relaxed skin tension lines where possible. The area thus outlined was incised deep to adipose tissue with a #15 scalpel blade.  The skin margins were undermined to an appropriate distance in all directions utilizing iris scissors.
Epidermal Autograft Text: The defect edges were debeveled with a #15 scalpel blade.  Given the location of the defect, shape of the defect and the proximity to free margins an epidermal autograft was deemed most appropriate.  Using a sterile surgical marker, the primary defect shape was transferred to the donor site. The epidermal graft was then harvested.  The skin graft was then placed in the primary defect and oriented appropriately.
Complex Repair And Rotation Flap Text: The defect edges were debeveled with a #15 scalpel blade.  The primary defect was closed partially with a complex linear closure.  Given the location of the remaining defect, shape of the defect and the proximity to free margins a rotation flap was deemed most appropriate for complete closure of the defect.  Using a sterile surgical marker, an appropriate advancement flap was drawn incorporating the defect and placing the expected incisions within the relaxed skin tension lines where possible.    The area thus outlined was incised deep to adipose tissue with a #15 scalpel blade.  The skin margins were undermined to an appropriate distance in all directions utilizing iris scissors.
Mercedes Flap Text: The defect edges were debeveled with a #15 scalpel blade.  Given the location of the defect, shape of the defect and the proximity to free margins a Mercedes flap was deemed most appropriate.  Using a sterile surgical marker, an appropriate advancement flap was drawn incorporating the defect and placing the expected incisions within the relaxed skin tension lines where possible. The area thus outlined was incised deep to adipose tissue with a #15 scalpel blade.  The skin margins were undermined to an appropriate distance in all directions utilizing iris scissors.
Complex Repair And Burow's Graft Text: The defect edges were debeveled with a #15 scalpel blade.  The primary defect was closed partially with a complex linear closure.  Given the location of the defect, shape of the defect, the proximity to free margins and the presence of a standing cone deformity a Burow's graft was deemed most appropriate to repair the remaining defect.  The graft was trimmed to fit the size of the remaining defect.  The graft was then placed in the primary defect, oriented appropriately, and sutured into place.
Nasal Turnover Hinge Flap Text: The defect edges were debeveled with a #15 scalpel blade.  Given the size, depth, location of the defect and the defect being full thickness a nasal turnover hinge flap was deemed most appropriate.  Using a sterile surgical marker, an appropriate hinge flap was drawn incorporating the defect. The area thus outlined was incised with a #15 scalpel blade. The flap was designed to recreate the nasal mucosal lining and the alar rim. The skin margins were undermined to an appropriate distance in all directions utilizing iris scissors.
Scalpel Size: 15 blade
Complex Repair And Bilobe Flap Text: The defect edges were debeveled with a #15 scalpel blade.  The primary defect was closed partially with a complex linear closure.  Given the location of the remaining defect, shape of the defect and the proximity to free margins a bilobe flap was deemed most appropriate for complete closure of the defect.  Using a sterile surgical marker, an appropriate advancement flap was drawn incorporating the defect and placing the expected incisions within the relaxed skin tension lines where possible.    The area thus outlined was incised deep to adipose tissue with a #15 scalpel blade.  The skin margins were undermined to an appropriate distance in all directions utilizing iris scissors.
Complex Repair And W Plasty Text: The defect edges were debeveled with a #15 scalpel blade.  The primary defect was closed partially with a complex linear closure.  Given the location of the remaining defect, shape of the defect and the proximity to free margins a W plasty was deemed most appropriate for complete closure of the defect.  Using a sterile surgical marker, an appropriate advancement flap was drawn incorporating the defect and placing the expected incisions within the relaxed skin tension lines where possible.    The area thus outlined was incised deep to adipose tissue with a #15 scalpel blade.  The skin margins were undermined to an appropriate distance in all directions utilizing iris scissors.
Repair Performed By Another Provider Text (Leave Blank If You Do Not Want): After the tissue was excised the defect was repaired by another provider.
Mucosal Advancement Flap Text: Given the location of the defect, shape of the defect and the proximity to free margins a mucosal advancement flap was deemed most appropriate. Incisions were made with a 15 blade scalpel in the appropriate fashion along the cutaneous vermilion border and the mucosal lip. The remaining actinically damaged mucosal tissue was excised.  The mucosal advancement flap was then elevated to the gingival sulcus with care taken to preserve the neurovascular structures and advanced into the primary defect. Care was taken to ensure that precise realignment of the vermilion border was achieved.
Complex Repair And Dermal Autograft Text: The defect edges were debeveled with a #15 scalpel blade.  The primary defect was closed partially with a complex linear closure.  Given the location of the defect, shape of the defect and the proximity to free margins an dermal autograft was deemed most appropriate to repair the remaining defect.  The graft was trimmed to fit the size of the remaining defect.  The graft was then placed in the primary defect, oriented appropriately, and sutured into place.
Star Wedge Flap Text: The defect edges were debeveled with a #15 scalpel blade.  Given the location of the defect, shape of the defect and the proximity to free margins a star wedge flap was deemed most appropriate.  Using a sterile surgical marker, an appropriate rotation flap was drawn incorporating the defect and placing the expected incisions within the relaxed skin tension lines where possible. The area thus outlined was incised deep to adipose tissue with a #15 scalpel blade.  The skin margins were undermined to an appropriate distance in all directions utilizing iris scissors.
Undermining Type: Entire Wound
O-Z Plasty Text: The defect edges were debeveled with a #15 scalpel blade.  Given the location of the defect, shape of the defect and the proximity to free margins an O-Z plasty (double transposition flap) was deemed most appropriate.  Using a sterile surgical marker, the appropriate transposition flaps were drawn incorporating the defect and placing the expected incisions within the relaxed skin tension lines where possible.    The area thus outlined was incised deep to adipose tissue with a #15 scalpel blade.  The skin margins were undermined to an appropriate distance in all directions utilizing iris scissors.  Hemostasis was achieved with electrocautery.  The flaps were then transposed into place, one clockwise and the other counterclockwise, and anchored with interrupted buried subcutaneous sutures.
Complex Repair And Single Advancement Flap Text: The defect edges were debeveled with a #15 scalpel blade.  The primary defect was closed partially with a complex linear closure.  Given the location of the remaining defect, shape of the defect and the proximity to free margins a single advancement flap was deemed most appropriate for complete closure of the defect.  Using a sterile surgical marker, an appropriate advancement flap was drawn incorporating the defect and placing the expected incisions within the relaxed skin tension lines where possible.    The area thus outlined was incised deep to adipose tissue with a #15 scalpel blade.  The skin margins were undermined to an appropriate distance in all directions utilizing iris scissors.
Burow's Advancement Flap Text: The defect edges were debeveled with a #15 scalpel blade.  Given the location of the defect and the proximity to free margins a Burow's advancement flap was deemed most appropriate.  Using a sterile surgical marker, the appropriate advancement flap was drawn incorporating the defect and placing the expected incisions within the relaxed skin tension lines where possible.    The area thus outlined was incised deep to adipose tissue with a #15 scalpel blade.  The skin margins were undermined to an appropriate distance in all directions utilizing iris scissors.
Number Of Hemigard Strips Per Side: 1
Complex Repair And V-Y Plasty Text: The defect edges were debeveled with a #15 scalpel blade.  The primary defect was closed partially with a complex linear closure.  Given the location of the remaining defect, shape of the defect and the proximity to free margins a V-Y plasty was deemed most appropriate for complete closure of the defect.  Using a sterile surgical marker, an appropriate advancement flap was drawn incorporating the defect and placing the expected incisions within the relaxed skin tension lines where possible.    The area thus outlined was incised deep to adipose tissue with a #15 scalpel blade.  The skin margins were undermined to an appropriate distance in all directions utilizing iris scissors.
Size Of Lesion In Cm: 1.3
Eye Clamp Note Details: An eye clamp was used during the procedure.
Posterior Auricular Interpolation Flap Text: A decision was made to reconstruct the defect utilizing an interpolation axial flap and a staged reconstruction.  A telfa template was made of the defect.  This telfa template was then used to outline the posterior auricular interpolation flap.  The donor area for the pedicle flap was then injected with anesthesia.  The flap was excised through the skin and subcutaneous tissue down to the layer of the underlying musculature.  The pedicle flap was carefully excised within this deep plane to maintain its blood supply.  The edges of the donor site were undermined.   The donor site was closed in a primary fashion.  The pedicle was then rotated into position and sutured.  Once the tube was sutured into place, adequate blood supply was confirmed with blanching and refill.  The pedicle was then wrapped with xeroform gauze and dressed appropriately with a telfa and gauze bandage to ensure continued blood supply and protect the attached pedicle.
Lip Wedge Excision Repair Text: Given the location of the defect and the proximity to free margins a full thickness wedge repair was deemed most appropriate.  Using a sterile surgical marker, the appropriate repair was drawn incorporating the defect and placing the expected incisions perpendicular to the vermilion border.  The vermilion border was also meticulously outlined to ensure appropriate reapproximation during the repair.  The area thus outlined was incised through and through with a #15 scalpel blade.  The muscularis and dermis were reaproximated with deep sutures following hemostasis. Care was taken to realign the vermilion border before proceeding with the superficial closure.  Once the vermilion was realigned the superfical and mucosal closure was finished.
O-T Plasty Text: The defect edges were debeveled with a #15 scalpel blade.  Given the location of the defect, shape of the defect and the proximity to free margins an O-T plasty was deemed most appropriate.  Using a sterile surgical marker, an appropriate O-T plasty was drawn incorporating the defect and placing the expected incisions within the relaxed skin tension lines where possible.    The area thus outlined was incised deep to adipose tissue with a #15 scalpel blade.  The skin margins were undermined to an appropriate distance in all directions utilizing iris scissors.
Complex Repair And Skin Substitute Graft Text: The defect edges were debeveled with a #15 scalpel blade.  The primary defect was closed partially with a complex linear closure.  Given the location of the remaining defect, shape of the defect and the proximity to free margins a skin substitute graft was deemed most appropriate to repair the remaining defect.  The graft was trimmed to fit the size of the remaining defect.  The graft was then placed in the primary defect, oriented appropriately, and sutured into place.
Purse String (Simple) Text: Given the location of the defect and the characteristics of the surrounding skin a purse string simple closure was deemed most appropriate.  Undermining was performed circumferentially around the surgical defect.  A purse string suture was then placed and tightened.
Positioning (Leave Blank If You Do Not Want): The patient was placed in a comfortable position exposing the surgical site.
Burow's Graft Text: The defect edges were debeveled with a #15 scalpel blade.  Given the location of the defect, shape of the defect, the proximity to free margins and the presence of a standing cone deformity a Burow's skin graft was deemed most appropriate. The standing cone was removed and this tissue was then trimmed to the shape of the primary defect. The adipose tissue was also removed until only dermis and epidermis were left.  The skin margins of the secondary defect were undermined to an appropriate distance in all directions utilizing iris scissors.  The secondary defect was closed with interrupted buried subcutaneous sutures.  The skin edges were then re-apposed with running  sutures.  The skin graft was then placed in the primary defect and oriented appropriately.
Bilateral Helical Rim Advancement Flap Text: The defect edges were debeveled with a #15 blade scalpel.  Given the location of the defect and the proximity to free margins (helical rim) a bilateral helical rim advancement flap was deemed most appropriate.  Using a sterile surgical marker, the appropriate advancement flaps were drawn incorporating the defect and placing the expected incisions between the helical rim and antihelix where possible.  The area thus outlined was incised through and through with a #15 scalpel blade.  With a skin hook and iris scissors, the flaps were gently and sharply undermined and freed up.
Complex Repair And Double Advancement Flap Text: The defect edges were debeveled with a #15 scalpel blade.  The primary defect was closed partially with a complex linear closure.  Given the location of the remaining defect, shape of the defect and the proximity to free margins a double advancement flap was deemed most appropriate for complete closure of the defect.  Using a sterile surgical marker, an appropriate advancement flap was drawn incorporating the defect and placing the expected incisions within the relaxed skin tension lines where possible.    The area thus outlined was incised deep to adipose tissue with a #15 scalpel blade.  The skin margins were undermined to an appropriate distance in all directions utilizing iris scissors.
Complex Repair And Epidermal Autograft Text: The defect edges were debeveled with a #15 scalpel blade.  The primary defect was closed partially with a complex linear closure.  Given the location of the defect, shape of the defect and the proximity to free margins an epidermal autograft was deemed most appropriate to repair the remaining defect.  The graft was trimmed to fit the size of the remaining defect.  The graft was then placed in the primary defect, oriented appropriately, and sutured into place.
Retention Suture Text: Retention sutures were placed to support the closure and prevent dehiscence.
Trilobed Flap Text: The defect edges were debeveled with a #15 scalpel blade.  Given the location of the defect and the proximity to free margins a trilobed flap was deemed most appropriate.  Using a sterile surgical marker, an appropriate trilobed flap drawn around the defect.    The area thus outlined was incised deep to adipose tissue with a #15 scalpel blade.  The skin margins were undermined to an appropriate distance in all directions utilizing iris scissors.
Complex Repair And O-T Advancement Flap Text: The defect edges were debeveled with a #15 scalpel blade.  The primary defect was closed partially with a complex linear closure.  Given the location of the remaining defect, shape of the defect and the proximity to free margins an O-T advancement flap was deemed most appropriate for complete closure of the defect.  Using a sterile surgical marker, an appropriate advancement flap was drawn incorporating the defect and placing the expected incisions within the relaxed skin tension lines where possible.    The area thus outlined was incised deep to adipose tissue with a #15 scalpel blade.  The skin margins were undermined to an appropriate distance in all directions utilizing iris scissors.
Complex Repair And O-L Flap Text: The defect edges were debeveled with a #15 scalpel blade.  The primary defect was closed partially with a complex linear closure.  Given the location of the remaining defect, shape of the defect and the proximity to free margins an O-L flap was deemed most appropriate for complete closure of the defect.  Using a sterile surgical marker, an appropriate flap was drawn incorporating the defect and placing the expected incisions within the relaxed skin tension lines where possible.    The area thus outlined was incised deep to adipose tissue with a #15 scalpel blade.  The skin margins were undermined to an appropriate distance in all directions utilizing iris scissors.
Perilesional Excision Additional Text (Leave Blank If You Do Not Want): The margin was drawn around the clinically apparent lesion. Incisions were then made along these lines to the appropriate tissue plane and the lesion was extirpated.
Cheek-To-Nose Interpolation Flap Text: A decision was made to reconstruct the defect utilizing an interpolation axial flap and a staged reconstruction.  A telfa template was made of the defect.  This telfa template was then used to outline the Cheek-To-Nose Interpolation flap.  The donor area for the pedicle flap was then injected with anesthesia.  The flap was excised through the skin and subcutaneous tissue down to the layer of the underlying musculature.  The interpolation flap was carefully excised within this deep plane to maintain its blood supply.  The edges of the donor site were undermined.   The donor site was closed in a primary fashion.  The pedicle was then rotated into position and sutured.  Once the tube was sutured into place, adequate blood supply was confirmed with blanching and refill.  The pedicle was then wrapped with xeroform gauze and dressed appropriately with a telfa and gauze bandage to ensure continued blood supply and protect the attached pedicle.
Advancement Flap (Double) Text: The defect edges were debeveled with a #15 scalpel blade.  Given the location of the defect and the proximity to free margins a double advancement flap was deemed most appropriate.  Using a sterile surgical marker, the appropriate advancement flaps were drawn incorporating the defect and placing the expected incisions within the relaxed skin tension lines where possible.    The area thus outlined was incised deep to adipose tissue with a #15 scalpel blade.  The skin margins were undermined to an appropriate distance in all directions utilizing iris scissors.
Graft Donor Site Bandage (Optional-Leave Blank If You Don't Want In Note): Steri-strips and a pressure bandage were applied to the donor site.
Complex Repair And A-T Advancement Flap Text: The defect edges were debeveled with a #15 scalpel blade.  The primary defect was closed partially with a complex linear closure.  Given the location of the remaining defect, shape of the defect and the proximity to free margins an A-T advancement flap was deemed most appropriate for complete closure of the defect.  Using a sterile surgical marker, an appropriate advancement flap was drawn incorporating the defect and placing the expected incisions within the relaxed skin tension lines where possible.    The area thus outlined was incised deep to adipose tissue with a #15 scalpel blade.  The skin margins were undermined to an appropriate distance in all directions utilizing iris scissors.
Deep Sutures: 5-0 Vicryl
Island Pedicle Flap-Requiring Vessel Identification Text: The defect edges were debeveled with a #15 scalpel blade.  Given the location of the defect, shape of the defect and the proximity to free margins an island pedicle advancement flap was deemed most appropriate.  Using a sterile surgical marker, an appropriate advancement flap was drawn, based on the axial vessel mentioned above, incorporating the defect, outlining the appropriate donor tissue and placing the expected incisions within the relaxed skin tension lines where possible.    The area thus outlined was incised deep to adipose tissue with a #15 scalpel blade.  The skin margins were undermined to an appropriate distance in all directions around the primary defect and laterally outward around the island pedicle utilizing iris scissors.  There was minimal undermining beneath the pedicle flap.
Complex Repair And M Plasty Text: The defect edges were debeveled with a #15 scalpel blade.  The primary defect was closed partially with a complex linear closure.  Given the location of the remaining defect, shape of the defect and the proximity to free margins an M plasty was deemed most appropriate for complete closure of the defect.  Using a sterile surgical marker, an appropriate advancement flap was drawn incorporating the defect and placing the expected incisions within the relaxed skin tension lines where possible.    The area thus outlined was incised deep to adipose tissue with a #15 scalpel blade.  The skin margins were undermined to an appropriate distance in all directions utilizing iris scissors.
Bilobed Flap Text: The defect edges were debeveled with a #15 scalpel blade.  Given the location of the defect and the proximity to free margins a bilobe flap was deemed most appropriate.  Using a sterile surgical marker, an appropriate bilobe flap drawn around the defect.    The area thus outlined was incised deep to adipose tissue with a #15 scalpel blade.  The skin margins were undermined to an appropriate distance in all directions utilizing iris scissors.
O-Z Flap Text: The defect edges were debeveled with a #15 scalpel blade.  Given the location of the defect, shape of the defect and the proximity to free margins an O-Z flap was deemed most appropriate.  Using a sterile surgical marker, an appropriate transposition flap was drawn incorporating the defect and placing the expected incisions within the relaxed skin tension lines where possible. The area thus outlined was incised deep to adipose tissue with a #15 scalpel blade.  The skin margins were undermined to an appropriate distance in all directions utilizing iris scissors.
Cheek Interpolation Flap Text: A decision was made to reconstruct the defect utilizing an interpolation axial flap and a staged reconstruction.  A telfa template was made of the defect.  This telfa template was then used to outline the Cheek Interpolation flap.  The donor area for the pedicle flap was then injected with anesthesia.  The flap was excised through the skin and subcutaneous tissue down to the layer of the underlying musculature.  The interpolation flap was carefully excised within this deep plane to maintain its blood supply.  The edges of the donor site were undermined.   The donor site was closed in a primary fashion.  The pedicle was then rotated into position and sutured.  Once the tube was sutured into place, adequate blood supply was confirmed with blanching and refill.  The pedicle was then wrapped with xeroform gauze and dressed appropriately with a telfa and gauze bandage to ensure continued blood supply and protect the attached pedicle.
Excisional Biopsy Additional Text (Leave Blank If You Do Not Want): The margin was drawn around the clinically apparent lesion. An elliptical shape was then drawn on the skin incorporating the lesion and margins.  Incisions were then made along these lines to the appropriate tissue plane and the lesion was extirpated.
Path Notes (To The Dermatopathologist): Please check margins.
Dermal Autograft Text: The defect edges were debeveled with a #15 scalpel blade.  Given the location of the defect, shape of the defect and the proximity to free margins a dermal autograft was deemed most appropriate.  Using a sterile surgical marker, the primary defect shape was transferred to the donor site. The area thus outlined was incised deep to adipose tissue with a #15 scalpel blade.  The harvested graft was then trimmed of adipose and epidermal tissue until only dermis was left.  The skin graft was then placed in the primary defect and oriented appropriately.
Helical Rim Advancement Flap Text: The defect edges were debeveled with a #15 blade scalpel.  Given the location of the defect and the proximity to free margins (helical rim) a double helical rim advancement flap was deemed most appropriate.  Using a sterile surgical marker, the appropriate advancement flaps were drawn incorporating the defect and placing the expected incisions between the helical rim and antihelix where possible.  The area thus outlined was incised through and through with a #15 scalpel blade.  With a skin hook and iris scissors, the flaps were gently and sharply undermined and freed up.
Z Plasty Text: The lesion was extirpated to the level of the fat with a #15 scalpel blade.  Given the location of the defect, shape of the defect and the proximity to free margins a Z-plasty was deemed most appropriate for repair.  Using a sterile surgical marker, the appropriate transposition arms of the Z-plasty were drawn incorporating the defect and placing the expected incisions within the relaxed skin tension lines where possible.    The area thus outlined was incised deep to adipose tissue with a #15 scalpel blade.  The skin margins were undermined to an appropriate distance in all directions utilizing iris scissors.  The opposing transposition arms were then transposed into place in opposite direction and anchored with interrupted buried subcutaneous sutures.
Dorsal Nasal Flap Text: The defect edges were debeveled with a #15 scalpel blade.  Given the location of the defect and the proximity to free margins a dorsal nasal flap was deemed most appropriate.  Using a sterile surgical marker, an appropriate dorsal nasal flap was drawn around the defect.    The area thus outlined was incised deep to adipose tissue with a #15 scalpel blade.  The skin margins were undermined to an appropriate distance in all directions utilizing iris scissors.
Width Of Defect Perpendicular To Closure In Cm (Required): 1.1
Billing Type: Third-Party Bill
Wound Care: Petrolatum
Split-Thickness Skin Graft Text: The defect edges were debeveled with a #15 scalpel blade.  Given the location of the defect, shape of the defect and the proximity to free margins a split thickness skin graft was deemed most appropriate.  Using a sterile surgical marker, the primary defect shape was transferred to the donor site. The split thickness graft was then harvested.  The skin graft was then placed in the primary defect and oriented appropriately.
Complex Repair And Split-Thickness Skin Graft Text: The defect edges were debeveled with a #15 scalpel blade.  The primary defect was closed partially with a complex linear closure.  Given the location of the defect, shape of the defect and the proximity to free margins a split thickness skin graft was deemed most appropriate to repair the remaining defect.  The graft was trimmed to fit the size of the remaining defect.  The graft was then placed in the primary defect, oriented appropriately, and sutured into place.
Purse String (Intermediate) Text: Given the location of the defect and the characteristics of the surrounding skin a purse string intermediate closure was deemed most appropriate.  Undermining was performed circumfirentially around the surgical defect.  A purse string suture was then placed and tightened.
Complex Repair And Dorsal Nasal Flap Text: The defect edges were debeveled with a #15 scalpel blade.  The primary defect was closed partially with a complex linear closure.  Given the location of the remaining defect, shape of the defect and the proximity to free margins a dorsal nasal flap was deemed most appropriate for complete closure of the defect.  Using a sterile surgical marker, an appropriate flap was drawn incorporating the defect and placing the expected incisions within the relaxed skin tension lines where possible.    The area thus outlined was incised deep to adipose tissue with a #15 scalpel blade.  The skin margins were undermined to an appropriate distance in all directions utilizing iris scissors.
Modified Advancement Flap Text: The defect edges were debeveled with a #15 scalpel blade.  Given the location of the defect, shape of the defect and the proximity to free margins a modified advancement flap was deemed most appropriate.  Using a sterile surgical marker, an appropriate advancement flap was drawn incorporating the defect and placing the expected incisions within the relaxed skin tension lines where possible.    The area thus outlined was incised deep to adipose tissue with a #15 scalpel blade.  The skin margins were undermined to an appropriate distance in all directions utilizing iris scissors.
Double Island Pedicle Flap Text: The defect edges were debeveled with a #15 scalpel blade.  Given the location of the defect, shape of the defect and the proximity to free margins a double island pedicle advancement flap was deemed most appropriate.  Using a sterile surgical marker, an appropriate advancement flap was drawn incorporating the defect, outlining the appropriate donor tissue and placing the expected incisions within the relaxed skin tension lines where possible.    The area thus outlined was incised deep to adipose tissue with a #15 scalpel blade.  The skin margins were undermined to an appropriate distance in all directions around the primary defect and laterally outward around the island pedicle utilizing iris scissors.  There was minimal undermining beneath the pedicle flap.
Adjacent Tissue Transfer Text: The defect edges were debeveled with a #15 scalpel blade.  Given the location of the defect and the proximity to free margins an adjacent tissue transfer was deemed most appropriate.  Using a sterile surgical marker, an appropriate flap was drawn incorporating the defect and placing the expected incisions within the relaxed skin tension lines where possible.    The area thus outlined was incised deep to adipose tissue with a #15 scalpel blade.  The skin margins were undermined to an appropriate distance in all directions utilizing iris scissors.
Where Do You Want The Question To Include Opioid Counseling Located?: Case Summary Tab
Saucerization Depth: dermis
H Plasty Text: Given the location of the defect, shape of the defect and the proximity to free margins a H-plasty was deemed most appropriate for repair.  Using a sterile surgical marker, the appropriate advancement arms of the H-plasty were drawn incorporating the defect and placing the expected incisions within the relaxed skin tension lines where possible. The area thus outlined was incised deep to adipose tissue with a #15 scalpel blade. The skin margins were undermined to an appropriate distance in all directions utilizing iris scissors.  The opposing advancement arms were then advanced into place in opposite direction and anchored with interrupted buried subcutaneous sutures.
Curvilinear Excision Additional Text (Leave Blank If You Do Not Want): The margin was drawn around the clinically apparent lesion.  A curvilinear shape was then drawn on the skin incorporating the lesion and margins.  Incisions were then made along these lines to the appropriate tissue plane and the lesion was extirpated.
Abbe Flap (Upper To Lower Lip) Text: The defect of the lower lip was assessed and measured.  Given the location and size of the defect, an Abbe flap was deemed most appropriate.  Using a sterile surgical marker, an appropriate Abbe flap was measured and drawn on the upper lip. Local anesthesia was then infiltrated.  A scalpel was then used to incise the upper lip through and through the skin, vermilion, muscle and mucosa, leaving the flap pedicled on the opposite side.  The flap was then rotated and transferred to the lower lip defect.  The flap was then sutured into place with a three layer technique, closing the orbicularis oris muscle layer with subcutaneous buried sutures, followed by a mucosal layer and an epidermal layer.
Dressing: pressure dressing with telfa
Hemigard Intro: Due to skin fragility and wound tension, it was decided to use HEMIGARD adhesive retention suture devices to permit a linear closure. The skin was cleaned and dried for a 6cm distance away from the wound. Excessive hair, if present, was removed to allow for adhesion.
Crescentic Advancement Flap Text: The defect edges were debeveled with a #15 scalpel blade.  Given the location of the defect and the proximity to free margins a crescentic advancement flap was deemed most appropriate.  Using a sterile surgical marker, the appropriate advancement flap was drawn incorporating the defect and placing the expected incisions within the relaxed skin tension lines where possible.    The area thus outlined was incised deep to adipose tissue with a #15 scalpel blade.  The skin margins were undermined to an appropriate distance in all directions utilizing iris scissors.
Bilobed Transposition Flap Text: The defect edges were debeveled with a #15 scalpel blade.  Given the location of the defect and the proximity to free margins a bilobed transposition flap was deemed most appropriate.  Using a sterile surgical marker, an appropriate bilobe flap drawn around the defect.    The area thus outlined was incised deep to adipose tissue with a #15 scalpel blade.  The skin margins were undermined to an appropriate distance in all directions utilizing iris scissors.
Skin Substitute Text: The defect edges were debeveled with a #15 scalpel blade.  Given the location of the defect, shape of the defect and the proximity to free margins a skin substitute graft was deemed most appropriate.  The graft material was trimmed to fit the size of the defect. The graft was then placed in the primary defect and oriented appropriately.
Suturegard Intro: Intraoperative tissue expansion was performed, utilizing the SUTUREGARD device, in order to reduce wound tension.
O-L Flap Text: The defect edges were debeveled with a #15 scalpel blade.  Given the location of the defect, shape of the defect and the proximity to free margins an O-L flap was deemed most appropriate.  Using a sterile surgical marker, an appropriate advancement flap was drawn incorporating the defect and placing the expected incisions within the relaxed skin tension lines where possible.    The area thus outlined was incised deep to adipose tissue with a #15 scalpel blade.  The skin margins were undermined to an appropriate distance in all directions utilizing iris scissors.
Abbe Flap (Lower To Upper Lip) Text: The defect of the upper lip was assessed and measured.  Given the location and size of the defect, an Abbe flap was deemed most appropriate.  Using a sterile surgical marker, an appropriate Abbe flap was measured and drawn on the lower lip. Local anesthesia was then infiltrated. A scalpel was then used to incise the upper lip through and through the skin, vermilion, muscle and mucosa, leaving the flap pedicled on the opposite side.  The flap was then rotated and transferred to the lower lip defect.  The flap was then sutured into place with a three layer technique, closing the orbicularis oris muscle layer with subcutaneous buried sutures, followed by a mucosal layer and an epidermal layer.
Hemigard Postcare Instructions: The HEMIGARD strips are to remain completely dry for at least 5-7 days.
Transposition Flap Text: The defect edges were debeveled with a #15 scalpel blade.  Given the location of the defect and the proximity to free margins a transposition flap was deemed most appropriate.  Using a sterile surgical marker, an appropriate transposition flap was drawn incorporating the defect.    The area thus outlined was incised deep to adipose tissue with a #15 scalpel blade.  The skin margins were undermined to an appropriate distance in all directions utilizing iris scissors.
Interpolation Flap Text: A decision was made to reconstruct the defect utilizing an interpolation axial flap and a staged reconstruction.  A telfa template was made of the defect.  This telfa template was then used to outline the interpolation flap.  The donor area for the pedicle flap was then injected with anesthesia.  The flap was excised through the skin and subcutaneous tissue down to the layer of the underlying musculature.  The interpolation flap was carefully excised within this deep plane to maintain its blood supply.  The edges of the donor site were undermined.   The donor site was closed in a primary fashion.  The pedicle was then rotated into position and sutured.  Once the tube was sutured into place, adequate blood supply was confirmed with blanching and refill.  The pedicle was then wrapped with xeroform gauze and dressed appropriately with a telfa and gauze bandage to ensure continued blood supply and protect the attached pedicle.
Size Of Margin In Cm: 0.4
Melolabial Interpolation Flap Text: A decision was made to reconstruct the defect utilizing an interpolation axial flap and a staged reconstruction.  A telfa template was made of the defect.  This telfa template was then used to outline the melolabial interpolation flap.  The donor area for the pedicle flap was then injected with anesthesia.  The flap was excised through the skin and subcutaneous tissue down to the layer of the underlying musculature.  The pedicle flap was carefully excised within this deep plane to maintain its blood supply.  The edges of the donor site were undermined.   The donor site was closed in a primary fashion.  The pedicle was then rotated into position and sutured.  Once the tube was sutured into place, adequate blood supply was confirmed with blanching and refill.  The pedicle was then wrapped with xeroform gauze and dressed appropriately with a telfa and gauze bandage to ensure continued blood supply and protect the attached pedicle.
Home Suture Removal Text: Patient was provided a home suture removal kit and will remove their sutures at home.  If they have any questions or difficulties they will call the office.
Double O-Z Plasty Text: The defect edges were debeveled with a #15 scalpel blade.  Given the location of the defect, shape of the defect and the proximity to free margins a Double O-Z plasty (double transposition flap) was deemed most appropriate.  Using a sterile surgical marker, the appropriate transposition flaps were drawn incorporating the defect and placing the expected incisions within the relaxed skin tension lines where possible. The area thus outlined was incised deep to adipose tissue with a #15 scalpel blade.  The skin margins were undermined to an appropriate distance in all directions utilizing iris scissors.  Hemostasis was achieved with electrocautery.  The flaps were then transposed into place, one clockwise and the other counterclockwise, and anchored with interrupted buried subcutaneous sutures.
Paramedian Forehead Flap Text: A decision was made to reconstruct the defect utilizing an interpolation axial flap and a staged reconstruction.  A telfa template was made of the defect.  This telfa template was then used to outline the paramedian forehead pedicle flap.  The donor area for the pedicle flap was then injected with anesthesia.  The flap was excised through the skin and subcutaneous tissue down to the layer of the underlying musculature.  The pedicle flap was carefully excised within this deep plane to maintain its blood supply.  The edges of the donor site were undermined.   The donor site was closed in a primary fashion.  The pedicle was then rotated into position and sutured.  Once the tube was sutured into place, adequate blood supply was confirmed with blanching and refill.  The pedicle was then wrapped with xeroform gauze and dressed appropriately with a telfa and gauze bandage to ensure continued blood supply and protect the attached pedicle.
Keystone Flap Text: The defect edges were debeveled with a #15 scalpel blade.  Given the location of the defect, shape of the defect a keystone flap was deemed most appropriate.  Using a sterile surgical marker, an appropriate keystone flap was drawn incorporating the defect, outlining the appropriate donor tissue and placing the expected incisions within the relaxed skin tension lines where possible. The area thus outlined was incised deep to adipose tissue with a #15 scalpel blade.  The skin margins were undermined to an appropriate distance in all directions around the primary defect and laterally outward around the flap utilizing iris scissors.
Repair Type: Complex
Muscle Hinge Flap Text: The defect edges were debeveled with a #15 scalpel blade.  Given the size, depth and location of the defect and the proximity to free margins a muscle hinge flap was deemed most appropriate.  Using a sterile surgical marker, an appropriate hinge flap was drawn incorporating the defect. The area thus outlined was incised with a #15 scalpel blade.  The skin margins were undermined to an appropriate distance in all directions utilizing iris scissors.
Double O-Z Flap Text: The defect edges were debeveled with a #15 scalpel blade.  Given the location of the defect, shape of the defect and the proximity to free margins a Double O-Z flap was deemed most appropriate.  Using a sterile surgical marker, an appropriate transposition flap was drawn incorporating the defect and placing the expected incisions within the relaxed skin tension lines where possible. The area thus outlined was incised deep to adipose tissue with a #15 scalpel blade.  The skin margins were undermined to an appropriate distance in all directions utilizing iris scissors.
Island Pedicle Flap Text: The defect edges were debeveled with a #15 scalpel blade.  Given the location of the defect, shape of the defect and the proximity to free margins an island pedicle advancement flap was deemed most appropriate.  Using a sterile surgical marker, an appropriate advancement flap was drawn incorporating the defect, outlining the appropriate donor tissue and placing the expected incisions within the relaxed skin tension lines where possible.    The area thus outlined was incised deep to adipose tissue with a #15 scalpel blade.  The skin margins were undermined to an appropriate distance in all directions around the primary defect and laterally outward around the island pedicle utilizing iris scissors.  There was minimal undermining beneath the pedicle flap.
Advancement Flap (Single) Text: The defect edges were debeveled with a #15 scalpel blade.  Given the location of the defect and the proximity to free margins a single advancement flap was deemed most appropriate.  Using a sterile surgical marker, an appropriate advancement flap was drawn incorporating the defect and placing the expected incisions within the relaxed skin tension lines where possible.    The area thus outlined was incised deep to adipose tissue with a #15 scalpel blade.  The skin margins were undermined to an appropriate distance in all directions utilizing iris scissors.
Helical Rim Text: The closure involved the helical rim.
W Plasty Text: The lesion was extirpated to the level of the fat with a #15 scalpel blade.  Given the location of the defect, shape of the defect and the proximity to free margins a W-plasty was deemed most appropriate for repair.  Using a sterile surgical marker, the appropriate transposition arms of the W-plasty were drawn incorporating the defect and placing the expected incisions within the relaxed skin tension lines where possible.    The area thus outlined was incised deep to adipose tissue with a #15 scalpel blade.  The skin margins were undermined to an appropriate distance in all directions utilizing iris scissors.  The opposing transposition arms were then transposed into place in opposite direction and anchored with interrupted buried subcutaneous sutures.
Bi-Rhombic Flap Text: The defect edges were debeveled with a #15 scalpel blade.  Given the location of the defect and the proximity to free margins a bi-rhombic flap was deemed most appropriate.  Using a sterile surgical marker, an appropriate rhombic flap was drawn incorporating the defect. The area thus outlined was incised deep to adipose tissue with a #15 scalpel blade.  The skin margins were undermined to an appropriate distance in all directions utilizing iris scissors.
V-Y Flap Text: The defect edges were debeveled with a #15 scalpel blade.  Given the location of the defect, shape of the defect and the proximity to free margins a V-Y flap was deemed most appropriate.  Using a sterile surgical marker, an appropriate advancement flap was drawn incorporating the defect and placing the expected incisions within the relaxed skin tension lines where possible.    The area thus outlined was incised deep to adipose tissue with a #15 scalpel blade.  The skin margins were undermined to an appropriate distance in all directions utilizing iris scissors.
Consent was obtained from the patient. The risks and benefits to therapy were discussed in detail. Specifically, the risks of infection, scarring, bleeding, prolonged wound healing, incomplete removal, allergy to anesthesia, nerve injury and recurrence were addressed. Prior to the procedure, the treatment site was clearly identified and confirmed by the patient.
Distance Of Undermining In Cm (Required): 1.5
Cartilage Graft Text: The defect edges were debeveled with a #15 scalpel blade.  Given the location of the defect, shape of the defect, the fact the defect involved a full thickness cartilage defect a cartilage graft was deemed most appropriate.  An appropriate donor site was identified, cleansed, and anesthetized. The cartilage graft was then harvested and transferred to the recipient site, oriented appropriately and then sutured into place.  The secondary defect was then repaired using a primary closure.
Excision Method: Fusiform
Detail Level: Detailed
Complex Repair And Xenograft Text: The defect edges were debeveled with a #15 scalpel blade.  The primary defect was closed partially with a complex linear closure.  Given the location of the defect, shape of the defect and the proximity to free margins a xenograft was deemed most appropriate to repair the remaining defect.  The graft was trimmed to fit the size of the remaining defect.  The graft was then placed in the primary defect, oriented appropriately, and sutured into place.
Melolabial Transposition Flap Text: The defect edges were debeveled with a #15 scalpel blade.  Given the location of the defect and the proximity to free margins a melolabial flap was deemed most appropriate.  Using a sterile surgical marker, an appropriate melolabial transposition flap was drawn incorporating the defect.    The area thus outlined was incised deep to adipose tissue with a #15 scalpel blade.  The skin margins were undermined to an appropriate distance in all directions utilizing iris scissors.
Saucerization Excision Additional Text (Leave Blank If You Do Not Want): The margin was drawn around the clinically apparent lesion.  Incisions were then made along these lines, in a tangential fashion, to the appropriate tissue plane and the lesion was extirpated.
Information: Selecting Yes will display possible errors in your note based on the variables you have selected. This validation is only offered as a suggestion for you. PLEASE NOTE THAT THE VALIDATION TEXT WILL BE REMOVED WHEN YOU FINALIZE YOUR NOTE. IF YOU WANT TO FAX A PRELIMINARY NOTE YOU WILL NEED TO TOGGLE THIS TO 'NO' IF YOU DO NOT WANT IT IN YOUR FAXED NOTE.

## 2023-03-30 ENCOUNTER — TELEPHONE (OUTPATIENT)
Dept: RHEUMATOLOGY | Facility: CLINIC | Age: 83
End: 2023-03-30

## 2023-03-30 ENCOUNTER — APPOINTMENT (OUTPATIENT)
Dept: URBAN - METROPOLITAN AREA CLINIC 244 | Age: 83
Setting detail: DERMATOLOGY
End: 2023-03-30

## 2023-03-30 DIAGNOSIS — Z48.02 ENCOUNTER FOR REMOVAL OF SUTURES: ICD-10-CM

## 2023-03-30 PROCEDURE — 99024 POSTOP FOLLOW-UP VISIT: CPT

## 2023-03-30 PROCEDURE — OTHER SUTURE REMOVAL (GLOBAL PERIOD): OTHER

## 2023-03-30 ASSESSMENT — LOCATION DETAILED DESCRIPTION DERM: LOCATION DETAILED: RIGHT INFERIOR PREAURICULAR CHEEK

## 2023-03-30 ASSESSMENT — LOCATION ZONE DERM: LOCATION ZONE: FACE

## 2023-03-30 ASSESSMENT — LOCATION SIMPLE DESCRIPTION DERM: LOCATION SIMPLE: RIGHT CHEEK

## 2023-03-30 NOTE — TELEPHONE ENCOUNTER
Outcome Statements  Prior Authorization not required for patient/medication  Notes  The patient currently has access to the requested medication and a Prior Authorization is not needed for the patient/medication.

## 2023-03-30 NOTE — PROCEDURE: SUTURE REMOVAL (GLOBAL PERIOD)
Detail Level: Detailed
Add 1585x Cpt? (Do Not Bill If You Billed For The Procedure Placing The Sutures. This Is An Add-On Code That Must Be Billed With An E/M Visit Code): No
Add 44403 Cpt? (Important Note: In 2017 The Use Of 55128 Is Being Tracked By Cms To Determine Future Global Period Reimbursement For Global Periods): yes

## 2023-04-19 RX ORDER — HYDROXYCHLOROQUINE SULFATE 200 MG/1
400 TABLET, FILM COATED ORAL DAILY
Qty: 180 TABLET | Refills: 3 | Status: SHIPPED | OUTPATIENT
Start: 2023-04-19

## 2023-04-19 NOTE — TELEPHONE ENCOUNTER
LOV: 3/7/2023  Future Appointments   Date Time Provider Taqueria Wise   5/15/2023 10:50 AM MD VINOD [facility] [place]   5/30/2023 11:40 AM Jose Pereyra MD [id number] [facility]   6/7/2023 10:20 AM Yohannes Singh MD [facility], [place]     Labs:   Component      Latest Ref Rng 2/28/2023   WBC      4.0 - 11.0 x10(3) uL 6.0    RBC      3.80 - 5.30 x10(6)uL 3.33 (L)    Hemoglobin      12.0 - 16.0 g/dL 11.4 (L)    Hematocrit      35.0 - 48.0 % 35.0    MCV      80.0 - 100.0 fL 105.1 (H)    MCH      26.0 - 34.0 pg 34.2 (H)    MCHC      31.0 - 37.0 g/dL 32.6    RDW-SD      35.1 - 46.3 fL 57.9 (H)    RDW      11.0 - 15.0 % 15.1 (H)    Platelet Count      042.0 - 450.0 10(3)uL 182.0    Prelim Neutrophil Abs      1.50 - 7.70 x10 (3) uL 3.37    Neutrophils Absolute      1.50 - 7.70 x10(3) uL 3.37    Lymphocytes Absolute      1.00 - 4.00 x10(3) uL 1.77    Monocytes Absolute      0.10 - 1.00 x10(3) uL 0.69    Eosinophils Absolute      0.00 - 0.70 x10(3) uL 0.14    Basophils Absolute      0.00 - 0.20 x10(3) uL 0.05    Immature Granulocyte Absolute      0.00 - 1.00 x10(3) uL 0.01    Neutrophils %      % 55.9    Lymphocytes %      % 29.4    Monocytes %      % 11.4    Eosinophils %      % 2.3    Basophils %      % 0.8    Immature Granulocyte %      % 0.2    CREATININE      0.55 - 1.02 mg/dL 1.15 (H)    eGFR-Cr      >=60 mL/min/1.73m2 47 (L)    T4,Free (Direct)      0.8 - 1.7 ng/dL 1.2    TSH      0.358 - 3.740 mIU/mL 1.060    Albumin      3.4 - 5.0 g/dL 3.6    AST (SGOT)      15 - 37 U/L 32    C-REACTIVE PROTEIN      <0.30 mg/dL <0.29    SED RATE      0 - 30 mm/Hr 11    FERRITIN      18.0 - 340.0 ng/mL 93.1    Iron, Serum      50 - 170 ug/dL 97       Legend:  (L) Low  (H) High

## 2023-04-25 DIAGNOSIS — E03.9 ACQUIRED HYPOTHYROIDISM: ICD-10-CM

## 2023-04-25 DIAGNOSIS — K21.9 GASTROESOPHAGEAL REFLUX DISEASE: ICD-10-CM

## 2023-04-25 NOTE — TELEPHONE ENCOUNTER
PANTOPRAZOLE 40 MG Oral Tab EC, TAKE 1 TABLET BY MOUTH IN  THE MORNING BEFORE  BREAKFAST, Disp: 90 tablet, Rfl: 3    LEVOTHYROXINE 137 MCG Oral Tab, TAKE 1 TABLET BY MOUTH  BEFORE BREAKFAST, Disp: 90 tablet, Rfl: 3

## 2023-04-26 RX ORDER — PANTOPRAZOLE SODIUM 40 MG/1
40 TABLET, DELAYED RELEASE ORAL
Qty: 90 TABLET | Refills: 3 | Status: SHIPPED | OUTPATIENT
Start: 2023-04-26

## 2023-04-26 RX ORDER — LEVOTHYROXINE SODIUM 137 UG/1
137 TABLET ORAL
Qty: 90 TABLET | Refills: 3 | Status: SHIPPED | OUTPATIENT
Start: 2023-04-26

## 2023-04-26 NOTE — TELEPHONE ENCOUNTER
Refill passed per [place] [facility] protocol.   Requested Prescriptions   Pending Prescriptions Disp Refills    levothyroxine 137 MCG Oral Tab 90 tablet 3     Sig: Take 137 mcg by mouth before breakfast.       Thyroid Medication Protocol Passed - 4/25/2023  2:06 PM        Passed - TSH in past 12 months        Passed - Last TSH value is normal     Lab Results   Component Value Date    TSH 1.060 02/28/2023    [facility] 1.79 10/13/2015                 Passed - In person appointment or virtual visit in the past 12 mos or appointment in next 3 mos     Recent Outpatient Visits              1 month ago Rheumatoid arthritis of multiple sites with negative rheumatoid factor ([facility] [address]    [address], [facility], Jed Pink MD    Office Visit    3 months ago Bilateral impacted cerumen    [address], [address], Jacqueline Aviles MD    Office Visit    4 months ago Rheumatoid arthritis of multiple sites with negative rheumatoid factor ([facility] [address]    [address], [facility], Jed Pink MD    Office Visit    5 months ago Stage 3b chronic kidney disease [facility])    Kasandra Luna, Jose Del Real MD    Office Visit    5 months ago Pre-op exam    Ivet Lockett MD    Office Visit          Future Appointments         Provider Department Appt Notes    In 2 weeks Diamond Dupree MD [address], [place], [place] pre-op, surgery scheduled for 06/14    In 1 month Jose Adams MD [address], [address] [place] [address] 6 months    In 1 month [facility]cell[facility] Heard [facility] [address] [address] [address], [place] 3 mos follow up                 pantoprazole 40 MG Oral Tab EC 90 tablet 3     Sig: Take 1 tablet (40 mg total) by mouth before breakfast.       Gastrointestional Medication Protocol Passed - 4/25/2023  2:06 PM        Passed - In person appointment or virtual visit in the past 12 mos or appointment in next 3 mos     Recent Outpatient Visits              1 month ago Rheumatoid arthritis of multiple sites with negative rheumatoid factor ([facility]    [address], [place], Damaso Dobson MD    Office Visit    3 months ago Bilateral impacted cerumen    [address], [address], Felice Cortez MD    Office Visit    4 months ago Rheumatoid arthritis of multiple sites with negative rheumatoid factor ([facility]    [address], [place], Damaso Dobson MD    Office Visit    5 months ago Stage 3b chronic kidney disease [facility])    Kasandra Sandoval, Rimma Otoole MD    Office Visit    5 months ago Pre-op exam    Julio Edward MD    Office Visit          Future Appointments         Provider Department Appt Notes    In 2 weeks Sebastian Donnelly MD [address], [facility] pre-op, surgery scheduled for 06/14    In 1 month Mauricio Bates MD [address], [address] [facility] 6 months    In 1 month Karli Frye MD [address], [place] 3 mos follow up                  Recent Outpatient Visits              1 month ago Rheumatoid arthritis of multiple sites with negative rheumatoid factor ([facility]    [address], [place]    Office Visit    3 months ago Bilateral impacted Samul Lobashir, Felice Cortez MD    Office Visit    4 months ago Rheumatoid arthritis of multiple sites with negative rheumatoid factor [facility])    [address], [place]    Office Visit    5 months ago Stage 3b chronic kidney disease [facility])    Beatriz Allison Gretta Keens, MD    Office Visit    5 months ago Pre-op exam    Gregory Candelaria MD    Office Visit          Future Appointments         Provider Department Appt Notes    In 2 weeks MD Renetta Malloy, [facility] pre-op, surgery scheduled for 06/14    In 1 month Aida Lou MD Lacretia Dicker, [address] [place] [address] 6 months    In 1 month MD Renetta Foote, [address], [place] 3 mos follow up

## 2023-05-15 ENCOUNTER — OFFICE VISIT (OUTPATIENT)
Facility: CLINIC | Age: 83
End: 2023-05-15

## 2023-05-15 VITALS
HEIGHT: 64 IN | DIASTOLIC BLOOD PRESSURE: 58 MMHG | BODY MASS INDEX: 39.98 KG/M2 | WEIGHT: 234.19 LBS | SYSTOLIC BLOOD PRESSURE: 112 MMHG

## 2023-05-15 DIAGNOSIS — M06.09 RHEUMATOID ARTHRITIS OF MULTIPLE SITES WITH NEGATIVE RHEUMATOID FACTOR (HCC): ICD-10-CM

## 2023-05-15 DIAGNOSIS — J44.9 CHRONIC OBSTRUCTIVE PULMONARY DISEASE, UNSPECIFIED COPD TYPE (HCC): ICD-10-CM

## 2023-05-15 DIAGNOSIS — I48.0 PAROXYSMAL ATRIAL FIBRILLATION (HCC): ICD-10-CM

## 2023-05-15 DIAGNOSIS — D84.9 IMMUNOSUPPRESSED STATUS (HCC): ICD-10-CM

## 2023-05-15 DIAGNOSIS — H04.552 STENOSIS OF TEAR DUCT, LEFT: ICD-10-CM

## 2023-05-15 DIAGNOSIS — I70.0 ATHEROSCLEROSIS OF AORTA (HCC): ICD-10-CM

## 2023-05-15 DIAGNOSIS — M79.602 LEFT ARM PAIN: ICD-10-CM

## 2023-05-15 DIAGNOSIS — Z01.818 PREOP EXAMINATION: Primary | ICD-10-CM

## 2023-05-15 DIAGNOSIS — N18.32 STAGE 3B CHRONIC KIDNEY DISEASE (HCC): ICD-10-CM

## 2023-05-15 PROBLEM — M31.0 LEUKOCYTOCLASTIC VASCULITIS (HCC): Status: RESOLVED | Noted: 2022-02-22 | Resolved: 2023-05-15

## 2023-05-15 PROBLEM — R53.83 FATIGUE: Status: RESOLVED | Noted: 2021-12-21 | Resolved: 2023-05-15

## 2023-05-15 PROCEDURE — 99215 OFFICE O/P EST HI 40 MIN: CPT | Performed by: INTERNAL MEDICINE

## 2023-05-15 PROCEDURE — 1159F MED LIST DOCD IN RCRD: CPT | Performed by: INTERNAL MEDICINE

## 2023-05-15 PROCEDURE — 3008F BODY MASS INDEX DOCD: CPT | Performed by: INTERNAL MEDICINE

## 2023-05-15 PROCEDURE — 1126F AMNT PAIN NOTED NONE PRSNT: CPT | Performed by: INTERNAL MEDICINE

## 2023-05-15 PROCEDURE — 1160F RVW MEDS BY RX/DR IN RCRD: CPT | Performed by: INTERNAL MEDICINE

## 2023-05-15 PROCEDURE — 3074F SYST BP LT 130 MM HG: CPT | Performed by: INTERNAL MEDICINE

## 2023-05-15 PROCEDURE — 3078F DIAST BP <80 MM HG: CPT | Performed by: INTERNAL MEDICINE

## 2023-05-15 NOTE — ASSESSMENT & PLAN NOTE
This is likely muscular. Patient declined referral for physical therapy. Advised her to rest the arm and take Tylenol as needed.

## 2023-05-15 NOTE — ASSESSMENT & PLAN NOTE
Patient is cleared for surgery pending labs and EKG. She should stop Eliquis 24 hours prior to surgery and resume immediately afterwards. Patient has arthritis in her neck.   She was cleared for surgery by Dr. Martha Dietrich.

## 2023-05-18 NOTE — TELEPHONE ENCOUNTER
LOV: 3/7/23  Future Appointments   Date Time Provider Taqueria Wise   5/30/2023 11:40 AM Inez Puentes MD [id number] [facility] [place] [facility]   6/7/2023 10:20 AM Tomy Mckeon MD [facility], [place]   6/27/2023  2:40 PM MD VINOD [facility]     Labs:   Component      Latest Ref Rng 2/28/2023   WBC      4.0 - 11.0 x10(3) uL 6.0    RBC      3.80 - 5.30 x10(6)uL 3.33 (L)    Hemoglobin      12.0 - 16.0 g/dL 11.4 (L)    Hematocrit      35.0 - 48.0 % 35.0    MCV      80.0 - 100.0 fL 105.1 (H)    MCH      26.0 - 34.0 pg 34.2 (H)    MCHC      31.0 - 37.0 g/dL 32.6    RDW-SD      35.1 - 46.3 fL 57.9 (H)    RDW      11.0 - 15.0 % 15.1 (H)    Platelet Count      861.0 - 450.0 10(3)uL 182.0    Prelim Neutrophil Abs      1.50 - 7.70 x10 (3) uL 3.37    Neutrophils Absolute      1.50 - 7.70 x10(3) uL 3.37    Lymphocytes Absolute      1.00 - 4.00 x10(3) uL 1.77    Monocytes Absolute      0.10 - 1.00 x10(3) uL 0.69    Eosinophils Absolute      0.00 - 0.70 x10(3) uL 0.14    Basophils Absolute      0.00 - 0.20 x10(3) uL 0.05    Immature Granulocyte Absolute      0.00 - 1.00 x10(3) uL 0.01    Neutrophils %      % 55.9    Lymphocytes %      % 29.4    Monocytes %      % 11.4    Eosinophils %      % 2.3    Basophils %      % 0.8    Immature Granulocyte %      % 0.2    CREATININE      0.55 - 1.02 mg/dL 1.15 (H)    eGFR-Cr      >=60 mL/min/1.73m2 47 (L)    T4,Free (Direct)      0.8 - 1.7 ng/dL 1.2    TSH      0.358 - 3.740 mIU/mL 1.060    Albumin      3.4 - 5.0 g/dL 3.6    AST (SGOT)      15 - 37 U/L 32    C-REACTIVE PROTEIN      <0.30 mg/dL <0.29    SED RATE      0 - 30 mm/Hr 11    FERRITIN      18.0 - 340.0 ng/mL 93.1    Iron, Serum      50 - 170 ug/dL 97       Legend:  (L) Low  (H) High

## 2023-05-19 RX ORDER — FOLIC ACID 1 MG/1
1 TABLET ORAL DAILY
Qty: 90 TABLET | Refills: 3 | Status: SHIPPED | OUTPATIENT
Start: 2023-05-19

## 2023-05-28 ENCOUNTER — LAB ENCOUNTER (OUTPATIENT)
Dept: LAB | Facility: HOSPITAL | Age: 83
End: 2023-05-28
Attending: INTERNAL MEDICINE
Payer: MEDICARE

## 2023-05-28 ENCOUNTER — TELEPHONE (OUTPATIENT)
Facility: CLINIC | Age: 83
End: 2023-05-28

## 2023-05-28 DIAGNOSIS — N18.32 STAGE 3B CHRONIC KIDNEY DISEASE (HCC): ICD-10-CM

## 2023-05-28 DIAGNOSIS — Z01.818 PREOP EXAMINATION: ICD-10-CM

## 2023-05-28 DIAGNOSIS — Z51.81 THERAPEUTIC DRUG MONITORING: ICD-10-CM

## 2023-05-28 DIAGNOSIS — M06.09 RHEUMATOID ARTHRITIS OF MULTIPLE SITES WITH NEGATIVE RHEUMATOID FACTOR (HCC): ICD-10-CM

## 2023-05-28 LAB
ALBUMIN SERPL-MCNC: 3.6 G/DL (ref 3.4–5)
ALBUMIN/GLOB SERPL: 1.1 {RATIO} (ref 1–2)
ALP LIVER SERPL-CCNC: 79 U/L
ALT SERPL-CCNC: 24 U/L
ANION GAP SERPL CALC-SCNC: 5 MMOL/L (ref 0–18)
AST SERPL-CCNC: 20 U/L (ref 15–37)
ATRIAL RATE: 62 BPM
BASOPHILS # BLD AUTO: 0.05 X10(3) UL (ref 0–0.2)
BASOPHILS NFR BLD AUTO: 1 %
BILIRUB SERPL-MCNC: 0.6 MG/DL (ref 0.1–2)
BUN BLD-MCNC: 31 MG/DL (ref 7–18)
BUN/CREAT SERPL: 24.4 (ref 10–20)
CALCIUM BLD-MCNC: 9.4 MG/DL (ref 8.5–10.1)
CHLORIDE SERPL-SCNC: 109 MMOL/L (ref 98–112)
CO2 SERPL-SCNC: 28 MMOL/L (ref 21–32)
CREAT BLD-MCNC: 1.27 MG/DL
CRP SERPL-MCNC: <0.29 MG/DL (ref ?–0.3)
DEPRECATED RDW RBC AUTO: 62.1 FL (ref 35.1–46.3)
EOSINOPHIL # BLD AUTO: 0.14 X10(3) UL (ref 0–0.7)
EOSINOPHIL NFR BLD AUTO: 2.7 %
ERYTHROCYTE [DISTWIDTH] IN BLOOD BY AUTOMATED COUNT: 15.9 % (ref 11–15)
ERYTHROCYTE [SEDIMENTATION RATE] IN BLOOD: 16 MM/HR
FASTING STATUS PATIENT QL REPORTED: YES
GFR SERPLBLD BASED ON 1.73 SQ M-ARVRAT: 42 ML/MIN/1.73M2 (ref 60–?)
GLOBULIN PLAS-MCNC: 3.4 G/DL (ref 2.8–4.4)
GLUCOSE BLD-MCNC: 93 MG/DL (ref 70–99)
HCT VFR BLD AUTO: 34.8 %
HGB BLD-MCNC: 11.1 G/DL
IMM GRANULOCYTES # BLD AUTO: 0.03 X10(3) UL (ref 0–1)
IMM GRANULOCYTES NFR BLD: 0.6 %
LYMPHOCYTES # BLD AUTO: 2.03 X10(3) UL (ref 1–4)
LYMPHOCYTES NFR BLD AUTO: 39.6 %
MCH RBC QN AUTO: 33.8 PG (ref 26–34)
MCHC RBC AUTO-ENTMCNC: 31.9 G/DL (ref 31–37)
MCV RBC AUTO: 106.1 FL
MONOCYTES # BLD AUTO: 0.68 X10(3) UL (ref 0.1–1)
MONOCYTES NFR BLD AUTO: 13.3 %
NEUTROPHILS # BLD AUTO: 2.2 X10 (3) UL (ref 1.5–7.7)
NEUTROPHILS # BLD AUTO: 2.2 X10(3) UL (ref 1.5–7.7)
NEUTROPHILS NFR BLD AUTO: 42.8 %
OSMOLALITY SERPL CALC.SUM OF ELEC: 300 MOSM/KG (ref 275–295)
P AXIS: 71 DEGREES
P-R INTERVAL: 196 MS
PHOSPHATE SERPL-MCNC: 4 MG/DL (ref 2.5–4.9)
PLATELET # BLD AUTO: 187 10(3)UL (ref 150–450)
POTASSIUM SERPL-SCNC: 3.9 MMOL/L (ref 3.5–5.1)
PROT SERPL-MCNC: 7 G/DL (ref 6.4–8.2)
Q-T INTERVAL: 424 MS
QRS DURATION: 94 MS
QTC CALCULATION (BEZET): 430 MS
R AXIS: -38 DEGREES
RBC # BLD AUTO: 3.28 X10(6)UL
SODIUM SERPL-SCNC: 142 MMOL/L (ref 136–145)
T AXIS: 61 DEGREES
VENTRICULAR RATE: 62 BPM
WBC # BLD AUTO: 5.1 X10(3) UL (ref 4–11)

## 2023-05-28 PROCEDURE — 93005 ELECTROCARDIOGRAM TRACING: CPT

## 2023-05-28 PROCEDURE — 93010 ELECTROCARDIOGRAM REPORT: CPT | Performed by: INTERNAL MEDICINE

## 2023-05-28 PROCEDURE — 36415 COLL VENOUS BLD VENIPUNCTURE: CPT

## 2023-05-28 PROCEDURE — 86140 C-REACTIVE PROTEIN: CPT

## 2023-05-28 PROCEDURE — 80053 COMPREHEN METABOLIC PANEL: CPT

## 2023-05-28 PROCEDURE — 85652 RBC SED RATE AUTOMATED: CPT

## 2023-05-28 PROCEDURE — 85025 COMPLETE CBC W/AUTO DIFF WBC: CPT

## 2023-05-28 PROCEDURE — 84100 ASSAY OF PHOSPHORUS: CPT

## 2023-05-28 NOTE — TELEPHONE ENCOUNTER
Please fax preop progress note and EKG tracing to Dr. Hawa Flores office and also to Dr. Eric Jimenez office.

## 2023-05-30 ENCOUNTER — OFFICE VISIT (OUTPATIENT)
Dept: NEPHROLOGY | Facility: CLINIC | Age: 83
End: 2023-05-30

## 2023-05-30 VITALS
HEIGHT: 64 IN | SYSTOLIC BLOOD PRESSURE: 116 MMHG | BODY MASS INDEX: 40.29 KG/M2 | WEIGHT: 236 LBS | HEART RATE: 64 BPM | DIASTOLIC BLOOD PRESSURE: 76 MMHG

## 2023-05-30 DIAGNOSIS — D84.9 IMMUNOSUPPRESSED STATUS (HCC): ICD-10-CM

## 2023-05-30 DIAGNOSIS — N18.32 STAGE 3B CHRONIC KIDNEY DISEASE (HCC): Primary | ICD-10-CM

## 2023-05-30 PROCEDURE — 3078F DIAST BP <80 MM HG: CPT | Performed by: INTERNAL MEDICINE

## 2023-05-30 PROCEDURE — 99214 OFFICE O/P EST MOD 30 MIN: CPT | Performed by: INTERNAL MEDICINE

## 2023-05-30 PROCEDURE — 3074F SYST BP LT 130 MM HG: CPT | Performed by: INTERNAL MEDICINE

## 2023-05-30 PROCEDURE — 3008F BODY MASS INDEX DOCD: CPT | Performed by: INTERNAL MEDICINE

## 2023-05-30 PROCEDURE — 1159F MED LIST DOCD IN RCRD: CPT | Performed by: INTERNAL MEDICINE

## 2023-05-30 NOTE — TELEPHONE ENCOUNTER
Faxed all information requested below to Dr. Keyla Jaeger ([phone]), and to Dr. Maris Duong ([phone]). Rec'd both fax confirmations.

## 2023-06-03 NOTE — PROGRESS NOTES
Edenilson Williamson is an 24-year-old patient of Dr. Starr Lennox with seronegative rheumatoid arthritis. Since I last saw her 3/7/2023, she has continued Enbrel weekly, methotrexate 10 mg weekly, and Plaquenil 400mg daily. She feels like her rheumatoid arthritis is controlled. Her joints aren't more swollen in the morning. Her morning stiffness is minimal.     Her back continues to slow her down. Lumbar spine x-rays show moderate scoliosis and moderate to severe spondylosis. She has discomfort from her lateral hips down her lateral thighs. She has discomfort in her knees and thumb bases. She has a left Z thumb deformity. She had right thumb surgery. Her knees have both been replaced. She has pain in her neck down to her shoulders. Imaging studies showed severe arthritis in her neck. Her Plaquenil eye exams have been negative for retinal toxicity. She had right ankle surgery on July 21st of 2015. She has discomfort above the ankle. It is getting harder for her to get around. She uses both a cane and walker. She is using her CPAP machine for sleep apnea. She is getting left tear duct rerouting surgery next week. She was admitted to the hospital April 29th through May 4th of 2021, with an acutely inflamed right third toe, which was originally felt to be an infection. Surgery was done, and tophi were seen in the bone. Her uric acid was 9.1. She has continued allopurinol 300 mg daily. She has not had any more acute attacks. Uric acid was repeated October 4th of 2021, and is down to 5.3. Labs were repeated 5/28/2023. CBC normal, except H/H 11./34.8. Creatinine 1.27 (42). She follows in nephrology. AST and albumin normal.  C-reactive protein normal at less than 0.29, and sed rate normal at 16. She has dyspnea on exertion. She follows with cardiology. She has had rhythm problems.  Echocardiogram on July 23rd of 2020 showed mildly dilated left ventricle, normal systolic function, grade 2 diastolic dysfunction, moderate mitral regurgitation, mildly to moderately dilated left atrium, moderate pulmonary hypertension. She is very fatigued all of the time. Review of Systems   Constitutional: Positive for fatigue. Negative for fever, chills and diaphoresis. Respiratory:Positive for shortness of breath. Cardiovascular: Negative for chest pain. Gastrointestinal: Negative for abdominal pain. Musculoskeletal: Positive for back and leg pain. Skin: Negative for rash. Hematological: Negative for adenopathy. Allergies:  Erythromycin Base         Pcn [Bicillin L-A]           PHYSICAL EXAM:   Pleasant woman in no acute distress. Blood pressure 108/64, pulse 77, height 5' 4\" (1.626 m), weight 237 lb (107.5 kg), not currently breastfeeding. Constitutional: She is oriented to person, place, and time. She appears well-developed. HENT:   Head: Normocephalic. Eyes: Conjunctival injection left eye. Cardiovascular: Normal rate, regular rhythm and normal heart sounds. Pulmonary/Chest: Effort normal. Scattered crackles in her lung bases. Abdominal: Soft. Bowel sounds are normal. She exhibits no mass. There is no tenderness. There is no rebound and no guarding. Musculoskeletal: She has significant pitting edema in her distal lower extremities. There is not active synovitis in her wrists or hands. Bony deformity of left thumb. Right wrist ROM is limited with discomfort, and without obvious synovitis. Lymphadenopathy:     She has no cervical adenopathy. Neurological: She is alert and oriented to person, place, and time. Skin: No rash noted. Psychiatric: She has a normal mood and affect. ASSESSMENT/PLAN:   1. Seronegative rheumatoid arthritis of multiple sites. It is well controlled on Plaquenil, Enbrel, and methotrexate 10 mg weekly. She will continue every 3-4 month monitoring in Rheumatology. 2. Encounter for therapeutic drug monitoring. 3. Primary osteoarthritis.  Status post successful right ankle surgery. She is continuing to have mechanical problems with her low back and knees. Recent sciatica, doing better after steroids. Probable iliotibial band syndrome. Stretching. 4. Low white count. Resolved. Again anemia. H/o borderline thrombocytopenia. Renal insufficiency. 5.  Renal insufficiency. Dyspnea on exertion. She follows with cardiology. Seeing Nephrology. 6.  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 It was not found to be infection, but tophaceous gout. Her uric acid is down to 5.3 on 300 mg of allopurinol daily, which she will continue for life.

## 2023-06-07 ENCOUNTER — OFFICE VISIT (OUTPATIENT)
Dept: RHEUMATOLOGY | Facility: CLINIC | Age: 83
End: 2023-06-07

## 2023-06-07 VITALS
DIASTOLIC BLOOD PRESSURE: 64 MMHG | HEIGHT: 64 IN | BODY MASS INDEX: 40.46 KG/M2 | SYSTOLIC BLOOD PRESSURE: 108 MMHG | WEIGHT: 237 LBS | HEART RATE: 77 BPM

## 2023-06-07 DIAGNOSIS — Z51.81 THERAPEUTIC DRUG MONITORING: ICD-10-CM

## 2023-06-07 DIAGNOSIS — N28.9 RENAL INSUFFICIENCY: ICD-10-CM

## 2023-06-07 DIAGNOSIS — M06.09 RHEUMATOID ARTHRITIS OF MULTIPLE SITES WITH NEGATIVE RHEUMATOID FACTOR (HCC): Primary | ICD-10-CM

## 2023-06-07 PROCEDURE — 3008F BODY MASS INDEX DOCD: CPT | Performed by: INTERNAL MEDICINE

## 2023-06-07 PROCEDURE — 3074F SYST BP LT 130 MM HG: CPT | Performed by: INTERNAL MEDICINE

## 2023-06-07 PROCEDURE — 99214 OFFICE O/P EST MOD 30 MIN: CPT | Performed by: INTERNAL MEDICINE

## 2023-06-07 PROCEDURE — 1125F AMNT PAIN NOTED PAIN PRSNT: CPT | Performed by: INTERNAL MEDICINE

## 2023-06-07 PROCEDURE — 1159F MED LIST DOCD IN RCRD: CPT | Performed by: INTERNAL MEDICINE

## 2023-06-07 PROCEDURE — 3078F DIAST BP <80 MM HG: CPT | Performed by: INTERNAL MEDICINE

## 2023-06-26 NOTE — ASSESSMENT & PLAN NOTE
Reviewed hospital notes, labs, and imaging reports. She is s/p debridement to bone 3rd digit right foot. Patient will continue IV antibiotics weekly per ID. F/u with podiatray. 26-Jun-2023

## 2023-06-27 ENCOUNTER — OFFICE VISIT (OUTPATIENT)
Facility: CLINIC | Age: 83
End: 2023-06-27

## 2023-06-27 VITALS
RESPIRATION RATE: 18 BRPM | DIASTOLIC BLOOD PRESSURE: 68 MMHG | TEMPERATURE: 99 F | HEART RATE: 72 BPM | OXYGEN SATURATION: 97 % | HEIGHT: 64 IN | WEIGHT: 230 LBS | SYSTOLIC BLOOD PRESSURE: 112 MMHG | BODY MASS INDEX: 39.27 KG/M2

## 2023-06-27 DIAGNOSIS — I70.0 ATHEROSCLEROSIS OF AORTA (HCC): ICD-10-CM

## 2023-06-27 DIAGNOSIS — E66.01 MORBID OBESITY WITH BMI OF 40.0-44.9, ADULT (HCC): ICD-10-CM

## 2023-06-27 DIAGNOSIS — M15.9 PRIMARY OSTEOARTHRITIS INVOLVING MULTIPLE JOINTS: ICD-10-CM

## 2023-06-27 DIAGNOSIS — Z99.89 OBSTRUCTIVE SLEEP APNEA ON CPAP: ICD-10-CM

## 2023-06-27 DIAGNOSIS — E78.2 MIXED HYPERLIPIDEMIA: ICD-10-CM

## 2023-06-27 DIAGNOSIS — K58.0 IRRITABLE BOWEL SYNDROME WITH DIARRHEA: ICD-10-CM

## 2023-06-27 DIAGNOSIS — R06.09 DOE (DYSPNEA ON EXERTION): ICD-10-CM

## 2023-06-27 DIAGNOSIS — E66.01 OBESITY, MORBID (HCC): ICD-10-CM

## 2023-06-27 DIAGNOSIS — I25.10 ATHEROSCLEROSIS OF NATIVE CORONARY ARTERY OF NATIVE HEART WITHOUT ANGINA PECTORIS: ICD-10-CM

## 2023-06-27 DIAGNOSIS — I10 ESSENTIAL HYPERTENSION: ICD-10-CM

## 2023-06-27 DIAGNOSIS — M79.602 LEFT ARM PAIN: ICD-10-CM

## 2023-06-27 DIAGNOSIS — E55.9 VITAMIN D DEFICIENCY: ICD-10-CM

## 2023-06-27 DIAGNOSIS — Z00.00 ENCOUNTER FOR MEDICARE ANNUAL WELLNESS EXAM: Primary | ICD-10-CM

## 2023-06-27 DIAGNOSIS — M06.09 RHEUMATOID ARTHRITIS OF MULTIPLE SITES WITH NEGATIVE RHEUMATOID FACTOR (HCC): ICD-10-CM

## 2023-06-27 DIAGNOSIS — R26.9 GAIT ABNORMALITY: ICD-10-CM

## 2023-06-27 DIAGNOSIS — K21.9 GASTROESOPHAGEAL REFLUX DISEASE, UNSPECIFIED WHETHER ESOPHAGITIS PRESENT: ICD-10-CM

## 2023-06-27 DIAGNOSIS — D84.9 IMMUNOSUPPRESSED STATUS (HCC): ICD-10-CM

## 2023-06-27 DIAGNOSIS — E03.9 ACQUIRED HYPOTHYROIDISM: ICD-10-CM

## 2023-06-27 DIAGNOSIS — R32 URINARY INCONTINENCE, UNSPECIFIED TYPE: ICD-10-CM

## 2023-06-27 DIAGNOSIS — N18.32 STAGE 3B CHRONIC KIDNEY DISEASE (HCC): ICD-10-CM

## 2023-06-27 DIAGNOSIS — D64.9 ANEMIA, UNSPECIFIED TYPE: ICD-10-CM

## 2023-06-27 DIAGNOSIS — I48.0 PAROXYSMAL ATRIAL FIBRILLATION (HCC): ICD-10-CM

## 2023-06-27 DIAGNOSIS — G47.33 OBSTRUCTIVE SLEEP APNEA ON CPAP: ICD-10-CM

## 2023-06-27 DIAGNOSIS — J44.9 CHRONIC OBSTRUCTIVE PULMONARY DISEASE, UNSPECIFIED COPD TYPE (HCC): ICD-10-CM

## 2023-06-27 DIAGNOSIS — M1A.9XX1 CHRONIC TOPHACEOUS GOUT OF RIGHT FOOT: ICD-10-CM

## 2023-06-27 PROBLEM — Z01.818 PREOP EXAMINATION: Status: RESOLVED | Noted: 2023-05-15 | Resolved: 2023-06-27

## 2023-06-27 PROBLEM — H04.552 STENOSIS OF TEAR DUCT, LEFT: Status: RESOLVED | Noted: 2023-05-15 | Resolved: 2023-06-27

## 2023-06-27 PROCEDURE — 99214 OFFICE O/P EST MOD 30 MIN: CPT | Performed by: INTERNAL MEDICINE

## 2023-06-27 PROCEDURE — 1159F MED LIST DOCD IN RCRD: CPT | Performed by: INTERNAL MEDICINE

## 2023-06-27 PROCEDURE — 1160F RVW MEDS BY RX/DR IN RCRD: CPT | Performed by: INTERNAL MEDICINE

## 2023-06-27 PROCEDURE — 3074F SYST BP LT 130 MM HG: CPT | Performed by: INTERNAL MEDICINE

## 2023-06-27 PROCEDURE — 3078F DIAST BP <80 MM HG: CPT | Performed by: INTERNAL MEDICINE

## 2023-06-27 PROCEDURE — 1126F AMNT PAIN NOTED NONE PRSNT: CPT | Performed by: INTERNAL MEDICINE

## 2023-06-27 PROCEDURE — G0439 PPPS, SUBSEQ VISIT: HCPCS | Performed by: INTERNAL MEDICINE

## 2023-06-27 PROCEDURE — 1170F FXNL STATUS ASSESSED: CPT | Performed by: INTERNAL MEDICINE

## 2023-06-27 PROCEDURE — 3008F BODY MASS INDEX DOCD: CPT | Performed by: INTERNAL MEDICINE

## 2023-06-27 PROCEDURE — 96160 PT-FOCUSED HLTH RISK ASSMT: CPT | Performed by: INTERNAL MEDICINE

## 2023-06-27 RX ORDER — PRAVASTATIN SODIUM 20 MG
20 TABLET ORAL NIGHTLY
Qty: 90 TABLET | Refills: 1 | Status: SHIPPED | OUTPATIENT
Start: 2023-06-27

## 2023-07-07 ENCOUNTER — OFFICE VISIT (OUTPATIENT)
Dept: OTOLARYNGOLOGY | Facility: CLINIC | Age: 83
End: 2023-07-07

## 2023-07-07 DIAGNOSIS — H61.23 BILATERAL IMPACTED CERUMEN: Primary | ICD-10-CM

## 2023-07-07 NOTE — PROGRESS NOTES
Mgaed Galeano is a 79year old female. Patient presents with:  Ear Wax: Ear cleaning       HISTORY OF PRESENT ILLNESS    Patient presents for cerumen removal. No other complaints or concerns at this time    Social History    Socioeconomic History      Marital status:     Tobacco Use      Smoking status: Never      Smokeless tobacco: Never    Substance and Sexual Activity      Alcohol use:  Yes        Alcohol/week: 1.7 standard drinks of alcohol        Types: 2 Glasses of wine per week        Comment: wine - 1 glass weekly      Drug use: No      Sexual activity: Never    Other Topics      Concerns:        Caffeine Concern: No      Family History   Problem Relation Age of Onset    Breast Cancer Mother 59    Cancer Mother         ovarian (cause of death)    Ovarian Cancer Mother 80    Diabetes Father     Cancer Father         stomach    Cancer Son         leukemia    Breast Cancer Paternal Aunt         post menopause    Cancer Self         melanoma    Breast Cancer Paternal Cousin Female         age after 48    Colon Cancer Son        Past Medical History:   Diagnosis Date    Cancer ([facility]     melanoma on back    Congestive heart disease (HCC)     COPD (chronic obstructive pulmonary disease) ([facility]     Coronary atherosclerosis     Disorder of thyroid     Fracture, patella     repair    Gout 05/2021    Heart valve disease     High blood pressure     High cholesterol     Hypothyroidism     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/2016    Pulmonary HTN (HCC)     Rheumatoid arthritis ([facility]     Sx.7/21/15, [id number], R Triple Arthrodesis/Poss Medializing Calc Arthrodesis/Lapidus/MONA/FDL to Post Tib Transfer, w/, Global Exp.10/19/15 7/23/2015    Unspecified essential hypertension     Unspecified sleep apnea        Past Surgical History:   Procedure Laterality Date    ANGIOGRAM  2013    CATARACT      CHOLECYSTECTOMY      HAND/FINGER SURGERY UNLISTED Right 2011    thumb surgery    HIP REPLACEMENT SURGERY Right     HYSTERECTOMY      SERENITY--fibroid    KNEE REPLACEMENT SURGERY      total - right and left    BLAKE LOCALIZATION WIRE 1 SITE RIGHT (CPT=19281)  1994    OTHER SURGICAL HISTORY      pins right foot    TEAR DUCT SYSTEM SURG UNLISTED  1968    TONSILLECTOMY         REVIEW OF SYSTEMS    System Neg/Pos Details   Constitutional Negative Fatigue, fever and weight loss. ENMT Negative Drooling. Eyes Negative Blurred vision and vision changes. Respiratory Negative Dyspnea and wheezing. Cardio Negative Chest pain, irregular heartbeat/palpitations and syncope. GI Negative Abdominal pain and diarrhea. Endocrine Negative Cold intolerance and heat intolerance. Neuro Negative Tremors. Psych Negative Anxiety and depression. Integumentary Negative Frequent skin infections, pigment change and rash. Hema/Lymph Negative Easy bleeding and easy bruising. PHYSICAL EXAM    There were no vitals taken for this visit. Constitutional Normal Overall appearance - Normal.        Neck Exam Normal Inspection - Normal. Palpation - Normal. Parotid gland - Normal. Thyroid gland - Normal.             Head/Face Normal Facial features - Normal. Eyebrows - Normal. Skull - Normal.             Ears Normal Inspection - Right: Normal, Left: Normal. Canal - Right: Normal, Left: Normal. TM - Right: Normal, Left: Normal.   Skin Normal Inspection - Normal.                              Canals:  Right: Canal reveals cerumen impaction,   Left: Canal reveals cerumen impaction,     Tympanic Membranes:  Right: Normal tympanic membrane. Left: Normal tympanic membrane. TM Visualized Method:   Right TM examined via otomicroscopy. Left TM examined via otomicroscopy. PROCEDURE:    Removal of cerumen impaction   The cerumen impaction was completely removed using microscopy. Removal was completed by using acurette and/or suction. Comments: Return to clinic as needed.   Avoid q-tips, water precautions and use over the counter wax remedies as needed. Current Outpatient Medications:     pravastatin 20 MG Oral Tab, Take 1 tablet (20 mg total) by mouth nightly., Disp: 90 tablet, Rfl: 1    methotrexate 2.5 MG Oral Tab, TAKE 4 TABLETS ONCE A WEEK, Disp: 52 tablet, Rfl: 1    folic acid 1 MG Oral Tab, Take 1 tablet (1 mg total) by mouth daily. , Disp: 90 tablet, Rfl: 3    lisinopril 20 MG Oral Tab, Take 1 tablet (20 mg total) by mouth daily. , Disp: 90 tablet, Rfl: 1    levothyroxine 137 MCG Oral Tab, Take 137 mcg by mouth before breakfast., Disp: 90 tablet, Rfl: 3    pantoprazole 40 MG Oral Tab EC, Take 1 tablet (40 mg total) by mouth before breakfast., Disp: 90 tablet, Rfl: 3    hydroxychloroquine 200 MG Oral Tab, Take 2 tablets (400 mg total) by mouth daily. , Disp: 180 tablet, Rfl: 3    Etanercept (ENBREL SURECLICK) 50 MG/ML Subcutaneous Solution Auto-injector, Inject 50 mg subcutaneously every week., Disp: 12 mL, Rfl: 1    Cholecalciferol (VITAMIN D) 50 MCG (2000 UT) Oral Tab, Take by mouth., Disp: , Rfl:     Potassium Chloride ER 10 MEQ Oral Tab CR, Take 1 tablet (10 mEq total) by mouth daily. , Disp: 90 tablet, Rfl: 1    allopurinol 300 MG Oral Tab, Take one daily. , Disp: 90 tablet, Rfl: 3    amLODIPine 5 MG Oral Tab, Take 1 tablet (5 mg total) by mouth daily. , Disp: , Rfl:     Solifenacin Succinate 10 MG Oral Tab, Take 1 tablet (10 mg total) by mouth nightly., Disp: 90 tablet, Rfl: 3    ELIQUIS 5 MG Oral Tab, , Disp: , Rfl:     Calcium 500-125 MG-UNIT Oral Tab, Take 1 tablet by mouth daily. , Disp: , Rfl:     Loperamide HCl 2 MG Oral Cap, Take 1 capsule (2 mg total) by mouth 4 (four) times daily as needed for Diarrhea., Disp: , Rfl:     furosemide 40 MG Oral Tab, Take 1 tablet (40 mg total) by mouth daily. , Disp: , Rfl:     Probiotic Product (PROBIOTIC-10) Oral Cap, Take 1 tablet by mouth daily. , Disp: , Rfl:     acetaminophen (TYLENOL EXTRA STRENGTH) 500 MG Oral Tab, Take 1 tablet (500 mg total) by mouth every 6 (six) hours as needed. , Disp: , Rfl:     CENTRUM SILVER OR TABS, 1 TABLET DAILY, Disp: , Rfl:   ASSESSMENT AND PLAN    1. Bilateral impacted cerumen    - REMOVAL IMPACTED CERUMEN REQUIRING INSTRUMENTATION, UNILATERAL      All cerumen was removed using microscopy. I have asked the patient to return to see me as needed for repeat cerumen removal in the future. Lety Caruso.  Lucia Pickard MD    7/7/2023    3:19 PM

## 2023-07-23 ENCOUNTER — LAB ENCOUNTER (OUTPATIENT)
Dept: LAB | Facility: HOSPITAL | Age: 83
End: 2023-07-23
Attending: INTERNAL MEDICINE
Payer: COMMERCIAL

## 2023-07-23 DIAGNOSIS — I51.9 HEART DISEASE, UNSPECIFIED: Primary | ICD-10-CM

## 2023-07-23 DIAGNOSIS — I25.10 CORONARY ATHEROSCLEROSIS OF NATIVE CORONARY ARTERY: ICD-10-CM

## 2023-07-23 DIAGNOSIS — I10 ESSENTIAL HYPERTENSION, MALIGNANT: ICD-10-CM

## 2023-07-23 LAB
ANION GAP SERPL CALC-SCNC: 9 MMOL/L (ref 0–18)
BUN BLD-MCNC: 28 MG/DL (ref 7–18)
BUN/CREAT SERPL: 21.5 (ref 10–20)
CALCIUM BLD-MCNC: 9.2 MG/DL (ref 8.5–10.1)
CHLORIDE SERPL-SCNC: 110 MMOL/L (ref 98–112)
CHOLEST SERPL-MCNC: 178 MG/DL (ref ?–200)
CO2 SERPL-SCNC: 27 MMOL/L (ref 21–32)
CREAT BLD-MCNC: 1.3 MG/DL
EGFRCR SERPLBLD CKD-EPI 2021: 41 ML/MIN/1.73M2 (ref 60–?)
FASTING PATIENT LIPID ANSWER: YES
FASTING STATUS PATIENT QL REPORTED: YES
GLUCOSE BLD-MCNC: 121 MG/DL (ref 70–99)
HDLC SERPL-MCNC: 78 MG/DL (ref 40–59)
LDLC SERPL CALC-MCNC: 82 MG/DL (ref ?–100)
NONHDLC SERPL-MCNC: 100 MG/DL (ref ?–130)
OSMOLALITY SERPL CALC.SUM OF ELEC: 309 MOSM/KG (ref 275–295)
POTASSIUM SERPL-SCNC: 4 MMOL/L (ref 3.5–5.1)
SODIUM SERPL-SCNC: 146 MMOL/L (ref 136–145)
TRIGL SERPL-MCNC: 103 MG/DL (ref 30–149)
VLDLC SERPL CALC-MCNC: 16 MG/DL (ref 0–30)

## 2023-07-23 PROCEDURE — 80048 BASIC METABOLIC PNL TOTAL CA: CPT

## 2023-07-23 PROCEDURE — 36415 COLL VENOUS BLD VENIPUNCTURE: CPT

## 2023-07-23 PROCEDURE — 80061 LIPID PANEL: CPT

## 2023-07-27 ENCOUNTER — APPOINTMENT (OUTPATIENT)
Dept: URBAN - METROPOLITAN AREA CLINIC 244 | Age: 83
Setting detail: DERMATOLOGY
End: 2023-07-27

## 2023-07-27 DIAGNOSIS — L82.0 INFLAMED SEBORRHEIC KERATOSIS: ICD-10-CM

## 2023-07-27 DIAGNOSIS — L82.1 OTHER SEBORRHEIC KERATOSIS: ICD-10-CM

## 2023-07-27 DIAGNOSIS — D22 MELANOCYTIC NEVI: ICD-10-CM

## 2023-07-27 DIAGNOSIS — L57.0 ACTINIC KERATOSIS: ICD-10-CM

## 2023-07-27 DIAGNOSIS — Z85.828 PERSONAL HISTORY OF OTHER MALIGNANT NEOPLASM OF SKIN: ICD-10-CM

## 2023-07-27 DIAGNOSIS — L81.4 OTHER MELANIN HYPERPIGMENTATION: ICD-10-CM

## 2023-07-27 PROBLEM — D22.5 MELANOCYTIC NEVI OF TRUNK: Status: ACTIVE | Noted: 2023-07-27

## 2023-07-27 PROCEDURE — OTHER BENIGN DESTRUCTION: OTHER

## 2023-07-27 PROCEDURE — 17110 DESTRUCT B9 LESION 1-14: CPT

## 2023-07-27 PROCEDURE — 99213 OFFICE O/P EST LOW 20 MIN: CPT | Mod: 25

## 2023-07-27 PROCEDURE — OTHER COUNSELING: OTHER

## 2023-07-27 PROCEDURE — 17000 DESTRUCT PREMALG LESION: CPT | Mod: 59

## 2023-07-27 PROCEDURE — OTHER MIPS QUALITY: OTHER

## 2023-07-27 PROCEDURE — OTHER LIQUID NITROGEN: OTHER

## 2023-07-27 ASSESSMENT — LOCATION ZONE DERM
LOCATION ZONE: TRUNK
LOCATION ZONE: FACE

## 2023-07-27 ASSESSMENT — LOCATION DETAILED DESCRIPTION DERM
LOCATION DETAILED: LEFT SUPERIOR PREAURICULAR CHEEK
LOCATION DETAILED: PERIUMBILICAL SKIN
LOCATION DETAILED: LEFT SUPERIOR CENTRAL MALAR CHEEK
LOCATION DETAILED: LEFT LATERAL UPPER BACK

## 2023-07-27 ASSESSMENT — LOCATION SIMPLE DESCRIPTION DERM
LOCATION SIMPLE: LEFT UPPER BACK
LOCATION SIMPLE: ABDOMEN
LOCATION SIMPLE: LEFT CHEEK

## 2023-07-27 NOTE — PROCEDURE: LIQUID NITROGEN
Render In Bullet Format When Appropriate: No
Post-Care Instructions: I reviewed with the patient in detail post-care instructions. Patient is to wear sunprotection, and avoid picking at any of the treated lesions. Pt may apply Vaseline to crusted or scabbing areas.
Total Number Of Aks Treated: 1
Duration Of Freeze Thaw-Cycle (Seconds): 0
Consent: The patient's consent was obtained including but not limited to risks of crusting, scabbing, blistering, scarring, darker or lighter pigmentary change, recurrence, incomplete removal and infection.
Detail Level: Zone

## 2023-07-27 NOTE — PROCEDURE: BENIGN DESTRUCTION
Anesthesia Volume In Cc: 0.5
Medical Necessity Clause: This procedure was medically necessary because the lesions that were treated were:
Medical Necessity Information: It is in your best interest to select a reason for this procedure from the list below. All of these items fulfill various CMS LCD requirements except the new and changing color options.
Detail Level: Detailed
Treatment Number (Will Not Render If 0): 1
Include Z78.9 (Other Specified Conditions Influencing Health Status) As An Associated Diagnosis?: No
Post-Care Instructions: I reviewed with the patient in detail post-care instructions. Patient is to wear sunprotection, and avoid picking at any of the treated lesions. Pt may apply Vaseline to crusted or scabbing areas.
Consent: The patient's consent was obtained including but not limited to risks of crusting, scabbing, blistering, scarring, darker or lighter pigmentary change, recurrence, incomplete removal and infection.

## 2023-08-09 ENCOUNTER — ORDER TRANSCRIPTION (OUTPATIENT)
Dept: PHYSICAL THERAPY | Facility: HOSPITAL | Age: 83
End: 2023-08-09

## 2023-08-09 DIAGNOSIS — R26.2 DIFFICULTY WALKING: Primary | ICD-10-CM

## 2023-08-19 NOTE — TELEPHONE ENCOUNTER
DR Darnell Wakefield (on call-on behalf of Dr Juan Mcguire  )     Please review; protocol failed/no protocol.      Requested Prescriptions   Pending Prescriptions Disp Refills    POTASSIUM CHLORIDE ER 10 MEQ Oral Tab CR [Pharmacy Med Name: Potassium Chloride Er 10 Meq Tab Kate] 90 tablet 0     Sig: TAKE ONE TABLET BY MOUTH ONE TIME DAILY       There is no refill protocol information for this order           Recent Outpatient Visits              1 month ago Bilateral impacted cerumen    [facility], [address], Ioana Musa MD    Office Visit    1 month ago Encounter for Nadir Shaggy annual wellness exam    Nallely Cabrera MD    Office Visit    2 months ago Rheumatoid arthritis of multiple sites with negative rheumatoid factor [facility])    [address], [facility], Miryam Macias MD    Office Visit    2 months ago Stage 3b chronic kidney disease [facility])    [address], [address] [facility], [facility], Oneyda Robles MD    Office Visit    3 months ago Preop examination    Nallely Cabrera MD    Office Visit             Future Appointments         Provider Department Appt Notes    In 1 week Jesusita Yepez & [address] in [employer]    In 2 weeks [place], [place], [address] in [employer]    In 2 weeks [place], [address] in [employer]    In 3 weeks [place], [address] in [employer]    In 3 weeks [place], [place], Joshy Joo & [address] in [employer]    In 4 weeks [place], [place], Jesusita Ge & [address] in [employer]    In 1 month [place], [place], Jesusita Ge & [address] in [employer]    In 1 month Jesusita Yepez & [address] in [employer]    In 1 month Jesusita Lopez & [address] in [facility]    In 1 month Jesusita Lopez & [address] in [facility]    In 2 months Ted Gamez MD [address], [address] [facility],[address], [facility] Former patient of [address]    In 3 months Ofilia Alek, Presley Shah MD [address], [address] [facility] 6 months

## 2023-08-20 RX ORDER — POTASSIUM CHLORIDE 750 MG/1
10 TABLET, FILM COATED, EXTENDED RELEASE ORAL DAILY
Qty: 90 TABLET | Refills: 1 | Status: SHIPPED | OUTPATIENT
Start: 2023-08-20

## 2023-08-28 ENCOUNTER — TELEPHONE (OUTPATIENT)
Dept: PHYSICAL THERAPY | Facility: HOSPITAL | Age: 83
End: 2023-08-28

## 2023-08-30 ENCOUNTER — TELEPHONE (OUTPATIENT)
Dept: PHYSICAL THERAPY | Facility: HOSPITAL | Age: 83
End: 2023-08-30

## 2023-08-30 ENCOUNTER — APPOINTMENT (OUTPATIENT)
Dept: PHYSICAL THERAPY | Age: 83
End: 2023-08-30
Attending: PODIATRIST
Payer: MEDICARE

## 2023-08-31 PROBLEM — L97.516 ULCER OF RIGHT FOOT WITH BONE INVOLVEMENT WITHOUT EVIDENCE OF NECROSIS (HCC): Status: ACTIVE | Noted: 2021-04-23

## 2023-08-31 PROBLEM — R00.1 BRADYCARDIA: Status: ACTIVE | Noted: 2022-09-06

## 2023-08-31 PROBLEM — E78.5 DYSLIPIDEMIA: Status: ACTIVE | Noted: 2022-12-14

## 2023-08-31 PROBLEM — I27.81 COR PULMONALE, CHRONIC (HCC): Status: ACTIVE | Noted: 2020-01-17

## 2023-08-31 PROBLEM — H04.302 DACROCYSTITIS, LEFT: Status: ACTIVE | Noted: 2022-09-16

## 2023-08-31 PROBLEM — R94.39 ABNORMAL FINDING ON THALLIUM STRESS TEST: Status: ACTIVE | Noted: 2022-02-15

## 2023-08-31 PROBLEM — H04.552 ACQUIRED STENOSIS OF LEFT NASOLACRIMAL DUCT: Status: ACTIVE | Noted: 2022-09-16

## 2023-08-31 PROBLEM — H04.222 EPIPHORA DUE TO INSUFFICIENT DRAINAGE OF LEFT SIDE: Status: ACTIVE | Noted: 2022-09-16

## 2023-08-31 PROBLEM — H00.036: Status: ACTIVE | Noted: 2023-04-11

## 2023-08-31 PROBLEM — I34.0 NONRHEUMATIC MITRAL VALVE REGURGITATION: Status: ACTIVE | Noted: 2020-07-28

## 2023-08-31 PROBLEM — J34.2 DEVIATED NASAL SEPTUM: Status: ACTIVE | Noted: 2022-09-16

## 2023-08-31 RX ORDER — ATORVASTATIN CALCIUM 40 MG/1
40 TABLET, FILM COATED ORAL DAILY
COMMUNITY
Start: 2023-07-27

## 2023-09-01 ENCOUNTER — LAB ENCOUNTER (OUTPATIENT)
Dept: LAB | Age: 83
End: 2023-09-01
Attending: INTERNAL MEDICINE
Payer: MEDICARE

## 2023-09-01 ENCOUNTER — OFFICE VISIT (OUTPATIENT)
Dept: PHYSICAL THERAPY | Age: 83
End: 2023-09-01
Attending: PODIATRIST
Payer: MEDICARE

## 2023-09-01 ENCOUNTER — OFFICE VISIT (OUTPATIENT)
Dept: INTERNAL MEDICINE CLINIC | Facility: CLINIC | Age: 83
End: 2023-09-01

## 2023-09-01 VITALS
HEIGHT: 64 IN | SYSTOLIC BLOOD PRESSURE: 114 MMHG | HEART RATE: 72 BPM | BODY MASS INDEX: 39.78 KG/M2 | WEIGHT: 233 LBS | DIASTOLIC BLOOD PRESSURE: 73 MMHG

## 2023-09-01 DIAGNOSIS — R32 URINARY INCONTINENCE, UNSPECIFIED TYPE: Primary | ICD-10-CM

## 2023-09-01 DIAGNOSIS — K58.0 IRRITABLE BOWEL SYNDROME WITH DIARRHEA: ICD-10-CM

## 2023-09-01 DIAGNOSIS — R00.0 TACHYCARDIA: ICD-10-CM

## 2023-09-01 DIAGNOSIS — R26.2 DIFFICULTY WALKING: Primary | ICD-10-CM

## 2023-09-01 LAB
Q-T INTERVAL: 412 MS
QRS DURATION: 88 MS
QTC CALCULATION (BEZET): 453 MS
R AXIS: -18 DEGREES
T AXIS: 72 DEGREES
VENTRICULAR RATE: 73 BPM

## 2023-09-01 PROCEDURE — 97110 THERAPEUTIC EXERCISES: CPT

## 2023-09-01 PROCEDURE — 1159F MED LIST DOCD IN RCRD: CPT | Performed by: INTERNAL MEDICINE

## 2023-09-01 PROCEDURE — 97161 PT EVAL LOW COMPLEX 20 MIN: CPT

## 2023-09-01 PROCEDURE — 1160F RVW MEDS BY RX/DR IN RCRD: CPT | Performed by: INTERNAL MEDICINE

## 2023-09-01 PROCEDURE — 93005 ELECTROCARDIOGRAM TRACING: CPT

## 2023-09-01 PROCEDURE — 93010 ELECTROCARDIOGRAM REPORT: CPT | Performed by: STUDENT IN AN ORGANIZED HEALTH CARE EDUCATION/TRAINING PROGRAM

## 2023-09-01 PROCEDURE — 99214 OFFICE O/P EST MOD 30 MIN: CPT | Performed by: INTERNAL MEDICINE

## 2023-09-01 PROCEDURE — 3074F SYST BP LT 130 MM HG: CPT | Performed by: INTERNAL MEDICINE

## 2023-09-01 PROCEDURE — 3078F DIAST BP <80 MM HG: CPT | Performed by: INTERNAL MEDICINE

## 2023-09-01 PROCEDURE — 3008F BODY MASS INDEX DOCD: CPT | Performed by: INTERNAL MEDICINE

## 2023-09-01 RX ORDER — SOLIFENACIN SUCCINATE 10 MG/1
10 TABLET, FILM COATED ORAL NIGHTLY
Qty: 90 TABLET | Refills: 3 | Status: SHIPPED | OUTPATIENT
Start: 2023-09-01

## 2023-09-06 ENCOUNTER — OFFICE VISIT (OUTPATIENT)
Dept: PHYSICAL THERAPY | Age: 83
End: 2023-09-06
Attending: PODIATRIST
Payer: MEDICARE

## 2023-09-06 PROCEDURE — 97110 THERAPEUTIC EXERCISES: CPT

## 2023-09-06 PROCEDURE — 97112 NEUROMUSCULAR REEDUCATION: CPT

## 2023-09-06 NOTE — PROGRESS NOTES
Diagnosis:      Difficulty walking (R26.2)   Referring Provider: Ewa  Date of Evaluation:    9/1/23    Precautions:  Fall Risk Next MD visit:   none scheduled  Date of Surgery: n/a   Insurance Primary/Secondary: Emiliano Latin / N/A     # Auth Visits: no limit, no auth            Subjective: Pt states that she is feeling better than she was last week. She is seeing her cardiologist this afternoon. She did fine with the HEP. Pain: 0/10      Objective:   See flow sheet  Tx: Reviewed HEP  HR up to 93 after gait with cane    Assessment: Pt did well with all exercises but does require sit breaks between standing exercises. HR was monitored throughout and stayed under 100 bpm throughout treatment. Goals:    (to be met in 10 visits)  Pt will demonstrate improved SLS to >3 seconds ARMINDA to promote safety and decrease risk of falls on uneven surfaces such as grass  Pt will perform TUG in <14 seconds with least restrictive AD, demonstrating improved gait speed for improved participation in ADL such as community ambulation  Pt will be able to squat to  light objects around the house without loss of stability  Pt will improve functional hip strength to demonstrate ability to ascend/descend 1 flight of stairs reciprocally without use of handrail  Pt will be independent and compliant with comprehensive HEP to maintain progress achieved in PT    Plan: Continue LE strengthening and endurance and static and dynamic balance activities. Add Nustep next visit for general conditioning. Date: 9/6/2023  TX#: 2/10 Date:                 TX#: 3/ Date:                 TX#: 4/ Date:                 TX#: 5/ Date:    Tx#: 6/   Ther Ex: 15 min  Bridges 10x   Supine clam with red TB 2x10  Seated marches 20x  Sit to stand with UE assist 5x  Standing hip abd 2x5 bilat  Standing hip ext 2x5 bilat         NMR: 23 min  NBOS balance 15\" 2x  Side stepping at bar 2 laps  Stride balance 10\" 2x bilat  Standing slow marching with holding bar 10 x R/L  Foot taps up to 3 inch step with holding railing 10x R/L   Gait with cane 40 ft x2 with CGA                     HEP: seated marches, sit to stand, standing hip abd, narrow base of support balance at counter  9/6/23: added supine clam with red TB    Charges: 1 TE, 2 NMR       Total Timed Treatment: 38  min  Total Treatment Time: 38 min

## 2023-09-08 ENCOUNTER — OFFICE VISIT (OUTPATIENT)
Dept: PHYSICAL THERAPY | Age: 83
End: 2023-09-08
Attending: PODIATRIST
Payer: MEDICARE

## 2023-09-08 PROCEDURE — 97110 THERAPEUTIC EXERCISES: CPT

## 2023-09-08 PROCEDURE — 97112 NEUROMUSCULAR REEDUCATION: CPT

## 2023-09-08 NOTE — PROGRESS NOTES
Diagnosis:      Difficulty walking (R26.2)   Referring Provider: Ewa  Date of Evaluation:    9/1/23    Precautions:  Fall Risk Next MD visit:   none scheduled  Date of Surgery: n/a   Insurance Primary/Secondary: Jose Lay / N/A     # Auth Visits: no limit, no auth            Subjective: Pt states that she started wearing a heart monitor yesterday, but it's starting to come off so will have to get it checked. Pain: 0/10      Objective:   See flow sheet      Assessment: Pt was more tired today and requested to end early. She did well with exercises but overall, more fatigued today. Goals:    (to be met in 10 visits)  Pt will demonstrate improved SLS to >3 seconds ARMINDA to promote safety and decrease risk of falls on uneven surfaces such as grass  Pt will perform TUG in <14 seconds with least restrictive AD, demonstrating improved gait speed for improved participation in ADL such as community ambulation  Pt will be able to squat to  light objects around the house without loss of stability  Pt will improve functional hip strength to demonstrate ability to ascend/descend 1 flight of stairs reciprocally without use of handrail  Pt will be independent and compliant with comprehensive HEP to maintain progress achieved in PT    Plan: Continue LE strengthening and endurance and static and dynamic balance activities. Add Nustep next visit for general conditioning. Date: 9/6/2023  TX#: 2/10 Date: 9/8/23                 TX#: 3/10 Date:                 TX#: 4/ Date:                 TX#: 5/ Date:    Tx#: 6/   Ther Ex: 15 min  Bridges 10x   Supine clam with red TB 2x10  Seated marches 20x  Sit to stand with UE assist 5x  Standing hip abd 2x5 bilat  Standing hip ext 2x5 bilat   Ther Ex: 20 min  Nustep LE, L3 3 min  Bridges 10x   Supine clam with red TB 2x10  Seated marches 20x  Sit to stand with UE assist 7x  Standing hip abd 2x5 bilat  Standing hip ext 2x5 bilat  Supine Hip flexor stretch with leg off edge of plinth 30\" bilat      NMR: 23 min  NBOS balance 15\" 2x  Side stepping at bar 2 laps  Stride balance 10\" 2x bilat  Standing slow marching with holding bar 10 x R/L  Foot taps up to 3 inch step with holding railing 10x R/L   Gait with cane 40 ft x2 with CGA NMR: 15 min  NBOS balance 15\" 2x  Side stepping at bar 2 laps  Stride balance 10\" 2x bilat  Standing slow marching with holding bar 10 x R/L  Foot taps up to 3 inch step with holding railing 10x R/L                       HEP: seated marches, sit to stand, standing hip abd, narrow base of support balance at counter  9/6/23: added supine clam with red TB    Charges: 1 TE, 1 NMR       Total Timed Treatment: 35  min  Total Treatment Time: 35 min

## 2023-09-12 DIAGNOSIS — M06.09 RHEUMATOID ARTHRITIS OF MULTIPLE SITES WITH NEGATIVE RHEUMATOID FACTOR (HCC): ICD-10-CM

## 2023-09-12 RX ORDER — MEDROXYPROGESTERONE ACETATE 150 MG/ML
INJECTION, SUSPENSION INTRAMUSCULAR
Qty: 12 ML | Refills: 1 | Status: SHIPPED | OUTPATIENT
Start: 2023-09-12

## 2023-09-13 ENCOUNTER — OFFICE VISIT (OUTPATIENT)
Dept: PHYSICAL THERAPY | Age: 83
End: 2023-09-13
Attending: PODIATRIST
Payer: MEDICARE

## 2023-09-13 PROCEDURE — 97112 NEUROMUSCULAR REEDUCATION: CPT

## 2023-09-13 PROCEDURE — 97110 THERAPEUTIC EXERCISES: CPT

## 2023-09-15 ENCOUNTER — OFFICE VISIT (OUTPATIENT)
Dept: PHYSICAL THERAPY | Age: 83
End: 2023-09-15
Attending: PODIATRIST
Payer: MEDICARE

## 2023-09-15 PROCEDURE — 97112 NEUROMUSCULAR REEDUCATION: CPT

## 2023-09-15 PROCEDURE — 97110 THERAPEUTIC EXERCISES: CPT

## 2023-09-20 ENCOUNTER — OFFICE VISIT (OUTPATIENT)
Dept: PHYSICAL THERAPY | Age: 83
End: 2023-09-20
Attending: PODIATRIST
Payer: MEDICARE

## 2023-09-20 PROCEDURE — 97112 NEUROMUSCULAR REEDUCATION: CPT

## 2023-09-20 PROCEDURE — 97110 THERAPEUTIC EXERCISES: CPT

## 2023-09-20 NOTE — PROGRESS NOTES
Diagnosis:      Difficulty walking (R26.2)   Referring Provider: Ewa  Date of Evaluation:    9/1/23    Precautions:  Fall Risk Next MD visit:   none scheduled  Date of Surgery: n/a   Insurance Primary/Secondary: Emiliano Latin / N/A     # Auth Visits: no limit, no auth            Subjective: Pt states that she is doing okay. She had to walk a long way to get into the building for her appointment today. Pain: 0/10      Objective:   See flow sheet      Assessment:  Pt continues to have improved endurance during treatment but still demonstrating balance deficits especially when weight bearing on right LE and will benefit from more PT to improve her safety with gait. Goals:    (to be met in 10 visits)  Pt will demonstrate improved SLS to >3 seconds ARMINDA to promote safety and decrease risk of falls on uneven surfaces such as grass  Pt will perform TUG in <14 seconds with least restrictive AD, demonstrating improved gait speed for improved participation in ADL such as community ambulation  Pt will be able to squat to  light objects around the house without loss of stability  Pt will improve functional hip strength to demonstrate ability to ascend/descend 1 flight of stairs reciprocally without use of handrail  Pt will be independent and compliant with comprehensive HEP to maintain progress achieved in PT    Plan: Continue LE strengthening and endurance and static and dynamic balance activities.     Date: 9/6/2023  TX#: 2/10 Date: 9/8/23                 TX#: 3/10 Date: 9/13/23                TX#: 4/10 Date: 9/15/23                TX#: 5/10 Date: 9/20/23  Tx#: 6/10   Ther Ex: 15 min  Bridges 10x   Supine clam with red TB 2x10  Seated marches 20x  Sit to stand with UE assist 5x  Standing hip abd 2x5 bilat  Standing hip ext 2x5 bilat   Ther Ex: 20 min  Nustep LE, L3 3 min  Bridges 10x   Supine clam with red TB 2x10  Seated marches 20x  Sit to stand with UE assist 7x  Standing hip abd 2x5 bilat  Standing hip ext 2x5 bilat  Supine Hip flexor stretch with leg off edge of plinth 30\" bilat Ther Ex: 25 min  Nustep, LE, L3 5 min  Bridges 10x   Supine clam with red TB 2x10  Supine Hip flexor stretch with leg off edge of plinth 30\" bilat  Seated marches 20x  Sit to stand without UE assist 10x  Standing hip abd 2x8 bilat  Standing hip ext 2x8 bilat  Step ups with bilat railings 5x R/L Ther Ex: 25 min  Nustep, LE, L3 5 min  Bridges 10x   Supine clam with red TB 2x10  Supine Hip flexor stretch with leg off edge of plinth 30\" bilat  Seated marches 20x  Sit to stand without UE assist 10x  Standing hip abd 2x10 bilat  Standing hip ext 2x10 bilat  Step ups with bilat railings 5x R/L  Seated heel raises 20x Ther Ex: 25 min  Nustep, LE, L3 5 min  Bridges 10x   Supine clam with green TB 2x10  Supine Hip flexor stretch with leg off edge of plinth 30\" bilat  Seated marches 20x  Sit to stand without UE assist 10x  Standing hip abd 2x10 bilat  Standing hip ext 2x10 bilat  Step ups with bilat railings 5x R/L  Seated heel raises 20x   NMR: 23 min  NBOS balance 15\" 2x  Side stepping at bar 2 laps  Stride balance 10\" 2x bilat  Standing slow marching with holding bar 10 x R/L  Foot taps up to 3 inch step with holding railing 10x R/L   Gait with cane 40 ft x2 with CGA NMR: 15 min  NBOS balance 15\" 2x  Side stepping at bar 2 laps  Stride balance 10\" 2x bilat  Standing slow marching with holding bar 10 x R/L  Foot taps up to 3 inch step with holding railing 10x R/L    NMR: 15 min  NBOS balance on Airex 15\" 2x  Weight shifting side to side on Airex 30\"   Side stepping at bar 2 laps  Stride balance 10\" 2x bilat  Standing slow marching with holding bar 10 x R/L  Foot taps up to 3 inch step with holding railing 10x R/L  NMR: 15 min  NBOS balance on Airex 15\" 2x  Weight shifting side to side on Airex 30\"   Side stepping at bar 2 laps  Stride balance 10\" 2x bilat  Standing slow marching with holding bar 10 x R/L  Foot taps up to 3 inch step with holding railing 10x R/L  NMR: 15 min  NBOS balance on Airex 15\" 2x  Weight shifting side to side on Airex 30\"   Side stepping at bar 2 laps  Stride balance 10\" 2x bilat  Standing slow marching on Airex with holding bar 10 x R/L  Foot taps up to 3 inch step with holding railing 10x R/L                  HEP: seated marches, sit to stand, standing hip abd, narrow base of support balance at counter  9/6/23: added supine clam with red TB    Charges: 2 TE, 1 NMR       Total Timed Treatment: 40  min  Total Treatment Time: 40 min

## 2023-09-22 ENCOUNTER — OFFICE VISIT (OUTPATIENT)
Dept: PHYSICAL THERAPY | Age: 83
End: 2023-09-22
Attending: PODIATRIST
Payer: MEDICARE

## 2023-09-22 PROCEDURE — 97110 THERAPEUTIC EXERCISES: CPT

## 2023-09-22 PROCEDURE — 97140 MANUAL THERAPY 1/> REGIONS: CPT

## 2023-09-22 NOTE — PROGRESS NOTES
Diagnosis:      Difficulty walking (R26.2)   Referring Provider: Ewa  Date of Evaluation:    9/1/23    Precautions:  Fall Risk Next MD visit:   none scheduled  Date of Surgery: n/a   Insurance Primary/Secondary: Taj Gomes / N/A     # Auth Visits: no limit, no auth            Subjective: Pt states that her right ankle is a little sore today. Pain: 0/10      Objective:   See flow sheet      Assessment:  Pt did not have increased c/o right ankle pain with exercises today. She would like to practice with her cane for when she goes out with her son or daughter, so this will be done next visit to assess for her safety with cane. Goals:    (to be met in 10 visits)  Pt will demonstrate improved SLS to >3 seconds ARMINDA to promote safety and decrease risk of falls on uneven surfaces such as grass  Pt will perform TUG in <14 seconds with least restrictive AD, demonstrating improved gait speed for improved participation in ADL such as community ambulation  Pt will be able to squat to  light objects around the house without loss of stability  Pt will improve functional hip strength to demonstrate ability to ascend/descend 1 flight of stairs reciprocally without use of handrail  Pt will be independent and compliant with comprehensive HEP to maintain progress achieved in PT    Plan: Continue LE strengthening and endurance and static and dynamic balance activities. Try gait with cane next week.     Date: 9/8/23                 TX#: 3/10 Date: 9/13/23                TX#: 4/10 Date: 9/15/23                TX#: 5/10 Date: 9/20/23  Tx#: 6/10 Date: 9/22/23  Tx#: 7/10   Ther Ex: 20 min  Nustep LE, L3 3 min  Bridges 10x   Supine clam with red TB 2x10  Seated marches 20x  Sit to stand with UE assist 7x  Standing hip abd 2x5 bilat  Standing hip ext 2x5 bilat  Supine Hip flexor stretch with leg off edge of plinth 30\" bilat Ther Ex: 25 min  Nustep, LE, L3 5 min  Bridges 10x   Supine clam with red TB 2x10  Supine Hip flexor stretch with leg off edge of plinth 30\" bilat  Seated marches 20x  Sit to stand without UE assist 10x  Standing hip abd 2x8 bilat  Standing hip ext 2x8 bilat  Step ups with bilat railings 5x R/L Ther Ex: 25 min  Nustep, LE, L3 5 min  Bridges 10x   Supine clam with red TB 2x10  Supine Hip flexor stretch with leg off edge of plinth 30\" bilat  Seated marches 20x  Sit to stand without UE assist 10x  Standing hip abd 2x10 bilat  Standing hip ext 2x10 bilat  Step ups with bilat railings 5x R/L  Seated heel raises 20x Ther Ex: 25 min  Nustep, LE, L3 5 min  Bridges 10x   Supine clam with green TB 2x10  Supine Hip flexor stretch with leg off edge of plinth 30\" bilat  Seated marches 20x  Sit to stand without UE assist 10x  Standing hip abd 2x10 bilat  Standing hip ext 2x10 bilat  Step ups with bilat railings 5x R/L  Seated heel raises 20x Ther Ex: 25 min  Nustep, LE, L3 5 min  Bridges 10x   Supine clam with green TB 2x10  Supine Hip flexor stretch with leg off edge of plinth 30\" bilat  Seated marches 20x  Sit to stand without UE assist 10x  Standing hip abd 2x10 bilat  Standing hip ext 2x10 bilat  Step ups with bilat railings 5x R/L  Seated heel raises 20x   NMR: 15 min  NBOS balance 15\" 2x  Side stepping at bar 2 laps  Stride balance 10\" 2x bilat  Standing slow marching with holding bar 10 x R/L  Foot taps up to 3 inch step with holding railing 10x R/L    NMR: 15 min  NBOS balance on Airex 15\" 2x  Weight shifting side to side on Airex 30\"   Side stepping at bar 2 laps  Stride balance 10\" 2x bilat  Standing slow marching with holding bar 10 x R/L  Foot taps up to 3 inch step with holding railing 10x R/L  NMR: 15 min  NBOS balance on Airex 15\" 2x  Weight shifting side to side on Airex 30\"   Side stepping at bar 2 laps  Stride balance 10\" 2x bilat  Standing slow marching with holding bar 10 x R/L  Foot taps up to 3 inch step with holding railing 10x R/L  NMR: 15 min  NBOS balance on Airex 15\" 2x  Weight shifting side to side on Airex 30\"   Side stepping at bar 2 laps  Stride balance 10\" 2x bilat  Standing slow marching on Airex with holding bar 10 x R/L  Foot taps up to 3 inch step with holding railing 10x R/L  NMR: 15 min  NBOS balance on Airex 15\" 2x  Weight shifting side to side on Airex 30\"   Side stepping at bar 2 laps  Stride balance 10\" 2x bilat  Standing slow marching on Airex with holding bar 10 x R/L  Foot taps up to 3 inch step with holding railing 10x R/L                  HEP: seated marches, sit to stand, standing hip abd, narrow base of support balance at counter  9/6/23: added supine clam with red TB    Charges: 2 TE, 1 NMR       Total Timed Treatment: 40  min  Total Treatment Time: 40 min

## 2023-09-26 ENCOUNTER — LAB ENCOUNTER (OUTPATIENT)
Dept: LAB | Facility: HOSPITAL | Age: 83
End: 2023-09-26
Attending: INTERNAL MEDICINE
Payer: MEDICARE

## 2023-09-26 DIAGNOSIS — E78.5 HYPERLIPIDEMIA: ICD-10-CM

## 2023-09-26 DIAGNOSIS — I51.89 DIASTOLIC DYSFUNCTION: ICD-10-CM

## 2023-09-26 DIAGNOSIS — I10 HTN (HYPERTENSION): Primary | ICD-10-CM

## 2023-09-26 LAB
ANION GAP SERPL CALC-SCNC: 5 MMOL/L (ref 0–18)
BUN BLD-MCNC: 30 MG/DL (ref 7–18)
BUN/CREAT SERPL: 21.6 (ref 10–20)
CALCIUM BLD-MCNC: 9.1 MG/DL (ref 8.5–10.1)
CHLORIDE SERPL-SCNC: 114 MMOL/L (ref 98–112)
CHOLEST SERPL-MCNC: 148 MG/DL (ref ?–200)
CO2 SERPL-SCNC: 25 MMOL/L (ref 21–32)
CREAT BLD-MCNC: 1.39 MG/DL
EGFRCR SERPLBLD CKD-EPI 2021: 38 ML/MIN/1.73M2 (ref 60–?)
FASTING PATIENT LIPID ANSWER: YES
FASTING STATUS PATIENT QL REPORTED: YES
GLUCOSE BLD-MCNC: 113 MG/DL (ref 70–99)
HDLC SERPL-MCNC: 79 MG/DL (ref 40–59)
LDLC SERPL CALC-MCNC: 55 MG/DL (ref ?–100)
NONHDLC SERPL-MCNC: 69 MG/DL (ref ?–130)
OSMOLALITY SERPL CALC.SUM OF ELEC: 305 MOSM/KG (ref 275–295)
POTASSIUM SERPL-SCNC: 4.1 MMOL/L (ref 3.5–5.1)
SODIUM SERPL-SCNC: 144 MMOL/L (ref 136–145)
TRIGL SERPL-MCNC: 72 MG/DL (ref 30–149)
VLDLC SERPL CALC-MCNC: 10 MG/DL (ref 0–30)

## 2023-09-26 PROCEDURE — 80061 LIPID PANEL: CPT

## 2023-09-26 PROCEDURE — 36415 COLL VENOUS BLD VENIPUNCTURE: CPT

## 2023-09-26 PROCEDURE — 80048 BASIC METABOLIC PNL TOTAL CA: CPT

## 2023-09-27 ENCOUNTER — OFFICE VISIT (OUTPATIENT)
Dept: PHYSICAL THERAPY | Age: 83
End: 2023-09-27
Attending: PODIATRIST
Payer: MEDICARE

## 2023-09-27 PROCEDURE — 97112 NEUROMUSCULAR REEDUCATION: CPT

## 2023-09-27 PROCEDURE — 97110 THERAPEUTIC EXERCISES: CPT

## 2023-09-27 NOTE — PROGRESS NOTES
Diagnosis:     Difficulty walking (R26.2)   Referring Provider: Ewa  Date of Evaluation:    9/1/23    Precautions:  Fall Risk Next MD visit:   none scheduled  Date of Surgery: n/a   Insurance Primary/Secondary: Enzo Heredia / N/A     # Auth Visits: no limit, no auth            Subjective: Pt states that her ankle feels better today but her neck and shoulders are hurting. Pain: 0/10      Objective:   See flow sheet  Tandem balance 1\" bilat      Assessment:  Pt did well walking with cane in clinic and did better with cane used in her left hand. She is doing better with stride balance and will benefit from progression of this for increased challenge. Goals:    (to be met in 10 visits)  Pt will demonstrate improved SLS to >3 seconds ARMINDA to promote safety and decrease risk of falls on uneven surfaces such as grass  Pt will perform TUG in <14 seconds with least restrictive AD, demonstrating improved gait speed for improved participation in ADL such as community ambulation  Pt will be able to squat to  light objects around the house without loss of stability  Pt will improve functional hip strength to demonstrate ability to ascend/descend 1 flight of stairs reciprocally without use of handrail  Pt will be independent and compliant with comprehensive HEP to maintain progress achieved in PT    Plan: Continue LE strengthening and endurance and static and dynamic balance activities. Progress stride balance activity.   Date: 9/13/23                TX#: 4/10 Date: 9/15/23                TX#: 5/10 Date: 9/20/23  Tx#: 6/10 Date: 9/22/23  Tx#: 7/10 Date: 9/27/23  Tx#: 8/10   Ther Ex: 25 min  Nustep, LE, L3 5 min  Bridges 10x   Supine clam with red TB 2x10  Supine Hip flexor stretch with leg off edge of plinth 30\" bilat  Seated marches 20x  Sit to stand without UE assist 10x  Standing hip abd 2x8 bilat  Standing hip ext 2x8 bilat  Step ups with bilat railings 5x R/L Ther Ex: 25 min  Nustep, LE, L3 5 min  Berry Phlegm 10x   Supine clam with red TB 2x10  Supine Hip flexor stretch with leg off edge of plinth 30\" bilat  Seated marches 20x  Sit to stand without UE assist 10x  Standing hip abd 2x10 bilat  Standing hip ext 2x10 bilat  Step ups with bilat railings 5x R/L  Seated heel raises 20x Ther Ex: 25 min  Nustep, LE, L3 5 min  Bridges 10x   Supine clam with green TB 2x10  Supine Hip flexor stretch with leg off edge of plinth 30\" bilat  Seated marches 20x  Sit to stand without UE assist 10x  Standing hip abd 2x10 bilat  Standing hip ext 2x10 bilat  Step ups with bilat railings 5x R/L  Seated heel raises 20x Ther Ex: 25 min  Nustep, LE, L3 5 min  Bridges 10x   Supine clam with green TB 2x10  Supine Hip flexor stretch with leg off edge of plinth 30\" bilat  Seated marches 20x  Sit to stand without UE assist 10x  Standing hip abd 2x10 bilat  Standing hip ext 2x10 bilat  Step ups with bilat railings 5x R/L  Seated heel raises 20x Ther Ex: 25 min  Nustep, LE, L3 5 min  Bridges 10x   Supine clam with green TB 2x10  Seated marches 20x  Sit to stand without UE assist 10x  Standing hip abd 2x10 bilat  Standing hip ext 2x10 bilat  Seated heel raises 20x   NMR: 15 min  NBOS balance on Airex 15\" 2x  Weight shifting side to side on Airex 30\"   Side stepping at bar 2 laps  Stride balance 10\" 2x bilat  Standing slow marching with holding bar 10 x R/L  Foot taps up to 3 inch step with holding railing 10x R/L  NMR: 15 min  NBOS balance on Airex 15\" 2x  Weight shifting side to side on Airex 30\"   Side stepping at bar 2 laps  Stride balance 10\" 2x bilat  Standing slow marching with holding bar 10 x R/L  Foot taps up to 3 inch step with holding railing 10x R/L  NMR: 15 min  NBOS balance on Airex 15\" 2x  Weight shifting side to side on Airex 30\"   Side stepping at bar 2 laps  Stride balance 10\" 2x bilat  Standing slow marching on Airex with holding bar 10 x R/L  Foot taps up to 3 inch step with holding railing 10x R/L  NMR: 15 min  NBOS balance on Airex 15\" 2x  Weight shifting side to side on Airex 30\"   Side stepping at bar 2 laps  Stride balance 10\" 2x bilat  Standing slow marching on Airex with holding bar 10 x R/L  Foot taps up to 3 inch step with holding railing 10x R/L  NMR: 15 min  NBOS balance on Airex 30\" 1x  Weight shifting side to side on Airex 30\"   Side stepping at bar 2 laps  Stride balance 10\" 2x bilat  Standing slow marching on Airex with holding bar 20 x R/L  Foot taps up to 3 inch step with holding railing 2x10 R/L   Gait with cane with CGA 60 ft                 HEP: seated marches, sit to stand, standing hip abd, narrow base of support balance at counter  9/6/23: added supine clam with red TB    Charges: 2 TE, 1 NMR       Total Timed Treatment: 40  min  Total Treatment Time: 40 min

## 2023-09-29 ENCOUNTER — OFFICE VISIT (OUTPATIENT)
Dept: PHYSICAL THERAPY | Age: 83
End: 2023-09-29
Attending: PODIATRIST
Payer: MEDICARE

## 2023-09-29 PROCEDURE — 97110 THERAPEUTIC EXERCISES: CPT

## 2023-09-29 PROCEDURE — 97112 NEUROMUSCULAR REEDUCATION: CPT

## 2023-09-29 NOTE — PROGRESS NOTES
Diagnosis:     Difficulty walking (R26.2)   Referring Provider: Ewa  Date of Evaluation:    9/1/23    Precautions:  Fall Risk Next MD visit:   none scheduled  Date of Surgery: n/a   Insurance Primary/Secondary: Jose Lay / N/A     # Auth Visits: no limit, no auth            Subjective: Pt states that she went to the store after last visit and the parking lot was crowded so she decided to leave because she didn't think she could walk that much. Pain: 0/10      Objective:   See flow sheet      Assessment:  Pt did well with stride balance progression. She does still need some assist of railing to perform heel taps up to 3 inch step. Goals:    (to be met in 10 visits)  Pt will demonstrate improved SLS to >3 seconds ARMINDA to promote safety and decrease risk of falls on uneven surfaces such as grass  Pt will perform TUG in <14 seconds with least restrictive AD, demonstrating improved gait speed for improved participation in ADL such as community ambulation  Pt will be able to squat to  light objects around the house without loss of stability  Pt will improve functional hip strength to demonstrate ability to ascend/descend 1 flight of stairs reciprocally without use of handrail  Pt will be independent and compliant with comprehensive HEP to maintain progress achieved in PT    Plan: Continue LE strengthening and endurance and static and dynamic balance activities. Progress Summary next visit.   Date: 9/15/23                TX#: 5/10 Date: 9/20/23  Tx#: 6/10 Date: 9/22/23  Tx#: 7/10 Date: 9/27/23  Tx#: 8/10 Date: 9/29/23  Tx#: 9/10   Ther Ex: 25 min  Nustep, LE, L3 5 min  Bridges 10x   Supine clam with red TB 2x10  Supine Hip flexor stretch with leg off edge of plinth 30\" bilat  Seated marches 20x  Sit to stand without UE assist 10x  Standing hip abd 2x10 bilat  Standing hip ext 2x10 bilat  Step ups with bilat railings 5x R/L  Seated heel raises 20x Ther Ex: 25 min  Nustep, LE, L3 5 min  Bridges 10x Supine clam with green TB 2x10  Supine Hip flexor stretch with leg off edge of plinth 30\" bilat  Seated marches 20x  Sit to stand without UE assist 10x  Standing hip abd 2x10 bilat  Standing hip ext 2x10 bilat  Step ups with bilat railings 5x R/L  Seated heel raises 20x Ther Ex: 25 min  Nustep, LE, L3 5 min  Bridges 10x   Supine clam with green TB 2x10  Supine Hip flexor stretch with leg off edge of plinth 30\" bilat  Seated marches 20x  Sit to stand without UE assist 10x  Standing hip abd 2x10 bilat  Standing hip ext 2x10 bilat  Step ups with bilat railings 5x R/L  Seated heel raises 20x Ther Ex: 25 min  Nustep, LE, L3 5 min  Bridges 10x   Supine clam with green TB 2x10  Seated marches 20x  Sit to stand without UE assist 10x  Standing hip abd 2x10 bilat  Standing hip ext 2x10 bilat  Seated heel raises 20x Ther Ex: 25 min  Nustep, LE, L3 5 min  Bridges 10x   Supine clam with green TB 2x10  Seated marches 20x  Sit to stand without UE assist 10x  Standing hip abd 2x10 bilat  Standing hip ext 2x10 bilat   NMR: 15 min  NBOS balance on Airex 15\" 2x  Weight shifting side to side on Airex 30\"   Side stepping at bar 2 laps  Stride balance 10\" 2x bilat  Standing slow marching with holding bar 10 x R/L  Foot taps up to 3 inch step with holding railing 10x R/L  NMR: 15 min  NBOS balance on Airex 15\" 2x  Weight shifting side to side on Airex 30\"   Side stepping at bar 2 laps  Stride balance 10\" 2x bilat  Standing slow marching on Airex with holding bar 10 x R/L  Foot taps up to 3 inch step with holding railing 10x R/L  NMR: 15 min  NBOS balance on Airex 15\" 2x  Weight shifting side to side on Airex 30\"   Side stepping at bar 2 laps  Stride balance 10\" 2x bilat  Standing slow marching on Airex with holding bar 10 x R/L  Foot taps up to 3 inch step with holding railing 10x R/L  NMR: 15 min  NBOS balance on Airex 30\" 1x  Weight shifting side to side on Airex 30\"   Side stepping at bar 2 laps  Stride balance 10\" 2x  Standing slow marching on Airex with holding bar 20 x R/L  Foot taps up to 3 inch step with holding railing 2x10 R/L   Gait with cane with CGA 40 ft NMR: 15 min  NBOS balance on Airex 30\" 1x  Weight shifting side to side on Airex 30\"   Side stepping at bar 2 laps  Stride balance with UE reaches in front 10x R/L  Standing slow marching on Airex with holding bar 20 x R/L  Foot taps up to 3 inch step with holding railing 2x10 R/L   Gait with cane with CGA 40 ft                 HEP: seated marches, sit to stand, standing hip abd, narrow base of support balance at counter  9/6/23: added supine clam with red TB    Charges: 2 TE, 1 NMR       Total Timed Treatment: 40  min  Total Treatment Time: 40 min

## 2023-10-10 ENCOUNTER — OFFICE VISIT (OUTPATIENT)
Dept: PHYSICAL THERAPY | Age: 83
End: 2023-10-10
Attending: INTERNAL MEDICINE
Payer: MEDICARE

## 2023-10-10 PROCEDURE — 97110 THERAPEUTIC EXERCISES: CPT

## 2023-10-10 PROCEDURE — 97112 NEUROMUSCULAR REEDUCATION: CPT

## 2023-10-10 NOTE — PROGRESS NOTES
Diagnosis:     Difficulty walking (R26.2)   Referring Provider: No ref. provider found  Date of Evaluation:    9/1/23    Precautions:  Fall Risk Next MD visit:   none scheduled  Date of Surgery: n/a   Insurance Primary/Secondary: Taj Gomes / N/A     # Auth Visits: no limit, no auth             Progress Summary  Pt has attended 10 visits in Physical Therapy. Subjective: Pt states  that she would like to continue PT to keep up with working on her balance. She feels more steady and comfortable walking around the house but out of the house she still feels limited with her walking distance and feels more unsteady to walk on uneven surfaces. She continues to use her wheeled walker. Pain: 0/10      Objective: Tandem balance R 1 sec; L 2 sec  SLS R 1 sec; L 1 sec  TUG: 15 sec  30 sec Sit <-> Stand: 8x    Assessment:  Pt is making progress in PT with improved ability with sit to stand transfers and walking with a cane in the clinic with CGA. She does continue to walk with a wheeled walker in the community but would like to continue working on her balance and strength in PT to be able to safely ambulate in the community with a cane.       Goals:    (to be met in 18 visits)  Pt will demonstrate improved SLS to >3 seconds ARMINDA to promote safety and decrease risk of falls on uneven surfaces such as grass  In Progress  Pt will perform TUG in <14 seconds with least restrictive AD, demonstrating improved gait speed for improved participation in ADL such as community ambulation  In progress  Pt will be able to squat to  light objects around the house without loss of stability  In progress-holds onto walker  Pt will improve functional hip strength to demonstrate ability to ascend/descend 1 flight of stairs reciprocally with use of handrail  In progress  Pt will be independent and compliant with comprehensive HEP to maintain progress achieved in PT  Ongoing      Plan: Continue skilled Physical Therapy 1 x/week or a total of 8 visits over a 90 day period. Treatment will include: LE strengthening and static and dynamic balance activities. Patient/Family/Caregiver was advised of these findings, precautions, and treatment options and has agreed to actively participate in planning and for this course of care. Thank you for your referral. If you have any questions, please contact me at Dept: [phone]. Sincerely,  Electronically signed by therapist: Robbie Fierro PT     Physician's certification required:  Yes  Please co-sign or sign and return this letter via fax as soon as possible to [phone]. I certify the need for these services furnished under this plan of treatment and while under my care.     X___________________________________________________ Date____________________    Certification From: [id number]  To:1/8/2024     Date: 9/20/23  Tx#: 6/10 Date: 9/22/23  Tx#: 7/10 Date: 9/27/23  Tx#: 8/10 Date: 9/29/23  Tx#: 9/10 Date: 10/10/23  Tx#: 10/10   Ther Ex: 25 min  Nustep, LE, L3 5 min  Bridges 10x   Supine clam with green TB 2x10  Supine Hip flexor stretch with leg off edge of plinth 30\" bilat  Seated marches 20x  Sit to stand without UE assist 10x  Standing hip abd 2x10 bilat  Standing hip ext 2x10 bilat  Step ups with bilat railings 5x R/L  Seated heel raises 20x Ther Ex: 25 min  Nustep, LE, L3 5 min  Bridges 10x   Supine clam with green TB 2x10  Supine Hip flexor stretch with leg off edge of plinth 30\" bilat  Seated marches 20x  Sit to stand without UE assist 10x  Standing hip abd 2x10 bilat  Standing hip ext 2x10 bilat  Step ups with bilat railings 5x R/L  Seated heel raises 20x Ther Ex: 25 min  Nustep, LE, L3 5 min  Bridges 10x   Supine clam with green TB 2x10  Seated marches 20x  Sit to stand without UE assist 10x  Standing hip abd 2x10 bilat  Standing hip ext 2x10 bilat  Seated heel raises 20x Ther Ex: 25 min  Nustep, LE, L3 5 min  Bridges 10x   Supine clam with green TB 2x10  Seated marches 20x  Sit to stand without UE assist 10x  Standing hip abd 2x10 bilat  Standing hip ext 2x10 bilat Ther Ex: 25 min  Nustep, LE, L3 5 min    Seated marches 20x  Sit to stand without UE assist 10x  Standing hip abd 2x10 bilat  Standing hip ext 2x10 bilat  TUG test   NMR: 15 min  NBOS balance on Airex 15\" 2x  Weight shifting side to side on Airex 30\"   Side stepping at bar 2 laps  Stride balance 10\" 2x bilat  Standing slow marching on Airex with holding bar 10 x R/L  Foot taps up to 3 inch step with holding railing 10x R/L  NMR: 15 min  NBOS balance on Airex 15\" 2x  Weight shifting side to side on Airex 30\"   Side stepping at bar 2 laps  Stride balance 10\" 2x bilat  Standing slow marching on Airex with holding bar 10 x R/L  Foot taps up to 3 inch step with holding railing 10x R/L  NMR: 15 min  NBOS balance on Airex 30\" 1x  Weight shifting side to side on Airex 30\"   Side stepping at bar 2 laps  Stride balance 10\" 2x  Standing slow marching on Airex with holding bar 20 x R/L  Foot taps up to 3 inch step with holding railing 2x10 R/L   Gait with cane with CGA 40 ft NMR: 15 min  NBOS balance on Airex 30\" 1x  Weight shifting side to side on Airex 30\"   Side stepping at bar 2 laps  Stride balance with UE reaches in front 10x R/L  Standing slow marching on Airex with holding bar 20 x R/L  Foot taps up to 3 inch step with holding railing 2x10 R/L   Gait with cane with CGA 40 ft NMR: 15 min  NBOS balance on Airex 30\" 1x  Weight shifting side to side on Airex 30\"   Side stepping at bar 2 laps  Stride balance with UE reaches in front 10x R/L  Standing slow marching on Airex with holding bar 20 x R/L  Foot taps up to 3 inch step with holding railing 2x10 R/L                  HEP: seated marches, sit to stand, standing hip abd, narrow base of support balance at counter  9/6/23: added supine clam with red TB    Charges: 2 TE, 1 NMR       Total Timed Treatment: 40  min  Total Treatment Time: 40 min

## 2023-10-17 ENCOUNTER — LAB ENCOUNTER (OUTPATIENT)
Dept: LAB | Facility: HOSPITAL | Age: 83
End: 2023-10-17
Attending: INTERNAL MEDICINE
Payer: MEDICARE

## 2023-10-17 DIAGNOSIS — Z51.81 THERAPEUTIC DRUG MONITORING: ICD-10-CM

## 2023-10-17 DIAGNOSIS — M06.09 RHEUMATOID ARTHRITIS OF MULTIPLE SITES WITH NEGATIVE RHEUMATOID FACTOR (HCC): ICD-10-CM

## 2023-10-17 LAB
ALBUMIN SERPL-MCNC: 3.5 G/DL (ref 3.4–5)
AST SERPL-CCNC: 22 U/L (ref 15–37)
BASOPHILS # BLD AUTO: 0.06 X10(3) UL (ref 0–0.2)
BASOPHILS NFR BLD AUTO: 1.2 %
CREAT BLD-MCNC: 1.15 MG/DL
CRP SERPL-MCNC: <0.29 MG/DL (ref ?–0.3)
DEPRECATED RDW RBC AUTO: 61.6 FL (ref 35.1–46.3)
EGFRCR SERPLBLD CKD-EPI 2021: 47 ML/MIN/1.73M2 (ref 60–?)
EOSINOPHIL # BLD AUTO: 0.19 X10(3) UL (ref 0–0.7)
EOSINOPHIL NFR BLD AUTO: 3.8 %
ERYTHROCYTE [DISTWIDTH] IN BLOOD BY AUTOMATED COUNT: 16.2 % (ref 11–15)
ERYTHROCYTE [SEDIMENTATION RATE] IN BLOOD: 17 MM/HR
HCT VFR BLD AUTO: 34.8 %
HGB BLD-MCNC: 11.2 G/DL
IMM GRANULOCYTES # BLD AUTO: 0.02 X10(3) UL (ref 0–1)
IMM GRANULOCYTES NFR BLD: 0.4 %
LYMPHOCYTES # BLD AUTO: 2.29 X10(3) UL (ref 1–4)
LYMPHOCYTES NFR BLD AUTO: 45.5 %
MCH RBC QN AUTO: 33.2 PG (ref 26–34)
MCHC RBC AUTO-ENTMCNC: 32.2 G/DL (ref 31–37)
MCV RBC AUTO: 103.3 FL
MONOCYTES # BLD AUTO: 0.65 X10(3) UL (ref 0.1–1)
MONOCYTES NFR BLD AUTO: 12.9 %
NEUTROPHILS # BLD AUTO: 1.82 X10 (3) UL (ref 1.5–7.7)
NEUTROPHILS # BLD AUTO: 1.82 X10(3) UL (ref 1.5–7.7)
NEUTROPHILS NFR BLD AUTO: 36.2 %
PLATELET # BLD AUTO: 165 10(3)UL (ref 150–450)
RBC # BLD AUTO: 3.37 X10(6)UL
WBC # BLD AUTO: 5 X10(3) UL (ref 4–11)

## 2023-10-17 PROCEDURE — 84450 TRANSFERASE (AST) (SGOT): CPT

## 2023-10-17 PROCEDURE — 82040 ASSAY OF SERUM ALBUMIN: CPT

## 2023-10-17 PROCEDURE — 82565 ASSAY OF CREATININE: CPT

## 2023-10-17 PROCEDURE — 36415 COLL VENOUS BLD VENIPUNCTURE: CPT

## 2023-10-17 PROCEDURE — 86140 C-REACTIVE PROTEIN: CPT

## 2023-10-17 PROCEDURE — 85652 RBC SED RATE AUTOMATED: CPT

## 2023-10-17 PROCEDURE — 85025 COMPLETE CBC W/AUTO DIFF WBC: CPT

## 2023-10-18 ENCOUNTER — OFFICE VISIT (OUTPATIENT)
Dept: RHEUMATOLOGY | Facility: CLINIC | Age: 83
End: 2023-10-18

## 2023-10-18 VITALS
HEART RATE: 83 BPM | SYSTOLIC BLOOD PRESSURE: 117 MMHG | WEIGHT: 231 LBS | DIASTOLIC BLOOD PRESSURE: 78 MMHG | BODY MASS INDEX: 39.44 KG/M2 | HEIGHT: 64 IN

## 2023-10-18 DIAGNOSIS — M06.09 RHEUMATOID ARTHRITIS OF MULTIPLE SITES WITH NEGATIVE RHEUMATOID FACTOR (HCC): Primary | ICD-10-CM

## 2023-10-18 DIAGNOSIS — M1A.9XX1 CHRONIC TOPHACEOUS GOUT OF RIGHT FOOT: ICD-10-CM

## 2023-10-18 DIAGNOSIS — Z51.81 THERAPEUTIC DRUG MONITORING: ICD-10-CM

## 2023-10-18 DIAGNOSIS — N28.9 RENAL INSUFFICIENCY: ICD-10-CM

## 2023-10-18 PROCEDURE — 3078F DIAST BP <80 MM HG: CPT | Performed by: INTERNAL MEDICINE

## 2023-10-18 PROCEDURE — 1159F MED LIST DOCD IN RCRD: CPT | Performed by: INTERNAL MEDICINE

## 2023-10-18 PROCEDURE — 3074F SYST BP LT 130 MM HG: CPT | Performed by: INTERNAL MEDICINE

## 2023-10-18 PROCEDURE — 1160F RVW MEDS BY RX/DR IN RCRD: CPT | Performed by: INTERNAL MEDICINE

## 2023-10-18 PROCEDURE — 1125F AMNT PAIN NOTED PAIN PRSNT: CPT | Performed by: INTERNAL MEDICINE

## 2023-10-18 PROCEDURE — 3008F BODY MASS INDEX DOCD: CPT | Performed by: INTERNAL MEDICINE

## 2023-10-18 PROCEDURE — 99214 OFFICE O/P EST MOD 30 MIN: CPT | Performed by: INTERNAL MEDICINE

## 2023-10-18 RX ORDER — METHOTREXATE 2.5 MG/1
TABLET ORAL
Qty: 52 TABLET | Refills: 1 | Status: SHIPPED | OUTPATIENT
Start: 2023-10-18

## 2023-10-18 NOTE — PATIENT INSTRUCTIONS
You were seen today for rheumatoid arthritis  Symptoms are stable  Continue Enbrel weekly, methotrexate 4 pills weekly, hydroxychloroquine 2 pills a day  Blood work in February  See me in February

## 2023-10-24 ENCOUNTER — OFFICE VISIT (OUTPATIENT)
Dept: PHYSICAL THERAPY | Age: 83
End: 2023-10-24
Attending: INTERNAL MEDICINE
Payer: MEDICARE

## 2023-10-24 PROCEDURE — 97110 THERAPEUTIC EXERCISES: CPT

## 2023-10-24 NOTE — PROGRESS NOTES
Diagnosis:     Difficulty walking (R26.2)   Referring Provider: No ref. provider found  Date of Evaluation:    9/1/23    Precautions:  Fall Risk Next MD visit:   none scheduled  Date of Surgery: n/a   Insurance Primary/Secondary: Mendel Settles / N/A     # Auth Visits: no limit, no auth              Subjective:   Pt states that she noticed improvement today when she wasn't leaning on the sink. She has been doing her exercises throughout the days. Pain: 0/10    Objective:   10/10/23:  Tandem balance R 1 sec; L 2 sec  SLS R 1 sec; L 1 sec  TUG: 15 sec  30 sec Sit <-> Stand: 8x    Assessment:  Ended session earlier today because pt was fatigued. She is doing well with balance activities but continues to need rest breaks between standing exercises. Goals:    (to be met in 18 visits)  Pt will demonstrate improved SLS to >3 seconds ARMINDA to promote safety and decrease risk of falls on uneven surfaces such as grass  In Progress  Pt will perform TUG in <14 seconds with least restrictive AD, demonstrating improved gait speed for improved participation in ADL such as community ambulation  In progress  Pt will be able to squat to  light objects around the house without loss of stability  In progress-holds onto walker  Pt will improve functional hip strength to demonstrate ability to ascend/descend 1 flight of stairs reciprocally with use of handrail  In progress  Pt will be independent and compliant with comprehensive HEP to maintain progress achieved in PT  Ongoing    Plan:   Continue PT for LE strengthening, gait training and balance.         Date: 9/22/23  Tx#: 7/10 Date: 9/27/23  Tx#: 8/10 Date: 9/29/23  Tx#: 9/10 Date: 10/10/23  Tx#: 10/10 Date: 10/24/23  Tx# 11/18   Ther Ex: 25 min  Nustep, LE, L3 5 min  Bridges 10x   Supine clam with green TB 2x10  Supine Hip flexor stretch with leg off edge of plinth 30\" bilat  Seated marches 20x  Sit to stand without UE assist 10x  Standing hip abd 2x10 bilat  Standing hip ext 2x10 bilat  Step ups with bilat railings 5x R/L  Seated heel raises 20x Ther Ex: 25 min  Nustep, LE, L3 5 min  Bridges 10x   Supine clam with green TB 2x10  Seated marches 20x  Sit to stand without UE assist 10x  Standing hip abd 2x10 bilat  Standing hip ext 2x10 bilat  Seated heel raises 20x Ther Ex: 25 min  Nustep, LE, L3 5 min  Bridges 10x   Supine clam with green TB 2x10  Seated marches 20x  Sit to stand without UE assist 10x  Standing hip abd 2x10 bilat  Standing hip ext 2x10 bilat Ther Ex: 25 min  Nustep, LE, L3 5 min    Seated marches 20x  Sit to stand without UE assist 10x  Standing hip abd 2x10 bilat  Standing hip ext 2x10 bilat  TUG test Ther Ex: 20 min  Nustep, LE, L4 5 min  Bridges 10x  Supine clam with green TB 2x10  Seated marches 20x  Sit to stand without UE assist 10x  Standing hip abd 2x10 bilat  Standing hip ext 2x10 bilat  Seated heel raises 20x     NMR: 15 min  NBOS balance on Airex 15\" 2x  Weight shifting side to side on Airex 30\"   Side stepping at bar 2 laps  Stride balance 10\" 2x bilat  Standing slow marching on Airex with holding bar 10 x R/L  Foot taps up to 3 inch step with holding railing 10x R/L  NMR: 15 min  NBOS balance on Airex 30\" 1x  Weight shifting side to side on Airex 30\"   Side stepping at bar 2 laps  Stride balance 10\" 2x  Standing slow marching on Airex with holding bar 20 x R/L  Foot taps up to 3 inch step with holding railing 2x10 R/L   Gait with cane with CGA 40 ft NMR: 15 min  NBOS balance on Airex 30\" 1x  Weight shifting side to side on Airex 30\"   Side stepping at bar 2 laps  Stride balance with UE reaches in front 10x R/L  Standing slow marching on Airex with holding bar 20 x R/L  Foot taps up to 3 inch step with holding railing 2x10 R/L   Gait with cane with CGA 40 ft NMR: 15 min  NBOS balance on Airex 30\" 1x  Weight shifting side to side on Airex 30\"   Side stepping at bar 2 laps  Stride balance with UE reaches in front 10x R/L  Standing slow marching on Airex with holding bar 20 x R/L  Foot taps up to 3 inch step with holding railing 2x10 R/L  NMR: 15 min  NBOS balance on Airex 30\" 1x  Weight shifting side to side on Airex 30\"   Side stepping at bar 3 laps  Stride balance with UE reaches in front 10x R/L  Standing slow marching on Airex with holding bar 20 x R/L  Foot taps up to 3 inch step with holding railing 2x10 R/L   Gait with cane with CGA 60 ft                 HEP: seated marches, sit to stand, standing hip abd, narrow base of support balance at counter  9/6/23: added supine clam with red TB    Charges: 2 TE      Total Timed Treatment: 35  min  Total Treatment Time: 35 min

## 2023-10-30 ENCOUNTER — APPOINTMENT (OUTPATIENT)
Dept: PHYSICAL THERAPY | Age: 83
End: 2023-10-30
Attending: INTERNAL MEDICINE
Payer: MEDICARE

## 2023-10-30 ENCOUNTER — APPOINTMENT (OUTPATIENT)
Dept: URBAN - METROPOLITAN AREA CLINIC 244 | Age: 83
Setting detail: DERMATOLOGY
End: 2023-10-31

## 2023-10-30 DIAGNOSIS — D22 MELANOCYTIC NEVI: ICD-10-CM

## 2023-10-30 DIAGNOSIS — L57.0 ACTINIC KERATOSIS: ICD-10-CM

## 2023-10-30 DIAGNOSIS — L82.0 INFLAMED SEBORRHEIC KERATOSIS: ICD-10-CM

## 2023-10-30 DIAGNOSIS — L81.4 OTHER MELANIN HYPERPIGMENTATION: ICD-10-CM

## 2023-10-30 DIAGNOSIS — L82.1 OTHER SEBORRHEIC KERATOSIS: ICD-10-CM

## 2023-10-30 DIAGNOSIS — Z85.828 PERSONAL HISTORY OF OTHER MALIGNANT NEOPLASM OF SKIN: ICD-10-CM

## 2023-10-30 PROBLEM — D22.5 MELANOCYTIC NEVI OF TRUNK: Status: ACTIVE | Noted: 2023-10-30

## 2023-10-30 PROCEDURE — OTHER LIQUID NITROGEN: OTHER

## 2023-10-30 PROCEDURE — OTHER BENIGN DESTRUCTION: OTHER

## 2023-10-30 PROCEDURE — OTHER COUNSELING: OTHER

## 2023-10-30 PROCEDURE — 17000 DESTRUCT PREMALG LESION: CPT | Mod: 59

## 2023-10-30 PROCEDURE — 99213 OFFICE O/P EST LOW 20 MIN: CPT | Mod: 25

## 2023-10-30 PROCEDURE — 17110 DESTRUCT B9 LESION 1-14: CPT

## 2023-10-30 ASSESSMENT — LOCATION DETAILED DESCRIPTION DERM
LOCATION DETAILED: LEFT SUPERIOR PREAURICULAR CHEEK
LOCATION DETAILED: RIGHT DISTAL PRETIBIAL REGION
LOCATION DETAILED: PERIUMBILICAL SKIN
LOCATION DETAILED: RIGHT MEDIAL UPPER BACK
LOCATION DETAILED: RIGHT LATERAL BUCCAL CHEEK
LOCATION DETAILED: RIGHT SUPERIOR UPPER BACK

## 2023-10-30 ASSESSMENT — LOCATION SIMPLE DESCRIPTION DERM
LOCATION SIMPLE: RIGHT PRETIBIAL REGION
LOCATION SIMPLE: RIGHT CHEEK
LOCATION SIMPLE: LEFT CHEEK
LOCATION SIMPLE: RIGHT UPPER BACK
LOCATION SIMPLE: ABDOMEN

## 2023-10-30 ASSESSMENT — LOCATION ZONE DERM
LOCATION ZONE: LEG
LOCATION ZONE: TRUNK
LOCATION ZONE: FACE

## 2023-10-30 NOTE — PROCEDURE: BENIGN DESTRUCTION
Treatment Number (Will Not Render If 0): 1
Include Z78.9 (Other Specified Conditions Influencing Health Status) As An Associated Diagnosis?: No
Medical Necessity Clause: This procedure was medically necessary because the lesions that were treated were:
Medical Necessity Information: It is in your best interest to select a reason for this procedure from the list below. All of these items fulfill various CMS LCD requirements except the new and changing color options.
Detail Level: Detailed
Consent: The patient's consent was obtained including but not limited to risks of crusting, scabbing, blistering, scarring, darker or lighter pigmentary change, recurrence, incomplete removal and infection.
Anesthesia Volume In Cc: 0.5
Post-Care Instructions: I reviewed with the patient in detail post-care instructions. Patient is to wear sunprotection, and avoid picking at any of the treated lesions. Pt may apply Vaseline to crusted or scabbing areas.

## 2023-11-06 DIAGNOSIS — I10 ESSENTIAL HYPERTENSION WITH GOAL BLOOD PRESSURE LESS THAN 140/90: ICD-10-CM

## 2023-11-07 ENCOUNTER — OFFICE VISIT (OUTPATIENT)
Dept: PHYSICAL THERAPY | Age: 83
End: 2023-11-07
Attending: INTERNAL MEDICINE
Payer: MEDICARE

## 2023-11-07 PROCEDURE — 97110 THERAPEUTIC EXERCISES: CPT

## 2023-11-07 PROCEDURE — 97112 NEUROMUSCULAR REEDUCATION: CPT

## 2023-11-07 RX ORDER — LISINOPRIL 20 MG/1
20 TABLET ORAL DAILY
Qty: 90 TABLET | Refills: 1 | Status: SHIPPED | OUTPATIENT
Start: 2023-11-07

## 2023-11-07 NOTE — TELEPHONE ENCOUNTER
Please review; protocol failed/no protocol. Requested Prescriptions   Pending Prescriptions Disp Refills    LISINOPRIL 20 MG Oral Tab [Pharmacy Med Name: Lisinopril 20 Mg Tab Lupi] 90 tablet 0     Sig: Take 1 tablet by mouth daily. Hypertensive Medications Protocol Failed - 11/6/2023  7:51 AM        Failed - EGFRCR or GFRNAA > 50     GFR Evaluation  EGFRCR: 47 , resulted on 10/17/2023          Passed - In person appointment in the past 12 or next 3 months     Recent Outpatient Visits              1 week ago     [address] in [facility], [place], [place]    Office Visit    2 weeks ago Rheumatoid arthritis of multiple sites with negative rheumatoid factor ([facility] [address]    [address], [address], Isi Jones MD    Office Visit    3 weeks ago     [address] in [facility], [place], [address]    1 month ago     [email or web address] in [facility], [place], [address]    1 month ago     [address] in [facility], [place], [address] Visit          Future Appointments         Provider Department Appt Notes    [address], [place], [address] in [place]  no c/p, no limit, no auth    In 1 week [place], [place], [address] in [place]  no c/p, no limit, no auth    In 3 weeks Lise Adkins MD [facility] [address] [facility] Urogynecology INCONTINENCE, REF.  BY DR. Jeanette Balderrama    In 4 weeks Felipe Dietrich MD [address], [address] [facility] 6 months    In 2 months [facility] [address], [place]                       Passed - Last BP reading less than 140/90     BP Readings from Last 1 Encounters:  10/18/23 : 117/78              Passed - CMP or BMP in past 6 months     Recent Results (from the past 4392 hour(s))   Basic Metabolic Panel (8)    Collection Time: 09/26/23  9:27 AM   Result Value Ref Range    Glucose 113 (H) 70 - 99 mg/dL    Sodium 144 136 - 145 mmol/L    Potassium 4.1 3.5 - 5.1 mmol/L    Chloride 114 (H) 98 - 112 mmol/L    CO2 25.0 21.0 - 32.0 mmol/L    Anion Gap 5 0 - 18 mmol/L    BUN 30 (H) 7 - 18 mg/dL    Creatinine 1.39 (H) 0.55 - 1.02 mg/dL    BUN/CREA Ratio 21.6 (H) 10.0 - 20.0    Calcium, Total 9.1 8.5 - 10.1 mg/dL    Calculated Osmolality 305 (H) 275 - 295 mOsm/kg    eGFR-Cr 38 (L) >=60 mL/min/1.73m2    Patient Fasting for BMP? Yes      *Note: Due to a large number of results and/or encounters for the requested time period, some results have not been displayed. A complete set of results can be found in Results Review. Passed - In person appointment or virtual visit in the past 6 months     Recent Outpatient Visits              1 week ago     [address] [place] [address] in [facility], [place], [place]    Office Visit    2 weeks ago Rheumatoid arthritis of multiple sites with negative rheumatoid factor ([facility] [address]    [address] [facility],[address], [address] [facility],[address], Florecita Noonan MD    Office Visit    3 weeks ago     [address] [place] [address] in [facility], [place], [address]    1 month ago     [address] [place] [address] in [facility], [place], [address]    1 month ago     [address] [place] [address] in [facility], [place], [address] [facility] Visit          Future Appointments         Provider Department Appt Notes    [address], [place], [address] in [place]  no c/p, no limit, no auth    In 1 week [place], [place], [address] in [place]  no c/p, no limit, no auth    In 3 weeks Romeo Noe MD [facility] [address] [facility] Urogynecology INCONTINENCE, REF.  BY DR. Ed Marte    In 4 weeks Yecenia Cruz MD [address], [place] [address] 6 months    In 2 months Geronimo Patterson MD [address], [address] [facility],[address], [place]                             Recent Outpatient Visits              1 week ago     [facility] in [place], [place], [place]    Office Visit    2 weeks ago Rheumatoid arthritis of multiple sites with negative rheumatoid factor ([address]    [address], [address], Humberto Noonan MD    Office Visit    3 weeks ago     [facility] in [place], [place], [address] [place] Visit    1 month ago     [facility] in [place], [place], [address]    1 month ago     [facility] in [place], [place], [address] [place] Visit             Future Appointments         Provider Department Appt Notes    [address], [place], [address] in [place]  no c/p, no limit, no auth    In 1 week [place], [place], [address] in [place]  no c/p, no limit, no auth    In 3 weeks Kevin Lakhani MD [facility] [address] [facility] Urogynecology INCONTINENCE, REF.  BY DR. Minerva Goddard    In 4 weeks Garrett Parra MD [address], [address] 6 months    In 2 months Kusum Aguiar MD [address], [address], [place]

## 2023-11-07 NOTE — PROGRESS NOTES
Diagnosis:     Difficulty walking (R26.2)   Referring Provider: No ref. provider found  Date of Evaluation:    9/1/23    Precautions:  Fall Risk Next MD visit:   none scheduled  Date of Surgery: n/a   Insurance Primary/Secondary: Efren Gaytan / N/A     # Auth Visits: no limit, no auth              Subjective:   Pt states she feels so/so. She fell onto the couch last week when she lost her balance reaching down for something. She did not get hurt. Pain: 0/10    Objective:   10/10/23:  Tandem balance R 1 sec; L 2 sec  SLS R 1 sec; L 1 sec  TUG: 15 sec  30 sec Sit <-> Stand: 8x    Assessment:  Pt did well with gait with cane. She has begun walking with her cane for short distances like when her kids drop her off in front of a restaurant. Pt continues to have poor endurance and needs rest breaks between standing balance activities. Goals:    (to be met in 18 visits)  Pt will demonstrate improved SLS to >3 seconds ARMINDA to promote safety and decrease risk of falls on uneven surfaces such as grass  In Progress  Pt will perform TUG in <14 seconds with least restrictive AD, demonstrating improved gait speed for improved participation in ADL such as community ambulation  In progress  Pt will be able to squat to  light objects around the house without loss of stability  In progress-holds onto walker  Pt will improve functional hip strength to demonstrate ability to ascend/descend 1 flight of stairs reciprocally with use of handrail  In progress  Pt will be independent and compliant with comprehensive HEP to maintain progress achieved in PT  Ongoing    Plan:   Continue PT for LE strengthening, gait training and balance.         Date: 9/22/23  Tx#: 7/10 Date: 9/27/23  Tx#: 8/10 Date: 9/29/23  Tx#: 9/10 Date: 10/10/23  Tx#: 10/10 Date: 10/24/23  Tx# 11/18 Date: 11/7/23  Tx#: 12/18   Ther Ex: 25 min  Nustep, LE, L3 5 min  Bridges 10x   Supine clam with green TB 2x10  Supine Hip flexor stretch with leg off edge of plinth 30\" bilat  Seated marches 20x  Sit to stand without UE assist 10x  Standing hip abd 2x10 bilat  Standing hip ext 2x10 bilat  Step ups with bilat railings 5x R/L  Seated heel raises 20x Ther Ex: 25 min  Nustep, LE, L3 5 min  Bridges 10x   Supine clam with green TB 2x10  Seated marches 20x  Sit to stand without UE assist 10x  Standing hip abd 2x10 bilat  Standing hip ext 2x10 bilat  Seated heel raises 20x Ther Ex: 25 min  Nustep, LE, L3 5 min  Bridges 10x   Supine clam with green TB 2x10  Seated marches 20x  Sit to stand without UE assist 10x  Standing hip abd 2x10 bilat  Standing hip ext 2x10 bilat Ther Ex: 25 min  Nustep, LE, L3 5 min    Seated marches 20x  Sit to stand without UE assist 10x  Standing hip abd 2x10 bilat  Standing hip ext 2x10 bilat  TUG test Ther Ex: 20 min  Nustep, LE, L4 5 min  Bridges 10x  Supine clam with green TB 2x10  Seated marches 20x  Sit to stand without UE assist 10x  Standing hip abd 2x10 bilat  Standing hip ext 2x10 bilat  Seated heel raises 20x   Ther Ex: 23 min  Nustep, LE, L4 5 min  Bridges 10x  Supine clam with green TB 2x10  Seated marches 20x  Sit to stand without UE assist 10x  Standing hip abd 2x10 bilat  Standing hip ext 2x10 bilat     NMR: 15 min  NBOS balance on Airex 15\" 2x  Weight shifting side to side on Airex 30\"   Side stepping at bar 2 laps  Stride balance 10\" 2x bilat  Standing slow marching on Airex with holding bar 10 x R/L  Foot taps up to 3 inch step with holding railing 10x R/L  NMR: 15 min  NBOS balance on Airex 30\" 1x  Weight shifting side to side on Airex 30\"   Side stepping at bar 2 laps  Stride balance 10\" 2x  Standing slow marching on Airex with holding bar 20 x R/L  Foot taps up to 3 inch step with holding railing 2x10 R/L   Gait with cane with CGA 40 ft NMR: 15 min  NBOS balance on Airex 30\" 1x  Weight shifting side to side on Airex 30\"   Side stepping at bar 2 laps  Stride balance with UE reaches in front 10x R/L  Standing slow marching on Airex with holding bar 20 x R/L  Foot taps up to 3 inch step with holding railing 2x10 R/L   Gait with cane with CGA 40 ft NMR: 15 min  NBOS balance on Airex 30\" 1x  Weight shifting side to side on Airex 30\"   Side stepping at bar 2 laps  Stride balance with UE reaches in front 10x R/L  Standing slow marching on Airex with holding bar 20 x R/L  Foot taps up to 3 inch step with holding railing 2x10 R/L  NMR: 15 min  NBOS balance on Airex 30\" 1x  Weight shifting side to side on Airex 30\"   Side stepping at bar 3 laps  Stride balance with UE reaches in front 10x R/L  Standing slow marching on Airex with holding bar 20 x R/L  Foot taps up to 3 inch step with holding railing 2x10 R/L   Gait with cane with CGA 60 ft NMR: 17 min  NBOS balance on Airex 30\" 1x  Weight shifting side to side on Airex 30\"   Side stepping at bar 3 laps  Stride balance with UE reaches in front 10x R/L  Standing slow marching on Airex with holding bar 20 x R/L  Foot taps up to 3 inch step with holding railing 2x10 R/L   Gait with cane with CGA 60 ft                   HEP: seated marches, sit to stand, standing hip abd, narrow base of support balance at counter  9/6/23: added supine clam with red TB    Charges: 2 TE, 1 NMR      Total Timed Treatment: 40  min  Total Treatment Time: 40 min

## 2023-11-14 ENCOUNTER — APPOINTMENT (OUTPATIENT)
Dept: PHYSICAL THERAPY | Age: 83
End: 2023-11-14
Attending: INTERNAL MEDICINE
Payer: MEDICARE

## 2023-11-27 ENCOUNTER — OFFICE VISIT (OUTPATIENT)
Dept: OTOLARYNGOLOGY | Facility: CLINIC | Age: 83
End: 2023-11-27

## 2023-11-27 DIAGNOSIS — H61.23 BILATERAL IMPACTED CERUMEN: Primary | ICD-10-CM

## 2023-11-28 ENCOUNTER — OFFICE VISIT (OUTPATIENT)
Dept: UROLOGY | Facility: HOSPITAL | Age: 83
End: 2023-11-28
Attending: OBSTETRICS & GYNECOLOGY
Payer: MEDICARE

## 2023-11-28 VITALS — WEIGHT: 231 LBS | RESPIRATION RATE: 18 BRPM | HEIGHT: 64 IN | BODY MASS INDEX: 39.44 KG/M2

## 2023-11-28 DIAGNOSIS — R39.15 URGENCY OF URINATION: Primary | ICD-10-CM

## 2023-11-28 DIAGNOSIS — R35.1 NOCTURIA: ICD-10-CM

## 2023-11-28 LAB
BLOOD URINE: NEGATIVE
CONTROL RUN WITHIN 24 HOURS?: YES
LEUKOCYTE ESTERASE URINE: NEGATIVE
NITRITE URINE: NEGATIVE

## 2023-11-28 PROCEDURE — 51701 INSERT BLADDER CATHETER: CPT | Performed by: OBSTETRICS & GYNECOLOGY

## 2023-11-28 PROCEDURE — 87086 URINE CULTURE/COLONY COUNT: CPT | Performed by: OBSTETRICS & GYNECOLOGY

## 2023-11-28 PROCEDURE — 81002 URINALYSIS NONAUTO W/O SCOPE: CPT | Performed by: OBSTETRICS & GYNECOLOGY

## 2023-11-28 PROCEDURE — 99212 OFFICE O/P EST SF 10 MIN: CPT

## 2023-11-28 RX ORDER — TROSPIUM CHLORIDE ER 60 MG/1
60 CAPSULE ORAL DAILY
Qty: 90 CAPSULE | Refills: 3 | Status: SHIPPED | OUTPATIENT
Start: 2023-11-28

## 2023-12-12 ENCOUNTER — OFFICE VISIT (OUTPATIENT)
Facility: CLINIC | Age: 83
End: 2023-12-12

## 2023-12-12 VITALS
DIASTOLIC BLOOD PRESSURE: 78 MMHG | OXYGEN SATURATION: 96 % | HEART RATE: 76 BPM | BODY MASS INDEX: 39.78 KG/M2 | SYSTOLIC BLOOD PRESSURE: 122 MMHG | HEIGHT: 64 IN | RESPIRATION RATE: 18 BRPM | WEIGHT: 233 LBS

## 2023-12-12 DIAGNOSIS — Z12.31 BREAST CANCER SCREENING BY MAMMOGRAM: ICD-10-CM

## 2023-12-12 DIAGNOSIS — E03.9 ACQUIRED HYPOTHYROIDISM: ICD-10-CM

## 2023-12-12 DIAGNOSIS — M79.89 LEG SWELLING: Primary | ICD-10-CM

## 2023-12-12 DIAGNOSIS — M48.02 SPINAL STENOSIS, CERVICAL REGION: ICD-10-CM

## 2023-12-12 DIAGNOSIS — I27.20 PULMONARY HYPERTENSION (HCC): ICD-10-CM

## 2023-12-12 DIAGNOSIS — R47.89 WORD FINDING PROBLEM: ICD-10-CM

## 2023-12-12 PROBLEM — L97.516 ULCER OF RIGHT FOOT WITH BONE INVOLVEMENT WITHOUT EVIDENCE OF NECROSIS (HCC): Status: RESOLVED | Noted: 2021-04-23 | Resolved: 2023-12-12

## 2023-12-12 PROCEDURE — 1159F MED LIST DOCD IN RCRD: CPT | Performed by: INTERNAL MEDICINE

## 2023-12-12 PROCEDURE — 3074F SYST BP LT 130 MM HG: CPT | Performed by: INTERNAL MEDICINE

## 2023-12-12 PROCEDURE — 99214 OFFICE O/P EST MOD 30 MIN: CPT | Performed by: INTERNAL MEDICINE

## 2023-12-12 PROCEDURE — 3078F DIAST BP <80 MM HG: CPT | Performed by: INTERNAL MEDICINE

## 2023-12-12 PROCEDURE — 1160F RVW MEDS BY RX/DR IN RCRD: CPT | Performed by: INTERNAL MEDICINE

## 2023-12-12 PROCEDURE — 3008F BODY MASS INDEX DOCD: CPT | Performed by: INTERNAL MEDICINE

## 2023-12-12 PROCEDURE — 1125F AMNT PAIN NOTED PAIN PRSNT: CPT | Performed by: INTERNAL MEDICINE

## 2023-12-12 NOTE — ASSESSMENT & PLAN NOTE
Likely due to microvascular ischemic changes. Continue risk factor control and Eliquis. Refer neuro.

## 2023-12-20 ENCOUNTER — HOSPITAL ENCOUNTER (OUTPATIENT)
Dept: ULTRASOUND IMAGING | Facility: HOSPITAL | Age: 83
Discharge: HOME | End: 2023-12-20
Attending: INTERNAL MEDICINE
Payer: MEDICARE

## 2023-12-20 DIAGNOSIS — M79.89 LEG SWELLING: ICD-10-CM

## 2023-12-20 PROCEDURE — 93970 EXTREMITY STUDY: CPT | Performed by: INTERNAL MEDICINE

## 2024-01-04 ENCOUNTER — TELEPHONE (OUTPATIENT)
Dept: INTERNAL MEDICINE CLINIC | Facility: CLINIC | Age: 84
End: 2024-01-04

## 2024-01-04 NOTE — TELEPHONE ENCOUNTER
Pt stts she tried to make an appt with Dr.Shireen Cornell [facility] and was told that  is no longer at the practice. Pt wanted to ask  who she would recommend her to see? Please advise

## 2024-01-05 NOTE — TELEPHONE ENCOUNTER
Sorry about that.  She can see any neurologist in that practice.   There are several:  Dr. William Bedoya,  Dr. Analy Gillespie,  Dr. Ashraf,  Dr. Jez Santillan

## 2024-01-11 ENCOUNTER — OFFICE VISIT (OUTPATIENT)
Dept: UROLOGY | Facility: HOSPITAL | Age: 84
End: 2024-01-11
Attending: PHYSICIAN ASSISTANT
Payer: MEDICARE

## 2024-01-11 VITALS — WEIGHT: 233 LBS | BODY MASS INDEX: 39.78 KG/M2 | HEIGHT: 64 IN | RESPIRATION RATE: 18 BRPM

## 2024-01-11 DIAGNOSIS — N95.2 POSTMENOPAUSAL ATROPHIC VAGINITIS: ICD-10-CM

## 2024-01-11 DIAGNOSIS — N39.41 URGE URINARY INCONTINENCE: Primary | ICD-10-CM

## 2024-01-11 DIAGNOSIS — R35.1 NOCTURIA: ICD-10-CM

## 2024-01-11 PROCEDURE — 99212 OFFICE O/P EST SF 10 MIN: CPT

## 2024-01-11 RX ORDER — ESTRADIOL 0.1 MG/G
CREAM VAGINAL
Qty: 42.5 G | Refills: 3 | Status: SHIPPED | OUTPATIENT
Start: 2024-01-11

## 2024-01-11 NOTE — PROGRESS NOTES
Patient presents to follow up of UUI    Currently taking trospium ER 60 mg daily  Denies SEs  Reports 20-30% improvement in sx    Bowels constipated    Previously tried:  Solifenacin 10 mg    Urogynecology Summary:  Urogynecology Summary  Prolapse: No  KATIE: Yes (Reports leackage with cough.)  Urge Incontinence: Yes (Reports more bothersome)  Nocturia Frequency: 2 (Reports 2 to 3 times throughout the night.)  Frequency: 2 - 3 hours  Incomplete emptying: Yes (Reports sense of incomplete emptying at times.)  Constipation: Yes (Bowel regimen reveiewed. Handout provided.)  Wears pad day?: 2 (Pads are wet throughout the day.)  Wears Pad Night?: 2 (Reports pad is dry in am and becomes wet with walking to the bathroom.)  Activities are limited by UI/POP?: No  Currently Sexually Active: No  Avoids sexual activity due to: Other (. No partner.)    Vitals:  Resp 18   Ht 64\"   Wt 233 lb (105.7 kg)   BMI 39.99 kg/m²     GENERAL EXAM:  GENERAL: alert & oriented, NAD  HEENT: NC/AT, sclera without injection  SKIN: no rashes  LUNGS:  without increased respiratory effort  EXT: no edema    PELVIC EXAM:  Deferred    Impression/Plan:    ICD-10-CM    1. Urge urinary incontinence  N39.41       2. Nocturia  R35.1       3. Postmenopausal atrophic vaginitis  N95.2 estradiol (ESTRACE) 0.1 MG/GM Vaginal Cream          Discussion Items:   Discussed dietary and behavioral modifications for mgmt of urinary symptoms.  Discussed weight management and benefits of weight loss on urinary symptoms.  Reviewed AUA/SUFU guidelines on mgmt of non-neurogenic OAB.  Discussed pharmacologic and nonpharmacologic mgmt options of urinary symptoms - reviewed risks, benefits, alternatives, and goals of treatment.  Discussed specific risks related to OAB meds including, but not limited to dry mouth, constipation, blurry vision, cognitive changes, and BP elevation.    Discussed mgmt of vulvovaginal atrophy with vaginal estrogen cream. Reviewed associated  benefits, risks, alternatives, and goals. Recommend low dose 2-3x/week treatment.    Bowel management reviewed. Discussed using fiber daily w/ addition of miralax as needed.    Medications Discussed:  Gemtesa & Myrbetriq  Vag estrogen    Treatment Plan:  Continue trospium ER 60 mg daily  Start vag estrogen 1/2 g 2-3x weekly  Bowel regimen  Bladder diet/drill  Pelvic floor exercises  Call with s/sx of UTI    All questions answered  She understands and agrees to plan    Return in about 2 months (around 3/11/2024) for UUI f/u (phone).    Malia Bradley PA-C

## 2024-01-30 ENCOUNTER — LAB ENCOUNTER (OUTPATIENT)
Dept: LAB | Facility: HOSPITAL | Age: 84
End: 2024-01-30
Attending: INTERNAL MEDICINE
Payer: MEDICARE

## 2024-01-30 DIAGNOSIS — M1A.9XX1 CHRONIC TOPHACEOUS GOUT OF RIGHT FOOT: ICD-10-CM

## 2024-01-30 DIAGNOSIS — E03.9 ACQUIRED HYPOTHYROIDISM: ICD-10-CM

## 2024-01-30 DIAGNOSIS — N28.9 RENAL INSUFFICIENCY: ICD-10-CM

## 2024-01-30 DIAGNOSIS — N18.32 STAGE 3B CHRONIC KIDNEY DISEASE (HCC): ICD-10-CM

## 2024-01-30 DIAGNOSIS — M06.09 RHEUMATOID ARTHRITIS OF MULTIPLE SITES WITH NEGATIVE RHEUMATOID FACTOR (HCC): ICD-10-CM

## 2024-01-30 DIAGNOSIS — Z51.81 THERAPEUTIC DRUG MONITORING: ICD-10-CM

## 2024-01-30 DIAGNOSIS — D84.9 IMMUNOSUPPRESSED STATUS (HCC): ICD-10-CM

## 2024-01-30 DIAGNOSIS — M79.89 LEG SWELLING: ICD-10-CM

## 2024-01-30 LAB
ALBUMIN SERPL-MCNC: 4 G/DL (ref 3.2–4.8)
ALT SERPL-CCNC: 17 U/L
ANION GAP SERPL CALC-SCNC: 9 MMOL/L (ref 0–18)
AST SERPL-CCNC: 20 U/L (ref ?–34)
BASOPHILS # BLD AUTO: 0.05 X10(3) UL (ref 0–0.2)
BASOPHILS NFR BLD AUTO: 1 %
BILIRUB UR QL: NEGATIVE
BUN BLD-MCNC: 28 MG/DL (ref 9–23)
BUN/CREAT SERPL: 24.3 (ref 10–20)
CALCIUM BLD-MCNC: 9.9 MG/DL (ref 8.7–10.4)
CHLORIDE SERPL-SCNC: 111 MMOL/L (ref 98–112)
CO2 SERPL-SCNC: 26 MMOL/L (ref 21–32)
COLOR UR: YELLOW
CREAT BLD-MCNC: 1.15 MG/DL
CREAT UR-SCNC: 146.8 MG/DL
CRP SERPL-MCNC: <0.4 MG/DL (ref ?–1)
DEPRECATED RDW RBC AUTO: 59.6 FL (ref 35.1–46.3)
EGFRCR SERPLBLD CKD-EPI 2021: 47 ML/MIN/1.73M2 (ref 60–?)
EOSINOPHIL # BLD AUTO: 0.15 X10(3) UL (ref 0–0.7)
EOSINOPHIL NFR BLD AUTO: 3.1 %
ERYTHROCYTE [DISTWIDTH] IN BLOOD BY AUTOMATED COUNT: 15.7 % (ref 11–15)
ERYTHROCYTE [SEDIMENTATION RATE] IN BLOOD: 11 MM/HR
FASTING STATUS PATIENT QL REPORTED: YES
GLUCOSE BLD-MCNC: 108 MG/DL (ref 70–99)
GLUCOSE UR-MCNC: NORMAL MG/DL
HCT VFR BLD AUTO: 35 %
HGB BLD-MCNC: 11.2 G/DL
HGB UR QL STRIP.AUTO: NEGATIVE
HYALINE CASTS #/AREA URNS AUTO: PRESENT /LPF
IMM GRANULOCYTES # BLD AUTO: 0.01 X10(3) UL (ref 0–1)
IMM GRANULOCYTES NFR BLD: 0.2 %
KETONES UR-MCNC: NEGATIVE MG/DL
LEUKOCYTE ESTERASE UR QL STRIP.AUTO: 500
LYMPHOCYTES # BLD AUTO: 1.99 X10(3) UL (ref 1–4)
LYMPHOCYTES NFR BLD AUTO: 41.6 %
MCH RBC QN AUTO: 32.9 PG (ref 26–34)
MCHC RBC AUTO-ENTMCNC: 32 G/DL (ref 31–37)
MCV RBC AUTO: 102.9 FL
MONOCYTES # BLD AUTO: 0.53 X10(3) UL (ref 0.1–1)
MONOCYTES NFR BLD AUTO: 11.1 %
NEUTROPHILS # BLD AUTO: 2.05 X10 (3) UL (ref 1.5–7.7)
NEUTROPHILS # BLD AUTO: 2.05 X10(3) UL (ref 1.5–7.7)
NEUTROPHILS NFR BLD AUTO: 43 %
NITRITE UR QL STRIP.AUTO: NEGATIVE
OSMOLALITY SERPL CALC.SUM OF ELEC: 308 MOSM/KG (ref 275–295)
PH UR: 6 [PH] (ref 5–8)
PLATELET # BLD AUTO: 174 10(3)UL (ref 150–450)
POTASSIUM SERPL-SCNC: 4.5 MMOL/L (ref 3.5–5.1)
PROT UR-MCNC: 30 MG/DL
PROT UR-MCNC: 37.4 MG/DL (ref ?–14)
PROT/CREAT UR-RTO: 0.25
RBC # BLD AUTO: 3.4 X10(6)UL
SODIUM SERPL-SCNC: 146 MMOL/L (ref 136–145)
SP GR UR STRIP: 1.02 (ref 1–1.03)
T4 FREE SERPL-MCNC: 1.8 NG/DL (ref 0.8–1.7)
TSI SER-ACNC: 0.35 MIU/ML (ref 0.55–4.78)
UROBILINOGEN UR STRIP-ACNC: NORMAL
WBC # BLD AUTO: 4.8 X10(3) UL (ref 4–11)

## 2024-01-30 PROCEDURE — 84439 ASSAY OF FREE THYROXINE: CPT

## 2024-01-30 PROCEDURE — 36415 COLL VENOUS BLD VENIPUNCTURE: CPT

## 2024-01-30 PROCEDURE — 84443 ASSAY THYROID STIM HORMONE: CPT

## 2024-01-30 PROCEDURE — 87086 URINE CULTURE/COLONY COUNT: CPT

## 2024-01-30 PROCEDURE — 85025 COMPLETE CBC W/AUTO DIFF WBC: CPT

## 2024-01-30 PROCEDURE — 86140 C-REACTIVE PROTEIN: CPT

## 2024-01-30 PROCEDURE — 81001 URINALYSIS AUTO W/SCOPE: CPT

## 2024-01-30 PROCEDURE — 82570 ASSAY OF URINE CREATININE: CPT

## 2024-01-30 PROCEDURE — 85652 RBC SED RATE AUTOMATED: CPT

## 2024-01-30 PROCEDURE — 84450 TRANSFERASE (AST) (SGOT): CPT

## 2024-01-30 PROCEDURE — 87077 CULTURE AEROBIC IDENTIFY: CPT

## 2024-01-30 PROCEDURE — 82040 ASSAY OF SERUM ALBUMIN: CPT

## 2024-01-30 PROCEDURE — 84460 ALANINE AMINO (ALT) (SGPT): CPT

## 2024-01-30 PROCEDURE — 80048 BASIC METABOLIC PNL TOTAL CA: CPT

## 2024-01-30 PROCEDURE — 84156 ASSAY OF PROTEIN URINE: CPT

## 2024-02-02 ENCOUNTER — OFFICE VISIT (OUTPATIENT)
Dept: RHEUMATOLOGY | Facility: CLINIC | Age: 84
End: 2024-02-02

## 2024-02-02 VITALS
HEART RATE: 72 BPM | DIASTOLIC BLOOD PRESSURE: 64 MMHG | BODY MASS INDEX: 40.29 KG/M2 | HEIGHT: 64 IN | WEIGHT: 236 LBS | SYSTOLIC BLOOD PRESSURE: 109 MMHG

## 2024-02-02 DIAGNOSIS — M06.09 RHEUMATOID ARTHRITIS OF MULTIPLE SITES WITH NEGATIVE RHEUMATOID FACTOR (HCC): Primary | ICD-10-CM

## 2024-02-02 DIAGNOSIS — Z51.81 THERAPEUTIC DRUG MONITORING: ICD-10-CM

## 2024-02-02 DIAGNOSIS — M1A.9XX1 CHRONIC TOPHACEOUS GOUT OF RIGHT FOOT: ICD-10-CM

## 2024-02-02 PROCEDURE — 99214 OFFICE O/P EST MOD 30 MIN: CPT | Performed by: INTERNAL MEDICINE

## 2024-02-02 RX ORDER — METHYLPREDNISOLONE 4 MG/1
TABLET ORAL
Qty: 1 EACH | Refills: 0 | Status: SHIPPED
Start: 2024-02-02

## 2024-02-02 RX ORDER — METHOTREXATE 2.5 MG/1
TABLET ORAL
Qty: 52 TABLET | Refills: 1 | Status: SHIPPED
Start: 2024-02-02

## 2024-02-02 NOTE — PROGRESS NOTES
Cris Peterson is a 83 year old female.    HPI:     Chief Complaint   Patient presents with    Rheumatoid Arthritis       I had the pleasure of seeing Cris Peterson on 2/2/2024 for follow up Seropositive RA and Gout    Current medications:  Enbrel weekly  Methotrexate 4 pills weekly   mg daily   Allopurinal 300 mg daily  Blood work:  +ITZEL 1:160, Neg BETTY panel  RF 51, +atypical p-ANCA 1:1280  UA 5.3 (Oct 2021)    Interval History:  This is a 83 yo F with hx of HTN, HLD, Hypothyroid, Atrial fibrillation on eliquis, COPD, B/L TKR, R THR, R foot surgery, R thumb surgery, Gout  presents to establish care for seronegative RA.  She is a former patient of Dr. Rose.  She was diagnosed with RA many years ago, about 20 years ago.  Currently on Enbrel weekly, methotrexate 4 pills weekly and hydroxychloroquine 400 mg daily.  Joints are stable.  No active swelling.  She does have chronic lower back pain.  X-rays have showed moderate scoliosis and moderate to severe spondylosis.  She also has discomfort in her neck.  X-ray showed advanced multilevel degenerative changes mostly in C5-C6.  She has full range of motion in her shoulders.  She was admitted in April 2021 for an acutely inflamed right third toe which was originally felt to be infected.  Surgery was done and tophi was removed.  Her uric acid was 9.1 at that time.  Now on allopurinol 300 mg daily.  Gout has been stable.  Has had no gout flares.  She reports shortness of breath.  Following with cardiology.    2/2/2024:  Presents for f/u of RA and Gout  Also has CKD following with nephology  Has chronic changes in hands but minimal pain. 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    Pulmonary HTN (HCC)     Rheumatoid arthritis (HCC)     Sx.7/21/15, CPT.42179, R Triple Arthrodesis/Poss Medializing Calc Arthrodesis/Lapidus/MONA/FDL to Post Tib Transfer, w/, Global Exp.10/19/15 07/23/2015    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 42.5 g 3    Trospium Chloride ER 60 MG Oral Capsule SR 24 Hr Take 1 capsule (60 mg total) by mouth daily. 90 capsule 3    lisinopril 20 MG Oral Tab Take 1 tablet (20 mg total) by mouth daily. 90 tablet 1    methotrexate 2.5 MG Oral Tab TAKE 4 TABLETS ONCE A WEEK 52 tablet 1    Etanercept (ENBREL SURECLICK) 50 MG/ML Subcutaneous Solution Auto-injector Inject 50 mg subcutaneously every week. 12 mL 1    atorvastatin 40 MG Oral Tab Take 1 tablet (40 mg total) by mouth daily.      Potassium Chloride ER 10 MEQ Oral Tab CR Take 1 tablet (10 mEq total) by mouth daily. 90 tablet 1    folic acid 1 MG Oral Tab Take 1 tablet (1 mg total) by mouth daily. 90 tablet 3    levothyroxine 137 MCG Oral Tab Take 137 mcg by mouth before breakfast. 90 tablet 3    pantoprazole 40 MG Oral Tab EC Take 1 tablet (40 mg total) by mouth before breakfast. 90 tablet 3    hydroxychloroquine 200 MG Oral Tab Take 2 tablets (400 mg total) by mouth daily. 180 tablet 3    Cholecalciferol (VITAMIN D) 50 MCG (2000 UT) Oral Tab Take by mouth.      allopurinol 300 MG Oral Tab Take one daily. 90 tablet 3     amLODIPine 5 MG Oral Tab Take 1 tablet (5 mg total) by mouth daily.      ELIQUIS 5 MG Oral Tab       Calcium 500-125 MG-UNIT Oral Tab Take 1 tablet by mouth daily.      Loperamide HCl 2 MG Oral Cap Take 1 capsule (2 mg total) by mouth 4 (four) times daily as needed for Diarrhea.      furosemide 40 MG Oral Tab Take 1 tablet (40 mg total) by mouth daily.      Probiotic Product (PROBIOTIC-10) Oral Cap Take 1 tablet by mouth daily.      acetaminophen (TYLENOL EXTRA STRENGTH) 500 MG Oral Tab Take 1 tablet (500 mg total) by mouth every 6 (six) hours as needed.      CENTRUM SILVER OR TABS 1 TABLET DAILY       .cmed  Allergies:  Allergies   Allergen Reactions    Erythromycin Base     Other OTHER (SEE COMMENTS)    Pcn [Bicillin L-A]     Lactose DIARRHEA         ROS:   All other ROS are negative.     PHYSICAL EXAM:   GEN: AAOx3, NAD  HEENT: EOMI, PERRLA, no injection or icterus, oral mucosa moist, no oral lesions. No lymphadenopathy. No facial rash  CVS: RRR, no murmurs rubs or gallops. Equal 2+ distal pulses.   LUNGS: CTAB, no increased work of breathing  ABDOMEN:  soft NT/ND, +BS, no HSM  SKIN: No rashes or skin lesions. No nail findings  MSK:  Chronic synovitis noted in the wrist  [place]-neck deformities of the right hand.  Able to make a full fist  Full range of motion in the shoulders  NEURO: Cranial nerves II-XII intact grossly. 5/5 strength throughout in both upper and lower extremities, sensation intact.  PSYCH: normal mood       LABS:       Component      Latest Ref Rng 2/11/2022   MYELOPEROX ANTIBODIES, IGG      0 - 19 AU/mL 0    SERINE PROTEASE3, IGG      0 - 19 AU/mL 0    ANCA IFA Titer      <1:20  1:1280 (H)    ANCA IFA Pattern      None Detected  Atypical p-ANCA !    ITZEL Titer/Pattern      <80  160 !    Reviewed By: Kathleen Galloway M.D.    Anti-Sjogren's A      Negative  Negative    Anti-Sjogren's B      Negative  Negative    Anti-Smith Antibody      Negative  Negative    Anti-Corea/RNP Antibody      Negative   Negative    RHEUMATOID FACTOR      <15 IU/mL 51 (H)    COMPLEMENT C4      10.0 - 40.0 mg/dL 32.1    ITZEL SCREEN WITH REFLEX (S)      Negative  Positive !    COMPLEMENT C3      90.0 - 180.0 mg/dL 117.0    Anti Double Strand DNA      <10  <10       Component      Latest Ref Rn 4/21/2022   MYELOPEROX ANTIBODIES, IGG      0 - 19 AU/mL 0    SERINE PROTEASE3, IGG      0 - 19 AU/mL 0    ANCA IFA Titer      <1:20  1:1280 (H)    ANCA IFA Pattern      None Detected  Atypical p-ANCA !    ITZEL Titer/Pattern      <80      Reviewed By:     Anti-Sjogren's A      Negative      Anti-Sjogren's B      Negative      Anti-Smith Antibody      Negative      Anti-Corea/RNP Antibody      Negative      RHEUMATOID FACTOR      <15 IU/mL     COMPLEMENT C4      10.0 - 40.0 mg/dL     ITZEL SCREEN WITH REFLEX (S)      Negative      COMPLEMENT C3      90.0 - 180.0 mg/dL     Anti Double Strand DNA      <10            Imaging:     XR cervical 2022:  CONCLUSION:   1. There is widening of the atlantodental interval on flexion, suggesting ligamentous instability.      2. Advanced multilevel degenerative changes throughout the cervical spine, most notably at the C5-C6 level.      ASSESSMENT/PLAN:     Seronegative RA- stable   - She is a former patient of Dr. Rose, diagnosed more than 20 years ago  - On Enbrel weekly, methotrexate 4 pills weekly and hydroxychloroquine 400 mg daily  - She states that she sees the eye doctor every year, no evidence of Plaquenil toxicity  - Plan to monitor blood work every 3 to 4 months     Primary osteoarthritis, chronic neck and lower back pain  - X-ray of the neck has shown advanced multilevel degenerative changes.  - She cannot take NSAIDs.  Recommended PT but she deferred that for now  - Advise she can take Tylenol arthritis once or twice a day for her pain  - Will give a medrol dose back for now     CKD stage 3  - Found to have a positive atypical p-ANCA 1: 1280, negative RI-3 and MPO.  UA shows no protein or blood.   Creatinine has been fluctuating between 1.1 and 1.3  - Following with nephrology. 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  Remains on allopurinol 300 mg daily.  No recent gout flare    Pt will f/u in 3-4 mos     Ivania Cornell MD  2/2/2024   10:43 AM

## 2024-02-02 NOTE — PATIENT INSTRUCTIONS
You were seen today for rheumatoid arthritis and chronic neck pain  Continue Enbrel weekly, methotrexate, folic acid and hydroxychloroquine  Make sure to see the eye doctor since you are on the hydroxychloroquine  Blood work in 3 to 4 months  Can follow-up in the next 3 to 4 months  For your neck pain I did give you a steroid pack in case you need it

## 2024-02-06 ENCOUNTER — OFFICE VISIT (OUTPATIENT)
Dept: INTERNAL MEDICINE CLINIC | Facility: CLINIC | Age: 84
End: 2024-02-06

## 2024-02-06 VITALS
BODY MASS INDEX: 40.19 KG/M2 | WEIGHT: 235.38 LBS | SYSTOLIC BLOOD PRESSURE: 119 MMHG | DIASTOLIC BLOOD PRESSURE: 71 MMHG | HEART RATE: 77 BPM | OXYGEN SATURATION: 97 % | HEIGHT: 64 IN

## 2024-02-06 DIAGNOSIS — R22.43 LOCALIZED SWELLING OF BOTH LOWER LEGS: ICD-10-CM

## 2024-02-06 DIAGNOSIS — E03.9 ACQUIRED HYPOTHYROIDISM: Primary | ICD-10-CM

## 2024-02-06 DIAGNOSIS — D64.9 ANEMIA, UNSPECIFIED TYPE: ICD-10-CM

## 2024-02-06 DIAGNOSIS — N18.32 STAGE 3B CHRONIC KIDNEY DISEASE (HCC): ICD-10-CM

## 2024-02-06 DIAGNOSIS — I48.0 PAROXYSMAL ATRIAL FIBRILLATION (HCC): ICD-10-CM

## 2024-02-06 PROBLEM — I49.3 PVC'S (PREMATURE VENTRICULAR CONTRACTIONS): Status: ACTIVE | Noted: 2022-10-24

## 2024-02-06 PROCEDURE — 99214 OFFICE O/P EST MOD 30 MIN: CPT | Performed by: INTERNAL MEDICINE

## 2024-02-06 RX ORDER — LEVOTHYROXINE SODIUM 0.12 MG/1
125 TABLET ORAL
Qty: 60 TABLET | Refills: 2 | Status: SHIPPED | OUTPATIENT
Start: 2024-02-06 | End: 2024-02-06

## 2024-02-06 RX ORDER — LEVOTHYROXINE SODIUM 0.12 MG/1
125 TABLET ORAL
Qty: 90 TABLET | Refills: 1 | Status: SHIPPED | OUTPATIENT
Start: 2024-02-06 | End: 2024-08-04

## 2024-02-06 NOTE — PROGRESS NOTES
Cris Peterson is a 83 year old female who is here for  1. Acquired hypothyroidism    2. Stage 3b chronic kidney disease (HCC)    3. Paroxysmal atrial fibrillation (HCC)    4. Anemia, unspecified type    5. Localized swelling of both lower legs          HPI:   Cris Peterson is a 83 year old female  presents for abnormal blood work.  Recheck of thyroid levels TSH 0.349, Free T4 1.8 currently on levothyroxine 137 mcg daily been on this dose for a few years.   Prior labs 2/203 TSH 1.0, free T4 1.2  CKD stage 3B  Chronic anemia, ferritin 93 2/2023  Sees multiple specialists; urogyne, rheumotology, ophth, ENT  Afib, on Eliquis  Increased lower extremity swelling for the past few weeks, she feels a little SOB, not a great historian, denies any chest pain or back pain    Past Medical History:   Diagnosis Date    Cancer (HCC)     melanoma on back    Congestive heart disease (HCC)     COPD (chronic obstructive pulmonary disease) (HCC)     Coronary atherosclerosis     Disorder of thyroid     Fracture, patella     repair    Gout 05/2021    Heart valve disease     High blood pressure     High cholesterol     Hypothyroidism     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    Pulmonary HTN (HCC)     Rheumatoid arthritis (HCC)     Sx.7/21/15, CPT.17780, R Triple Arthrodesis/Poss Medializing Calc Arthrodesis/Lapidus/MONA/FDL to Post Tib Transfer, w/, Global Exp.10/19/15 07/23/2015    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)     Unspecified essential hypertension     Unspecified sleep apnea      Past Surgical History:   Procedure Laterality Date    ANGIOGRAM  2013    CATARACT      CHOLECYSTECTOMY      HAND/FINGER SURGERY UNLISTED Right 2011    thumb surgery    HIP REPLACEMENT SURGERY Right     HYSTERECTOMY      SERENITY--fibroid    KNEE REPLACEMENT SURGERY      total - right and left    BLAKE LOCALIZATION WIRE 1 SITE RIGHT (CPT=19281)  1994    OTHER SURGICAL HISTORY      pins right foot     TEAR DUCT SYSTEM SURG UNLISTED  1968    TONSILLECTOMY         Current Outpatient Medications:     levothyroxine 125 MCG Oral Tab, Take 1 tablet (125 mcg total) by mouth before breakfast., Disp: 90 tablet, Rfl: 1    methotrexate 2.5 MG Oral Tab, TAKE 4 TABLETS ONCE A WEEK, Disp: 52 tablet, Rfl: 1    methylPREDNISolone 4 MG Oral Tablet Therapy Pack, Take as directed on package., Disp: 1 each, Rfl: 0    estradiol (ESTRACE) 0.1 MG/GM Vaginal Cream, Apply 1/2 gram vaginally 2-3 times per week., Disp: 42.5 g, Rfl: 3    Trospium Chloride ER 60 MG Oral Capsule SR 24 Hr, Take 1 capsule (60 mg total) by mouth daily., Disp: 90 capsule, Rfl: 3    lisinopril 20 MG Oral Tab, Take 1 tablet (20 mg total) by mouth daily., Disp: 90 tablet, Rfl: 1    Etanercept (ENBREL SURECLICK) 50 MG/ML Subcutaneous Solution Auto-injector, Inject 50 mg subcutaneously every week., Disp: 12 mL, Rfl: 1    atorvastatin 40 MG Oral Tab, Take 1 tablet (40 mg total) by mouth daily., Disp: , Rfl:     Potassium Chloride ER 10 MEQ Oral Tab CR, Take 1 tablet (10 mEq total) by mouth daily., Disp: 90 tablet, Rfl: 1    folic acid 1 MG Oral Tab, Take 1 tablet (1 mg total) by mouth daily., Disp: 90 tablet, Rfl: 3    levothyroxine 137 MCG Oral Tab, Take 137 mcg by mouth before breakfast., Disp: 90 tablet, Rfl: 3    pantoprazole 40 MG Oral Tab EC, Take 1 tablet (40 mg total) by mouth before breakfast., Disp: 90 tablet, Rfl: 3    hydroxychloroquine 200 MG Oral Tab, Take 2 tablets (400 mg total) by mouth daily., Disp: 180 tablet, Rfl: 3    Cholecalciferol (VITAMIN D) 50 MCG (2000 UT) Oral Tab, Take by mouth., Disp: , Rfl:     allopurinol 300 MG Oral Tab, Take one daily., Disp: 90 tablet, Rfl: 3    amLODIPine 5 MG Oral Tab, Take 1 tablet (5 mg total) by mouth daily., Disp: , Rfl:     ELIQUIS 5 MG Oral Tab, Take 1 tablet (5 mg total) by mouth 2 (two) times daily., Disp: , Rfl:     Calcium 500-125 MG-UNIT Oral Tab, Take 1 tablet by mouth daily., Disp: , Rfl:     Loperamide  HCl 2 MG Oral Cap, Take 1 capsule (2 mg total) by mouth 4 (four) times daily as needed for Diarrhea., Disp: , Rfl:     furosemide 40 MG Oral Tab, Take 1 tablet (40 mg total) by mouth daily., Disp: , Rfl:     Probiotic Product (PROBIOTIC-10) Oral Cap, Take 1 tablet by mouth daily., Disp: , Rfl:     acetaminophen (TYLENOL EXTRA STRENGTH) 500 MG Oral Tab, Take 1 tablet (500 mg total) by mouth every 6 (six) hours as needed., Disp: , Rfl:     CENTRUM SILVER OR TABS, 1 TABLET DAILY, Disp: , Rfl:     Allergies:  Allergies   Allergen Reactions    Erythromycin Base     Other OTHER (SEE COMMENTS)    Pcn [Bicillin L-A]     Lactose DIARRHEA     Social History     Socioeconomic History    Marital status:      Spouse name: Not on file    Number of children: Not on file    Years of education: Not on file    Highest education level: Not on file   Occupational History    Not on file   Tobacco Use    Smoking status: Never    Smokeless tobacco: Never   Vaping Use    Vaping Use: Not on file   Substance and Sexual Activity    Alcohol use: Yes     Alcohol/week: 1.7 standard drinks of alcohol     Types: 2 Glasses of wine per week     Comment: wine - 1 glass weekly    Drug use: No    Sexual activity: Never   Other Topics Concern     Service Not Asked    Blood Transfusions Not Asked    Caffeine Concern No    Occupational Exposure Not Asked    Hobby Hazards Not Asked    Sleep Concern Not Asked    Stress Concern Not Asked    Weight Concern Not Asked    Special Diet Not Asked    Back Care Not Asked    Exercise Not Asked    Bike Helmet Not Asked    Seat Belt Not Asked    Self-Exams Not Asked   Social History Narrative    Not on file     Social Determinants of Health     Financial Resource Strain: Not on file   Food Insecurity: Not on file   Transportation Needs: Not on file   Physical Activity: Not on file   Stress: Not on file   Social Connections: Not on file   Housing Stability: Not on file       REVIEW OF SYSTEMS:     GENERAL  HEALTH: No fevers, chills, sweats, fatigue  VISION: No recent vision problems, blurry vision or double vision  HEENT: No decreased hearing ear pain nasal congestion or sore throat  SKIN: denies any unusual skin lesions or rashes  RESPIRATORY: denies shortness of breath, cough, wheezing  CARDIOVASCULAR: denies chest pain on exertion, palpitations, +swelling in feet  GI: denies abdominal pain and denies heartburn, nausea or vomiting  : No Pain on urination, change in the color of urine, discharge, urinating frequently  MUS: No back pain, joint pain, muscle pain  NEURO: denies headaches , anxiety, depression    EXAM:     Vitals:    02/06/24 0944   BP: 119/71   Pulse: 77   SpO2: 97%   Weight: 235 lb 6.4 oz (106.8 kg)   Height: 5' 4\" (1.626 m)     GENERAL: well developed, well nourished,in no apparent distress.  SKIN: no rashes,no suspicious lesions  HEENT: atraumatic, normocephalic,ears and throat are clear,   NECK: supple,no adenopathy,  LUNGS: clear to auscultation, no wheeze  CARDIO: irregularly irregular without murmur  GI: good BS's,no masses or tenderness  EXTREMITIES: no cyanosis, 3+  pitting edema to both legs to knees    ASSESSMENT AND PLAN:   1. Acquired hypothyroidism  -TSH 0.349, Free T4 1.8 currently on levothyroxine 137 mcg daily been on this dose for a few years.   -Prior labs 2/203 TSH 1.0, free T4 1.2  Plan:  -decrease dose LT4 to 125 mcg  -repeat TSH in 6 weeks    2. Stage 3b chronic kidney disease (HCC)  3. Paroxysmal atrial fibrillation (HCC)  4. Anemia, unspecified type  5. Localized swelling of both lower legs  -increased swelling of both legs  -little NICOLE, baseline per patient. but also BMI 40  -been on lasix 40 mg daily  Plan:  -increase lasix to BID x7 days  -if gets SOB, chest pain or NICOLE go to the ER, patient understands and agreeable  -follow up with cardiology    The patient indicates understanding of these issues and agrees to the plan.    Return in about 6 weeks (around  3/19/2024).    Carly Bolivar MD  2/6/2024

## 2024-02-07 ENCOUNTER — TELEPHONE (OUTPATIENT)
Dept: RHEUMATOLOGY | Facility: CLINIC | Age: 84
End: 2024-02-07

## 2024-02-07 NOTE — TELEPHONE ENCOUNTER
Pt states that the doctor was going to send a refill on her methotrexate medication and another medication (pt is not sure of the name) to the pharmacy for her. Per the patient the pharmacy has not received anything. Transmission shows failed. Pt would like to know if this can be sent to Pharmacy: [facility] (listed)     Pt is out of medication.     Current Outpatient Medications   Medication Sig Dispense Refill    methotrexate 2.5 MG Oral Tab TAKE 4 TABLETS ONCE A WEEK 52 tablet 1

## 2024-02-15 ENCOUNTER — OFFICE VISIT (OUTPATIENT)
Dept: NEPHROLOGY | Facility: CLINIC | Age: 84
End: 2024-02-15

## 2024-02-15 VITALS
DIASTOLIC BLOOD PRESSURE: 69 MMHG | SYSTOLIC BLOOD PRESSURE: 124 MMHG | HEART RATE: 74 BPM | BODY MASS INDEX: 39.44 KG/M2 | HEIGHT: 64 IN | WEIGHT: 231 LBS

## 2024-02-15 DIAGNOSIS — N18.32 STAGE 3B CHRONIC KIDNEY DISEASE (HCC): Primary | ICD-10-CM

## 2024-02-15 DIAGNOSIS — D84.9 IMMUNOSUPPRESSED STATUS (HCC): ICD-10-CM

## 2024-02-15 PROCEDURE — 99215 OFFICE O/P EST HI 40 MIN: CPT | Performed by: INTERNAL MEDICINE

## 2024-02-15 RX ORDER — LISINOPRIL 10 MG/1
10 TABLET ORAL EVERY EVENING
Qty: 90 TABLET | Refills: 0 | Status: SHIPPED | OUTPATIENT
Start: 2024-02-15

## 2024-02-15 NOTE — PATIENT INSTRUCTIONS
Stop amlodipine and monitor leg swelling   Increase water intake by 8-10 ounces a day   Add lisinopril 10 mg in evening - prescription sent   Follow up in 6 months   Lab test prior to next visit   Stop potassium supplement

## 2024-02-15 NOTE — PROGRESS NOTES
Progress Note:     Patient is a 83 yrs old female with pmh of COPD, HTN, HL, CKD stage III, RA on methotrexate, enbrel and hydroxychloroquine, irritable bowel syndrome, A-fib on AC, hypothyroidism  who presented for follow up     Lab test showed BUN/Cr 28/1.56 mg/dl with an eGFR 31 ml/min. was 1.65 mg/dl in Jan 2022. Creatinine was 1.2 mg/dl in Nov 2021 with an eGFR 41 ml/min.. fluctuating at 1.1-1.3 mg/dl in 2021. UA unremarkable with no protein or RBC    Non smoker, wine couple of time a week. No NSAIDs or herbal supplement.     On amlodipine 5 mg , clonidine 0.1 mg patch, furosemide 40 mg daily and lisinopril 40 mg daily, bystolic 10 mg     S/p coronary angiogram - Mild pulmonary hypertension with normal wedge pressure and normal LVEDP.  No significant angiographic evidence of CAD.      Leg swelling on and off - wears compression socks.    State using walker more often. +ve leg swelling which is stable- left > right. Is taking furosemide twice a day for last 6 days and have noticed 5 lbs weight loss and some improvement in leg swelling.     HISTORY:  Past Medical History:   Diagnosis Date    Cancer (HCC)     melanoma on back    Congestive heart disease (HCC)     COPD (chronic obstructive pulmonary disease) (HCC)     Coronary atherosclerosis     Disorder of thyroid     Fracture, patella     repair    Gout 05/2021    Heart valve disease     High blood pressure     High cholesterol     Hypothyroidism     Nonunion of midfoot arthrodesis ([facility]) 05/25/2016    Pulmonary HTN (HCC)     Rheumatoid arthritis ([facility])     Sx.7/21/15, CPT.63729, R Triple Arthrodesis/Poss Medializing Calc Arthrodesis/Lapidus/MONA/FDL to Post Tib Transfer, w/, Global Exp.10/19/15 07/23/2015    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)     Unspecified essential hypertension     Unspecified sleep apnea       Past Surgical History:   Procedure Laterality Date    ANGIOGRAM  2013    CATARACT       CHOLECYSTECTOMY      HAND/FINGER SURGERY UNLISTED Right 2011    thumb surgery    HIP REPLACEMENT SURGERY Right     HYSTERECTOMY      SERENITY--fibroid    KNEE REPLACEMENT SURGERY      total - right and left    BLAKE LOCALIZATION WIRE 1 SITE RIGHT (CPT=19281)  1994    OTHER SURGICAL HISTORY      pins right foot    TEAR DUCT SYSTEM SURG UNLISTED  1968    TONSILLECTOMY        Family History   Problem Relation Age of Onset    Breast Cancer Mother 64    Cancer Mother         ovarian (cause of death)    Ovarian Cancer Mother 83    Diabetes Father     Cancer Father         stomach    Cancer Son         leukemia    Breast Cancer Paternal Aunt         post menopause    Cancer Self         melanoma    Breast Cancer Paternal Cousin Female         age after 50    Colon Cancer Son       Social History:   Social History     Socioeconomic History    Marital status:    Tobacco Use    Smoking status: Never    Smokeless tobacco: Never   Substance and Sexual Activity    Alcohol use: Yes     Alcohol/week: 1.7 standard drinks of alcohol     Types: 2 Glasses of wine per week     Comment: wine - 1 glass weekly    Drug use: No    Sexual activity: Never   Other Topics Concern    Caffeine Concern No        Medications (Active prior to today's visit):  Current Outpatient Medications   Medication Sig Dispense Refill    lisinopril 10 MG Oral Tab Take 1 tablet (10 mg total) by mouth every evening. 90 tablet 0    methotrexate 2.5 MG Oral Tab TAKE 4 TABLETS ONCE A WEEK 52 tablet 1    levothyroxine 125 MCG Oral Tab Take 1 tablet (125 mcg total) by mouth before breakfast. 90 tablet 1    estradiol (ESTRACE) 0.1 MG/GM Vaginal Cream Apply 1/2 gram vaginally 2-3 times per week. 42.5 g 3    Trospium Chloride ER 60 MG Oral Capsule SR 24 Hr Take 1 capsule (60 mg total) by mouth daily. 90 capsule 3    lisinopril 20 MG Oral Tab Take 1 tablet (20 mg total) by mouth daily. 90 tablet 1    Etanercept (ENBREL SURECLICK) 50 MG/ML Subcutaneous Solution  Auto-injector Inject 50 mg subcutaneously every week. 12 mL 1    atorvastatin 40 MG Oral Tab Take 1 tablet (40 mg total) by mouth daily.      folic acid 1 MG Oral Tab Take 1 tablet (1 mg total) by mouth daily. 90 tablet 3    pantoprazole 40 MG Oral Tab EC Take 1 tablet (40 mg total) by mouth before breakfast. 90 tablet 3    hydroxychloroquine 200 MG Oral Tab Take 2 tablets (400 mg total) by mouth daily. 180 tablet 3    Cholecalciferol (VITAMIN D) 50 MCG (2000 UT) Oral Tab Take by mouth.      allopurinol 300 MG Oral Tab Take one daily. 90 tablet 3    ELIQUIS 5 MG Oral Tab Take 1 tablet (5 mg total) by mouth 2 (two) times daily.      Calcium 500-125 MG-UNIT Oral Tab Take 1 tablet by mouth daily.      Loperamide HCl 2 MG Oral Cap Take 1 capsule (2 mg total) by mouth 4 (four) times daily as needed for Diarrhea.      furosemide 40 MG Oral Tab Take 1 tablet (40 mg total) by mouth daily.      Probiotic Product (PROBIOTIC-10) Oral Cap Take 1 tablet by mouth daily.      acetaminophen (TYLENOL EXTRA STRENGTH) 500 MG Oral Tab Take 1 tablet (500 mg total) by mouth every 6 (six) hours as needed.      CENTRUM SILVER OR TABS 1 TABLET DAILY      methylPREDNISolone 4 MG Oral Tablet Therapy Pack Take as directed on package. (Patient not taking: Reported on 2/15/2024) 1 each 0       Allergies:  Allergies   Allergen Reactions    Erythromycin Base     Other OTHER (SEE COMMENTS)    Pcn [Bicillin L-A]     Lactose DIARRHEA         ROS:     Constitutional:  Negative for decreased activity, fever, irritability and lethargy  ENMT:  Negative for ear drainage, hearing loss and nasal drainage  Eyes:  Negative for eye discharge and vision loss  Cardiovascular:  Negative for chest pain, sobs  Respiratory:  Negative for cough, dyspnea and wheezing  Gastrointestinal:  Negative for abdominal pain, constipation  Genitourinary:  Negative for dysuria and hematuria  Endocrine:  Negative for abnormal sleep patterns, increased activity  Hema/Lymph:   Negative for easy bleeding and easy bruising  Integumentary:  Negative for pruritus and rash  Musculoskeletal:  Negative for bone/joint symptoms  Neurological:  Negative for gait disturbance  Psychiatric:  Negative for inappropriate interaction and psychiatric symptoms      Vitals:    02/15/24 1118   BP: 124/69   Pulse: 74     Wt Readings from Last 6 Encounters:   02/15/24 231 lb (104.8 kg)   02/06/24 235 lb 6.4 oz (106.8 kg)   02/02/24 236 lb (107 kg)   01/11/24 233 lb (105.7 kg)   12/12/23 233 lb (105.7 kg)   11/28/23 231 lb (104.8 kg)       PHYSICAL EXAM:   Constitutional: appears well hydrated alert and responsive no acute distress noted  Head/Face: normocephalic  Eyes/Vision: normal extraocular motion is intact  Nose/Mouth/Throat:mucous membranes are moist   Neck/Thyroid: neck is supple   Skin/Hair: no unusual rashes present  Back/Spine: no abnormalities noted  Musculoskeletal:  no deformities  Extremities: BLE swelling / edema   Neurological:  Grossly normal       ASSESSMENT/PLAN:     1. CKD stage III: non proteinuric   - BUN/Cr 26/1.3 mg/dl with an eGFR 40 ml/min. - stable  - Cr fluctuating at 1.1-1.3 mg/dl in 2021.   - fluid intake 60-65 ounces a day   - UA unremarkable with no protein or RBC . UACR <30 and UPCR unremarkable  - CKD presumably secondary to Hypertensive disease   - renal US - right kidney 10.3 cm and left kidney 10.7 cm with normal echotexture.  1.8 cm exophytic hypoechoic lesion lower pole left kidney which appears to demonstrate some peripheral internal vascularity and is suspicious for an indeterminate solid renal lesion which would include the possibility renal neoplasm.   - s/p CT abdomen which showed bosniak 2 benign complicated cyst stable at 1.5 cm on left side  - work up per dermatology for rash - ITZEL +ve with 160 titer and negative antibodies. Atypical ANCA 1:1280 with PR3 and MPO negative. SPEP negative for monoclonal gammopathy and urine bence amador negative. HCV and Hepatitis B  non reactive. C3, C4 wnl and cryglobulin stable.- on methotrexate and hydroxychloroquine   - CRP and ESR wnl  - repeat ANCA level unchanged   -  - on daily vitamin D3 at 2000 units - vitamin D elvel 34    2. BLE swelling :  - on daily furosemide 40 mg and wears on and off compression stocking - on potassium 10 meq daily   - S/p coronary angiogram - Mild pulmonary hypertension with normal wedge pressure and normal LVEDP.  No significant angiographic evidence of CAD.  - on lisinopril 20 mg and and amlodipine 5 mg daily   - stop amlodipine and increase lisinopril to 10 mg in evening   - low salt diet counseled     3. P-Afib/ bradycardia:   - was seen by Dr. Borja and off of bystolic   - repeat Echo few days back - not available in epic to review   - [facility] showed no significant coronary disease and EF 59%    Follow up in 6 months     Lab tests prior to next visit        Orders This Visit:  Orders Placed This Encounter   Procedures    Renal Function Panel       Meds This Visit:  Requested Prescriptions     Signed Prescriptions Disp Refills    lisinopril 10 MG Oral Tab 90 tablet 0     Sig: Take 1 tablet (10 mg total) by mouth every evening.       Imaging & Referrals:  None     2/15/2024  Mikal Richardson MD

## 2024-02-16 ENCOUNTER — HOSPITAL ENCOUNTER (OUTPATIENT)
Dept: MAMMOGRAPHY | Facility: HOSPITAL | Age: 84
Discharge: HOME | End: 2024-02-16
Attending: INTERNAL MEDICINE
Payer: MEDICARE

## 2024-02-16 DIAGNOSIS — Z12.31 BREAST CANCER SCREENING BY MAMMOGRAM: ICD-10-CM

## 2024-02-16 PROCEDURE — 77063 BREAST TOMOSYNTHESIS BI: CPT | Performed by: INTERNAL MEDICINE

## 2024-02-16 PROCEDURE — 77067 SCR MAMMO BI INCL CAD: CPT | Performed by: INTERNAL MEDICINE

## 2024-02-20 DIAGNOSIS — Z91.89 AT HIGH RISK FOR BREAST CANCER: Primary | ICD-10-CM

## 2024-02-21 DIAGNOSIS — N95.2 POSTMENOPAUSAL ATROPHIC VAGINITIS: ICD-10-CM

## 2024-02-21 DIAGNOSIS — M06.09 RHEUMATOID ARTHRITIS OF MULTIPLE SITES WITH NEGATIVE RHEUMATOID FACTOR (HCC): ICD-10-CM

## 2024-02-21 RX ORDER — MEDROXYPROGESTERONE ACETATE 150 MG/ML
INJECTION, SUSPENSION INTRAMUSCULAR
Qty: 12 ML | Refills: 1 | Status: SHIPPED | OUTPATIENT
Start: 2024-02-21

## 2024-02-21 NOTE — TELEPHONE ENCOUNTER
LOV: 2/2/24  Last Refilled:#12ml, 1rf 9/12/23    ASSESSMENT/PLAN:      Seronegative RA- stable   - She is a former patient of Dr. Rose, diagnosed more than 20 years ago  - On Enbrel weekly, methotrexate 4 pills weekly and hydroxychloroquine 400 mg daily  - She states that she sees the eye doctor every year, no evidence of Plaquenil toxicity  - Plan to monitor blood work every 3 to 4 months     Primary osteoarthritis, chronic neck and lower back pain  - X-ray of the neck has shown advanced multilevel degenerative changes.  - She cannot take NSAIDs.  Recommended PT but she deferred that for now  - Advise she can take Tylenol arthritis once or twice a day for her pain  - Will give a medrol dose back for now     CKD stage 3  - Found to have a positive atypical p-ANCA 1: 1280, negative SC-3 and MPO.  UA shows no protein or blood.  Creatinine has been fluctuating between 1.1 and 1.3  - Following with nephrology. 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  Remains on allopurinol 300 mg daily.  No recent gout flare     Pt will f/u in 3-4 mos      Ivania Cornell MD  2/2/2024   10:43 AM              Please advise.

## 2024-03-01 ENCOUNTER — HOSPITAL ENCOUNTER (OUTPATIENT)
Age: 84
Discharge: HOME | End: 2024-03-01
Attending: EMERGENCY MEDICINE
Payer: MEDICARE

## 2024-03-01 ENCOUNTER — APPOINTMENT (OUTPATIENT)
Dept: GENERAL RADIOLOGY | Age: 84
End: 2024-03-01
Attending: EMERGENCY MEDICINE
Payer: MEDICARE

## 2024-03-01 VITALS
TEMPERATURE: 99 F | HEART RATE: 74 BPM | SYSTOLIC BLOOD PRESSURE: 136 MMHG | OXYGEN SATURATION: 95 % | DIASTOLIC BLOOD PRESSURE: 69 MMHG | RESPIRATION RATE: 18 BRPM

## 2024-03-01 DIAGNOSIS — I48.0 INTERMITTENT ATRIAL FIBRILLATION (HCC): ICD-10-CM

## 2024-03-01 DIAGNOSIS — J11.1 INFLUENZA: Primary | ICD-10-CM

## 2024-03-01 LAB
#MXD IC: 0.3 X10ˆ3/UL (ref 0.1–1)
BUN BLD-MCNC: 16 MG/DL (ref 7–18)
CHLORIDE BLD-SCNC: 103 MMOL/L (ref 98–112)
CO2 BLD-SCNC: 24 MMOL/L (ref 21–32)
CREAT BLD-MCNC: 1 MG/DL
EGFRCR SERPLBLD CKD-EPI 2021: 56 ML/MIN/1.73M2 (ref 60–?)
GLUCOSE BLD-MCNC: 104 MG/DL (ref 70–99)
HCT VFR BLD AUTO: 34.6 %
HCT VFR BLD CALC: 37 %
HGB BLD-MCNC: 11.3 G/DL
ISTAT IONIZED CALCIUM FOR CHEM 8: 1.15 MMOL/L (ref 1.12–1.32)
LYMPHOCYTES # BLD AUTO: 0.9 X10ˆ3/UL (ref 1–4)
LYMPHOCYTES NFR BLD AUTO: 22.4 %
MCH RBC QN AUTO: 33 PG (ref 26–34)
MCHC RBC AUTO-ENTMCNC: 32.7 G/DL (ref 31–37)
MCV RBC AUTO: 101.2 FL (ref 80–100)
MIXED CELL %: 8.4 %
NEUTROPHILS # BLD AUTO: 2.9 X10ˆ3/UL (ref 1.5–7.7)
NEUTROPHILS NFR BLD AUTO: 69.2 %
PLATELET # BLD AUTO: 128 X10ˆ3/UL (ref 150–450)
POCT INFLUENZA A: POSITIVE
POCT INFLUENZA B: NEGATIVE
POTASSIUM BLD-SCNC: 3.4 MMOL/L (ref 3.6–5.1)
Q-T INTERVAL: 346 MS
QRS DURATION: 90 MS
QTC CALCULATION (BEZET): 396 MS
R AXIS: -41 DEGREES
RBC # BLD AUTO: 3.42 X10ˆ6/UL
SARS-COV-2 RNA RESP QL NAA+PROBE: NOT DETECTED
SODIUM BLD-SCNC: 140 MMOL/L (ref 136–145)
T AXIS: 53 DEGREES
TROPONIN I BLD-MCNC: 0.04 NG/ML
VENTRICULAR RATE: 79 BPM
WBC # BLD AUTO: 4.1 X10ˆ3/UL (ref 4–11)

## 2024-03-01 PROCEDURE — 84484 ASSAY OF TROPONIN QUANT: CPT

## 2024-03-01 PROCEDURE — 93005 ELECTROCARDIOGRAM TRACING: CPT

## 2024-03-01 PROCEDURE — 85025 COMPLETE CBC W/AUTO DIFF WBC: CPT | Performed by: EMERGENCY MEDICINE

## 2024-03-01 PROCEDURE — 99214 OFFICE O/P EST MOD 30 MIN: CPT

## 2024-03-01 PROCEDURE — 36415 COLL VENOUS BLD VENIPUNCTURE: CPT

## 2024-03-01 PROCEDURE — 93010 ELECTROCARDIOGRAM REPORT: CPT

## 2024-03-01 PROCEDURE — 80047 BASIC METABLC PNL IONIZED CA: CPT

## 2024-03-01 PROCEDURE — 99215 OFFICE O/P EST HI 40 MIN: CPT

## 2024-03-01 PROCEDURE — 71046 X-RAY EXAM CHEST 2 VIEWS: CPT | Performed by: EMERGENCY MEDICINE

## 2024-03-01 PROCEDURE — 87502 INFLUENZA DNA AMP PROBE: CPT | Performed by: EMERGENCY MEDICINE

## 2024-03-01 RX ORDER — ALBUTEROL SULFATE 90 UG/1
2 AEROSOL, METERED RESPIRATORY (INHALATION) EVERY 4 HOURS PRN
Qty: 1 EACH | Refills: 0 | Status: SHIPPED | OUTPATIENT
Start: 2024-03-01 | End: 2024-03-01

## 2024-03-01 RX ORDER — BENZONATATE 100 MG/1
100 CAPSULE ORAL 3 TIMES DAILY PRN
Qty: 30 CAPSULE | Refills: 0 | Status: SHIPPED | OUTPATIENT
Start: 2024-03-01 | End: 2024-03-31

## 2024-03-01 RX ORDER — ALBUTEROL SULFATE 90 UG/1
2 AEROSOL, METERED RESPIRATORY (INHALATION) EVERY 4 HOURS PRN
Qty: 1 EACH | Refills: 0 | Status: SHIPPED | OUTPATIENT
Start: 2024-03-01 | End: 2024-03-31

## 2024-03-01 RX ORDER — BENZONATATE 100 MG/1
100 CAPSULE ORAL 3 TIMES DAILY PRN
Qty: 30 CAPSULE | Refills: 0 | Status: SHIPPED | OUTPATIENT
Start: 2024-03-01 | End: 2024-03-01

## 2024-03-01 NOTE — ED PROVIDER NOTES
Patient Seen in: [facility]      History     Chief Complaint   Patient presents with    Cough     Stated Complaint: Cough; Breathing Issues    Subjective:   HPI    This is an 83-year-old female who presents to the immediate care with reported cough which began approximately 3-1/2 days ago.  She denies fever, chills, nausea or vomiting.  However, patient reports significant shortness of breath with minimal activity such as walking with her walker.  She also reports significant fatigue.  There are no known other aggravating or alleviating factors.  She denies chest pain or chest pressure.  She has not taken her medication she is on Eliquis for a history of atrial fibrillation.    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    Pulmonary HTN (HCC)     Rheumatoid arthritis ([facility])     Sx.7/21/15, CPT.09821, R Triple Arthrodesis/Poss Medializing Calc Arthrodesis/Lapidus/MONA/FDL to Post Tib Transfer, w/, Global Exp.10/19/15 07/23/2015    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 ([facility])     Unspecified essential hypertension     Unspecified sleep apnea               Past Surgical History:   Procedure Laterality Date    ANGIOGRAM  2013    CATARACT      CHOLECYSTECTOMY      HAND/FINGER SURGERY UNLISTED Right 2011    thumb surgery    HIP REPLACEMENT SURGERY Right     HYSTERECTOMY      SERENITY--fibroid    KNEE REPLACEMENT SURGERY      total - right and left    BLAKE LOCALIZATION WIRE 1 SITE RIGHT (CPT=19281)  1994    OTHER SURGICAL HISTORY      pins right foot    TEAR DUCT SYSTEM SURG UNLISTED  1968     TONSILLECTOMY                  Social History     Socioeconomic History    Marital status:    Tobacco Use    Smoking status: Never    Smokeless tobacco: Never   Substance and Sexual Activity    Alcohol use: Yes     Alcohol/week: 1.7 standard drinks of alcohol     Types: 2 Glasses of wine per week     Comment: wine - 1 glass weekly    Drug use: No    Sexual activity: Never   Other Topics Concern    Caffeine Concern No              Review of Systems   Constitutional:  Positive for fatigue. Negative for fever.   Respiratory:  Positive for cough and shortness of breath.    Cardiovascular:  Positive for leg swelling.   Gastrointestinal: Negative.    Skin: Negative.    Neurological: Negative.    All other systems reviewed and are negative.      Positive for stated complaint: Cough; Breathing Issues  Other systems are as noted in HPI.  Constitutional and vital signs reviewed.      All other systems reviewed and negative except as noted above.    Physical Exam     ED Triage Vitals [03/01/24 1024]   /69   Pulse 74   Resp 18   Temp 98.9 °F (37.2 °C)   Temp src    SpO2 95 %   O2 Device None (Room air)       Current:/69   Pulse 74   Temp 98.9 °F (37.2 °C)   Resp 18   SpO2 95%         Physical Exam  Vitals reviewed.   Constitutional:       General: She is not in acute distress.     Appearance: Normal appearance. She is obese. She is not ill-appearing or toxic-appearing.   HENT:      Head: Normocephalic and atraumatic.      Nose: Nose normal.   Eyes:      Extraocular Movements: Extraocular movements intact.      Pupils: Pupils are equal, round, and reactive to light.   Cardiovascular:      Rate and Rhythm: Normal rate.      Pulses: Normal pulses.   Pulmonary:      Effort: Pulmonary effort is normal. No respiratory distress.      Breath sounds: No wheezing, rhonchi or rales.   Abdominal:      General: Abdomen is flat. There is no distension.      Tenderness: There is no abdominal tenderness.    Musculoskeletal:      Cervical back: Normal range of motion.      Right lower leg: Edema present.      Left lower leg: Edema present.   Skin:     General: Skin is warm and dry.      Capillary Refill: Capillary refill takes less than 2 seconds.   Neurological:      General: No focal deficit present.      Mental Status: She is alert and oriented to person, place, and time.   Psychiatric:         Mood and Affect: Mood normal.         Behavior: Behavior normal.         Thought Content: Thought content normal.         Judgment: Judgment normal.             ED Course     Labs Reviewed   POCT CBC - Abnormal; Notable for the following components:       Result Value    RBC IC 3.42 (*)     HGB IC 11.3 (*)     HCT IC 34.6 (*)     MCV .2 (*)     PLT .0 (*)     # Lymphocyte 0.9 (*)     All other components within normal limits   POCT ISTAT CHEM8 CARTRIDGE - Abnormal; Notable for the following components:    ISTAT Potassium 3.4 (*)     ISTAT Glucose 104 (*)     eGFR-Cr 56 (*)     All other components within normal limits   POCT FLU TEST - Abnormal; Notable for the following components:    POCT INFLUENZA A Positive (*)     All other components within normal limits    Narrative:     This assay is a rapid molecular in vitro test utilizing nucleic acid amplification of influenza A and B viral RNA.   ISTAT TROPONIN - Normal   RAPID SARS-COV-2 BY PCR - Normal     EKG    Rate, intervals and axes as noted on EKG Report.  Rate: 79  Rhythm: Atrial Fibrillation  Reading: A-fib, left axis deviation, significant artifact noted in leads II and V5, no acute ischemic ST T wave changes         CXR:  FINDINGS:  CARDIAC/VASC: The cardiomediastinal silhouette is unchanged in size.  There is atherosclerotic calcification of the tortuous thoracic aorta.  MEDIAST/NICOLA: No visible mass or adenopathy.  LUNGS/PLEURA: There is unchanged minimal bibasilar atelectasis/scarring.  There is unchanged thickening of the right major fissure.  No new  focal opacity, pleural effusion, or pneumothorax.  BONES: There is scoliosis.  There are multilevel degenerative changes of the thoracic spine.  OTHER: There are surgical clips in the upper abdomen.              Impression  CONCLUSION:     No new focal opacity, pleural effusion, or pneumothorax.     No significant change in the minimal bibasilar atelectasis/scarring as well as thickening along the right major fissure.            ED Course as of 03/01/24 1114  ------------------------------------------------------------  Time: 03/01 1106  Comment: HGB stable from prior CBC. Plt count decreasing when compared to most recent CBC  ------------------------------------------------------------  Time: 03/01 1111  Comment: Trop reviewed and [facility]                                         Medical Decision Making  Medical decision making  Multiple diagnoses considered in the evaluation of this patient.  Vital signs reviewed and are stable. Differential diagnosis considered include, but not limited to: Pneumonia, pulmonary edema, influenza, COVID-19, arrhythmia-atrial fibrillation with RVR        Diagnosis: Influenzae A, intermittent A. Fib      Treatment Plan: Bronchodilator, Tessalon Perles, follow up with cardiology      Amount and/or Complexity of Data Reviewed  Labs: ordered.  Radiology: ordered.  ECG/medicine tests: ordered.        Disposition and Plan     Clinical Impression:  1. Influenza    2. Intermittent atrial fibrillation (HCC)         Disposition:  Discharge  3/1/2024 11:14 am    Follow-up:  Carlos Beck MD  [address] [facility] [address]  [facility] [id number] [phone]    In 3 days  As needed          Medications Prescribed:  Current Discharge Medication List

## 2024-03-01 NOTE — ED INITIAL ASSESSMENT (HPI)
Patient arrived ambulatory to room with walker. Symptoms started 2 days ago. +non productive cough +exertional SOB. No nasal congestion. No fevers. No n/v. Diarrhea yesterday, resolved today. +generalized weakness.

## 2024-03-14 ENCOUNTER — VIRTUAL PHONE E/M (OUTPATIENT)
Dept: UROLOGY | Facility: HOSPITAL | Age: 84
End: 2024-03-14
Attending: PHYSICIAN ASSISTANT
Payer: MEDICARE

## 2024-03-14 DIAGNOSIS — R35.1 NOCTURIA: ICD-10-CM

## 2024-03-14 DIAGNOSIS — N39.41 URGE URINARY INCONTINENCE: Primary | ICD-10-CM

## 2024-03-14 DIAGNOSIS — N95.2 POSTMENOPAUSAL ATROPHIC VAGINITIS: ICD-10-CM

## 2024-03-14 NOTE — PROGRESS NOTES
Given circumstances surrounding COVID-19 this visit is being conducted as a televisit with pt's consent.  Pt in safe, private environment for televisit, provider located in the office setting.    Visit for f/u of UUI    She is currently taking trospium ER 60 mg  Using vag estrogen 2x weekly  Denies SEs  Reports +improvement  Happy    Bowels reg, using fiber     Denies any current signs or symptoms of UTI    Previously tried:  Solifenacin 10 mg    Impression/Plan:    ICD-10-CM    1. Urge urinary incontinence  N39.41       2. Nocturia  R35.1       3. 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  Discussed weight management and benefits of weight loss on urinary symptoms.  Reviewed AUA/SUFU guidelines on mgmt of non-neurogenic OAB.  Discussed pharmacologic and nonpharmacologic mgmt options of urinary symptoms - reviewed risks, benefits, alternatives, and goals of treatment.  Discussed specific risks related to OAB meds including, but not limited to dry mouth, constipation, blurry vision, cognitive changes, and BP elevation.    Discussed mgmt of vulvovaginal atrophy with vaginal estrogen cream. Reviewed associated benefits, risks, alternatives, and goals. Recommend low dose 2-3x/week treatment.    Treatment Plan:  Continue trospium ER 60 mg daily  Continue low dose vag estrogen 2-3x weekly  Bowel regimen  Bladder diet/drill  Pelvic floor exercises  Call with s/sx of UTI    All questions answered  She understands and agrees to plan    Return in about 3 months (around 6/14/2024) for UUI (phone).    Malia Bradley PA-C

## 2024-03-29 ENCOUNTER — OFFICE VISIT (OUTPATIENT)
Dept: OTOLARYNGOLOGY | Facility: CLINIC | Age: 84
End: 2024-03-29

## 2024-03-29 VITALS — HEIGHT: 64 IN | BODY MASS INDEX: 39.44 KG/M2 | WEIGHT: 231 LBS

## 2024-03-29 DIAGNOSIS — H61.23 BILATERAL IMPACTED CERUMEN: Primary | ICD-10-CM

## 2024-03-29 DIAGNOSIS — H60.391 OTHER INFECTIVE ACUTE OTITIS EXTERNA OF RIGHT EAR: ICD-10-CM

## 2024-03-29 PROCEDURE — 69210 REMOVE IMPACTED EAR WAX UNI: CPT | Performed by: OTOLARYNGOLOGY

## 2024-03-29 PROCEDURE — 99213 OFFICE O/P EST LOW 20 MIN: CPT | Performed by: OTOLARYNGOLOGY

## 2024-03-29 RX ORDER — CIPROFLOXACIN AND DEXAMETHASONE 3; 1 MG/ML; MG/ML
3 SUSPENSION/ DROPS AURICULAR (OTIC) EVERY 12 HOURS
Qty: 7.5 ML | Refills: 0 | Status: SHIPPED | OUTPATIENT
Start: 2024-03-29 | End: 2024-04-03

## 2024-03-29 RX ORDER — ALLOPURINOL 300 MG/1
TABLET ORAL
Qty: 90 TABLET | Refills: 1 | Status: SHIPPED | OUTPATIENT
Start: 2024-03-29

## 2024-03-29 NOTE — TELEPHONE ENCOUNTER
Pt is requesting a refill on her allopurinol 300 MG medication. Per the patient she is out of medication. Pharmacy: [facility] (listed)     Current Outpatient Medications   Medication Sig Dispense Refill    allopurinol 300 MG Oral Tab Take one daily. 90 tablet 3

## 2024-03-29 NOTE — PROGRESS NOTES
RETURNING PATIENT PROGRESS NOTE  OTOLOGY/OTOLARYNGOLOGY    REF MD:  No ref. provider found    PCP: No primary care provider on file.    CHIEF COMPLAINT:  cerumen impaction     LAST VISIT 11/27/2023  IMPRESSION:  Bilateral cerumen impaction - removed     PLAN:  -Avoid q-tips/instrumentation of the ears  -Follow-up in 3-6 months for routine cerumen removal   __________________________________________________________________________  Interval history: Patient is here for an ear cleaning.     HISTORY OF PRESENT ILLNESS: Cris Peterson is a 83 year old female who presents for evaluation of cerumen impaction. Denies use of hearing aids. Endorses that she typically comes in for ear cleanings every 3-6 months. Endorses that the left ear felt worse than the right today.    Patient has been seen by Dr. Sandoval, Dr. Whitmore, and Dr. Torres for routine ear cleanings. She was last seen on 7/7/23 by Dr. Whitmore.      PAST MEDICAL HISTORY:    Past Medical History:   Diagnosis Date    Cancer (HCC)     melanoma on back    Congestive heart disease (HCC)     COPD (chronic obstructive pulmonary disease) (HCC)     Coronary atherosclerosis     Disorder of thyroid     Fracture, patella     repair    Gout 05/2021    Heart valve disease     High blood pressure     High cholesterol     Hypothyroidism     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    Pulmonary HTN (HCC)     Rheumatoid arthritis ([facility])     Sx.7/21/15, CPT.78840, R Triple Arthrodesis/Poss Medializing Calc Arthrodesis/Lapidus/MONA/FDL to Post Tib Transfer, w/, Global Exp.10/19/15 07/23/2015    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)     Unspecified essential hypertension     Unspecified sleep apnea        PAST SURGICAL HISTORY:    Past Surgical History:   Procedure Laterality Date    ANGIOGRAM  2013    CATARACT      CHOLECYSTECTOMY      HAND/FINGER SURGERY UNLISTED Right 2011    thumb surgery    HIP REPLACEMENT SURGERY Right      HYSTERECTOMY      SERENITY--fibroid    KNEE REPLACEMENT SURGERY      total - right and left    BLAKE LOCALIZATION WIRE 1 SITE RIGHT (CPT=19281)  1994    OTHER SURGICAL HISTORY      pins right foot    TEAR DUCT SYSTEM SURG UNLISTED  1968    TONSILLECTOMY         Current Outpatient Medications on File Prior to Visit   Medication Sig Dispense Refill    Etanercept (ENBREL SURECLICK) 50 MG/ML Subcutaneous Solution Auto-injector Inject 50 mg subcutaneously every week. 12 mL 1    lisinopril 10 MG Oral Tab Take 1 tablet (10 mg total) by mouth every evening. 90 tablet 0    methotrexate 2.5 MG Oral Tab TAKE 4 TABLETS ONCE A WEEK 52 tablet 1    methylPREDNISolone 4 MG Oral Tablet Therapy Pack Take as directed on package. 1 each 0    levothyroxine 125 MCG Oral Tab Take 1 tablet (125 mcg total) by mouth before breakfast. 90 tablet 1    estradiol (ESTRACE) 0.1 MG/GM Vaginal Cream Apply 1/2 gram vaginally 2-3 times per week. 42.5 g 3    Trospium Chloride ER 60 MG Oral Capsule SR 24 Hr Take 1 capsule (60 mg total) by mouth daily. 90 capsule 3    lisinopril 20 MG Oral Tab Take 1 tablet (20 mg total) by mouth daily. 90 tablet 1    atorvastatin 40 MG Oral Tab Take 1 tablet (40 mg total) by mouth daily.      folic acid 1 MG Oral Tab Take 1 tablet (1 mg total) by mouth daily. 90 tablet 3    pantoprazole 40 MG Oral Tab EC Take 1 tablet (40 mg total) by mouth before breakfast. 90 tablet 3    hydroxychloroquine 200 MG Oral Tab Take 2 tablets (400 mg total) by mouth daily. 180 tablet 3    Cholecalciferol (VITAMIN D) 50 MCG (2000 UT) Oral Tab Take by mouth.      ELIQUIS 5 MG Oral Tab Take 1 tablet (5 mg total) by mouth 2 (two) times daily.      Calcium 500-125 MG-UNIT Oral Tab Take 1 tablet by mouth daily.      Loperamide HCl 2 MG Oral Cap Take 1 capsule (2 mg total) by mouth 4 (four) times daily as needed for Diarrhea.      furosemide 40 MG Oral Tab Take 1 tablet (40 mg total) by mouth daily.      Probiotic Product (PROBIOTIC-10) Oral Cap Take 1  tablet by mouth daily.      acetaminophen (TYLENOL EXTRA STRENGTH) 500 MG Oral Tab Take 1 tablet (500 mg total) by mouth every 6 (six) hours as needed.      CENTRUM SILVER OR TABS 1 TABLET DAILY       No current facility-administered medications on file prior to visit.       Allergies:   Allergies   Allergen Reactions    Erythromycin Base     Other OTHER (SEE COMMENTS)    Pcn [Bicillin L-A]     Lactose DIARRHEA       SOCIAL HISTORY:    Social History     Tobacco Use    Smoking status: Never    Smokeless tobacco: Never   Substance Use Topics    Alcohol use: Yes     Alcohol/week: 1.7 standard drinks of alcohol     Types: 2 Glasses of wine per week     Comment: wine - 1 glass weekly       FAMILY HISTORY: Denies known family history of hearing loss, tinnitus, vertigo, or migraine.  Denies known family history of head and neck cancer, thyroid cancer, bleeding disorders.     REVIEW OF SYSTEMS:   Positives are in bold  ENT: Hearing change, tinnitus, otorrhea, otalgia, aural fullness, ear pressure, vertigo, imbalance  Sinus pressure, rhinorrhea, congestion, facial pain, jaw pain, dysphagia, odynophagia, sore throat, voice changes, shortness of breath    EXAMINATION:  I washed my hands with an alcohol-based hand gel prior to examination  Constitutional:   --Vitals: Height 5' 4\" (1.626 m), weight 231 lb (104.8 kg), not currently breastfeeding.  --General: no apparent distress, well-developed, conversant  Psych: affect pleasant and appropriate for age, alert and oriented  Neuro: Facial movement normal bilateral, HB 1/6  Respiratory: No stridor, stertor or increased work of breathing  ENT:  --Ear: (Bilateral ears were examined under binocular microscopy)  Right ear microscopic exam:  Pinna: Normal, no lesions or masses.  Mastoid: Nontender on palpation.   External auditory canal: Cerumen impaction - removed, canal is slightly edematous and erythematous. no masses or lesions.  Tympanic membrane: Intact, no lesions, normal  landmarks.  Middle ear: Aerated.    Left ear microscopic exam:  Pinna: Normal, no lesions or masses.  Mastoid: Nontender on palpation.   External auditory canal: Cerumen impaction - removed, no masses or lesions.  Tympanic membrane: Intact, no lesions, normal landmarks.  Middle ear: Aerated.    Cerumen removal: (11/27/23)  Under binocular microscopy cerumen was removed from the bilateral external auditory canal using a wax loop curette and suction. Patient noted subjective improvement in hearing.    Cerumen removal: (03/29/24)  Under binocular microscopy cerumen was removed from the bilateral external auditory canal using a wax loop curette and suction. Patient noted subjective improvement in hearing.     ASSESSMENT/PLAN:  Cris Peterson is a 83 year old female with     ICD-10-CM   1. Bilateral impacted cerumen  H61.23   2. Other infective acute otitis externa of right ear  H60.391          IMPRESSION:  Bilateral cerumen impaction - removed   Right acute otitis externa    PLAN:  -Recommend Ciprodex otic drops; place 4 drops into right ear BID for 5 days  -Dry ear precautions until drops are complete  -Avoid q-tips/instrumentation of the ears  -Follow-up in 3-6 months for routine cerumen removal     Situation reviewed with the patient in detail.    Attention: This note has been scribed by Kelly Agrawal under the supervision of Riaz Cruz MD.     Riaz Cruz MD  Otology/Otolaryngology  [address] [place] [id number]  Phone [phone]  Fax [phone]      I have personally performed the services described in this documentation. All medical record entries made by the scribe were at my direction and in my presence. I have reviewed the chart and agree that the medical record reflects my personal performance and is accurate and complete.

## 2024-03-29 NOTE — TELEPHONE ENCOUNTER
LOV: 2/2/24  Last Refilled:#90, 3rfs  8/9/22    ASSESSMENT/PLAN:      Seronegative RA- stable   - She is a former patient of Dr. Rose, diagnosed more than 20 years ago  - On Enbrel weekly, methotrexate 4 pills weekly and hydroxychloroquine 400 mg daily  - She states that she sees the eye doctor every year, no evidence of Plaquenil toxicity  - Plan to monitor blood work every 3 to 4 months     Primary osteoarthritis, chronic neck and lower back pain  - X-ray of the neck has shown advanced multilevel degenerative changes.  - She cannot take NSAIDs.  Recommended PT but she deferred that for now  - Advise she can take Tylenol arthritis once or twice a day for her pain  - Will give a medrol dose back for now     CKD stage 3  - Found to have a positive atypical p-ANCA 1: 1280, negative MT-3 and MPO.  UA shows no protein or blood.  Creatinine has been fluctuating between 1.1 and 1.3  - Following with nephrology. 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  Remains on allopurinol 300 mg daily.  No recent gout flare     Pt will f/u in 3-4 mos      Ivania Cornell MD  2/2/2024   Please advise.

## 2024-04-17 ENCOUNTER — LAB ENCOUNTER (OUTPATIENT)
Dept: LAB | Facility: HOSPITAL | Age: 84
End: 2024-04-17
Attending: INTERNAL MEDICINE
Payer: MEDICARE

## 2024-04-17 ENCOUNTER — TELEPHONE (OUTPATIENT)
Dept: INTERNAL MEDICINE CLINIC | Facility: CLINIC | Age: 84
End: 2024-04-17

## 2024-04-17 DIAGNOSIS — E03.9 ACQUIRED HYPOTHYROIDISM: ICD-10-CM

## 2024-04-17 DIAGNOSIS — E03.9 ACQUIRED HYPOTHYROIDISM: Primary | ICD-10-CM

## 2024-04-17 LAB — TSI SER-ACNC: 2.35 MIU/ML (ref 0.55–4.78)

## 2024-04-17 PROCEDURE — 36415 COLL VENOUS BLD VENIPUNCTURE: CPT

## 2024-04-17 PROCEDURE — 84443 ASSAY THYROID STIM HORMONE: CPT

## 2024-04-17 NOTE — TELEPHONE ENCOUNTER
[facility] called, verified patient's Name and . States patient is currently there for thyroid testing, however, there is not test ordered. Chart reviewed. Last OV  showed patient needs repeat testing; see below. Order placed.    1. Acquired hypothyroidism  -TSH 0.349, Free T4 1.8 currently on levothyroxine 137 mcg daily been on this dose for a few years.   -Prior labs  TSH 1.0, free T4 1.2  Plan:  -decrease dose LT4 to 125 mcg  -repeat TSH in 6 weeks     Dr. Osmel GRAY.

## 2024-04-18 NOTE — TELEPHONE ENCOUNTER
Patient has viewed provider result note on [employer] as per date/time stamp copied here:  Merna Lynch ,     I reviewed your test results. It looks normal. Keep taking the same dose you are currently taking.     Please let me know if you have any questions,  Have a nice day.     Carly Bolivar MD   Written by Carly Bolivar MD on 4/18/2024 12:01 PM CDT  Seen by patient Cris Peterson on 4/18/2024  4:26 PM    Closing this encounter.

## 2024-04-22 ENCOUNTER — OFFICE VISIT (OUTPATIENT)
Dept: INTERNAL MEDICINE CLINIC | Facility: CLINIC | Age: 84
End: 2024-04-22

## 2024-04-22 VITALS
HEART RATE: 72 BPM | OXYGEN SATURATION: 95 % | WEIGHT: 236 LBS | SYSTOLIC BLOOD PRESSURE: 122 MMHG | BODY MASS INDEX: 40.29 KG/M2 | HEIGHT: 64 IN | DIASTOLIC BLOOD PRESSURE: 78 MMHG

## 2024-04-22 DIAGNOSIS — R53.83 OTHER FATIGUE: ICD-10-CM

## 2024-04-22 DIAGNOSIS — E03.9 ACQUIRED HYPOTHYROIDISM: ICD-10-CM

## 2024-04-22 DIAGNOSIS — N18.32 STAGE 3B CHRONIC KIDNEY DISEASE (HCC): ICD-10-CM

## 2024-04-22 DIAGNOSIS — I48.0 PAROXYSMAL ATRIAL FIBRILLATION (HCC): Primary | ICD-10-CM

## 2024-04-22 PROCEDURE — 99214 OFFICE O/P EST MOD 30 MIN: CPT | Performed by: INTERNAL MEDICINE

## 2024-04-22 NOTE — PROGRESS NOTES
Cris Peterson is a 83 year old female who is here for  1. Paroxysmal atrial fibrillation (HCC)    2. Stage 3b chronic kidney disease (HCC)    3. Acquired hypothyroidism    4. Other fatigue            HPI:   Cris Peterson is a 83 year old female  presents for follow up on thyroid medication titrations.    CKD stage 3B  Chronic anemia, ferritin 93 2/2023  Sees multiple specialists; urogyne, rheumotology, ophth, ENT  Afib, on Eliquis      Past Medical History:    Cancer (HCC)    melanoma on back    Congestive heart disease (HCC)    COPD (chronic obstructive pulmonary disease) (HCC)    Coronary atherosclerosis    Disorder of thyroid    Fracture, patella    repair    Gout    Heart valve disease    High blood pressure    High cholesterol    Hypothyroidism    Nonunion of midfoot arthrodesis (HCC)    Pulmonary HTN (HCC)    Rheumatoid arthritis (HCC)    Sx.7/21/15, CPT.63531, R Triple Arthrodesis/Poss Medializing Calc Arthrodesis/Lapidus/MONA/FDL to Post Tib Transfer, w/, Global Exp.10/19/15    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)    Unspecified essential hypertension    Unspecified sleep apnea     Past Surgical History:   Procedure Laterality Date    Angiogram  2013    Cataract      Cholecystectomy      Hand/finger surgery unlisted Right 2011    thumb surgery    Hip replacement surgery Right     Hysterectomy      SERENITY--fibroid    Knee replacement surgery      total - right and left    Jw localization wire 1 site right (cpt=19281)  1994    Other surgical history      pins right foot    Tear duct system surg unlisted  1968    Tonsillectomy         Current Outpatient Medications:     allopurinol 300 MG Oral Tab, Take one daily., Disp: 90 tablet, Rfl: 1    Etanercept (ENBREL SURECLICK) 50 MG/ML Subcutaneous Solution Auto-injector, Inject 50 mg subcutaneously every week., Disp: 12 mL, Rfl: 1    lisinopril 10 MG Oral Tab, Take 1 tablet (10 mg total) by mouth every evening., Disp:  90 tablet, Rfl: 0    methotrexate 2.5 MG Oral Tab, TAKE 4 TABLETS ONCE A WEEK, Disp: 52 tablet, Rfl: 1    methylPREDNISolone 4 MG Oral Tablet Therapy Pack, Take as directed on package., Disp: 1 each, Rfl: 0    levothyroxine 125 MCG Oral Tab, Take 1 tablet (125 mcg total) by mouth before breakfast., Disp: 90 tablet, Rfl: 1    estradiol (ESTRACE) 0.1 MG/GM Vaginal Cream, Apply 1/2 gram vaginally 2-3 times per week., Disp: 42.5 g, Rfl: 3    Trospium Chloride ER 60 MG Oral Capsule SR 24 Hr, Take 1 capsule (60 mg total) by mouth daily., Disp: 90 capsule, Rfl: 3    atorvastatin 40 MG Oral Tab, Take 1 tablet (40 mg total) by mouth daily., Disp: , Rfl:     folic acid 1 MG Oral Tab, Take 1 tablet (1 mg total) by mouth daily., Disp: 90 tablet, Rfl: 3    pantoprazole 40 MG Oral Tab EC, Take 1 tablet (40 mg total) by mouth before breakfast., Disp: 90 tablet, Rfl: 3    hydroxychloroquine 200 MG Oral Tab, Take 2 tablets (400 mg total) by mouth daily., Disp: 180 tablet, Rfl: 3    Cholecalciferol (VITAMIN D) 50 MCG (2000 UT) Oral Tab, Take by mouth., Disp: , Rfl:     ELIQUIS 5 MG Oral Tab, Take 1 tablet (5 mg total) by mouth 2 (two) times daily., Disp: , Rfl:     Calcium 500-125 MG-UNIT Oral Tab, Take 1 tablet by mouth daily., Disp: , Rfl:     Loperamide HCl 2 MG Oral Cap, Take 1 capsule (2 mg total) by mouth 4 (four) times daily as needed for Diarrhea., Disp: , Rfl:     furosemide 40 MG Oral Tab, Take 1 tablet (40 mg total) by mouth daily., Disp: , Rfl:     Probiotic Product (PROBIOTIC-10) Oral Cap, Take 1 tablet by mouth daily., Disp: , Rfl:     acetaminophen (TYLENOL EXTRA STRENGTH) 500 MG Oral Tab, Take 1 tablet (500 mg total) by mouth every 6 (six) hours as needed., Disp: , Rfl:     CENTRUM SILVER OR TABS, 1 TABLET DAILY, Disp: , Rfl:     Allergies:  Allergies   Allergen Reactions    Erythromycin Base     Other OTHER (SEE COMMENTS)    Pcn [Bicillin L-A]     Lactose DIARRHEA     Social History     Socioeconomic History     Marital status:      Spouse name: Not on file    Number of children: Not on file    Years of education: Not on file    Highest education level: Not on file   Occupational History    Not on file   Tobacco Use    Smoking status: Never    Smokeless tobacco: Never   Vaping Use    Vaping status: Not on file   Substance and Sexual Activity    Alcohol use: Yes     Alcohol/week: 1.7 standard drinks of alcohol     Types: 2 Glasses of wine per week     Comment: wine - 1 glass weekly    Drug use: No    Sexual activity: Never   Other Topics Concern     Service Not Asked    Blood Transfusions Not Asked    Caffeine Concern No    Occupational Exposure Not Asked    Hobby Hazards Not Asked    Sleep Concern Not Asked    Stress Concern Not Asked    Weight Concern Not Asked    Special Diet Not Asked    Back Care Not Asked    Exercise Not Asked    Bike Helmet Not Asked    Seat Belt Not Asked    Self-Exams Not Asked   Social History Narrative    Not on file     Social Determinants of Health     Financial Resource Strain: Not on file   Food Insecurity: Not on file   Transportation Needs: Not on file   Physical Activity: Not on file   Stress: Not on file   Social Connections: Not on file   Housing Stability: Not on file       REVIEW OF SYSTEMS:     GENERAL HEALTH: No fevers, chills, sweats, fatigue  VISION: No recent vision problems, blurry vision or double vision  HEENT: No decreased hearing ear pain nasal congestion or sore throat  SKIN: denies any unusual skin lesions or rashes  RESPIRATORY: denies shortness of breath, cough, wheezing  CARDIOVASCULAR: denies chest pain on exertion, palpitations, +swelling in feet  GI: denies abdominal pain and denies heartburn, nausea or vomiting  : No Pain on urination, change in the color of urine, discharge, urinating frequently  MUS: No back pain, joint pain, muscle pain  NEURO: denies headaches , anxiety, depression    EXAM:     Vitals:    04/22/24 0933   BP: 122/78   Pulse: 72    SpO2: 95%   Weight: 236 lb (107 kg)   Height: 5' 4\" (1.626 m)     GENERAL: well developed, well nourished,in no apparent distress.  SKIN: no rashes,no suspicious lesions  HEENT: atraumatic, normocephalic,ears and throat are clear,   NECK: supple,no adenopathy,  LUNGS: clear to auscultation, no wheeze  CARDIO: irregularly irregular without murmur  GI: good BS's,no masses or tenderness  EXTREMITIES: no cyanosis, 3+  pitting edema to both legs to knees    ASSESSMENT AND PLAN:   1. Acquired hypothyroidism  -TSH 0.349, Free T4 1.8 currently on levothyroxine 137 mcg daily been on this dose for a few years.   Plan:  -decrease dose LT4 to 125 mcg  -repeat TSH 2.3 will continue same dose    2. Stage 3b chronic kidney disease (HCC)  3. Paroxysmal atrial fibrillation (HCC)  4. Anemia, unspecified type  5. Localized swelling of both lower legs  -increased swelling of both legs  -little NICOLE, baseline per patient. but also BMI 40  -lungs are clear  -tried lasix BID x week with 7 lbs weight loss. Last month  -been on lasix 40 mg daily  Plan:  -repeat echo, last one 2020  -increase lasix to BID x 1 week and discuss dosage titration with cardiology  -if gets SOB, chest pain or NICOLE go to the ER, patient understands and agreeable  -follow up with cardiology    The patient indicates understanding of these issues and agrees to the plan.    Time spent on this encounter including face to face and chart review 30 minutes.       Return in about 3 months (around 7/22/2024).    Carly Bolivar MD

## 2024-04-23 ENCOUNTER — MED REC SCAN ONLY (OUTPATIENT)
Dept: RHEUMATOLOGY | Facility: CLINIC | Age: 84
End: 2024-04-23

## 2024-04-24 ENCOUNTER — MED REC SCAN ONLY (OUTPATIENT)
Dept: INTERNAL MEDICINE CLINIC | Facility: CLINIC | Age: 84
End: 2024-04-24

## 2024-04-24 DIAGNOSIS — K21.9 GASTROESOPHAGEAL REFLUX DISEASE: ICD-10-CM

## 2024-04-24 RX ORDER — HYDROXYCHLOROQUINE SULFATE 200 MG/1
400 TABLET, FILM COATED ORAL DAILY
Qty: 180 TABLET | Refills: 3 | Status: SHIPPED | OUTPATIENT
Start: 2024-04-24

## 2024-04-24 NOTE — TELEPHONE ENCOUNTER
pantoprazole 40 MG Oral Tab EC, Take 1 tablet (40 mg total) by mouth before breakfast., Disp: 90 tablet, Rfl: 3

## 2024-04-24 NOTE — TELEPHONE ENCOUNTER
LOV: 2/2/24  Last Refilled:#180, 3rfs 4/19/23    ASSESSMENT/PLAN:      Seronegative RA- stable   - She is a former patient of Dr. Rose, diagnosed more than 20 years ago  - On Enbrel weekly, methotrexate 4 pills weekly and hydroxychloroquine 400 mg daily  - She states that she sees the eye doctor every year, no evidence of Plaquenil toxicity  - Plan to monitor blood work every 3 to 4 months     Primary osteoarthritis, chronic neck and lower back pain  - X-ray of the neck has shown advanced multilevel degenerative changes.  - She cannot take NSAIDs.  Recommended PT but she deferred that for now  - Advise she can take Tylenol arthritis once or twice a day for her pain  - Will give a medrol dose back for now     CKD stage 3  - Found to have a positive atypical p-ANCA 1: 1280, negative KY-3 and MPO.  UA shows no protein or blood.  Creatinine has been fluctuating between 1.1 and 1.3  - Following with nephrology. 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  Remains on allopurinol 300 mg daily.  No recent gout flare     Pt will f/u in 3-4 mos      Ivania Cornell MD  2/2/2024   10:43 AM        Please advise.     
General

## 2024-04-25 RX ORDER — PANTOPRAZOLE SODIUM 40 MG/1
40 TABLET, DELAYED RELEASE ORAL
Qty: 90 TABLET | Refills: 3 | Status: SHIPPED | OUTPATIENT
Start: 2024-04-25

## 2024-04-25 NOTE — TELEPHONE ENCOUNTER
Refill passed per protocol.    Requested Prescriptions   Pending Prescriptions Disp Refills    pantoprazole 40 MG Oral Tab EC 90 tablet 3     Sig: Take 1 tablet (40 mg total) by mouth before breakfast.       Gastrointestional Medication Protocol Passed - 4/24/2024  3:33 PM        Passed - In person appointment or virtual visit in the past 12 mos or appointment in next 3 mos     Recent Outpatient Visits              3 days ago Paroxysmal atrial fibrillation (HCC)    [facility] Carly Bolivar MD    Office Visit    3 weeks ago Bilateral impacted cerumen    [facility] Riaz Andrade MD    Office Visit    1 month ago Urge urinary incontinence    [facility] Urogynecology Malia Bradley PA    Virtual Phone E/M    2 months ago Stage 3b chronic kidney disease (HCC)    [facility] Mikal Parks MD    Office Visit    2 months ago Acquired hypothyroidism    [facility] Carly Bolivar MD    Office Visit          Future Appointments         Provider Department Appt Notes    In 1 month Ivania Cornell MD [facility] 4 month f/u    In 1 month Malia Bradley PA [facility] Urogynecology 3MO UUI F/U    In 2 months Carly Bolivar MD [facility] last px 6/27/23    In 3 months Mikal Parks MD [facility] 6 months                         Future Appointments         Provider Department Appt Notes    In 1 month Ivania Cornell MD [facility] 4 month f/u    In 1 month Malia Bradley PA [facility] Urogynecology 3MO UUI F/U    In 2 months Osmel  MD Carly [facility] last px 6/27/23    In 3 months Mikal Parks MD [facility] 6 months          Recent Outpatient Visits              3 days ago Paroxysmal atrial fibrillation (HCC)    [facility] Carly Bolivar MD    Office Visit    3 weeks ago Bilateral impacted cerumen    [facility] Riaz Andrade MD    Office Visit    1 month ago Urge urinary incontinence    [facility] Urogynecology Malia Bradley PA    Virtual Phone E/M    2 months ago Stage 3b chronic kidney disease (HCC)    [facility] Mikal Parks MD    Office Visit    2 months ago Acquired hypothyroidism    [facility] Carly Bolivar MD    Office Visit

## 2024-04-27 ENCOUNTER — APPOINTMENT (OUTPATIENT)
Dept: GENERAL RADIOLOGY | Age: 84
End: 2024-04-27
Attending: PHYSICIAN ASSISTANT
Payer: MEDICARE

## 2024-04-27 ENCOUNTER — HOSPITAL ENCOUNTER (OUTPATIENT)
Age: 84
Discharge: HOME | End: 2024-04-27
Payer: MEDICARE

## 2024-04-27 VITALS
SYSTOLIC BLOOD PRESSURE: 129 MMHG | OXYGEN SATURATION: 95 % | DIASTOLIC BLOOD PRESSURE: 54 MMHG | RESPIRATION RATE: 18 BRPM | HEART RATE: 68 BPM | TEMPERATURE: 98 F

## 2024-04-27 DIAGNOSIS — R05.1 ACUTE COUGH: Primary | ICD-10-CM

## 2024-04-27 DIAGNOSIS — J44.1 COPD EXACERBATION (HCC): ICD-10-CM

## 2024-04-27 LAB
POCT INFLUENZA A: NEGATIVE
POCT INFLUENZA B: NEGATIVE
SARS-COV-2 RNA RESP QL NAA+PROBE: NOT DETECTED

## 2024-04-27 PROCEDURE — 99214 OFFICE O/P EST MOD 30 MIN: CPT

## 2024-04-27 PROCEDURE — 99215 OFFICE O/P EST HI 40 MIN: CPT

## 2024-04-27 PROCEDURE — 87502 INFLUENZA DNA AMP PROBE: CPT | Performed by: PHYSICIAN ASSISTANT

## 2024-04-27 PROCEDURE — 94640 AIRWAY INHALATION TREATMENT: CPT

## 2024-04-27 PROCEDURE — 71046 X-RAY EXAM CHEST 2 VIEWS: CPT | Performed by: PHYSICIAN ASSISTANT

## 2024-04-27 RX ORDER — ALBUTEROL SULFATE 90 UG/1
2 AEROSOL, METERED RESPIRATORY (INHALATION) EVERY 4 HOURS PRN
Qty: 1 EACH | Refills: 0 | Status: SHIPPED | OUTPATIENT
Start: 2024-04-27 | End: 2024-05-27

## 2024-04-27 RX ORDER — PREDNISONE 20 MG/1
40 TABLET ORAL DAILY
Qty: 10 TABLET | Refills: 0 | Status: SHIPPED | OUTPATIENT
Start: 2024-04-27 | End: 2024-05-02

## 2024-04-27 RX ORDER — IPRATROPIUM BROMIDE AND ALBUTEROL SULFATE 2.5; .5 MG/3ML; MG/3ML
3 SOLUTION RESPIRATORY (INHALATION) ONCE
Status: COMPLETED | OUTPATIENT
Start: 2024-04-27 | End: 2024-04-27

## 2024-04-27 RX ORDER — BENZONATATE 100 MG/1
100 CAPSULE ORAL 3 TIMES DAILY PRN
Qty: 30 CAPSULE | Refills: 0 | Status: SHIPPED | OUTPATIENT
Start: 2024-04-27 | End: 2024-05-27

## 2024-04-27 NOTE — ED INITIAL ASSESSMENT (HPI)
Presents with 2 days of congestion and cough. No fever. Hx of +Flu A and \"probable pneumonia\" last month. C/o exertional SOB.

## 2024-04-27 NOTE — ED PROVIDER NOTES
Patient Seen in: [facility]    History     Chief Complaint   Patient presents with    Cough/URI     Stated Complaint: cough    HPI    Cris Peterson is a 83 year old female presents with chief complaint of cough.  Onset 4 to 5 days ago.  Patient reports associated dyspnea.  Patient reports history of COPD and states her symptoms are similar to previous acute exacerbations.  Patient denies change or worsening in baseline dyspnea.  Patient denies fever, chills, earache, nasal drainage, sore throat, abdominal pain, nausea, vomiting, diarrhea, constipation, dysuria, hematuria, flank pain, rash, neck pain, neck swelling, restricted neck movement, dyspnea, wheeze, hemoptysis, chest pain.      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)    Unspecified essential hypertension    Unspecified sleep apnea       Past Surgical History:   Procedure Laterality Date    Angiogram  2013    Cataract      Cholecystectomy      Hand/finger surgery unlisted Right 2011    thumb surgery    Hip replacement surgery Right     Hysterectomy      SERENITY--fibroid    Knee replacement surgery      total - right and left    Jw localization wire 1 site right (cpt=19281)  1994    Other surgical history      pins right foot    Tear duct system surg unlisted  1968    Tonsillectomy              Family History   Problem Relation Age of Onset    Breast  Cancer Mother 64    Cancer Mother         ovarian (cause of death)    Ovarian Cancer Mother 83    Diabetes Father     Cancer Father         stomach    Cancer Son         leukemia    Colon Cancer Son     Breast Cancer Paternal Aunt         post menopause    Cancer Self         melanoma    Breast Cancer Paternal Cousin Female         age after 50       Social History     Socioeconomic History    Marital status:    Tobacco Use    Smoking status: Never    Smokeless tobacco: Never   Substance and Sexual Activity    Alcohol use: Yes     Alcohol/week: 1.7 standard drinks of alcohol     Types: 2 Glasses of wine per week     Comment: wine - 1 glass weekly    Drug use: No    Sexual activity: Never   Other Topics Concern    Caffeine Concern No       Review of Systems    Positive for stated complaint: cough  Other systems are as noted in HPI.  Constitutional and vital signs reviewed.      All other systems reviewed and negative except as noted above.    PSFH elements reviewed from today and agreed except as otherwise stated in HPI.    Physical Exam     ED Triage Vitals [04/27/24 1152]   /54   Pulse 68   Resp 18   Temp 98.3 °F (36.8 °C)   Temp src Oral   SpO2 95 %   O2 Device None (Room air)       Current:/54   Pulse 68   Temp 98.3 °F (36.8 °C) (Oral)   Resp 18   SpO2 95%     PULSE OX within normal limits on room air as interpreted by this provider.     Constitutional: The patient is cooperative. Appears well-developed and well-nourished.  No acute distress.  Psychological: Alert, No abnormalities of mood, affect.  Head: Normocephalic/atraumatic.   Eyes: Pupils are equal round reactive to light.  Conjunctiva are within normal limits.  ENT: Pharynx noninjected.  Tonsils within normal size limits bilaterally.  No tonsillar exudates.  TMs within normal limits bilaterally.  Mucous membranes moist.  Neck: The neck is supple.  No meningeal signs.  Chest: There is no tenderness to the chest wall.  No CVA  tenderness bilaterally.  Respiratory: Respiratory effort was normal.  Decreased lung sounds bilaterally.  There is no stridor.  Air entry is equal.  Cardiovascular: Regular rate and rhythm.  Capillary refill is brisk.    Lymphatic: No gross lymphadenopathy noted.  Musculoskeletal: Musculoskeletal system is grossly intact.  There is no obvious deformity.  Neurological: Gross motor movement is intact in all 4 extremities.  Patient exhibits normal speech.  Skin: Skin is normal to inspection.  Warm and dry.  No obvious bruising.  No obvious rash.        ED Course     Labs Reviewed   RAPID SARS-COV-2 BY PCR - Normal   POCT FLU TEST - Normal    Narrative:     This assay is a rapid molecular in vitro test utilizing nucleic acid amplification of influenza A and B viral RNA.       MDM     Patient declined further workup including but not limited to blood work, EKG, further imaging    Radiology findings: XR CHEST PA + LAT CHEST (CPT=71046)    Result Date: 4/27/2024  CONCLUSION: No acute cardiopulmonary disease.    Dictated by (CST): Issac Iglesias MD on 4/27/2024 at 12:27 PM     Finalized by (CST): Issac Iglesias MD on 4/27/2024 at 12:28 PM           Chest x-ray images independently reviewed by this provider-no pneumonia.     Physical exam remained stable over serial reexaminations as previously documented.  Results reviewed with patient.    I have given the patient instructions regarding their diagnoses, expectations, follow up, and ER precautions. I explained to the patient that emergent conditions may arise and to go to the ER for new, worsening or any persistent conditions. I've explained the importance of following up with their doctor as instructed. The patient verbalized understanding of the discharge instructions and plan.    The patient was informed of their elevated blood pressure reading at immediate care.  They were informed of the dangers of undiagnosed and untreated hypertension.  Education regarding  lifestyle modifications and the need for appropriate follow-up with their PCP to have their blood pressure re-checked within 24-48 hours was provided.     Disposition and Plan     Clinical Impression:  1. Acute cough    2. COPD exacerbation (HCC)        Disposition:  Discharge    Follow-up:  Carly Bolivar MD  [address] [place] [id number]  [phone]    Call in 1 day  For follow-up      Medications Prescribed:  Discharge Medication List as of 4/27/2024 12:51 PM        START taking these medications    Details   Spacer/Aero-Holding Chambers Does not apply Device Use with albuterol inhaler, Normal, Disp-1 each, R-0      predniSONE 20 MG Oral Tab Take 2 tablets (40 mg total) by mouth daily for 5 days., Normal, Disp-10 tablet, R-0

## 2024-04-30 ENCOUNTER — TELEPHONE (OUTPATIENT)
Dept: INTERNAL MEDICINE CLINIC | Facility: CLINIC | Age: 84
End: 2024-04-30

## 2024-05-01 ENCOUNTER — APPOINTMENT (OUTPATIENT)
Dept: URBAN - METROPOLITAN AREA CLINIC 244 | Age: 84
Setting detail: DERMATOLOGY
End: 2024-05-01

## 2024-05-01 DIAGNOSIS — Z85.828 PERSONAL HISTORY OF OTHER MALIGNANT NEOPLASM OF SKIN: ICD-10-CM

## 2024-05-01 DIAGNOSIS — L29.8 OTHER PRURITUS: ICD-10-CM

## 2024-05-01 DIAGNOSIS — L82.1 OTHER SEBORRHEIC KERATOSIS: ICD-10-CM

## 2024-05-01 DIAGNOSIS — D22 MELANOCYTIC NEVI: ICD-10-CM

## 2024-05-01 DIAGNOSIS — L81.4 OTHER MELANIN HYPERPIGMENTATION: ICD-10-CM

## 2024-05-01 PROBLEM — D22.5 MELANOCYTIC NEVI OF TRUNK: Status: ACTIVE | Noted: 2024-05-01

## 2024-05-01 PROBLEM — D48.5 NEOPLASM OF UNCERTAIN BEHAVIOR OF SKIN: Status: ACTIVE | Noted: 2024-05-01

## 2024-05-01 PROCEDURE — OTHER COUNSELING: OTHER

## 2024-05-01 PROCEDURE — OTHER PRESCRIPTION: OTHER

## 2024-05-01 PROCEDURE — 11102 TANGNTL BX SKIN SINGLE LES: CPT

## 2024-05-01 PROCEDURE — 99213 OFFICE O/P EST LOW 20 MIN: CPT | Mod: 25

## 2024-05-01 PROCEDURE — OTHER BIOPSY BY SHAVE METHOD: OTHER

## 2024-05-01 PROCEDURE — OTHER ADDITIONAL NOTES: OTHER

## 2024-05-01 RX ORDER — FLUOCINONIDE 0.5 MG/ML
SOLUTION TOPICAL
Qty: 60 | Refills: 6 | Status: ERX | COMMUNITY
Start: 2024-05-01

## 2024-05-01 ASSESSMENT — LOCATION DETAILED DESCRIPTION DERM
LOCATION DETAILED: LEFT ANTERIOR PROXIMAL UPPER ARM
LOCATION DETAILED: RIGHT SUPERIOR UPPER BACK
LOCATION DETAILED: LEFT MEDIAL FRONTAL SCALP
LOCATION DETAILED: RIGHT MID-UPPER BACK
LOCATION DETAILED: PERIUMBILICAL SKIN

## 2024-05-01 ASSESSMENT — LOCATION ZONE DERM
LOCATION ZONE: SCALP
LOCATION ZONE: ARM
LOCATION ZONE: TRUNK

## 2024-05-01 ASSESSMENT — LOCATION SIMPLE DESCRIPTION DERM
LOCATION SIMPLE: LEFT SCALP
LOCATION SIMPLE: LEFT UPPER ARM
LOCATION SIMPLE: RIGHT UPPER BACK
LOCATION SIMPLE: ABDOMEN

## 2024-05-01 NOTE — PROCEDURE: BIOPSY BY SHAVE METHOD
Biopsy Method: double edge Personna blade
Type Of Destruction Used: Curettage
Hide Topical Anesthesia?: No
Notification Instructions: Patient will be notified of biopsy results. However, patient instructed to call the office if not contacted within 2 weeks.
Silver Nitrate Text: The wound bed was treated with silver nitrate after the biopsy was performed.
Curettage Text: The wound bed was treated with curettage after the biopsy was performed.
Lab: -[id number]
X Size Of Lesion In Cm: 0
Consent: Written consent was obtained and risks were reviewed including but not limited to scarring, infection, bleeding, scabbing, incomplete removal, nerve damage and allergy to anesthesia.
Anesthesia Type: 0.5% lidocaine with 1:100,000 epinephrine and a 1:10 solution of 8.4% sodium bicarbonate
Cryotherapy Text: The wound bed was treated with cryotherapy after the biopsy was performed.
Wound Care: Petrolatum
Electrodesiccation Text: The wound bed was treated with electrodesiccation after the biopsy was performed.
Biopsy Type: H and E
Dressing: bandage
Hemostasis: Drysol
Was A Bandage Applied: Yes
Detail Level: Detailed
Anesthesia Volume In Cc: 0.5
Billing Type: Third-Party Bill
Post-Care Instructions: I reviewed with the patient in detail post-care instructions. Patient is to keep the biopsy site dry overnight, and then apply bacitracin twice daily until healed. Patient may apply hydrogen peroxide soaks to remove any crusting.
Depth Of Biopsy: dermis
Information: Selecting Yes will display possible errors in your note based on the variables you have selected. This validation is only offered as a suggestion for you. PLEASE NOTE THAT THE VALIDATION TEXT WILL BE REMOVED WHEN YOU FINALIZE YOUR NOTE. IF YOU WANT TO FAX A PRELIMINARY NOTE YOU WILL NEED TO TOGGLE THIS TO 'NO' IF YOU DO NOT WANT IT IN YOUR FAXED NOTE.
Electrodesiccation And Curettage Text: The wound bed was treated with electrodesiccation and curettage after the biopsy was performed.

## 2024-05-01 NOTE — PROCEDURE: ADDITIONAL NOTES
Render Risk Assessment In Note?: no
Detail Level: Simple
Additional Notes: Patient can also use OTC minoxidil.

## 2024-05-06 ENCOUNTER — TELEPHONE (OUTPATIENT)
Dept: RHEUMATOLOGY | Facility: CLINIC | Age: 84
End: 2024-05-06

## 2024-05-24 ENCOUNTER — HOSPITAL ENCOUNTER (OUTPATIENT)
Dept: GENERAL RADIOLOGY | Facility: HOSPITAL | Age: 84
Discharge: HOME | End: 2024-05-24
Attending: INTERNAL MEDICINE

## 2024-05-24 ENCOUNTER — LAB ENCOUNTER (OUTPATIENT)
Dept: LAB | Facility: HOSPITAL | Age: 84
End: 2024-05-24
Attending: INTERNAL MEDICINE

## 2024-05-24 DIAGNOSIS — N28.9 RENAL INSUFFICIENCY: ICD-10-CM

## 2024-05-24 DIAGNOSIS — R00.1 BRADYCARDIA: ICD-10-CM

## 2024-05-24 DIAGNOSIS — I48.0 PAROXYSMAL ATRIAL FIBRILLATION (HCC): ICD-10-CM

## 2024-05-24 DIAGNOSIS — Z51.81 THERAPEUTIC DRUG MONITORING: ICD-10-CM

## 2024-05-24 DIAGNOSIS — Z01.818 PREOPERATIVE CLEARANCE: ICD-10-CM

## 2024-05-24 DIAGNOSIS — I48.0 AF (PAROXYSMAL ATRIAL FIBRILLATION) (HCC): ICD-10-CM

## 2024-05-24 DIAGNOSIS — R94.39 ABNORMAL BALLISTOCARDIOGRAM: Primary | ICD-10-CM

## 2024-05-24 DIAGNOSIS — R94.39 ABNORMAL FINDING ON THALLIUM STRESS TEST: ICD-10-CM

## 2024-05-24 DIAGNOSIS — M06.09 RHEUMATOID ARTHRITIS OF MULTIPLE SITES WITH NEGATIVE RHEUMATOID FACTOR (HCC): ICD-10-CM

## 2024-05-24 DIAGNOSIS — M1A.9XX1 CHRONIC TOPHACEOUS GOUT OF RIGHT FOOT: ICD-10-CM

## 2024-05-24 DIAGNOSIS — R00.1 SEVERE SINUS BRADYCARDIA: ICD-10-CM

## 2024-05-24 DIAGNOSIS — I25.10 CORONARY ATHEROSCLEROSIS OF NATIVE CORONARY ARTERY: ICD-10-CM

## 2024-05-24 DIAGNOSIS — N18.9 CHRONIC KIDNEY DISEASE: ICD-10-CM

## 2024-05-24 LAB
ALBUMIN SERPL-MCNC: 4.1 G/DL (ref 3.2–4.8)
ALT SERPL-CCNC: 17 U/L
ANION GAP SERPL CALC-SCNC: 9 MMOL/L (ref 0–18)
AST SERPL-CCNC: 21 U/L (ref ?–34)
BASOPHILS # BLD AUTO: 0.05 X10(3) UL (ref 0–0.2)
BASOPHILS NFR BLD AUTO: 1 %
BUN BLD-MCNC: 20 MG/DL (ref 9–23)
BUN/CREAT SERPL: 17.5 (ref 10–20)
CALCIUM BLD-MCNC: 9.3 MG/DL (ref 8.7–10.4)
CHLORIDE SERPL-SCNC: 109 MMOL/L (ref 98–112)
CO2 SERPL-SCNC: 26 MMOL/L (ref 21–32)
CREAT BLD-MCNC: 1.14 MG/DL
CRP SERPL-MCNC: <0.4 MG/DL (ref ?–1)
DEPRECATED RDW RBC AUTO: 69.4 FL (ref 35.1–46.3)
EGFRCR SERPLBLD CKD-EPI 2021: 47 ML/MIN/1.73M2 (ref 60–?)
EOSINOPHIL # BLD AUTO: 0.12 X10(3) UL (ref 0–0.7)
EOSINOPHIL NFR BLD AUTO: 2.4 %
ERYTHROCYTE [DISTWIDTH] IN BLOOD BY AUTOMATED COUNT: 18.2 % (ref 11–15)
ERYTHROCYTE [SEDIMENTATION RATE] IN BLOOD: 47 MM/HR
FASTING STATUS PATIENT QL REPORTED: NO
GLUCOSE BLD-MCNC: 96 MG/DL (ref 70–99)
HCT VFR BLD AUTO: 33.7 %
HGB BLD-MCNC: 11.4 G/DL
IMM GRANULOCYTES # BLD AUTO: 0.02 X10(3) UL (ref 0–1)
IMM GRANULOCYTES NFR BLD: 0.4 %
LYMPHOCYTES # BLD AUTO: 1.63 X10(3) UL (ref 1–4)
LYMPHOCYTES NFR BLD AUTO: 33.1 %
MCH RBC QN AUTO: 35.2 PG (ref 26–34)
MCHC RBC AUTO-ENTMCNC: 33.8 G/DL (ref 31–37)
MCV RBC AUTO: 104 FL
MONOCYTES # BLD AUTO: 0.66 X10(3) UL (ref 0.1–1)
MONOCYTES NFR BLD AUTO: 13.4 %
NEUTROPHILS # BLD AUTO: 2.44 X10 (3) UL (ref 1.5–7.7)
NEUTROPHILS # BLD AUTO: 2.44 X10(3) UL (ref 1.5–7.7)
NEUTROPHILS NFR BLD AUTO: 49.7 %
OSMOLALITY SERPL CALC.SUM OF ELEC: 300 MOSM/KG (ref 275–295)
PLATELET # BLD AUTO: 184 10(3)UL (ref 150–450)
PLATELET MORPHOLOGY: NORMAL
POTASSIUM SERPL-SCNC: 3.6 MMOL/L (ref 3.5–5.1)
RBC # BLD AUTO: 3.24 X10(6)UL
SODIUM SERPL-SCNC: 144 MMOL/L (ref 136–145)
WBC # BLD AUTO: 4.9 X10(3) UL (ref 4–11)

## 2024-05-24 PROCEDURE — 85025 COMPLETE CBC W/AUTO DIFF WBC: CPT

## 2024-05-24 PROCEDURE — 84460 ALANINE AMINO (ALT) (SGPT): CPT

## 2024-05-24 PROCEDURE — 84450 TRANSFERASE (AST) (SGOT): CPT

## 2024-05-24 PROCEDURE — 80048 BASIC METABOLIC PNL TOTAL CA: CPT

## 2024-05-24 PROCEDURE — 85652 RBC SED RATE AUTOMATED: CPT

## 2024-05-24 PROCEDURE — 71046 X-RAY EXAM CHEST 2 VIEWS: CPT | Performed by: INTERNAL MEDICINE

## 2024-05-24 PROCEDURE — 86140 C-REACTIVE PROTEIN: CPT

## 2024-05-24 PROCEDURE — 82040 ASSAY OF SERUM ALBUMIN: CPT

## 2024-05-24 PROCEDURE — 36415 COLL VENOUS BLD VENIPUNCTURE: CPT

## 2024-05-24 NOTE — H&P
The above referenced H&P was reviewed by Carlos Beck MD on 6/6/2024, the patient was examined and no significant changes have occurred in the patient's condition since the H&P was performed.  Risks and benefits were discussed, proceed with procedure as planned.

## 2024-05-28 ENCOUNTER — APPOINTMENT (OUTPATIENT)
Dept: URBAN - METROPOLITAN AREA CLINIC 244 | Age: 84
Setting detail: DERMATOLOGY
End: 2024-05-30

## 2024-05-28 PROBLEM — C44.612 BASAL CELL CARCINOMA OF SKIN OF RIGHT UPPER LIMB, INCLUDING SHOULDER: Status: ACTIVE | Noted: 2024-05-28

## 2024-05-28 PROCEDURE — 11603 EXC TR-EXT MAL+MARG 2.1-3 CM: CPT

## 2024-05-28 PROCEDURE — OTHER MIPS QUALITY: OTHER

## 2024-05-28 PROCEDURE — OTHER EXCISION: OTHER

## 2024-05-28 PROCEDURE — 13121 CMPLX RPR S/A/L 2.6-7.5 CM: CPT

## 2024-05-28 NOTE — PROCEDURE: EXCISION
Primary Defect Length (In Cm): 0
Double O-Z Flap Text: The defect edges were debeveled with a #15 scalpel blade. Given the location of the defect, shape of the defect and the proximity to free margins a Double O-Z flap was deemed most appropriate. Using a sterile surgical marker, an appropriate transposition flap was drawn incorporating the defect and placing the expected incisions within the relaxed skin tension lines where possible. The area thus outlined was incised deep to adipose tissue with a #15 scalpel blade. The skin margins were undermined to an appropriate distance in all directions utilizing iris scissors. Following this, the designed flap was carried over into the primary defect and sutured into place.
Ear Star Wedge Flap Text: The defect edges were debeveled with a #15 blade scalpel.  Given the location of the defect and the proximity to free margins (helical rim) an ear star wedge flap was deemed most appropriate. Using a sterile surgical marker, the appropriate flap was drawn incorporating the defect and placing the expected incisions between the helical rim and antihelix where possible.  The area thus outlined was incised through and through with a #15 scalpel blade. Following this, the designed flap was carried over into the primary defect and sutured into place.
Spiral Flap Text: The defect edges were debeveled with a #15 scalpel blade. Given the location of the defect, shape of the defect and the proximity to free margins a spiral flap was deemed most appropriate. Using a sterile surgical marker, an appropriate rotation flap was drawn incorporating the defect and placing the expected incisions within the relaxed skin tension lines where possible. The area thus outlined was incised deep to adipose tissue with a #15 scalpel blade. The skin margins were undermined to an appropriate distance in all directions utilizing iris scissors. Following this, the designed flap was carried over into the primary defect and sutured into place.
Post-Care Instructions: I reviewed with the patient in detail post-care instructions. Patient is not to engage in any heavy lifting, exercise, or swimming for the next 14 days. Should the patient develop any fevers, chills, bleeding, severe pain patient will contact the office immediately.
Consent was obtained from the patient. The risks and benefits to therapy were discussed in detail. Specifically, the risks of infection, scarring, bleeding, prolonged wound healing, incomplete removal, allergy to anesthesia, nerve injury and recurrence were addressed. Prior to the procedure, the treatment site was clearly identified and confirmed by the patient.
Where Do You Want The Question To Include Opioid Counseling Located?: Case Summary Tab
Zygomaticofacial Flap Text: Given the location of the defect, shape of the defect and the proximity to free margins a zygomaticofacial flap was deemed most appropriate for repair. Using a sterile surgical marker, the appropriate flap was drawn incorporating the defect and placing the expected incisions within the relaxed skin tension lines where possible. The area thus outlined was incised deep to adipose tissue with a #15 scalpel blade with preservation of a vascular pedicle.  The skin margins were undermined to an appropriate distance in all directions utilizing iris scissors. The flap was then carried over into the defect and anchored with interrupted buried subcutaneous sutures.
Pre-Excision Curettage Text (Leave Blank If You Do Not Want): Prior to drawing the surgical margin the visible lesion was removed with curettage to clearly define the lesion size.
Scalpel Size: 15 blade
Excision Depth: adipose tissue
M-Plasty Intermediate Repair Preamble Text (Leave Blank If You Do Not Want): Undermining was performed with blunt dissection.
Epidermal Closure: running
Show Curettage Variables: Yes
Was An Eye Clamp Used?: No
Purse String (Intermediate) Text: Given the location of the defect and the characteristics of the surrounding skin a purse string intermediate closure was deemed most appropriate.  Undermining was performed circumferentially around the surgical defect.  A purse string suture was then placed and tightened.
Island Pedicle Flap With Canthal Suspension Text: The defect edges were debeveled with a #15 scalpel blade. Given the location of the defect, shape of the defect and the proximity to free margins an island pedicle advancement flap was deemed most appropriate. Using a sterile surgical marker, an appropriate advancement flap was drawn incorporating the defect, outlining the appropriate donor tissue and placing the expected incisions within the relaxed skin tension lines where possible. The area thus outlined was incised deep to adipose tissue with a #15 scalpel blade. The skin margins were undermined to an appropriate distance in all directions around the primary defect and laterally outward around the island pedicle utilizing iris scissors.  There was minimal undermining beneath the pedicle flap. A suspension suture was placed in the canthal tendon to prevent tension and prevent ectropion. Following this, the designed flap was placed into the primary defect and sutured into place.
Complex Repair And O-L Flap Text: The defect edges were debeveled with a #15 scalpel blade.  The primary defect was closed partially with a complex linear closure.  Given the location of the remaining defect, shape of the defect and the proximity to free margins an O-L flap was deemed most appropriate for complete closure of the defect.  Using a sterile surgical marker, an appropriate flap was drawn incorporating the defect and placing the expected incisions within the relaxed skin tension lines where possible. The area thus outlined was incised deep to adipose tissue with a #15 scalpel blade. The skin margins were undermined to an appropriate distance in all directions utilizing iris scissors and carried over to close the primary defect.
Retention Suture Bite Size: 3 mm
Partial Purse String (Intermediate) Text: Given the location of the defect and the characteristics of the surrounding skin an intermediate purse string closure was deemed most appropriate.  Undermining was performed circumferentially around the surgical defect.  A purse string suture was then placed and tightened. Wound tension of the circular defect prevented complete closure of the wound.
O-Z Flap Text: The defect edges were debeveled with a #15 scalpel blade. Given the location of the defect, shape of the defect and the proximity to free margins an O-Z flap was deemed most appropriate. Using a sterile surgical marker, an appropriate transposition flap was drawn incorporating the defect and placing the expected incisions within the relaxed skin tension lines where possible. The area thus outlined was incised deep to adipose tissue with a #15 scalpel blade. The skin margins were undermined to an appropriate distance in all directions utilizing iris scissors. Following this, the designed flap was carried over into the primary defect and sutured into place.
Modified Advancement Flap Text: The defect edges were debeveled with a #15 scalpel blade. Given the location of the defect, shape of the defect and the proximity to free margins a modified advancement flap was deemed most appropriate. Using a sterile surgical marker, an appropriate advancement flap was drawn incorporating the defect and placing the expected incisions within the relaxed skin tension lines where possible. The area thus outlined was incised deep to adipose tissue with a #15 scalpel blade. The skin margins were undermined to an appropriate distance in all directions utilizing iris scissors. Following this, the designed flap was advanced and carried over into the primary defect and sutured into place.
Posterior Auricular Interpolation Flap Text: A decision was made to reconstruct the defect utilizing an interpolation axial flap and a staged reconstruction.  A telfa template was made of the defect.  This telfa template was then used to outline the posterior auricular interpolation flap.  The donor area for the pedicle flap was then injected with anesthesia.  The flap was excised through the skin and subcutaneous tissue down to the layer of the underlying musculature.  The pedicle flap was carefully excised within this deep plane to maintain its blood supply.  The edges of the donor site were undermined.   The donor site was closed in a primary fashion.  The pedicle was then rotated into position and sutured.  Once the tube was sutured into place, adequate blood supply was confirmed with blanching and refill.  The pedicle was then wrapped with xeroform gauze and dressed appropriately with a telfa and gauze bandage to ensure continued blood supply and protect the attached pedicle.
Banner Transposition Flap Text: The defect edges were debeveled with a #15 scalpel blade. Given the location of the defect and the proximity to free margins a Banner transposition flap was deemed most appropriate. Using a sterile surgical marker, an appropriate flap was drawn around the defect. The area thus outlined was incised deep to adipose tissue with a #15 scalpel blade. The skin margins were undermined to an appropriate distance in all directions utilizing iris scissors. Following this, the designed flap was carried into the primary defect and sutured into place.
Repair Performed By Another Provider Text (Leave Blank If You Do Not Want): After the tissue was excised the defect was repaired by another provider.
Suture Removal: 14 days
Helical Rim Advancement Flap Text: The defect edges were debeveled with a #15 blade scalpel.  Given the location of the defect and the proximity to free margins (helical rim) a double helical rim advancement flap was deemed most appropriate. Using a sterile surgical marker, the appropriate advancement flaps were drawn incorporating the defect and placing the expected incisions between the helical rim and antihelix where possible.  The area thus outlined was incised through and through with a #15 scalpel blade.  With a skin hook and iris scissors, the flaps were gently and sharply undermined and freed up. Folllowing this, the designed flaps were carried over into the primary defect and sutured into place.
Hatchet Flap Text: The defect edges were debeveled with a #15 scalpel blade. Given the location of the defect, shape of the defect and the proximity to free margins a hatchet flap was deemed most appropriate. Using a sterile surgical marker, an appropriate hatchet flap was drawn incorporating the defect and placing the expected incisions within the relaxed skin tension lines where possible. The area thus outlined was incised deep to adipose tissue with a #15 scalpel blade. The skin margins were undermined to an appropriate distance in all directions utilizing iris scissors. Following this, the designed flap was carried over into the primary defect and sutured into place.
Mustarde Flap Text: The defect edges were debeveled with a #15 scalpel blade.  Given the size, depth and location of the defect and the proximity to free margins a Mustarde flap was deemed most appropriate. Using a sterile surgical marker, an appropriate flap was drawn incorporating the defect. The area thus outlined was incised with a #15 scalpel blade. The skin margins were undermined to an appropriate distance in all directions utilizing iris scissors. Following this, the designed flap was carried into the primary defect and sutured into place.
Chonodrocutaneous Helical Advancement Flap Text: The defect edges were debeveled with a #15 scalpel blade. Given the location of the defect and the proximity to free margins a chondrocutaneous helical advancement flap was deemed most appropriate. Using a sterile surgical marker, the appropriate advancement flap was drawn incorporating the defect and placing the expected incisions within the relaxed skin tension lines where possible. The area thus outlined was incised deep to adipose tissue with a #15 scalpel blade. The skin margins were undermined to an appropriate distance in all directions utilizing iris scissors. Following this, the designed flap was advanced and carried over into the primary defect and sutured into place.
Complex Repair And Dorsal Nasal Flap Text: The defect edges were debeveled with a #15 scalpel blade.  The primary defect was closed partially with a complex linear closure.  Given the location of the remaining defect, shape of the defect and the proximity to free margins a dorsal nasal flap was deemed most appropriate for complete closure of the defect.  Using a sterile surgical marker, an appropriate flap was drawn incorporating the defect and placing the expected incisions within the relaxed skin tension lines where possible. The area thus outlined was incised deep to adipose tissue with a #15 scalpel blade. The skin margins were undermined to an appropriate distance in all directions utilizing iris scissors and carried over to close the primary defect.
Hemigard Retention Suture: 2-0 Nylon
Complex Repair And Double Advancement Flap Text: The defect edges were debeveled with a #15 scalpel blade.  The primary defect was closed partially with a complex linear closure.  Given the location of the remaining defect, shape of the defect and the proximity to free margins a double advancement flap was deemed most appropriate for complete closure of the defect.  Using a sterile surgical marker, an appropriate advancement flap was drawn incorporating the defect and placing the expected incisions within the relaxed skin tension lines where possible. The area thus outlined was incised deep to adipose tissue with a #15 scalpel blade. The skin margins were undermined to an appropriate distance in all directions utilizing iris scissors and carried over to close the primary defect.
Eye Clamp Note Details: An eye clamp was used during the procedure.
H Plasty Text: Given the location of the defect, shape of the defect and the proximity to free margins a H-plasty was deemed most appropriate for repair. Using a sterile surgical marker, the appropriate advancement arms of the H-plasty were drawn incorporating the defect and placing the expected incisions within the relaxed skin tension lines where possible. The area thus outlined was incised deep to adipose tissue with a #15 scalpel blade. The skin margins were undermined to an appropriate distance in all directions utilizing iris scissors.  The opposing advancement arms were then advanced and carried over into place in opposite direction and anchored with interrupted buried subcutaneous sutures.
Dressing: dry sterile dressing
Helical Rim Text: The closure involved the helical rim.
Double Island Pedicle Flap Text: The defect edges were debeveled with a #15 scalpel blade. Given the location of the defect, shape of the defect and the proximity to free margins a double island pedicle advancement flap was deemed most appropriate. Using a sterile surgical marker, an appropriate advancement flap was drawn incorporating the defect, outlining the appropriate donor tissue and placing the expected incisions within the relaxed skin tension lines where possible. The area thus outlined was incised deep to adipose tissue with a #15 scalpel blade. The skin margins were undermined to an appropriate distance in all directions around the primary defect and laterally outward around the island pedicle utilizing iris scissors.  There was minimal undermining beneath the pedicle flap. Following this, the flap was carried over into the primary defect and sutured into place.
Excision Method: Fusiform
Nostril Rim Text: The closure involved the nostril rim.
Complex Repair And Epidermal Autograft Text: The defect edges were debeveled with a #15 scalpel blade.  The primary defect was closed partially with a complex linear closure.  Given the location of the defect, shape of the defect and the proximity to free margins an epidermal autograft was deemed most appropriate to repair the remaining defect.  The graft was trimmed to fit the size of the remaining defect.  The graft was then placed in the primary defect, oriented appropriately, and sutured into place.
Complex Repair And Split-Thickness Skin Graft Text: The defect edges were debeveled with a #15 scalpel blade.  The primary defect was closed partially with a complex linear closure.  Given the location of the defect, shape of the defect and the proximity to free margins a split thickness skin graft was deemed most appropriate to repair the remaining defect.  The graft was trimmed to fit the size of the remaining defect.  The graft was then placed in the primary defect, oriented appropriately, and sutured into place.
Rotation Flap Text: The defect edges were debeveled with a #15 scalpel blade. Given the location of the defect, shape of the defect and the proximity to free margins a rotation flap was deemed most appropriate. Using a sterile surgical marker, an appropriate rotation flap was drawn incorporating the defect and placing the expected incisions within the relaxed skin tension lines where possible. The area thus outlined was incised deep to adipose tissue with a #15 scalpel blade. The skin margins were undermined to an appropriate distance in all directions utilizing iris scissors. Following this, the designed flap was carried over into the primary defect and sutured into place.
Complex Repair And Tissue Cultured Epidermal Autograft Text: The defect edges were debeveled with a #15 scalpel blade.  The primary defect was closed partially with a complex linear closure.  Given the location of the defect, shape of the defect and the proximity to free margins an tissue cultured epidermal autograft was deemed most appropriate to repair the remaining defect.  The graft was trimmed to fit the size of the remaining defect.  The graft was then placed in the primary defect, oriented appropriately, and sutured into place.
Rhomboid Transposition Flap Text: The defect edges were debeveled with a #15 scalpel blade. Given the location of the defect and the proximity to free margins a rhomboid transposition flap was deemed most appropriate. Using a sterile surgical marker, an appropriate rhomboid flap was drawn incorporating the defect. The area thus outlined was incised deep to adipose tissue with a #15 scalpel blade. The skin margins were undermined to an appropriate distance in all directions utilizing iris scissors. Following this, the designed flap was carried over into the primary defect and sutured into place.
Abbe Flap (Lower To Upper Lip) Text: The defect of the upper lip was assessed and measured.  Given the location and size of the defect, an Abbe flap was deemed most appropriate. Using a sterile surgical marker, an appropriate Abbe flap was measured and drawn on the lower lip. Local anesthesia was then infiltrated. A scalpel was then used to incise the upper lip through and through the skin, vermilion, muscle and mucosa, leaving the flap pedicled on the opposite side.  The flap was then rotated and transferred to the lower lip defect.  The flap was then sutured into place with a three layer technique, closing the orbicularis oris muscle layer with subcutaneous buried sutures, followed by a mucosal layer and an epidermal layer.
Complex Repair And A-T Advancement Flap Text: The defect edges were debeveled with a #15 scalpel blade.  The primary defect was closed partially with a complex linear closure.  Given the location of the remaining defect, shape of the defect and the proximity to free margins an A-T advancement flap was deemed most appropriate for complete closure of the defect.  Using a sterile surgical marker, an appropriate advancement flap was drawn incorporating the defect and placing the expected incisions within the relaxed skin tension lines where possible. The area thus outlined was incised deep to adipose tissue with a #15 scalpel blade. The skin margins were undermined to an appropriate distance in all directions utilizing iris scissors and carried over to close the primary defect.
Purse String (Simple) Text: Given the location of the defect and the characteristics of the surrounding skin a purse string simple closure was deemed most appropriate.  Undermining was performed circumferentially around the surgical defect.  A purse string suture was then placed and tightened.
O-L Flap Text: The defect edges were debeveled with a #15 scalpel blade. Given the location of the defect, shape of the defect and the proximity to free margins an O-L flap was deemed most appropriate. Using a sterile surgical marker, an appropriate advancement flap was drawn incorporating the defect and placing the expected incisions within the relaxed skin tension lines where possible. The area thus outlined was incised deep to adipose tissue with a #15 scalpel blade. The skin margins were undermined to an appropriate distance in all directions utilizing iris scissors. Following this, the designed flap was advanced and carried over into the primary defect and sutured into place.
Complex Repair And Melolabial Flap Text: The defect edges were debeveled with a #15 scalpel blade.  The primary defect was closed partially with a complex linear closure.  Given the location of the remaining defect, shape of the defect and the proximity to free margins a melolabial flap was deemed most appropriate for complete closure of the defect.  Using a sterile surgical marker, an appropriate advancement flap was drawn incorporating the defect and placing the expected incisions within the relaxed skin tension lines where possible. The area thus outlined was incised deep to adipose tissue with a #15 scalpel blade. The skin margins were undermined to an appropriate distance in all directions utilizing iris scissors and carried over to close the primary defect.
Keystone Flap Text: The defect edges were debeveled with a #15 scalpel blade. Given the location of the defect, shape of the defect a keystone flap was deemed most appropriate. Using a sterile surgical marker, an appropriate keystone flap was drawn incorporating the defect, outlining the appropriate donor tissue and placing the expected incisions within the relaxed skin tension lines where possible. The area thus outlined was incised deep to adipose tissue with a #15 scalpel blade. The skin margins were undermined to an appropriate distance in all directions around the primary defect and laterally outward around the flap utilizing iris scissors. Following this, the designed flap was carried into the primary defect and sutured into place.
Complex Repair And V-Y Plasty Text: The defect edges were debeveled with a #15 scalpel blade.  The primary defect was closed partially with a complex linear closure.  Given the location of the remaining defect, shape of the defect and the proximity to free margins a V-Y plasty was deemed most appropriate for complete closure of the defect.  Using a sterile surgical marker, an appropriate advancement flap was drawn incorporating the defect and placing the expected incisions within the relaxed skin tension lines where possible. The area thus outlined was incised deep to adipose tissue with a #15 scalpel blade. The skin margins were undermined to an appropriate distance in all directions utilizing iris scissors and carried over to close the primary defect.
Complex Repair And M Plasty Text: The defect edges were debeveled with a #15 scalpel blade.  The primary defect was closed partially with a complex linear closure.  Given the location of the remaining defect, shape of the defect and the proximity to free margins an M plasty was deemed most appropriate for complete closure of the defect.  Using a sterile surgical marker, an appropriate advancement flap was drawn incorporating the defect and placing the expected incisions within the relaxed skin tension lines where possible. The area thus outlined was incised deep to adipose tissue with a #15 scalpel blade. The skin margins were undermined to an appropriate distance in all directions utilizing iris scissors and carried over to close the primary defect.
Composite Graft Text: The defect edges were debeveled with a #15 scalpel blade. Given the location of the defect, shape of the defect, the proximity to free margins and the fact the defect was full thickness a composite graft was deemed most appropriate.  The defect was outline and then transferred to the donor site.  A full thickness graft was then excised from the donor site. The graft was then placed in the primary defect, oriented appropriately and then sutured into place.  The secondary defect was then repaired using a primary closure.
Mastoid Interpolation Flap Text: A decision was made to reconstruct the defect utilizing an interpolation axial flap and a staged reconstruction.  A telfa template was made of the defect.  This telfa template was then used to outline the mastoid interpolation flap.  The donor area for the pedicle flap was then injected with anesthesia.  The flap was excised through the skin and subcutaneous tissue down to the layer of the underlying musculature.  The pedicle flap was carefully excised within this deep plane to maintain its blood supply.  The edges of the donor site were undermined.   The donor site was closed in a primary fashion.  The pedicle was then rotated into position and sutured.  Once the tube was sutured into place, adequate blood supply was confirmed with blanching and refill.  The pedicle was then wrapped with xeroform gauze and dressed appropriately with a telfa and gauze bandage to ensure continued blood supply and protect the attached pedicle.
Repair Type: Complex
Debridement Text: The wound edges were debrided prior to proceeding with the closure to facilitate wound healing.
Excisional Biopsy Additional Text (Leave Blank If You Do Not Want): The margin was drawn around the clinically apparent lesion. An elliptical shape was then drawn on the skin incorporating the lesion and margins.  Incisions were then made along these lines to the appropriate tissue plane and the lesion was extirpated.
Complex Repair And Z Plasty Text: The defect edges were debeveled with a #15 scalpel blade.  The primary defect was closed partially with a complex linear closure.  Given the location of the remaining defect, shape of the defect and the proximity to free margins a Z plasty was deemed most appropriate for complete closure of the defect.  Using a sterile surgical marker, an appropriate advancement flap was drawn incorporating the defect and placing the expected incisions within the relaxed skin tension lines where possible. The area thus outlined was incised deep to adipose tissue with a #15 scalpel blade. The skin margins were undermined to an appropriate distance in all directions utilizing iris scissors and carried over to close the primary defect.
Estimated Blood Loss (Cc): minimal
Staged Advancement Flap Text: The defect edges were debeveled with a #15 scalpel blade. Given the location of the defect, shape of the defect and the proximity to free margins a staged advancement flap was deemed most appropriate. Using a sterile surgical marker, an appropriate advancement flap was drawn incorporating the defect and placing the expected incisions within the relaxed skin tension lines where possible. The area thus outlined was incised deep to adipose tissue with a #15 scalpel blade. The skin margins were undermined to an appropriate distance in all directions utilizing iris scissors. Following this, the designed flap was carried over into the primary defect and sutured into place.
O-T Advancement Flap Text: The defect edges were debeveled with a #15 scalpel blade. Given the location of the defect, shape of the defect and the proximity to free margins an O-T advancement flap was deemed most appropriate. Using a sterile surgical marker, an appropriate advancement flap was drawn incorporating the defect and placing the expected incisions within the relaxed skin tension lines where possible. The area thus outlined was incised deep to adipose tissue with a #15 scalpel blade. The skin margins were undermined to an appropriate distance in all directions utilizing iris scissors. Following this, the designed flap was advanced and carried over into the primary defect and sutured into place.
V-Y Plasty Text: The defect edges were debeveled with a #15 scalpel blade. Given the location of the defect, shape of the defect and the proximity to free margins an V-Y advancement flap was deemed most appropriate. Using a sterile surgical marker, an appropriate advancement flap was drawn incorporating the defect and placing the expected incisions within the relaxed skin tension lines where possible. The area thus outlined was incised deep to adipose tissue with a #15 scalpel blade. The skin margins were undermined to an appropriate distance in all directions utilizing iris scissors. Following this, the designed flap was advanced and carried over into the primary defect and sutured into place.
Double Z Plasty Text: The lesion was extirpated to the level of the fat with a #15 scalpel blade. Given the location of the defect, shape of the defect and the proximity to free margins a double Z-plasty was deemed most appropriate for repair. Using a sterile surgical marker, the appropriate transposition arms of the double Z-plasty were drawn incorporating the defect and placing the expected incisions within the relaxed skin tension lines where possible. The area thus outlined was incised deep to adipose tissue with a #15 scalpel blade. The skin margins were undermined to an appropriate distance in all directions utilizing iris scissors. The opposing transposition arms were then transposed and carried over into place in opposite direction and anchored with interrupted buried subcutaneous sutures.
Anesthesia Type: 1% lidocaine with epinephrine
Orbicularis Oris Muscle Flap Text: The defect edges were debeveled with a #15 scalpel blade.  Given that the defect affected the competency of the oral sphincter an orbicularis oris muscle flap was deemed most appropriate to restore this competency and normal muscle function.  Using a sterile surgical marker, an appropriate flap was drawn incorporating the defect. The area thus outlined was incised with a #15 scalpel blade. Following this, the designed flap was carried over into the primary defect and sutured into place.
Complex Repair And Burow's Graft Text: The defect edges were debeveled with a #15 scalpel blade.  The primary defect was closed partially with a complex linear closure.  Given the location of the defect, shape of the defect, the proximity to free margins and the presence of a standing cone deformity a Burow's graft was deemed most appropriate to repair the remaining defect.  The graft was trimmed to fit the size of the remaining defect.  The graft was then placed in the primary defect, oriented appropriately, and sutured into place.
Epidermal Closure Graft Donor Site (Optional): simple interrupted
Complex Repair And Modified Advancement Flap Text: The defect edges were debeveled with a #15 scalpel blade.  The primary defect was closed partially with a complex linear closure.  Given the location of the remaining defect, shape of the defect and the proximity to free margins a modified advancement flap was deemed most appropriate for complete closure of the defect.  Using a sterile surgical marker, an appropriate advancement flap was drawn incorporating the defect and placing the expected incisions within the relaxed skin tension lines where possible. The area thus outlined was incised deep to adipose tissue with a #15 scalpel blade. The skin margins were undermined to an appropriate distance in all directions utilizing iris scissors and carried over to close the primary defect.
Peng Advancement Flap Text: The defect edges were debeveled with a #15 scalpel blade. Given the location of the defect, shape of the defect and the proximity to free margins a Peng advancement flap was deemed most appropriate. Using a sterile surgical marker, an appropriate advancement flap was drawn incorporating the defect and placing the expected incisions within the relaxed skin tension lines where possible. The area thus outlined was incised deep to adipose tissue with a #15 scalpel blade. The skin margins were undermined to an appropriate distance in all directions utilizing iris scissors. Following this, the designed flap was advanced and carried over into the primary defect and sutured into place.
Suturegard Body: The suture ends were repeatedly re-tightened and re-clamped to achieve the desired tissue expansion.
Information: Selecting Yes will display possible errors in your note based on the variables you have selected. This validation is only offered as a suggestion for you. PLEASE NOTE THAT THE VALIDATION TEXT WILL BE REMOVED WHEN YOU FINALIZE YOUR NOTE. IF YOU WANT TO FAX A PRELIMINARY NOTE YOU WILL NEED TO TOGGLE THIS TO 'NO' IF YOU DO NOT WANT IT IN YOUR FAXED NOTE.
Star Wedge Flap Text: The defect edges were debeveled with a #15 scalpel blade. Given the location of the defect, shape of the defect and the proximity to free margins a star wedge flap was deemed most appropriate. Using a sterile surgical marker, an appropriate rotation flap was drawn incorporating the defect and placing the expected incisions within the relaxed skin tension lines where possible. The area thus outlined was incised deep to adipose tissue with a #15 scalpel blade. The skin margins were undermined to an appropriate distance in all directions utilizing iris scissors. Following this, the designed flap was carried over into the primary defect and sutured into place.
Saucerization Excision Additional Text (Leave Blank If You Do Not Want): The margin was drawn around the clinically apparent lesion.  Incisions were then made along these lines, in a tangential fashion, to the appropriate tissue plane and the lesion was extirpated.
Z Plasty Text: The lesion was extirpated to the level of the fat with a #15 scalpel blade. Given the location of the defect, shape of the defect and the proximity to free margins a Z-plasty was deemed most appropriate for repair. Using a sterile surgical marker, the appropriate transposition arms of the Z-plasty were drawn incorporating the defect and placing the expected incisions within the relaxed skin tension lines where possible. The area thus outlined was incised deep to adipose tissue with a #15 scalpel blade. The skin margins were undermined to an appropriate distance in all directions utilizing iris scissors. The opposing transposition arms were then transposed and carried over into place in opposite direction and anchored with interrupted buried subcutaneous sutures.
Retention Suture Text: Retention sutures were placed to support the closure and prevent dehiscence.
Island Pedicle Flap-Requiring Vessel Identification Text: The defect edges were debeveled with a #15 scalpel blade. Given the location of the defect, shape of the defect and the proximity to free margins an island pedicle advancement flap was deemed most appropriate. Using a sterile surgical marker, an appropriate advancement flap was drawn, based on the axial vessel mentioned above, incorporating the defect, outlining the appropriate donor tissue and placing the expected incisions within the relaxed skin tension lines where possible. The area thus outlined was incised deep to adipose tissue with a #15 scalpel blade. The skin margins were undermined to an appropriate distance in all directions around the primary defect and laterally outward around the island pedicle utilizing iris scissors.  There was minimal undermining beneath the pedicle flap. Following this, the designed flap was carried over into the primary defect and sutured into place.
Cartilage Graft Text: The defect edges were debeveled with a #15 scalpel blade. Given the location of the defect, shape of the defect, the fact the defect involved a full thickness cartilage defect a cartilage graft was deemed most appropriate.  An appropriate donor site was identified, cleansed, and anesthetized. The cartilage graft was then harvested and transferred to the recipient site, oriented appropriately and then sutured into place.  The secondary defect was then repaired using a primary closure.
X Size Of Lesion In Cm (Optional): 1.3
Complex Repair And Rotation Flap Text: The defect edges were debeveled with a #15 scalpel blade.  The primary defect was closed partially with a complex linear closure.  Given the location of the remaining defect, shape of the defect and the proximity to free margins a rotation flap was deemed most appropriate for complete closure of the defect.  Using a sterile surgical marker, an appropriate advancement flap was drawn incorporating the defect and placing the expected incisions within the relaxed skin tension lines where possible. The area thus outlined was incised deep to adipose tissue with a #15 scalpel blade. The skin margins were undermined to an appropriate distance in all directions utilizing iris scissors and carried over to close the primary defect.
Burow's Graft Text: The defect edges were debeveled with a #15 scalpel blade. Given the location of the defect, shape of the defect, the proximity to free margins and the presence of a standing cone deformity a Burow's skin graft was deemed most appropriate. The standing cone was removed and this tissue was then trimmed to the shape of the primary defect. The adipose tissue was also removed until only dermis and epidermis were left.  The skin margins of the secondary defect were undermined to an appropriate distance in all directions utilizing iris scissors.  The secondary defect was closed with interrupted buried subcutaneous sutures.  The skin edges were then re-apposed with running  sutures.  The skin graft was then placed in the primary defect and oriented appropriately.
Advancement Flap (Single) Text: The defect edges were debeveled with a #15 scalpel blade. Given the location of the defect and the proximity to free margins a single advancement flap was deemed most appropriate. Using a sterile surgical marker, an appropriate advancement flap was drawn incorporating the defect and placing the expected incisions within the relaxed skin tension lines where possible. The area thus outlined was incised deep to adipose tissue with a #15 scalpel blade. The skin margins were undermined to an appropriate distance in all directions utilizing iris scissors. Following this, the designed flap was advanced and carried over into the primary defect and sutured into place.
Complex Repair And Rhombic Flap Text: The defect edges were debeveled with a #15 scalpel blade.  The primary defect was closed partially with a complex linear closure.  Given the location of the remaining defect, shape of the defect and the proximity to free margins a rhombic flap was deemed most appropriate for complete closure of the defect.  Using a sterile surgical marker, an appropriate advancement flap was drawn incorporating the defect and placing the expected incisions within the relaxed skin tension lines where possible. The area thus outlined was incised deep to adipose tissue with a #15 scalpel blade. The skin margins were undermined to an appropriate distance in all directions utilizing iris scissors and carried over to close the primary defect.
Perilesional Excision Additional Text (Leave Blank If You Do Not Want): The margin was drawn around the clinically apparent lesion. Incisions were then made along these lines to the appropriate tissue plane and the lesion was extirpated.
Dermal Autograft Text: The defect edges were debeveled with a #15 scalpel blade. Given the location of the defect, shape of the defect and the proximity to free margins a dermal autograft was deemed most appropriate. Using a sterile surgical marker, the primary defect shape was transferred to the donor site. The area thus outlined was incised deep to adipose tissue with a #15 scalpel blade.  The harvested graft was then trimmed of adipose and epidermal tissue until only dermis was left.  The skin graft was then placed in the primary defect and oriented appropriately.
Xenograft Text: The defect edges were debeveled with a #15 scalpel blade. Given the location of the defect, shape of the defect and the proximity to free margins a xenograft was deemed most appropriate.  The graft was then trimmed to fit the size of the defect.  The graft was then placed in the primary defect and oriented appropriately.
Complex Repair And W Plasty Text: The defect edges were debeveled with a #15 scalpel blade.  The primary defect was closed partially with a complex linear closure.  Given the location of the remaining defect, shape of the defect and the proximity to free margins a W plasty was deemed most appropriate for complete closure of the defect.  Using a sterile surgical marker, an appropriate advancement flap was drawn incorporating the defect and placing the expected incisions within the relaxed skin tension lines where possible. The area thus outlined was incised deep to adipose tissue with a #15 scalpel blade. The skin margins were undermined to an appropriate distance in all directions utilizing iris scissors and carried over to close the primary defect.
Ftsg Text: The defect edges were debeveled with a #15 scalpel blade. Given the location of the defect, shape of the defect and the proximity to free margins a full thickness skin graft was deemed most appropriate. Using a sterile surgical marker, the primary defect shape was transferred to the donor site. The area thus outlined was incised deep to adipose tissue with a #15 scalpel blade.  The harvested graft was then trimmed of adipose tissue until only dermis and epidermis was left.  The skin margins of the secondary defect were undermined to an appropriate distance in all directions utilizing iris scissors.  The secondary defect was closed with interrupted buried subcutaneous sutures.  The skin edges were then re-apposed with running  sutures.  The skin graft was then placed in the primary defect and oriented appropriately.
W Plasty Text: The lesion was extirpated to the level of the fat with a #15 scalpel blade. Given the location of the defect, shape of the defect and the proximity to free margins a W-plasty was deemed most appropriate for repair. Using a sterile surgical marker, the appropriate transposition arms of the W-plasty were drawn incorporating the defect and placing the expected incisions within the relaxed skin tension lines where possible. The area thus outlined was incised deep to adipose tissue with a #15 scalpel blade. The skin margins were undermined to an appropriate distance in all directions utilizing iris scissors. The opposing transposition arms were then transposed and carried over into place in opposite direction and anchored with interrupted buried subcutaneous sutures.
Burow's Advancement Flap Text: The defect edges were debeveled with a #15 scalpel blade. Given the location of the defect and the proximity to free margins a Burow's advancement flap was deemed most appropriate. Using a sterile surgical marker, the appropriate advancement flap was drawn incorporating the defect and placing the expected incisions within the relaxed skin tension lines where possible. The area thus outlined was incised deep to adipose tissue with a #15 scalpel blade. The skin margins were undermined to an appropriate distance in all directions utilizing iris scissors. Following this, the designed flap was advanced and carried over into the primary defect and sutured into place.
Cheek Interpolation Flap Text: A decision was made to reconstruct the defect utilizing an interpolation axial flap and a staged reconstruction.  A telfa template was made of the defect.  This telfa template was then used to outline the Cheek Interpolation flap.  The donor area for the pedicle flap was then injected with anesthesia.  The flap was excised through the skin and subcutaneous tissue down to the layer of the underlying musculature.  The interpolation flap was carefully excised within this deep plane to maintain its blood supply.  The edges of the donor site were undermined.   The donor site was closed in a primary fashion.  The pedicle was then rotated into position and sutured.  Once the tube was sutured into place, adequate blood supply was confirmed with blanching and refill.  The pedicle was then wrapped with xeroform gauze and dressed appropriately with a telfa and gauze bandage to ensure continued blood supply and protect the attached pedicle.
Fusiform Excision Additional Text (Leave Blank If You Do Not Want): The margin was drawn around the clinically apparent lesion.  A fusiform shape was then drawn on the skin incorporating the lesion and margins.  Incisions were then made along these lines to the appropriate tissue plane and the lesion was extirpated.
Crescentic Complex Repair Preamble Text (Leave Blank If You Do Not Want): Extensive wide undermining was performed.
Anesthesia Volume In Cc: 12
Number Of Hemigard Strips Per Side: 1
A-T Advancement Flap Text: The defect edges were debeveled with a #15 scalpel blade. Given the location of the defect, shape of the defect and the proximity to free margins an A-T advancement flap was deemed most appropriate. Using a sterile surgical marker, an appropriate advancement flap was drawn incorporating the defect and placing the expected incisions within the relaxed skin tension lines where possible. The area thus outlined was incised deep to adipose tissue with a #15 scalpel blade. The skin margins were undermined to an appropriate distance in all directions utilizing iris scissors. Following this, the designed flap was advanced and carried over into the primary defect and sutured into place.
Size Of Lesion In Cm: 1.2
Bilobed Flap Text: The defect edges were debeveled with a #15 scalpel blade. Given the location of the defect and the proximity to free margins a bilobe flap was deemed most appropriate. Using a sterile surgical marker, an appropriate bilobe flap drawn around the defect. The area thus outlined was incised deep to adipose tissue with a #15 scalpel blade. The skin margins were undermined to an appropriate distance in all directions utilizing iris scissors. Following this, the designed flap was carried over into the primary defect and sutured into place.
Interpolation Flap Text: A decision was made to reconstruct the defect utilizing an interpolation axial flap and a staged reconstruction.  A telfa template was made of the defect.  This telfa template was then used to outline the interpolation flap.  The donor area for the pedicle flap was then injected with anesthesia.  The flap was excised through the skin and subcutaneous tissue down to the layer of the underlying musculature.  The interpolation flap was carefully excised within this deep plane to maintain its blood supply.  The edges of the donor site were undermined.   The donor site was closed in a primary fashion.  The pedicle was then rotated into position and sutured.  Once the tube was sutured into place, adequate blood supply was confirmed with blanching and refill.  The pedicle was then wrapped with xeroform gauze and dressed appropriately with a telfa and gauze bandage to ensure continued blood supply and protect the attached pedicle.
Complex Repair And Double M Plasty Text: The defect edges were debeveled with a #15 scalpel blade.  The primary defect was closed partially with a complex linear closure.  Given the location of the remaining defect, shape of the defect and the proximity to free margins a double M plasty was deemed most appropriate for complete closure of the defect.  Using a sterile surgical marker, an appropriate advancement flap was drawn incorporating the defect and placing the expected incisions within the relaxed skin tension lines where possible. The area thus outlined was incised deep to adipose tissue with a #15 scalpel blade. The skin margins were undermined to an appropriate distance in all directions utilizing iris scissors and carried over to close the primary defect.
Island Pedicle Flap Text: The defect edges were debeveled with a #15 scalpel blade. Given the location of the defect, shape of the defect and the proximity to free margins an island pedicle advancement flap was deemed most appropriate. Using a sterile surgical marker, an appropriate advancement flap was drawn incorporating the defect, outlining the appropriate donor tissue and placing the expected incisions within the relaxed skin tension lines where possible. The area thus outlined was incised deep to adipose tissue with a #15 scalpel blade. The skin margins were undermined to an appropriate distance in all directions around the primary defect and laterally outward around the island pedicle utilizing iris scissors.  There was minimal undermining beneath the pedicle flap. Following this, the flap was carried over into the primary defect and sutured into place.
Anesthesia Volume In Cc: 6
Epidermal Autograft Text: The defect edges were debeveled with a #15 scalpel blade. Given the location of the defect, shape of the defect and the proximity to free margins an epidermal autograft was deemed most appropriate. Using a sterile surgical marker, the primary defect shape was transferred to the donor site. The epidermal graft was then harvested.  The skin graft was then placed in the primary defect and oriented appropriately.
Wound Care: Petrolatum
Estlander Flap (Upper To Lower Lip) Text: The defect of the lower lip was assessed and measured.  Given the location and size of the defect, an Estlander flap was deemed most appropriate. Using a sterile surgical marker, an appropriate Estlander flap was measured and drawn on the upper lip. Local anesthesia was then infiltrated. A scalpel was then used to incise the lateral aspect of the flap, through skin, muscle and mucosa, leaving the flap pedicled medially.  The flap was then rotated and positioned to fill the lower lip defect.  The flap was then sutured into place with a three layer technique, closing the orbicularis oris muscle layer with subcutaneous buried sutures, followed by a mucosal layer and an epidermal layer.
Graft Donor Site Bandage (Optional-Leave Blank If You Don't Want In Note): Steri-strips and a pressure bandage were applied to the donor site.
Curvilinear Excision Additional Text (Leave Blank If You Do Not Want): The margin was drawn around the clinically apparent lesion.  A curvilinear shape was then drawn on the skin incorporating the lesion and margins.  Incisions were then made along these lines to the appropriate tissue plane and the lesion was extirpated.
Tissue Cultured Epidermal Autograft Text: The defect edges were debeveled with a #15 scalpel blade. Given the location of the defect, shape of the defect and the proximity to free margins a tissue cultured epidermal autograft was deemed most appropriate.  The graft was then trimmed to fit the size of the defect.  The graft was then placed in the primary defect and oriented appropriately.
Complex Repair And O-T Advancement Flap Text: The defect edges were debeveled with a #15 scalpel blade.  The primary defect was closed partially with a complex linear closure.  Given the location of the remaining defect, shape of the defect and the proximity to free margins an O-T advancement flap was deemed most appropriate for complete closure of the defect.  Using a sterile surgical marker, an appropriate advancement flap was drawn incorporating the defect and placing the expected incisions within the relaxed skin tension lines where possible. The area thus outlined was incised deep to adipose tissue with a #15 scalpel blade. The skin margins were undermined to an appropriate distance in all directions utilizing iris scissors and carried over to close the primary defect.
Bilateral Helical Rim Advancement Flap Text: The defect edges were debeveled with a #15 blade scalpel.  Given the location of the defect and the proximity to free margins (helical rim) a bilateral helical rim advancement flap was deemed most appropriate. Using a sterile surgical marker, the appropriate advancement flaps were drawn incorporating the defect and placing the expected incisions between the helical rim and antihelix where possible.  The area thus outlined was incised through and through with a #15 scalpel blade.  With a skin hook and iris scissors, the flaps were gently and sharply undermined and freed up. Following this, the designed flaps were placed into the primary defect and sutured into place.
Complex Repair And Ftsg Text: The defect edges were debeveled with a #15 scalpel blade.  The primary defect was closed partially with a complex linear closure.  Given the location of the defect, shape of the defect and the proximity to free margins a full thickness skin graft was deemed most appropriate to repair the remaining defect.  The graft was trimmed to fit the size of the remaining defect.  The graft was then placed in the primary defect, oriented appropriately, and sutured into place.
Deep Sutures: 3-0 Vicryl
Complex Repair And Xenograft Text: The defect edges were debeveled with a #15 scalpel blade.  The primary defect was closed partially with a complex linear closure.  Given the location of the defect, shape of the defect and the proximity to free margins a xenograft was deemed most appropriate to repair the remaining defect.  The graft was trimmed to fit the size of the remaining defect.  The graft was then placed in the primary defect, oriented appropriately, and sutured into place.
V-Y Flap Text: The defect edges were debeveled with a #15 scalpel blade. Given the location of the defect, shape of the defect and the proximity to free margins a V-Y flap was deemed most appropriate. Using a sterile surgical marker, an appropriate advancement flap was drawn incorporating the defect and placing the expected incisions within the relaxed skin tension lines where possible. The area thus outlined was incised deep to adipose tissue with a #15 scalpel blade. The skin margins were undermined to an appropriate distance in all directions utilizing iris scissors. Following this, the designed flap was advanced and carried over into the primary defect and sutured into place.
Cheek-To-Nose Interpolation Flap Text: A decision was made to reconstruct the defect utilizing an interpolation axial flap and a staged reconstruction.  A telfa template was made of the defect.  This telfa template was then used to outline the Cheek-To-Nose Interpolation flap.  The donor area for the pedicle flap was then injected with anesthesia.  The flap was excised through the skin and subcutaneous tissue down to the layer of the underlying musculature.  The interpolation flap was carefully excised within this deep plane to maintain its blood supply.  The edges of the donor site were undermined.   The donor site was closed in a primary fashion.  The pedicle was then rotated into position and sutured.  Once the tube was sutured into place, adequate blood supply was confirmed with blanching and refill.  The pedicle was then wrapped with xeroform gauze and dressed appropriately with a telfa and gauze bandage to ensure continued blood supply and protect the attached pedicle.
Paramedian Forehead Flap Text: A decision was made to reconstruct the defect utilizing an interpolation axial flap and a staged reconstruction.  A telfa template was made of the defect.  This telfa template was then used to outline the paramedian forehead pedicle flap.  The donor area for the pedicle flap was then injected with anesthesia.  The flap was excised through the skin and subcutaneous tissue down to the layer of the underlying musculature.  The pedicle flap was carefully excised within this deep plane to maintain its blood supply.  The edges of the donor site were undermined.   The donor site was closed in a primary fashion.  The pedicle was then rotated into position and sutured.  Once the tube was sutured into place, adequate blood supply was confirmed with blanching and refill.  The pedicle was then wrapped with xeroform gauze and dressed appropriately with a telfa and gauze bandage to ensure continued blood supply and protect the attached pedicle.
Slit Excision Additional Text (Leave Blank If You Do Not Want): A linear line was drawn on the skin overlying the lesion. An incision was made slowly until the lesion was visualized.  Once visualized, the lesion was removed with blunt dissection.
Vermilion Border Text: The closure involved the vermilion border.
Nasal Turnover Hinge Flap Text: The defect edges were debeveled with a #15 scalpel blade.  Given the size, depth, location of the defect and the defect being full thickness a nasal turnover hinge flap was deemed most appropriate. Using a sterile surgical marker, an appropriate hinge flap was drawn incorporating the defect. The area thus outlined was incised with a #15 scalpel blade. The flap was designed to recreate the nasal mucosal lining and the alar rim. The skin margins were undermined to an appropriate distance in all directions utilizing iris scissors. Following this, the designed flap was carried over into the primary defect and sutured into place
Billing Type: Third-Party Bill
O-T Plasty Text: The defect edges were debeveled with a #15 scalpel blade. Given the location of the defect, shape of the defect and the proximity to free margins an O-T plasty was deemed most appropriate. Using a sterile surgical marker, an appropriate O-T plasty was drawn incorporating the defect and placing the expected incisions within the relaxed skin tension lines where possible. The area thus outlined was incised deep to adipose tissue with a #15 scalpel blade. The skin margins were undermined to an appropriate distance in all directions utilizing iris scissors. Following this, the designed flap was carried over into the primary defect and sutured into place.
Dorsal Nasal Flap Text: The defect edges were debeveled with a #15 scalpel blade. Given the location of the defect and the proximity to free margins a dorsal nasal flap was deemed most appropriate. Using a sterile surgical marker, an appropriate dorsal nasal flap was drawn around the defect. The area thus outlined was incised deep to adipose tissue with a #15 scalpel blade. The skin margins were undermined to an appropriate distance in all directions utilizing iris scissors. Following this, the designed flap was carried into the primary defect and sutured into place.
Size Of Margin In Cm: 0.4
Abbe Flap (Upper To Lower Lip) Text: The defect of the lower lip was assessed and measured.  Given the location and size of the defect, an Abbe flap was deemed most appropriate. Using a sterile surgical marker, an appropriate Abbe flap was measured and drawn on the upper lip. Local anesthesia was then infiltrated.  A scalpel was then used to incise the upper lip through and through the skin, vermilion, muscle and mucosa, leaving the flap pedicled on the opposite side.  The flap was then rotated and transferred to the lower lip defect.  The flap was then sutured into place with a three layer technique, closing the orbicularis oris muscle layer with subcutaneous buried sutures, followed by a mucosal layer and an epidermal layer.
Bilobed Transposition Flap Text: The defect edges were debeveled with a #15 scalpel blade. Given the location of the defect and the proximity to free margins a bilobed transposition flap was deemed most appropriate. Using a sterile surgical marker, an appropriate bilobe flap drawn around the defect. The area thus outlined was incised deep to adipose tissue with a #15 scalpel blade. The skin margins were undermined to an appropriate distance in all directions utilizing iris scissors. Following this, the designed flap was carried over into the primary defect and sutured into place.
Anesthesia Type: 0.5% lidocaine with 1:200,000 epinephrine and a 1:10 solution of 8.4% sodium bicarbonate
Bi-Rhombic Flap Text: The defect edges were debeveled with a #15 scalpel blade. Given the location of the defect and the proximity to free margins a bi-rhombic flap was deemed most appropriate. Using a sterile surgical marker, an appropriate rhombic flap was drawn incorporating the defect. The area thus outlined was incised deep to adipose tissue with a #15 scalpel blade. The skin margins were undermined to an appropriate distance in all directions utilizing iris scissors. Following this, the designed flap was carried over into the primary defect and sutured into place.
Crescentic Advancement Flap Text: The defect edges were debeveled with a #15 scalpel blade. Given the location of the defect and the proximity to free margins a crescentic advancement flap was deemed most appropriate. Using a sterile surgical marker, the appropriate advancement flap was drawn incorporating the defect and placing the expected incisions within the relaxed skin tension lines where possible. The area thus outlined was incised deep to adipose tissue with a #15 scalpel blade. The skin margins were undermined to an appropriate distance in all directions utilizing iris scissors. Following this, the designed flap was advanced and carried over into the primary defect and sutured into place.
Rhombic Flap Text: The defect edges were debeveled with a #15 scalpel blade. Given the location of the defect and the proximity to free margins a rhombic flap was deemed most appropriate. Using a sterile surgical marker, an appropriate rhombic flap was drawn incorporating the defect. The area thus outlined was incised deep to adipose tissue with a #15 scalpel blade. The skin margins were undermined to an appropriate distance in all directions utilizing iris scissors. Following this, the designed flap was carried over into the primary defect and sutured into place.
O-Z Plasty Text: The defect edges were debeveled with a #15 scalpel blade. Given the location of the defect, shape of the defect and the proximity to free margins an O-Z plasty (double transposition flap) was deemed most appropriate. Using a sterile surgical marker, the appropriate transposition flaps were drawn incorporating the defect and placing the expected incisions within the relaxed skin tension lines where possible. The area thus outlined was incised deep to adipose tissue with a #15 scalpel blade. The skin margins were undermined to an appropriate distance in all directions utilizing iris scissors. Hemostasis was achieved with electrocautery. The flaps were then transposed and carried over into place, one clockwise and the other counterclockwise, and anchored with interrupted buried subcutaneous sutures.
No Repair - Repaired With Adjacent Surgical Defect Text (Leave Blank If You Do Not Want): After the excision the defect was repaired concurrently with another surgical defect which was in close approximation.
Elliptical Excision Additional Text (Leave Blank If You Do Not Want): The margin was drawn around the clinically apparent lesion.  An elliptical shape was then drawn on the skin incorporating the lesion and margins.  Incisions were then made along these lines to the appropriate tissue plane and the lesion was extirpated.
Double O-Z Plasty Text: The defect edges were debeveled with a #15 scalpel blade. Given the location of the defect, shape of the defect and the proximity to free margins a Double O-Z plasty (double transposition flap) was deemed most appropriate. Using a sterile surgical marker, the appropriate transposition flaps were drawn incorporating the defect and placing the expected incisions within the relaxed skin tension lines where possible. The area thus outlined was incised deep to adipose tissue with a #15 scalpel blade. The skin margins were undermined to an appropriate distance in all directions utilizing iris scissors. Hemostasis was achieved with electrocautery. The flaps were then transposed and carried over into place, one clockwise and the other counterclockwise, and anchored with interrupted buried subcutaneous sutures.
Complex Repair And Transposition Flap Text: The defect edges were debeveled with a #15 scalpel blade.  The primary defect was closed partially with a complex linear closure.  Given the location of the remaining defect, shape of the defect and the proximity to free margins a transposition flap was deemed most appropriate for complete closure of the defect.  Using a sterile surgical marker, an appropriate advancement flap was drawn incorporating the defect and placing the expected incisions within the relaxed skin tension lines where possible. The area thus outlined was incised deep to adipose tissue with a #15 scalpel blade. The skin margins were undermined to an appropriate distance in all directions utilizing iris scissors and carried over to close the primary defect.
Bilateral Rotation Flap Text: The defect edges were debeveled with a #15 scalpel blade. Given the location of the defect, shape of the defect and the proximity to free margins a bilateral rotation flap was deemed most appropriate. Using a sterile surgical marker, an appropriate rotation flap was drawn incorporating the defect and placing the expected incisions within the relaxed skin tension lines where possible. The area thus outlined was incised deep to adipose tissue with a #15 scalpel blade. The skin margins were undermined to an appropriate distance in all directions utilizing iris scissors. Following this, the designed flap was carried over into the primary defect and sutured into place.
Detail Level: Detailed
Alar Island Pedicle Flap Text: The defect edges were debeveled with a #15 scalpel blade. Given the location of the defect, shape of the defect and the proximity to the alar rim an island pedicle advancement flap was deemed most appropriate. Using a sterile surgical marker, an appropriate advancement flap was drawn incorporating the defect, outlining the appropriate donor tissue and placing the expected incisions within the nasal ala running parallel to the alar rim. The area thus outlined was incised with a #15 scalpel blade. The skin margins were undermined minimally to an appropriate distance in all directions around the primary defect and laterally outward around the island pedicle utilizing iris scissors.  There was minimal undermining beneath the pedicle flap. Following this, the designed flap was carried over into the primary defect and sutured into place.
Undermining Type: Entire Wound
Lab: -[id number]
Complex Repair And Skin Substitute Graft Text: The defect edges were debeveled with a #15 scalpel blade.  The primary defect was closed partially with a complex linear closure.  Given the location of the remaining defect, shape of the defect and the proximity to free margins a skin substitute graft was deemed most appropriate to repair the remaining defect.  The graft was trimmed to fit the size of the remaining defect.  The graft was then placed in the primary defect, oriented appropriately, and sutured into place.
Nasalis-Muscle-Based Myocutaneous Island Pedicle Flap Text: Using a #15 blade, an incision was made around the donor flap to the level of the nasalis muscle. Wide lateral undermining was then performed in both the subcutaneous plane above the nasalis muscle, and in a submuscular plane just above periosteum. This allowed the formation of a free nasalis muscle axial pedicle (based on the angular artery) which was still attached to the actual cutaneous flap, increasing its mobility and vascular viability. Hemostasis was obtained with pinpoint electrocoagulation. The flap was mobilized into position and the pivotal anchor points positioned and stabilized with buried interrupted sutures. Subcutaneous and dermal tissues were closed in a multilayered fashion with sutures. Tissue redundancies were excised, and the epidermal edges were apposed without significant tension and sutured with sutures.
Hemigard Intro: Due to skin fragility and wound tension, it was decided to use HEMIGARD adhesive retention suture devices to permit a linear closure. The skin was cleaned and dried for a 6cm distance away from the wound. Excessive hair, if present, was removed to allow for adhesion.
Complex Repair And Bilobe Flap Text: The defect edges were debeveled with a #15 scalpel blade.  The primary defect was closed partially with a complex linear closure.  Given the location of the remaining defect, shape of the defect and the proximity to free margins a bilobe flap was deemed most appropriate for complete closure of the defect.  Using a sterile surgical marker, an appropriate advancement flap was drawn incorporating the defect and placing the expected incisions within the relaxed skin tension lines where possible. The area thus outlined was incised deep to adipose tissue with a #15 scalpel blade. The skin margins were undermined to an appropriate distance in all directions utilizing iris scissors and carried over to close the primary defect.
Complex Repair And Dermal Autograft Text: The defect edges were debeveled with a #15 scalpel blade.  The primary defect was closed partially with a complex linear closure.  Given the location of the defect, shape of the defect and the proximity to free margins an dermal autograft was deemed most appropriate to repair the remaining defect.  The graft was trimmed to fit the size of the remaining defect.  The graft was then placed in the primary defect, oriented appropriately, and sutured into place.
Advancement Flap (Double) Text: The defect edges were debeveled with a #15 scalpel blade. Given the location of the defect and the proximity to free margins a double advancement flap was deemed most appropriate. Using a sterile surgical marker, the appropriate advancement flaps were drawn incorporating the defect and placing the expected incisions within the relaxed skin tension lines where possible. The area thus outlined was incised deep to adipose tissue with a #15 scalpel blade. The skin margins were undermined to an appropriate distance in all directions utilizing iris scissors. Following this, the designed flaps were advanced and carried over into the primary defect and sutured into place.
Intermediate / Complex Repair - Final Wound Length In Cm: 4.5
Adjacent Tissue Transfer Text: The defect edges were debeveled with a #15 scalpel blade. Given the location of the defect and the proximity to free margins an adjacent tissue transfer was deemed most appropriate. Using a sterile surgical marker, an appropriate flap was drawn incorporating the defect and placing the expected incisions within the relaxed skin tension lines where possible. The area thus outlined was incised deep to adipose tissue with a #15 scalpel blade. The skin margins were undermined to an appropriate distance in all directions utilizing iris scissors and carried over to close the primary defect.
Lip Wedge Excision Repair Text: Given the location of the defect and the proximity to free margins a full thickness wedge repair was deemed most appropriate. Using a sterile surgical marker, the appropriate repair was drawn incorporating the defect and placing the expected incisions perpendicular to the vermilion border.  The vermilion border was also meticulously outlined to ensure appropriate reapproximation during the repair.  The area thus outlined was incised through and through with a #15 scalpel blade.  The muscularis and dermis were reaproximated with deep sutures following hemostasis. Care was taken to realign the vermilion border before proceeding with the superficial closure.  Once the vermilion was realigned the superfical and mucosal closure was finished.
Partial Purse String (Simple) Text: Given the location of the defect and the characteristics of the surrounding skin a simple purse string closure was deemed most appropriate.  Undermining was performed circumferentially around the surgical defect.  A purse string suture was then placed and tightened. Wound tension of the circular defect prevented complete closure of the wound.
Hemigard Postcare Instructions: The HEMIGARD strips are to remain completely dry for at least 5-7 days.
Home Suture Removal Text: Patient was provided a home suture removal kit and will remove their sutures at home.  If they have any questions or difficulties they will call the office.
Saucerization Depth: dermis and superficial adipose tissue
Path Notes (To The Dermatopathologist): Please check margins.
Melolabial Transposition Flap Text: The defect edges were debeveled with a #15 scalpel blade. Given the location of the defect and the proximity to free margins a melolabial flap was deemed most appropriate. Using a sterile surgical marker, an appropriate melolabial transposition flap was drawn incorporating the defect. The area thus outlined was incised deep to adipose tissue with a #15 scalpel blade. The skin margins were undermined to an appropriate distance in all directions utilizing iris scissors. Following this, the designed flap was carried over into the primary defect and sutured into place.
Complex Repair And Single Advancement Flap Text: The defect edges were debeveled with a #15 scalpel blade.  The primary defect was closed partially with a complex linear closure.  Given the location of the remaining defect, shape of the defect and the proximity to free margins a single advancement flap was deemed most appropriate for complete closure of the defect.  Using a sterile surgical marker, an appropriate advancement flap was drawn incorporating the defect and placing the expected incisions within the relaxed skin tension lines where possible. The area thus outlined was incised deep to adipose tissue with a #15 scalpel blade. The skin margins were undermined to an appropriate distance in all directions utilizing iris scissors and carried over to close the primary defect.
Mucosal Advancement Flap Text: Given the location of the defect, shape of the defect and the proximity to free margins a mucosal advancement flap was deemed most appropriate. Incisions were made with a 15 blade scalpel in the appropriate fashion along the cutaneous vermilion border and the mucosal lip. The remaining actinically damaged mucosal tissue was excised.  The mucosal advancement flap was then elevated to the gingival sulcus with care taken to preserve the neurovascular structures and advanced into the primary defect. Care was taken to ensure that precise realignment of the vermilion border was achieved.
Advancement-Rotation Flap Text: The defect edges were debeveled with a #15 scalpel blade. Given the location of the defect, shape of the defect and the proximity to free margins an advancement-rotation flap was deemed most appropriate. Using a sterile surgical marker, an appropriate flap was drawn incorporating the defect and placing the expected incisions within the relaxed skin tension lines where possible. The area thus outlined was incised deep to adipose tissue with a #15 scalpel blade. The skin margins were undermined to an appropriate distance in all directions utilizing iris scissors. Following this, the designed flap was carried over into the primary defect and sutured into place.
Melolabial Interpolation Flap Text: A decision was made to reconstruct the defect utilizing an interpolation axial flap and a staged reconstruction.  A telfa template was made of the defect.  This telfa template was then used to outline the melolabial interpolation flap.  The donor area for the pedicle flap was then injected with anesthesia.  The flap was excised through the skin and subcutaneous tissue down to the layer of the underlying musculature.  The pedicle flap was carefully excised within this deep plane to maintain its blood supply.  The edges of the donor site were undermined.   The donor site was closed in a primary fashion.  The pedicle was then rotated into position and sutured.  Once the tube was sutured into place, adequate blood supply was confirmed with blanching and refill.  The pedicle was then wrapped with xeroform gauze and dressed appropriately with a telfa and gauze bandage to ensure continued blood supply and protect the attached pedicle.
Mercedes Flap Text: The defect edges were debeveled with a #15 scalpel blade. Given the location of the defect, shape of the defect and the proximity to free margins a Mercedes flap was deemed most appropriate. Using a sterile surgical marker, an appropriate advancement flap was drawn incorporating the defect and placing the expected incisions within the relaxed skin tension lines where possible. The area thus outlined was incised deep to adipose tissue with a #15 scalpel blade. The skin margins were undermined to an appropriate distance in all directions utilizing iris scissors. Following this, the designed flap was advanced and carried over into the primary defect and sutured into place.
Muscle Hinge Flap Text: The defect edges were debeveled with a #15 scalpel blade.  Given the size, depth and location of the defect and the proximity to free margins a muscle hinge flap was deemed most appropriate. Using a sterile surgical marker, an appropriate hinge flap was drawn incorporating the defect. The area thus outlined was incised with a #15 scalpel blade. The skin margins were undermined to an appropriate distance in all directions utilizing iris scissors. Following this, the designed flap was carried into the primary defect and sutured into place.
Split-Thickness Skin Graft Text: The defect edges were debeveled with a #15 scalpel blade. Given the location of the defect, shape of the defect and the proximity to free margins a split thickness skin graft was deemed most appropriate. Using a sterile surgical marker, the primary defect shape was transferred to the donor site. The split thickness graft was then harvested.  The skin graft was then placed in the primary defect and oriented appropriately.
Trilobed Flap Text: The defect edges were debeveled with a #15 scalpel blade. Given the location of the defect and the proximity to free margins a trilobed flap was deemed most appropriate. Using a sterile surgical marker, an appropriate trilobed flap was drawn around the defect. The area thus outlined was incised deep to adipose tissue with a #15 scalpel blade. The skin margins were undermined to an appropriate distance in all directions utilizing iris scissors. Following this, the designed flap was carried into the primary defect and sutured into place.
Transposition Flap Text: The defect edges were debeveled with a #15 scalpel blade. Given the location of the defect and the proximity to free margins a transposition flap was deemed most appropriate. Using a sterile surgical marker, an appropriate transposition flap was drawn incorporating the defect. The area thus outlined was incised deep to adipose tissue with a #15 scalpel blade. The skin margins were undermined to an appropriate distance in all directions utilizing iris scissors. Following this, the designed flap was carried over into the primary defect and sutured into place.
Skin Substitute Text: The defect edges were debeveled with a #15 scalpel blade. Given the location of the defect, shape of the defect and the proximity to free margins a skin substitute graft was deemed most appropriate.  The graft material was trimmed to fit the size of the defect. The graft was then placed in the primary defect and oriented appropriately.
Distance Of Undermining In Cm (Required): 1.5
Pinch Graft Text: The defect edges were debeveled with a #15 scalpel blade. Given the location of the defect, shape of the defect and the proximity to free margins a pinch graft was deemed most appropriate. Using a sterile surgical marker, the primary defect shape was transferred to the donor site. The area thus outlined was incised deep to adipose tissue with a #15 scalpel blade.  The harvested graft was then trimmed of adipose tissue until only dermis and epidermis was left. The skin margins of the secondary defect were undermined to an appropriate distance in all directions utilizing iris scissors.  The secondary defect was closed with interrupted buried subcutaneous sutures.  The skin edges were then re-apposed with running  sutures.  The skin graft was then placed in the primary defect and oriented appropriately.
Epidermal Sutures: 4-0 Nylon
Positioning (Leave Blank If You Do Not Want): The patient was placed in a comfortable position exposing the surgical site.
Hemostasis: Electrocautery
Length To Time In Minutes Device Was In Place: 10
Suturegard Intro: Intraoperative tissue expansion was performed, utilizing the SUTUREGARD device, in order to reduce wound tension.
Repair Depth: use same depth as excision depth
Nasalis Myocutaneous Flap Text: Using a #15 blade, an incision was made around the donor flap to the level of the nasalis muscle. Wide lateral undermining was then performed in both the subcutaneous plane above the nasalis muscle, and in a submuscular plane just above periosteum. This allowed the formation of a free nasalis muscle axial pedicle which was still attached to the actual cutaneous flap, increasing its mobility and vascular viability. Hemostasis was obtained with pinpoint electrocoagulation. The flap was mobilized into position and the pivotal anchor points positioned and stabilized with buried interrupted sutures. Subcutaneous and dermal tissues were closed in a multilayered fashion with sutures. Tissue redundancies were excised, and the epidermal edges were apposed without significant tension and sutured with sutures.
Rectangular Flap Text: The defect edges were debeveled with a #15 scalpel blade. Given the location of the defect and the proximity to free margins a rectangular flap was deemed most appropriate. Using a sterile surgical marker, an appropriate rectangular flap was drawn incorporating the defect. The area thus outlined was incised deep to adipose tissue with a #15 scalpel blade. The skin margins were undermined to an appropriate distance in all directions utilizing iris scissors. Following this, the designed flap was carried over into the primary defect and sutured into place.

## 2024-05-28 NOTE — PROCEDURE: MIPS QUALITY
Apixaban/Eliquis - Compliance/Apixaban/Eliquis - Dietary Advice/Apixaban/Eliquis - Follow up monitoring/Apixaban/Eliquis - Potential for adverse drug reactions and interactions
Detail Level: Detailed
Quality 358: Patient-Centered Surgical Risk Assessment And Communication: Documentation of patient-specific risk assessment with a risk calculator based on multi-institutional clinical data, the specific risk calculator used, and communication of risk assessment from risk calculator with the patient or family.

## 2024-06-03 ENCOUNTER — OFFICE VISIT (OUTPATIENT)
Dept: RHEUMATOLOGY | Facility: CLINIC | Age: 84
End: 2024-06-03

## 2024-06-03 VITALS
RESPIRATION RATE: 16 BRPM | SYSTOLIC BLOOD PRESSURE: 125 MMHG | HEIGHT: 64 IN | WEIGHT: 239.88 LBS | BODY MASS INDEX: 40.95 KG/M2 | DIASTOLIC BLOOD PRESSURE: 76 MMHG | HEART RATE: 69 BPM

## 2024-06-03 DIAGNOSIS — M06.09 RHEUMATOID ARTHRITIS OF MULTIPLE SITES WITH NEGATIVE RHEUMATOID FACTOR (HCC): Primary | ICD-10-CM

## 2024-06-03 DIAGNOSIS — N28.9 RENAL INSUFFICIENCY: ICD-10-CM

## 2024-06-03 DIAGNOSIS — Z51.81 THERAPEUTIC DRUG MONITORING: ICD-10-CM

## 2024-06-03 DIAGNOSIS — M1A.9XX1 CHRONIC TOPHACEOUS GOUT OF RIGHT FOOT: ICD-10-CM

## 2024-06-03 PROCEDURE — 99214 OFFICE O/P EST MOD 30 MIN: CPT | Performed by: INTERNAL MEDICINE

## 2024-06-03 RX ORDER — FOLIC ACID 1 MG/1
1 TABLET ORAL DAILY
Qty: 90 TABLET | Refills: 3 | Status: SHIPPED | OUTPATIENT
Start: 2024-06-03

## 2024-06-03 NOTE — PROGRESS NOTES
Cris Peterson is a 83 year old female.    HPI:     Chief Complaint   Patient presents with    Rheumatoid Arthritis    Medication Follow-Up    Lab Results       I had the pleasure of seeing Cris Peterson on 6/3/2024 for follow up Seropositive RA and Gout    Current medications:  Enbrel weekly  Methotrexate 4 pills weekly   mg daily   Allopurinal 300 mg daily  Blood work:  +ITZEL 1:160, Neg BETTY panel  RF 51, +atypical p-ANCA 1:1280  UA 5.3 (Oct 2021)    Interval History:  This is a 83 yo F with hx of HTN, HLD, Hypothyroid, Atrial fibrillation on eliquis, COPD, B/L TKR, R THR, R foot surgery, R thumb surgery, Gout  presents to establish care for seronegative RA.  She is a former patient of Dr. Rose.  She was diagnosed with RA many years ago, about 20 years ago.  Currently on Enbrel weekly, methotrexate 4 pills weekly and hydroxychloroquine 400 mg daily.  Joints are stable.  No active swelling.  She does have chronic lower back pain.  X-rays have showed moderate scoliosis and moderate to severe spondylosis.  She also has discomfort in her neck.  X-ray showed advanced multilevel degenerative changes mostly in C5-C6.  She has full range of motion in her shoulders.  She was admitted in April 2021 for an acutely inflamed right third toe which was originally felt to be infected.  Surgery was done and tophi was removed.  Her uric acid was 9.1 at that time.  Now on allopurinol 300 mg daily.  Gout has been stable.  Has had no gout flares.  She reports shortness of breath.  Following with cardiology.    2/2/2024:  Presents for f/u of RA and Gout  Also has CKD following with nephology  Has chronic changes in hands but minimal pain. Has weakness in the hands  Continues to have pain in the neck and across the shoulders  No n/t or shooting for the pain  No recent gout flares     6/3/2024:  Presents for f/u of RA and Gout  No recent gout flare  Also has CKD following with nephology  Has chronic changes in hands but minimal  pain. Has weakness in the hands  Recent blood work showing increased ESR 47.  Normal LFTs.  Creatinine 1.14  Seen by ophthalmology 4/23/2024, no evidence of Plaquenil toxicity  Had increased swelling in both legs.  She also has atrial fibrillation and CKD stage III.  She is now on Lasix and following with cardiology. She will be getting right heart cath on Thursday  She reports some SOB and weight gain   Also had basal cell carcinoma on right shoulder and it was removed last Thursday           HISTORY:  Past Medical History:    Cancer (HCC)    melanoma on back    Congestive heart disease (HCC)    COPD (chronic obstructive pulmonary disease) (HCC)    Coronary atherosclerosis    Disorder of thyroid    Fracture, patella    repair    Gout    Heart valve disease    High blood pressure    High cholesterol    Hypothyroidism    Nonunion of midfoot arthrodesis (HCC)    Pulmonary HTN (HCC)    Rheumatoid arthritis (HCC)    Sx.7/21/15, CPT.76955, R Triple Arthrodesis/Poss Medializing Calc Arthrodesis/Lapidus/MONA/FDL to Post Tib Transfer, w/, Global Exp.10/19/15    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 90 tablet 3    hydroxychloroquine 200 MG Oral Tab Take 2 tablets (400 mg total) by mouth daily. 180 tablet 3    allopurinol 300 MG Oral Tab Take one daily. 90 tablet 1    Etanercept (ENBREL SURECLICK) 50 MG/ML Subcutaneous Solution Auto-injector Inject 50 mg subcutaneously every week. 12 mL 1    lisinopril 10 MG Oral Tab Take 1 tablet (10 mg total) by mouth every evening. 90 tablet 0     methotrexate 2.5 MG Oral Tab TAKE 4 TABLETS ONCE A WEEK 52 tablet 1    levothyroxine 125 MCG Oral Tab Take 1 tablet (125 mcg total) by mouth before breakfast. 90 tablet 1    estradiol (ESTRACE) 0.1 MG/GM Vaginal Cream Apply 1/2 gram vaginally 2-3 times per week. 42.5 g 3    Trospium Chloride ER 60 MG Oral Capsule SR 24 Hr Take 1 capsule (60 mg total) by mouth daily. 90 capsule 3    atorvastatin 40 MG Oral Tab Take 1 tablet (40 mg total) by mouth daily.      folic acid 1 MG Oral Tab Take 1 tablet (1 mg total) by mouth daily. 90 tablet 3    Cholecalciferol (VITAMIN D) 50 MCG (2000 UT) Oral Tab Take by mouth.      ELIQUIS 5 MG Oral Tab Take 1 tablet (5 mg total) by mouth 2 (two) times daily.      Calcium 500-125 MG-UNIT Oral Tab Take 1 tablet by mouth daily.      Loperamide HCl 2 MG Oral Cap Take 1 capsule (2 mg total) by mouth 4 (four) times daily as needed for Diarrhea.      furosemide 40 MG Oral Tab Take 1 tablet (40 mg total) by mouth daily.      Probiotic Product (PROBIOTIC-10) Oral Cap Take 1 tablet by mouth daily.      acetaminophen (TYLENOL EXTRA STRENGTH) 500 MG Oral Tab Take 1 tablet (500 mg total) by mouth every 6 (six) hours as needed.      CENTRUM SILVER OR TABS 1 TABLET DAILY      methylPREDNISolone 4 MG Oral Tablet Therapy Pack Take as directed on package. (Patient not taking: Reported on 6/3/2024) 1 each 0     .cmed  Allergies:  Allergies   Allergen Reactions    Erythromycin Base     Other OTHER (SEE COMMENTS)    Pcn [Bicillin L-A]     Lactose DIARRHEA         ROS:   All other ROS are negative.     PHYSICAL EXAM:   GEN: AAOx3, NAD  HEENT: EOMI, PERRLA, no injection or icterus, oral mucosa moist, no oral lesions. No lymphadenopathy. No facial rash  CVS: RRR, no murmurs rubs or gallops. Equal 2+ distal pulses.   LUNGS: CTAB, no increased work of breathing  ABDOMEN:  soft NT/ND, +BS, no HSM  SKIN: No rashes or skin lesions. No nail findings  MSK:  Chronic synovitis noted in the wrist  [place]-neck deformities  of the right hand.  Able to make a full fist  Full range of motion in the shoulders  3+edema b/l LE  NEURO: Cranial nerves II-XII intact grossly. 5/5 strength throughout in both upper and lower extremities, sensation intact.  PSYCH: normal mood       LABS:       Component      Latest Ref Rng 2/11/2022   MYELOPEROX ANTIBODIES, IGG      0 - 19 AU/mL 0    SERINE PROTEASE3, IGG      0 - 19 AU/mL 0    ANCA IFA Titer      <1:20  1:1280 (H)    ANCA IFA Pattern      None Detected  Atypical p-ANCA !    ITZEL Titer/Pattern      <80  160 !    Reviewed By: Kathleen Galloway M.D.    Anti-Sjogren's A      Negative  Negative    Anti-Sjogren's B      Negative  Negative    Anti-Smith Antibody      Negative  Negative    Anti-Corea/RNP Antibody      Negative  Negative    RHEUMATOID FACTOR      <15 IU/mL 51 (H)    COMPLEMENT C4      10.0 - 40.0 mg/dL 32.1    ITZEL SCREEN WITH REFLEX (S)      Negative  Positive !    COMPLEMENT C3      90.0 - 180.0 mg/dL 117.0    Anti Double Strand DNA      <10  <10       Component      Latest Ref Rng 4/21/2022   MYELOPEROX ANTIBODIES, IGG      0 - 19 AU/mL 0    SERINE PROTEASE3, IGG      0 - 19 AU/mL 0    ANCA IFA Titer      <1:20  1:1280 (H)    ANCA IFA Pattern      None Detected  Atypical p-ANCA !    ITZEL Titer/Pattern      <80      Reviewed By:     Anti-Sjogren's A      Negative      Anti-Sjogren's B      Negative      Anti-Smith Antibody      Negative      Anti-Corea/RNP Antibody      Negative      RHEUMATOID FACTOR      <15 IU/mL     COMPLEMENT C4      10.0 - 40.0 mg/dL     ITZEL SCREEN WITH REFLEX (S)      Negative      COMPLEMENT C3      90.0 - 180.0 mg/dL     Anti Double Strand DNA      <10            Imaging:     XR cervical 2022:  CONCLUSION:   1. There is widening of the atlantodental interval on flexion, suggesting ligamentous instability.      2. Advanced multilevel degenerative changes throughout the cervical spine, most notably at the C5-C6 level.      ASSESSMENT/PLAN:     Seronegative RA-  stable   - She is a former patient of Dr. Rose, diagnosed more than 20 years ago  - On Enbrel weekly, methotrexate 4 pills weekly and hydroxychloroquine 400 mg daily  - She states that she sees the eye doctor every year 4/2024, no evidence of Plaquenil toxicity  - Plan to monitor blood work every 3 to 4 months    B/L LE swelling  - following with cardiology  - on lasix now  - plan for cardiac cath on Thursday    Basal cell carcinoma, right shoulder  - s/p removal       Primary osteoarthritis, chronic neck and lower back pain  - X-ray of the neck has shown advanced multilevel degenerative changes.  - She cannot take NSAIDs.  Recommended PT but she deferred that for now  - Advise she can take Tylenol arthritis once or twice a day for her pain     CKD stage 3  - Found to have a positive atypical p-ANCA 1: 1280, negative RI-3 and MPO.  UA shows no protein or blood.  Creatinine has been fluctuating between 1.1 and 1.3  - Following with nephrology. 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  Remains on allopurinol 300 mg daily.  No recent gout flare    Pt will f/u in 3-4 mos     There is a longitudinal care relationship with me, the care plan reflects the ongoing nature of the continuous relationship of care, and the medical record indicates that there is ongoing treatment of a serious/complex medical condition which I am currently managing.  is Applicable.     Ivania Cornell MD  6/3/2024   8:45 AM

## 2024-06-03 NOTE — PATIENT INSTRUCTIONS
You were seen today for RA  Continue enbrel weekly, methotrexate 4 pills weekly and plaquenil  Blood work in October  See me in October

## 2024-06-06 ENCOUNTER — HOSPITAL ENCOUNTER (OUTPATIENT)
Dept: INTERVENTIONAL RADIOLOGY/VASCULAR | Facility: HOSPITAL | Age: 84
Discharge: HOME | End: 2024-06-06
Attending: INTERNAL MEDICINE | Admitting: INTERNAL MEDICINE
Payer: MEDICARE

## 2024-06-06 ENCOUNTER — MED REC SCAN ONLY (OUTPATIENT)
Dept: INTERNAL MEDICINE CLINIC | Facility: CLINIC | Age: 84
End: 2024-06-06

## 2024-06-06 VITALS
WEIGHT: 234 LBS | HEART RATE: 54 BPM | BODY MASS INDEX: 40 KG/M2 | RESPIRATION RATE: 23 BRPM | DIASTOLIC BLOOD PRESSURE: 68 MMHG | SYSTOLIC BLOOD PRESSURE: 125 MMHG | OXYGEN SATURATION: 98 %

## 2024-06-06 DIAGNOSIS — M79.89 LEG SWELLING: Primary | ICD-10-CM

## 2024-06-06 DIAGNOSIS — R53.83 FATIGUE: ICD-10-CM

## 2024-06-06 DIAGNOSIS — I51.89 DIASTOLIC DYSFUNCTION: ICD-10-CM

## 2024-06-06 PROCEDURE — 93451 RIGHT HEART CATH: CPT | Performed by: INTERNAL MEDICINE

## 2024-06-06 PROCEDURE — 4A023N6 MEASUREMENT OF CARDIAC SAMPLING AND PRESSURE, RIGHT HEART, PERCUTANEOUS APPROACH: ICD-10-PCS | Performed by: INTERNAL MEDICINE

## 2024-06-06 PROCEDURE — 36415 COLL VENOUS BLD VENIPUNCTURE: CPT

## 2024-06-06 PROCEDURE — 75827 VEIN X-RAY CHEST: CPT | Performed by: INTERNAL MEDICINE

## 2024-06-06 RX ORDER — SODIUM CHLORIDE 9 MG/ML
INJECTION, SOLUTION INTRAVENOUS
Status: COMPLETED | OUTPATIENT
Start: 2024-06-06 | End: 2024-06-06

## 2024-06-06 RX ORDER — MIDAZOLAM HYDROCHLORIDE 1 MG/ML
INJECTION INTRAMUSCULAR; INTRAVENOUS
Status: DISCONTINUED
Start: 2024-06-06 | End: 2024-06-06 | Stop reason: WASHOUT

## 2024-06-06 RX ORDER — FUROSEMIDE 40 MG/1
40 TABLET ORAL 2 TIMES DAILY
Qty: 60 TABLET | Refills: 5 | Status: SHIPPED | OUTPATIENT
Start: 2024-06-06

## 2024-06-06 RX ORDER — DIPHENHYDRAMINE HYDROCHLORIDE 50 MG/ML
INJECTION INTRAMUSCULAR; INTRAVENOUS
Status: COMPLETED
Start: 2024-06-06 | End: 2024-06-06

## 2024-06-06 RX ORDER — LISINOPRIL 10 MG/1
10 TABLET ORAL 2 TIMES DAILY
Status: SHIPPED | COMMUNITY
Start: 2024-06-06

## 2024-06-06 RX ORDER — LIDOCAINE HYDROCHLORIDE 20 MG/ML
INJECTION, SOLUTION EPIDURAL; INFILTRATION; INTRACAUDAL; PERINEURAL
Status: COMPLETED
Start: 2024-06-06 | End: 2024-06-06

## 2024-06-06 RX ADMIN — SODIUM CHLORIDE: 9 INJECTION, SOLUTION INTRAVENOUS at 10:30:00

## 2024-06-06 NOTE — DISCHARGE INSTRUCTIONS
HOME CARE INSTRUCTIONS RIGHT HEART ANGIOGRAM      Activity    Plan on resting and relaxing tonight and tomorrow    Do not lift anything over 5 pounds for the next 24 hours      Resume your normal activity after 24 hours, or as instructed by your physician     What is Normal?    The procedure site may appear bruised or discolored    The procedure site may be tender to the touch    There may be a small amount of drainage on the bandage     Special Instructions    The bandage is to remain in place for 24 hours    After 24 hours, you must remove the bandage. You should shower after removing the bandage, and wash the procedure site gently with soap and water.   Do NOT apply any powders, ointments or creams to the site for 1 week.    (If you choose to wear a bandage for a few days, make sure it remains clean and dry and that it is changed daily.)     When you should NOTIFY YOUR PHYSICIAN    If you have persistent pain at the procedure site    If you experience signs of a fever, temperature >101o, chills, infection (redness, swelling, thick yellow drainage, or a foul odor from the procedure site)     Other    You may resume your present diet, unless otherwise specified by your physician    You may resume all of your medications as prescribed, unless otherwise directed by your physician. A list of your medications was provided to you at discharge    Please call your physician’s office for a follow-up appointment. You should be seen in 1 to 2 weeks    *You will need a BMP lab drawn between 6/13-6/17 so that Dr. Beck can review your lab results with you at your follow-up appointment on 6/18 - you do not need to make an appointment and may walk-in at [facility] to have this lab drawn.    Resume your Eliquis tonight (6/6)    Increase Lasix 40mg - Take 1 tablet (40mg total) by mouth 2 (two) times daily

## 2024-06-06 NOTE — IVS NOTE
DISCHARGE NOTE     Pt is able to sit up and ambulate without difficulty.   Pt voided and tolerated fluids and food.   Right brachial procedural site remains dry and intact with good circulation, motion and sensation.   No signs or symptoms of bleeding/hematoma noted.   Pt denies any pain or discomfort at this time.  IV access removed  Instructions provided, patient/family verbalizes understanding.     Requested for Dr. Beck to call patient post-procedure.      Pt discharge via wheelchair to [facility]      Follow up Appointment: 6/18 at 8:50AM with Dr. Beck    New Prescription: Notified patient that will call her with any changes to medications

## 2024-06-06 NOTE — PROCEDURES
[facility]  part of [facility] Cardiac Cath Procedure Note  Cris Peterson Patient Status:  Outpatient    1940 MRN [id number]   Location [facility] CARDIOVASCULAR OBSERVATION Attending No att. providers found   Hosp Day # 0 PCP Carly Bolivar MD       Cardiologist: Carlos Beck MD  Primary Proceduralist: Carlos Beck MD  Procedure Performed: RHC  Date of Procedure: 2024     Pre procedure diagnosis: Dyspnea on exertion, pulmonary hypertension  Post procedure diagnosis: As above    Summary of findings: Severe pulmonary hypertension due to severely elevated left-sided filling pressures.  Mildly reduced cardiac index.      Hemodynamics:   RA 18 mmHg  RV 65/22 mmHg  PA 63/29 mean 44 mmHg  PCW 29 mmHg    CO 4.8 L/min by Karson/ CI 2.3 L/min/m² by Karson  SVR 1651 dynes/  dynes    Ao sat 93%  PA sat 51%      Recommendations:  Increase diuretic therapy and afterload reducing agents        Description of Procedure:   After written informed consent was obtained from the patient, patient was brought to the cardiac catheterization laboratory.  Patient was prepped and draped in the usual sterile fashion. Lidocaine 1% was used to infiltrate the right brachial area.  Under ultrasound guidance, micropuncture needle and 5 fr sheath was introduced into the right brachial vein. 5 fr sheath placed, CCO swan advanced to RA, RV, PA and wedge position obtaining pressures as described above.      Specimen sent to: No specimen collected  Estimated blood loss: 0 cc  Closure: Manual      IV was maintained by RN and no sedation was provided    Carlos Beck MD  24

## 2024-06-06 NOTE — IVS NOTE
Notified patient regarding Dr. Beck's orders:    -Medication change -  increase Lasix 40mg - Take 1 tablet (40mg total) 2 (two) times daily.    -Instructed patient that she will need a BMP drawn between 6/13-6/17 before her follow-up appointment with Dr. Beck on 6/18 so that he may review lab results with patient.     -Instructed patient to resume Eliquis tonight (6/6)    *A copy of the updated discharge instructions were sent through [facility].  Instructed patient this if she has any further questions to call Dr. Beck's office.  Patient verbalized undersanding.

## 2024-06-11 ENCOUNTER — APPOINTMENT (OUTPATIENT)
Dept: URBAN - METROPOLITAN AREA CLINIC 244 | Age: 84
Setting detail: DERMATOLOGY
End: 2024-06-12

## 2024-06-11 DIAGNOSIS — Z48.02 ENCOUNTER FOR REMOVAL OF SUTURES: ICD-10-CM

## 2024-06-11 PROCEDURE — 99024 POSTOP FOLLOW-UP VISIT: CPT

## 2024-06-11 PROCEDURE — OTHER SUTURE REMOVAL (GLOBAL PERIOD): OTHER

## 2024-06-11 ASSESSMENT — LOCATION ZONE DERM: LOCATION ZONE: ARM

## 2024-06-11 ASSESSMENT — LOCATION SIMPLE DESCRIPTION DERM: LOCATION SIMPLE: RIGHT SHOULDER

## 2024-06-11 ASSESSMENT — LOCATION DETAILED DESCRIPTION DERM: LOCATION DETAILED: RIGHT ANTERIOR SHOULDER

## 2024-06-11 NOTE — PROCEDURE: SUTURE REMOVAL (GLOBAL PERIOD)
Detail Level: Detailed
Add 42337 Cpt? (Important Note: In 2017 The Use Of 60879 Is Being Tracked By Cms To Determine Future Global Period Reimbursement For Global Periods): yes

## 2024-06-15 ENCOUNTER — LAB ENCOUNTER (OUTPATIENT)
Dept: LAB | Facility: HOSPITAL | Age: 84
End: 2024-06-15
Attending: INTERNAL MEDICINE

## 2024-06-15 DIAGNOSIS — M79.89 LEG SWELLING: ICD-10-CM

## 2024-06-15 LAB
ANION GAP SERPL CALC-SCNC: 7 MMOL/L (ref 0–18)
BUN BLD-MCNC: 32 MG/DL (ref 9–23)
BUN/CREAT SERPL: 22.1 (ref 10–20)
CALCIUM BLD-MCNC: 9.8 MG/DL (ref 8.7–10.4)
CHLORIDE SERPL-SCNC: 108 MMOL/L (ref 98–112)
CO2 SERPL-SCNC: 28 MMOL/L (ref 21–32)
CREAT BLD-MCNC: 1.45 MG/DL
EGFRCR SERPLBLD CKD-EPI 2021: 36 ML/MIN/1.73M2 (ref 60–?)
FASTING STATUS PATIENT QL REPORTED: NO
GLUCOSE BLD-MCNC: 86 MG/DL (ref 70–99)
OSMOLALITY SERPL CALC.SUM OF ELEC: 302 MOSM/KG (ref 275–295)
POTASSIUM SERPL-SCNC: 4 MMOL/L (ref 3.5–5.1)
SODIUM SERPL-SCNC: 143 MMOL/L (ref 136–145)

## 2024-06-15 PROCEDURE — 80048 BASIC METABOLIC PNL TOTAL CA: CPT

## 2024-06-15 PROCEDURE — 36415 COLL VENOUS BLD VENIPUNCTURE: CPT

## 2024-07-18 ENCOUNTER — OFFICE VISIT (OUTPATIENT)
Dept: UROLOGY | Facility: HOSPITAL | Age: 84
End: 2024-07-18
Attending: PHYSICIAN ASSISTANT
Payer: MEDICARE

## 2024-07-18 VITALS — HEIGHT: 64 IN | RESPIRATION RATE: 20 BRPM | BODY MASS INDEX: 39.95 KG/M2 | WEIGHT: 234 LBS

## 2024-07-18 DIAGNOSIS — N39.41 URGE URINARY INCONTINENCE: Primary | ICD-10-CM

## 2024-07-18 DIAGNOSIS — R35.1 NOCTURIA: ICD-10-CM

## 2024-07-18 DIAGNOSIS — N81.84 PELVIC MUSCLE WASTING: ICD-10-CM

## 2024-07-18 DIAGNOSIS — N95.2 POSTMENOPAUSAL ATROPHIC VAGINITIS: ICD-10-CM

## 2024-07-18 PROCEDURE — 99212 OFFICE O/P EST SF 10 MIN: CPT

## 2024-07-18 RX ORDER — ESTRADIOL 0.1 MG/G
CREAM VAGINAL
Qty: 42.5 G | Refills: 3 | Status: SHIPPED | OUTPATIENT
Start: 2024-07-18

## 2024-07-18 RX ORDER — TROSPIUM CHLORIDE ER 60 MG/1
60 CAPSULE ORAL DAILY
Qty: 90 CAPSULE | Refills: 3 | Status: SHIPPED | OUTPATIENT
Start: 2024-07-18

## 2024-07-18 NOTE — PROGRESS NOTES
Patient presents to follow up UUI    Currently taking trospium ER 60 mg  Denies SEs  Reports + improvement    Not consist with vag estrogen  Multiple BMs a day, using fiber daily, miralax 4x weekly    Denies current UTI s/sx    Previously tried:  Solifenacin 10 mg    Urogynecology Summary:  Urogynecology Summary  Prolapse: No  KATIE: Yes  Urge Incontinence: Yes (reports more bothersome)  Nocturia Frequency: 3  Frequency: 2 - 3 hours (q3h)  Incomplete emptying: No  Constipation: Yes (benefiber qd and Miralax ~4x/wk)  Wears pad day?: 2 (2-3)  Wears Pad Night?: 1  Activities are limited by UI/POP?: Yes (dt other health issues)    Vitals:  Resp 20   Ht 64\"   Wt 234 lb (106.1 kg)   BMI 40.17 kg/m²     GENERAL EXAM:  GENERAL: alert & oriented, NAD  HEENT: NC/AT, sclera without injection  SKIN: no rashes  LUNGS:  without increased respiratory effort  ABDOMEN: soft, non-tender, non-distended, no masses appreciated  EXT: no edema    PELVIC EXAM: PERLA Coy, present for exam as a chaperone  External Genitalia: Normal appearance for age. + atrophy, no lesions  Urethra: + atrophy, non tender  Bladder: no fullness, non tender  Vagina: + atrophy, no lesions   Cervix and uterus: surgically absent  Adnexa:non palpable.   Perineum: non tender  Anus: no hemorrhoids  Rectum: deferred     PELVIS FLOOR NEUROMUSCULAR FUNCTION:  Strength:  1  Perineal Sensation:  Normal        PELVIC SUPPORT:  Timbo:  0  Ant:  0  Post:  1-2  CST:  negative  UVJ: not hypermobile    Impression/Plan:    ICD-10-CM    1. Urge urinary incontinence  N39.41 Trospium Chloride ER 60 MG Oral Capsule SR 24 Hr      2. Nocturia  R35.1 Trospium Chloride ER 60 MG Oral Capsule SR 24 Hr      3. Postmenopausal atrophic vaginitis  N95.2 estradiol (ESTRACE) 0.1 MG/GM Vaginal Cream      4. Pelvic muscle wasting  N81.84           Discussion Items:   Discussed mgmt of vulvovaginal atrophy with vaginal estrogen cream. Reviewed associated benefits, risks, alternatives, and goals.  Recommend low dose 3x/week treatment.    Bowel management reviewed. Discussed increasing fiber daily w/ addition of miralax as needed.    Discussed dietary and behavioral modifications for mgmt of urinary symptoms.  Discussed weight management and benefits of weight loss on urinary symptoms.  Reviewed AUA/SUFU guidelines on mgmt of non-neurogenic OAB.  Discussed pharmacologic and nonpharmacologic mgmt options of urinary symptoms - reviewed risks, benefits, alternatives, and goals of treatment.  Discussed specific risks related to OAB meds including, but not limited to dry mouth, constipation, blurry vision, cognitive changes, and BP elevation.      Treatment Plan:  Continue trospium ER 60 mg daily on an empty stomach  Continue vag estrogen cream as prescribed  Bowel regimen  Bladder diet/drill  Pelvic floor exercises  Call with s/sx of UTI    All questions answered  She understands and agrees to plan    Return in about 1 year (around 7/18/2025) for UUI, yearly exam.    Malia Bradley PA-C    Note to patient: The 21st Century Cures Act makes medical notes like these available to patients in the interest of transparency.  However, be advised this is a medical document.  It is intended as peer to peer communication.  It is written in medical language and may contain abbreviations or verbiage that are unfamiliar.  It may appear blunt or direct.  Medical documents are intended to carry relevant information, facts as evident, and the clinical opinion of the practitioner.

## 2024-07-22 PROBLEM — N17.9 ACUTE RENAL FAILURE SUPERIMPOSED ON CHRONIC KIDNEY DISEASE: Status: ACTIVE | Noted: 2024-05-20

## 2024-07-22 PROBLEM — N18.9 ACUTE RENAL FAILURE SUPERIMPOSED ON CHRONIC KIDNEY DISEASE (HCC): Status: ACTIVE | Noted: 2024-05-20

## 2024-07-22 PROBLEM — R06.83 SNORING: Status: ACTIVE | Noted: 2024-04-19

## 2024-07-22 PROBLEM — N18.9 ACUTE RENAL FAILURE SUPERIMPOSED ON CHRONIC KIDNEY DISEASE: Status: ACTIVE | Noted: 2024-05-20

## 2024-07-22 PROBLEM — R94.39 ABNORMAL RESULT OF OTHER CARDIOVASCULAR FUNCTION STUDY: Status: ACTIVE | Noted: 2024-07-22

## 2024-07-22 PROBLEM — N17.9 ACUTE RENAL FAILURE SUPERIMPOSED ON CHRONIC KIDNEY DISEASE (HCC): Status: ACTIVE | Noted: 2024-05-20

## 2024-07-23 ENCOUNTER — LAB ENCOUNTER (OUTPATIENT)
Dept: LAB | Age: 84
End: 2024-07-23
Attending: INTERNAL MEDICINE
Payer: MEDICARE

## 2024-07-23 ENCOUNTER — OFFICE VISIT (OUTPATIENT)
Dept: INTERNAL MEDICINE CLINIC | Facility: CLINIC | Age: 84
End: 2024-07-23
Payer: MEDICARE

## 2024-07-23 VITALS
WEIGHT: 236.63 LBS | BODY MASS INDEX: 40.4 KG/M2 | HEART RATE: 62 BPM | HEIGHT: 64 IN | SYSTOLIC BLOOD PRESSURE: 122 MMHG | DIASTOLIC BLOOD PRESSURE: 70 MMHG | OXYGEN SATURATION: 95 %

## 2024-07-23 DIAGNOSIS — Z01.818 PREOP EXAMINATION: ICD-10-CM

## 2024-07-23 DIAGNOSIS — N18.32 STAGE 3B CHRONIC KIDNEY DISEASE (HCC): ICD-10-CM

## 2024-07-23 DIAGNOSIS — R26.9 GAIT ABNORMALITY: ICD-10-CM

## 2024-07-23 DIAGNOSIS — D84.9 IMMUNOSUPPRESSED STATUS (HCC): ICD-10-CM

## 2024-07-23 DIAGNOSIS — R53.83 OTHER FATIGUE: Primary | ICD-10-CM

## 2024-07-23 DIAGNOSIS — M15.0 PRIMARY OSTEOARTHRITIS INVOLVING MULTIPLE JOINTS: ICD-10-CM

## 2024-07-23 LAB
ALBUMIN SERPL-MCNC: 4.1 G/DL (ref 3.2–4.8)
ALBUMIN/GLOB SERPL: 1.4 {RATIO} (ref 1–2)
ALP LIVER SERPL-CCNC: 86 U/L
ALT SERPL-CCNC: 19 U/L
ANION GAP SERPL CALC-SCNC: 6 MMOL/L (ref 0–18)
AST SERPL-CCNC: 23 U/L (ref ?–34)
BASOPHILS # BLD AUTO: 0.06 X10(3) UL (ref 0–0.2)
BASOPHILS NFR BLD AUTO: 1.2 %
BILIRUB SERPL-MCNC: 0.8 MG/DL (ref 0.2–1.1)
BUN BLD-MCNC: 22 MG/DL (ref 9–23)
BUN/CREAT SERPL: 19.6 (ref 10–20)
CALCIUM BLD-MCNC: 9.8 MG/DL (ref 8.7–10.4)
CHLORIDE SERPL-SCNC: 108 MMOL/L (ref 98–112)
CHOLEST SERPL-MCNC: 147 MG/DL (ref ?–200)
CO2 SERPL-SCNC: 28 MMOL/L (ref 21–32)
CREAT BLD-MCNC: 1.12 MG/DL
DEPRECATED RDW RBC AUTO: 58.4 FL (ref 35.1–46.3)
EGFRCR SERPLBLD CKD-EPI 2021: 48 ML/MIN/1.73M2 (ref 60–?)
EOSINOPHIL # BLD AUTO: 0.14 X10(3) UL (ref 0–0.7)
EOSINOPHIL NFR BLD AUTO: 2.7 %
ERYTHROCYTE [DISTWIDTH] IN BLOOD BY AUTOMATED COUNT: 15.6 % (ref 11–15)
FASTING PATIENT LIPID ANSWER: NO
FASTING STATUS PATIENT QL REPORTED: NO
GLOBULIN PLAS-MCNC: 2.9 G/DL (ref 2–3.5)
GLUCOSE BLD-MCNC: 90 MG/DL (ref 70–99)
HCT VFR BLD AUTO: 35.5 %
HDLC SERPL-MCNC: 73 MG/DL (ref 40–59)
HGB BLD-MCNC: 11.7 G/DL
IMM GRANULOCYTES # BLD AUTO: 0.01 X10(3) UL (ref 0–1)
IMM GRANULOCYTES NFR BLD: 0.2 %
LDLC SERPL CALC-MCNC: 58 MG/DL (ref ?–100)
LYMPHOCYTES # BLD AUTO: 1.86 X10(3) UL (ref 1–4)
LYMPHOCYTES NFR BLD AUTO: 35.9 %
MCH RBC QN AUTO: 34.1 PG (ref 26–34)
MCHC RBC AUTO-ENTMCNC: 33 G/DL (ref 31–37)
MCV RBC AUTO: 103.5 FL
MONOCYTES # BLD AUTO: 0.67 X10(3) UL (ref 0.1–1)
MONOCYTES NFR BLD AUTO: 12.9 %
NEUTROPHILS # BLD AUTO: 2.44 X10 (3) UL (ref 1.5–7.7)
NEUTROPHILS # BLD AUTO: 2.44 X10(3) UL (ref 1.5–7.7)
NEUTROPHILS NFR BLD AUTO: 47.1 %
NONHDLC SERPL-MCNC: 74 MG/DL (ref ?–130)
OSMOLALITY SERPL CALC.SUM OF ELEC: 297 MOSM/KG (ref 275–295)
PHOSPHATE SERPL-MCNC: 3.8 MG/DL (ref 2.4–5.1)
PLATELET # BLD AUTO: 167 10(3)UL (ref 150–450)
POTASSIUM SERPL-SCNC: 4.2 MMOL/L (ref 3.5–5.1)
PROT SERPL-MCNC: 7 G/DL (ref 5.7–8.2)
RBC # BLD AUTO: 3.43 X10(6)UL
SODIUM SERPL-SCNC: 142 MMOL/L (ref 136–145)
TRIGL SERPL-MCNC: 82 MG/DL (ref 30–149)
TSI SER-ACNC: 1.09 MIU/ML (ref 0.55–4.78)
VLDLC SERPL CALC-MCNC: 12 MG/DL (ref 0–30)
WBC # BLD AUTO: 5.2 X10(3) UL (ref 4–11)

## 2024-07-23 PROCEDURE — 84443 ASSAY THYROID STIM HORMONE: CPT

## 2024-07-23 PROCEDURE — 36415 COLL VENOUS BLD VENIPUNCTURE: CPT

## 2024-07-23 PROCEDURE — 3078F DIAST BP <80 MM HG: CPT | Performed by: INTERNAL MEDICINE

## 2024-07-23 PROCEDURE — 3074F SYST BP LT 130 MM HG: CPT | Performed by: INTERNAL MEDICINE

## 2024-07-23 PROCEDURE — 96160 PT-FOCUSED HLTH RISK ASSMT: CPT | Performed by: INTERNAL MEDICINE

## 2024-07-23 PROCEDURE — 80053 COMPREHEN METABOLIC PANEL: CPT

## 2024-07-23 PROCEDURE — G0439 PPPS, SUBSEQ VISIT: HCPCS | Performed by: INTERNAL MEDICINE

## 2024-07-23 PROCEDURE — 3008F BODY MASS INDEX DOCD: CPT | Performed by: INTERNAL MEDICINE

## 2024-07-23 PROCEDURE — 85025 COMPLETE CBC W/AUTO DIFF WBC: CPT

## 2024-07-23 PROCEDURE — 84100 ASSAY OF PHOSPHORUS: CPT

## 2024-07-23 PROCEDURE — 80061 LIPID PANEL: CPT

## 2024-07-23 PROCEDURE — 99213 OFFICE O/P EST LOW 20 MIN: CPT | Performed by: INTERNAL MEDICINE

## 2024-07-23 NOTE — PROGRESS NOTES
Subjective:   Cris Peterson is a 83 year old female who presents for a MA AHA (Medicare Advantage Annual Health Assessment) and Medicare Subsequent Annual Wellness visit (Pt already had Initial Annual Wellness) and acute uncomplicated problem    History/Other:   Fall Risk Assessment:   She has been screened for Falls and is High Risk. Fall Prevention information provided to patient in After Visit Summary.    Do you feel unsteady when standing or walking?: Yes  Do you worry about falling?: Yes  Have you fallen in the past year?: Yes  How many times have you fallen?: 1     Cognitive Assessment:   She had a completely normal cognitive assessment - see flowsheet entries     Functional Ability/Status:   Cris Peterson has some abnormal functions as listed below:  She has Dressing and/or Bathing issues based on screening of functional status.  Difficulty dressing or bathing?: No  Bathing or Showering: Cannot do without help  Dressing: Cannot do without help  She has Toileting difficulties based on screening of functional status.She has Eating difficulties based on screening of functional status.She has Driving difficulties based on screening of functional status. She has Meal Preparation difficulties based on screening of functional status.She has difficulties Managing Money/Bills based on screening of functional status.She has difficulties Shopping for Groceries based on screening of functional status. She has difficulties Taking Meds as Rx'd based on screening of functional status. She has Hearing problems based on screening of functional status.She has Vision problems based on screening of functional status. She has Walking problems based on screening of functional status. She has problems with Memory based on screening of functional status.   Residential referral placed and the patient was called and going through the questions, patient reports that she was told that her insurance would not cover home care based on her  answers.    Depression Screening (PHQ):  PHQ-2 SCORE: 0  , done 7/23/2024   If you checked off any problems, how difficult have these problems made it for you to do your work, take care of things at home, or get along with other people?: Not difficult at all              Advanced Directives:   She does have a Living Will but we do NOT have it on file in Epic.    She has a Power of  for Health Care on file in [facility].  Discussed Advance Care Planning with patient (and family/surrogate if present). Standard forms made available to patient in After Visit Summary.      Patient Active Problem List   Diagnosis    Osteoarthritis    Hypothyroidism    Obstructive sleep apnea on CPAP    IBS (irritable bowel syndrome)    Rheumatoid arthritis with negative rheumatoid factor (HCC)    Obesity, morbid (HCC)    Immunosuppressed status (HCC)    Essential hypertension    Atherosclerosis of aorta (HCC)    Stage 3b chronic kidney disease ([facility])    Gait abnormality    Anemia    Mixed hyperlipidemia    Gastroesophageal reflux disease    NICOLE (dyspnea on exertion)    Atherosclerosis of native coronary artery of native heart without angina pectoris    Chronic obstructive pulmonary disease (HCC)    Pulmonary hypertension (HCC)    Chronic tophaceous gout of right foot    Urinary incontinence    Atrial fibrillation (HCC)    Vitamin D deficiency    Left arm pain    Abnormal finding on thallium stress test    Acquired stenosis of left nasolacrimal duct    Bradycardia    Cor pulmonale, chronic (HCC)    Dacrocystitis, left    Deviated nasal septum    Dyslipidemia    Epiphora due to insufficient drainage of left side    Eyelid abscess, left    Nonrheumatic mitral valve regurgitation    Word finding problem    Spinal stenosis, cervical region    Leg swelling    PVC's (premature ventricular contractions)    Abnormal result of other cardiovascular function study    Acute renal failure superimposed on chronic kidney disease (HCC)    Snoring      Allergies:  She is allergic to erythromycin base, pcn [bicillin l-a], and lactose.    Current Medications:  Outpatient Medications Marked as Taking for the 7/23/24 encounter (Office Visit) with Carly Bolivar MD   Medication Sig    Trospium Chloride ER 60 MG Oral Capsule SR 24 Hr Take 1 capsule (60 mg total) by mouth daily.    estradiol (ESTRACE) 0.1 MG/GM Vaginal Cream Apply 1/2 gram vaginally 2-3 times per week.    furosemide 40 MG Oral Tab Take 1 tablet (40 mg total) by mouth 2 (two) times daily.    lisinopril 10 MG Oral Tab Take 1 tablet (10 mg total) by mouth in the morning and 1 tablet (10 mg total) before bedtime.    folic acid 1 MG Oral Tab Take 1 tablet (1 mg total) by mouth daily.    Spacer/Aero-Holding Chambers Does not apply Device Use with albuterol inhaler    pantoprazole 40 MG Oral Tab EC Take 1 tablet (40 mg total) by mouth before breakfast.    hydroxychloroquine 200 MG Oral Tab Take 2 tablets (400 mg total) by mouth daily.    allopurinol 300 MG Oral Tab Take one daily.    Etanercept (ENBREL SURECLICK) 50 MG/ML Subcutaneous Solution Auto-injector Inject 50 mg subcutaneously every week.    methotrexate 2.5 MG Oral Tab TAKE 4 TABLETS ONCE A WEEK    levothyroxine 125 MCG Oral Tab Take 1 tablet (125 mcg total) by mouth before breakfast.    atorvastatin 40 MG Oral Tab Take 1 tablet (40 mg total) by mouth daily.    Cholecalciferol (VITAMIN D) 50 MCG (2000 UT) Oral Tab Take by mouth.    ELIQUIS 5 MG Oral Tab Take 1 tablet (5 mg total) by mouth 2 (two) times daily.    Calcium 500-125 MG-UNIT Oral Tab Take 1 tablet by mouth daily.    Loperamide HCl 2 MG Oral Cap Take 1 capsule (2 mg total) by mouth 4 (four) times daily as needed for Diarrhea.    Probiotic Product (PROBIOTIC-10) Oral Cap Take 1 tablet by mouth daily.    acetaminophen (TYLENOL EXTRA STRENGTH) 500 MG Oral Tab Take 1 tablet (500 mg total) by mouth every 6 (six) hours as needed.    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  Surgical History:  She  has a past surgical history that includes tear duct system surg unlisted (1968); hysterectomy; cataract; cholecystectomy; hip replacement surgery (Right); knee replacement surgery; hand/finger surgery unlisted (Right, 2011); angiogram (2013); nichole localization wire 1 site right (cpt=19281) (1994); tonsillectomy; and other surgical history.   Family History:  Her family history includes Breast Cancer in her paternal aunt and paternal cousin female; Breast Cancer (age of onset: 64) in her mother; Cancer in her father, mother, self, and son; Colon Cancer in her son; Diabetes in her father; Ovarian Cancer (age of onset: 83) in her mother.  Social History:  She  reports that she has never smoked. She has never used smokeless tobacco. She reports current alcohol use of about 1.7 standard drinks of alcohol per week. She reports that she does not use drugs.    Tobacco:       CAGE Alcohol Screen:   CAGE screening score of 0 on 7/23/2024, showing low risk of alcohol abuse.      Patient Care Team:  Carly Bolivar MD as PCP - General (Internal Medicine)  Wilian Cuevas PT as Physical Therapist (Physical Therapy)    Review of Systems  Negative except listed in HPI.    Objective:   Physical  Exam  Constitutional:       Appearance: She is obese.   HENT:      Head: Normocephalic and atraumatic.      Right Ear: Tympanic membrane normal.      Left Ear: Tympanic membrane normal.      Nose: Nose normal.      Mouth/Throat:      Mouth: Mucous membranes are moist.      Pharynx: Oropharynx is clear.   Cardiovascular:      Rate and Rhythm: Normal rate. Rhythm irregular.   Pulmonary:      Effort: Pulmonary effort is normal.      Breath sounds: Normal breath sounds.   Abdominal:      General: Abdomen is flat.      Palpations: Abdomen is soft.   Musculoskeletal:         General: Normal range of motion.      Cervical back: Normal range of motion and neck supple.   Skin:     General: Skin is warm.   Neurological:      General: No focal deficit present.      Mental Status: She is alert and oriented to person, place, and time.           /70   Pulse 62   Ht 5' 4\" (1.626 m)   Wt 236 lb 9.6 oz (107.3 kg)   SpO2 95%   BMI 40.61 kg/m²  Estimated body mass index is 40.61 kg/m² as calculated from the following:    Height as of this encounter: 5' 4\" (1.626 m).    Weight as of this encounter: 236 lb 9.6 oz (107.3 kg).    Medicare Hearing Assessment:   Hearing Screening    Screening Method: Questionnaire  I have a problem hearing over the telephone: Sometimes I have trouble following the conversations when two or more people are talking at the same time: Sometimes   I have trouble understanding things on the TV: Sometimes I have to strain to understand conversations: Sometimes   I have to worry about missing the telephone ring or doorbell: Sometimes I have trouble hearing conversations in a noisy background such as a crowded room or restaurant: Sometimes   I get confused about where sounds come from: Sometimes I misunderstand some words in a sentence and need to ask people to repeat themselves: Sometimes   I especially have trouble understanding the speech of women and children: Sometimes I have trouble understanding  the speaker in a large room such as at a meeting or place of Quaker: Sometimes   Many people I talk to seem to mumble (or don't speak clearly): Sometimes People get annoyed because I misunderstand what they say: No   I misunderstand what others are saying and make inappropriate responses: No I avoid social activities because I cannot hear well and fear I will reply improperly: No   Family members and friends have told me they think I may have hearing loss: No             Visual Acuity:   Right Eye Visual Acuity: Uncorrected Right Eye Chart Acuity: 20/20   Left Eye Visual Acuity: Uncorrected Left Eye Chart Acuity: 20/20   Both Eyes Visual Acuity: Uncorrected Both Eyes Chart Acuity: 20/20   Able To Tolerate Visual Acuity: Yes        Assessment & Plan:   Cris Peterson is a 83 year old female who presents for a Medicare Assessment.     1. Annual medicare health assessment  2. Acquired hypothyroidism  -TSH 0.349, Free T4 1.8 currently on levothyroxine 137 mcg daily been on this dose for a few years.   Plan:  -decrease dose LT4 to 125 mcg  -repeat TSH 2.3 will continue same dose    2. Stage 3b chronic kidney disease (HCC)  3. Paroxysmal atrial fibrillation (HCC)  4. Anemia, unspecified type  5. Localized swelling of both lower legs  -increased swelling of both legs  -little NICOLE, baseline per patient. but also BMI 40  -lungs are clear  -tried lasix BID x week with 7 lbs weight loss. Last month  -been on lasix 40 mg daily  -recent RHC with cardiology 6/2024 with mild increase in filling pressure  Plan:  -follow up with cardiology    6. Other fatigue  7. Obesity, morbid (HCC)  8. Spinal stenosis, cervical region  9. Urinary incontinence, unspecified type  10. Rheumatoid arthritis of multiple sites with negative rheumatoid factor (HCC)  11. Gait abnormality  - patient needs some assistance at home  -risk of fall  Plan:  - physical therapy    12. Preop examination  Pt presents for preoperative clearance.  Planned surgery -  Left Probing eye  [place] [facility] [place] CASTILLO  Date of surgery - 08/06/2024  Cardiac history - Afib on ELiquis  Myocardial infarction in past 6 months - No  Bleeding disorders - No  Taking blood thinners - Yes  Chest pain or shortness of breath with ADLs - No  Number of alcoholic beverages consumed weekly - 0  Tobacco consumption - No  Plan  - pending blood work and cardiology clearance for stopping eliquis, patient is considered moderate risk for low risk procedure.    Carly Bolivar MD      The patient indicates understanding of these issues and agrees to the plan.  Reinforced healthy diet, lifestyle, and exercise.      Return in about 3 months (around 10/23/2024).     Carly Bolivar MD, 7/23/2024     Supplementary Documentation:   General Health:  In the past six months, have you lost more than 10 pounds without trying?: 2 - No  Has your appetite been poor?: No  Type of Diet: Other  How does the patient maintain a good energy level?: Other  How would you describe your daily physical activity?:  (poor)  How would you describe your current health state?: Poor  How do you maintain positive mental well-being?: Social Interaction;Puzzles;Visiting Friends;Visiting Family  On a scale of 0 to 10, with 0 being no pain and 10 being severe pain, what is your pain level?: 2 - (Mild)  In the past six months, have you experienced urine leakage?: 1-Yes  At any time do you feel concerned for the safety/well-being of yourself and/or your children, in your home or elsewhere?: Yes  Have you had any immunizations at another office such as Influenza, Hepatitis B, Tetanus, or Pneumococcal?: No    Health Maintenance   Topic Date Due    TB Screen  Never done    MA Annual Health Assessment  01/01/2024    COVID-19 Vaccine (5 - 2023-24 season) 12/12/2024 (Originally 9/1/2023)    Influenza Vaccine (1) 10/01/2024    Mammogram  02/16/2025    Colorectal Cancer Screening  12/05/2026    DEXA Scan  Completed    Annual Depression  Screening  Completed    Fall Risk Screening (Annual)  Completed    Pneumococcal Vaccine: 65+ Years  Completed    Zoster Vaccines  Completed

## 2024-07-31 ENCOUNTER — OFFICE VISIT (OUTPATIENT)
Dept: OTOLARYNGOLOGY | Facility: CLINIC | Age: 84
End: 2024-07-31

## 2024-07-31 DIAGNOSIS — H61.23 BILATERAL IMPACTED CERUMEN: Primary | ICD-10-CM

## 2024-07-31 PROCEDURE — 69210 REMOVE IMPACTED EAR WAX UNI: CPT | Performed by: OTOLARYNGOLOGY

## 2024-07-31 NOTE — PROGRESS NOTES
RETURNING PATIENT PROGRESS NOTE  OTOLOGY/OTOLARYNGOLOGY    REF MD:  No ref. provider found    PCP: Carly Bolivar MD    CHIEF COMPLAINT:  cerumen impaction     LAST VISIT 3/29/2024  IMPRESSION:  Bilateral cerumen impaction - removed   Right acute otitis externa    PLAN:  -Recommend Ciprodex otic drops; place 4 drops into right ear BID for 5 days  -Dry ear precautions until drops are complete  -Avoid q-tips/instrumentation of the ears  -Follow-up in 3-6 months for routine cerumen removal   __________________________________________________________________________  Interval history: Patient is here for an ear cleaning.     HISTORY OF PRESENT ILLNESS: Cris Peterson is a 83 year old female who presents for evaluation of cerumen impaction. Denies use of hearing aids. Endorses that she typically comes in for ear cleanings every 3-6 months. Endorses that the left ear felt worse than the right today.    Patient has been seen by Dr. Sandoval, Dr. Whitmore, and Dr. Torres for routine ear cleanings. She was last seen on 7/7/23 by Dr. Whitmore.      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)    Unspecified essential hypertension    Unspecified sleep apnea       PAST SURGICAL HISTORY:    Past Surgical History:   Procedure Laterality Date    Angiogram  2013    Cataract      Cholecystectomy      Hand/finger surgery  unlisted Right 2011    thumb surgery    Hip replacement surgery Right     Hysterectomy      SERENITY--fibroid    Knee replacement surgery      total - right and left    Jw localization wire 1 site right (cpt=19281)  1994    Other surgical history      pins right foot    Tear duct system surg unlisted  1968    Tonsillectomy         Current Outpatient Medications on File Prior to Visit   Medication Sig Dispense Refill    Trospium Chloride ER 60 MG Oral Capsule SR 24 Hr Take 1 capsule (60 mg total) by mouth daily. 90 capsule 3    estradiol (ESTRACE) 0.1 MG/GM Vaginal Cream Apply 1/2 gram vaginally 2-3 times per week. 42.5 g 3    furosemide 40 MG Oral Tab Take 1 tablet (40 mg total) by mouth 2 (two) times daily. 60 tablet 5    lisinopril 10 MG Oral Tab Take 1 tablet (10 mg total) by mouth in the morning and 1 tablet (10 mg total) before bedtime.      folic acid 1 MG Oral Tab Take 1 tablet (1 mg total) by mouth daily. 90 tablet 3    Spacer/Aero-Holding Chambers Does not apply Device Use with albuterol inhaler 1 each 0    pantoprazole 40 MG Oral Tab EC Take 1 tablet (40 mg total) by mouth before breakfast. 90 tablet 3    hydroxychloroquine 200 MG Oral Tab Take 2 tablets (400 mg total) by mouth daily. 180 tablet 3    allopurinol 300 MG Oral Tab Take one daily. 90 tablet 1    Etanercept (ENBREL SURECLICK) 50 MG/ML Subcutaneous Solution Auto-injector Inject 50 mg subcutaneously every week. 12 mL 1    methotrexate 2.5 MG Oral Tab TAKE 4 TABLETS ONCE A WEEK 52 tablet 1    levothyroxine 125 MCG Oral Tab Take 1 tablet (125 mcg total) by mouth before breakfast. 90 tablet 1    atorvastatin 40 MG Oral Tab Take 1 tablet (40 mg total) by mouth daily.      Cholecalciferol (VITAMIN D) 50 MCG (2000 UT) Oral Tab Take by mouth.      ELIQUIS 5 MG Oral Tab Take 1 tablet (5 mg total) by mouth 2 (two) times daily.      Calcium 500-125 MG-UNIT Oral Tab Take 1 tablet by mouth daily.      Loperamide HCl 2 MG Oral Cap Take 1 capsule (2 mg total) by  mouth 4 (four) times daily as needed for Diarrhea.      Probiotic Product (PROBIOTIC-10) Oral Cap Take 1 tablet by mouth daily.      acetaminophen (TYLENOL EXTRA STRENGTH) 500 MG Oral Tab Take 1 tablet (500 mg total) by mouth every 6 (six) hours as needed.      CENTRUM SILVER OR TABS 1 TABLET DAILY       No current facility-administered medications on file prior to visit.       Allergies:   Allergies   Allergen Reactions    Erythromycin Base     Pcn [Bicillin L-A]     Lactose DIARRHEA       SOCIAL HISTORY:    Social History     Tobacco Use    Smoking status: Never    Smokeless tobacco: Never   Substance Use Topics    Alcohol use: Yes     Alcohol/week: 1.7 standard drinks of alcohol     Types: 2 Glasses of wine per week     Comment: wine - 1 glass weekly       FAMILY HISTORY: Denies known family history of hearing loss, tinnitus, vertigo, or migraine.  Denies known family history of head and neck cancer, thyroid cancer, bleeding disorders.     REVIEW OF SYSTEMS:   Positives are in bold  ENT: Hearing change, tinnitus, otorrhea, otalgia, aural fullness, ear pressure, vertigo, imbalance  Sinus pressure, rhinorrhea, congestion, facial pain, jaw pain, dysphagia, odynophagia, sore throat, voice changes, shortness of breath    EXAMINATION:  I washed my hands with an alcohol-based hand gel prior to examination  Constitutional:   --Vitals: not currently breastfeeding.  --General: no apparent distress, well-developed, conversant  Psych: affect pleasant and appropriate for age, alert and oriented  Neuro: Facial movement normal bilateral, HB 1/6  Respiratory: No stridor, stertor or increased work of breathing  ENT:  --Ear: (Bilateral ears were examined under binocular microscopy)  Right ear microscopic exam:  Pinna: Normal, no lesions or masses.  Mastoid: Nontender on palpation.   External auditory canal:Cerumen impaction - removed, no masses or lesions.  Tympanic membrane: Intact, no lesions, normal landmarks.  Middle ear:  Aerated.    Left ear microscopic exam:  Pinna: Normal, no lesions or masses.  Mastoid: Nontender on palpation.   External auditory canal: Cerumen impaction - removed, no masses or lesions.  Tympanic membrane: Intact, no lesions, normal landmarks.  Middle ear: Aerated.    Cerumen removal: (7/31/24)  Under binocular microscopy cerumen was removed from the bilateral external auditory canal using a wax loop curette and suction. Patient noted subjective improvement in hearing.      ASSESSMENT/PLAN:  Cris Peterson is a 83 year old female with   No diagnosis found.      IMPRESSION:  Bilateral cerumen impaction - removed     PLAN:  -Avoid q-tips/instrumentation of the ears  -Follow-up in 3-6 months for routine cerumen removal     Situation reviewed with the patient in detail.    Attention: This note has been scribed by Kelly Agrawal under the supervision of Riaz Cruz MD.     Riaz Cruz MD  Otology/Otolaryngology  [address] [place] [id number]  Phone [phone]  Fax [phone]      I have personally performed the services described in this documentation. All medical record entries made by the scribe were at my direction and in my presence. I have reviewed the chart and agree that the medical record reflects my personal performance and is accurate and complete.

## 2024-08-06 NOTE — PROCEDURE: LIQUID NITROGEN
juliocesar-aid she stated and same amt of juice. He wakes up about 1300 to be up for the day she stated. I told Mrs Romero to give more fluids and re check BP at 1500 and I will check back with her Pt is on Masimo, now VS; 88-%.     1500: BP 87/54 HR 85; RESP 22; 100%. Told to hold metoprolol night dose for now and check BP at 1700. Call to report. Spouse stated pt was given MOM and had a BM, but now has a couple of loose stools. Spouse stated now pt feels SOB, Masimo monitor resp rate is 44, Dr Joe notified.  1600: /78 HR;106 RESP 28; 100% on 3.5 lpm O2. Lungs clear bowel sounds good. Says he feels SOB. Spouse Leticia stated earlier she will give a breathing treatment.   Pt's spouse instructed for pt to take per Dr Joe, take regular dose of Metoprolol 50 mg if BP is OK, if low take 25 mg. Ashley Kelsey did communicate this to pt's spouse Leticia on home visit.  [employer] [phone] called to take out more oxygen tanks to the pt. Wife stated pt was low, and due to the storms, they had a power outage at their home and were unable to call company. Nurse called and they said they will send tanks to the home this evening. Pt's phone not working now. Address verified with company rep.    Topics for Daily Review: BP, fluid intake, breathing, any symptoms noted today.  Pt's spouse Leticia demonstrates clear understanding: Yes    Daily VS:  1300: BP 87/55   Pulse 90   Resp 22   SpO2 100%     Last 3 Weights:  Wt Readings from Last 7 Encounters:   07/26/24 55.7 kg (122 lb 14.4 oz)   07/19/24 62 kg (136 lb 11 oz)   07/17/24 62 kg (136 lb 11 oz)   07/13/24 62 kg (136 lb 11 oz)   06/15/24 56.3 kg (124 lb 1.9 oz)   05/22/24 59 kg (130 lb)   05/18/24 60 kg (132 lb 4.4 oz) 
      Masimo Device: Yes   Masimo Yuko Yanes MD [id number]   Showing recent visits within past 30 days and meeting all other requirements  Future Appointments  Date Type Provider Dept   08/26/24 Appointment [facility]   Showing future appointments within next 90 days and meeting all other requirements       Frequency of RN Calls & Virtual Visits per Team Agreement: Healthy at Home Frequency: Daily    Medication issues Addressed (what was done):     Follow up appointments scheduled by [facility] Staff: [facility] 8/8 @ 1600          
Post-Care Instructions: I reviewed with the patient in detail post-care instructions. Patient is to wear sunprotection, and avoid picking at any of the treated lesions. Pt may apply Vaseline to crusted or scabbing areas.
Render In Bullet Format When Appropriate: No
Duration Of Freeze Thaw-Cycle (Seconds): 0
Total Number Of Aks Treated: 1
Detail Level: Zone
Consent: The patient's consent was obtained including but not limited to risks of crusting, scabbing, blistering, scarring, darker or lighter pigmentary change, recurrence, incomplete removal and infection.

## 2024-08-13 ENCOUNTER — OFFICE VISIT (OUTPATIENT)
Dept: NEPHROLOGY | Facility: CLINIC | Age: 84
End: 2024-08-13

## 2024-08-13 VITALS
HEART RATE: 59 BPM | DIASTOLIC BLOOD PRESSURE: 72 MMHG | WEIGHT: 237 LBS | SYSTOLIC BLOOD PRESSURE: 131 MMHG | BODY MASS INDEX: 41 KG/M2

## 2024-08-13 DIAGNOSIS — N18.32 STAGE 3B CHRONIC KIDNEY DISEASE (HCC): Primary | ICD-10-CM

## 2024-08-13 DIAGNOSIS — D84.9 IMMUNOSUPPRESSED STATUS (HCC): ICD-10-CM

## 2024-08-13 PROCEDURE — 99215 OFFICE O/P EST HI 40 MIN: CPT | Performed by: INTERNAL MEDICINE

## 2024-08-13 NOTE — PROGRESS NOTES
Progress Note:     Patient is a 83 yrs old female with pmh of COPD, HTN, HL, CKD stage III, RA on methotrexate, enbrel and hydroxychloroquine, irritable bowel syndrome, A-fib on AC, hypothyroidism  who presented for follow up     Lab test showed BUN/Cr 28/1.56 mg/dl with an eGFR 31 ml/min. was 1.65 mg/dl in Jan 2022. Creatinine was 1.2 mg/dl in Nov 2021 with an eGFR 41 ml/min.. fluctuating at 1.1-1.3 mg/dl in 2021. UA unremarkable with no protein or RBC    Non smoker, wine couple of time a week. No NSAIDs or herbal supplement.     On amlodipine 5 mg , clonidine 0.1 mg patch, furosemide 40 mg daily and lisinopril 40 mg daily, bystolic 10 mg     S/p coronary angiogram - Mild pulmonary hypertension with normal wedge pressure and normal LVEDP.  No significant angiographic evidence of CAD.      Leg swelling on and off - wears compression socks.    State using walker more often.wears compression socks- has a zipper on it.  Furosemide dose was reduce from 40 mg BID to 40 mg daily dose for dizziness. Appetite is good. Exertional dyspnea.    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)    Unspecified essential hypertension    Unspecified sleep apnea      Past Surgical History:   Procedure Laterality Date    Angiogram  2013    Cataract      Cholecystectomy      Hand/finger surgery unlisted Right 2011    thumb surgery     Hip replacement surgery Right     Hysterectomy      SERENITY--fibroid    Knee replacement surgery      total - right and left    Jw localization wire 1 site right (cpt=19281)  1994    Other surgical history      pins right foot    Tear duct system surg unlisted  1968    Tonsillectomy        Family History   Problem Relation Age of Onset    Breast Cancer Mother 64    Cancer Mother         ovarian (cause of death)    Ovarian Cancer Mother 83    Diabetes Father     Cancer Father         stomach    Cancer Son         leukemia    Colon Cancer Son     Breast Cancer Paternal Aunt         post menopause    Cancer Self         melanoma    Breast Cancer Paternal Cousin Female         age after 50      Social History:   Social History     Socioeconomic History    Marital status:    Tobacco Use    Smoking status: Never    Smokeless tobacco: Never   Substance and Sexual Activity    Alcohol use: Yes     Alcohol/week: 1.7 standard drinks of alcohol     Types: 2 Glasses of wine per week     Comment: wine - 1 glass weekly    Drug use: No    Sexual activity: Never   Other Topics Concern    Caffeine Concern No        Medications (Active prior to today's visit):  Current Outpatient Medications   Medication Sig Dispense Refill    Trospium Chloride ER 60 MG Oral Capsule SR 24 Hr Take 1 capsule (60 mg total) by mouth daily. 90 capsule 3    estradiol (ESTRACE) 0.1 MG/GM Vaginal Cream Apply 1/2 gram vaginally 2-3 times per week. 42.5 g 3    furosemide 40 MG Oral Tab Take 1 tablet (40 mg total) by mouth 2 (two) times daily. (Patient taking differently: Take 1 tablet (40 mg total) by mouth daily.) 60 tablet 5    lisinopril 10 MG Oral Tab Take 1 tablet (10 mg total) by mouth in the morning and 1 tablet (10 mg total) before bedtime.      folic acid 1 MG Oral Tab Take 1 tablet (1 mg total) by mouth daily. 90 tablet 3    pantoprazole 40 MG Oral Tab EC Take 1 tablet (40 mg total) by mouth before breakfast. 90 tablet 3    hydroxychloroquine 200  MG Oral Tab Take 2 tablets (400 mg total) by mouth daily. 180 tablet 3    allopurinol 300 MG Oral Tab Take one daily. 90 tablet 1    Etanercept (ENBREL SURECLICK) 50 MG/ML Subcutaneous Solution Auto-injector Inject 50 mg subcutaneously every week. 12 mL 1    methotrexate 2.5 MG Oral Tab TAKE 4 TABLETS ONCE A WEEK 52 tablet 1    atorvastatin 40 MG Oral Tab Take 1 tablet (40 mg total) by mouth daily.      Cholecalciferol (VITAMIN D) 50 MCG (2000 UT) Oral Tab Take by mouth.      ELIQUIS 5 MG Oral Tab Take 1 tablet (5 mg total) by mouth 2 (two) times daily.      Calcium 500-125 MG-UNIT Oral Tab Take 1 tablet by mouth daily.      Loperamide HCl 2 MG Oral Cap Take 1 capsule (2 mg total) by mouth 4 (four) times daily as needed for Diarrhea.      Probiotic Product (PROBIOTIC-10) Oral Cap Take 1 tablet by mouth daily.      acetaminophen (TYLENOL EXTRA STRENGTH) 500 MG Oral Tab Take 1 tablet (500 mg total) by mouth every 6 (six) hours as needed.      CENTRUM SILVER OR TABS 1 TABLET DAILY      Spacer/Aero-Holding Chambers Does not apply Device Use with albuterol inhaler 1 each 0       Allergies:  Allergies   Allergen Reactions    Erythromycin Base     Pcn [Bicillin L-A]     Lactose DIARRHEA         ROS:     Constitutional:  Negative for decreased activity, fever, irritability and lethargy  ENMT:  Negative for ear drainage, hearing loss and nasal drainage  Eyes:  Negative for eye discharge and vision loss  Cardiovascular:  Negative for chest pain, sobs  Respiratory:  Negative for cough, dyspnea and wheezing  Gastrointestinal:  Negative for abdominal pain, constipation  Genitourinary:  Negative for dysuria and hematuria  Endocrine:  Negative for abnormal sleep patterns, increased activity  Hema/Lymph:  Negative for easy bleeding and easy bruising  Integumentary:  Negative for pruritus and rash  Musculoskeletal:  Negative for bone/joint symptoms  Neurological:  Negative for gait disturbance  Psychiatric:  Negative for  inappropriate interaction and psychiatric symptoms      Vitals:    08/13/24 1041   BP: 131/72   Pulse: 59     Wt Readings from Last 6 Encounters:   08/13/24 237 lb (107.5 kg)   07/23/24 236 lb 9.6 oz (107.3 kg)   07/18/24 234 lb (106.1 kg)   05/28/24 234 lb (106.1 kg)   06/03/24 239 lb 14.4 oz (108.8 kg)   04/22/24 236 lb (107 kg)       PHYSICAL EXAM:     Constitutional: appears well hydrated alert and responsive no acute distress noted  Head/Face: normocephalic  Eyes/Vision: normal extraocular motion is intact  Nose/Mouth/Throat:mucous membranes are moist   Neck/Thyroid: neck is supple   Skin/Hair: no unusual rashes present  Back/Spine: no abnormalities noted  Musculoskeletal:  no deformities  Extremities: BLE swelling / edema   Neurological:  Grossly normal    ASSESSMENT/PLAN:     1. CKD stage III: non proteinuric   - BUN/Cr 26/1.3 mg/dl with an eGFR 40 ml/min. - stable  - Cr fluctuating at 1.1-1.3 mg/dl in 2021.   - fluid intake 60-65 ounces a day   - UA unremarkable with no protein or RBC . UACR <30 and UPCR unremarkable  - CKD presumably secondary to Hypertensive disease   - renal US - right kidney 10.3 cm and left kidney 10.7 cm with normal echotexture.  1.8 cm exophytic hypoechoic lesion lower pole left kidney which appears to demonstrate some peripheral internal vascularity and is suspicious for an indeterminate solid renal lesion which would include the possibility renal neoplasm.   - s/p CT abdomen which showed bosniak 2 benign complicated cyst stable at 1.5 cm on left side  - work up per dermatology for rash - ITZEL +ve with 160 titer and negative antibodies. Atypical ANCA 1:1280 with PR3 and MPO negative. SPEP negative for monoclonal gammopathy and urine bence amador negative. HCV and Hepatitis B non reactive. C3, C4 wnl and cryglobulin stable.- on methotrexate and hydroxychloroquine   - CRP and ESR wnl  - repeat ANCA level unchanged   -  - on daily vitamin D3 at 2000 units - vitamin D elvel 34    2.  BLE swelling :  - on daily furosemide 40 mg and wears on and off compression stocking - 4/7 days. on potassium 10 meq daily   - S/p coronary angiogram - Mild pulmonary hypertension with normal wedge pressure and normal LVEDP.  No significant angiographic evidence of CAD.  - off of amlodipine and on lisinopril 10 mg BID   - low salt diet counseled     3. P-Afib/ bradycardia:   - was seen by Dr. Borja and off of bystolic   - repeat Echo few days back - not available in epic to review   - [facility] showed no significant coronary disease and EF 59%    4. RA:  on methotrexate, enbrel and hydroxychloroquine  Follows with Dr. Cornell    Follow up in 6 months     Urine tests prior to next visit        Orders This Visit:  Orders Placed This Encounter   Procedures    Urinalysis with Culture Reflex    Microalb/Creat Ratio, Random Urine       Meds This Visit:  Requested Prescriptions      No prescriptions requested or ordered in this encounter       Imaging & Referrals:  None     8/13/2024  Mikal Richardson MD

## 2024-08-26 DIAGNOSIS — M06.09 RHEUMATOID ARTHRITIS OF MULTIPLE SITES WITH NEGATIVE RHEUMATOID FACTOR (HCC): ICD-10-CM

## 2024-08-27 RX ORDER — MEDROXYPROGESTERONE ACETATE 150 MG/ML
INJECTION, SUSPENSION INTRAMUSCULAR
Qty: 12 ML | Refills: 1 | Status: SHIPPED | OUTPATIENT
Start: 2024-08-27

## 2024-08-27 NOTE — TELEPHONE ENCOUNTER
LOV: 6/3/24  Future Appointments   Date Time Provider Department Center   10/4/2024 10:00 AM Ivania Cornell MD E[facility]   10/23/2024  9:20 AM [facility]   2/12/2025  9:40 AM Mikal Parks MD [id number]   7/17/2025  9:00 AM Malia Bradley PA UROG [facility]       ASSESSMENT/PLAN:      Seronegative RA- stable   - She is a former patient of Dr. Rose, diagnosed more than 20 years ago  - On Enbrel weekly, methotrexate 4 pills weekly and hydroxychloroquine 400 mg daily  - She states that she sees the eye doctor every year 4/2024, no evidence of Plaquenil toxicity  - Plan to monitor blood work every 3 to 4 months     B/L LE swelling  - following with cardiology  - on lasix now  - plan for cardiac cath on Thursday     Basal cell carcinoma, right shoulder  - s/p removal       Primary osteoarthritis, chronic neck and lower back pain  - X-ray of the neck has shown advanced multilevel degenerative changes.  - She cannot take NSAIDs.  Recommended PT but she deferred that for now  - Advise she can take Tylenol arthritis once or twice a day for her pain     CKD stage 3  - Found to have a positive atypical p-ANCA 1: 1280, negative AR-3 and MPO.  UA shows no protein or blood.  Creatinine has been fluctuating between 1.1 and 1.3  - Following with nephrology. 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  Remains on allopurinol 300 mg daily.  No recent gout flare     Pt will f/u in 3-4 mos      There is a longitudinal care relationship with me, the care plan reflects the ongoing nature of the continuous relationship of care, and the medical record indicates that there is ongoing treatment of a serious/complex medical condition which I am currently managing.  is Applicable.      Ivania Cornell MD  6/3/2024   8:45 AM

## 2024-08-30 RX ORDER — METHOTREXATE 2.5 MG/1
TABLET ORAL
Qty: 52 TABLET | Refills: 1 | Status: SHIPPED | OUTPATIENT
Start: 2024-08-30

## 2024-08-30 NOTE — TELEPHONE ENCOUNTER
LOV: 6/3/24  Future Appointments   Date Time Provider Department Center   10/4/2024 10:00 AM Ivania Cornell MD E[facility]   10/23/2024  9:20 AM [facility]   2/12/2025  9:40 AM Mikal Parks MD [id number]   7/17/2025  9:00 AM Malia Bradley PA UROG [facility]     Labs: AST 23 ALT 19 7/23/24  ASSESSMENT/PLAN:      Seronegative RA- stable   - She is a former patient of Dr. Rose, diagnosed more than 20 years ago  - On Enbrel weekly, methotrexate 4 pills weekly and hydroxychloroquine 400 mg daily  - She states that she sees the eye doctor every year 4/2024, no evidence of Plaquenil toxicity  - Plan to monitor blood work every 3 to 4 months     B/L LE swelling  - following with cardiology  - on lasix now  - plan for cardiac cath on Thursday     Basal cell carcinoma, right shoulder  - s/p removal       Primary osteoarthritis, chronic neck and lower back pain  - X-ray of the neck has shown advanced multilevel degenerative changes.  - She cannot take NSAIDs.  Recommended PT but she deferred that for now  - Advise she can take Tylenol arthritis once or twice a day for her pain     CKD stage 3  - Found to have a positive atypical p-ANCA 1: 1280, negative AK-3 and MPO.  UA shows no protein or blood.  Creatinine has been fluctuating between 1.1 and 1.3  - Following with nephrology. 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  Remains on allopurinol 300 mg daily.  No recent gout flare     Pt will f/u in 3-4 mos      There is a longitudinal care relationship with me, the care plan reflects the ongoing nature of the continuous relationship of care, and the medical record indicates that there is ongoing treatment of a serious/complex medical condition which I am currently managing.  is Applicable.      Ivania Cornell MD  6/3/2024   8:45 AM

## 2024-09-09 ENCOUNTER — TELEPHONE (OUTPATIENT)
Dept: INTERNAL MEDICINE CLINIC | Facility: CLINIC | Age: 84
End: 2024-09-09

## 2024-09-09 NOTE — TELEPHONE ENCOUNTER
Per patient, she is going to have an eye surgery on 10/29/2024 and need a medical clearance no available appointment, Please advise.

## 2024-09-10 NOTE — TELEPHONE ENCOUNTER
I have so many openings tomorrow. And next week  I will be transitioning into bariatrics/weight management only at 9/29.   So if it has to be within 30 days, she can schedule with another provider.

## 2024-09-10 NOTE — TELEPHONE ENCOUNTER
Called patient,verified name and date of birth.  Reviewed Dr Bolivar's recommendations from her note below.  Pre op appointment scheduled with Dr Leong per patient request:  Future Appointments   Date Time Provider Department Center   10/2/2024 12:00 PM Jfef Leong MD [facility]       Patient will call surgeon's office to  have pre op clearance request be faxed to office.

## 2024-09-18 ENCOUNTER — APPOINTMENT (OUTPATIENT)
Dept: URBAN - METROPOLITAN AREA CLINIC 244 | Age: 84
Setting detail: DERMATOLOGY
End: 2024-09-19

## 2024-09-18 DIAGNOSIS — L29.89 OTHER PRURITUS: ICD-10-CM

## 2024-09-18 DIAGNOSIS — L81.4 OTHER MELANIN HYPERPIGMENTATION: ICD-10-CM

## 2024-09-18 DIAGNOSIS — L82.1 OTHER SEBORRHEIC KERATOSIS: ICD-10-CM

## 2024-09-18 DIAGNOSIS — D22 MELANOCYTIC NEVI: ICD-10-CM

## 2024-09-18 DIAGNOSIS — L57.0 ACTINIC KERATOSIS: ICD-10-CM

## 2024-09-18 DIAGNOSIS — Z85.828 PERSONAL HISTORY OF OTHER MALIGNANT NEOPLASM OF SKIN: ICD-10-CM

## 2024-09-18 PROBLEM — L29.8 OTHER PRURITUS: Status: ACTIVE | Noted: 2024-09-18

## 2024-09-18 PROBLEM — D22.9 MELANOCYTIC NEVI, UNSPECIFIED: Status: ACTIVE | Noted: 2024-09-18

## 2024-09-18 PROBLEM — D48.5 NEOPLASM OF UNCERTAIN BEHAVIOR OF SKIN: Status: ACTIVE | Noted: 2024-09-18

## 2024-09-18 PROCEDURE — 17000 DESTRUCT PREMALG LESION: CPT | Mod: 59

## 2024-09-18 PROCEDURE — 11102 TANGNTL BX SKIN SINGLE LES: CPT

## 2024-09-18 PROCEDURE — 17003 DESTRUCT PREMALG LES 2-14: CPT

## 2024-09-18 PROCEDURE — 11103 TANGNTL BX SKIN EA SEP/ADDL: CPT

## 2024-09-18 PROCEDURE — OTHER LIQUID NITROGEN: OTHER

## 2024-09-18 PROCEDURE — OTHER COUNSELING: OTHER

## 2024-09-18 PROCEDURE — OTHER PRESCRIPTION MEDICATION MANAGEMENT: OTHER

## 2024-09-18 PROCEDURE — OTHER BIOPSY BY SHAVE METHOD: OTHER

## 2024-09-18 PROCEDURE — 99214 OFFICE O/P EST MOD 30 MIN: CPT | Mod: 25

## 2024-09-18 ASSESSMENT — LOCATION DETAILED DESCRIPTION DERM
LOCATION DETAILED: LEFT ANTERIOR PROXIMAL UPPER ARM
LOCATION DETAILED: RIGHT MID-UPPER BACK
LOCATION DETAILED: RIGHT SUPERIOR UPPER BACK
LOCATION DETAILED: LEFT DISTAL DORSAL FOREARM
LOCATION DETAILED: LEFT MEDIAL FRONTAL SCALP

## 2024-09-18 ASSESSMENT — LOCATION SIMPLE DESCRIPTION DERM
LOCATION SIMPLE: RIGHT UPPER BACK
LOCATION SIMPLE: LEFT UPPER ARM
LOCATION SIMPLE: LEFT FOREARM
LOCATION SIMPLE: LEFT SCALP

## 2024-09-18 ASSESSMENT — LOCATION ZONE DERM
LOCATION ZONE: TRUNK
LOCATION ZONE: SCALP
LOCATION ZONE: ARM

## 2024-09-18 NOTE — PROCEDURE: BIOPSY BY SHAVE METHOD
Validate Lesion Size: No
Dressing: bandage
X Size Of Lesion In Cm: 0
Post-Care Instructions: I reviewed with the patient in detail post-care instructions. Patient is to keep the biopsy site dry overnight, and then apply bacitracin twice daily until healed. Patient may apply hydrogen peroxide soaks to remove any crusting.
Biopsy Method: double edge Personna blade
Notification Instructions: Patient will be notified of biopsy results. However, patient instructed to call the office if not contacted within 2 weeks.
Consent: Written consent was obtained and risks were reviewed including but not limited to scarring, infection, bleeding, scabbing, incomplete removal, nerve damage and allergy to anesthesia.
Cryotherapy Text: The wound bed was treated with cryotherapy after the biopsy was performed.
Information: Selecting Yes will display possible errors in your note based on the variables you have selected. This validation is only offered as a suggestion for you. PLEASE NOTE THAT THE VALIDATION TEXT WILL BE REMOVED WHEN YOU FINALIZE YOUR NOTE. IF YOU WANT TO FAX A PRELIMINARY NOTE YOU WILL NEED TO TOGGLE THIS TO 'NO' IF YOU DO NOT WANT IT IN YOUR FAXED NOTE.
Silver Nitrate Text: The wound bed was treated with silver nitrate after the biopsy was performed.
Depth Of Biopsy: dermis
Lab: -[id number]
Detail Level: Detailed
Hemostasis: Drysol
Electrodesiccation Text: The wound bed was treated with electrodesiccation after the biopsy was performed.
Type Of Destruction Used: Curettage
Curettage Text: The wound bed was treated with curettage after the biopsy was performed.
Biopsy Type: H and E
Wound Care: Petrolatum
Was A Bandage Applied: Yes
Billing Type: Third-Party Bill
Anesthesia Type: 0.5% lidocaine with 1:100,000 epinephrine and a 1:10 solution of 8.4% sodium bicarbonate
Electrodesiccation And Curettage Text: The wound bed was treated with electrodesiccation and curettage after the biopsy was performed.
Anesthesia Volume In Cc: 0.5
Biopsy Method: Dermablade
Anesthesia Type: 1% lidocaine with epinephrine

## 2024-09-18 NOTE — PROCEDURE: LIQUID NITROGEN
Render In Bullet Format When Appropriate: Yes
Total Number Of Aks Treated: 4
Detail Level: Simple
Consent: The patient's consent has been obtained after discussing/reviewing the following: \\n-The patient has had the opportunity to ask questions and understand the purpose of the treatment, benefits, risks, and alternatives.\\nRisks to the procedure may include but are not limited to pain/discomfort, redness, swelling, crusting/scabbing/blistering, discoloration, recurrence, persistence, and the risk of scarring. The patient is aware that this treatment does not prevent further actinic keratoses from forming. Pt is aware they may develop further actinic keratoses in the future and appropriate surveillance should be done moving forward (at least once a year). The patient is aware that reassessment is required if a lesion does NOT resolve or worsens (typically noted to be resolved in 4 wks). \\n- If any singular spot on pt's body were to grow/bleed/worsen (whether it is an actinic keratosis that we treated today or another lesion present elsewhere), pt knows we must assess in clinic in a timely fashion.  \\n- This is not a cosmetic service and is expected to be covered under insurance plans but co-insurance or copays may apply.
Number Of Freeze-Thaw Cycles: 1 freeze-thaw cycle
Post-Care Instructions: I reviewed with the patient post-care instructions. Patient is to wear sunprotection / sun protective clothing, and avoid picking areas. Vaseline use daily is encouraged until healed.

## 2024-09-18 NOTE — PROCEDURE: COUNSELING
Detail Level: Detailed
Detail Level: Generalized
Detail Level: Simple
Detail Level: Zone
Sunscreen Recommendations: Brands include neutrogena, La-Roche, and Elta-MD. Rec mineral sunscreen use (zinc/titanium dioxide) over chemical sunscreens due to safety.
Nicotinamide Supplementation Recommendations: All supplements should be USP (https://www.usp.org/verification-services/verified-tiffany).

## 2024-09-18 NOTE — PROCEDURE: PRESCRIPTION MEDICATION MANAGEMENT
Detail Level: Zone
Initiate Treatment: fluocinonide 0.05 % topical solution (patient has not used rx yet)
Render In Strict Bullet Format?: No

## 2024-09-30 ENCOUNTER — RX ONLY (RX ONLY)
Age: 84
End: 2024-09-30

## 2024-09-30 RX ORDER — CLINDAMYCIN HYDROCHLORIDE 300 MG/1
CAPSULE ORAL
Qty: 2 | Refills: 0 | Status: ERX | COMMUNITY
Start: 2024-09-30

## 2024-10-02 ENCOUNTER — OFFICE VISIT (OUTPATIENT)
Dept: INTERNAL MEDICINE CLINIC | Facility: CLINIC | Age: 84
End: 2024-10-02

## 2024-10-02 ENCOUNTER — LAB ENCOUNTER (OUTPATIENT)
Dept: LAB | Age: 84
End: 2024-10-02
Attending: INTERNAL MEDICINE
Payer: MEDICARE

## 2024-10-02 ENCOUNTER — EKG ENCOUNTER (OUTPATIENT)
Dept: LAB | Age: 84
End: 2024-10-02
Attending: INTERNAL MEDICINE
Payer: MEDICARE

## 2024-10-02 ENCOUNTER — TELEPHONE (OUTPATIENT)
Dept: PULMONOLOGY | Facility: CLINIC | Age: 84
End: 2024-10-02

## 2024-10-02 VITALS
WEIGHT: 241 LBS | TEMPERATURE: 98 F | OXYGEN SATURATION: 97 % | SYSTOLIC BLOOD PRESSURE: 120 MMHG | BODY MASS INDEX: 41.15 KG/M2 | HEART RATE: 50 BPM | RESPIRATION RATE: 18 BRPM | DIASTOLIC BLOOD PRESSURE: 68 MMHG | HEIGHT: 64 IN

## 2024-10-02 DIAGNOSIS — M06.09 RHEUMATOID ARTHRITIS OF MULTIPLE SITES WITH NEGATIVE RHEUMATOID FACTOR (HCC): ICD-10-CM

## 2024-10-02 DIAGNOSIS — M1A.9XX1 CHRONIC TOPHACEOUS GOUT OF RIGHT FOOT: ICD-10-CM

## 2024-10-02 DIAGNOSIS — I27.20 PULMONARY HYPERTENSION (HCC): ICD-10-CM

## 2024-10-02 DIAGNOSIS — J44.9 CHRONIC OBSTRUCTIVE PULMONARY DISEASE, UNSPECIFIED COPD TYPE (HCC): ICD-10-CM

## 2024-10-02 DIAGNOSIS — Z01.818 PREOP EXAM FOR INTERNAL MEDICINE: ICD-10-CM

## 2024-10-02 DIAGNOSIS — G47.33 OBSTRUCTIVE SLEEP APNEA ON CPAP: ICD-10-CM

## 2024-10-02 DIAGNOSIS — I25.10 ATHEROSCLEROSIS OF NATIVE CORONARY ARTERY OF NATIVE HEART WITHOUT ANGINA PECTORIS: ICD-10-CM

## 2024-10-02 DIAGNOSIS — Q10.5 CONGENITAL BLOCKED TEAR DUCT OF LEFT EYE: ICD-10-CM

## 2024-10-02 DIAGNOSIS — Z01.818 PREOP EXAM FOR INTERNAL MEDICINE: Primary | ICD-10-CM

## 2024-10-02 DIAGNOSIS — Z51.81 THERAPEUTIC DRUG MONITORING: ICD-10-CM

## 2024-10-02 DIAGNOSIS — N28.9 RENAL INSUFFICIENCY: ICD-10-CM

## 2024-10-02 DIAGNOSIS — I48.0 PAROXYSMAL ATRIAL FIBRILLATION (HCC): Primary | ICD-10-CM

## 2024-10-02 DIAGNOSIS — Z23 NEED FOR VACCINATION: ICD-10-CM

## 2024-10-02 PROBLEM — R26.9 GAIT ABNORMALITY: Status: RESOLVED | Noted: 2018-10-11 | Resolved: 2024-10-02

## 2024-10-02 PROBLEM — M79.89 LEG SWELLING: Status: RESOLVED | Noted: 2023-12-12 | Resolved: 2024-10-02

## 2024-10-02 PROBLEM — R06.83 SNORING: Status: RESOLVED | Noted: 2024-04-19 | Resolved: 2024-10-02

## 2024-10-02 LAB
ALBUMIN SERPL-MCNC: 3.9 G/DL (ref 3.2–4.8)
ALBUMIN/GLOB SERPL: 1.6 {RATIO} (ref 1–2)
ALP LIVER SERPL-CCNC: 80 U/L
ALT SERPL-CCNC: 24 U/L
ANION GAP SERPL CALC-SCNC: 6 MMOL/L (ref 0–18)
AST SERPL-CCNC: 23 U/L (ref ?–34)
BASOPHILS # BLD AUTO: 0.06 X10(3) UL (ref 0–0.2)
BASOPHILS NFR BLD AUTO: 1.3 %
BILIRUB SERPL-MCNC: 0.8 MG/DL (ref 0.2–1.1)
BUN BLD-MCNC: 24 MG/DL (ref 9–23)
BUN/CREAT SERPL: 21.2 (ref 10–20)
CALCIUM BLD-MCNC: 9.6 MG/DL (ref 8.7–10.4)
CHLORIDE SERPL-SCNC: 111 MMOL/L (ref 98–112)
CO2 SERPL-SCNC: 29 MMOL/L (ref 21–32)
CREAT BLD-MCNC: 1.13 MG/DL
CRP SERPL-MCNC: <0.4 MG/DL (ref ?–1)
DEPRECATED RDW RBC AUTO: 55.7 FL (ref 35.1–46.3)
EGFRCR SERPLBLD CKD-EPI 2021: 48 ML/MIN/1.73M2 (ref 60–?)
EOSINOPHIL # BLD AUTO: 0.11 X10(3) UL (ref 0–0.7)
EOSINOPHIL NFR BLD AUTO: 2.3 %
ERYTHROCYTE [DISTWIDTH] IN BLOOD BY AUTOMATED COUNT: 14.6 % (ref 11–15)
ERYTHROCYTE [SEDIMENTATION RATE] IN BLOOD: 7 MM/HR
FASTING STATUS PATIENT QL REPORTED: NO
GLOBULIN PLAS-MCNC: 2.4 G/DL (ref 2–3.5)
GLUCOSE BLD-MCNC: 86 MG/DL (ref 70–99)
HCT VFR BLD AUTO: 33.8 %
HGB BLD-MCNC: 11.2 G/DL
IMM GRANULOCYTES # BLD AUTO: 0.01 X10(3) UL (ref 0–1)
IMM GRANULOCYTES NFR BLD: 0.2 %
LYMPHOCYTES # BLD AUTO: 1.48 X10(3) UL (ref 1–4)
LYMPHOCYTES NFR BLD AUTO: 31.2 %
MCH RBC QN AUTO: 34.3 PG (ref 26–34)
MCHC RBC AUTO-ENTMCNC: 33.1 G/DL (ref 31–37)
MCV RBC AUTO: 103.4 FL
MONOCYTES # BLD AUTO: 0.75 X10(3) UL (ref 0.1–1)
MONOCYTES NFR BLD AUTO: 15.8 %
NEUTROPHILS # BLD AUTO: 2.34 X10 (3) UL (ref 1.5–7.7)
NEUTROPHILS # BLD AUTO: 2.34 X10(3) UL (ref 1.5–7.7)
NEUTROPHILS NFR BLD AUTO: 49.2 %
OSMOLALITY SERPL CALC.SUM OF ELEC: 305 MOSM/KG (ref 275–295)
PLATELET # BLD AUTO: 163 10(3)UL (ref 150–450)
POTASSIUM SERPL-SCNC: 4.5 MMOL/L (ref 3.5–5.1)
PROT SERPL-MCNC: 6.3 G/DL (ref 5.7–8.2)
Q-T INTERVAL: 424 MS
QRS DURATION: 98 MS
QTC CALCULATION (BEZET): 394 MS
R AXIS: -33 DEGREES
RBC # BLD AUTO: 3.27 X10(6)UL
SODIUM SERPL-SCNC: 146 MMOL/L (ref 136–145)
T AXIS: 33 DEGREES
VENTRICULAR RATE: 52 BPM
WBC # BLD AUTO: 4.8 X10(3) UL (ref 4–11)

## 2024-10-02 PROCEDURE — 93010 ELECTROCARDIOGRAM REPORT: CPT | Performed by: INTERNAL MEDICINE

## 2024-10-02 PROCEDURE — 36415 COLL VENOUS BLD VENIPUNCTURE: CPT

## 2024-10-02 PROCEDURE — 90662 IIV NO PRSV INCREASED AG IM: CPT | Performed by: INTERNAL MEDICINE

## 2024-10-02 PROCEDURE — 85025 COMPLETE CBC W/AUTO DIFF WBC: CPT

## 2024-10-02 PROCEDURE — 80053 COMPREHEN METABOLIC PANEL: CPT

## 2024-10-02 PROCEDURE — 85652 RBC SED RATE AUTOMATED: CPT

## 2024-10-02 PROCEDURE — 93005 ELECTROCARDIOGRAM TRACING: CPT

## 2024-10-02 PROCEDURE — 86140 C-REACTIVE PROTEIN: CPT

## 2024-10-02 PROCEDURE — G0008 ADMIN INFLUENZA VIRUS VAC: HCPCS | Performed by: INTERNAL MEDICINE

## 2024-10-02 PROCEDURE — 99214 OFFICE O/P EST MOD 30 MIN: CPT | Performed by: INTERNAL MEDICINE

## 2024-10-02 NOTE — TELEPHONE ENCOUNTER
Last office visit 10/12/22.  Patient accepted appointment with Dr. Alonso on 10/4/24 2:15 pm ([place]). Appointment information provided. She voiced understanding.

## 2024-10-02 NOTE — TELEPHONE ENCOUNTER
Dr Leong's office is calling to schedule a Surgical Clearance.  Patient is scheduled for tear duct surgery on 10/29/24 with Dr Sorto ([facility]).

## 2024-10-02 NOTE — PROGRESS NOTES
Patient ID: Cris Peterson is a 83 year old female.  Chief Complaint   Patient presents with    Pre-Op Exam     Presents for pre-op of left naslolacrimal duct Probing and placement of tube. Will be confuted on 10/29 by Akin Quinn MD. Will fax pre-op to 1 [phone].        HISTORY OF PRESENT ILLNESS:   HPI  Pt presents for preoperative clearance.  Planned surgery - Left eye probing  Type of anesthesia - General  Surgeon - Dr. Akin Quinn  Date of surgery - 10/29/2024  Cardiac history - Yes  Myocardial infarction in past 6 months - No  Bleeding disorders - No  Taking blood thinners - Yes (Eliquis)  Chest pain or shortness of breath with ADLs - No  Number of alcoholic beverages consumed weekly - 3-4 per week  Tobacco consumption - No  Patient with multiple chronic medical problems including pulmonary hypertension, COPD, rheumatoid arthritis.  She also has obstructive sleep apnea.  She is seeing cardiology in 2 weeks for clearance.  She is on Biologics for her rheumatoid arthritis.  She has an upcoming appointment with her rheumatologist later this week.    Review of Systems  Ten point review of systems otherwise negative with the exception of HPI and assessment and plan.    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)    Unspecified essential hypertension    Unspecified sleep apnea       Past Surgical  History:   Procedure Laterality Date    Angiogram  2013    Cataract      Cholecystectomy      Hand/finger surgery unlisted Right 2011    thumb surgery    Hip replacement surgery Right     Hysterectomy      SERENITY--fibroid    Knee replacement surgery      total - right and left    Jw localization wire 1 site right (cpt=19281)  1994    Other surgical history      pins right foot    Tear duct system surg unlisted  1968    Tonsillectomy           Current Outpatient Medications:     METHOTREXATE 2.5 MG Oral Tab, TAKE 4 TABLETS ONCE A WEEK, Disp: 52 tablet, Rfl: 1    Etanercept (ENBREL SURECLICK) 50 MG/ML Subcutaneous Solution Auto-injector, INJECT 1 PEN UNDER THE SKIN EVERY 7 DAYS, Disp: 12 mL, Rfl: 1    Trospium Chloride ER 60 MG Oral Capsule SR 24 Hr, Take 1 capsule (60 mg total) by mouth daily., Disp: 90 capsule, Rfl: 3    estradiol (ESTRACE) 0.1 MG/GM Vaginal Cream, Apply 1/2 gram vaginally 2-3 times per week., Disp: 42.5 g, Rfl: 3    furosemide 40 MG Oral Tab, Take 1 tablet (40 mg total) by mouth 2 (two) times daily. (Patient taking differently: Take 1 tablet (40 mg total) by mouth daily.), Disp: 60 tablet, Rfl: 5    lisinopril 10 MG Oral Tab, Take 1 tablet (10 mg total) by mouth in the morning and 1 tablet (10 mg total) before bedtime., Disp: , Rfl:     folic acid 1 MG Oral Tab, Take 1 tablet (1 mg total) by mouth daily., Disp: 90 tablet, Rfl: 3    Spacer/Aero-Holding Chambers Does not apply Device, Use with albuterol inhaler, Disp: 1 each, Rfl: 0    pantoprazole 40 MG Oral Tab EC, Take 1 tablet (40 mg total) by mouth before breakfast., Disp: 90 tablet, Rfl: 3    hydroxychloroquine 200 MG Oral Tab, Take 2 tablets (400 mg total) by mouth daily., Disp: 180 tablet, Rfl: 3    allopurinol 300 MG Oral Tab, Take one daily., Disp: 90 tablet, Rfl: 1    atorvastatin 40 MG Oral Tab, Take 1 tablet (40 mg total) by mouth daily., Disp: , Rfl:     Cholecalciferol (VITAMIN D) 50 MCG (2000 UT) Oral Tab, Take by mouth., Disp: , Rfl:      ELIQUIS 5 MG Oral Tab, Take 1 tablet (5 mg total) by mouth 2 (two) times daily., Disp: , Rfl:     Calcium 500-125 MG-UNIT Oral Tab, Take 1 tablet by mouth daily., Disp: , Rfl:     Loperamide HCl 2 MG Oral Cap, Take 1 capsule (2 mg total) by mouth 4 (four) times daily as needed for Diarrhea., Disp: , Rfl:     Probiotic Product (PROBIOTIC-10) Oral Cap, Take 1 tablet by mouth daily., Disp: , Rfl:     acetaminophen (TYLENOL EXTRA STRENGTH) 500 MG Oral Tab, Take 1 tablet (500 mg total) by mouth every 6 (six) hours as needed., Disp: , Rfl:     CENTRUM SILVER OR TABS, 1 TABLET DAILY, Disp: , Rfl:     Allergies:  Allergies   Allergen Reactions    Erythromycin Base     Pcn [Bicillin L-A]     Lactose DIARRHEA       Social History     Socioeconomic History    Marital status:      Spouse name: Not on file    Number of children: Not on file    Years of education: Not on file    Highest education level: Not on file   Occupational History    Not on file   Tobacco Use    Smoking status: Never    Smokeless tobacco: Never   Vaping Use    Vaping status: Not on file   Substance and Sexual Activity    Alcohol use: Yes     Alcohol/week: 1.7 standard drinks of alcohol     Types: 2 Glasses of wine per week     Comment: social    Drug use: No    Sexual activity: Not Currently   Other Topics Concern     Service Not Asked    Blood Transfusions Not Asked    Caffeine Concern No    Occupational Exposure Not Asked    Hobby Hazards Not Asked    Sleep Concern Not Asked    Stress Concern Not Asked    Weight Concern Not Asked    Special Diet Not Asked    Back Care Not Asked    Exercise Not Asked    Bike Helmet Not Asked    Seat Belt Not Asked    Self-Exams Not Asked   Social History Narrative    Not on file     Social Determinants of Health     Financial Resource Strain: Not on file   Food Insecurity: Not on file   Transportation Needs: Not on file   Physical Activity: Not on file   Stress: Not on file   Social Connections: Not on  file   Housing Stability: Not on file           PHYSICAL EXAM:     Vitals:    10/02/24 1202   BP: 120/68   Pulse: 50   Resp: 18   Temp: 98.2 °F (36.8 °C)   TempSrc: Temporal   SpO2: 97%   Weight: 241 lb (109.3 kg)   Height: 5' 4\" (1.626 m)       Body mass index is 41.37 kg/m².    Physical Exam  Constitutional:       Appearance: Normal appearance.   Eyes:      General: No scleral icterus.  Cardiovascular:      Rate and Rhythm: Normal rate and regular rhythm.      Pulses: Normal pulses.      Heart sounds: Normal heart sounds. No murmur heard.  Pulmonary:      Effort: No respiratory distress.      Breath sounds: Examination of the right-upper field reveals decreased breath sounds. Examination of the left-upper field reveals decreased breath sounds. Examination of the right-middle field reveals decreased breath sounds. Examination of the left-middle field reveals decreased breath sounds. Examination of the right-lower field reveals decreased breath sounds. Examination of the left-lower field reveals decreased breath sounds. Decreased breath sounds present. No wheezing, rhonchi or rales.   Neurological:      Mental Status: She is alert.   Psychiatric:         Mood and Affect: Mood normal.         Behavior: Behavior normal.           ASSESSMENT/PLAN:   1. Preop exam for internal medicine  CBC With Differential With Platelet; Future  Comp Metabolic Panel (14); Future  XR CHEST PA + LAT CHEST (CPT=71046); Future  EKG.  Has upcoming appointment with her cardiologist.  Have recommended also to get clearance from her pulmonologist as well as rheumatologist to modify her medications.  She will need to be off Eliquis which her cardiologist will determine.  Clearance is dependent on above results.    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  As per #1.    3. Atherosclerosis of native coronary  artery of native heart without angina pectoris  CBC With Differential With Platelet; Future  Comp Metabolic Panel (14); Future  XR CHEST PA + LAT CHEST (CPT=71046); Future  EKG.  Has appointment with cardiology in 2 weeks.    4. Chronic obstructive pulmonary disease, unspecified COPD type (HCC)  CBC With Differential With Platelet; Future  Comp Metabolic Panel (14); Future  XR CHEST PA + LAT CHEST (CPT=71046); Future  Encouraged to make appointment with pulmonology.    5. Pulmonary hypertension (HCC)  CBC With Differential With Platelet; Future  Comp Metabolic Panel (14); Future  XR CHEST PA + LAT CHEST (CPT=71046); Future  Encouraged to make an appointment with pulmonology.    6. Rheumatoid arthritis of multiple sites with negative rheumatoid factor (HCC)  CBC With Differential With Platelet; Future  Comp Metabolic Panel (14); Future  XR CHEST PA + LAT CHEST (CPT=71046); Future  Has appointment with rheumatology later this week.  Will need guidance on how to modify her medications leading up to surgery.    7. Obstructive sleep apnea on CPAP  CBC With Differential With Platelet; Future  Comp Metabolic Panel (14); Future  XR CHEST PA + LAT CHEST (CPT=71046); Future  Encouraged to make an appointment with pulmonology prior to surgery.    8. Need for vaccination  High Dose Fluzone trivalent influenza, 65yrs+ PFS (07889)    No follow-ups on file.    This note was prepared using Dragon Medical voice recognition dictation software. As a result errors may occur. When identified these errors have been corrected. While every attempt is made to correct errors during dictation discrepancies may still exist.    Jeff Leong MD  10/2/2024

## 2024-10-04 ENCOUNTER — OFFICE VISIT (OUTPATIENT)
Dept: RHEUMATOLOGY | Facility: CLINIC | Age: 84
End: 2024-10-04

## 2024-10-04 ENCOUNTER — HOSPITAL ENCOUNTER (OUTPATIENT)
Dept: GENERAL RADIOLOGY | Facility: HOSPITAL | Age: 84
Discharge: HOME | End: 2024-10-04
Attending: INTERNAL MEDICINE
Payer: MEDICARE

## 2024-10-04 ENCOUNTER — OFFICE VISIT (OUTPATIENT)
Dept: PULMONOLOGY | Facility: CLINIC | Age: 84
End: 2024-10-04

## 2024-10-04 VITALS
BODY MASS INDEX: 42.14 KG/M2 | OXYGEN SATURATION: 96 % | SYSTOLIC BLOOD PRESSURE: 130 MMHG | DIASTOLIC BLOOD PRESSURE: 64 MMHG | HEART RATE: 61 BPM | WEIGHT: 240.81 LBS | HEIGHT: 63.5 IN

## 2024-10-04 VITALS
HEIGHT: 64 IN | HEART RATE: 71 BPM | DIASTOLIC BLOOD PRESSURE: 64 MMHG | BODY MASS INDEX: 41.15 KG/M2 | SYSTOLIC BLOOD PRESSURE: 131 MMHG | WEIGHT: 241 LBS

## 2024-10-04 DIAGNOSIS — Z01.818 PREOP EXAM FOR INTERNAL MEDICINE: ICD-10-CM

## 2024-10-04 DIAGNOSIS — I27.20 PULMONARY HYPERTENSION (HCC): ICD-10-CM

## 2024-10-04 DIAGNOSIS — G47.33 OSA (OBSTRUCTIVE SLEEP APNEA): Primary | ICD-10-CM

## 2024-10-04 DIAGNOSIS — Z51.81 THERAPEUTIC DRUG MONITORING: ICD-10-CM

## 2024-10-04 DIAGNOSIS — J44.9 CHRONIC OBSTRUCTIVE PULMONARY DISEASE, UNSPECIFIED COPD TYPE (HCC): ICD-10-CM

## 2024-10-04 DIAGNOSIS — M54.2 NECK PAIN: ICD-10-CM

## 2024-10-04 DIAGNOSIS — M1A.9XX1 CHRONIC TOPHACEOUS GOUT OF RIGHT FOOT: ICD-10-CM

## 2024-10-04 DIAGNOSIS — G47.33 OBSTRUCTIVE SLEEP APNEA ON CPAP: ICD-10-CM

## 2024-10-04 DIAGNOSIS — J45.20 MILD INTERMITTENT ASTHMA WITHOUT COMPLICATION (HCC): ICD-10-CM

## 2024-10-04 DIAGNOSIS — Q10.5 CONGENITAL BLOCKED TEAR DUCT OF LEFT EYE: ICD-10-CM

## 2024-10-04 DIAGNOSIS — M06.09 RHEUMATOID ARTHRITIS OF MULTIPLE SITES WITH NEGATIVE RHEUMATOID FACTOR (HCC): ICD-10-CM

## 2024-10-04 DIAGNOSIS — I25.10 ATHEROSCLEROSIS OF NATIVE CORONARY ARTERY OF NATIVE HEART WITHOUT ANGINA PECTORIS: ICD-10-CM

## 2024-10-04 DIAGNOSIS — M06.09 RHEUMATOID ARTHRITIS OF MULTIPLE SITES WITH NEGATIVE RHEUMATOID FACTOR (HCC): Primary | ICD-10-CM

## 2024-10-04 PROCEDURE — 99214 OFFICE O/P EST MOD 30 MIN: CPT | Performed by: INTERNAL MEDICINE

## 2024-10-04 PROCEDURE — G2211 COMPLEX E/M VISIT ADD ON: HCPCS | Performed by: INTERNAL MEDICINE

## 2024-10-04 PROCEDURE — 71046 X-RAY EXAM CHEST 2 VIEWS: CPT | Performed by: INTERNAL MEDICINE

## 2024-10-04 NOTE — PROGRESS NOTES
Cris Peterson is a 83 year old female.    HPI:     Chief Complaint   Patient presents with    Rheumatoid Arthritis       I had the pleasure of seeing Cris Peterson on 10/4/2024 for follow up Seropositive RA and Gout    Current medications:  Enbrel weekly  Methotrexate 4 pills weekly   mg daily   Allopurinal 300 mg daily  Blood work:  +ITZEL 1:160, Neg BETTY panel  RF 51, +atypical p-ANCA 1:1280  UA 5.3 (Oct 2021)    Interval History:  This is a 83 yo F with hx of HTN, HLD, Hypothyroid, Atrial fibrillation on eliquis, COPD, B/L TKR, R THR, R foot surgery, R thumb surgery, Gout  presents to establish care for seronegative RA.  She is a former patient of Dr. Rose.  She was diagnosed with RA many years ago, about 20 years ago.  Currently on Enbrel weekly, methotrexate 4 pills weekly and hydroxychloroquine 400 mg daily.  Joints are stable.  No active swelling.  She does have chronic lower back pain.  X-rays have showed moderate scoliosis and moderate to severe spondylosis.  She also has discomfort in her neck.  X-ray showed advanced multilevel degenerative changes mostly in C5-C6.  She has full range of motion in her shoulders.  She was admitted in April 2021 for an acutely inflamed right third toe which was originally felt to be infected.  Surgery was done and tophi was removed.  Her uric acid was 9.1 at that time.  Now on allopurinol 300 mg daily.  Gout has been stable.  Has had no gout flares.  She reports shortness of breath.  Following with cardiology.    2/2/2024:  Presents for f/u of RA and Gout  Also has CKD following with nephology  Has chronic changes in hands but minimal pain. Has weakness in the hands  Continues to have pain in the neck and across the shoulders  No n/t or shooting for the pain  No recent gout flares     6/3/2024:  Presents for f/u of RA and Gout  No recent gout flare  Also has CKD following with nephology  Has chronic changes in hands but minimal pain. Has weakness in the  hands  Recent blood work showing increased ESR 47.  Normal LFTs.  Creatinine 1.14  Seen by ophthalmology 4/23/2024, no evidence of Plaquenil toxicity  Had increased swelling in both legs.  She also has atrial fibrillation and CKD stage III.  She is now on Lasix and following with cardiology. She will be getting right heart cath on Thursday  She reports some SOB and weight gain   Also had basal cell carcinoma on right shoulder and it was removed last Thursday     10/4/2024:  Presents for f/u of RA and Gout  No recent gout flare  Also has CKD following with nephology  Blood work shows creatinine stable at 1.13.  Normal LFTs.  Normal inflammation marker  Having some more neck pain and pain across the shoulders and mid back  Also pain in the knees  Feet are painful  It is also getting harder to move, she is more stiff overall  Legs are swollen, she is on a diuretic   She was suppose to have tear duct surgery but postponed due to abnormal EKG- Atrial flutter but this is chronic, she saw cardiology and they said everything was ok  She will starting PT for movement         HISTORY:  Past Medical History:    Cancer (HCC)    melanoma on back    Congestive heart disease (HCC)    COPD (chronic obstructive pulmonary disease) (HCC)    Coronary atherosclerosis    Disorder of thyroid    Fracture, patella    repair    Gout    Heart valve disease    High blood pressure    High cholesterol    Hypothyroidism    Nonunion of midfoot arthrodesis (HCC)    Pulmonary HTN (HCC)    Rheumatoid arthritis (HCC)    Sx.7/21/15, CPT.63328, R Triple Arthrodesis/Poss Medializing Calc Arthrodesis/Lapidus/MONA/FDL to Post Tib Transfer, w/, Global Exp.10/19/15    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 90 capsule 3    estradiol (ESTRACE) 0.1 MG/GM Vaginal Cream Apply 1/2 gram vaginally 2-3 times per week. 42.5 g 3    furosemide 40 MG Oral Tab Take 1 tablet (40 mg total) by mouth 2 (two) times daily. (Patient taking differently: Take 1 tablet (40 mg total) by mouth daily.) 60 tablet 5    lisinopril 10 MG Oral Tab Take 1 tablet (10 mg total) by mouth in the morning and 1 tablet (10 mg total) before bedtime.      folic acid 1 MG Oral Tab Take 1 tablet (1 mg total) by mouth daily. 90 tablet 3    Spacer/Aero-Holding Chambers Does not apply Device Use with albuterol inhaler 1 each 0    pantoprazole 40 MG Oral Tab EC Take 1 tablet (40 mg total) by mouth before breakfast. 90 tablet 3    hydroxychloroquine 200 MG Oral Tab Take 2 tablets (400 mg total) by mouth daily. 180 tablet 3    allopurinol 300 MG Oral Tab Take one daily. 90 tablet 1    atorvastatin 40 MG Oral Tab Take 1 tablet (40 mg total) by mouth daily.      Cholecalciferol (VITAMIN D) 50 MCG (2000 UT) Oral Tab Take by mouth.      ELIQUIS 5 MG Oral Tab Take 1 tablet (5 mg total) by mouth 2 (two) times daily.      Calcium 500-125 MG-UNIT Oral Tab Take 1 tablet by mouth daily.      Loperamide HCl 2 MG Oral Cap Take 1 capsule (2 mg total) by mouth 4 (four) times daily as needed for Diarrhea.      Probiotic Product (PROBIOTIC-10) Oral Cap Take 1 tablet by mouth daily.      acetaminophen (TYLENOL EXTRA STRENGTH) 500 MG Oral Tab Take 1 tablet (500 mg total) by mouth every 6 (six) hours as needed.      CENTRUM SILVER OR TABS 1 TABLET DAILY       .cmed  Allergies:  Allergies   Allergen Reactions    Erythromycin Base     Pcn [Bicillin L-A]     Lactose DIARRHEA          ROS:   All other ROS are negative.     PHYSICAL EXAM:   GEN: AAOx3, NAD  HEENT: EOMI, PERRLA, no injection or icterus, oral mucosa moist, no oral lesions. No lymphadenopathy. No facial rash  CVS: RRR, no murmurs rubs or gallops. Equal 2+ distal pulses.   LUNGS: CTAB, no increased work of breathing  ABDOMEN:  soft NT/ND, +BS, no HSM  SKIN: No rashes or skin lesions. No nail findings  MSK:  Chronic synovitis noted in the wrist  [place]-neck deformities of the right hand.  Able to make a full fist  Full range of motion in the shoulders  3+edema b/l LE  NEURO: Cranial nerves II-XII intact grossly. 5/5 strength throughout in both upper and lower extremities, sensation intact.  PSYCH: normal mood       LABS:       Component      Latest Ref Rn 2/11/2022   MYELOPEROX ANTIBODIES, IGG      0 - 19 AU/mL 0    SERINE PROTEASE3, IGG      0 - 19 AU/mL 0    ANCA IFA Titer      <1:20  1:1280 (H)    ANCA IFA Pattern      None Detected  Atypical p-ANCA !    ITZEL Titer/Pattern      <80  160 !    Reviewed By: Kathleen Galloway M.D.    Anti-Sjogren's A      Negative  Negative    Anti-Sjogren's B      Negative  Negative    Anti-Smith Antibody      Negative  Negative    Anti-Corea/RNP Antibody      Negative  Negative    RHEUMATOID FACTOR      <15 IU/mL 51 (H)    COMPLEMENT C4      10.0 - 40.0 mg/dL 32.1    ITZEL SCREEN WITH REFLEX (S)      Negative  Positive !    COMPLEMENT C3      90.0 - 180.0 mg/dL 117.0    Anti Double Strand DNA      <10  <10       Component      Latest Ref Rng 4/21/2022   MYELOPEROX ANTIBODIES, IGG      0 - 19 AU/mL 0    SERINE PROTEASE3, IGG      0 - 19 AU/mL 0    ANCA IFA Titer      <1:20  1:1280 (H)    ANCA IFA Pattern      None Detected  Atypical p-ANCA !    ITZEL Titer/Pattern      <80      Reviewed By:     Anti-Sjogren's A      Negative      Anti-Sjogren's B      Negative      Anti-Smith Antibody      Negative      Anti-Corea/RNP Antibody      Negative      RHEUMATOID FACTOR      <15 IU/mL     COMPLEMENT C4       10.0 - 40.0 mg/dL     ITZEL SCREEN WITH REFLEX (S)      Negative      COMPLEMENT C3      90.0 - 180.0 mg/dL     Anti Double Strand DNA      <10            Imaging:     XR cervical 2022:  CONCLUSION:   1. There is widening of the atlantodental interval on flexion, suggesting ligamentous instability.      2. Advanced multilevel degenerative changes throughout the cervical spine, most notably at the C5-C6 level.      ASSESSMENT/PLAN:     Seronegative RA- stable   - She is a former patient of Dr. Rose, diagnosed more than 20 years ago  - On Enbrel weekly, methotrexate 4 pills weekly and hydroxychloroquine 400 mg daily  - She states that she sees the eye doctor every year 4/2024, no evidence of Plaquenil toxicity  - Plan to monitor blood work every 3 to 4 months    Neck pain  - refer to PT    B/L LE swelling  - following with cardiology  - on lasix now    Basal cell carcinoma, right shoulder  - s/p removal       Primary osteoarthritis, chronic neck and lower back pain  - X-ray of the neck has shown advanced multilevel degenerative changes.  - She cannot take NSAIDs.  Recommended PT but she deferred that for now  - Advise she can take Tylenol arthritis once or twice a day for her pain     CKD stage 3  - Found to have a positive atypical p-ANCA 1: 1280, negative OH-3 and MPO.  UA shows no protein or blood.  Creatinine has been fluctuating between 1.1 and 1.3  - Following with nephrology. 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  Remains on allopurinol 300 mg daily.  No recent gout flare    Pt will f/u in 3-4 mos     There is a longitudinal care relationship with me, the care plan reflects the ongoing nature of the continuous relationship of care, and the medical record indicates that there is ongoing treatment of a serious/complex medical condition which I am currently managing.  is Applicable.     Ivania Cornell MD  10/4/2024   10:00 AM

## 2024-10-04 NOTE — PROGRESS NOTES
Subjective:   Patient ID: Cris Peterson is a 83 year old female.    HPI  Doing very well with CPAP and very compliant and vigilant about using it every night and all night no technical issue with with no residual daytime significant sleepiness or fatigue  Regular time in bed  Denies any chest pain or shortness of breath or cough or wheeze  Chronic lower extremity pitting edema  Mild chronic dyspnea upon exertion  History/Other:   Review of Systems   Constitutional: Negative.    HENT: Negative.     Respiratory:  Negative for cough and wheezing.    Cardiovascular:  Positive for leg swelling. Negative for chest pain and palpitations.   Gastrointestinal: Negative.    Musculoskeletal:  Positive for arthralgias.   Neurological: Negative.    Hematological: Negative.    Psychiatric/Behavioral: Negative.       Current Outpatient Medications   Medication Sig Dispense Refill    METHOTREXATE 2.5 MG Oral Tab TAKE 4 TABLETS ONCE A WEEK 52 tablet 1    Etanercept (ENBREL SURECLICK) 50 MG/ML Subcutaneous Solution Auto-injector INJECT 1 PEN UNDER THE SKIN EVERY 7 DAYS 12 mL 1    Trospium Chloride ER 60 MG Oral Capsule SR 24 Hr Take 1 capsule (60 mg total) by mouth daily. 90 capsule 3    estradiol (ESTRACE) 0.1 MG/GM Vaginal Cream Apply 1/2 gram vaginally 2-3 times per week. 42.5 g 3    furosemide 40 MG Oral Tab Take 1 tablet (40 mg total) by mouth 2 (two) times daily. (Patient taking differently: Take 1 tablet (40 mg total) by mouth daily.) 60 tablet 5    lisinopril 10 MG Oral Tab Take 1 tablet (10 mg total) by mouth in the morning and 1 tablet (10 mg total) before bedtime.      folic acid 1 MG Oral Tab Take 1 tablet (1 mg total) by mouth daily. 90 tablet 3    Spacer/Aero-Holding Chambers Does not apply Device Use with albuterol inhaler 1 each 0    pantoprazole 40 MG Oral Tab EC Take 1 tablet (40 mg total) by mouth before breakfast. 90 tablet 3    hydroxychloroquine 200 MG Oral Tab Take 2 tablets (400 mg total) by mouth daily. 180  tablet 3    allopurinol 300 MG Oral Tab Take one daily. 90 tablet 1    atorvastatin 40 MG Oral Tab Take 1 tablet (40 mg total) by mouth daily.      Cholecalciferol (VITAMIN D) 50 MCG (2000 UT) Oral Tab Take by mouth.      ELIQUIS 5 MG Oral Tab Take 1 tablet (5 mg total) by mouth 2 (two) times daily.      Calcium 500-125 MG-UNIT Oral Tab Take 1 tablet by mouth daily.      Loperamide HCl 2 MG Oral Cap Take 1 capsule (2 mg total) by mouth 4 (four) times daily as needed for Diarrhea.      Probiotic Product (PROBIOTIC-10) Oral Cap Take 1 tablet by mouth daily.      acetaminophen (TYLENOL EXTRA STRENGTH) 500 MG Oral Tab Take 1 tablet (500 mg total) by mouth every 6 (six) hours as needed.      CENTRUM SILVER OR TABS 1 TABLET DAILY       Allergies:  Allergies   Allergen Reactions    Erythromycin Base     Pcn [Bicillin L-A]     Lactose DIARRHEA       Objective:   Physical Exam  Constitutional:       General: She is not in acute distress.     Appearance: She is obese. She is not ill-appearing.   HENT:      Head: Normocephalic and atraumatic.      Nose: Nose normal.      Mouth/Throat:      Mouth: Mucous membranes are moist.   Eyes:      General: No scleral icterus.  Cardiovascular:      Rate and Rhythm: Normal rate and regular rhythm.      Heart sounds: No murmur heard.     No gallop.   Pulmonary:      Effort: No respiratory distress.      Breath sounds: No stridor. No wheezing, rhonchi or rales.   Chest:      Chest wall: No tenderness.   Abdominal:      General: Abdomen is flat. Bowel sounds are normal. There is no distension.      Palpations: Abdomen is soft.      Tenderness: There is no guarding or rebound.   Musculoskeletal:      Cervical back: Normal range of motion.      Comments: Mild lower extremity pitting edema   Lymphadenopathy:      Cervical: No cervical adenopathy.   Skin:     General: Skin is dry.   Neurological:      General: No focal deficit present.      Mental Status: She is oriented to person, place, and  time.             CXR 10/4/2024 reviewed   FINDINGS:  CARDIAC/VASC: Stable cardiac silhouette enlargement.  Unremarkable pulmonary vasculature.    MEDIAST/NICOLA: Atherosclerotic aorta with no visible aneurysm.    LUNGS/PLEURA: No significant pulmonary parenchymal abnormalities.  No effusion or pleural thickening.    BONES: Scattered mild degenerative endplate changes in the visualized thoracolumbar spine.  OTHER: Negative.                 Impression   CONCLUSION: No acute cardiopulmonary abnormality.          Assessment & Plan:   1. JUAN (obstructive sleep apnea)    2. Mild intermittent asthma without complication (HCC)      1- JUAN   Doing very well on cpap 12 cwp   Excellent  subjective and objective compliance compliant   I reviewed her download data 100% adherence and normal AHI     - cpm with cpap 12 CWP / nasal mask    - diet / exercise / physical therapy    avoid sedative /narcotics   -Avoid driving if sleepy     2-mild intermittent asthma    Stable   No h/o smoking   No occupational exposure   Prior HRCT normal and V/Q is normal   CXR today clear        3- CHF ,  secondary pulmonary HTN / PAP 47/12   Per cardiology        Pulmonary status stable and clear to have eye surgery          F/u in 1 year        Meds This Visit:  Requested Prescriptions      No prescriptions requested or ordered in this encounter       Imaging & Referrals:  None

## 2024-10-04 NOTE — PATIENT INSTRUCTIONS
You were seen today for rheumatoid arthritis and gout  You also have chronic pain and neck pain from osteoarthritis  Recent blood work look good  Continue Enbrel, methotrexate, folic acid and Plaquenil  I gave you referral for physical therapy for your neck and shoulder pain     For your surgery stop Enbrel and methotrexate 1 week before and 1 week after surgery    Follow-up in 4 months  Get blood work before you see me

## 2024-10-08 ENCOUNTER — TELEPHONE (OUTPATIENT)
Dept: PHYSICAL THERAPY | Facility: HOSPITAL | Age: 84
End: 2024-10-08

## 2024-10-09 ENCOUNTER — OFFICE VISIT (OUTPATIENT)
Dept: PHYSICAL THERAPY | Age: 84
End: 2024-10-09
Attending: INTERNAL MEDICINE
Payer: MEDICARE

## 2024-10-09 DIAGNOSIS — M15.0 PRIMARY OSTEOARTHRITIS INVOLVING MULTIPLE JOINTS: ICD-10-CM

## 2024-10-09 DIAGNOSIS — R26.9 GAIT ABNORMALITY: ICD-10-CM

## 2024-10-09 DIAGNOSIS — R53.83 OTHER FATIGUE: Primary | ICD-10-CM

## 2024-10-09 PROCEDURE — 97110 THERAPEUTIC EXERCISES: CPT

## 2024-10-09 PROCEDURE — 97162 PT EVAL MOD COMPLEX 30 MIN: CPT

## 2024-10-09 NOTE — PROGRESS NOTES
LOWER EXTREMITY EVALUATION:     Diagnosis:   Other fatigue (R53.83)  Gait abnormality (R26.9)  Primary osteoarthritis involving multiple joints (M15.0), Neck pain (M54.2)    Referring Provider: Carly Bolivar  Date of Evaluation:    10/9/2024    Precautions:  Fall Risk Next MD visit:   none scheduled  Date of Surgery: n/a     PATIENT SUMMARY   Cris Peterson is a 83 year old female who presents to therapy today with complaints of decreasing mobility and OA pain throughout her body, most recently at her neck.  She reports using a 4-wheeled RW outside of her home, but no AD within her bilevel home (6 steps up and 13 steps down from main level with B railings).  She c/o SOB with exertion as well (seeing a pulmonologist).    Current functional limitations include difficulty with gait, transfers, imbalance.     Cris describes prior level of function as less sedentary with better endurance. Pt goals include eliminate fear of falling, improve balance, improve mobility, eliminate neck pain.  Past medical history was reviewed with Cris. Significant findings include RA, OA, Gout, HTN, HLD, Hypothyroid, A-Fib, COPD, B TKA, R LAURA, R foot surgery, JUAN, SOB.    ASSESSMENT  Cris presents to physical therapy evaluation with primary c/o decreasing mobility and OA pain throughout her body, most recently at her neck. The results of the objective tests and measures show fairly good flexibility all extremities (except knee flexion) and weakness B shoulders and hips, as well as limited balance.  Functional deficits include but are not limited to difficulty with gait, transfers, imbalance.  Signs and symptoms are consistent with diagnosis of Other fatigue (R53.83), Gait abnormality (R26.9), Primary osteoarthritis involving multiple joints (M15.0), Neck pain (M54.2). Pt and PT discussed evaluation findings, pathology, POC and HEP.  Pt voiced understanding and performs HEP correctly without reported pain. Skilled Physical Therapy is  medically necessary to address the above impairments and reach functional goals.     OBJECTIVE:   Observation: Obese woman struggling with transfers and mobility.    AROM: (* denotes performed with pain)  - LE's grossly WFL's except B knee flexion limited by past TKA's - pt admits struggle sitting/arising from low/standard chair due to knee limitation and considering a lift chair in addition to her current recliner setting up on a home made platform for height.  - Ue's grossly WFL's    Strength/MMT: (* denotes performed with pain)  Hip Knee Foot/Ankle   Flexion: R 4-/5; L 4-/5  Extension: R 4+/5; L 4+/5  Abduction: R 4+/5; L 4+/5   Flexion: R 4+/5; L 4+/5  Extension: R 5/5; L 5/5    DF: R 4+/5; L 4+/5  PF: R 4/5; L 4/5  Great toe ext: R 4/5; L 4/5     - UE MMT: B shoulders grossly 4/5, remainder of Ue musculature 5/5 B    Gait: pt ambulates on level ground with assistive device of 4 wheeled RW, antalgia, and decreased step length B  Balance:   - normal stance: eyes open and eyes closed with mild instability, increased with EC  - Rhomberg stance: eyes open with mild/mod instability; EC only briefly  - Tandem stance: unable  - SLS - n/a    Today’s Treatment and Response:   Pt education was provided on exam findings, treatment diagnosis, treatment plan, expectations, and prognosis. Pt was also provided recommendations for activity modifications, detrimental fear avoidance behaviors, importance of remaining active, and strategies to reduce fall risk at home.  Pt performed UE/LE NuStep x 7' at L5.  Discussed the need for activity for body conditioning and strength.  Patient was instructed in and issued a HEP for: Seated shoulder Abd, flex, punching, scapular retraction.    Access Code: [email or web address]  URL: [email or web address]  Date: 10/09/2024  Prepared by: Lior Walsh  Exercises  - Seated Scapular Retraction  - 1 x daily - 7 x weekly - 3 sets - 10 reps  - Seated Shoulder Abduction and External Rotation  AROM  - 1 x daily - 7 x weekly - 3 sets - 10 reps  - Seated Single Arm Shoulder Flexion AROM  - 1 x daily - 7 x weekly - 3 sets - 10 reps  - Seated Punches  - 1 x daily - 7 x weekly - 3 sets - 10 reps    Charges: PT Eval Moderate Complexity, Ther Ex x2      Total Timed Treatment: 25 min     Total Treatment Time: 45 min     Based on clinical rationale and outcome measures, this evaluation involved Moderate Complexity decision making due to 1-2 personal factors/comorbidities, 3 body structures involved/activity limitations, and evolving symptoms including changing pain levels.  PLAN OF CARE:    Goals: (to be met in 10 visits)  Pt report comfort with home and short community mobility  Pt demo ascend and descend stairs easily w/ B rail  Pt demonstrate independence with HEP  Pt to consider resuming pool exercise regularly  Pt demonstrate safe and independent transfers    Frequency / Duration: Patient will be seen for 1-2 x/week or a total of 10 visits over a 90 day period. Treatment will include: Gait training, Manual Therapy, Neuromuscular Re-education, Therapeutic Activities, Therapeutic Exercise, and Home Exercise Program instruction    Education or treatment limitation: None  Rehab Potential:good    LEFS Score  No data recorded    Patient/Family/Caregiver was advised of these findings, precautions, and treatment options and has agreed to actively participate in planning and for this course of care.    Thank you for your referral. Please co-sign or sign and return this letter via fax as soon as possible to [phone]. If you have any questions, please contact me at Dept: [phone]    Sincerely,  Electronically signed by therapist: Lior Walsh, PT  Physician's certification required: Yes  I certify the need for these services furnished under this plan of treatment and while under my care.    X___________________________________________________ Date____________________    Certification From: 10/9/2024   To:1/7/2025

## 2024-10-14 ENCOUNTER — OFFICE VISIT (OUTPATIENT)
Dept: PHYSICAL THERAPY | Age: 84
End: 2024-10-14
Attending: INTERNAL MEDICINE
Payer: MEDICARE

## 2024-10-14 PROCEDURE — 97112 NEUROMUSCULAR REEDUCATION: CPT

## 2024-10-14 PROCEDURE — 97110 THERAPEUTIC EXERCISES: CPT

## 2024-10-14 NOTE — PROGRESS NOTES
Diagnosis:   Other fatigue (R53.83)  Gait abnormality (R26.9)  Primary osteoarthritis involving multiple joints (M15.0), Neck pain (M54.2)       Referring Provider: Carly Bolivar  Date of Evaluation:   10/9/2024    Precautions:  Fall Risk Next MD visit:   none scheduled  Date of Surgery: n/a   Insurance Primary/Secondary: [employer] MCR / N/A     # Auth Visits: -            Subjective: Did walking, but not HEP, over the weekend as she traveled to [place], [place] for the weekend with family.  Pain: -    Objective: See below treatment grid.    Assessment: Limited activity tolerance, but improves with short break.  Struggles with Narrow RAMON challenges.    Goals:   Pt report comfort with home and short community mobility  Pt demo ascend and descend stairs easily w/ B rail  Pt demonstrate independence with HEP  Pt to consider resuming pool exercise regularly  Pt demonstrate safe and independent transfers    Plan: Progress conditioning, UE/LE strengthening and balance challenges as able.  Date: 10/14/2024  TX#: 2/- Date:                 TX#: 3/ Date:                 TX#: 4/ Date:                 TX#: 5/ Date:   Tx#: 6/   Ther Ex (15')  - NuStep L5 8'  - Seated: UE flexion, abd, punching, scap retraction.  - Seated: LE: marching, LAQ, hip abd/add, toe taps, heel raises       Neuro Re-Ed (30')  - Stance variations (on level/foam, EO/EC, normal/narrow RAMON) with various UE support  - Silvino board side/side B w/ UE support                     HEP: [employer] Access Code [email or web address]    Charges: Ther Ex, Neuro Re-Ed x2       Total Timed Treatment: 45 min  Total Treatment Time: 45 min

## 2024-10-16 ENCOUNTER — OFFICE VISIT (OUTPATIENT)
Dept: PHYSICAL THERAPY | Age: 84
End: 2024-10-16
Attending: INTERNAL MEDICINE
Payer: MEDICARE

## 2024-10-16 PROCEDURE — 97112 NEUROMUSCULAR REEDUCATION: CPT

## 2024-10-16 PROCEDURE — 97110 THERAPEUTIC EXERCISES: CPT

## 2024-10-16 NOTE — PROGRESS NOTES
Diagnosis:   Other fatigue (R53.83)  Gait abnormality (R26.9)  Primary osteoarthritis involving multiple joints (M15.0), Neck pain (M54.2)       Referring Provider: Carly Bolivar  Date of Evaluation:   10/9/2024    Precautions:  Fall Risk Next MD visit:   none scheduled  Date of Surgery: n/a   Insurance Primary/Secondary: [employer] MCR / N/A     # Auth Visits: -            Subjective: Neck is improving.  Balance challenges were the hardest last time.  Pain: -    Objective: See below treatment grid.    Assessment: Pt increasing cervical and UE/LE AROM with ease.  Balance remains quite challenging.    Goals:   Pt report comfort with home and short community mobility  Pt demo ascend and descend stairs easily w/ B rail  Pt demonstrate independence with HEP  Pt to consider resuming pool exercise regularly  Pt demonstrate safe and independent transfers    Plan: Progress conditioning, UE/LE strengthening and balance challenges as able.  Date: 10/14/2024  TX#: 2/- Date:  10/16/2024           TX#: 3/- Date:                 TX#: 4/ Date:                 TX#: 5/ Date:   Tx#: 6/   Ther Ex (15')  - NuStep L5 8'  - Seated: UE flexion, abd, punching, scap retraction.  - Seated: LE: marching, LAQ, hip abd/add, toe taps, heel raises her Ex (15')  - NuStep L5 8'  - Seated: UE flexion, abd, punching, scap retraction.  - Seated: LE: marching, LAQ, hip abd/add, toe taps, heel raises      Neuro Re-Ed (30')  - Stance variations (on level/foam, EO/EC, normal/narrow RAMON) with various UE support  - Silvino board side/side B w/ UE support Neuro Re-Ed (30')  - Stance variations (on level/foam, EO/EC, normal/narrow RAMON) with various UE support  - Silvino board side/side and AP B w/ UE support                    HEP: [employer] Access Code [email or web address]    Charges: Ther Ex, Neuro Re-Ed x2       Total Timed Treatment: 45 min  Total Treatment Time: 45 min

## 2024-10-17 ENCOUNTER — TELEPHONE (OUTPATIENT)
Dept: INTERNAL MEDICINE CLINIC | Facility: CLINIC | Age: 84
End: 2024-10-17

## 2024-10-17 NOTE — TELEPHONE ENCOUNTER
Pt stated she was seen 10/2/24 for pre op-surgery 10/29/24, was advised to see Cardiologist, Rheumatologist , Pulmonologist , stated she have seen all and was told she was cleared , stated the Cardiologist sent a report to surgeon   Asking is she cleared

## 2024-10-21 ENCOUNTER — OFFICE VISIT (OUTPATIENT)
Dept: PHYSICAL THERAPY | Age: 84
End: 2024-10-21
Attending: INTERNAL MEDICINE
Payer: MEDICARE

## 2024-10-21 PROCEDURE — 97110 THERAPEUTIC EXERCISES: CPT

## 2024-10-21 PROCEDURE — 97112 NEUROMUSCULAR REEDUCATION: CPT

## 2024-10-21 NOTE — PROGRESS NOTES
Diagnosis:   Other fatigue (R53.83)  Gait abnormality (R26.9)  Primary osteoarthritis involving multiple joints (M15.0), Neck pain (M54.2)       Referring Provider: Carly Bolivar  Date of Evaluation:   10/9/2024    Precautions:  Fall Risk Next MD visit:   none scheduled  Date of Surgery: n/a   Insurance Primary/Secondary: [employer]ET[employer] MCR / N/A     # Auth Visits: -            Subjective: Neck is doing well.  Balance continues to be a challenge.  Pain: -    Objective: See below treatment grid.    Assessment: Balance and endurance continue to be limited.    Goals:   Pt report comfort with home and short community mobility  Pt demo ascend and descend stairs easily w/ B rail  Pt demonstrate independence with HEP  Pt to consider resuming pool exercise regularly  Pt demonstrate safe and independent transfers    Plan: Progress conditioning, UE/LE strengthening and balance challenges as able.  Date: 10/14/2024  TX#: 2/- Date:  10/16/2024           TX#: 3/- Date: 10/21/2024            TX#: 4/ Date:                 TX#: 5/ Date:   Tx#: 6/   Ther Ex (15')  - NuStep L5 8'  - Seated: UE flexion, abd, punching, scap retraction.  - Seated: LE: marching, LAQ, hip abd/add, toe taps, heel raises Ther Ex (15')  - NuStep L5 8'  - Seated: UE flexion, abd, punching, scap retraction.  - Seated: LE: marching, LAQ, hip abd/add, toe taps, heel raises Ther Ex (15')  - NuStep L5 8'  - Seated: UE flexion, abd, punching, scap retraction.  - Seated: LE: marching, LAQ, hip abd/add, toe taps, heel raises  - Standing heel raises, toe raises     Neuro Re-Ed (30')  - Stance variations (on level/foam, EO/EC, normal/narrow RAMON) with various UE support  - Silvino board side/side B w/ UE support Neuro Re-Ed (30')  - Stance variations (on level/foam, EO/EC, normal/narrow RAMON) with various UE support  - Silvino board side/side and AP B w/ UE support Neuro Re-Ed (30')  - Stance variations (on level/foam, EO/EC, normal/narrow RAMON) with various UE support  - Silvino  board side/side and AP B w/ UE support                   HEP: [employer] Access Code [email or web address]    Charges: Ther Ex, Neuro Re-Ed x2       Total Timed Treatment: 45 min  Total Treatment Time: 45 min

## 2024-10-23 ENCOUNTER — APPOINTMENT (OUTPATIENT)
Dept: PHYSICAL THERAPY | Age: 84
End: 2024-10-23
Attending: INTERNAL MEDICINE
Payer: MEDICARE

## 2024-10-25 NOTE — TELEPHONE ENCOUNTER
Tamie from [facility] called requesting the patients EKG tracing. She is requesting that it is sent to the following fax# ASAP:    Fax#: [phone]

## 2024-10-28 ENCOUNTER — OFFICE VISIT (OUTPATIENT)
Dept: PHYSICAL THERAPY | Age: 84
End: 2024-10-28
Attending: INTERNAL MEDICINE
Payer: MEDICARE

## 2024-10-28 PROCEDURE — 97112 NEUROMUSCULAR REEDUCATION: CPT

## 2024-10-28 PROCEDURE — 97110 THERAPEUTIC EXERCISES: CPT

## 2024-10-28 NOTE — PROGRESS NOTES
Diagnosis:   Other fatigue (R53.83)  Gait abnormality (R26.9)  Primary osteoarthritis involving multiple joints (M15.0), Neck pain (M54.2)       Referring Provider: Carly Bolivar  Date of Evaluation:   10/9/2024    Precautions:  Fall Risk Next MD visit:   none scheduled  Date of Surgery: n/a   Insurance Primary/Secondary: [employer]ET[employer] MCR / N/A     # Auth Visits: -            Subjective: Feeling a bit more unsteady on uneven surfaces, though doing ok with RW on level surfaces.  Pain: -    Objective: See below treatment grid.    Assessment: Pt able to tolerate increased challenge for seated tasks, but balance still limited/variable.    Goals:   Pt report comfort with home and short community mobility  Pt demo ascend and descend stairs easily w/ B rail  Pt demonstrate independence with HEP  Pt to consider resuming pool exercise regularly  Pt demonstrate safe and independent transfers    Plan: Progress conditioning, UE/LE strengthening and balance challenges as able.  Date: 10/14/2024  TX#: 2/- Date:  10/16/2024           TX#: 3/- Date: 10/21/2024            TX#: 4/ Date:  10/28/2024          TX#: 5/ Date:   Tx#: 6/   Ther Ex (15')  - NuStep L5 8'  - Seated: UE flexion, abd, punching, scap retraction.  - Seated: LE: marching, LAQ, hip abd/add, toe taps, heel raises Ther Ex (15')  - NuStep L5 8'  - Seated: UE flexion, abd, punching, scap retraction.  - Seated: LE: marching, LAQ, hip abd/add, toe taps, heel raises Ther Ex (15')  - NuStep L5 8'  - Seated: UE flexion, abd, punching, scap retraction.  - Seated: LE: marching, LAQ, hip abd/add, toe taps, heel raises  - Standing heel raises, toe raises Ther Ex (15')  - NuStep L5 8'  - Seated: UE flexion, abd, punching, scap retraction - RTB  - Seated: LE: marching, LAQ, hip abd/add, toe taps, heel raises - RTB  - Standing heel raises, toe raises    Neuro Re-Ed (30')  - Stance variations (on level/foam, EO/EC, normal/narrow RAMON) with various UE support  - Silvino board side/side B w/  UE support Neuro Re-Ed (30')  - Stance variations (on level/foam, EO/EC, normal/narrow RAMON) with various UE support  - Silvino board side/side and AP B w/ UE support Neuro Re-Ed (30')  - Stance variations (on level/foam, EO/EC, normal/narrow RAMON) with various UE support  - Silvino board side/side and AP B w/ UE support Neuro Re-Ed (30')  - Stance variations (on level/foam, EO/EC, normal/narrow RAMON) with various UE support  - Silvino board side/side and AP B w/ UE support                  HEP: [employer] Access Code [email or web address]    Charges: Ther Ex, Neuro Re-Ed x2       Total Timed Treatment: 45 min  Total Treatment Time: 45 min

## 2024-10-31 ENCOUNTER — TELEPHONE (OUTPATIENT)
Dept: PHYSICAL THERAPY | Facility: HOSPITAL | Age: 84
End: 2024-10-31

## 2024-11-01 ENCOUNTER — APPOINTMENT (OUTPATIENT)
Dept: PHYSICAL THERAPY | Age: 84
End: 2024-11-01
Attending: INTERNAL MEDICINE
Payer: MEDICARE

## 2024-11-04 ENCOUNTER — TELEPHONE (OUTPATIENT)
Dept: PHYSICAL THERAPY | Facility: HOSPITAL | Age: 84
End: 2024-11-04

## 2024-11-04 ENCOUNTER — APPOINTMENT (OUTPATIENT)
Dept: PHYSICAL THERAPY | Age: 84
End: 2024-11-04
Attending: INTERNAL MEDICINE
Payer: MEDICARE

## 2024-11-06 ENCOUNTER — OFFICE VISIT (OUTPATIENT)
Dept: PHYSICAL THERAPY | Age: 84
End: 2024-11-06
Attending: INTERNAL MEDICINE
Payer: MEDICARE

## 2024-11-06 PROCEDURE — 97112 NEUROMUSCULAR REEDUCATION: CPT

## 2024-11-06 PROCEDURE — 97110 THERAPEUTIC EXERCISES: CPT

## 2024-11-06 NOTE — PROGRESS NOTES
Diagnosis:   Other fatigue (R53.83)  Gait abnormality (R26.9)  Primary osteoarthritis involving multiple joints (M15.0), Neck pain (M54.2)       Referring Provider: Carly Bolivar  Date of Evaluation:   10/9/2024    Precautions:  Fall Risk Next MD visit:   none scheduled  Date of Surgery: n/a   Insurance Primary/Secondary: [employer] MCR / N/A     # Auth Visits: -            Subjective: A bit tired today due to troubles with tear duct surgery for eye.  Pain: -    Objective: See below treatment grid.    Assessment: Limited tolerance for all balance activities today.    Goals:   Pt report comfort with home and short community mobility  Pt demo ascend and descend stairs easily w/ B rail  Pt demonstrate independence with HEP  Pt to consider resuming pool exercise regularly  Pt demonstrate safe and independent transfers    Plan: Progress conditioning, UE/LE strengthening and balance challenges as able. Pt to schedule an additional 5 visits.  Date: 10/21/2024            TX#: 4/ Date:  10/28/2024          TX#: 5/ Date: 11/6/2024  Tx#: 6/     Ther Ex (15')  - NuStep L5 8'  - Seated: UE flexion, abd, punching, scap retraction.  - Seated: LE: marching, LAQ, hip abd/add, toe taps, heel raises  - Standing heel raises, toe raises Ther Ex (15')  - NuStep L5 8'  - Seated: UE flexion, abd, punching, scap retraction - RTB  - Seated: LE: marching, LAQ, hip abd/add, toe taps, heel raises - RTB  - Standing heel raises, toe raises Ther Ex (10')  - NuStep L5 8'  - Seated: UE flexion, abd, punching, scap retraction - RTB  - Seated: LE: marching YTB, LAQ YTB, hip abd YTB/add 4# ball, toe taps, heel raises   - Standing heel raises, toe raises     Neuro Re-Ed (30')  - Stance variations (on level/foam, EO/EC, normal/narrow RAMON) with various UE support  - Silvino board side/side and AP B w/ UE support Neuro Re-Ed (30')  - Stance variations (on level/foam, EO/EC, normal/narrow RAMON) with various UE support  - Silvino board side/side and AP B w/ UE  [facility] Daily Progress Note    Patient: Velia Glover Date: 2024   : 1956 Attending: Noe Camarillo MD   Admit Date: 2024 Room/Bed: [place] [address]   68 year old female        SUBJECTIVE:  Patient seen and examined.   S/p OR yesterday with Dr. Hussein for cervicothoracic decompression and fusion.   Lying in bed, in NAD. Neck and shoulder pain 8-9/10, asking for pain meds. BUE strength with slight improvement from pre-op. Persistent numbness to fingertips. Tolerating liquid diet. No new complaints.  Combs remains in place. Hemovacs with moderate output since OR. No acute events overnight.      ROS:  Review of Systems   HENT:  Negative for trouble swallowing.    Eyes:  Negative for visual disturbance.   Respiratory:  Negative for shortness of breath.    Cardiovascular:  Negative for chest pain.   Genitourinary:  Positive for difficulty urinating.   Musculoskeletal:  Positive for neck pain. Negative for gait problem.   Neurological:  Positive for weakness and numbness. Negative for headaches.         OBJECTIVE:    Medications/Infusions:  Scheduled:   Current Facility-Administered Medications   Medication Dose Route Frequency Provider Last Rate Last Admin    Potassium Replacement (Levels 3.6 - 4)   Does not apply See Admin Instructions Shania Camacho, DO        heparin (porcine) injection 5,000 Units  5,000 Units Subcutaneous 3 times per day Ashlie Fry PA-C   5,000 Units at 24 0522    pregabalin (LYRICA) capsule 100 mg  100 mg Oral BID Corrina Rain, CNP   100 mg at 24 0834    senna (SENOKOT) 17.2 mg  2 tablet Oral Nightly Noe Camarillo MD   17.2 mg at 24    polyethylene glycol (MIRALAX) packet 17 g  17 g Oral BID Noe Camarillo MD   17 g at 24 0834    [Held by provider] insulin glargine (LANTUS) injection 10 Units  10 Units Subcutaneous Daily Noe Camarillo MD   10 Units at 24 1150    nitrofurantoin  (macrocrystal-monohydrate) (MACROBID) SR capsule 100 mg  100 mg Oral BID  Noe Camarillo MD   100 mg at 05/21/24 0834    rosuvastatin (CRESTOR) tablet 20 mg  20 mg Oral Nightly Tabatha Multani CNP   20 mg at 05/20/24 2038    pantoprazole (PROTONIX) EC tablet 40 mg  40 mg Oral QAM AC Tabatha Multani CNP   40 mg at 05/21/24 0521    docusate sodium (COLACE) capsule 200 mg  200 mg Oral BID Lissett House CNP   200 mg at 05/21/24 0834    sodium chloride 0.9 % injection 2 mL  2 mL Intracatheter 2 times per day Lissett House CNP   2 mL at 05/21/24 0835    insulin lispro (ADMELOG,HumaLOG) - Correction Dose   Subcutaneous 4 times per day Lissett House CNP   1 Units at 05/21/24 0521    Potassium Standard Replacement Protocol (Levels 3.5 and lower)   Does not apply See Admin Instructions Lissett House CNP        Magnesium Standard Replacement Protocol   Does not apply See Admin Instructions Lissett House CNP        Phosphorus Standard Replacement Protocol   Does not apply See Admin Instructions Lissett House CNP           PRN:    Current Facility-Administered Medications   Medication Dose Route Frequency Provider Last Rate Last Admin    HYDROcodone-acetaminophen (NORCO) 5-325 MG per tablet 1 tablet  1 tablet Oral Q4H PRN Antle, Ashlie, PA-C   1 tablet at 05/21/24 0521    Or    HYDROcodone-acetaminophen (NORCO)  MG per tablet 1 tablet  1 tablet Oral Q4H PRN Antle, Ashlie, PA-C   1 tablet at 05/21/24 0922    tiZANidine (ZANAFLEX) tablet 4 mg  4 mg Oral TID PRN Antjose Ashlie, PA-C        fentaNYL (SUBLIMAZE) injection 50 mcg  50 mcg Intravenous Q2H PRN María Truong DO   50 mcg at 05/21/24 0132    hydrALAZINE (APRESOLINE) injection 10 mg  10 mg Intravenous Q4H PRN Corrina Rain CNP   10 mg at 05/21/24 0544    labetalol (NORMODYNE) injection 20 mg  20 mg Intravenous Q4H PRN Corrina Rain CNP   20 mg at 05/19/24 0550    bisacodyl (DULCOLAX) suppository 10 mg  10 mg  support Neuro Re-Ed (30')  - Stance variations (on level/foam, EO/EC, normal/narrow RAMON) with various UE support  - Silvino board side/side and AP B w/ UE support                   HEP: [email or web address] Access Code [email or web address]    Charges: Ther Ex, Neuro Re-Ed x2       Total Timed Treatment: 40 min  Total Treatment Time: 40 min   Rectal Daily PRN Tabatha Multani, CNP        sodium chloride 0.9 % flush bag 25 mL  25 mL Intravenous PRN Lissett House, DASHA        ondansetron (ZOFRAN ODT) disintegrating tablet 4 mg  4 mg Oral Q12H PRN Lissett House, DASHA        Or    ondansetron (ZOFRAN) injection 4 mg  4 mg Intravenous Q12H PRN Lissett House, CNP        dextrose 50 % injection 25 g  25 g Intravenous PRN Lissett House, CNP        dextrose 50 % injection 12.5 g  12.5 g Intravenous PRN Lissett House, CNP        glucagon (GLUCAGEN) injection 1 mg  1 mg Intramuscular PRN Lissett House, CNP        dextrose (GLUTOSE) 40 % gel 15 g  15 g Oral PRN Lissett House, CNP        dextrose (GLUTOSE) 40 % gel 30 g  30 g Oral PRN Lissett House, CNP        sodium chloride 0.9 % flush bag 25 mL  25 mL Intravenous PRN Lissett House, DASHA              Vital Last Value 24 Hour Range   Temperature 98.1 °F (36.7 °C) (05/21/24 0800) Temp  Min: 98.1 °F (36.7 °C)  Max: 99.1 °F (37.3 °C)   Pulse 85 (05/21/24 0900) Pulse  Min: 66  Max: 85   Respiratory 15 (05/21/24 0922) Resp  Min: 12  Max: 15   Non-Invasive  Blood Pressure (!) 176/84 (05/20/24 1000) BP  Min: 176/84  Max: 176/84   Arterial   Blood Pressure 140/47 (05/21/24 0900) Arterial Line BP  Min: 95/68  Max: 215/88   Pulse Oximetry 99 % (05/21/24 0900) SpO2  Min: 95 %  Max: 100 %       Intake/Output:      Intake/Output Summary (Last 24 hours) at 5/21/2024 0927  Last data filed at 5/21/2024 0400  Gross per 24 hour   Intake 2282.35 ml   Output 1680 ml   Net 602.35 ml     Bowel movements        Physical Exam:   Constitutional:  No acute distress.    Integument:  Warm. Dry.  HENT:  Normocephalic. Abrasion to forehead  Eyes:  PERRL, EOM intact  Neck:  Supple. Collar in place   Cardiac:  Peripheral perfusion appears normal.    GI:  Soft  Respiratory:  No respiratory distress noted.  Neuro:  Alert, Oriented x4. Answers questions and follows commands appropriately. Speech  fluent. Cranial nerves II-XII grossly intact. RUE D/B/T/WE 5/5 Hg/I 0/5 LUE D4/5 T 3/5 (limited 2/2 pain and clavicle fracture) B/We 5/5 HG/I 0/5. RLE 5/5 LLE HF/KF/DF/PF 5/5 KE 4/5. Reports numbness to fingertips with sensation intact to fingerprick.   Psych:  Cooperative, appropriate mood and affect.        Laboratory Results:  CBC  Recent Labs   Lab 05/21/24 0226 05/20/24  0650 05/19/24  0333   WBC 9.0 7.4 9.8   HCT 34.4* 37.7 38.5   HGB 11.3* 12.3 12.5    159 197       CMP  Recent Labs   Lab 05/21/24 0226 05/20/24  0650 05/19/24  0333   SODIUM 137 138 134*   POTASSIUM 3.9 3.7 3.7   CHLORIDE 104 103 100   CO2 25 28 27   GLUCOSE 195* 176* 199*   BUN 20 15 14   CREATININE 0.60 0.61 0.69   CALCIUM 8.6 9.3 9.2       Coags  Recent Labs   Lab 05/18/24  1846   INR 1.1   PTT 27         Microbiology:  Microbiology Results       None               Imaging:   XR CLAVICLE LEFT    Result Date: 5/19/2024  EXAM DATE:  5/18/2024 5:58 PM  PROCEDURE: XR CLAVICLE LEFT  CLINICAL INDICATION: Age:  68 years . Gender:  Female. History: clavicular pain s/p syncope/collapse with fall from truck, eval for fracture,  COMPARISON: None.  TECHNIQUE: AP and serendipity views of the left clavicle, 2 views  FINDINGS: There is an oblique transverse fracture of the distal clavicle lateral to the coracoclavicular ligament attachments with mild 2 mm superior displacement of the distal clavicle. There is mild AC joint arthrosis.      1.   Minimally displaced distal clavicle fracture. Electronically Signed by: RHEA ARMSTRONG MD Signed on: 5/19/2024 9:55 AM Workstation ID: [id number]    MRI CERVICAL SPINE WO CONTRAST    Result Date: 5/19/2024  EXAM: MRI CERVICAL SPINE WO CONTRAST, CT CERVICAL SPINE WO CONTRAST HISTORY: s/p syncopal episode, with fall from truck & collapse to ground: inability to move hands, HF weakness TECHNIQUE: MRI of the cervical spine was performed without contrast according to standard protocol. CT of the cervical spine  without contrast with sagittal and coronal reformats. COMPARISON: None FINDINGS: Grade 1 anterolisthesis of C4 on C5 measures 6 mm, likely secondary to degenerative facet arthropathy. Vertebral bodies are normal in height without evidence of compression fractures. There is marrow edema along the endplates of C5-C6, likely type I Modic changes. The craniocervical junction and visualized portions of the posterior fossa appear normal. There is focal cord signal abnormality and expansion at the level of C4-C6, likely representing cord contusion and edema, possibly superimposed on myelomalacia. There is moderate multilevel degenerative disc disease, with posterior disc bulge and osteophytes at C4-C7. There is focal edema within the asymmetrically widened anterior disc space at C6-C7 with discontinuity of the adjacent anterior longitudinal ligament and mild prevertebral soft tissue edema, suggestive of acute hyperextension injury. There is mild soft tissue edema within the posterior paraspinal soft tissues at this level as well as trace fluid within the C7-T1 facet joint, which may represent associated soft tissue injury. No acute fracture or dislocation is identified. There is severe spinal canal stenosis at C4-C7 secondary to disc bulge, osteophytes, and ligamentous hypertrophy. Uncovertebral and facet arthropathy contributes to bilateral severe neuroforaminal narrowing at C4-C7. There is degenerative osseous fusion of the right facet joints at C4-C5.     Acute hyperextension injury at C6-C7 with associated focal disruption of the anterior longitudinal ligament and widening of the anterior disc space, superimposed on severe spinal canal stenosis at C4-C7 secondary to disc bulge, osteophytes, ligamentous hypertrophy, and grade 1 degenerative anterolisthesis of C4 on C5. Focal cord signal abnormality at C4-C6 likely represents cord contusion and edema, possibly superimposed on chronic myelomalacia. Electronically Signed  by: JOHNATHAN FOSTER M.D. Signed on: 5/19/2024 1:03 AM Workstation ID: [id number]    CT CERVICAL SPINE WO CONTRAST    Result Date: 5/19/2024  EXAM: MRI CERVICAL SPINE WO CONTRAST, CT CERVICAL SPINE WO CONTRAST HISTORY: s/p syncopal episode, with fall from truck & collapse to ground: inability to move hands, HF weakness TECHNIQUE: MRI of the cervical spine was performed without contrast according to standard protocol. CT of the cervical spine without contrast with sagittal and coronal reformats. COMPARISON: None FINDINGS: Grade 1 anterolisthesis of C4 on C5 measures 6 mm, likely secondary to degenerative facet arthropathy. Vertebral bodies are normal in height without evidence of compression fractures. There is marrow edema along the endplates of C5-C6, likely type I Modic changes. The craniocervical junction and visualized portions of the posterior fossa appear normal. There is focal cord signal abnormality and expansion at the level of C4-C6, likely representing cord contusion and edema, possibly superimposed on myelomalacia. There is moderate multilevel degenerative disc disease, with posterior disc bulge and osteophytes at C4-C7. There is focal edema within the asymmetrically widened anterior disc space at C6-C7 with discontinuity of the adjacent anterior longitudinal ligament and mild prevertebral soft tissue edema, suggestive of acute hyperextension injury. There is mild soft tissue edema within the posterior paraspinal soft tissues at this level as well as trace fluid within the C7-T1 facet joint, which may represent associated soft tissue injury. No acute fracture or dislocation is identified. There is severe spinal canal stenosis at C4-C7 secondary to disc bulge, osteophytes, and ligamentous hypertrophy. Uncovertebral and facet arthropathy contributes to bilateral severe neuroforaminal narrowing at C4-C7. There is degenerative osseous fusion of the right facet joints at C4-C5.     Acute hyperextension  injury at C6-C7 with associated focal disruption of the anterior longitudinal ligament and widening of the anterior disc space, superimposed on severe spinal canal stenosis at C4-C7 secondary to disc bulge, osteophytes, ligamentous hypertrophy, and grade 1 degenerative anterolisthesis of C4 on C5. Focal cord signal abnormality at C4-C6 likely represents cord contusion and edema, possibly superimposed on chronic myelomalacia. Electronically Signed by: JOHNATHAN FOSTER M.D. Signed on: 5/19/2024 1:03 AM Workstation ID: [id number]    CTA CHEST ABDOMEN PELVIS    Result Date: 5/19/2024  PROCEDURE: CTA CHEST ABDOMEN PELVIS HISTORY: Pain after trauma. TECHNIQUE: CT angiogram of the chest, abdomen, and pelvis performed with intravenous contrast. Images obtained after intravenous injection of 75 mL of Omnipaque 350. 3D rendering performed. COMPARISON: Same day chest radiograph. ANGIOGRAM FINDINGS: The aorta is patent without evidence of acute pathology such as dissection or rupture. Patent tortuous thoracic aorta without stenosis or aneurysm. The aortic arch branch vessels are patent without evidence of high-grade stenosis. Conventional aortic arch branching pattern. Patent abdominal aorta without stenosis or aneurysm. The celiac artery, SMA, bilateral renal arteries, and SREE are patent without evidence of high-grade stenosis or aneurysm. The bilateral iliac arteries are patent without evidence of high-grade stenosis or aneurysm. CHEST FINDINGS: Lungs, Airways, & Pleurae: No airspace consolidation. No suspicious masses. The central airways are clear. No pleural effusion. Heart & Pericardium: Normal heart size. The coronary arteries are not well evaluated. No pericardial effusion. Pulmonary Arteries: The main pulmonary artery is normal in diameter. Mediastinum & Sirisha: No adenopathy. Status post left thyroidectomy. Subcentimeter hypoattenuating nodule in the right thyroid gland. Chest Wall: Displaced extra-articular  fracture of the left distal clavicle with overlying soft tissue contusion. Multilevel degenerative disc disease. ABDOMEN & PELVIS FINDINGS: Liver & Biliary Tract: The liver is normal in size and morphology. No focal hepatic lesions identified. The gallbladder is surgically absent. Dilated extrahepatic bile duct measuring up to 2.2 cm. Spleen: Mild splenomegaly measuring 13.2 cm in craniocaudal dimension. Pancreas: No abnormalities. GI Tract & Peritoneum: Small hiatal hernia. No bowel obstruction. No acute findings in the colon. Colonic diverticulosis without diverticulitis. No free fluid or free air. Adrenal Glands: No abnormalities. Kidneys & Bladder: Subcentimeter low-attenuation foci in both kidneys are too small to characterize. No hydronephrosis. The urinary bladder is distended and contains nondependent gas. Reproductive Organs: No pelvic mass. Lymph Nodes: No adenopathy. Body Wall: Instrumented fusion from L4-S1. Multilevel degenerative disc disease and facet arthropathy.     1. No aortic dissection or other acute aortic abnormality. The tortuous thoracic aorta is patent and nonaneurysmal. 2. Displaced extra-articular fracture of the left distal clavicle. No other acute findings in the chest. 3. No acute traumatic abnormality in the abdomen or pelvis. 4. Status post cholecystectomy with extrahepatic biliary ductal dilatation, more than expected for reservoir effect. Correlate with symptoms and laboratory values and consider MRCP, if clinically warranted. 5. Distended urinary bladder containing nondependent gas. Correlate with recent instrumentation and bladder outlet compromise. 6. Additional details as above. Electronically Signed by: MUSTAPHA NVOAK MD Signed on: 5/19/2024 12:02 AM Workstation ID: [id number]    CT SHOULDER WO CONTRAST RIGHT    Result Date: 5/18/2024  PROCEDURE: CT SHOULDER WO CONTRAST RIGHT HISTORY: Pain after fall. TECHNIQUE: CT right shoulder without intravenous contrast. Multiplanar  reformats were performed. COMPARISON: Same day chest radiograph. FINDINGS: No acute fracture or traumatic malalignment. The glenohumeral and acromioclavicular joints are congruent with mild osteoarthritis and small amount of joint fluid. Tiny intra-articular loose bodies and enthesophytes. Findings in the chest reported separately. Soft tissues about the right shoulder are unremarkable.     No acute osseous abnormality. Electronically Signed by: ROBBY FERRO MD Signed on: 5/18/2024 11:47 PM Workstation ID: [id number]    XR CHEST AP OR PA    Result Date: 5/18/2024  Exam: XR CHEST AP OR PA. Indication: Syncope/collapse. Comparison: None. Findings: Single view of the chest demonstrates widening of the upper mediastinum. Cardiac silhouette is within normal limits. No consolidation or pleural effusion. No pneumothorax. No acute osseous abnormality.     Impression: Widened upper mediastinum. CTA chest is recommended to assess for acute aortic pathology. Findings communicated to Dr. Camarillo via telephone by Dr. Robby Ferro at 8:24 p.m. on 5/18/2024. Electronically Signed by: ROBBY FERRO MD Signed on: 5/18/2024 8:25 PM Workstation ID: [id number]  '      IMPRESSION/PLAN:  69yo female with PMHx of HLD, HTN, DM, GERD, prior lumbar surgery who presents to the ED s/p fall.   CT CS without fracture.     Dx: weakness, central cord syndrome      518 MRI cervical spine showing hyperextension injury at C6-7 with associated focal disruption of the ALL and widening of anterior disc space, superimposed on spinal canal stenosis at C4-7, cord signal abnormality at C4-6 likely represents cord contusion and edema    POD1 C3-T2 posterior instrumented fusion, C4-7 laminectomies (5/20/24)   Hemovac x2, output 90/40cc since OR      Plan:  - Complete yasmeen-op abx  - Combs in place for urinary retention - continue. Conside TOV tomorrow   - Drain care; Hemovacs x2 to suction, empty and record output q4hrs  - Incision care: Continue OR  dressing, notify if saturated, reinforce prn  - Maintain cervical collar at all times  - Serial neuro checks, notify if change   - Pain control prn  - Advance diet as tolerated  - Activity as tolerated  - PT/OT  - UTI, cxs + for E. Coli, abx per primary   - Medical management per primary, appreciate  - Ppx: SCDs, okay for HSQ, IS q1hr while awake, bowel regimen   - Dispo: okay for floor from Nsgy standpoint       Patient seen and discussed with Dr. Sascha Hussein.

## 2024-11-12 ENCOUNTER — APPOINTMENT (OUTPATIENT)
Dept: URBAN - METROPOLITAN AREA CLINIC 244 | Age: 84
Setting detail: DERMATOLOGY
End: 2024-11-14

## 2024-11-12 DIAGNOSIS — L57.0 ACTINIC KERATOSIS: ICD-10-CM

## 2024-11-12 PROBLEM — C44.619 BASAL CELL CARCINOMA OF SKIN OF LEFT UPPER LIMB, INCLUDING SHOULDER: Status: ACTIVE | Noted: 2024-11-12

## 2024-11-12 PROCEDURE — 11602 EXC TR-EXT MAL+MARG 1.1-2 CM: CPT

## 2024-11-12 PROCEDURE — 99213 OFFICE O/P EST LOW 20 MIN: CPT | Mod: 25

## 2024-11-12 PROCEDURE — OTHER DEFER: OTHER

## 2024-11-12 PROCEDURE — OTHER MIPS QUALITY: OTHER

## 2024-11-12 PROCEDURE — OTHER EXCISION: OTHER

## 2024-11-12 PROCEDURE — 12032 INTMD RPR S/A/T/EXT 2.6-7.5: CPT

## 2024-11-12 PROCEDURE — OTHER COUNSELING: OTHER

## 2024-11-12 ASSESSMENT — LOCATION SIMPLE DESCRIPTION DERM
LOCATION SIMPLE: LEFT CHEEK
LOCATION SIMPLE: RIGHT UPPER BACK

## 2024-11-12 ASSESSMENT — LOCATION DETAILED DESCRIPTION DERM
LOCATION DETAILED: RIGHT SUPERIOR UPPER BACK
LOCATION DETAILED: LEFT LATERAL BUCCAL CHEEK

## 2024-11-12 ASSESSMENT — LOCATION ZONE DERM
LOCATION ZONE: TRUNK
LOCATION ZONE: FACE

## 2024-11-12 NOTE — PROCEDURE: COUNSELING
Nicotinamide Supplementation Recommendations: All supplements should be USP (https://www.usp.org/verification-services/verified-tiffany).
Sunscreen Recommendations: Brands include neutrogena, La-Roche, and Elta-MD. Rec mineral sunscreen use (zinc/titanium dioxide) over chemical sunscreens due to safety.
Detail Level: Simple

## 2024-11-12 NOTE — PROCEDURE: EXCISION
Positioning (Leave Blank If You Do Not Want): The patient was placed in a comfortable position exposing the surgical site.
Was An Eye Clamp Used?: No
Information: Selecting Yes will display possible errors in your note based on the variables you have selected. This validation is only offered as a suggestion for you. PLEASE NOTE THAT THE VALIDATION TEXT WILL BE REMOVED WHEN YOU FINALIZE YOUR NOTE. IF YOU WANT TO FAX A PRELIMINARY NOTE YOU WILL NEED TO TOGGLE THIS TO 'NO' IF YOU DO NOT WANT IT IN YOUR FAXED NOTE.
Melolabial Transposition Flap Text: The defect edges were debeveled with a #15 scalpel blade. Given the location of the defect and the proximity to free margins a melolabial flap was deemed most appropriate. Using a sterile surgical marker, an appropriate melolabial transposition flap was drawn incorporating the defect. The area thus outlined was incised deep to adipose tissue with a #15 scalpel blade. The skin margins were undermined to an appropriate distance in all directions utilizing iris scissors. Following this, the designed flap was carried over into the primary defect and sutured into place.
Banner Transposition Flap Text: The defect edges were debeveled with a #15 scalpel blade. Given the location of the defect and the proximity to free margins a Banner transposition flap was deemed most appropriate. Using a sterile surgical marker, an appropriate flap was drawn around the defect. The area thus outlined was incised deep to adipose tissue with a #15 scalpel blade. The skin margins were undermined to an appropriate distance in all directions utilizing iris scissors. Following this, the designed flap was carried into the primary defect and sutured into place.
Zygomaticofacial Flap Text: Given the location of the defect, shape of the defect and the proximity to free margins a zygomaticofacial flap was deemed most appropriate for repair. Using a sterile surgical marker, the appropriate flap was drawn incorporating the defect and placing the expected incisions within the relaxed skin tension lines where possible. The area thus outlined was incised deep to adipose tissue with a #15 scalpel blade with preservation of a vascular pedicle.  The skin margins were undermined to an appropriate distance in all directions utilizing iris scissors. The flap was then carried over into the defect and anchored with interrupted buried subcutaneous sutures.
Primary Defect Length (In Cm): 0
Detail Level: Detailed
Crescentic Advancement Flap Text: The defect edges were debeveled with a #15 scalpel blade. Given the location of the defect and the proximity to free margins a crescentic advancement flap was deemed most appropriate. Using a sterile surgical marker, the appropriate advancement flap was drawn incorporating the defect and placing the expected incisions within the relaxed skin tension lines where possible. The area thus outlined was incised deep to adipose tissue with a #15 scalpel blade. The skin margins were undermined to an appropriate distance in all directions utilizing iris scissors. Following this, the designed flap was advanced and carried over into the primary defect and sutured into place.
Advancement Flap (Single) Text: The defect edges were debeveled with a #15 scalpel blade. Given the location of the defect and the proximity to free margins a single advancement flap was deemed most appropriate. Using a sterile surgical marker, an appropriate advancement flap was drawn incorporating the defect and placing the expected incisions within the relaxed skin tension lines where possible. The area thus outlined was incised deep to adipose tissue with a #15 scalpel blade. The skin margins were undermined to an appropriate distance in all directions utilizing iris scissors. Following this, the designed flap was advanced and carried over into the primary defect and sutured into place.
Repair Performed By Another Provider Text (Leave Blank If You Do Not Want): After the tissue was excised the defect was repaired by another provider.
Show Accession Variable: Yes
Island Pedicle Flap Text: The defect edges were debeveled with a #15 scalpel blade. Given the location of the defect, shape of the defect and the proximity to free margins an island pedicle advancement flap was deemed most appropriate. Using a sterile surgical marker, an appropriate advancement flap was drawn incorporating the defect, outlining the appropriate donor tissue and placing the expected incisions within the relaxed skin tension lines where possible. The area thus outlined was incised deep to adipose tissue with a #15 scalpel blade. The skin margins were undermined to an appropriate distance in all directions around the primary defect and laterally outward around the island pedicle utilizing iris scissors.  There was minimal undermining beneath the pedicle flap. Following this, the flap was carried over into the primary defect and sutured into place.
Hemigard Intro: Due to skin fragility and wound tension, it was decided to use HEMIGARD adhesive retention suture devices to permit a linear closure. The skin was cleaned and dried for a 6cm distance away from the wound. Excessive hair, if present, was removed to allow for adhesion.
Cartilage Graft Text: The defect edges were debeveled with a #15 scalpel blade. Given the location of the defect, shape of the defect, the fact the defect involved a full thickness cartilage defect a cartilage graft was deemed most appropriate.  An appropriate donor site was identified, cleansed, and anesthetized. The cartilage graft was then harvested and transferred to the recipient site, oriented appropriately and then sutured into place.  The secondary defect was then repaired using a primary closure.
Estlander Flap (Upper To Lower Lip) Text: The defect of the lower lip was assessed and measured.  Given the location and size of the defect, an Estlander flap was deemed most appropriate. Using a sterile surgical marker, an appropriate Estlander flap was measured and drawn on the upper lip. Local anesthesia was then infiltrated. A scalpel was then used to incise the lateral aspect of the flap, through skin, muscle and mucosa, leaving the flap pedicled medially.  The flap was then rotated and positioned to fill the lower lip defect.  The flap was then sutured into place with a three layer technique, closing the orbicularis oris muscle layer with subcutaneous buried sutures, followed by a mucosal layer and an epidermal layer.
Double M-Plasty Intermediate Repair Preamble Text (Leave Blank If You Do Not Want): Undermining was performed with blunt dissection.
Where Do You Want The Question To Include Opioid Counseling Located?: Case Summary Tab
Complex Repair And Epidermal Autograft Text: The defect edges were debeveled with a #15 scalpel blade.  The primary defect was closed partially with a complex linear closure.  Given the location of the defect, shape of the defect and the proximity to free margins an epidermal autograft was deemed most appropriate to repair the remaining defect.  The graft was trimmed to fit the size of the remaining defect.  The graft was then placed in the primary defect, oriented appropriately, and sutured into place.
Paramedian Forehead Flap Text: A decision was made to reconstruct the defect utilizing an interpolation axial flap and a staged reconstruction.  A telfa template was made of the defect.  This telfa template was then used to outline the paramedian forehead pedicle flap.  The donor area for the pedicle flap was then injected with anesthesia.  The flap was excised through the skin and subcutaneous tissue down to the layer of the underlying musculature.  The pedicle flap was carefully excised within this deep plane to maintain its blood supply.  The edges of the donor site were undermined.   The donor site was closed in a primary fashion.  The pedicle was then rotated into position and sutured.  Once the tube was sutured into place, adequate blood supply was confirmed with blanching and refill.  The pedicle was then wrapped with xeroform gauze and dressed appropriately with a telfa and gauze bandage to ensure continued blood supply and protect the attached pedicle.
Double M-Plasty Complex Repair Preamble Text (Leave Blank If You Do Not Want): Extensive wide undermining was performed.
X Size Of Lesion In Cm (Optional): 0.7
Dorsal Nasal Flap Text: The defect edges were debeveled with a #15 scalpel blade. Given the location of the defect and the proximity to free margins a dorsal nasal flap was deemed most appropriate. Using a sterile surgical marker, an appropriate dorsal nasal flap was drawn around the defect. The area thus outlined was incised deep to adipose tissue with a #15 scalpel blade. The skin margins were undermined to an appropriate distance in all directions utilizing iris scissors. Following this, the designed flap was carried into the primary defect and sutured into place.
Nasal Turnover Hinge Flap Text: The defect edges were debeveled with a #15 scalpel blade.  Given the size, depth, location of the defect and the defect being full thickness a nasal turnover hinge flap was deemed most appropriate. Using a sterile surgical marker, an appropriate hinge flap was drawn incorporating the defect. The area thus outlined was incised with a #15 scalpel blade. The flap was designed to recreate the nasal mucosal lining and the alar rim. The skin margins were undermined to an appropriate distance in all directions utilizing iris scissors. Following this, the designed flap was carried over into the primary defect and sutured into place
Complex Repair And Dermal Autograft Text: The defect edges were debeveled with a #15 scalpel blade.  The primary defect was closed partially with a complex linear closure.  Given the location of the defect, shape of the defect and the proximity to free margins an dermal autograft was deemed most appropriate to repair the remaining defect.  The graft was trimmed to fit the size of the remaining defect.  The graft was then placed in the primary defect, oriented appropriately, and sutured into place.
Anesthesia Type: 0.5% lidocaine with 1:200,000 epinephrine and a 1:10 solution of 8.4% sodium bicarbonate
Hemigard Retention Suture: 2-0 Nylon
O-Z Plasty Text: The defect edges were debeveled with a #15 scalpel blade. Given the location of the defect, shape of the defect and the proximity to free margins an O-Z plasty (double transposition flap) was deemed most appropriate. Using a sterile surgical marker, the appropriate transposition flaps were drawn incorporating the defect and placing the expected incisions within the relaxed skin tension lines where possible. The area thus outlined was incised deep to adipose tissue with a #15 scalpel blade. The skin margins were undermined to an appropriate distance in all directions utilizing iris scissors. Hemostasis was achieved with electrocautery. The flaps were then transposed and carried over into place, one clockwise and the other counterclockwise, and anchored with interrupted buried subcutaneous sutures.
Saucerization Excision Additional Text (Leave Blank If You Do Not Want): The margin was drawn around the clinically apparent lesion.  Incisions were then made along these lines, in a tangential fashion, to the appropriate tissue plane and the lesion was extirpated.
Complex Repair And Tissue Cultured Epidermal Autograft Text: The defect edges were debeveled with a #15 scalpel blade.  The primary defect was closed partially with a complex linear closure.  Given the location of the defect, shape of the defect and the proximity to free margins an tissue cultured epidermal autograft was deemed most appropriate to repair the remaining defect.  The graft was trimmed to fit the size of the remaining defect.  The graft was then placed in the primary defect, oriented appropriately, and sutured into place.
Complex Repair And A-T Advancement Flap Text: The defect edges were debeveled with a #15 scalpel blade.  The primary defect was closed partially with a complex linear closure.  Given the location of the remaining defect, shape of the defect and the proximity to free margins an A-T advancement flap was deemed most appropriate for complete closure of the defect.  Using a sterile surgical marker, an appropriate advancement flap was drawn incorporating the defect and placing the expected incisions within the relaxed skin tension lines where possible. The area thus outlined was incised deep to adipose tissue with a #15 scalpel blade. The skin margins were undermined to an appropriate distance in all directions utilizing iris scissors and carried over to close the primary defect.
Elliptical Excision Additional Text (Leave Blank If You Do Not Want): The margin was drawn around the clinically apparent lesion.  An elliptical shape was then drawn on the skin incorporating the lesion and margins.  Incisions were then made along these lines to the appropriate tissue plane and the lesion was extirpated.
Partial Purse String (Simple) Text: Given the location of the defect and the characteristics of the surrounding skin a simple purse string closure was deemed most appropriate.  Undermining was performed circumferentially around the surgical defect.  A purse string suture was then placed and tightened. Wound tension of the circular defect prevented complete closure of the wound.
Epidermal Sutures: 4-0 Nylon
Debridement Text: The wound edges were debrided prior to proceeding with the closure to facilitate wound healing.
Hemigard Postcare Instructions: The HEMIGARD strips are to remain completely dry for at least 5-7 days.
Slit Excision Additional Text (Leave Blank If You Do Not Want): A linear line was drawn on the skin overlying the lesion. An incision was made slowly until the lesion was visualized.  Once visualized, the lesion was removed with blunt dissection.
Size Of Lesion In Cm: 1
Keystone Flap Text: The defect edges were debeveled with a #15 scalpel blade. Given the location of the defect, shape of the defect a keystone flap was deemed most appropriate. Using a sterile surgical marker, an appropriate keystone flap was drawn incorporating the defect, outlining the appropriate donor tissue and placing the expected incisions within the relaxed skin tension lines where possible. The area thus outlined was incised deep to adipose tissue with a #15 scalpel blade. The skin margins were undermined to an appropriate distance in all directions around the primary defect and laterally outward around the flap utilizing iris scissors. Following this, the designed flap was carried into the primary defect and sutured into place.
Tissue Cultured Epidermal Autograft Text: The defect edges were debeveled with a #15 scalpel blade. Given the location of the defect, shape of the defect and the proximity to free margins a tissue cultured epidermal autograft was deemed most appropriate.  The graft was then trimmed to fit the size of the defect.  The graft was then placed in the primary defect and oriented appropriately.
Complex Repair And Rotation Flap Text: The defect edges were debeveled with a #15 scalpel blade.  The primary defect was closed partially with a complex linear closure.  Given the location of the remaining defect, shape of the defect and the proximity to free margins a rotation flap was deemed most appropriate for complete closure of the defect.  Using a sterile surgical marker, an appropriate advancement flap was drawn incorporating the defect and placing the expected incisions within the relaxed skin tension lines where possible. The area thus outlined was incised deep to adipose tissue with a #15 scalpel blade. The skin margins were undermined to an appropriate distance in all directions utilizing iris scissors and carried over to close the primary defect.
Graft Donor Site Bandage (Optional-Leave Blank If You Don't Want In Note): Steri-strips and a pressure bandage were applied to the donor site.
Epidermal Closure Graft Donor Site (Optional): simple interrupted
O-T Advancement Flap Text: The defect edges were debeveled with a #15 scalpel blade. Given the location of the defect, shape of the defect and the proximity to free margins an O-T advancement flap was deemed most appropriate. Using a sterile surgical marker, an appropriate advancement flap was drawn incorporating the defect and placing the expected incisions within the relaxed skin tension lines where possible. The area thus outlined was incised deep to adipose tissue with a #15 scalpel blade. The skin margins were undermined to an appropriate distance in all directions utilizing iris scissors. Following this, the designed flap was advanced and carried over into the primary defect and sutured into place.
Retention Suture Text: Retention sutures were placed to support the closure and prevent dehiscence.
Complex Repair And Bilobe Flap Text: The defect edges were debeveled with a #15 scalpel blade.  The primary defect was closed partially with a complex linear closure.  Given the location of the remaining defect, shape of the defect and the proximity to free margins a bilobe flap was deemed most appropriate for complete closure of the defect.  Using a sterile surgical marker, an appropriate advancement flap was drawn incorporating the defect and placing the expected incisions within the relaxed skin tension lines where possible. The area thus outlined was incised deep to adipose tissue with a #15 scalpel blade. The skin margins were undermined to an appropriate distance in all directions utilizing iris scissors and carried over to close the primary defect.
Complex Repair And V-Y Plasty Text: The defect edges were debeveled with a #15 scalpel blade.  The primary defect was closed partially with a complex linear closure.  Given the location of the remaining defect, shape of the defect and the proximity to free margins a V-Y plasty was deemed most appropriate for complete closure of the defect.  Using a sterile surgical marker, an appropriate advancement flap was drawn incorporating the defect and placing the expected incisions within the relaxed skin tension lines where possible. The area thus outlined was incised deep to adipose tissue with a #15 scalpel blade. The skin margins were undermined to an appropriate distance in all directions utilizing iris scissors and carried over to close the primary defect.
Hatchet Flap Text: The defect edges were debeveled with a #15 scalpel blade. Given the location of the defect, shape of the defect and the proximity to free margins a hatchet flap was deemed most appropriate. Using a sterile surgical marker, an appropriate hatchet flap was drawn incorporating the defect and placing the expected incisions within the relaxed skin tension lines where possible. The area thus outlined was incised deep to adipose tissue with a #15 scalpel blade. The skin margins were undermined to an appropriate distance in all directions utilizing iris scissors. Following this, the designed flap was carried over into the primary defect and sutured into place.
Cheek-To-Nose Interpolation Flap Text: A decision was made to reconstruct the defect utilizing an interpolation axial flap and a staged reconstruction.  A telfa template was made of the defect.  This telfa template was then used to outline the Cheek-To-Nose Interpolation flap.  The donor area for the pedicle flap was then injected with anesthesia.  The flap was excised through the skin and subcutaneous tissue down to the layer of the underlying musculature.  The interpolation flap was carefully excised within this deep plane to maintain its blood supply.  The edges of the donor site were undermined.   The donor site was closed in a primary fashion.  The pedicle was then rotated into position and sutured.  Once the tube was sutured into place, adequate blood supply was confirmed with blanching and refill.  The pedicle was then wrapped with xeroform gauze and dressed appropriately with a telfa and gauze bandage to ensure continued blood supply and protect the attached pedicle.
A-T Advancement Flap Text: The defect edges were debeveled with a #15 scalpel blade. Given the location of the defect, shape of the defect and the proximity to free margins an A-T advancement flap was deemed most appropriate. Using a sterile surgical marker, an appropriate advancement flap was drawn incorporating the defect and placing the expected incisions within the relaxed skin tension lines where possible. The area thus outlined was incised deep to adipose tissue with a #15 scalpel blade. The skin margins were undermined to an appropriate distance in all directions utilizing iris scissors. Following this, the designed flap was advanced and carried over into the primary defect and sutured into place.
Double O-Z Plasty Text: The defect edges were debeveled with a #15 scalpel blade. Given the location of the defect, shape of the defect and the proximity to free margins a Double O-Z plasty (double transposition flap) was deemed most appropriate. Using a sterile surgical marker, the appropriate transposition flaps were drawn incorporating the defect and placing the expected incisions within the relaxed skin tension lines where possible. The area thus outlined was incised deep to adipose tissue with a #15 scalpel blade. The skin margins were undermined to an appropriate distance in all directions utilizing iris scissors. Hemostasis was achieved with electrocautery. The flaps were then transposed and carried over into place, one clockwise and the other counterclockwise, and anchored with interrupted buried subcutaneous sutures.
Nostril Rim Text: The closure involved the nostril rim.
Complex Repair And W Plasty Text: The defect edges were debeveled with a #15 scalpel blade.  The primary defect was closed partially with a complex linear closure.  Given the location of the remaining defect, shape of the defect and the proximity to free margins a W plasty was deemed most appropriate for complete closure of the defect.  Using a sterile surgical marker, an appropriate advancement flap was drawn incorporating the defect and placing the expected incisions within the relaxed skin tension lines where possible. The area thus outlined was incised deep to adipose tissue with a #15 scalpel blade. The skin margins were undermined to an appropriate distance in all directions utilizing iris scissors and carried over to close the primary defect.
Length To Time In Minutes Device Was In Place: 10
Abbe Flap (Lower To Upper Lip) Text: The defect of the upper lip was assessed and measured.  Given the location and size of the defect, an Abbe flap was deemed most appropriate. Using a sterile surgical marker, an appropriate Abbe flap was measured and drawn on the lower lip. Local anesthesia was then infiltrated. A scalpel was then used to incise the upper lip through and through the skin, vermilion, muscle and mucosa, leaving the flap pedicled on the opposite side.  The flap was then rotated and transferred to the lower lip defect.  The flap was then sutured into place with a three layer technique, closing the orbicularis oris muscle layer with subcutaneous buried sutures, followed by a mucosal layer and an epidermal layer.
Double O-Z Flap Text: The defect edges were debeveled with a #15 scalpel blade. Given the location of the defect, shape of the defect and the proximity to free margins a Double O-Z flap was deemed most appropriate. Using a sterile surgical marker, an appropriate transposition flap was drawn incorporating the defect and placing the expected incisions within the relaxed skin tension lines where possible. The area thus outlined was incised deep to adipose tissue with a #15 scalpel blade. The skin margins were undermined to an appropriate distance in all directions utilizing iris scissors. Following this, the designed flap was carried over into the primary defect and sutured into place.
Fusiform Excision Additional Text (Leave Blank If You Do Not Want): The margin was drawn around the clinically apparent lesion.  A fusiform shape was then drawn on the skin incorporating the lesion and margins.  Incisions were then made along these lines to the appropriate tissue plane and the lesion was extirpated.
Mustarde Flap Text: The defect edges were debeveled with a #15 scalpel blade.  Given the size, depth and location of the defect and the proximity to free margins a Mustarde flap was deemed most appropriate. Using a sterile surgical marker, an appropriate flap was drawn incorporating the defect. The area thus outlined was incised with a #15 scalpel blade. The skin margins were undermined to an appropriate distance in all directions utilizing iris scissors. Following this, the designed flap was carried into the primary defect and sutured into place.
Star Wedge Flap Text: The defect edges were debeveled with a #15 scalpel blade. Given the location of the defect, shape of the defect and the proximity to free margins a star wedge flap was deemed most appropriate. Using a sterile surgical marker, an appropriate rotation flap was drawn incorporating the defect and placing the expected incisions within the relaxed skin tension lines where possible. The area thus outlined was incised deep to adipose tissue with a #15 scalpel blade. The skin margins were undermined to an appropriate distance in all directions utilizing iris scissors. Following this, the designed flap was carried over into the primary defect and sutured into place.
O-Z Flap Text: The defect edges were debeveled with a #15 scalpel blade. Given the location of the defect, shape of the defect and the proximity to free margins an O-Z flap was deemed most appropriate. Using a sterile surgical marker, an appropriate transposition flap was drawn incorporating the defect and placing the expected incisions within the relaxed skin tension lines where possible. The area thus outlined was incised deep to adipose tissue with a #15 scalpel blade. The skin margins were undermined to an appropriate distance in all directions utilizing iris scissors. Following this, the designed flap was carried over into the primary defect and sutured into place.
Alar Island Pedicle Flap Text: The defect edges were debeveled with a #15 scalpel blade. Given the location of the defect, shape of the defect and the proximity to the alar rim an island pedicle advancement flap was deemed most appropriate. Using a sterile surgical marker, an appropriate advancement flap was drawn incorporating the defect, outlining the appropriate donor tissue and placing the expected incisions within the nasal ala running parallel to the alar rim. The area thus outlined was incised with a #15 scalpel blade. The skin margins were undermined minimally to an appropriate distance in all directions around the primary defect and laterally outward around the island pedicle utilizing iris scissors.  There was minimal undermining beneath the pedicle flap. Following this, the designed flap was carried over into the primary defect and sutured into place.
Adjacent Tissue Transfer Text: The defect edges were debeveled with a #15 scalpel blade. Given the location of the defect and the proximity to free margins an adjacent tissue transfer was deemed most appropriate. Using a sterile surgical marker, an appropriate flap was drawn incorporating the defect and placing the expected incisions within the relaxed skin tension lines where possible. The area thus outlined was incised deep to adipose tissue with a #15 scalpel blade. The skin margins were undermined to an appropriate distance in all directions utilizing iris scissors and carried over to close the primary defect.
Purse String (Simple) Text: Given the location of the defect and the characteristics of the surrounding skin a purse string simple closure was deemed most appropriate.  Undermining was performed circumferentially around the surgical defect.  A purse string suture was then placed and tightened.
Transposition Flap Text: The defect edges were debeveled with a #15 scalpel blade. Given the location of the defect and the proximity to free margins a transposition flap was deemed most appropriate. Using a sterile surgical marker, an appropriate transposition flap was drawn incorporating the defect. The area thus outlined was incised deep to adipose tissue with a #15 scalpel blade. The skin margins were undermined to an appropriate distance in all directions utilizing iris scissors. Following this, the designed flap was carried over into the primary defect and sutured into place.
Mucosal Advancement Flap Text: Given the location of the defect, shape of the defect and the proximity to free margins a mucosal advancement flap was deemed most appropriate. Incisions were made with a 15 blade scalpel in the appropriate fashion along the cutaneous vermilion border and the mucosal lip. The remaining actinically damaged mucosal tissue was excised.  The mucosal advancement flap was then elevated to the gingival sulcus with care taken to preserve the neurovascular structures and advanced into the primary defect. Care was taken to ensure that precise realignment of the vermilion border was achieved.
Pre-Excision Curettage Text (Leave Blank If You Do Not Want): Prior to drawing the surgical margin the visible lesion was removed with curettage to clearly define the lesion size.
Complex Repair And Double Advancement Flap Text: The defect edges were debeveled with a #15 scalpel blade.  The primary defect was closed partially with a complex linear closure.  Given the location of the remaining defect, shape of the defect and the proximity to free margins a double advancement flap was deemed most appropriate for complete closure of the defect.  Using a sterile surgical marker, an appropriate advancement flap was drawn incorporating the defect and placing the expected incisions within the relaxed skin tension lines where possible. The area thus outlined was incised deep to adipose tissue with a #15 scalpel blade. The skin margins were undermined to an appropriate distance in all directions utilizing iris scissors and carried over to close the primary defect.
Dressing: dry sterile dressing
Helical Rim Text: The closure involved the helical rim.
Helical Rim Advancement Flap Text: The defect edges were debeveled with a #15 blade scalpel.  Given the location of the defect and the proximity to free margins (helical rim) a double helical rim advancement flap was deemed most appropriate. Using a sterile surgical marker, the appropriate advancement flaps were drawn incorporating the defect and placing the expected incisions between the helical rim and antihelix where possible.  The area thus outlined was incised through and through with a #15 scalpel blade.  With a skin hook and iris scissors, the flaps were gently and sharply undermined and freed up. Folllowing this, the designed flaps were carried over into the primary defect and sutured into place.
Posterior Auricular Interpolation Flap Text: A decision was made to reconstruct the defect utilizing an interpolation axial flap and a staged reconstruction.  A telfa template was made of the defect.  This telfa template was then used to outline the posterior auricular interpolation flap.  The donor area for the pedicle flap was then injected with anesthesia.  The flap was excised through the skin and subcutaneous tissue down to the layer of the underlying musculature.  The pedicle flap was carefully excised within this deep plane to maintain its blood supply.  The edges of the donor site were undermined.   The donor site was closed in a primary fashion.  The pedicle was then rotated into position and sutured.  Once the tube was sutured into place, adequate blood supply was confirmed with blanching and refill.  The pedicle was then wrapped with xeroform gauze and dressed appropriately with a telfa and gauze bandage to ensure continued blood supply and protect the attached pedicle.
Advancement-Rotation Flap Text: The defect edges were debeveled with a #15 scalpel blade. Given the location of the defect, shape of the defect and the proximity to free margins an advancement-rotation flap was deemed most appropriate. Using a sterile surgical marker, an appropriate flap was drawn incorporating the defect and placing the expected incisions within the relaxed skin tension lines where possible. The area thus outlined was incised deep to adipose tissue with a #15 scalpel blade. The skin margins were undermined to an appropriate distance in all directions utilizing iris scissors. Following this, the designed flap was carried over into the primary defect and sutured into place.
Chonodrocutaneous Helical Advancement Flap Text: The defect edges were debeveled with a #15 scalpel blade. Given the location of the defect and the proximity to free margins a chondrocutaneous helical advancement flap was deemed most appropriate. Using a sterile surgical marker, the appropriate advancement flap was drawn incorporating the defect and placing the expected incisions within the relaxed skin tension lines where possible. The area thus outlined was incised deep to adipose tissue with a #15 scalpel blade. The skin margins were undermined to an appropriate distance in all directions utilizing iris scissors. Following this, the designed flap was advanced and carried over into the primary defect and sutured into place.
Complex Repair And Dorsal Nasal Flap Text: The defect edges were debeveled with a #15 scalpel blade.  The primary defect was closed partially with a complex linear closure.  Given the location of the remaining defect, shape of the defect and the proximity to free margins a dorsal nasal flap was deemed most appropriate for complete closure of the defect.  Using a sterile surgical marker, an appropriate flap was drawn incorporating the defect and placing the expected incisions within the relaxed skin tension lines where possible. The area thus outlined was incised deep to adipose tissue with a #15 scalpel blade. The skin margins were undermined to an appropriate distance in all directions utilizing iris scissors and carried over to close the primary defect.
Double Island Pedicle Flap Text: The defect edges were debeveled with a #15 scalpel blade. Given the location of the defect, shape of the defect and the proximity to free margins a double island pedicle advancement flap was deemed most appropriate. Using a sterile surgical marker, an appropriate advancement flap was drawn incorporating the defect, outlining the appropriate donor tissue and placing the expected incisions within the relaxed skin tension lines where possible. The area thus outlined was incised deep to adipose tissue with a #15 scalpel blade. The skin margins were undermined to an appropriate distance in all directions around the primary defect and laterally outward around the island pedicle utilizing iris scissors.  There was minimal undermining beneath the pedicle flap. Following this, the flap was carried over into the primary defect and sutured into place.
Wound Care: Petrolatum
Modified Advancement Flap Text: The defect edges were debeveled with a #15 scalpel blade. Given the location of the defect, shape of the defect and the proximity to free margins a modified advancement flap was deemed most appropriate. Using a sterile surgical marker, an appropriate advancement flap was drawn incorporating the defect and placing the expected incisions within the relaxed skin tension lines where possible. The area thus outlined was incised deep to adipose tissue with a #15 scalpel blade. The skin margins were undermined to an appropriate distance in all directions utilizing iris scissors. Following this, the designed flap was advanced and carried over into the primary defect and sutured into place.
O-L Flap Text: The defect edges were debeveled with a #15 scalpel blade. Given the location of the defect, shape of the defect and the proximity to free margins an O-L flap was deemed most appropriate. Using a sterile surgical marker, an appropriate advancement flap was drawn incorporating the defect and placing the expected incisions within the relaxed skin tension lines where possible. The area thus outlined was incised deep to adipose tissue with a #15 scalpel blade. The skin margins were undermined to an appropriate distance in all directions utilizing iris scissors. Following this, the designed flap was advanced and carried over into the primary defect and sutured into place.
Complex Repair And Melolabial Flap Text: The defect edges were debeveled with a #15 scalpel blade.  The primary defect was closed partially with a complex linear closure.  Given the location of the remaining defect, shape of the defect and the proximity to free margins a melolabial flap was deemed most appropriate for complete closure of the defect.  Using a sterile surgical marker, an appropriate advancement flap was drawn incorporating the defect and placing the expected incisions within the relaxed skin tension lines where possible. The area thus outlined was incised deep to adipose tissue with a #15 scalpel blade. The skin margins were undermined to an appropriate distance in all directions utilizing iris scissors and carried over to close the primary defect.
Rotation Flap Text: The defect edges were debeveled with a #15 scalpel blade. Given the location of the defect, shape of the defect and the proximity to free margins a rotation flap was deemed most appropriate. Using a sterile surgical marker, an appropriate rotation flap was drawn incorporating the defect and placing the expected incisions within the relaxed skin tension lines where possible. The area thus outlined was incised deep to adipose tissue with a #15 scalpel blade. The skin margins were undermined to an appropriate distance in all directions utilizing iris scissors. Following this, the designed flap was carried over into the primary defect and sutured into place.
Complex Repair And Modified Advancement Flap Text: The defect edges were debeveled with a #15 scalpel blade.  The primary defect was closed partially with a complex linear closure.  Given the location of the remaining defect, shape of the defect and the proximity to free margins a modified advancement flap was deemed most appropriate for complete closure of the defect.  Using a sterile surgical marker, an appropriate advancement flap was drawn incorporating the defect and placing the expected incisions within the relaxed skin tension lines where possible. The area thus outlined was incised deep to adipose tissue with a #15 scalpel blade. The skin margins were undermined to an appropriate distance in all directions utilizing iris scissors and carried over to close the primary defect.
Bi-Rhombic Flap Text: The defect edges were debeveled with a #15 scalpel blade. Given the location of the defect and the proximity to free margins a bi-rhombic flap was deemed most appropriate. Using a sterile surgical marker, an appropriate rhombic flap was drawn incorporating the defect. The area thus outlined was incised deep to adipose tissue with a #15 scalpel blade. The skin margins were undermined to an appropriate distance in all directions utilizing iris scissors. Following this, the designed flap was carried over into the primary defect and sutured into place.
Anesthesia Volume In Cc: 8
Bilobed Flap Text: The defect edges were debeveled with a #15 scalpel blade. Given the location of the defect and the proximity to free margins a bilobe flap was deemed most appropriate. Using a sterile surgical marker, an appropriate bilobe flap drawn around the defect. The area thus outlined was incised deep to adipose tissue with a #15 scalpel blade. The skin margins were undermined to an appropriate distance in all directions utilizing iris scissors. Following this, the designed flap was carried over into the primary defect and sutured into place.
Nasalis-Muscle-Based Myocutaneous Island Pedicle Flap Text: Using a #15 blade, an incision was made around the donor flap to the level of the nasalis muscle. Wide lateral undermining was then performed in both the subcutaneous plane above the nasalis muscle, and in a submuscular plane just above periosteum. This allowed the formation of a free nasalis muscle axial pedicle (based on the angular artery) which was still attached to the actual cutaneous flap, increasing its mobility and vascular viability. Hemostasis was obtained with pinpoint electrocoagulation. The flap was mobilized into position and the pivotal anchor points positioned and stabilized with buried interrupted sutures. Subcutaneous and dermal tissues were closed in a multilayered fashion with sutures. Tissue redundancies were excised, and the epidermal edges were apposed without significant tension and sutured with sutures.
Trilobed Flap Text: The defect edges were debeveled with a #15 scalpel blade. Given the location of the defect and the proximity to free margins a trilobed flap was deemed most appropriate. Using a sterile surgical marker, an appropriate trilobed flap was drawn around the defect. The area thus outlined was incised deep to adipose tissue with a #15 scalpel blade. The skin margins were undermined to an appropriate distance in all directions utilizing iris scissors. Following this, the designed flap was carried into the primary defect and sutured into place.
Partial Purse String (Intermediate) Text: Given the location of the defect and the characteristics of the surrounding skin an intermediate purse string closure was deemed most appropriate.  Undermining was performed circumferentially around the surgical defect.  A purse string suture was then placed and tightened. Wound tension of the circular defect prevented complete closure of the wound.
Complex Repair And O-T Advancement Flap Text: The defect edges were debeveled with a #15 scalpel blade.  The primary defect was closed partially with a complex linear closure.  Given the location of the remaining defect, shape of the defect and the proximity to free margins an O-T advancement flap was deemed most appropriate for complete closure of the defect.  Using a sterile surgical marker, an appropriate advancement flap was drawn incorporating the defect and placing the expected incisions within the relaxed skin tension lines where possible. The area thus outlined was incised deep to adipose tissue with a #15 scalpel blade. The skin margins were undermined to an appropriate distance in all directions utilizing iris scissors and carried over to close the primary defect.
Double Z Plasty Text: The lesion was extirpated to the level of the fat with a #15 scalpel blade. Given the location of the defect, shape of the defect and the proximity to free margins a double Z-plasty was deemed most appropriate for repair. Using a sterile surgical marker, the appropriate transposition arms of the double Z-plasty were drawn incorporating the defect and placing the expected incisions within the relaxed skin tension lines where possible. The area thus outlined was incised deep to adipose tissue with a #15 scalpel blade. The skin margins were undermined to an appropriate distance in all directions utilizing iris scissors. The opposing transposition arms were then transposed and carried over into place in opposite direction and anchored with interrupted buried subcutaneous sutures.
Melolabial Interpolation Flap Text: A decision was made to reconstruct the defect utilizing an interpolation axial flap and a staged reconstruction.  A telfa template was made of the defect.  This telfa template was then used to outline the melolabial interpolation flap.  The donor area for the pedicle flap was then injected with anesthesia.  The flap was excised through the skin and subcutaneous tissue down to the layer of the underlying musculature.  The pedicle flap was carefully excised within this deep plane to maintain its blood supply.  The edges of the donor site were undermined.   The donor site was closed in a primary fashion.  The pedicle was then rotated into position and sutured.  Once the tube was sutured into place, adequate blood supply was confirmed with blanching and refill.  The pedicle was then wrapped with xeroform gauze and dressed appropriately with a telfa and gauze bandage to ensure continued blood supply and protect the attached pedicle.
Abbe Flap (Upper To Lower Lip) Text: The defect of the lower lip was assessed and measured.  Given the location and size of the defect, an Abbe flap was deemed most appropriate. Using a sterile surgical marker, an appropriate Abbe flap was measured and drawn on the upper lip. Local anesthesia was then infiltrated.  A scalpel was then used to incise the upper lip through and through the skin, vermilion, muscle and mucosa, leaving the flap pedicled on the opposite side.  The flap was then rotated and transferred to the lower lip defect.  The flap was then sutured into place with a three layer technique, closing the orbicularis oris muscle layer with subcutaneous buried sutures, followed by a mucosal layer and an epidermal layer.
Bilateral Rotation Flap Text: The defect edges were debeveled with a #15 scalpel blade. Given the location of the defect, shape of the defect and the proximity to free margins a bilateral rotation flap was deemed most appropriate. Using a sterile surgical marker, an appropriate rotation flap was drawn incorporating the defect and placing the expected incisions within the relaxed skin tension lines where possible. The area thus outlined was incised deep to adipose tissue with a #15 scalpel blade. The skin margins were undermined to an appropriate distance in all directions utilizing iris scissors. Following this, the designed flap was carried over into the primary defect and sutured into place.
O-T Plasty Text: The defect edges were debeveled with a #15 scalpel blade. Given the location of the defect, shape of the defect and the proximity to free margins an O-T plasty was deemed most appropriate. Using a sterile surgical marker, an appropriate O-T plasty was drawn incorporating the defect and placing the expected incisions within the relaxed skin tension lines where possible. The area thus outlined was incised deep to adipose tissue with a #15 scalpel blade. The skin margins were undermined to an appropriate distance in all directions utilizing iris scissors. Following this, the designed flap was carried over into the primary defect and sutured into place.
Hemostasis: Electrocautery
Advancement Flap (Double) Text: The defect edges were debeveled with a #15 scalpel blade. Given the location of the defect and the proximity to free margins a double advancement flap was deemed most appropriate. Using a sterile surgical marker, the appropriate advancement flaps were drawn incorporating the defect and placing the expected incisions within the relaxed skin tension lines where possible. The area thus outlined was incised deep to adipose tissue with a #15 scalpel blade. The skin margins were undermined to an appropriate distance in all directions utilizing iris scissors. Following this, the designed flaps were advanced and carried over into the primary defect and sutured into place.
No Repair - Repaired With Adjacent Surgical Defect Text (Leave Blank If You Do Not Want): After the excision the defect was repaired concurrently with another surgical defect which was in close approximation.
Bilateral Helical Rim Advancement Flap Text: The defect edges were debeveled with a #15 blade scalpel.  Given the location of the defect and the proximity to free margins (helical rim) a bilateral helical rim advancement flap was deemed most appropriate. Using a sterile surgical marker, the appropriate advancement flaps were drawn incorporating the defect and placing the expected incisions between the helical rim and antihelix where possible.  The area thus outlined was incised through and through with a #15 scalpel blade.  With a skin hook and iris scissors, the flaps were gently and sharply undermined and freed up. Following this, the designed flaps were placed into the primary defect and sutured into place.
[place] Pedicle Flap-Requiring Vessel Identification Text: The defect edges were debeveled with a #15 scalpel blade. Given the location of the defect, shape of the defect and the proximity to free margins an island pedicle advancement flap was deemed most appropriate. Using a sterile surgical marker, an appropriate advancement flap was drawn, based on the axial vessel mentioned above, incorporating the defect, outlining the appropriate donor tissue and placing the expected incisions within the relaxed skin tension lines where possible. The area thus outlined was incised deep to adipose tissue with a #15 scalpel blade. The skin margins were undermined to an appropriate distance in all directions around the primary defect and laterally outward around the island pedicle utilizing iris scissors.  There was minimal undermining beneath the pedicle flap. Following this, the designed flap was carried over into the primary defect and sutured into place.
Cheek Interpolation Flap Text: A decision was made to reconstruct the defect utilizing an interpolation axial flap and a staged reconstruction.  A telfa template was made of the defect.  This telfa template was then used to outline the Cheek Interpolation flap.  The donor area for the pedicle flap was then injected with anesthesia.  The flap was excised through the skin and subcutaneous tissue down to the layer of the underlying musculature.  The interpolation flap was carefully excised within this deep plane to maintain its blood supply.  The edges of the donor site were undermined.   The donor site was closed in a primary fashion.  The pedicle was then rotated into position and sutured.  Once the tube was sutured into place, adequate blood supply was confirmed with blanching and refill.  The pedicle was then wrapped with xeroform gauze and dressed appropriately with a telfa and gauze bandage to ensure continued blood supply and protect the attached pedicle.
Estimated Blood Loss (Cc): minimal
Ear Star Wedge Flap Text: The defect edges were debeveled with a #15 blade scalpel.  Given the location of the defect and the proximity to free margins (helical rim) an ear star wedge flap was deemed most appropriate. Using a sterile surgical marker, the appropriate flap was drawn incorporating the defect and placing the expected incisions between the helical rim and antihelix where possible.  The area thus outlined was incised through and through with a #15 scalpel blade. Following this, the designed flap was carried over into the primary defect and sutured into place.
Ftsg Text: The defect edges were debeveled with a #15 scalpel blade. Given the location of the defect, shape of the defect and the proximity to free margins a full thickness skin graft was deemed most appropriate. Using a sterile surgical marker, the primary defect shape was transferred to the donor site. The area thus outlined was incised deep to adipose tissue with a #15 scalpel blade.  The harvested graft was then trimmed of adipose tissue until only dermis and epidermis was left.  The skin margins of the secondary defect were undermined to an appropriate distance in all directions utilizing iris scissors.  The secondary defect was closed with interrupted buried subcutaneous sutures.  The skin edges were then re-apposed with running  sutures.  The skin graft was then placed in the primary defect and oriented appropriately.
Complex Repair And Rhombic Flap Text: The defect edges were debeveled with a #15 scalpel blade.  The primary defect was closed partially with a complex linear closure.  Given the location of the remaining defect, shape of the defect and the proximity to free margins a rhombic flap was deemed most appropriate for complete closure of the defect.  Using a sterile surgical marker, an appropriate advancement flap was drawn incorporating the defect and placing the expected incisions within the relaxed skin tension lines where possible. The area thus outlined was incised deep to adipose tissue with a #15 scalpel blade. The skin margins were undermined to an appropriate distance in all directions utilizing iris scissors and carried over to close the primary defect.
Vermilion Border Text: The closure involved the vermilion border.
Complex Repair And Burow's Graft Text: The defect edges were debeveled with a #15 scalpel blade.  The primary defect was closed partially with a complex linear closure.  Given the location of the defect, shape of the defect, the proximity to free margins and the presence of a standing cone deformity a Burow's graft was deemed most appropriate to repair the remaining defect.  The graft was trimmed to fit the size of the remaining defect.  The graft was then placed in the primary defect, oriented appropriately, and sutured into place.
Rhomboid Transposition Flap Text: The defect edges were debeveled with a #15 scalpel blade. Given the location of the defect and the proximity to free margins a rhomboid transposition flap was deemed most appropriate. Using a sterile surgical marker, an appropriate rhomboid flap was drawn incorporating the defect. The area thus outlined was incised deep to adipose tissue with a #15 scalpel blade. The skin margins were undermined to an appropriate distance in all directions utilizing iris scissors. Following this, the designed flap was carried over into the primary defect and sutured into place.
Home Suture Removal Text: Patient was provided a home suture removal kit and will remove their sutures at home.  If they have any questions or difficulties they will call the office.
Complex Repair And Ftsg Text: The defect edges were debeveled with a #15 scalpel blade.  The primary defect was closed partially with a complex linear closure.  Given the location of the defect, shape of the defect and the proximity to free margins a full thickness skin graft was deemed most appropriate to repair the remaining defect.  The graft was trimmed to fit the size of the remaining defect.  The graft was then placed in the primary defect, oriented appropriately, and sutured into place.
Complex Repair And M Plasty Text: The defect edges were debeveled with a #15 scalpel blade.  The primary defect was closed partially with a complex linear closure.  Given the location of the remaining defect, shape of the defect and the proximity to free margins an M plasty was deemed most appropriate for complete closure of the defect.  Using a sterile surgical marker, an appropriate advancement flap was drawn incorporating the defect and placing the expected incisions within the relaxed skin tension lines where possible. The area thus outlined was incised deep to adipose tissue with a #15 scalpel blade. The skin margins were undermined to an appropriate distance in all directions utilizing iris scissors and carried over to close the primary defect.
Deep Sutures: 3-0 Vicryl
Billing Type: Third-Party Bill
Complex Repair And Skin Substitute Graft Text: The defect edges were debeveled with a #15 scalpel blade.  The primary defect was closed partially with a complex linear closure.  Given the location of the remaining defect, shape of the defect and the proximity to free margins a skin substitute graft was deemed most appropriate to repair the remaining defect.  The graft was trimmed to fit the size of the remaining defect.  The graft was then placed in the primary defect, oriented appropriately, and sutured into place.
Saucerization Depth: dermis and superficial adipose tissue
W Plasty Text: The lesion was extirpated to the level of the fat with a #15 scalpel blade. Given the location of the defect, shape of the defect and the proximity to free margins a W-plasty was deemed most appropriate for repair. Using a sterile surgical marker, the appropriate transposition arms of the W-plasty were drawn incorporating the defect and placing the expected incisions within the relaxed skin tension lines where possible. The area thus outlined was incised deep to adipose tissue with a #15 scalpel blade. The skin margins were undermined to an appropriate distance in all directions utilizing iris scissors. The opposing transposition arms were then transposed and carried over into place in opposite direction and anchored with interrupted buried subcutaneous sutures.
Purse String (Intermediate) Text: Given the location of the defect and the characteristics of the surrounding skin a purse string intermediate closure was deemed most appropriate.  Undermining was performed circumferentially around the surgical defect.  A purse string suture was then placed and tightened.
Suturegard Body: The suture ends were repeatedly re-tightened and re-clamped to achieve the desired tissue expansion.
Excisional Biopsy Additional Text (Leave Blank If You Do Not Want): The margin was drawn around the clinically apparent lesion. An elliptical shape was then drawn on the skin incorporating the lesion and margins.  Incisions were then made along these lines to the appropriate tissue plane and the lesion was extirpated.
Suture Removal: 14 days
Complex Repair And Transposition Flap Text: The defect edges were debeveled with a #15 scalpel blade.  The primary defect was closed partially with a complex linear closure.  Given the location of the remaining defect, shape of the defect and the proximity to free margins a transposition flap was deemed most appropriate for complete closure of the defect.  Using a sterile surgical marker, an appropriate advancement flap was drawn incorporating the defect and placing the expected incisions within the relaxed skin tension lines where possible. The area thus outlined was incised deep to adipose tissue with a #15 scalpel blade. The skin margins were undermined to an appropriate distance in all directions utilizing iris scissors and carried over to close the primary defect.
Burow's Advancement Flap Text: The defect edges were debeveled with a #15 scalpel blade. Given the location of the defect and the proximity to free margins a Burow's advancement flap was deemed most appropriate. Using a sterile surgical marker, the appropriate advancement flap was drawn incorporating the defect and placing the expected incisions within the relaxed skin tension lines where possible. The area thus outlined was incised deep to adipose tissue with a #15 scalpel blade. The skin margins were undermined to an appropriate distance in all directions utilizing iris scissors. Following this, the designed flap was advanced and carried over into the primary defect and sutured into place.
Complex Repair And Double M Plasty Text: The defect edges were debeveled with a #15 scalpel blade.  The primary defect was closed partially with a complex linear closure.  Given the location of the remaining defect, shape of the defect and the proximity to free margins a double M plasty was deemed most appropriate for complete closure of the defect.  Using a sterile surgical marker, an appropriate advancement flap was drawn incorporating the defect and placing the expected incisions within the relaxed skin tension lines where possible. The area thus outlined was incised deep to adipose tissue with a #15 scalpel blade. The skin margins were undermined to an appropriate distance in all directions utilizing iris scissors and carried over to close the primary defect.
Xenograft Text: The defect edges were debeveled with a #15 scalpel blade. Given the location of the defect, shape of the defect and the proximity to free margins a xenograft was deemed most appropriate.  The graft was then trimmed to fit the size of the defect.  The graft was then placed in the primary defect and oriented appropriately.
Island Pedicle Flap With Canthal Suspension Text: The defect edges were debeveled with a #15 scalpel blade. Given the location of the defect, shape of the defect and the proximity to free margins an island pedicle advancement flap was deemed most appropriate. Using a sterile surgical marker, an appropriate advancement flap was drawn incorporating the defect, outlining the appropriate donor tissue and placing the expected incisions within the relaxed skin tension lines where possible. The area thus outlined was incised deep to adipose tissue with a #15 scalpel blade. The skin margins were undermined to an appropriate distance in all directions around the primary defect and laterally outward around the island pedicle utilizing iris scissors.  There was minimal undermining beneath the pedicle flap. A suspension suture was placed in the canthal tendon to prevent tension and prevent ectropion. Following this, the designed flap was placed into the primary defect and sutured into place.
Eye Clamp Note Details: An eye clamp was used during the procedure.
Epidermal Closure: running
Excision Method: Fusiform
Excision Depth: adipose tissue
Size Of Margin In Cm: 0.5
Complex Repair And Split-Thickness Skin Graft Text: The defect edges were debeveled with a #15 scalpel blade.  The primary defect was closed partially with a complex linear closure.  Given the location of the defect, shape of the defect and the proximity to free margins a split thickness skin graft was deemed most appropriate to repair the remaining defect.  The graft was trimmed to fit the size of the remaining defect.  The graft was then placed in the primary defect, oriented appropriately, and sutured into place.
Spiral Flap Text: The defect edges were debeveled with a #15 scalpel blade. Given the location of the defect, shape of the defect and the proximity to free margins a spiral flap was deemed most appropriate. Using a sterile surgical marker, an appropriate rotation flap was drawn incorporating the defect and placing the expected incisions within the relaxed skin tension lines where possible. The area thus outlined was incised deep to adipose tissue with a #15 scalpel blade. The skin margins were undermined to an appropriate distance in all directions utilizing iris scissors. Following this, the designed flap was carried over into the primary defect and sutured into place.
Composite Graft Text: The defect edges were debeveled with a #15 scalpel blade. Given the location of the defect, shape of the defect, the proximity to free margins and the fact the defect was full thickness a composite graft was deemed most appropriate.  The defect was outline and then transferred to the donor site.  A full thickness graft was then excised from the donor site. The graft was then placed in the primary defect, oriented appropriately and then sutured into place.  The secondary defect was then repaired using a primary closure.
Bilobed Transposition Flap Text: The defect edges were debeveled with a #15 scalpel blade. Given the location of the defect and the proximity to free margins a bilobed transposition flap was deemed most appropriate. Using a sterile surgical marker, an appropriate bilobe flap drawn around the defect. The area thus outlined was incised deep to adipose tissue with a #15 scalpel blade. The skin margins were undermined to an appropriate distance in all directions utilizing iris scissors. Following this, the designed flap was carried over into the primary defect and sutured into place.
Mercedes Flap Text: The defect edges were debeveled with a #15 scalpel blade. Given the location of the defect, shape of the defect and the proximity to free margins a Mercedes flap was deemed most appropriate. Using a sterile surgical marker, an appropriate advancement flap was drawn incorporating the defect and placing the expected incisions within the relaxed skin tension lines where possible. The area thus outlined was incised deep to adipose tissue with a #15 scalpel blade. The skin margins were undermined to an appropriate distance in all directions utilizing iris scissors. Following this, the designed flap was advanced and carried over into the primary defect and sutured into place.
Post-Care Instructions: I reviewed with the patient in detail post-care instructions. Patient is not to engage in any heavy lifting, exercise, or swimming for the next 14 days. Should the patient develop any fevers, chills, bleeding, severe pain patient will contact the office immediately.
Lab: -[id number]
Consent was obtained from the patient. The risks and benefits to therapy were discussed in detail. Specifically, the risks of infection, scarring, bleeding, prolonged wound healing, incomplete removal, allergy to anesthesia, nerve injury and recurrence were addressed. Prior to the procedure, the treatment site was clearly identified and confirmed by the patient.
Mastoid Interpolation Flap Text: A decision was made to reconstruct the defect utilizing an interpolation axial flap and a staged reconstruction.  A telfa template was made of the defect.  This telfa template was then used to outline the mastoid interpolation flap.  The donor area for the pedicle flap was then injected with anesthesia.  The flap was excised through the skin and subcutaneous tissue down to the layer of the underlying musculature.  The pedicle flap was carefully excised within this deep plane to maintain its blood supply.  The edges of the donor site were undermined.   The donor site was closed in a primary fashion.  The pedicle was then rotated into position and sutured.  Once the tube was sutured into place, adequate blood supply was confirmed with blanching and refill.  The pedicle was then wrapped with xeroform gauze and dressed appropriately with a telfa and gauze bandage to ensure continued blood supply and protect the attached pedicle.
Complex Repair And Z Plasty Text: The defect edges were debeveled with a #15 scalpel blade.  The primary defect was closed partially with a complex linear closure.  Given the location of the remaining defect, shape of the defect and the proximity to free margins a Z plasty was deemed most appropriate for complete closure of the defect.  Using a sterile surgical marker, an appropriate advancement flap was drawn incorporating the defect and placing the expected incisions within the relaxed skin tension lines where possible. The area thus outlined was incised deep to adipose tissue with a #15 scalpel blade. The skin margins were undermined to an appropriate distance in all directions utilizing iris scissors and carried over to close the primary defect.
Repair Type: Intermediate
Retention Suture Bite Size: 3 mm
Staged Advancement Flap Text: The defect edges were debeveled with a #15 scalpel blade. Given the location of the defect, shape of the defect and the proximity to free margins a staged advancement flap was deemed most appropriate. Using a sterile surgical marker, an appropriate advancement flap was drawn incorporating the defect and placing the expected incisions within the relaxed skin tension lines where possible. The area thus outlined was incised deep to adipose tissue with a #15 scalpel blade. The skin margins were undermined to an appropriate distance in all directions utilizing iris scissors. Following this, the designed flap was carried over into the primary defect and sutured into place.
Interpolation Flap Text: A decision was made to reconstruct the defect utilizing an interpolation axial flap and a staged reconstruction.  A telfa template was made of the defect.  This telfa template was then used to outline the interpolation flap.  The donor area for the pedicle flap was then injected with anesthesia.  The flap was excised through the skin and subcutaneous tissue down to the layer of the underlying musculature.  The interpolation flap was carefully excised within this deep plane to maintain its blood supply.  The edges of the donor site were undermined.   The donor site was closed in a primary fashion.  The pedicle was then rotated into position and sutured.  Once the tube was sutured into place, adequate blood supply was confirmed with blanching and refill.  The pedicle was then wrapped with xeroform gauze and dressed appropriately with a telfa and gauze bandage to ensure continued blood supply and protect the attached pedicle.
Epidermal Autograft Text: The defect edges were debeveled with a #15 scalpel blade. Given the location of the defect, shape of the defect and the proximity to free margins an epidermal autograft was deemed most appropriate. Using a sterile surgical marker, the primary defect shape was transferred to the donor site. The epidermal graft was then harvested.  The skin graft was then placed in the primary defect and oriented appropriately.
V-Y Flap Text: The defect edges were debeveled with a #15 scalpel blade. Given the location of the defect, shape of the defect and the proximity to free margins a V-Y flap was deemed most appropriate. Using a sterile surgical marker, an appropriate advancement flap was drawn incorporating the defect and placing the expected incisions within the relaxed skin tension lines where possible. The area thus outlined was incised deep to adipose tissue with a #15 scalpel blade. The skin margins were undermined to an appropriate distance in all directions utilizing iris scissors. Following this, the designed flap was advanced and carried over into the primary defect and sutured into place.
Scalpel Size: 15 blade
Perilesional Excision Additional Text (Leave Blank If You Do Not Want): The margin was drawn around the clinically apparent lesion. Incisions were then made along these lines to the appropriate tissue plane and the lesion was extirpated.
Complex Repair And Single Advancement Flap Text: The defect edges were debeveled with a #15 scalpel blade.  The primary defect was closed partially with a complex linear closure.  Given the location of the remaining defect, shape of the defect and the proximity to free margins a single advancement flap was deemed most appropriate for complete closure of the defect.  Using a sterile surgical marker, an appropriate advancement flap was drawn incorporating the defect and placing the expected incisions within the relaxed skin tension lines where possible. The area thus outlined was incised deep to adipose tissue with a #15 scalpel blade. The skin margins were undermined to an appropriate distance in all directions utilizing iris scissors and carried over to close the primary defect.
Complex Repair And O-L Flap Text: The defect edges were debeveled with a #15 scalpel blade.  The primary defect was closed partially with a complex linear closure.  Given the location of the remaining defect, shape of the defect and the proximity to free margins an O-L flap was deemed most appropriate for complete closure of the defect.  Using a sterile surgical marker, an appropriate flap was drawn incorporating the defect and placing the expected incisions within the relaxed skin tension lines where possible. The area thus outlined was incised deep to adipose tissue with a #15 scalpel blade. The skin margins were undermined to an appropriate distance in all directions utilizing iris scissors and carried over to close the primary defect.
Rhombic Flap Text: The defect edges were debeveled with a #15 scalpel blade. Given the location of the defect and the proximity to free margins a rhombic flap was deemed most appropriate. Using a sterile surgical marker, an appropriate rhombic flap was drawn incorporating the defect. The area thus outlined was incised deep to adipose tissue with a #15 scalpel blade. The skin margins were undermined to an appropriate distance in all directions utilizing iris scissors. Following this, the designed flap was carried over into the primary defect and sutured into place.
Orbicularis Oris Muscle Flap Text: The defect edges were debeveled with a #15 scalpel blade.  Given that the defect affected the competency of the oral sphincter an orbicularis oris muscle flap was deemed most appropriate to restore this competency and normal muscle function.  Using a sterile surgical marker, an appropriate flap was drawn incorporating the defect. The area thus outlined was incised with a #15 scalpel blade. Following this, the designed flap was carried over into the primary defect and sutured into place.
Peng Advancement Flap Text: The defect edges were debeveled with a #15 scalpel blade. Given the location of the defect, shape of the defect and the proximity to free margins a Peng advancement flap was deemed most appropriate. Using a sterile surgical marker, an appropriate advancement flap was drawn incorporating the defect and placing the expected incisions within the relaxed skin tension lines where possible. The area thus outlined was incised deep to adipose tissue with a #15 scalpel blade. The skin margins were undermined to an appropriate distance in all directions utilizing iris scissors. Following this, the designed flap was advanced and carried over into the primary defect and sutured into place.
Undermining Type: Entire Wound
Burow's Graft Text: The defect edges were debeveled with a #15 scalpel blade. Given the location of the defect, shape of the defect, the proximity to free margins and the presence of a standing cone deformity a Burow's skin graft was deemed most appropriate. The standing cone was removed and this tissue was then trimmed to the shape of the primary defect. The adipose tissue was also removed until only dermis and epidermis were left.  The skin margins of the secondary defect were undermined to an appropriate distance in all directions utilizing iris scissors.  The secondary defect was closed with interrupted buried subcutaneous sutures.  The skin edges were then re-apposed with running  sutures.  The skin graft was then placed in the primary defect and oriented appropriately.
Skin Substitute Text: The defect edges were debeveled with a #15 scalpel blade. Given the location of the defect, shape of the defect and the proximity to free margins a skin substitute graft was deemed most appropriate.  The graft material was trimmed to fit the size of the defect. The graft was then placed in the primary defect and oriented appropriately.
Z Plasty Text: The lesion was extirpated to the level of the fat with a #15 scalpel blade. Given the location of the defect, shape of the defect and the proximity to free margins a Z-plasty was deemed most appropriate for repair. Using a sterile surgical marker, the appropriate transposition arms of the Z-plasty were drawn incorporating the defect and placing the expected incisions within the relaxed skin tension lines where possible. The area thus outlined was incised deep to adipose tissue with a #15 scalpel blade. The skin margins were undermined to an appropriate distance in all directions utilizing iris scissors. The opposing transposition arms were then transposed and carried over into place in opposite direction and anchored with interrupted buried subcutaneous sutures.
H Plasty Text: Given the location of the defect, shape of the defect and the proximity to free margins a H-plasty was deemed most appropriate for repair. Using a sterile surgical marker, the appropriate advancement arms of the H-plasty were drawn incorporating the defect and placing the expected incisions within the relaxed skin tension lines where possible. The area thus outlined was incised deep to adipose tissue with a #15 scalpel blade. The skin margins were undermined to an appropriate distance in all directions utilizing iris scissors.  The opposing advancement arms were then advanced and carried over into place in opposite direction and anchored with interrupted buried subcutaneous sutures.
Suturegard Intro: Intraoperative tissue expansion was performed, utilizing the SUTUREGARD device, in order to reduce wound tension.
Pinch Graft Text: The defect edges were debeveled with a #15 scalpel blade. Given the location of the defect, shape of the defect and the proximity to free margins a pinch graft was deemed most appropriate. Using a sterile surgical marker, the primary defect shape was transferred to the donor site. The area thus outlined was incised deep to adipose tissue with a #15 scalpel blade.  The harvested graft was then trimmed of adipose tissue until only dermis and epidermis was left. The skin margins of the secondary defect were undermined to an appropriate distance in all directions utilizing iris scissors.  The secondary defect was closed with interrupted buried subcutaneous sutures.  The skin edges were then re-apposed with running  sutures.  The skin graft was then placed in the primary defect and oriented appropriately.
Curvilinear Excision Additional Text (Leave Blank If You Do Not Want): The margin was drawn around the clinically apparent lesion.  A curvilinear shape was then drawn on the skin incorporating the lesion and margins.  Incisions were then made along these lines to the appropriate tissue plane and the lesion was extirpated.
Lip Wedge Excision Repair Text: Given the location of the defect and the proximity to free margins a full thickness wedge repair was deemed most appropriate. Using a sterile surgical marker, the appropriate repair was drawn incorporating the defect and placing the expected incisions perpendicular to the vermilion border.  The vermilion border was also meticulously outlined to ensure appropriate reapproximation during the repair.  The area thus outlined was incised through and through with a #15 scalpel blade.  The muscularis and dermis were reaproximated with deep sutures following hemostasis. Care was taken to realign the vermilion border before proceeding with the superficial closure.  Once the vermilion was realigned the superfical and mucosal closure was finished.
Intermediate / Complex Repair - Final Wound Length In Cm: 3.2
Complex Repair And Xenograft Text: The defect edges were debeveled with a #15 scalpel blade.  The primary defect was closed partially with a complex linear closure.  Given the location of the defect, shape of the defect and the proximity to free margins a xenograft was deemed most appropriate to repair the remaining defect.  The graft was trimmed to fit the size of the remaining defect.  The graft was then placed in the primary defect, oriented appropriately, and sutured into place.
Path Notes (To The Dermatopathologist): Please check margins.
Anesthesia Volume In Cc: 6
Split-Thickness Skin Graft Text: The defect edges were debeveled with a #15 scalpel blade. Given the location of the defect, shape of the defect and the proximity to free margins a split thickness skin graft was deemed most appropriate. Using a sterile surgical marker, the primary defect shape was transferred to the donor site. The split thickness graft was then harvested.  The skin graft was then placed in the primary defect and oriented appropriately.
Dermal Autograft Text: The defect edges were debeveled with a #15 scalpel blade. Given the location of the defect, shape of the defect and the proximity to free margins a dermal autograft was deemed most appropriate. Using a sterile surgical marker, the primary defect shape was transferred to the donor site. The area thus outlined was incised deep to adipose tissue with a #15 scalpel blade.  The harvested graft was then trimmed of adipose and epidermal tissue until only dermis was left.  The skin graft was then placed in the primary defect and oriented appropriately.
V-Y Plasty Text: The defect edges were debeveled with a #15 scalpel blade. Given the location of the defect, shape of the defect and the proximity to free margins an V-Y advancement flap was deemed most appropriate. Using a sterile surgical marker, an appropriate advancement flap was drawn incorporating the defect and placing the expected incisions within the relaxed skin tension lines where possible. The area thus outlined was incised deep to adipose tissue with a #15 scalpel blade. The skin margins were undermined to an appropriate distance in all directions utilizing iris scissors. Following this, the designed flap was advanced and carried over into the primary defect and sutured into place.
Muscle Hinge Flap Text: The defect edges were debeveled with a #15 scalpel blade.  Given the size, depth and location of the defect and the proximity to free margins a muscle hinge flap was deemed most appropriate. Using a sterile surgical marker, an appropriate hinge flap was drawn incorporating the defect. The area thus outlined was incised with a #15 scalpel blade. The skin margins were undermined to an appropriate distance in all directions utilizing iris scissors. Following this, the designed flap was carried into the primary defect and sutured into place.
Is There Documentation In The Chart Showing Discussion Of Changes With Another Physician?: Please Select the Appropriate Response
Nasalis Myocutaneous Flap Text: Using a #15 blade, an incision was made around the donor flap to the level of the nasalis muscle. Wide lateral undermining was then performed in both the subcutaneous plane above the nasalis muscle, and in a submuscular plane just above periosteum. This allowed the formation of a free nasalis muscle axial pedicle which was still attached to the actual cutaneous flap, increasing its mobility and vascular viability. Hemostasis was obtained with pinpoint electrocoagulation. The flap was mobilized into position and the pivotal anchor points positioned and stabilized with buried interrupted sutures. Subcutaneous and dermal tissues were closed in a multilayered fashion with sutures. Tissue redundancies were excised, and the epidermal edges were apposed without significant tension and sutured with sutures.
Flip-Flop Flap Text: The defect edges were debeveled with a #15 blade scalpel.  Given the location of the defect and the proximity to free margins a flip-flop flap was deemed most appropriate. Using a sterile surgical marker, the appropriate flap was drawn incorporating the defect and placing the expected incisions between the helical rim and antihelix where possible.  The area thus outlined was incised through and through with a #15 scalpel blade. Following this, the designed flap was carried over into the primary defect and sutured into place.
Repair Depth: use same depth as excision depth
Nasolabial Transposition Flap Text: The defect edges were debeveled with a #15 scalpel blade.  Given the size, depth and location of the defect and the proximity to free margins a nasolabial transposition flap was deemed most appropriate. Using a sterile surgical marker, an appropriate flap was drawn incorporating the defect. The area thus outlined was incised with a #15 scalpel blade. The skin margins were undermined to an appropriate distance in all directions utilizing iris scissors. Following this, the designed flap was carried into the primary defect and sutured into place.
Rectangular Flap Text: The defect edges were debeveled with a #15 scalpel blade. Given the location of the defect and the proximity to free margins a rectangular flap was deemed most appropriate. Using a sterile surgical marker, an appropriate rectangular flap was drawn incorporating the defect. The area thus outlined was incised deep to adipose tissue with a #15 scalpel blade. The skin margins were undermined to an appropriate distance in all directions utilizing iris scissors. Following this, the designed flap was carried over into the primary defect and sutured into place.

## 2024-11-12 NOTE — PROCEDURE: DEFER
Size Of Lesion In Cm (Optional): 0
Instructions (Optional): Cryo
Detail Level: Detailed
Introduction Text (Please End With A Colon): The following procedure was deferred:

## 2024-11-14 ENCOUNTER — MED REC SCAN ONLY (OUTPATIENT)
Dept: RHEUMATOLOGY | Facility: CLINIC | Age: 84
End: 2024-11-14

## 2024-11-22 ENCOUNTER — OFFICE VISIT (OUTPATIENT)
Dept: OTOLARYNGOLOGY | Facility: CLINIC | Age: 84
End: 2024-11-22

## 2024-11-22 DIAGNOSIS — H61.23 BILATERAL IMPACTED CERUMEN: Primary | ICD-10-CM

## 2024-11-22 PROCEDURE — 1160F RVW MEDS BY RX/DR IN RCRD: CPT | Performed by: OTOLARYNGOLOGY

## 2024-11-22 PROCEDURE — 69210 REMOVE IMPACTED EAR WAX UNI: CPT | Performed by: OTOLARYNGOLOGY

## 2024-11-22 PROCEDURE — 1159F MED LIST DOCD IN RCRD: CPT | Performed by: OTOLARYNGOLOGY

## 2024-11-22 NOTE — PROGRESS NOTES
Cris Peterson is a 84 year old female.    Chief Complaint   Patient presents with    Ear Wax     Ear cleaning        HISTORY OF PRESENT ILLNESS    Patient presents for cerumen removal. No other complaints or concerns at this time    Social History     Socioeconomic History    Marital status:    Tobacco Use    Smoking status: Never    Smokeless tobacco: Never   Substance and Sexual Activity    Alcohol use: Yes     Alcohol/week: 1.7 standard drinks of alcohol     Types: 2 Glasses of wine per week     Comment: social    Drug use: No    Sexual activity: Not Currently   Other Topics Concern    Caffeine Concern No       Family History   Problem Relation Age of Onset    Breast Cancer Mother 64    Cancer Mother         ovarian (cause of death)    Ovarian Cancer Mother 83    Diabetes Father     Cancer Father         stomach    Cancer Son         leukemia    Colon Cancer Son     Breast Cancer Paternal Aunt         post menopause    Cancer Self         melanoma    Breast Cancer Paternal Cousin Female         age after 50       Past Medical History:    Cancer (HCC)    melanoma on back    Congestive heart disease (HCC)    COPD (chronic obstructive pulmonary disease) (HCC)    Coronary atherosclerosis    Disorder of thyroid    Fracture, patella    repair    Gout    Heart valve disease    High blood pressure    High cholesterol    Hypothyroidism    Nonunion of midfoot arthrodesis (HCC)    Pulmonary HTN (HCC)    Rheumatoid arthritis (HCC)    Sx.7/21/15, CPT.98473, R Triple Arthrodesis/Poss Medializing Calc Arthrodesis/Lapidus/MONA/FDL to Post Tib Transfer, w/, Global Exp.10/19/15    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)    Unspecified essential hypertension    Unspecified sleep apnea       Past Surgical History:   Procedure Laterality Date    Angiogram  2013    Cataract      Cholecystectomy      Hand/finger surgery unlisted Right 2011    thumb surgery    Hip replacement surgery  Right     Hysterectomy      SERENITY--fibroid    Knee replacement surgery      total - right and left    Jw localization wire 1 site right (cpt=19281)  1994    Other surgical history      pins right foot    Tear duct system surg unlisted  1968    Tonsillectomy         REVIEW OF SYSTEMS    System Neg/Pos Details   Constitutional Negative Fatigue, fever and weight loss.   ENMT Negative Drooling.   Eyes Negative Blurred vision and vision changes.   Respiratory Negative Dyspnea and wheezing.   Cardio Negative Chest pain, irregular heartbeat/palpitations and syncope.   GI Negative Abdominal pain and diarrhea.   Endocrine Negative Cold intolerance and heat intolerance.   Neuro Negative Tremors.   Psych Negative Anxiety and depression.   Integumentary Negative Frequent skin infections, pigment change and rash.   Hema/Lymph Negative Easy bleeding and easy bruising.           PHYSICAL EXAM    There were no vitals taken for this visit.       Constitutional Normal Overall appearance - Normal.        Neck Exam Normal Inspection - Normal. Palpation - Normal. Parotid gland - Normal. Thyroid gland - Normal.             Head/Face Normal Facial features - Normal. Eyebrows - Normal. Skull - Normal.             Ears Normal Inspection - Right: Normal, Left: Normal. Canal - Right: Normal, Left: Normal. TM - Right: Normal, Left: Normal.   Skin Normal Inspection - Normal.                              Canals:  Right: Canal reveals cerumen impaction,   Left: Canal reveals cerumen impaction,     Tympanic Membranes:  Right: Normal tympanic membrane.   Left: Normal tympanic membrane.     TM Visualized Method:   Right TM examined via otomicroscopy.    Left TM examined via otomicroscopy.      PROCEDURE:    Removal of cerumen impaction   The cerumen impaction was completely removed using microscopy.   Removal was completed by using acurette and/or suction.   Comments: Return to clinic as needed.  Avoid q-tips, water precautions and use over the  counter wax remedies as needed.      Current Outpatient Medications:     METHOTREXATE 2.5 MG Oral Tab, TAKE 4 TABLETS ONCE A WEEK, Disp: 52 tablet, Rfl: 1    Etanercept (ENBREL SURECLICK) 50 MG/ML Subcutaneous Solution Auto-injector, INJECT 1 PEN UNDER THE SKIN EVERY 7 DAYS, Disp: 12 mL, Rfl: 1    Trospium Chloride ER 60 MG Oral Capsule SR 24 Hr, Take 1 capsule (60 mg total) by mouth daily., Disp: 90 capsule, Rfl: 3    estradiol (ESTRACE) 0.1 MG/GM Vaginal Cream, Apply 1/2 gram vaginally 2-3 times per week., Disp: 42.5 g, Rfl: 3    furosemide 40 MG Oral Tab, Take 1 tablet (40 mg total) by mouth 2 (two) times daily. (Patient taking differently: Take 1 tablet (40 mg total) by mouth daily.), Disp: 60 tablet, Rfl: 5    lisinopril 10 MG Oral Tab, Take 1 tablet (10 mg total) by mouth in the morning and 1 tablet (10 mg total) before bedtime., Disp: , Rfl:     folic acid 1 MG Oral Tab, Take 1 tablet (1 mg total) by mouth daily., Disp: 90 tablet, Rfl: 3    Spacer/Aero-Holding Chambers Does not apply Device, Use with albuterol inhaler, Disp: 1 each, Rfl: 0    pantoprazole 40 MG Oral Tab EC, Take 1 tablet (40 mg total) by mouth before breakfast., Disp: 90 tablet, Rfl: 3    hydroxychloroquine 200 MG Oral Tab, Take 2 tablets (400 mg total) by mouth daily., Disp: 180 tablet, Rfl: 3    allopurinol 300 MG Oral Tab, Take one daily., Disp: 90 tablet, Rfl: 1    atorvastatin 40 MG Oral Tab, Take 1 tablet (40 mg total) by mouth daily., Disp: , Rfl:     Cholecalciferol (VITAMIN D) 50 MCG (2000 UT) Oral Tab, Take by mouth., Disp: , Rfl:     ELIQUIS 5 MG Oral Tab, Take 1 tablet (5 mg total) by mouth 2 (two) times daily., Disp: , Rfl:     Calcium 500-125 MG-UNIT Oral Tab, Take 1 tablet by mouth daily., Disp: , Rfl:     Loperamide HCl 2 MG Oral Cap, Take 1 capsule (2 mg total) by mouth 4 (four) times daily as needed for Diarrhea., Disp: , Rfl:     Probiotic Product (PROBIOTIC-10) Oral Cap, Take 1 tablet by mouth daily., Disp: , Rfl:      acetaminophen (TYLENOL EXTRA STRENGTH) 500 MG Oral Tab, Take 1 tablet (500 mg total) by mouth every 6 (six) hours as needed., Disp: , Rfl:     CENTRUM SILVER OR TABS, 1 TABLET DAILY, Disp: , Rfl:   ASSESSMENT AND PLAN    1. Bilateral impacted cerumen        All cerumen was removed using microscopy. I have asked the patient to return to see me as needed for repeat cerumen removal in the future.      Akin Whitmore MD    11/22/2024    11:01 AM

## 2024-11-25 ENCOUNTER — APPOINTMENT (OUTPATIENT)
Dept: URBAN - METROPOLITAN AREA CLINIC 244 | Age: 84
Setting detail: DERMATOLOGY
End: 2024-11-25

## 2024-11-25 DIAGNOSIS — L21.8 OTHER SEBORRHEIC DERMATITIS: ICD-10-CM

## 2024-11-25 DIAGNOSIS — Z48.02 ENCOUNTER FOR REMOVAL OF SUTURES: ICD-10-CM

## 2024-11-25 DIAGNOSIS — L57.0 ACTINIC KERATOSIS: ICD-10-CM

## 2024-11-25 PROCEDURE — OTHER COUNSELING: OTHER

## 2024-11-25 PROCEDURE — 99214 OFFICE O/P EST MOD 30 MIN: CPT | Mod: 25

## 2024-11-25 PROCEDURE — OTHER SUTURE REMOVAL (GLOBAL PERIOD): OTHER

## 2024-11-25 PROCEDURE — 17003 DESTRUCT PREMALG LES 2-14: CPT

## 2024-11-25 PROCEDURE — OTHER PRESCRIPTION: OTHER

## 2024-11-25 PROCEDURE — OTHER LIQUID NITROGEN: OTHER

## 2024-11-25 PROCEDURE — 17000 DESTRUCT PREMALG LESION: CPT

## 2024-11-25 RX ORDER — FLUOCINONIDE 0.5 MG/ML
SOLUTION TOPICAL
Qty: 60 | Refills: 1 | Status: ERX

## 2024-11-25 RX ORDER — KETOCONAZOLE 20 MG/ML
SHAMPOO, SUSPENSION TOPICAL
Qty: 120 | Refills: 11 | Status: ERX | COMMUNITY
Start: 2024-11-25

## 2024-11-25 ASSESSMENT — LOCATION ZONE DERM
LOCATION ZONE: SCALP
LOCATION ZONE: TRUNK
LOCATION ZONE: ARM
LOCATION ZONE: FACE

## 2024-11-25 ASSESSMENT — LOCATION DETAILED DESCRIPTION DERM
LOCATION DETAILED: LEFT LATERAL BUCCAL CHEEK
LOCATION DETAILED: RIGHT SUPERIOR UPPER BACK
LOCATION DETAILED: LEFT INFERIOR CENTRAL MALAR CHEEK
LOCATION DETAILED: LEFT INFERIOR LATERAL MALAR CHEEK
LOCATION DETAILED: LEFT PROXIMAL POSTERIOR UPPER ARM
LOCATION DETAILED: POSTERIOR MID-PARIETAL SCALP

## 2024-11-25 ASSESSMENT — LOCATION SIMPLE DESCRIPTION DERM
LOCATION SIMPLE: LEFT POSTERIOR UPPER ARM
LOCATION SIMPLE: LEFT CHEEK
LOCATION SIMPLE: RIGHT UPPER BACK
LOCATION SIMPLE: POSTERIOR SCALP

## 2024-11-25 NOTE — PROCEDURE: COUNSELING
Nicotinamide Supplementation Recommendations: All supplements should be USP (https://[email or web address].[email or web address]/[email or web address]-services/[email or web address]-tiffany).
Sunscreen Recommendations: Brands include neutrogena, La-Roche, and Elta-MD. Rec mineral sunscreen use (zinc/titanium dioxide) over chemical sunscreens due to safety.
Detail Level: Simple
Detail Level: Zone
Cleanser Recommendations: Vanicream brand

## 2024-11-25 NOTE — PROCEDURE: SUTURE REMOVAL (GLOBAL PERIOD)
Detail Level: Detailed
Add 03464 Cpt? (Important Note: In 2017 The Use Of 49448 Is Being Tracked By Cms To Determine Future Global Period Reimbursement For Global Periods): no

## 2024-11-25 NOTE — PROCEDURE: LIQUID NITROGEN
Render Post-Care Instructions In Note?: yes
Detail Level: Simple
Total Number Of Aks Treated: 4
Consent: The patient's consent was obtained after discussing risks/benefits/alternatives to the procedure including but not limited to risks of crusting, scabbing, blistering, scarring, darker or lighter pigmentary change, recurrence, incomplete removal and infection. Patient verbalized understanding of the above. Patient aware that I cannot guarantee cosmetic outcome of any liquid nitrogen application and that this treatment is being done for medical purposes. \\n\\nPatient is aware that treatment today will likely produce erythema and some swelling (and sometimes blistering) that may be cosmetically unappealing to the patient if they have any speaking engagements or events in the coming weeks.
Post-Care Instructions: I reviewed with the patient in detail post-care instructions. Patient is to wear sunprotection, and avoid picking at any of the treated lesions. Pt may apply Vaseline to crusted or scabbing areas.
Number Of Freeze-Thaw Cycles: 1 freeze-thaw cycle

## 2024-11-26 ENCOUNTER — TELEPHONE (OUTPATIENT)
Dept: PHYSICAL THERAPY | Facility: HOSPITAL | Age: 84
End: 2024-11-26

## 2024-12-02 ENCOUNTER — APPOINTMENT (OUTPATIENT)
Dept: PHYSICAL THERAPY | Age: 84
End: 2024-12-02
Attending: INTERNAL MEDICINE
Payer: MEDICARE

## 2024-12-04 ENCOUNTER — APPOINTMENT (OUTPATIENT)
Dept: PHYSICAL THERAPY | Age: 84
End: 2024-12-04
Attending: INTERNAL MEDICINE
Payer: MEDICARE

## 2024-12-06 ENCOUNTER — OFFICE VISIT (OUTPATIENT)
Dept: PULMONOLOGY | Facility: CLINIC | Age: 84
End: 2024-12-06

## 2024-12-06 VITALS
OXYGEN SATURATION: 99 % | DIASTOLIC BLOOD PRESSURE: 78 MMHG | SYSTOLIC BLOOD PRESSURE: 140 MMHG | BODY MASS INDEX: 41.97 KG/M2 | HEART RATE: 65 BPM | WEIGHT: 239.81 LBS | HEIGHT: 63.5 IN | RESPIRATION RATE: 16 BRPM

## 2024-12-06 DIAGNOSIS — J45.30 MILD PERSISTENT ASTHMA WITHOUT COMPLICATION (HCC): Primary | ICD-10-CM

## 2024-12-06 DIAGNOSIS — G47.33 OSA (OBSTRUCTIVE SLEEP APNEA): ICD-10-CM

## 2024-12-06 PROCEDURE — 1159F MED LIST DOCD IN RCRD: CPT | Performed by: INTERNAL MEDICINE

## 2024-12-06 PROCEDURE — 1160F RVW MEDS BY RX/DR IN RCRD: CPT | Performed by: INTERNAL MEDICINE

## 2024-12-06 PROCEDURE — 1126F AMNT PAIN NOTED NONE PRSNT: CPT | Performed by: INTERNAL MEDICINE

## 2024-12-06 PROCEDURE — 99214 OFFICE O/P EST MOD 30 MIN: CPT | Performed by: INTERNAL MEDICINE

## 2024-12-06 RX ORDER — ALBUTEROL SULFATE 90 UG/1
2 INHALANT RESPIRATORY (INHALATION) EVERY 6 HOURS PRN
Qty: 1 EACH | Refills: 2 | Status: SHIPPED | OUTPATIENT
Start: 2024-12-06

## 2024-12-06 RX ORDER — FLUTICASONE PROPIONATE AND SALMETEROL 100; 50 UG/1; UG/1
1 POWDER RESPIRATORY (INHALATION) 2 TIMES DAILY
Qty: 1 EACH | Refills: 5 | Status: SHIPPED | OUTPATIENT
Start: 2024-12-06

## 2024-12-06 RX ORDER — NEOMYCIN SULFATE, POLYMYXIN B SULFATE AND DEXAMETHASONE 3.5; 10000; 1 MG/ML; [USP'U]/ML; MG/ML
1 SUSPENSION/ DROPS OPHTHALMIC 3 TIMES DAILY
COMMUNITY
Start: 2024-11-08

## 2024-12-06 NOTE — PROGRESS NOTES
Subjective:   Patient ID: Cris Peterson is a 84 year old female.    HPI  Reported increased dyspnea upon exertion and occasional wheezes  Reported stable cardiac status  Denies chest pain  Very compliant with her CPAP and cannot sleep without it with no technical issue with no sinus pressure or nasal congestion  No active cough or sputum  No fever or chills  Chronic lower extremity edema  Dyspnea upon exertion about 1 block  History/Other:   Review of Systems   Constitutional:  Negative for fever.   HENT:  Negative for congestion.    Cardiovascular:  Positive for leg swelling. Negative for chest pain.   Gastrointestinal:  Negative for abdominal distention.   Neurological: Negative.    Hematological: Negative.    Psychiatric/Behavioral: Negative.       Current Outpatient Medications   Medication Sig Dispense Refill    Neomycin-Polymyxin-Dexameth 3.5-18193-3.1 Ophthalmic Suspension Place 1 drop into the left eye 3 (three) times daily.      fluticasone-salmeterol 100-50 MCG/ACT Inhalation Aerosol Powder, Breath Activated Inhale 1 puff into the lungs 2 (two) times daily. inhale 1 puff by INHALATION route 2 times every day morning and evening approximately 12 hours apart 1 each 5    albuterol 108 (90 Base) MCG/ACT Inhalation Aero Soln Inhale 2 puffs into the lungs every 6 (six) hours as needed for Wheezing. 1 each 2    METHOTREXATE 2.5 MG Oral Tab TAKE 4 TABLETS ONCE A WEEK 52 tablet 1    Etanercept (ENBREL SURECLICK) 50 MG/ML Subcutaneous Solution Auto-injector INJECT 1 PEN UNDER THE SKIN EVERY 7 DAYS 12 mL 1    Trospium Chloride ER 60 MG Oral Capsule SR 24 Hr Take 1 capsule (60 mg total) by mouth daily. 90 capsule 3    estradiol (ESTRACE) 0.1 MG/GM Vaginal Cream Apply 1/2 gram vaginally 2-3 times per week. 42.5 g 3    furosemide 40 MG Oral Tab Take 1 tablet (40 mg total) by mouth 2 (two) times daily. (Patient taking differently: Take 1 tablet (40 mg total) by mouth daily.) 60 tablet 5    lisinopril 10 MG Oral Tab Take  1 tablet (10 mg total) by mouth in the morning and 1 tablet (10 mg total) before bedtime.      folic acid 1 MG Oral Tab Take 1 tablet (1 mg total) by mouth daily. 90 tablet 3    Spacer/Aero-Holding Chambers Does not apply Device Use with albuterol inhaler 1 each 0    pantoprazole 40 MG Oral Tab EC Take 1 tablet (40 mg total) by mouth before breakfast. 90 tablet 3    hydroxychloroquine 200 MG Oral Tab Take 2 tablets (400 mg total) by mouth daily. 180 tablet 3    allopurinol 300 MG Oral Tab Take one daily. 90 tablet 1    atorvastatin 40 MG Oral Tab Take 1 tablet (40 mg total) by mouth daily.      Cholecalciferol (VITAMIN D) 50 MCG (2000 UT) Oral Tab Take by mouth.      ELIQUIS 5 MG Oral Tab Take 1 tablet (5 mg total) by mouth 2 (two) times daily.      Calcium 500-125 MG-UNIT Oral Tab Take 1 tablet by mouth daily.      Loperamide HCl 2 MG Oral Cap Take 1 capsule (2 mg total) by mouth 4 (four) times daily as needed for Diarrhea.      Probiotic Product (PROBIOTIC-10) Oral Cap Take 1 tablet by mouth daily.      acetaminophen (TYLENOL EXTRA STRENGTH) 500 MG Oral Tab Take 1 tablet (500 mg total) by mouth every 6 (six) hours as needed.      CENTRUM SILVER OR TABS 1 TABLET DAILY       Allergies:Allergies[1]    Objective:   Physical Exam  Constitutional:       General: She is not in acute distress.     Appearance: She is obese.   HENT:      Head: Atraumatic.      Nose: Nose normal.      Mouth/Throat:      Mouth: Mucous membranes are moist.   Eyes:      Pupils: Pupils are equal, round, and reactive to light.   Cardiovascular:      Rate and Rhythm: Normal rate.      Heart sounds:      No gallop.   Pulmonary:      Effort: Pulmonary effort is normal. No respiratory distress.      Breath sounds: No stridor. No wheezing, rhonchi or rales.   Abdominal:      General: Abdomen is flat. Bowel sounds are normal.      Palpations: Abdomen is soft.   Musculoskeletal:      Cervical back: No rigidity.      Right lower leg: Edema present.       Left lower leg: Edema present.   Skin:     General: Skin is dry.   Neurological:      General: No focal deficit present.      Mental Status: She is oriented to person, place, and time.         Assessment & Plan:   1. Mild persistent asthma without complication (HCC)    2. JUAN (obstructive sleep apnea)        1- JUAN   Doing very well on cpap 12 cwp   Excellent  subjective and objective compliance compliant   I reviewed her download data 90 % adherence and normal AHI     - cpm with cpap 12 CWP / nasal mask    - diet / exercise / physical therapy    avoid sedative /narcotics   -Avoid driving if sleepy     2-mild persistent asthma    -PFTs 2022 overall normal but significant bronchodilator response  Claimed more dyspnea and occasional wheezes  No h/o smoking   No occupational exposure   Prior HRCT normal and V/Q is normal   CXR today clear     Plan ;   add ICS/LAMA with Advair 100/ 50 mcg one puff bid   Albuterol as needed  Keep up-to-date on vaccinations  Avoid triggers        3- CHF ,  secondary pulmonary HTN / PAP 47 / chronic A-fib on Eliquis  Per cardiology     4-dyspnea upon exertion without hypoxia secondary to above .    Orders for today  Advair and albuterol and PFTs      Follow-up in 3 months             Meds This Visit:  Requested Prescriptions     Signed Prescriptions Disp Refills    fluticasone-salmeterol 100-50 MCG/ACT Inhalation Aerosol Powder, Breath Activated 1 each 5     Sig: Inhale 1 puff into the lungs 2 (two) times daily. inhale 1 puff by INHALATION route 2 times every day morning and evening approximately 12 hours apart    albuterol 108 (90 Base) MCG/ACT Inhalation Aero Soln 1 each 2     Sig: Inhale 2 puffs into the lungs every 6 (six) hours as needed for Wheezing.       Imaging & Referrals:  None         [1]   Allergies  Allergen Reactions    Erythromycin Base     Pcn [Bicillin L-A]     Lactose DIARRHEA

## 2024-12-09 ENCOUNTER — APPOINTMENT (OUTPATIENT)
Dept: PHYSICAL THERAPY | Age: 84
End: 2024-12-09
Attending: INTERNAL MEDICINE
Payer: MEDICARE

## 2024-12-11 ENCOUNTER — APPOINTMENT (OUTPATIENT)
Dept: PHYSICAL THERAPY | Age: 84
End: 2024-12-11
Attending: INTERNAL MEDICINE
Payer: MEDICARE

## 2024-12-16 ENCOUNTER — APPOINTMENT (OUTPATIENT)
Dept: PHYSICAL THERAPY | Age: 84
End: 2024-12-16
Attending: INTERNAL MEDICINE
Payer: MEDICARE

## 2024-12-18 ENCOUNTER — APPOINTMENT (OUTPATIENT)
Dept: PHYSICAL THERAPY | Age: 84
End: 2024-12-18
Attending: INTERNAL MEDICINE
Payer: MEDICARE

## 2024-12-19 ENCOUNTER — HOSPITAL ENCOUNTER (OUTPATIENT)
Dept: RESPIRATORY THERAPY | Facility: HOSPITAL | Age: 84
Discharge: HOME | End: 2024-12-19
Attending: INTERNAL MEDICINE
Payer: MEDICARE

## 2024-12-19 DIAGNOSIS — J45.30 MILD PERSISTENT ASTHMA WITHOUT COMPLICATION (HCC): ICD-10-CM

## 2024-12-19 PROCEDURE — 94060 EVALUATION OF WHEEZING: CPT | Performed by: INTERNAL MEDICINE

## 2024-12-19 PROCEDURE — 94726 PLETHYSMOGRAPHY LUNG VOLUMES: CPT | Performed by: INTERNAL MEDICINE

## 2024-12-19 PROCEDURE — 94729 DIFFUSING CAPACITY: CPT | Performed by: INTERNAL MEDICINE

## 2024-12-19 NOTE — PROCEDURES
[facility]  part of [facility]     Pulmonary Function Test     Cris Peterson Patient Status:  Outpatient    1940 MRN [id number]   Date of Exam 2024 PCP Jfef Leong MD           Spirometry   FEV1: 1.34 75%  FVC: 1.86 79%  FEV1/FVC: 0.72    Lung Volume   T.52 96%  RV : 2.55 121%    Diffusion Capacity   DLCO: 11.50 66%    Flow Volume Loop       Impression   No evidence of obstructive defect seen however significant postbronchodilator response observed.  Normal lung volumes.  Mild reduction in diffusion capacity    Barb Shaikh DO  Pulmonary Critical Care Medicine  [facility]

## 2025-01-03 ENCOUNTER — OFFICE VISIT (OUTPATIENT)
Dept: INTERNAL MEDICINE CLINIC | Facility: CLINIC | Age: 85
End: 2025-01-03

## 2025-01-03 VITALS
WEIGHT: 241 LBS | OXYGEN SATURATION: 98 % | RESPIRATION RATE: 16 BRPM | SYSTOLIC BLOOD PRESSURE: 110 MMHG | HEIGHT: 63.5 IN | HEART RATE: 66 BPM | BODY MASS INDEX: 42.17 KG/M2 | DIASTOLIC BLOOD PRESSURE: 66 MMHG

## 2025-01-03 DIAGNOSIS — Q10.5 CONGENITAL BLOCKED TEAR DUCT OF LEFT EYE: Primary | ICD-10-CM

## 2025-01-03 DIAGNOSIS — R47.01 EXPRESSIVE APHASIA: ICD-10-CM

## 2025-01-03 PROBLEM — N17.9 ACUTE RENAL FAILURE SUPERIMPOSED ON CHRONIC KIDNEY DISEASE: Status: RESOLVED | Noted: 2024-05-20 | Resolved: 2025-01-03

## 2025-01-03 PROBLEM — N18.9 ACUTE RENAL FAILURE SUPERIMPOSED ON CHRONIC KIDNEY DISEASE: Status: RESOLVED | Noted: 2024-05-20 | Resolved: 2025-01-03

## 2025-01-03 PROBLEM — N18.9 ACUTE RENAL FAILURE SUPERIMPOSED ON CHRONIC KIDNEY DISEASE (HCC): Status: RESOLVED | Noted: 2024-05-20 | Resolved: 2025-01-03

## 2025-01-03 PROBLEM — R06.09 DOE (DYSPNEA ON EXERTION): Status: RESOLVED | Noted: 2020-07-21 | Resolved: 2025-01-03

## 2025-01-03 PROBLEM — M79.602 LEFT ARM PAIN: Status: RESOLVED | Noted: 2023-05-15 | Resolved: 2025-01-03

## 2025-01-03 PROBLEM — N17.9 ACUTE RENAL FAILURE SUPERIMPOSED ON CHRONIC KIDNEY DISEASE (HCC): Status: RESOLVED | Noted: 2024-05-20 | Resolved: 2025-01-03

## 2025-01-03 PROBLEM — R00.1 BRADYCARDIA: Status: RESOLVED | Noted: 2022-09-06 | Resolved: 2025-01-03

## 2025-01-03 PROCEDURE — 1126F AMNT PAIN NOTED NONE PRSNT: CPT | Performed by: INTERNAL MEDICINE

## 2025-01-03 PROCEDURE — 1160F RVW MEDS BY RX/DR IN RCRD: CPT | Performed by: INTERNAL MEDICINE

## 2025-01-03 PROCEDURE — 1170F FXNL STATUS ASSESSED: CPT | Performed by: INTERNAL MEDICINE

## 2025-01-03 PROCEDURE — G2211 COMPLEX E/M VISIT ADD ON: HCPCS | Performed by: INTERNAL MEDICINE

## 2025-01-03 PROCEDURE — 1159F MED LIST DOCD IN RCRD: CPT | Performed by: INTERNAL MEDICINE

## 2025-01-03 PROCEDURE — 99214 OFFICE O/P EST MOD 30 MIN: CPT | Performed by: INTERNAL MEDICINE

## 2025-01-03 NOTE — PROGRESS NOTES
Patient ID: Cris Peterson is a 84 year old female.  Chief Complaint   Patient presents with    Follow - Up     Pt. Presents for a 3 month follow up prior to surgery of left nasolacrimal duct probing and tube placement on 10/29, and 11/20/24 Minor inpatient surgery of Left basal cell carcinoma of left shoulder. Per. Pt. She is feeling better, surgery went well.         HISTORY OF PRESENT ILLNESS:   HPI  Patient presents for above.  Here for follow-up.    History of blocked tear duct of the left eye.  This was surgically corrected in late 2024 but is still having complications.  She is seeing her ophthalmologist as well as ENT for this.  No known surgical procedures have been scheduled for this.    Complaining of word finding difficulty.  This is worsening as she gets older.  Would like to see a neurologist.    Review of Systems  Ten point review of systems otherwise negative with the exception of HPI and assessment and plan.    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)    Unspecified essential hypertension    Unspecified sleep apnea       Past Surgical History:   Procedure Laterality Date    Angiogram  01/01/2013    Cataract      Cholecystectomy      Hand/finger surgery unlisted Right 01/01/2011    thumb surgery    Hip replacement surgery Right     Hysterectomy      SERENITY--fibroid    Knee  replacement surgery      total - right and left    Jw localization wire 1 site right (cpt=19281)  01/01/1994    Other surgical history      pins right foot    Surgery - external      tear duct    Tear duct system surg unlisted  01/01/1968    Tonsillectomy           Current Outpatient Medications:     fluticasone-salmeterol 100-50 MCG/ACT Inhalation Aerosol Powder, Breath Activated, Inhale 1 puff into the lungs 2 (two) times daily. inhale 1 puff by INHALATION route 2 times every day morning and evening approximately 12 hours apart, Disp: 1 each, Rfl: 5    albuterol 108 (90 Base) MCG/ACT Inhalation Aero Soln, Inhale 2 puffs into the lungs every 6 (six) hours as needed for Wheezing., Disp: 1 each, Rfl: 2    METHOTREXATE 2.5 MG Oral Tab, TAKE 4 TABLETS ONCE A WEEK, Disp: 52 tablet, Rfl: 1    Etanercept (ENBREL SURECLICK) 50 MG/ML Subcutaneous Solution Auto-injector, INJECT 1 PEN UNDER THE SKIN EVERY 7 DAYS, Disp: 12 mL, Rfl: 1    Trospium Chloride ER 60 MG Oral Capsule SR 24 Hr, Take 1 capsule (60 mg total) by mouth daily., Disp: 90 capsule, Rfl: 3    estradiol (ESTRACE) 0.1 MG/GM Vaginal Cream, Apply 1/2 gram vaginally 2-3 times per week., Disp: 42.5 g, Rfl: 3    furosemide 40 MG Oral Tab, Take 1 tablet (40 mg total) by mouth 2 (two) times daily., Disp: 60 tablet, Rfl: 5    lisinopril 10 MG Oral Tab, Take 1 tablet (10 mg total) by mouth in the morning and 1 tablet (10 mg total) before bedtime., Disp: , Rfl:     folic acid 1 MG Oral Tab, Take 1 tablet (1 mg total) by mouth daily., Disp: 90 tablet, Rfl: 3    Spacer/Aero-Holding Chambers Does not apply Device, Use with albuterol inhaler, Disp: 1 each, Rfl: 0    pantoprazole 40 MG Oral Tab EC, Take 1 tablet (40 mg total) by mouth before breakfast., Disp: 90 tablet, Rfl: 3    hydroxychloroquine 200 MG Oral Tab, Take 2 tablets (400 mg total) by mouth daily., Disp: 180 tablet, Rfl: 3    allopurinol 300 MG Oral Tab, Take one daily., Disp: 90 tablet, Rfl: 1    atorvastatin 40  MG Oral Tab, Take 1 tablet (40 mg total) by mouth daily., Disp: , Rfl:     Cholecalciferol (VITAMIN D) 50 MCG (2000 UT) Oral Tab, Take by mouth., Disp: , Rfl:     ELIQUIS 5 MG Oral Tab, Take 1 tablet (5 mg total) by mouth 2 (two) times daily., Disp: , Rfl:     Calcium 500-125 MG-UNIT Oral Tab, Take 1 tablet by mouth daily., Disp: , Rfl:     Loperamide HCl 2 MG Oral Cap, Take 1 capsule (2 mg total) by mouth 4 (four) times daily as needed for Diarrhea., Disp: , Rfl:     Probiotic Product (PROBIOTIC-10) Oral Cap, Take 1 tablet by mouth daily., Disp: , Rfl:     acetaminophen (TYLENOL EXTRA STRENGTH) 500 MG Oral Tab, Take 1 tablet (500 mg total) by mouth every 6 (six) hours as needed., Disp: , Rfl:     CENTRUM SILVER OR TABS, 1 TABLET DAILY, Disp: , Rfl:     Neomycin-Polymyxin-Dexameth 3.5-03667-3.1 Ophthalmic Suspension, Place 1 drop into the left eye 3 (three) times daily. (Patient not taking: Reported on 1/3/2025), Disp: , Rfl:     Allergies:Allergies[1]    Social History     Socioeconomic History    Marital status:      Spouse name: Not on file    Number of children: Not on file    Years of education: Not on file    Highest education level: Not on file   Occupational History    Not on file   Tobacco Use    Smoking status: Never    Smokeless tobacco: Never   Vaping Use    Vaping status: Not on file   Substance and Sexual Activity    Alcohol use: Yes     Alcohol/week: 1.7 standard drinks of alcohol     Types: 2 Glasses of wine per week     Comment: social    Drug use: No    Sexual activity: Not Currently   Other Topics Concern     Service Not Asked    Blood Transfusions Not Asked    Caffeine Concern No    Occupational Exposure Not Asked    Hobby Hazards Not Asked    Sleep Concern Not Asked    Stress Concern Not Asked    Weight Concern Not Asked    Special Diet Not Asked    Back Care Not Asked    Exercise Not Asked    Bike Helmet Not Asked    Seat Belt Not Asked    Self-Exams Not Asked   Social History  Narrative    Not on file     Social Drivers of Health     Financial Resource Strain: Not on file   Food Insecurity: Not on file   Transportation Needs: Not on file   Physical Activity: Not on file   Stress: Not on file   Social Connections: Not on file   Housing Stability: Not on file           PHYSICAL EXAM:     Vitals:    01/03/25 1022   BP: 110/66   Pulse: 66   Resp: 16   SpO2: 98%   Weight: 241 lb (109.3 kg)   Height: 5' 3.5\" (1.613 m)       Body mass index is 42.02 kg/m².    Physical Exam  Constitutional:       Appearance: Normal appearance.   Eyes:      General: No scleral icterus.        Right eye: No discharge.         Left eye: No discharge.   Cardiovascular:      Rate and Rhythm: Normal rate. Rhythm irregular.      Pulses: Normal pulses.      Heart sounds: Normal heart sounds.   Pulmonary:      Effort: Pulmonary effort is normal. No respiratory distress.      Breath sounds: Normal breath sounds. No stridor. No wheezing or rhonchi.   Neurological:      Mental Status: She is alert.   Psychiatric:         Mood and Affect: Mood normal.         Behavior: Behavior normal.           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    2. Expressive aphasia  Neuro Referral - [facility] ([place])    Return if symptoms worsen or fail to improve.    This note was prepared using Dragon Medical voice recognition dictation software. As a result errors may occur. When identified these errors have been corrected. While every attempt is made to correct errors during dictation discrepancies may still exist.    Jeff Leong MD  1/3/2025         [1]   Allergies  Allergen Reactions    Erythromycin Base     Pcn [Bicillin L-A]     Lactose DIARRHEA

## 2025-01-06 RX ORDER — ALLOPURINOL 300 MG/1
TABLET ORAL
Qty: 90 TABLET | Refills: 0 | Status: SHIPPED | OUTPATIENT
Start: 2025-01-06

## 2025-01-06 NOTE — TELEPHONE ENCOUNTER
Requested Prescriptions     Pending Prescriptions Disp Refills    allopurinol 300 MG Oral Tab [Pharmacy Med Name: Allopurinol 300 Mg Tab Nort] 90 tablet 0     Sig: TAKE ONE TABLET BY MOUTH ONE TIME DAILY     Future Appointments   Date Time Provider Department Center   2/7/2025 10:40 AM Ivania Cornell MD [email or web address] [facility]   2/19/2025 10:20 AM Mikal Parks MD [id number] [facility]   3/10/2025 10:45 AM Brittany Alonso MD [id number] [facility]   4/8/2025 10:30 AM Jeff Leong MD ECSCHIM [facility]   7/17/2025  9:00 AM Malia Bradley PA UROG [facility]     LOV: 10/4/24   Last Refilled:3/29/24 #90 1RF       ASSESSMENT/PLAN:      Seronegative RA- stable   - She is a former patient of Dr. Rose, diagnosed more than 20 years ago  - On Enbrel weekly, methotrexate 4 pills weekly and hydroxychloroquine 400 mg daily  - She states that she sees the eye doctor every year 4/2024, no evidence of Plaquenil toxicity  - Plan to monitor blood work every 3 to 4 months     Neck pain  - refer to PT     B/L LE swelling  - following with cardiology  - on lasix now     Basal cell carcinoma, right shoulder  - s/p removal       Primary osteoarthritis, chronic neck and lower back pain  - X-ray of the neck has shown advanced multilevel degenerative changes.  - She cannot take NSAIDs.  Recommended PT but she deferred that for now  - Advise she can take Tylenol arthritis once or twice a day for her pain     CKD stage 3  - Found to have a positive atypical p-ANCA 1: 1280, negative SD-3 and MPO.  UA shows no protein or blood.  Creatinine has been fluctuating between 1.1 and 1.3  - Following with nephrology. 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  Remains on allopurinol 300 mg daily.  No recent gout flare     Pt will f/u in 3-4 mos      There is a longitudinal care relationship with me, the care plan reflects the ongoing nature of the continuous relationship of care, and  the medical record indicates that there is ongoing treatment of a serious/complex medical condition which I am currently managing.  is Applicable.      Ivania Cornell MD  10/4/2024   10:00 AM

## 2025-01-21 RX ORDER — LEVOTHYROXINE SODIUM 125 UG/1
125 TABLET ORAL
Qty: 90 TABLET | Refills: 3 | Status: SHIPPED | OUTPATIENT
Start: 2025-01-21

## 2025-01-21 NOTE — TELEPHONE ENCOUNTER
Please review.  Protocol failed / Has no protocol.    Previous prescription written by Dr. Bolivar '' off of medication list.     Requested Prescriptions   Pending Prescriptions Disp Refills    LEVOTHYROXINE 125 MCG Oral Tab [Pharmacy Med Name: Levothyroxine Sodium 125 Mcg Tab Lupi] 90 tablet 3     Sig: Take 1 tablet by mouth before breakfast.       Thyroid Medication Protocol Failed - 2025  2:00 PM        Failed - Medication is active on med list        Passed - TSH in past 12 months        Passed - Last TSH value is normal     Lab Results   Component Value Date    TSH 1.092 2024    THYROIDFUNC 1.79 10/13/2015                 Passed - In person appointment or virtual visit in the past 12 mos or appointment in next 3 mos     Recent Outpatient Visits              2 weeks ago Congenital blocked tear duct of left eye    [facility] [place] Jeff Leong MD    Office Visit    1 month ago Mild persistent asthma without complication (HCC)    [facility] [address], [place] Brittany Alonso MD    Office Visit    2 months ago Bilateral impacted cerumen    [facility] Akin Whitmore MD    Office Visit    2 months ago     [facility] in [place] ShLior ceja, PT    Office Visit    2 months ago     [facility] in [place] ShmartineLior, PT    Office Visit          Future Appointments         Provider Department Appt Notes    In 2 weeks Ivania Cornell MD [facility] 4 month f/u    In 4 weeks Mikal Parks MD [facility] 6 months    In 1 month Brittany Alonso MD [facility] 3 mo f/u    In 2 months Jeff Leong MD [facility] [place] Last AWV 2024    In 5 months Malia Bradley PA [facility] Urogynecology 1YR UUI F/U                       Future Appointments         Provider Department Appt Notes    In 2 weeks Ivania Cornell MD [facility] 4 month f/u    In 4 weeks Mikal Parks MD [facility] 6 months    In 1 month Brittany Alonso MD [facility] 3 mo f/u    In 2 months Jeff Leong MD [facility] Last AWV 07/2024    In 5 months Malia Bradley PA [facility] Urogynecology 1YR UUI F/U          Recent Outpatient Visits              2 weeks ago Congenital blocked tear duct of left eye    [facility] Jeff Leong MD    Office Visit    1 month ago Mild persistent asthma without complication (HCC)    [facility] [address], [place] Brittany Alonso MD    Office Visit    2 months ago Bilateral impacted cerumen    [facility] Akin Whitmore MD    Office Visit    2 months ago     [facility] [place]    Office Visit    2 months ago     [facility] in [place] Lior Walsh, PT    Office Visit

## 2025-02-04 ENCOUNTER — LAB ENCOUNTER (OUTPATIENT)
Dept: LAB | Facility: HOSPITAL | Age: 85
End: 2025-02-04
Attending: INTERNAL MEDICINE
Payer: MEDICARE

## 2025-02-04 DIAGNOSIS — Z51.81 THERAPEUTIC DRUG MONITORING: ICD-10-CM

## 2025-02-04 DIAGNOSIS — N18.32 STAGE 3B CHRONIC KIDNEY DISEASE (HCC): ICD-10-CM

## 2025-02-04 DIAGNOSIS — D84.9 IMMUNOSUPPRESSED STATUS (HCC): ICD-10-CM

## 2025-02-04 DIAGNOSIS — M06.09 RHEUMATOID ARTHRITIS OF MULTIPLE SITES WITH NEGATIVE RHEUMATOID FACTOR (HCC): ICD-10-CM

## 2025-02-04 LAB
ALBUMIN SERPL-MCNC: 4.1 G/DL (ref 3.2–4.8)
ALT SERPL-CCNC: 21 U/L
AST SERPL-CCNC: 23 U/L (ref ?–34)
BASOPHILS # BLD AUTO: 0.04 X10(3) UL (ref 0–0.2)
BASOPHILS NFR BLD AUTO: 0.8 %
CREAT BLD-MCNC: 1.32 MG/DL
CRP SERPL-MCNC: <0.4 MG/DL (ref ?–1)
DEPRECATED RDW RBC AUTO: 63.4 FL (ref 35.1–46.3)
EGFRCR SERPLBLD CKD-EPI 2021: 40 ML/MIN/1.73M2 (ref 60–?)
EOSINOPHIL # BLD AUTO: 0.13 X10(3) UL (ref 0–0.7)
EOSINOPHIL NFR BLD AUTO: 2.5 %
ERYTHROCYTE [DISTWIDTH] IN BLOOD BY AUTOMATED COUNT: 16.5 % (ref 11–15)
ERYTHROCYTE [SEDIMENTATION RATE] IN BLOOD: 13 MM/HR
HCT VFR BLD AUTO: 33.2 %
HGB BLD-MCNC: 10.9 G/DL
IMM GRANULOCYTES # BLD AUTO: 0.01 X10(3) UL (ref 0–1)
IMM GRANULOCYTES NFR BLD: 0.2 %
LYMPHOCYTES # BLD AUTO: 2.43 X10(3) UL (ref 1–4)
LYMPHOCYTES NFR BLD AUTO: 46.5 %
MCH RBC QN AUTO: 34.2 PG (ref 26–34)
MCHC RBC AUTO-ENTMCNC: 32.8 G/DL (ref 31–37)
MCV RBC AUTO: 104.1 FL
MONOCYTES # BLD AUTO: 0.64 X10(3) UL (ref 0.1–1)
MONOCYTES NFR BLD AUTO: 12.2 %
NEUTROPHILS # BLD AUTO: 1.98 X10 (3) UL (ref 1.5–7.7)
NEUTROPHILS # BLD AUTO: 1.98 X10(3) UL (ref 1.5–7.7)
NEUTROPHILS NFR BLD AUTO: 37.8 %
PLATELET # BLD AUTO: 165 10(3)UL (ref 150–450)
RBC # BLD AUTO: 3.19 X10(6)UL
WBC # BLD AUTO: 5.2 X10(3) UL (ref 4–11)

## 2025-02-04 PROCEDURE — 84460 ALANINE AMINO (ALT) (SGPT): CPT

## 2025-02-04 PROCEDURE — 86140 C-REACTIVE PROTEIN: CPT

## 2025-02-04 PROCEDURE — 36415 COLL VENOUS BLD VENIPUNCTURE: CPT

## 2025-02-04 PROCEDURE — 85652 RBC SED RATE AUTOMATED: CPT

## 2025-02-04 PROCEDURE — 82565 ASSAY OF CREATININE: CPT

## 2025-02-04 PROCEDURE — 85025 COMPLETE CBC W/AUTO DIFF WBC: CPT

## 2025-02-04 PROCEDURE — 84450 TRANSFERASE (AST) (SGOT): CPT

## 2025-02-04 PROCEDURE — 82040 ASSAY OF SERUM ALBUMIN: CPT

## 2025-02-07 ENCOUNTER — OFFICE VISIT (OUTPATIENT)
Dept: RHEUMATOLOGY | Facility: CLINIC | Age: 85
End: 2025-02-07

## 2025-02-07 ENCOUNTER — LAB ENCOUNTER (OUTPATIENT)
Dept: LAB | Facility: HOSPITAL | Age: 85
End: 2025-02-07
Attending: INTERNAL MEDICINE
Payer: MEDICARE

## 2025-02-07 VITALS
WEIGHT: 241 LBS | SYSTOLIC BLOOD PRESSURE: 112 MMHG | HEART RATE: 54 BPM | HEIGHT: 63.5 IN | DIASTOLIC BLOOD PRESSURE: 58 MMHG | BODY MASS INDEX: 42.17 KG/M2

## 2025-02-07 DIAGNOSIS — M06.09 RHEUMATOID ARTHRITIS OF MULTIPLE SITES WITH NEGATIVE RHEUMATOID FACTOR (HCC): Primary | ICD-10-CM

## 2025-02-07 DIAGNOSIS — D84.9 IMMUNOSUPPRESSED STATUS (HCC): ICD-10-CM

## 2025-02-07 DIAGNOSIS — N18.32 STAGE 3B CHRONIC KIDNEY DISEASE (HCC): ICD-10-CM

## 2025-02-07 DIAGNOSIS — M06.09 RHEUMATOID ARTHRITIS OF MULTIPLE SITES WITH NEGATIVE RHEUMATOID FACTOR (HCC): ICD-10-CM

## 2025-02-07 DIAGNOSIS — Z51.81 THERAPEUTIC DRUG MONITORING: ICD-10-CM

## 2025-02-07 DIAGNOSIS — M1A.9XX1 CHRONIC TOPHACEOUS GOUT OF RIGHT FOOT: ICD-10-CM

## 2025-02-07 DIAGNOSIS — M54.2 NECK PAIN: ICD-10-CM

## 2025-02-07 LAB
BILIRUB UR QL: NEGATIVE
CLARITY UR: CLEAR
GLUCOSE UR-MCNC: NORMAL MG/DL
HGB UR QL STRIP.AUTO: NEGATIVE
KETONES UR-MCNC: NEGATIVE MG/DL
LEUKOCYTE ESTERASE UR QL STRIP.AUTO: 25
NITRITE UR QL STRIP.AUTO: NEGATIVE
PH UR: 6.5 [PH] (ref 5–8)
PROT UR-MCNC: NEGATIVE MG/DL
SP GR UR STRIP: 1 (ref 1–1.03)
UROBILINOGEN UR STRIP-ACNC: NORMAL

## 2025-02-07 PROCEDURE — G2211 COMPLEX E/M VISIT ADD ON: HCPCS | Performed by: INTERNAL MEDICINE

## 2025-02-07 PROCEDURE — 1159F MED LIST DOCD IN RCRD: CPT | Performed by: INTERNAL MEDICINE

## 2025-02-07 PROCEDURE — 81001 URINALYSIS AUTO W/SCOPE: CPT

## 2025-02-07 PROCEDURE — 1125F AMNT PAIN NOTED PAIN PRSNT: CPT | Performed by: INTERNAL MEDICINE

## 2025-02-07 PROCEDURE — 99214 OFFICE O/P EST MOD 30 MIN: CPT | Performed by: INTERNAL MEDICINE

## 2025-02-07 PROCEDURE — 1160F RVW MEDS BY RX/DR IN RCRD: CPT | Performed by: INTERNAL MEDICINE

## 2025-02-07 PROCEDURE — 87086 URINE CULTURE/COLONY COUNT: CPT

## 2025-02-07 RX ORDER — HYDROXYCHLOROQUINE SULFATE 200 MG/1
400 TABLET, FILM COATED ORAL DAILY
Qty: 180 TABLET | Refills: 3 | Status: SHIPPED | OUTPATIENT
Start: 2025-02-07

## 2025-02-07 RX ORDER — FOLIC ACID 1 MG/1
1 TABLET ORAL DAILY
Qty: 90 TABLET | Refills: 3 | Status: SHIPPED | OUTPATIENT
Start: 2025-02-07

## 2025-02-07 RX ORDER — METHOTREXATE 2.5 MG/1
TABLET ORAL
Qty: 52 TABLET | Refills: 1 | Status: SHIPPED | OUTPATIENT
Start: 2025-02-07

## 2025-02-07 RX ORDER — MEDROXYPROGESTERONE ACETATE 150 MG/ML
INJECTION, SUSPENSION INTRAMUSCULAR
Qty: 12 ML | Refills: 1 | Status: SHIPPED | OUTPATIENT
Start: 2025-02-07

## 2025-02-07 NOTE — PROGRESS NOTES
Cris Peterson is a 84 year old female.    HPI:     Chief Complaint   Patient presents with    Rheumatoid Arthritis    Neck Pain    Shoulder Pain    Wrist Pain     Just soreness        I had the pleasure of seeing Cris Peterson on 2/7/2025 for follow up Seropositive RA and Gout    Current medications:  Enbrel weekly  Methotrexate 4 pills weekly   mg daily   Allopurinal 300 mg daily  Blood work:  +ITZEL 1:160, Neg BETTY panel  RF 51, +atypical p-ANCA 1:1280  UA 5.3 (Oct 2021)    Interval History:  This is a 81 yo F with hx of HTN, HLD, Hypothyroid, Atrial fibrillation on eliquis, COPD, B/L TKR, R THR, R foot surgery, R thumb surgery, Gout  presents to establish care for seronegative RA.  She is a former patient of Dr. Rose.  She was diagnosed with RA many years ago, about 20 years ago.  Currently on Enbrel weekly, methotrexate 4 pills weekly and hydroxychloroquine 400 mg daily.  Joints are stable.  No active swelling.  She does have chronic lower back pain.  X-rays have showed moderate scoliosis and moderate to severe spondylosis.  She also has discomfort in her neck.  X-ray showed advanced multilevel degenerative changes mostly in C5-C6.  She has full range of motion in her shoulders.  She was admitted in April 2021 for an acutely inflamed right third toe which was originally felt to be infected.  Surgery was done and tophi was removed.  Her uric acid was 9.1 at that time.  Now on allopurinol 300 mg daily.  Gout has been stable.  Has had no gout flares.  She reports shortness of breath.  Following with cardiology.    2/2/2024:  Presents for f/u of RA and Gout  Also has CKD following with nephology  Has chronic changes in hands but minimal pain. Has weakness in the hands  Continues to have pain in the neck and across the shoulders  No n/t or shooting for the pain  No recent gout flares     6/3/2024:  Presents for f/u of RA and Gout  No recent gout flare  Also has CKD following with nephology  Has chronic  changes in hands but minimal pain. Has weakness in the hands  Recent blood work showing increased ESR 47.  Normal LFTs.  Creatinine 1.14  Seen by ophthalmology 4/23/2024, no evidence of Plaquenil toxicity  Had increased swelling in both legs.  She also has atrial fibrillation and CKD stage III.  She is now on Lasix and following with cardiology. She will be getting right heart cath on Thursday  She reports some SOB and weight gain   Also had basal cell carcinoma on right shoulder and it was removed last Thursday     10/4/2024:  Presents for f/u of RA and Gout  No recent gout flare  Also has CKD following with nephology  Blood work shows creatinine stable at 1.13.  Normal LFTs.  Normal inflammation marker  Having some more neck pain and pain across the shoulders and mid back  Also pain in the knees  Feet are painful  It is also getting harder to move, she is more stiff overall  Legs are swollen, she is on a diuretic   She was suppose to have tear duct surgery but postponed due to abnormal EKG- Atrial flutter but this is chronic, she saw cardiology and they said everything was ok  She will starting PT for movement     2/7/2025:  Presents for f/u of RA and Gout  No recent gout flare  Also has CKD following with nephology  She is on Enbrel weekly, methotrexate and hydroxychloroquine  Recent blood work showing normal inflammation markers.  Creatinine stable around 1.3  Having worsening wrist pain, no swelling in the wrists   Also having neck and shoulder pain which has been chronic. CT neck in 2022 showed partial fusion and moderated disc disease  No recent falls             HISTORY:  Past Medical History:    Basalioma    Cancer (HCC)    melanoma on back    Congestive heart disease (HCC)    COPD (chronic obstructive pulmonary disease) (HCC)    Coronary atherosclerosis    Disorder of thyroid    Fracture, patella    repair    Gout    Heart valve disease    High blood pressure    High cholesterol    Hypothyroidism     Nonunion of midfoot arthrodesis (HCC)    Pulmonary HTN (HCC)    Rheumatoid arthritis (HCC)    Sx.7/21/15, CPT.10389, R Triple Arthrodesis/Poss Medializing Calc Arthrodesis/Lapidus/MONA/FDL to Post Tib Transfer, w/, Global Exp.10/19/15    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 90 tablet 3    allopurinol 300 MG Oral Tab TAKE ONE TABLET BY MOUTH ONE TIME DAILY 90 tablet 0    Neomycin-Polymyxin-Dexameth 3.5-97124-4.1 Ophthalmic Suspension Place 1 drop into the left eye 3 (three) times daily.      fluticasone-salmeterol 100-50 MCG/ACT Inhalation Aerosol Powder, Breath Activated Inhale 1 puff into the lungs 2 (two) times daily. inhale 1 puff by INHALATION route 2 times every day morning and evening approximately 12 hours apart 1 each 5    albuterol 108 (90 Base) MCG/ACT Inhalation Aero Soln Inhale 2 puffs into the lungs every 6 (six) hours as needed for Wheezing. 1 each 2    METHOTREXATE 2.5 MG Oral Tab TAKE 4 TABLETS ONCE A WEEK 52 tablet 1    Etanercept (ENBREL SURECLICK) 50 MG/ML Subcutaneous Solution Auto-injector INJECT 1 PEN UNDER THE SKIN EVERY 7 DAYS 12 mL 1    Trospium Chloride ER 60 MG Oral Capsule SR 24 Hr Take 1 capsule (60 mg total) by mouth daily. 90 capsule 3    estradiol (ESTRACE) 0.1 MG/GM Vaginal Cream Apply 1/2 gram vaginally 2-3 times per week. 42.5 g 3    furosemide 40 MG Oral Tab Take 1 tablet (40 mg total) by mouth 2 (two) times daily. 60 tablet 5    lisinopril 10 MG Oral Tab Take 1 tablet (10 mg total) by mouth in the morning and 1 tablet (10 mg total) before bedtime.      folic acid 1 MG Oral Tab Take 1 tablet (1 mg total) by mouth daily. 90  tablet 3    Spacer/Aero-Holding Chambers Does not apply Device Use with albuterol inhaler 1 each 0    pantoprazole 40 MG Oral Tab EC Take 1 tablet (40 mg total) by mouth before breakfast. 90 tablet 3    hydroxychloroquine 200 MG Oral Tab Take 2 tablets (400 mg total) by mouth daily. 180 tablet 3    atorvastatin 40 MG Oral Tab Take 1 tablet (40 mg total) by mouth daily.      Cholecalciferol (VITAMIN D) 50 MCG (2000 UT) Oral Tab Take by mouth.      ELIQUIS 5 MG Oral Tab Take 1 tablet (5 mg total) by mouth 2 (two) times daily.      Calcium 500-125 MG-UNIT Oral Tab Take 1 tablet by mouth daily.      Loperamide HCl 2 MG Oral Cap Take 1 capsule (2 mg total) by mouth 4 (four) times daily as needed for Diarrhea.      Probiotic Product (PROBIOTIC-10) Oral Cap Take 1 tablet by mouth daily.      acetaminophen (TYLENOL EXTRA STRENGTH) 500 MG Oral Tab Take 1 tablet (500 mg total) by mouth every 6 (six) hours as needed.      CENTRUM SILVER OR TABS 1 TABLET DAILY       .cmed  Allergies:  Allergies   Allergen Reactions    Erythromycin Base     Pcn [Bicillin L-A]     Lactose DIARRHEA         ROS:   All other ROS are negative.     PHYSICAL EXAM:   GEN: AAOx3, NAD  HEENT: EOMI, PERRLA, no injection or icterus, oral mucosa moist, no oral lesions. No lymphadenopathy. No facial rash  CVS: RRR, no murmurs rubs or gallops. Equal 2+ distal pulses.   LUNGS: CTAB, no increased work of breathing  ABDOMEN:  soft NT/ND, +BS, no HSM  SKIN: No rashes or skin lesions. No nail findings  MSK:  Chronic synovitis noted in the wrist  [place]-neck deformities of the right hand.  Able to make a full fist  Full range of motion in the shoulders  3+edema b/l LE  NEURO: Cranial nerves II-XII intact grossly. 5/5 strength throughout in both upper and lower extremities, sensation intact.  PSYCH: normal mood       LABS:       Component      Latest Ref Rng 2/11/2022   MYELOPEROX ANTIBODIES, IGG      0 - 19 AU/mL 0    SERINE PROTEASE3, IGG      0 - 19 AU/mL 0    ANCA  IFA Titer      <1:20  1:1280 (H)    ANCA IFA Pattern      None Detected  Atypical p-ANCA !    ITZEL Titer/Pattern      <80  160 !    Reviewed By: Kathleen Galloway M.D.    Anti-Sjogren's A      Negative  Negative    Anti-Sjogren's B      Negative  Negative    Anti-Smith Antibody      Negative  Negative    Anti-Corea/RNP Antibody      Negative  Negative    RHEUMATOID FACTOR      <15 IU/mL 51 (H)    COMPLEMENT C4      10.0 - 40.0 mg/dL 32.1    ITZEL SCREEN WITH REFLEX (S)      Negative  Positive !    COMPLEMENT C3      90.0 - 180.0 mg/dL 117.0    Anti Double Strand DNA      <10  <10       Component      Latest Ref Rn 4/21/2022   MYELOPEROX ANTIBODIES, IGG      0 - 19 AU/mL 0    SERINE PROTEASE3, IGG      0 - 19 AU/mL 0    ANCA IFA Titer      <1:20  1:1280 (H)    ANCA IFA Pattern      None Detected  Atypical p-ANCA !    ITZEL Titer/Pattern      <80      Reviewed By:     Anti-Sjogren's A      Negative      Anti-Sjogren's B      Negative      Anti-Smith Antibody      Negative      Anti-Corea/RNP Antibody      Negative      RHEUMATOID FACTOR      <15 IU/mL     COMPLEMENT C4      10.0 - 40.0 mg/dL     ITZEL SCREEN WITH REFLEX (S)      Negative      COMPLEMENT C3      90.0 - 180.0 mg/dL     Anti Double Strand DNA      <10            Imaging:     XR cervical 2022:  CONCLUSION:   1. There is widening of the atlantodental interval on flexion, suggesting ligamentous instability.      2. Advanced multilevel degenerative changes throughout the cervical spine, most notably at the C5-C6 level.      ASSESSMENT/PLAN:     Seronegative RA- stable   - She is a former patient of Dr. Rose, diagnosed more than 20 years ago  - On Enbrel weekly, methotrexate 4 pills weekly and hydroxychloroquine 400 mg daily  - She states that she sees the eye doctor every year 4/2024, no evidence of Plaquenil toxicity  - Plan to monitor blood work every 3 to 4 months    Cervical disc disease  - refer to PT    B/L LE swelling  - following with cardiology  - on  lasix now    Basal cell carcinoma, right shoulder  - s/p removal       Primary osteoarthritis, chronic neck and lower back pain  - X-ray of the neck has shown advanced multilevel degenerative changes.  - She cannot take NSAIDs.  Recommended PT but she deferred that for now  - Advise she can take Tylenol arthritis once or twice a day for her pain     CKD stage 3  - Found to have a positive atypical p-ANCA 1: 1280, negative CA-3 and MPO.  UA shows no protein or blood.  Creatinine has been fluctuating between 1.1 and 1.3  - Following with nephrology. 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  Remains on allopurinol 300 mg daily.  No recent gout flare    Pt will f/u in 3-4 mos     There is a longitudinal care relationship with me, the care plan reflects the ongoing nature of the continuous relationship of care, and the medical record indicates that there is ongoing treatment of a serious/complex medical condition which I am currently managing.  is Applicable.     Ivania Cornell MD  2/7/2025  10:41 AM

## 2025-02-07 NOTE — PATIENT INSTRUCTIONS
You were seen today for rheumatoid arthritis  Continue Enbrel, methotrexate, hydroxychloroquine and folic acid  For your gout continue allopurinol  Repeat blood work in 3 to 4 months  I also gave you referral for physical therapy for your neck  I refilled your medications

## 2025-02-19 ENCOUNTER — OFFICE VISIT (OUTPATIENT)
Dept: NEPHROLOGY | Facility: CLINIC | Age: 85
End: 2025-02-19

## 2025-02-19 VITALS
DIASTOLIC BLOOD PRESSURE: 58 MMHG | HEIGHT: 63.5 IN | BODY MASS INDEX: 42 KG/M2 | HEART RATE: 77 BPM | WEIGHT: 240 LBS | SYSTOLIC BLOOD PRESSURE: 108 MMHG

## 2025-02-19 DIAGNOSIS — R06.09 EXERTIONAL DYSPNEA: ICD-10-CM

## 2025-02-19 DIAGNOSIS — N18.32 STAGE 3B CHRONIC KIDNEY DISEASE (HCC): Primary | ICD-10-CM

## 2025-02-19 DIAGNOSIS — D84.9 IMMUNOSUPPRESSED STATUS (HCC): ICD-10-CM

## 2025-02-19 PROCEDURE — 99215 OFFICE O/P EST HI 40 MIN: CPT | Performed by: INTERNAL MEDICINE

## 2025-02-19 PROCEDURE — 1159F MED LIST DOCD IN RCRD: CPT | Performed by: INTERNAL MEDICINE

## 2025-02-19 NOTE — PROGRESS NOTES
Progress Note:     Patient is a 84 yrs old female with pmh of COPD, HTN, HL, CKD stage III, RA on methotrexate, enbrel and hydroxychloroquine, irritable bowel syndrome, A-fib on AC, hypothyroidism  who presented for follow up     Lab test showed BUN/Cr 28/1.56 mg/dl with an eGFR 31 ml/min. was 1.65 mg/dl in Jan 2022. Creatinine was 1.2 mg/dl in Nov 2021 with an eGFR 41 ml/min.. fluctuating at 1.1-1.3 mg/dl in 2021. UA unremarkable with no protein or RBC    Non smoker, wine couple of time a week. No NSAIDs or herbal supplement.     S/p coronary angiogram - Mild pulmonary hypertension with normal wedge pressure and normal LVEDP.  No significant angiographic evidence of CAD.    State using walker more often.wears compression socks- has a zipper on it.  Extertional dyspnea - dose of furosemide was increased to BID on 2/14  - weight stable. (was reduced to daily dose for dizziness). Has appointment with pulm and cardio next month     HISTORY:  Past Medical History:    Basalioma    Cancer (HCC)    melanoma on back    Congestive heart disease (HCC)    COPD (chronic obstructive pulmonary disease) (HCC)    Coronary atherosclerosis    Disorder of thyroid    Fracture, patella    repair    Gout    Heart valve disease    High blood pressure    High cholesterol    Hypothyroidism    Nonunion of midfoot arthrodesis (HCC)    Pulmonary HTN (HCC)    Rheumatoid arthritis (HCC)    Sx.7/21/15, CPT.30481, R Triple Arthrodesis/Poss Medializing Calc Arthrodesis/Lapidus/MONA/FDL to Post Tib Transfer, w/, Global Exp.10/19/15    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)    Unspecified essential hypertension    Unspecified sleep apnea      Past Surgical History:   Procedure Laterality Date    Angiogram  01/01/2013    Cataract      Cholecystectomy      Hand/finger surgery unlisted Right 01/01/2011    thumb surgery    Hip replacement surgery Right     Hysterectomy      SERENITY--fibroid    Knee  replacement surgery      total - right and left    Jw localization wire 1 site right (cpt=19281)  01/01/1994    Other surgical history      pins right foot    Surgery - external      tear duct    Tear duct system surg unlisted  01/01/1968    Tonsillectomy        Family History   Problem Relation Age of Onset    Breast Cancer Mother 64    Cancer Mother         ovarian (cause of death)    Ovarian Cancer Mother 83    Diabetes Father     Cancer Father         stomach    Cancer Son         leukemia    Colon Cancer Son     Breast Cancer Paternal Aunt         post menopause    Cancer Self         melanoma    Breast Cancer Paternal Cousin Female         age after 50      Social History:   Social History     Socioeconomic History    Marital status:    Tobacco Use    Smoking status: Never    Smokeless tobacco: Never   Substance and Sexual Activity    Alcohol use: Yes     Alcohol/week: 1.7 standard drinks of alcohol     Types: 2 Glasses of wine per week     Comment: social    Drug use: No    Sexual activity: Not Currently   Other Topics Concern    Caffeine Concern No        Medications (Active prior to today's visit):  Current Outpatient Medications   Medication Sig Dispense Refill    methotrexate 2.5 MG Oral Tab TAKE 4 TABLETS ONCE A WEEK 52 tablet 1    Etanercept (ENBREL SURECLICK) 50 MG/ML Subcutaneous Solution Auto-injector INJECT 1 PEN UNDER THE SKIN EVERY 7 DAYS 12 mL 1    hydroxychloroquine 200 MG Oral Tab Take 2 tablets (400 mg total) by mouth daily. 180 tablet 3    folic acid 1 MG Oral Tab Take 1 tablet (1 mg total) by mouth daily. 90 tablet 3    levothyroxine 125 MCG Oral Tab Take 1 tablet (125 mcg total) by mouth before breakfast. 90 tablet 3    allopurinol 300 MG Oral Tab TAKE ONE TABLET BY MOUTH ONE TIME DAILY 90 tablet 0    fluticasone-salmeterol 100-50 MCG/ACT Inhalation Aerosol Powder, Breath Activated Inhale 1 puff into the lungs 2 (two) times daily. inhale 1 puff by INHALATION route 2 times every day  morning and evening approximately 12 hours apart 1 each 5    albuterol 108 (90 Base) MCG/ACT Inhalation Aero Soln Inhale 2 puffs into the lungs every 6 (six) hours as needed for Wheezing. 1 each 2    Trospium Chloride ER 60 MG Oral Capsule SR 24 Hr Take 1 capsule (60 mg total) by mouth daily. 90 capsule 3    estradiol (ESTRACE) 0.1 MG/GM Vaginal Cream Apply 1/2 gram vaginally 2-3 times per week. 42.5 g 3    furosemide 40 MG Oral Tab Take 1 tablet (40 mg total) by mouth 2 (two) times daily. 60 tablet 5    lisinopril 10 MG Oral Tab Take 1 tablet (10 mg total) by mouth in the morning and 1 tablet (10 mg total) before bedtime.      Spacer/Aero-Holding Chambers Does not apply Device Use with albuterol inhaler 1 each 0    pantoprazole 40 MG Oral Tab EC Take 1 tablet (40 mg total) by mouth before breakfast. 90 tablet 3    atorvastatin 40 MG Oral Tab Take 1 tablet (40 mg total) by mouth daily.      Cholecalciferol (VITAMIN D) 50 MCG (2000 UT) Oral Tab Take by mouth.      ELIQUIS 5 MG Oral Tab Take 1 tablet (5 mg total) by mouth 2 (two) times daily.      Calcium 500-125 MG-UNIT Oral Tab Take 1 tablet by mouth daily.      Loperamide HCl 2 MG Oral Cap Take 1 capsule (2 mg total) by mouth 4 (four) times daily as needed for Diarrhea.      Probiotic Product (PROBIOTIC-10) Oral Cap Take 1 tablet by mouth daily.      acetaminophen (TYLENOL EXTRA STRENGTH) 500 MG Oral Tab Take 1 tablet (500 mg total) by mouth every 6 (six) hours as needed.      CENTRUM SILVER OR TABS 1 TABLET DAILY         Allergies:  Allergies   Allergen Reactions    Erythromycin Base     Pcn [Bicillin L-A]     Lactose DIARRHEA         ROS:     Constitutional:  Negative for decreased activity, fever, irritability and lethargy  ENMT:  Negative for ear drainage, hearing loss and nasal drainage  Eyes:  Negative for eye discharge and vision loss  Cardiovascular:  Negative for chest pain, sobs  Respiratory:  Negative for cough, dyspnea and wheezing  Gastrointestinal:   Negative for abdominal pain, constipation  Genitourinary:  Negative for dysuria and hematuria  Endocrine:  Negative for abnormal sleep patterns, increased activity  Hema/Lymph:  Negative for easy bleeding and easy bruising  Integumentary:  Negative for pruritus and rash  Musculoskeletal:  Negative for bone/joint symptoms  Neurological:  Negative for gait disturbance  Psychiatric:  Negative for inappropriate interaction and psychiatric symptoms      Vitals:    02/19/25 1016   BP: 108/58   Pulse: 77     Wt Readings from Last 6 Encounters:   02/19/25 240 lb (108.9 kg)   02/07/25 241 lb (109.3 kg)   01/03/25 241 lb (109.3 kg)   12/06/24 239 lb 12.8 oz (108.8 kg)   10/04/24 240 lb 12.8 oz (109.2 kg)   10/04/24 241 lb (109.3 kg)       PHYSICAL EXAM:     Constitutional: appears well hydrated alert and responsive no acute distress noted  Head/Face: normocephalic  Eyes/Vision: normal extraocular motion is intact  Nose/Mouth/Throat:mucous membranes are moist   Neck/Thyroid: neck is supple   Skin/Hair: no unusual rashes present  Back/Spine: no abnormalities noted  Musculoskeletal:  no deformities  Extremities: BLE swelling / edema   Neurological:  Grossly normal    ASSESSMENT/PLAN:     1. CKD stage III: non proteinuric   - BUN/Cr 26/1.3 mg/dl with an eGFR 40 ml/min. - stable  - eGFR underestimation given obesity   - Cr fluctuating at 1.1-1.3 mg/dl in 2021.   - fluid intake 60-65 ounces a day   - UA unremarkable with no protein or RBC . UACR <30 and UPCR unremarkable  - CKD presumably secondary to Hypertensive disease   - renal US - right kidney 10.3 cm and left kidney 10.7 cm with normal echotexture.  1.8 cm exophytic hypoechoic lesion lower pole left kidney which appears to demonstrate some peripheral internal vascularity and is suspicious for an indeterminate solid renal lesion which would include the possibility renal neoplasm.   - s/p CT abdomen which showed bosniak 2 benign complicated cyst stable at 1.5 cm on left  side  - work up per dermatology for rash - ITZEL +ve with 160 titer and negative antibodies. Atypical ANCA 1:1280 with PR3 and MPO negative. SPEP negative for monoclonal gammopathy and urine bence amador negative. HCV and Hepatitis B non reactive. C3, C4 wnl and cryglobulin stable.- on methotrexate and hydroxychloroquine   - CRP and ESR wnl  - repeat ANCA level unchanged   -  - on daily vitamin D3 at 2000 units - vitamin D elvel 34    2. BLE swelling : stable   -  wears on and off compression stocking - 4/7 days.  - furosemide dose increased to BID  on potassium 10 meq daily   - S/p coronary angiogram - Mild pulmonary hypertension with normal wedge pressure and normal LVEDP.  No significant angiographic evidence of CAD.  - off of amlodipine and on lisinopril 10 mg BID   - low salt diet counseled     3. P-Afib/ bradycardia:   - was seen by Dr. Borja and off of bystolic   - LHC showed no significant coronary disease and EF 59%    4. RA:  on methotrexate, enbrel and hydroxychloroquine  Follows with Dr. Cornell  sed rate and CRP wnl    Follow up in 6 months     Lab tests per other provider     Has appointment with Cardiology and pulmonologist next month. Pulse ox 95 % at start and down to 93% with exertion.      Orders This Visit:  No orders of the defined types were placed in this encounter.      Meds This Visit:  Requested Prescriptions      No prescriptions requested or ordered in this encounter       Imaging & Referrals:  None     2/19/2025  Mikal Richardson MD

## 2025-03-10 ENCOUNTER — OFFICE VISIT (OUTPATIENT)
Dept: PULMONOLOGY | Facility: CLINIC | Age: 85
End: 2025-03-10

## 2025-03-10 VITALS
HEIGHT: 63.5 IN | DIASTOLIC BLOOD PRESSURE: 69 MMHG | SYSTOLIC BLOOD PRESSURE: 115 MMHG | HEART RATE: 83 BPM | BODY MASS INDEX: 42 KG/M2 | WEIGHT: 240 LBS | OXYGEN SATURATION: 95 %

## 2025-03-10 DIAGNOSIS — J45.30 MILD PERSISTENT ASTHMA WITHOUT COMPLICATION (HCC): ICD-10-CM

## 2025-03-10 DIAGNOSIS — G47.33 OSA (OBSTRUCTIVE SLEEP APNEA): Primary | ICD-10-CM

## 2025-03-10 PROCEDURE — 99214 OFFICE O/P EST MOD 30 MIN: CPT | Performed by: INTERNAL MEDICINE

## 2025-03-10 PROCEDURE — 1160F RVW MEDS BY RX/DR IN RCRD: CPT | Performed by: INTERNAL MEDICINE

## 2025-03-10 PROCEDURE — 1159F MED LIST DOCD IN RCRD: CPT | Performed by: INTERNAL MEDICINE

## 2025-03-10 PROCEDURE — 1126F AMNT PAIN NOTED NONE PRSNT: CPT | Performed by: INTERNAL MEDICINE

## 2025-03-10 NOTE — PROGRESS NOTES
Subjective:   Patient ID: Cris Peterson is a 84 year old female.    HPI  Very compliant and vigilant about using CPAP every night and all night with no technical issue with regular time in bed with no significant residual daytime sleepiness or fatigue  Doing well with inhalers with no cough or wheezes  Still with dyspnea upon exertion  Denies a chest pain  No fever or chills  History/Other:   Review of Systems   Constitutional:  Negative for fatigue and unexpected weight change.   HENT:  Negative for congestion.    Respiratory:  Positive for shortness of breath. Negative for cough and wheezing.    Cardiovascular:  Negative for chest pain and leg swelling.   Gastrointestinal:  Negative for abdominal distention.   Musculoskeletal:  Positive for arthralgias.   Skin:  Negative for color change.   Neurological:  Negative for syncope.   Hematological:  Negative for adenopathy.   Psychiatric/Behavioral:  Negative for agitation.      Current Outpatient Medications   Medication Sig Dispense Refill    methotrexate 2.5 MG Oral Tab TAKE 4 TABLETS ONCE A WEEK 52 tablet 1    Etanercept (ENBREL SURECLICK) 50 MG/ML Subcutaneous Solution Auto-injector INJECT 1 PEN UNDER THE SKIN EVERY 7 DAYS 12 mL 1    hydroxychloroquine 200 MG Oral Tab Take 2 tablets (400 mg total) by mouth daily. 180 tablet 3    folic acid 1 MG Oral Tab Take 1 tablet (1 mg total) by mouth daily. 90 tablet 3    levothyroxine 125 MCG Oral Tab Take 1 tablet (125 mcg total) by mouth before breakfast. 90 tablet 3    allopurinol 300 MG Oral Tab TAKE ONE TABLET BY MOUTH ONE TIME DAILY 90 tablet 0    fluticasone-salmeterol 100-50 MCG/ACT Inhalation Aerosol Powder, Breath Activated Inhale 1 puff into the lungs 2 (two) times daily. inhale 1 puff by INHALATION route 2 times every day morning and evening approximately 12 hours apart 1 each 5    albuterol 108 (90 Base) MCG/ACT Inhalation Aero Soln Inhale 2 puffs into the lungs every 6 (six) hours as needed for Wheezing. 1 each  2    Trospium Chloride ER 60 MG Oral Capsule SR 24 Hr Take 1 capsule (60 mg total) by mouth daily. 90 capsule 3    estradiol (ESTRACE) 0.1 MG/GM Vaginal Cream Apply 1/2 gram vaginally 2-3 times per week. 42.5 g 3    furosemide 40 MG Oral Tab Take 1 tablet (40 mg total) by mouth 2 (two) times daily. 60 tablet 5    lisinopril 10 MG Oral Tab Take 1 tablet (10 mg total) by mouth in the morning and 1 tablet (10 mg total) before bedtime.      Spacer/Aero-Holding Chambers Does not apply Device Use with albuterol inhaler 1 each 0    pantoprazole 40 MG Oral Tab EC Take 1 tablet (40 mg total) by mouth before breakfast. 90 tablet 3    atorvastatin 40 MG Oral Tab Take 1 tablet (40 mg total) by mouth daily.      Cholecalciferol (VITAMIN D) 50 MCG (2000 UT) Oral Tab Take by mouth.      ELIQUIS 5 MG Oral Tab Take 1 tablet (5 mg total) by mouth 2 (two) times daily.      Calcium 500-125 MG-UNIT Oral Tab Take 1 tablet by mouth daily.      Loperamide HCl 2 MG Oral Cap Take 1 capsule (2 mg total) by mouth 4 (four) times daily as needed for Diarrhea.      Probiotic Product (PROBIOTIC-10) Oral Cap Take 1 tablet by mouth daily.      acetaminophen (TYLENOL EXTRA STRENGTH) 500 MG Oral Tab Take 1 tablet (500 mg total) by mouth every 6 (six) hours as needed.      CENTRUM SILVER OR TABS 1 TABLET DAILY       Allergies:Allergies[1]    Objective:   Physical Exam  Constitutional:       General: She is not in acute distress.     Appearance: She is obese.   HENT:      Head: Atraumatic.      Nose: Nose normal.      Mouth/Throat:      Mouth: Mucous membranes are moist.   Eyes:      General: No scleral icterus.  Cardiovascular:      Rate and Rhythm: Normal rate.      Heart sounds:      No gallop.   Pulmonary:      Effort: No respiratory distress.      Breath sounds: No stridor. No wheezing, rhonchi or rales.   Chest:      Chest wall: No tenderness.   Abdominal:      General: Abdomen is flat. Bowel sounds are normal. There is no distension.       Palpations: Abdomen is soft.      Tenderness: There is no guarding.   Musculoskeletal:      Cervical back: Normal range of motion.      Right lower leg: No edema.      Left lower leg: No edema.   Skin:     General: Skin is dry.   Neurological:      General: No focal deficit present.      Mental Status: She is oriented to person, place, and time.             CXR 10/4/2024 reviewed :    Narrative   PROCEDURE: XR CHEST PA + LAT CHEST (CPT=71046)     COMPARISON: [facility], XR CHEST PA + LAT CHEST (CPT=71046), 5/24/2024, 10:31 AM.     INDICATIONS: Pre-operative examination for left naslolacrimal duct surgery on 10/29/24. History of hypertension.     TECHNIQUE:   Two views.       FINDINGS:  CARDIAC/VASC: Stable cardiac silhouette enlargement.  Unremarkable pulmonary vasculature.    MEDIAST/NICOLA: Atherosclerotic aorta with no visible aneurysm.    LUNGS/PLEURA: No significant pulmonary parenchymal abnormalities.  No effusion or pleural thickening.    BONES: Scattered mild degenerative endplate changes in the visualized thoracolumbar spine.  OTHER: Negative.                 Impression   CONCLUSION: No acute cardiopulmonary abnormality.     Assessment & Plan:   1. JUAN (obstructive sleep apnea)    2. Mild persistent asthma without complication (HCC)        1- JUAN   Doing well on cpap 12 cwp   Excellent  subjective and objective compliance compliant   I reviewed her download data 100 % adherence and normal AHI     - cpm with cpap 12 CWP / nasal mask    - diet / exercise / physical therapy    avoid sedative /narcotics   -Avoid driving if sleepy     2-mild asthma    -PFTs 12/19/2024 FEV1 75 % +BDR  No h/o smoking   No occupational exposure   Prior HRCT normal and V/Q is normal   CXR today clear      Plan ;  Advair 100/ 50 mcg one puff bid   Albuterol as needed  Keep up-to-date on vaccinations  Avoid triggers     3- CHF ,   pulmonary HTN PAP 47 / chronic A-fib on Eliquis  Followed with  cardiology       4-dyspnea upon exertion without hypoxia secondary to above .      F/u in 6 months          Meds This Visit:  Requested Prescriptions      No prescriptions requested or ordered in this encounter       Imaging & Referrals:  None         [1]   Allergies  Allergen Reactions    Erythromycin Base     Pcn [Bicillin L-A]     Lactose DIARRHEA

## 2025-03-12 ENCOUNTER — TELEPHONE (OUTPATIENT)
Dept: INTERNAL MEDICINE CLINIC | Facility: CLINIC | Age: 85
End: 2025-03-12

## 2025-03-12 ENCOUNTER — OFFICE VISIT (OUTPATIENT)
Dept: OTOLARYNGOLOGY | Facility: CLINIC | Age: 85
End: 2025-03-12

## 2025-03-12 ENCOUNTER — MED REC SCAN ONLY (OUTPATIENT)
Dept: INTERNAL MEDICINE CLINIC | Facility: CLINIC | Age: 85
End: 2025-03-12

## 2025-03-12 VITALS — BODY MASS INDEX: 42 KG/M2 | WEIGHT: 240 LBS

## 2025-03-12 DIAGNOSIS — H61.23 BILATERAL IMPACTED CERUMEN: Primary | ICD-10-CM

## 2025-03-12 PROCEDURE — 1159F MED LIST DOCD IN RCRD: CPT | Performed by: OTOLARYNGOLOGY

## 2025-03-12 PROCEDURE — 1160F RVW MEDS BY RX/DR IN RCRD: CPT | Performed by: OTOLARYNGOLOGY

## 2025-03-12 PROCEDURE — 69210 REMOVE IMPACTED EAR WAX UNI: CPT | Performed by: OTOLARYNGOLOGY

## 2025-03-12 NOTE — PROGRESS NOTES
RETURNING PATIENT PROGRESS NOTE  OTOLOGY/OTOLARYNGOLOGY    REF MD:  No ref. provider found    PCP: Jeff Leong MD    CHIEF COMPLAINT:  cerumen impaction     LAST VISIT 07/31/2024  IMPRESSION:  Bilateral cerumen impaction - removed     PLAN:  -Avoid q-tips/instrumentation of the ears  -Follow-up in 3-6 months for routine cerumen removal   __________________________________________________________________________  Interval history: Patient is here for an ear cleaning. Not interested in hearing aids at this time.     HISTORY OF PRESENT ILLNESS: Cris Peterson is a 84 year old female who presents for evaluation of cerumen impaction. Denies use of hearing aids. Endorses that she typically comes in for ear cleanings every 3-6 months. Endorses that the left ear felt worse than the right today.    Patient has been seen by Dr. Sandoval, Dr. Whitmore, and Dr. Torres for routine ear cleanings. She was last seen on 7/7/23 by Dr. Whitmore.      PAST MEDICAL HISTORY:    Past Medical History:    Basalioma    Cancer (HCC)    melanoma on back    Congestive heart disease (HCC)    COPD (chronic obstructive pulmonary disease) (HCC)    Coronary atherosclerosis    Disorder of thyroid    Fracture, patella    repair    Gout    Heart valve disease    High blood pressure    High cholesterol    Hypothyroidism    Nonunion of midfoot arthrodesis (HCC)    Pulmonary HTN (HCC)    Rheumatoid arthritis (HCC)    Sx.7/21/15, CPT.94162, R Triple Arthrodesis/Poss Medializing Calc Arthrodesis/Lapidus/MONA/FDL to Post Tib Transfer, w/, Global Exp.10/19/15    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)    Unspecified essential hypertension    Unspecified sleep apnea       PAST SURGICAL HISTORY:    Past Surgical History:   Procedure Laterality Date    Angiogram  01/01/2013    Cataract      Cholecystectomy      Hand/finger surgery unlisted Right 01/01/2011    thumb surgery    Hip replacement surgery Right     Hysterectomy       SERENITY--fibroid    Knee replacement surgery      total - right and left    Jw localization wire 1 site right (cpt=19281)  01/01/1994    Other surgical history      pins right foot    Surgery - external      tear duct    Tear duct system surg unlisted  01/01/1968    Tonsillectomy         Current Outpatient Medications on File Prior to Visit   Medication Sig Dispense Refill    methotrexate 2.5 MG Oral Tab TAKE 4 TABLETS ONCE A WEEK 52 tablet 1    Etanercept (ENBREL SURECLICK) 50 MG/ML Subcutaneous Solution Auto-injector INJECT 1 PEN UNDER THE SKIN EVERY 7 DAYS 12 mL 1    hydroxychloroquine 200 MG Oral Tab Take 2 tablets (400 mg total) by mouth daily. 180 tablet 3    folic acid 1 MG Oral Tab Take 1 tablet (1 mg total) by mouth daily. 90 tablet 3    levothyroxine 125 MCG Oral Tab Take 1 tablet (125 mcg total) by mouth before breakfast. 90 tablet 3    allopurinol 300 MG Oral Tab TAKE ONE TABLET BY MOUTH ONE TIME DAILY 90 tablet 0    fluticasone-salmeterol 100-50 MCG/ACT Inhalation Aerosol Powder, Breath Activated Inhale 1 puff into the lungs 2 (two) times daily. inhale 1 puff by INHALATION route 2 times every day morning and evening approximately 12 hours apart 1 each 5    albuterol 108 (90 Base) MCG/ACT Inhalation Aero Soln Inhale 2 puffs into the lungs every 6 (six) hours as needed for Wheezing. 1 each 2    Trospium Chloride ER 60 MG Oral Capsule SR 24 Hr Take 1 capsule (60 mg total) by mouth daily. 90 capsule 3    estradiol (ESTRACE) 0.1 MG/GM Vaginal Cream Apply 1/2 gram vaginally 2-3 times per week. 42.5 g 3    furosemide 40 MG Oral Tab Take 1 tablet (40 mg total) by mouth 2 (two) times daily. 60 tablet 5    lisinopril 10 MG Oral Tab Take 1 tablet (10 mg total) by mouth in the morning and 1 tablet (10 mg total) before bedtime.      Spacer/Aero-Holding Chambers Does not apply Device Use with albuterol inhaler 1 each 0    pantoprazole 40 MG Oral Tab EC Take 1 tablet (40 mg total) by mouth before breakfast. 90 tablet  3    atorvastatin 40 MG Oral Tab Take 1 tablet (40 mg total) by mouth daily.      Cholecalciferol (VITAMIN D) 50 MCG (2000 UT) Oral Tab Take by mouth.      ELIQUIS 5 MG Oral Tab Take 1 tablet (5 mg total) by mouth 2 (two) times daily.      Calcium 500-125 MG-UNIT Oral Tab Take 1 tablet by mouth daily.      Loperamide HCl 2 MG Oral Cap Take 1 capsule (2 mg total) by mouth 4 (four) times daily as needed for Diarrhea.      Probiotic Product (PROBIOTIC-10) Oral Cap Take 1 tablet by mouth daily.      acetaminophen (TYLENOL EXTRA STRENGTH) 500 MG Oral Tab Take 1 tablet (500 mg total) by mouth every 6 (six) hours as needed.      CENTRUM SILVER OR TABS 1 TABLET DAILY       No current facility-administered medications on file prior to visit.       Allergies:   Allergies   Allergen Reactions    Erythromycin Base     Pcn [Bicillin L-A]     Lactose DIARRHEA       SOCIAL HISTORY:    Social History     Tobacco Use    Smoking status: Never    Smokeless tobacco: Never   Substance Use Topics    Alcohol use: Yes     Alcohol/week: 1.7 standard drinks of alcohol     Types: 2 Glasses of wine per week     Comment: social       FAMILY HISTORY: Denies known family history of hearing loss, tinnitus, vertigo, or migraine.  Denies known family history of head and neck cancer, thyroid cancer, bleeding disorders.     REVIEW OF SYSTEMS:   Positives are in bold  ENT: Hearing change, tinnitus, otorrhea, otalgia, aural fullness, ear pressure, vertigo, imbalance  Sinus pressure, rhinorrhea, congestion, facial pain, jaw pain, dysphagia, odynophagia, sore throat, voice changes, shortness of breath    EXAMINATION:  I washed my hands with an alcohol-based hand gel prior to examination  Constitutional:   --Vitals: Weight 240 lb, not currently breastfeeding.  --General: no apparent distress, well-developed, conversant  Psych: affect pleasant and appropriate for age, alert and oriented  Neuro: Facial movement normal bilateral, HB 1/6  Respiratory: No  stridor, stertor or increased work of breathing  ENT:  --Ear: (Bilateral ears were examined under binocular microscopy)  Right ear microscopic exam:  Pinna: Normal, no lesions or masses.  Mastoid: Nontender on palpation.   External auditory canal:Cerumen impaction - removed, no masses or lesions.  Tympanic membrane: Intact, no lesions, normal landmarks.  Middle ear: Aerated.    Left ear microscopic exam:  Pinna: Normal, no lesions or masses.  Mastoid: Nontender on palpation.   External auditory canal: Cerumen impaction - removed, no masses or lesions.  Tympanic membrane: Intact, no lesions, normal landmarks.  Middle ear: Aerated.    Cerumen removal: (7/31/24)  Under binocular microscopy cerumen was removed from the bilateral external auditory canal using a wax loop curette and suction. Patient noted subjective improvement in hearing.      ASSESSMENT/PLAN:  Cris Peterson is a 84 year old female with   No diagnosis found.      IMPRESSION:  Bilateral cerumen impaction - removed     PLAN:  -Avoid q-tips/instrumentation of the ears  -Follow-up in 3-6 months for routine cerumen removal     Situation reviewed with the patient in detail.    Attention: This note has been scribed by Kimberlyn Burrows under the supervision of Riaz Cruz MD.     Riaz Cruz MD  Otology/Otolaryngology  [facility] [address] [place] [id number]  Phone [phone]  Fax [phone]      I have personally performed the services described in this documentation. All medical record entries made by the scribe were at my direction and in my presence. I have reviewed the chart and agree that the medical record reflects my personal performance and is accurate and complete.

## 2025-03-20 NOTE — TELEPHONE ENCOUNTER
Autoimmune work up was negative  Dr Janet Kumari obtained. Faxed back to [address] at [phone]. Awaiting confirmation.

## 2025-03-25 ENCOUNTER — LAB ENCOUNTER (OUTPATIENT)
Dept: LAB | Facility: HOSPITAL | Age: 85
End: 2025-03-25
Attending: STUDENT IN AN ORGANIZED HEALTH CARE EDUCATION/TRAINING PROGRAM
Payer: MEDICARE

## 2025-03-25 DIAGNOSIS — I51.9 HEART DISEASE, UNSPECIFIED: ICD-10-CM

## 2025-03-25 DIAGNOSIS — R06.02 SHORTNESS OF BREATH: ICD-10-CM

## 2025-03-25 DIAGNOSIS — R53.83 FATIGUE: Primary | ICD-10-CM

## 2025-03-25 DIAGNOSIS — N18.30 CHRONIC KIDNEY DISEASE, STAGE III (MODERATE) (HCC): ICD-10-CM

## 2025-03-25 LAB
ALBUMIN SERPL-MCNC: 4.1 G/DL (ref 3.2–4.8)
ALBUMIN/GLOB SERPL: 1.5 {RATIO} (ref 1–2)
ALP LIVER SERPL-CCNC: 90 U/L
ALT SERPL-CCNC: 20 U/L
ANION GAP SERPL CALC-SCNC: 10 MMOL/L (ref 0–18)
AST SERPL-CCNC: 20 U/L (ref ?–34)
BILIRUB SERPL-MCNC: 0.4 MG/DL (ref 0.2–1.1)
BUN BLD-MCNC: 29 MG/DL (ref 9–23)
BUN/CREAT SERPL: 19.6 (ref 10–20)
CALCIUM BLD-MCNC: 9.2 MG/DL (ref 8.7–10.4)
CHLORIDE SERPL-SCNC: 105 MMOL/L (ref 98–112)
CO2 SERPL-SCNC: 26 MMOL/L (ref 21–32)
CREAT BLD-MCNC: 1.48 MG/DL
DEPRECATED HBV CORE AB SER IA-ACNC: 86 NG/ML
EGFRCR SERPLBLD CKD-EPI 2021: 34 ML/MIN/1.73M2 (ref 60–?)
FASTING STATUS PATIENT QL REPORTED: NO
GLOBULIN PLAS-MCNC: 2.7 G/DL (ref 2–3.5)
GLUCOSE BLD-MCNC: 99 MG/DL (ref 70–99)
IRON SATN MFR SERPL: 14 %
IRON SERPL-MCNC: 37 UG/DL
NT-PROBNP SERPL-MCNC: 1516 PG/ML (ref ?–450)
OSMOLALITY SERPL CALC.SUM OF ELEC: 298 MOSM/KG (ref 275–295)
POTASSIUM SERPL-SCNC: 4.2 MMOL/L (ref 3.5–5.1)
PROT SERPL-MCNC: 6.8 G/DL (ref 5.7–8.2)
SODIUM SERPL-SCNC: 141 MMOL/L (ref 136–145)
TOTAL IRON BINDING CAPACITY: 266 UG/DL (ref 250–425)
TRANSFERRIN SERPL-MCNC: 204 MG/DL (ref 250–380)

## 2025-03-25 PROCEDURE — 83880 ASSAY OF NATRIURETIC PEPTIDE: CPT

## 2025-03-25 PROCEDURE — 36415 COLL VENOUS BLD VENIPUNCTURE: CPT

## 2025-03-25 PROCEDURE — 83540 ASSAY OF IRON: CPT

## 2025-03-25 PROCEDURE — 84466 ASSAY OF TRANSFERRIN: CPT

## 2025-03-25 PROCEDURE — 80053 COMPREHEN METABOLIC PANEL: CPT

## 2025-03-25 PROCEDURE — 82728 ASSAY OF FERRITIN: CPT

## 2025-04-04 ENCOUNTER — TELEPHONE (OUTPATIENT)
Dept: INTERNAL MEDICINE CLINIC | Facility: CLINIC | Age: 85
End: 2025-04-04

## 2025-04-04 RX ORDER — ESTRADIOL 0.1 MG/G
CREAM VAGINAL
COMMUNITY

## 2025-04-04 RX ORDER — CARBOXYMETHYLCELLULOSE SODIUM 10 MG/ML
1 GEL OPHTHALMIC 2 TIMES DAILY PRN
COMMUNITY

## 2025-04-04 RX ORDER — DAPAGLIFLOZIN 10 MG/1
10 TABLET, FILM COATED ORAL DAILY
COMMUNITY
Start: 2025-03-17

## 2025-04-04 RX ORDER — SPIRONOLACTONE 25 MG/1
12.5 TABLET ORAL DAILY
COMMUNITY
Start: 2025-03-14

## 2025-04-04 RX ORDER — KETOCONAZOLE 20 MG/ML
1 SHAMPOO, SUSPENSION TOPICAL AS NEEDED
COMMUNITY
Start: 2024-11-25

## 2025-04-04 RX ORDER — CLINDAMYCIN HYDROCHLORIDE 300 MG/1
600 CAPSULE ORAL AS DIRECTED
COMMUNITY
Start: 2024-10-14

## 2025-04-04 RX ORDER — FLUOCINONIDE TOPICAL SOLUTION USP, 0.05% 0.5 MG/ML
1 SOLUTION TOPICAL 2 TIMES DAILY PRN
COMMUNITY
Start: 2024-11-25

## 2025-04-04 NOTE — TELEPHONE ENCOUNTER
COMPREHENSIVE MEDICATION REVIEW         Cris Peterson MRN [id number]    1940 PCP Jeff Leong MD     Comments: Medication history completed by [facility] Pharmacist over the phone on 25. Patient has upcoming AWV with PCP on 25.     After thorough medication review, 5 discrepancies have been identified and corrected on patient's medication list. See updated list below:     Outpatient Encounter Medications as of 2025   Medication Sig    FARXIGA 10 MG Oral Tab Take 1 tablet (10 mg total) by mouth daily.    spironolactone 25 MG Oral Tab Take 0.5 tablets (12.5 mg total) by mouth daily.    clindamycin 300 MG Oral Cap Take 2 capsules (600 mg total) by mouth As Directed. 1 HR BEFORE PROCEDURE    Fluocinonide 0.05 % External Solution Apply 1 Application topically 2 (two) times daily as needed.    ketoconazole 2 % External Shampoo Apply 1 Application topically as needed for Itching.    estradiol 0.1 MG/GM Vaginal Cream Place vaginally daily as needed.    Magnesium Hydroxide (MILK OF MAGNESIA OR) Take 15 mL by mouth daily as needed.    carboxymethylcellulose 1 % Ophthalmic Gel Place 1 drop into both eyes 2 (two) times daily as needed.    methotrexate 2.5 MG Oral Tab TAKE 4 TABLETS ONCE A WEEK    Etanercept (ENBREL SURECLICK) 50 MG/ML Subcutaneous Solution Auto-injector INJECT 1 PEN UNDER THE SKIN EVERY 7 DAYS    hydroxychloroquine 200 MG Oral Tab Take 2 tablets (400 mg total) by mouth daily.    folic acid 1 MG Oral Tab Take 1 tablet (1 mg total) by mouth daily.    levothyroxine 125 MCG Oral Tab Take 1 tablet (125 mcg total) by mouth before breakfast.    allopurinol 300 MG Oral Tab TAKE ONE TABLET BY MOUTH ONE TIME DAILY    fluticasone-salmeterol 100-50 MCG/ACT Inhalation Aerosol Powder, Breath Activated Inhale 1 puff into the lungs 2 (two) times daily. inhale 1 puff by INHALATION route 2 times every day morning and evening approximately 12 hours apart    albuterol 108 (90 Base) MCG/ACT Inhalation  Aero Soln Inhale 2 puffs into the lungs every 6 (six) hours as needed for Wheezing.    Trospium Chloride ER 60 MG Oral Capsule SR 24 Hr Take 1 capsule (60 mg total) by mouth daily.    furosemide 40 MG Oral Tab Take 1 tablet (40 mg total) by mouth 2 (two) times daily.    lisinopril 10 MG Oral Tab Take 1 tablet (10 mg total) by mouth 2 (two) times daily.    pantoprazole 40 MG Oral Tab EC Take 1 tablet (40 mg total) by mouth before breakfast.    atorvastatin 40 MG Oral Tab Take 1 tablet (40 mg total) by mouth nightly.    Cholecalciferol (VITAMIN D) 50 MCG (2000 UT) Oral Tab Take 1 tablet by mouth daily.    ELIQUIS 5 MG Oral Tab Take 1 tablet (5 mg total) by mouth 2 (two) times daily.    Calcium 500-125 MG-UNIT Oral Tab Take 1 tablet by mouth daily.    Loperamide HCl 2 MG Oral Cap Take 1 capsule (2 mg total) by mouth 4 (four) times daily as needed for Diarrhea.    Probiotic Product (PROBIOTIC-10) Oral Cap Take 1 tablet by mouth daily.    acetaminophen (TYLENOL EXTRA STRENGTH) 500 MG Oral Tab Take 1 tablet (500 mg total) by mouth every 6 (six) hours as needed.    CENTRUM SILVER OR TABS Take 1 tablet by mouth daily.     Medication Assessment:   Reviewed all medications in detail with patient including dose, indication, timing of administration, monitoring parameters, and potential side effects of medications.     Patient reports taking atorvastatin 40 mg nightly, Eliquis 5 mg twice daily, Farxiga 10 mg daily, furosemide 40 mg twice daily, lisinopril 10 mg twice daily and spironolactone 12.5 mg daily as prescribed. She does follow closely with cardiologist. Patient does not monitor her blood pressure at home. Did recommend patient monitor her blood pressure 2-3 times weekly and bring readings to all MD appointments for review.     Did provide education and stressed the importance of taking medication just like prescribed to get the most benefit. Patient uses a pillbox to organize her medication and denies forgetting or  missing medication doses. Patient denies any questions or concerns with medications at this time.     Thank you,    Yohana Deshpande, PharmD, 4/4/2025, 1:25 PM

## 2025-04-07 ENCOUNTER — HOSPITAL ENCOUNTER (OUTPATIENT)
Dept: CT IMAGING | Facility: HOSPITAL | Age: 85
Discharge: HOME | End: 2025-04-07
Attending: STUDENT IN AN ORGANIZED HEALTH CARE EDUCATION/TRAINING PROGRAM
Payer: MEDICARE

## 2025-04-07 DIAGNOSIS — R06.02 SOB (SHORTNESS OF BREATH): ICD-10-CM

## 2025-04-07 PROCEDURE — 71250 CT THORAX DX C-: CPT | Performed by: STUDENT IN AN ORGANIZED HEALTH CARE EDUCATION/TRAINING PROGRAM

## 2025-04-08 ENCOUNTER — LAB ENCOUNTER (OUTPATIENT)
Dept: LAB | Age: 85
End: 2025-04-08
Attending: INTERNAL MEDICINE
Payer: MEDICARE

## 2025-04-08 ENCOUNTER — OFFICE VISIT (OUTPATIENT)
Dept: INTERNAL MEDICINE CLINIC | Facility: CLINIC | Age: 85
End: 2025-04-08

## 2025-04-08 VITALS
HEIGHT: 63.5 IN | RESPIRATION RATE: 16 BRPM | DIASTOLIC BLOOD PRESSURE: 60 MMHG | OXYGEN SATURATION: 97 % | HEART RATE: 59 BPM | WEIGHT: 227.63 LBS | TEMPERATURE: 98 F | BODY MASS INDEX: 39.83 KG/M2 | SYSTOLIC BLOOD PRESSURE: 110 MMHG

## 2025-04-08 DIAGNOSIS — I34.0 NONRHEUMATIC MITRAL VALVE REGURGITATION: ICD-10-CM

## 2025-04-08 DIAGNOSIS — D84.9 IMMUNOSUPPRESSED STATUS (HCC): ICD-10-CM

## 2025-04-08 DIAGNOSIS — I70.0 ATHEROSCLEROSIS OF AORTA: ICD-10-CM

## 2025-04-08 DIAGNOSIS — M1A.9XX1 CHRONIC TOPHACEOUS GOUT OF RIGHT FOOT: ICD-10-CM

## 2025-04-08 DIAGNOSIS — M06.09 RHEUMATOID ARTHRITIS OF MULTIPLE SITES WITH NEGATIVE RHEUMATOID FACTOR (HCC): ICD-10-CM

## 2025-04-08 DIAGNOSIS — I95.9 HYPOTENSION, UNSPECIFIED HYPOTENSION TYPE: ICD-10-CM

## 2025-04-08 DIAGNOSIS — E55.9 VITAMIN D DEFICIENCY: ICD-10-CM

## 2025-04-08 DIAGNOSIS — N18.32 STAGE 3B CHRONIC KIDNEY DISEASE (HCC): ICD-10-CM

## 2025-04-08 DIAGNOSIS — R32 URINARY INCONTINENCE, UNSPECIFIED TYPE: ICD-10-CM

## 2025-04-08 DIAGNOSIS — E03.9 ACQUIRED HYPOTHYROIDISM: ICD-10-CM

## 2025-04-08 DIAGNOSIS — Z00.00 ENCOUNTER FOR ANNUAL HEALTH EXAMINATION: Primary | ICD-10-CM

## 2025-04-08 DIAGNOSIS — G47.33 OBSTRUCTIVE SLEEP APNEA ON CPAP: ICD-10-CM

## 2025-04-08 DIAGNOSIS — I10 ESSENTIAL HYPERTENSION: ICD-10-CM

## 2025-04-08 DIAGNOSIS — I25.10 ATHEROSCLEROSIS OF NATIVE CORONARY ARTERY OF NATIVE HEART WITHOUT ANGINA PECTORIS: ICD-10-CM

## 2025-04-08 DIAGNOSIS — K58.0 IRRITABLE BOWEL SYNDROME WITH DIARRHEA: ICD-10-CM

## 2025-04-08 DIAGNOSIS — I27.20 PULMONARY HYPERTENSION (HCC): ICD-10-CM

## 2025-04-08 DIAGNOSIS — K21.9 GASTROESOPHAGEAL REFLUX DISEASE, UNSPECIFIED WHETHER ESOPHAGITIS PRESENT: ICD-10-CM

## 2025-04-08 DIAGNOSIS — E78.5 DYSLIPIDEMIA: ICD-10-CM

## 2025-04-08 DIAGNOSIS — I48.0 PAROXYSMAL ATRIAL FIBRILLATION (HCC): ICD-10-CM

## 2025-04-08 DIAGNOSIS — Z00.00 ENCOUNTER FOR ANNUAL HEALTH EXAMINATION: ICD-10-CM

## 2025-04-08 DIAGNOSIS — R42 LIGHTHEADEDNESS: ICD-10-CM

## 2025-04-08 DIAGNOSIS — J44.9 CHRONIC OBSTRUCTIVE PULMONARY DISEASE, UNSPECIFIED COPD TYPE (HCC): ICD-10-CM

## 2025-04-08 DIAGNOSIS — J34.2 DEVIATED NASAL SEPTUM: ICD-10-CM

## 2025-04-08 PROBLEM — M48.02 SPINAL STENOSIS, CERVICAL REGION: Status: RESOLVED | Noted: 2023-12-12 | Resolved: 2025-04-08

## 2025-04-08 PROBLEM — H00.036: Status: RESOLVED | Noted: 2023-04-11 | Resolved: 2025-04-08

## 2025-04-08 PROBLEM — I27.81 COR PULMONALE, CHRONIC (HCC): Status: RESOLVED | Noted: 2020-01-17 | Resolved: 2025-04-08

## 2025-04-08 PROBLEM — R94.39 ABNORMAL RESULT OF OTHER CARDIOVASCULAR FUNCTION STUDY: Status: RESOLVED | Noted: 2024-07-22 | Resolved: 2025-04-08

## 2025-04-08 PROBLEM — H04.222 EPIPHORA DUE TO INSUFFICIENT DRAINAGE OF LEFT SIDE: Status: RESOLVED | Noted: 2022-09-16 | Resolved: 2025-04-08

## 2025-04-08 PROBLEM — H04.552 ACQUIRED STENOSIS OF LEFT NASOLACRIMAL DUCT: Status: RESOLVED | Noted: 2022-09-16 | Resolved: 2025-04-08

## 2025-04-08 PROBLEM — D64.9 ANEMIA: Status: RESOLVED | Noted: 2019-04-16 | Resolved: 2025-04-08

## 2025-04-08 PROBLEM — H04.302 DACROCYSTITIS, LEFT: Status: RESOLVED | Noted: 2022-09-16 | Resolved: 2025-04-08

## 2025-04-08 PROBLEM — I49.3 PVC'S (PREMATURE VENTRICULAR CONTRACTIONS): Status: RESOLVED | Noted: 2022-10-24 | Resolved: 2025-04-08

## 2025-04-08 PROBLEM — E78.2 MIXED HYPERLIPIDEMIA: Status: RESOLVED | Noted: 2019-09-23 | Resolved: 2025-04-08

## 2025-04-08 PROBLEM — R47.89 WORD FINDING PROBLEM: Status: RESOLVED | Noted: 2023-12-12 | Resolved: 2025-04-08

## 2025-04-08 PROBLEM — R94.39 ABNORMAL FINDING ON THALLIUM STRESS TEST: Status: RESOLVED | Noted: 2022-02-15 | Resolved: 2025-04-08

## 2025-04-08 LAB
ALBUMIN SERPL-MCNC: 4.2 G/DL (ref 3.2–4.8)
ALBUMIN/GLOB SERPL: 1.4 {RATIO} (ref 1–2)
ALP LIVER SERPL-CCNC: 95 U/L
ALT SERPL-CCNC: 19 U/L
ANION GAP SERPL CALC-SCNC: 9 MMOL/L (ref 0–18)
AST SERPL-CCNC: 23 U/L (ref ?–34)
BASOPHILS # BLD AUTO: 0.06 X10(3) UL (ref 0–0.2)
BASOPHILS NFR BLD AUTO: 0.9 %
BILIRUB SERPL-MCNC: 0.7 MG/DL (ref 0.2–1.1)
BUN BLD-MCNC: 42 MG/DL (ref 9–23)
BUN/CREAT SERPL: 24.3 (ref 10–20)
CALCIUM BLD-MCNC: 9.6 MG/DL (ref 8.7–10.4)
CHLORIDE SERPL-SCNC: 106 MMOL/L (ref 98–112)
CHOLEST SERPL-MCNC: 153 MG/DL (ref ?–200)
CO2 SERPL-SCNC: 26 MMOL/L (ref 21–32)
CREAT BLD-MCNC: 1.73 MG/DL
CREAT UR-SCNC: 69.2 MG/DL
DEPRECATED RDW RBC AUTO: 58.5 FL (ref 35.1–46.3)
EGFRCR SERPLBLD CKD-EPI 2021: 29 ML/MIN/1.73M2 (ref 60–?)
EOSINOPHIL # BLD AUTO: 0.1 X10(3) UL (ref 0–0.7)
EOSINOPHIL NFR BLD AUTO: 1.5 %
ERYTHROCYTE [DISTWIDTH] IN BLOOD BY AUTOMATED COUNT: 15.4 % (ref 11–15)
FASTING PATIENT LIPID ANSWER: NO
FASTING STATUS PATIENT QL REPORTED: NO
GLOBULIN PLAS-MCNC: 2.9 G/DL (ref 2–3.5)
GLUCOSE BLD-MCNC: 104 MG/DL (ref 70–99)
HCT VFR BLD AUTO: 35.9 %
HDLC SERPL-MCNC: 70 MG/DL (ref 40–59)
HGB BLD-MCNC: 11.8 G/DL
IMM GRANULOCYTES # BLD AUTO: 0.03 X10(3) UL (ref 0–1)
IMM GRANULOCYTES NFR BLD: 0.4 %
LDLC SERPL CALC-MCNC: 64 MG/DL (ref ?–100)
LYMPHOCYTES # BLD AUTO: 2.24 X10(3) UL (ref 1–4)
LYMPHOCYTES NFR BLD AUTO: 33 %
MCH RBC QN AUTO: 34.5 PG (ref 26–34)
MCHC RBC AUTO-ENTMCNC: 32.9 G/DL (ref 31–37)
MCV RBC AUTO: 105 FL
MICROALBUMIN UR-MCNC: 1.1 MG/DL
MICROALBUMIN/CREAT 24H UR-RTO: 15.9 UG/MG (ref ?–30)
MONOCYTES # BLD AUTO: 0.81 X10(3) UL (ref 0.1–1)
MONOCYTES NFR BLD AUTO: 11.9 %
NEUTROPHILS # BLD AUTO: 3.55 X10 (3) UL (ref 1.5–7.7)
NEUTROPHILS # BLD AUTO: 3.55 X10(3) UL (ref 1.5–7.7)
NEUTROPHILS NFR BLD AUTO: 52.3 %
NONHDLC SERPL-MCNC: 83 MG/DL (ref ?–130)
OSMOLALITY SERPL CALC.SUM OF ELEC: 303 MOSM/KG (ref 275–295)
PLATELET # BLD AUTO: 205 10(3)UL (ref 150–450)
POTASSIUM SERPL-SCNC: 4.8 MMOL/L (ref 3.5–5.1)
PROT SERPL-MCNC: 7.1 G/DL (ref 5.7–8.2)
RBC # BLD AUTO: 3.42 X10(6)UL
SODIUM SERPL-SCNC: 141 MMOL/L (ref 136–145)
TRIGL SERPL-MCNC: 106 MG/DL (ref 30–149)
TSI SER-ACNC: 0.56 UIU/ML (ref 0.55–4.78)
URATE SERPL-MCNC: 3.5 MG/DL
VIT D+METAB SERPL-MCNC: 42.7 NG/ML (ref 30–100)
VLDLC SERPL CALC-MCNC: 16 MG/DL (ref 0–30)
WBC # BLD AUTO: 6.8 X10(3) UL (ref 4–11)

## 2025-04-08 PROCEDURE — 84443 ASSAY THYROID STIM HORMONE: CPT

## 2025-04-08 PROCEDURE — 99499 UNLISTED E&M SERVICE: CPT | Performed by: INTERNAL MEDICINE

## 2025-04-08 PROCEDURE — 1159F MED LIST DOCD IN RCRD: CPT | Performed by: INTERNAL MEDICINE

## 2025-04-08 PROCEDURE — 99214 OFFICE O/P EST MOD 30 MIN: CPT | Performed by: INTERNAL MEDICINE

## 2025-04-08 PROCEDURE — 80061 LIPID PANEL: CPT

## 2025-04-08 PROCEDURE — G0439 PPPS, SUBSEQ VISIT: HCPCS | Performed by: INTERNAL MEDICINE

## 2025-04-08 PROCEDURE — 82043 UR ALBUMIN QUANTITATIVE: CPT

## 2025-04-08 PROCEDURE — 84550 ASSAY OF BLOOD/URIC ACID: CPT

## 2025-04-08 PROCEDURE — 82570 ASSAY OF URINE CREATININE: CPT

## 2025-04-08 PROCEDURE — 1125F AMNT PAIN NOTED PAIN PRSNT: CPT | Performed by: INTERNAL MEDICINE

## 2025-04-08 PROCEDURE — 36415 COLL VENOUS BLD VENIPUNCTURE: CPT

## 2025-04-08 PROCEDURE — 1160F RVW MEDS BY RX/DR IN RCRD: CPT | Performed by: INTERNAL MEDICINE

## 2025-04-08 PROCEDURE — 96160 PT-FOCUSED HLTH RISK ASSMT: CPT | Performed by: INTERNAL MEDICINE

## 2025-04-08 PROCEDURE — 80053 COMPREHEN METABOLIC PANEL: CPT

## 2025-04-08 PROCEDURE — 85025 COMPLETE CBC W/AUTO DIFF WBC: CPT

## 2025-04-08 PROCEDURE — 1170F FXNL STATUS ASSESSED: CPT | Performed by: INTERNAL MEDICINE

## 2025-04-08 PROCEDURE — 82306 VITAMIN D 25 HYDROXY: CPT

## 2025-04-08 NOTE — PROGRESS NOTES
Subjective:   Cris Peterson is a 84 year old female who presents for a MA AHA (Medicare Advantage Annual Health Assessment) and scheduled follow up of multiple significant but stable problems, acute complicated new problem, and acute uncomplicated problem.   History of Present Illness    History of hypertension on medications.  Today she is hypotensive.  Has been feeling slightly dizzy lately.  Has been compliant with all her medications.  Denies palpitations, chest pain, shortness of breath.  She does follow with her cardiologist.  Recently has had multiple changes in her medications.  In February 2025 she saw her cardiologist and was placed on twice her diuretic dosage.  She became dizzy and cut back to once a day again.  She did follow-up with another cardiologist 1 month later and again dosage was doubled on her diuretic.  BNP was 1500.  She did do a CT scan of her chest which is still pending.  She was supposed to see her cardiologist next month.    History of hypercholesterolemia on medications.  Needs levels rechecked.    History of hypothyroidism on levothyroxine.  Needs levels rechecked.    History of vitamin D deficiency.  Needs levels rechecked.    History of deviated septum and obstructive sleep apnea.  Currently uses her CPAP regularly.    History of gout of right foot.  Currently on allopurinol.  Needs uric acid levels rechecked.    History of incontinence.  This is stable.    History of irritable bowel syndrome and gastroesophageal reflux disease.  These are stable.  She follows with gastroenterology.    History of coronary artery disease and mitral valve regurgitation.  She follows with cardiology.  Denies chest pain albeit she does not get much activity.    History of chronic kidney disease.  Needs levels rechecked.    History of rheumatoid arthritis.  She is on Biologics.  She follows with rheumatology.    History of pulmonary hypertension and COPD.  She follows with pulmonology.  She is on  CPAP.    History of atrial fibrillation.  Currently takes Eliquis.  Follows with cardiology.    History/Other:   Fall Risk Assessment:   She has been screened for Falls and is High Risk. Fall Prevention information provided to patient in After Visit Summary.    Do you feel unsteady when standing or walking?: Yes  Do you worry about falling?: Yes  Have you fallen in the past year?: Yes  How many times have you fallen?: 1  Were you injured?: No     Cognitive Assessment:   She had a completely normal cognitive assessment - see flowsheet entries     Functional Ability/Status:   Cris Peterson has some abnormal functions as listed below:  She has difficulties Shopping for Groceries based on screening of functional status. She has Hearing problems based on screening of functional status.She has Vision problems based on screening of functional status. She has Walking problems based on screening of functional status. She has problems with Memory based on screening of functional status.       Depression Screening (PHQ):  PHQ-2 SCORE: 0  , done 4/8/2025   If you checked off any problems, how difficult have these problems made it for you to do your work, take care of things at home, or get along with other people?: Not difficult at all      Advanced Directives:   She does have a Living Will but we do NOT have it on file in Epic.    She has a Power of  for Health Care on file in [facility].  Discussed Advance Care Planning with patient (and family/surrogate if present). Standard forms made available to patient in After Visit Summary.      Patient Active Problem List   Diagnosis    Hypothyroidism    Obstructive sleep apnea on CPAP    IBS (irritable bowel syndrome)    Rheumatoid arthritis with negative rheumatoid factor (HCC)    Immunosuppressed status (HCC)    Essential hypertension    Atherosclerosis of aorta    Stage 3b chronic kidney disease (HCC)    Gastroesophageal reflux disease    Atherosclerosis of native coronary  artery of native heart without angina pectoris    Chronic obstructive pulmonary disease (HCC)    Pulmonary hypertension (HCC)    Chronic tophaceous gout of right foot    Urinary incontinence    Atrial fibrillation (HCC)    Vitamin D deficiency    Deviated nasal septum    Dyslipidemia    Nonrheumatic mitral valve regurgitation    Mild persistent asthma without complication (HCC)     Allergies:  She is allergic to erythromycin base, pcn [bicillin l-a], and lactose.    Current Medications:  Outpatient Medications Marked as Taking for the 4/8/25 encounter (Office Visit) with Jeff Leong MD   Medication Sig    FARXIGA 10 MG Oral Tab Take 1 tablet (10 mg total) by mouth daily.    spironolactone 25 MG Oral Tab Take 0.5 tablets (12.5 mg total) by mouth daily.    Fluocinonide 0.05 % External Solution Apply 1 Application topically 2 (two) times daily as needed.    ketoconazole 2 % External Shampoo Apply 1 Application topically as needed for Itching.    estradiol 0.1 MG/GM Vaginal Cream Place vaginally daily as needed.    Magnesium Hydroxide (MILK OF MAGNESIA OR) Take 15 mL by mouth daily as needed.    carboxymethylcellulose 1 % Ophthalmic Gel Place 1 drop into both eyes 2 (two) times daily as needed.    methotrexate 2.5 MG Oral Tab TAKE 4 TABLETS ONCE A WEEK    Etanercept (ENBREL SURECLICK) 50 MG/ML Subcutaneous Solution Auto-injector INJECT 1 PEN UNDER THE SKIN EVERY 7 DAYS    hydroxychloroquine 200 MG Oral Tab Take 2 tablets (400 mg total) by mouth daily.    folic acid 1 MG Oral Tab Take 1 tablet (1 mg total) by mouth daily.    levothyroxine 125 MCG Oral Tab Take 1 tablet (125 mcg total) by mouth before breakfast.    allopurinol 300 MG Oral Tab TAKE ONE TABLET BY MOUTH ONE TIME DAILY    fluticasone-salmeterol 100-50 MCG/ACT Inhalation Aerosol Powder, Breath Activated Inhale 1 puff into the lungs 2 (two) times daily. inhale 1 puff by INHALATION route 2 times every day morning and evening approximately 12 hours apart     albuterol 108 (90 Base) MCG/ACT Inhalation Aero Soln Inhale 2 puffs into the lungs every 6 (six) hours as needed for Wheezing.    Trospium Chloride ER 60 MG Oral Capsule SR 24 Hr Take 1 capsule (60 mg total) by mouth daily.    furosemide 40 MG Oral Tab Take 1 tablet (40 mg total) by mouth 2 (two) times daily.    lisinopril 10 MG Oral Tab Take 1 tablet (10 mg total) by mouth 2 (two) times daily.    Spacer/Aero-Holding Chambers Does not apply Device Use with albuterol inhaler    pantoprazole 40 MG Oral Tab EC Take 1 tablet (40 mg total) by mouth before breakfast.    atorvastatin 40 MG Oral Tab Take 1 tablet (40 mg total) by mouth nightly.    Cholecalciferol (VITAMIN D) 50 MCG (2000 UT) Oral Tab Take 1 tablet by mouth daily.    ELIQUIS 5 MG Oral Tab Take 1 tablet (5 mg total) by mouth 2 (two) times daily.    Calcium 500-125 MG-UNIT Oral Tab Take 1 tablet by mouth daily.    Loperamide HCl 2 MG Oral Cap Take 1 capsule (2 mg total) by mouth 4 (four) times daily as needed for Diarrhea.    Probiotic Product (PROBIOTIC-10) Oral Cap Take 1 tablet by mouth daily.    acetaminophen (TYLENOL EXTRA STRENGTH) 500 MG Oral Tab Take 1 tablet (500 mg total) by mouth every 6 (six) hours as needed.    CENTRUM SILVER OR TABS Take 1 tablet by mouth daily.       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  Surgical History:  She  has a past surgical history that includes tear duct system surg unlisted (01/01/1968); hysterectomy; cataract; cholecystectomy; hip replacement surgery (Right); knee replacement surgery; hand/finger surgery unlisted (Right, 01/01/2011); angiogram (01/01/2013); nichole localization wire 1 site right (cpt=19281) (01/01/1994); tonsillectomy; other surgical history; and surgery - external.   Family History:  Her family history includes Breast Cancer in her paternal aunt and paternal cousin female; Breast Cancer (age of onset: 64) in her mother; Cancer in her father, mother, self, and son; Colon Cancer in her son; Diabetes in her father; Ovarian Cancer (age of onset: 83) in her mother.  Social History:  She  reports that she has never smoked. She has never used smokeless tobacco. She reports current alcohol use of about 1.7 standard drinks of alcohol per week. She reports that she does not use drugs.    Tobacco:       CAGE Alcohol Screen:   Cage NOT Performed in the last 6 months, please do assessment! Last Cage score was 0 on 7/23/2024.      Patient Care Team:  Jeff Leong MD as PCP - General (Internal Medicine)  Brittany Alonso MD (PULMONARY DISEASES)  Carlos Beck MD (Interventional, Cardiology)  Ivania Cornell MD (RHEUMATOLOGY)  Yasmany Bhagat MD (DERMATOLOGY)  Mikal Parks MD (NEPHROLOGY)    Review of Systems   Musculoskeletal:  Positive for arthralgias.   Neurological:  Positive for dizziness and light-headedness.     Objective:   Physical Exam  HENT:      Right Ear: Tympanic membrane, ear canal and external ear normal. There is no impacted cerumen.      Left Ear: Tympanic membrane, ear canal and external ear normal. There is no impacted cerumen.   Eyes:      General: No scleral icterus.  Cardiovascular:      Rate and Rhythm: Normal rate and regular rhythm.      Pulses: Normal pulses.      Heart sounds: Normal heart sounds. No murmur  heard.  Pulmonary:      Effort: Pulmonary effort is normal. No respiratory distress.      Breath sounds: Normal breath sounds. No stridor. No wheezing or rhonchi.   Abdominal:      General: Abdomen is flat. Bowel sounds are normal. There is no distension.      Palpations: Abdomen is soft. There is no mass.      Tenderness: There is no abdominal tenderness.      Hernia: No hernia is present.   Musculoskeletal:      Right lower leg: Swelling (Minimal) present.      Left lower leg: Swelling (Minimal) present.   Skin:     General: Skin is warm.   Neurological:      Mental Status: She is alert.   Psychiatric:         Mood and Affect: Mood normal.       /60 (BP Location: Right arm, Patient Position: Sitting, Cuff Size: adult)   Pulse 59   Temp 97.7 °F (36.5 °C) (Temporal)   Resp 16   Ht 5' 3.5\" (1.613 m)   Wt 227 lb 9.6 oz (103.2 kg)   SpO2 97%   BMI 39.69 kg/m²  Estimated body mass index is 39.69 kg/m² as calculated from the following:    Height as of this encounter: 5' 3.5\" (1.613 m).    Weight as of this encounter: 227 lb 9.6 oz (103.2 kg).    Medicare Hearing Assessment:   Hearing Screening    Time taken: 4/8/2025 10:19 AM  Screening Method: Questionnaire  I have a problem hearing over the telephone: Sometimes I have trouble following the conversations when two or more people are talking at the same time: No   I have trouble understanding things on the TV: No I have to strain to understand conversations: No   I have to worry about missing the telephone ring or doorbell: No I have trouble hearing conversations in a noisy background such as a crowded room or restaurant: Sometimes   I get confused about where sounds come from: No I misunderstand some words in a sentence and need to ask people to repeat themselves: Sometimes   I especially have trouble understanding the speech of women and children: No I have trouble understanding the speaker in a large room such as at a meeting or place of Confucianist: Sometimes    Many people I talk to seem to mumble (or don't speak clearly): No People get annoyed because I misunderstand what they say: Sometimes   I misunderstand what others are saying and make inappropriate responses: No I avoid social activities because I cannot hear well and fear I will reply improperly: No   Family members and friends have told me they think I may have hearing loss: No                   Assessment & Plan:   Cris Peterson is a 84 year old female who presents for a Medicare Assessment.     1. Encounter for annual health examination (Primary)  -     CBC With Differential With Platelet; Future; Expected date: 04/08/2025  -     Comp Metabolic Panel (14); Future; Expected date: 04/08/2025  -     Lipid Panel; Future; Expected date: 04/08/2025  -     TSH W Reflex To Free T4; Future; Expected date: 04/08/2025    2. Essential hypertension  -     Detailed, Mod Complex (99214)  -     Microalb/Creat Ratio, Random Urine; Future; Expected date: 04/08/2025  - Initially hypotensive and symptomatic.  - Recent echocardiogram reviewed with no significant findings to explain symptoms.  - CT scan of her chest is pending.  - Recommend she reduce her diuretic to once a day again.  - Debated about sending to ER but repeat blood pressure improved.  - Have recommended she follow-up with cardiology sooner than her next scheduled appointment.    3. Dyslipidemia  -     Detailed, Mod Complex (99214)  -     Comp Metabolic Panel (14); Future; Expected date: 04/08/2025  -     Lipid Panel; Future; Expected date: 04/08/2025    4. Acquired hypothyroidism  Overview:  2/20/2023: TSH 1.0  Orders:  -     Detailed, Mod Complex (99214)  -     TSH W Reflex To Free T4; Future; Expected date: 04/08/2025    5. Vitamin D deficiency  -     Detailed, Mod Complex (99214)  -     Vitamin D; Future; Expected date: 04/08/2025    6. Obstructive sleep apnea on CPAP  -     Detailed, Mod Complex (99214)  - Continue CPAP.  - Follow-up with pulmonology.    7.  Deviated nasal septum  Overview:  Formatting of this note might be different from the original. Added automatically from request for surgery [id number]    Orders:  -     Detailed, Mod Complex (99214)  - Stable.    8. Urinary incontinence, unspecified type  -     Detailed, Mod Complex (99214)  - Stable.    9. Chronic tophaceous gout of right foot  -     Detailed, Mod Complex (99214)  -     Uric Acid; Future; Expected date: 04/08/2025  - Continue allopurinol.    10. Irritable bowel syndrome with diarrhea  -     Detailed, Mod Complex (99214)  - Stable.  - Follow-up with gastroenterology.    11. Gastroesophageal reflux disease, unspecified whether esophagitis present  -     Detailed, Mod Complex (99214)  - Stable.  - Follow-up with gastroenterology.    12. Nonrheumatic mitral valve regurgitation  -     Detailed, Mod Complex (99214)  - Stable.  - Follow-up with cardiology.    13. Atherosclerosis of native coronary artery of native heart without angina pectoris  -     Detailed, Mod Complex (99214)  - Stable.  - Follow-up with cardiology.    14. Stage 3b chronic kidney disease (HCC)  -     Detailed, Mod Complex (99214)  -     Comp Metabolic Panel (14); Future; Expected date: 04/08/2025    15. Rheumatoid arthritis of multiple sites with negative rheumatoid factor (HCC)  -     Detailed, Mod Complex (99214)  - Stable.  - Follow-up with rheumatology.  - Continue Biologics and methotrexate.    16. Pulmonary hypertension (HCC)  -     Detailed, Mod Complex (99214)  - Continue CPAP.  - Follow-up with pulmonology.    17. Immunosuppressed status (HCC)  -     Detailed, Mod Complex (99214)  - Continue Biologics.  - Rheumatology to address tuberculosis screening due to being on Biologics.    18. Chronic obstructive pulmonary disease, unspecified COPD type (HCC)  -     Detailed, Mod Complex (99214)  - Stable.  - Follow-up with pulmonology.    19. Paroxysmal atrial fibrillation ([facility])  Overview:  Cardioversion 11/2021.  On flecainide and  Eliquis.  Orders:  -     Detailed, Mod Complex (39070)  - Stable.  - Follow-up with cardiology.    20. Atherosclerosis of aorta  Overview:  CT chest 2016  Orders:  -     Detailed, Mod Complex (50938)  - Lipid panel.    21. Hypotension, unspecified hypotension type  -     Detailed, Mod Complex (74336)  - Recheck had normalized.  - Reduce diuretic to once a day.  - Move up appointment with cardiology.  - Low threshold for emergency room visit.    22. Lightheadedness  -     Detailed, Mod Complex (54809)  - Recheck had normalized.  - Reduce diuretic to once a day.  - Move up appointment with cardiology.  - Low threshold for emergency room visit.    Assessment & Plan      The patient indicates understanding of these issues and agrees to the plan.  Reinforced healthy diet, lifestyle, and exercise.      Return in about 4 weeks (around 5/6/2025) for Routine Follow-Up.     Jeff Leong MD, 4/8/2025     Supplementary Documentation:   General Health:  In the past six months, have you lost more than 10 pounds without trying?: 2 - No  Has your appetite been poor?: No  Type of Diet: Other  How does the patient maintain a good energy level?: Other  How would you describe your daily physical activity?: None  How would you describe your current health state?: Poor  How do you maintain positive mental well-being?: Social Interaction, Visiting Friends, Visiting Family  On a scale of 0 to 10, with 0 being no pain and 10 being severe pain, what is your pain level?: 3 - (Mild)  In the past six months, have you experienced urine leakage?: 1-Yes  At any time do you feel concerned for the safety/well-being of yourself and/or your children, in your home or elsewhere?: No  Have you had any immunizations at another office such as Influenza, Hepatitis B, Tetanus, or Pneumococcal?: No    Health Maintenance   Topic Date Due    TB Screen  Never done    Annual Well Visit  01/01/2025    Mammogram  02/16/2025    COVID-19 Vaccine (5 - 2024-25 season)  05/08/2025 (Originally 9/1/2024)    Colorectal Cancer Screening  12/05/2026    Influenza Vaccine  Completed    DEXA Scan  Completed    Annual Depression Screening  Completed    Fall Risk Screening (Annual)  Completed    Pneumococcal Vaccine: 50+ Years  Completed    Zoster Vaccines  Completed    Meningococcal B Vaccine  Aged Out

## 2025-04-08 NOTE — PATIENT INSTRUCTIONS
Prevention Guidelines, Women Ages 65 and Older  Screening tests and vaccines are an important part of managing your health. A screening test is done to find possible disorders or diseases in people who don't have any symptoms. The goal is to find a disease early so lifestyle changes can be made and you can be watched more closely to reduce the risk of disease, or to detect it early enough to treat it most effectively. Screening tests are not considered diagnostic, but are used to determine if more testing is needed. Health counseling is essential, too. Below are guidelines for these, for women ages 65 and older. Talk with your healthcare provider to make sure you’re up to date on what you need.  Screening Who needs it How often   Type 2 diabetes or prediabetes All women ages 40 to 75 who are overweight or obese At least every 3 years   Type 2 diabetes All women with prediabetes Every year   Alcohol misuse All women in this age group At routine exams   Blood pressure All women in this age group Yearly checkup if your blood pressure is normal*  Normal blood pressure is less than 120/80 mm Hg*  If your blood pressure reading is higher than normal, follow the advice of your healthcare provider   Breast cancer All women of average risk  There are no guidelines for breast cancer screening for 75 years and older.  Mammograms should be done every 1 or 2 years until age 75. At that point a woman should talk to her doctor about whether to continue screening. Talk to your doctor regarding your recommended frequency depending on your risk factors.   Cervical cancer Only women who had abnormal screening results before age 65 Talk with your healthcare provider   Chlamydia Women at increased risk for infection At routine exams   Colorectal cancer All women over the age of 50  This screening is advised against for women over 75 or if there is a life expectancy of less than 10 years.  Multiple tests are available and are used at  different times. Possible tests include: flexible sigmoidoscopy, colonoscopy, double-contrast barium enema, yearly fecal occult blood test, fecal immunochemical test, or stool DNA test as often as your healthcare provider advises. Talk with your healthcare provider about which tests are best for you.   Depression All women in this age group At routine exams   Gonorrhea Sexually active women at increased risk for infection At routine exams   Hepatitis C Anyone at increased risk; 1 time for those born between 1945 and 1965 At routine exams   High cholesterol or triglycerides All women in this age group who are at risk for coronary artery disease At least every 5 years   HIV Women at increased risk for infection-talk with your healthcare provider At routine exams   Lung cancer Adults ages 55 to 74 who have smoked with a 30-pack-a-year history and have smoked within the past 15 years  Annual low dose CT scan   Obesity All women in this age group At routine exams   Osteoporosis All women in this age group Bone density test at age 65, then follow-up as advised by your healthcare provider   Syphilis Women at increased risk for infection-talk with your healthcare provider At routine exams   Thyroid-Stimulating Hormone (TSH) All women in this age group with symptoms of thyroid dysfunction. There is not enough evidence to support TSH screening in women without symptoms.  ACOG recommendation is every 5 years; American Academy of Family Physicians concludes there is not enough evidence to support routine screening in adults without symptoms.    Tuberculosis Women at increased risk for infection-talk with your healthcare provider Ask your healthcare provider   Vision All women in this age group Every 1 to 2 years; if you have a chronic health condition, ask your healthcare provider if you need exams more often   Vaccine Who needs it How often   Chickenpox (varicella) All women in this age group who have no record of this  infection or vaccine 2 doses; second dose should be given at least 4 weeks after the first dose   Hepatitis A Women at increased risk for infection-talk with your healthcare provider 2 doses given 6 months apart   Hepatitis B Women at increased risk for infection-talk with your healthcare provider 3 doses over 6 months; second dose should be given 1 month after the first dose; the third dose should be given at least 2 months after the second dose and at least 4 months after the first dose   Haemophilus influenza Type B (HIB) Women at increased risk for infection-talk with your healthcare provider 1 to 3 doses   Influenza (flu) All women in this age group Once a year   Pneumococcal conjugate vaccine (PCV13) and pneumococcal polysaccharide vaccine (PPSV23) All women in this age group 1 dose of each vaccine   Tetanus/diphtheria/pertussis (Td/Tdap) booster All women in this age group Td every 10 years, or a one-time dose of Tdap instead of a Td booster after age 18, then Td every 10 years   Zoster All women in this age group 1 dose   Counseling Who needs it How often   Diet and exercise Women who are overweight or obese When diagnosed, and then at routine exams   Fall prevention (exercise and vitamin D supplements) All women in this age group At routine exams   Sexually transmitted infection prevention Women at increased risk for infection-talk with your healthcare provider At routine exams   Use of daily aspirin Talk to your healthcare provider about whether or not to start taking low-dose aspirin for the prevention of cardiovascular disease (CVD) and colorectal cancer in adults ages 60 to 69 who have at least a 10% risk of getting CVD within the next 10 years.  People who are not at increased risk for bleeding, have a life expectancy of at least 10 years, and are willing to take low-dose aspirin daily for at least 10 years are more likely to benefit. When the advantages of taking low-dose aspirin outweigh the risks,  people may choose to start taking a low-dose aspirin.  There is not enough data to support the use of aspirin in people over the age of 70.  When your risk is known   Use of tobacco and the health effects it can cause All women in this age group Every exam   Date Last Reviewed: 11/1/2017  © 9376-1294 [employer]. [address] [place] [address], [place] [address]. All rights reserved. This information is not intended as a substitute for professional medical care. Always follow your healthcare professional's instructions.

## 2025-04-08 NOTE — PROGRESS NOTES
The following individual(s), Cris RICO Kristen, verbally consents to be recorded using ambient AI listening technology and understand that they can each withdraw their consent to this listening technology at any point by asking the clinician to turn off or pause the recording

## 2025-04-14 ENCOUNTER — APPOINTMENT (OUTPATIENT)
Dept: URBAN - METROPOLITAN AREA CLINIC 244 | Age: 85
Setting detail: DERMATOLOGY
End: 2025-04-14

## 2025-04-14 DIAGNOSIS — L57.0 ACTINIC KERATOSIS: ICD-10-CM

## 2025-04-14 DIAGNOSIS — Z85.828 PERSONAL HISTORY OF OTHER MALIGNANT NEOPLASM OF SKIN: ICD-10-CM

## 2025-04-14 DIAGNOSIS — D22 MELANOCYTIC NEVI: ICD-10-CM

## 2025-04-14 DIAGNOSIS — L82.1 OTHER SEBORRHEIC KERATOSIS: ICD-10-CM

## 2025-04-14 DIAGNOSIS — L81.4 OTHER MELANIN HYPERPIGMENTATION: ICD-10-CM

## 2025-04-14 DIAGNOSIS — D485 NEOPLASM OF UNCERTAIN BEHAVIOR OF SKIN: ICD-10-CM

## 2025-04-14 DIAGNOSIS — Z12.83 ENCOUNTER FOR SCREENING FOR MALIGNANT NEOPLASM OF SKIN: ICD-10-CM

## 2025-04-14 PROBLEM — D48.5 NEOPLASM OF UNCERTAIN BEHAVIOR OF SKIN: Status: ACTIVE | Noted: 2025-04-14

## 2025-04-14 PROBLEM — D22.9 MELANOCYTIC NEVI, UNSPECIFIED: Status: ACTIVE | Noted: 2025-04-14

## 2025-04-14 PROCEDURE — 99213 OFFICE O/P EST LOW 20 MIN: CPT | Mod: 25

## 2025-04-14 PROCEDURE — OTHER COUNSELING: OTHER

## 2025-04-14 PROCEDURE — 17000 DESTRUCT PREMALG LESION: CPT

## 2025-04-14 PROCEDURE — OTHER DIAGNOSIS COMMENT: OTHER

## 2025-04-14 PROCEDURE — 17003 DESTRUCT PREMALG LES 2-14: CPT

## 2025-04-14 PROCEDURE — OTHER LIQUID NITROGEN: OTHER

## 2025-04-14 ASSESSMENT — LOCATION DETAILED DESCRIPTION DERM
LOCATION DETAILED: LEFT ANTERIOR PROXIMAL UPPER ARM
LOCATION DETAILED: LEFT PROXIMAL POSTERIOR UPPER ARM
LOCATION DETAILED: RIGHT DISTAL DORSAL FOREARM
LOCATION DETAILED: RIGHT MEDIAL SUPERIOR CHEST
LOCATION DETAILED: LEFT SUPERIOR MEDIAL FOREHEAD
LOCATION DETAILED: RIGHT SUPERIOR UPPER BACK
LOCATION DETAILED: RIGHT DISTAL PRETIBIAL REGION
LOCATION DETAILED: LEFT DISTAL DORSAL FOREARM
LOCATION DETAILED: RIGHT MID-UPPER BACK

## 2025-04-14 ASSESSMENT — LOCATION SIMPLE DESCRIPTION DERM
LOCATION SIMPLE: LEFT FOREHEAD
LOCATION SIMPLE: LEFT POSTERIOR UPPER ARM
LOCATION SIMPLE: LEFT UPPER ARM
LOCATION SIMPLE: CHEST
LOCATION SIMPLE: RIGHT FOREARM
LOCATION SIMPLE: RIGHT UPPER BACK
LOCATION SIMPLE: LEFT FOREARM
LOCATION SIMPLE: RIGHT PRETIBIAL REGION

## 2025-04-14 ASSESSMENT — LOCATION ZONE DERM
LOCATION ZONE: FACE
LOCATION ZONE: ARM
LOCATION ZONE: LEG
LOCATION ZONE: TRUNK

## 2025-04-14 NOTE — PROCEDURE: LIQUID NITROGEN
Consent has been obtained after discussing/reviewing the risks to the procedure which may include but are not limited to pain, discomfort, redness, swelling, crusting/scabbing/blistering, discoloration, recurrence, persistence, and the risk of scarring. The patient has had the opportunity to ask questions and understand the purpose of the treatment, benefits, risks, and alternatives.
Number Of Freeze-Thaw Cycles: 1 freeze-thaw cycle
Render Post-Care Instructions In Note?: yes
Post-care instructions were reviewed with patient. Patient is to wear sunprotection / sun protective clothing, avoid picking areas and Vaseline use daily is encouraged until healed. Rec re-evaluation in clinic if an AK does not resolve within 6 wks and/or sooner if worsening.
Total Number Of Aks Treated: 6
Detail Level: Simple

## 2025-04-14 NOTE — PROCEDURE: DIAGNOSIS COMMENT
Comment: Although a limited exam was performed today (instead of a full exam per pt preference), I strongly encourage full-body skin exams. If limited exams are preferred, good self-examinations of all areas of the body are required (handout provided with information on how to perform self skin checks and what to look for as it relates to concerning lesions)
Render Risk Assessment In Note?: no
Detail Level: Simple
Comment: Beni scar that she has had for as long as she can remember. was offered to have it removed by a surgeon but declined. has not changed. no concerns from her perspective. stable no changes

## 2025-04-14 NOTE — PROCEDURE: COUNSELING
Detail Level: Generalized
Detail Level: Simple
Detail Level: Detailed
Nicotinamide Supplementation Recommendations: All supplements should be USP (https://www.usp.org/verification-services/verified-tiffany).

## 2025-04-16 ENCOUNTER — LAB ENCOUNTER (OUTPATIENT)
Dept: LAB | Facility: HOSPITAL | Age: 85
End: 2025-04-16
Attending: NURSE PRACTITIONER
Payer: MEDICARE

## 2025-04-16 DIAGNOSIS — N18.30 CHRONIC KIDNEY DISEASE, STAGE III (MODERATE) (HCC): Primary | ICD-10-CM

## 2025-04-16 DIAGNOSIS — I10 HYPERTENSION: ICD-10-CM

## 2025-04-16 DIAGNOSIS — R06.02 SHORTNESS OF BREATH: ICD-10-CM

## 2025-04-16 LAB
ANION GAP SERPL CALC-SCNC: 9 MMOL/L (ref 0–18)
BUN BLD-MCNC: 42 MG/DL (ref 9–23)
BUN/CREAT SERPL: 25.5 (ref 10–20)
CALCIUM BLD-MCNC: 9.6 MG/DL (ref 8.7–10.4)
CHLORIDE SERPL-SCNC: 105 MMOL/L (ref 98–112)
CO2 SERPL-SCNC: 23 MMOL/L (ref 21–32)
CREAT BLD-MCNC: 1.65 MG/DL (ref 0.55–1.02)
EGFRCR SERPLBLD CKD-EPI 2021: 30 ML/MIN/1.73M2 (ref 60–?)
FASTING STATUS PATIENT QL REPORTED: YES
GLUCOSE BLD-MCNC: 90 MG/DL (ref 70–99)
NT-PROBNP SERPL-MCNC: 1104 PG/ML (ref ?–450)
OSMOLALITY SERPL CALC.SUM OF ELEC: 294 MOSM/KG (ref 275–295)
POTASSIUM SERPL-SCNC: 5.1 MMOL/L (ref 3.5–5.1)
SODIUM SERPL-SCNC: 137 MMOL/L (ref 136–145)

## 2025-04-16 PROCEDURE — 36415 COLL VENOUS BLD VENIPUNCTURE: CPT

## 2025-04-16 PROCEDURE — 83880 ASSAY OF NATRIURETIC PEPTIDE: CPT

## 2025-04-16 PROCEDURE — 80048 BASIC METABOLIC PNL TOTAL CA: CPT

## 2025-04-22 RX ORDER — ALLOPURINOL 300 MG/1
300 TABLET ORAL DAILY
Qty: 90 TABLET | Refills: 1 | Status: SHIPPED | OUTPATIENT
Start: 2025-04-22

## 2025-04-22 NOTE — TELEPHONE ENCOUNTER
Requested Prescriptions     Pending Prescriptions Disp Refills    ALLOPURINOL 300 MG Oral Tab [Pharmacy Med Name: Allopurinol 300 Mg Tab Nort] 90 tablet 0     Sig: TAKE ONE TABLET BY MOUTH ONE TIME DAILY     Future Appointments   Date Time Provider Department Center   5/6/2025  1:15 PM Jeff Leogn MD [facility]   6/23/2025  9:40 AM Ivania Cornell MD [facility]   7/17/2025  9:00 AM Malia Bradley PA UROG [facility]   8/11/2025  1:20 PM Mikal Parks MD [id number] [facility]   9/15/2025 10:45 AM Brittany Alonso MD [facility]ULTR[facility]     LOV: 2/7/2025  Last Refilled:1/6/2025 #90 0RF   Labs:  Component      Latest Ref Rng 4/8/2025   URIC ACID      3.1 - 7.8 mg/dL 3.5        ASSESSMENT/PLAN:      Seronegative RA- stable   - She is a former patient of Dr. Rose, diagnosed more than 20 years ago  - On Enbrel weekly, methotrexate 4 pills weekly and hydroxychloroquine 400 mg daily  - She states that she sees the eye doctor every year 4/2024, no evidence of Plaquenil toxicity  - Plan to monitor blood work every 3 to 4 months     Cervical disc disease  - refer to PT     B/L LE swelling  - following with cardiology  - on lasix now     Basal cell carcinoma, right shoulder  - s/p removal       Primary osteoarthritis, chronic neck and lower back pain  - X-ray of the neck has shown advanced multilevel degenerative changes.  - She cannot take NSAIDs.  Recommended PT but she deferred that for now  - Advise she can take Tylenol arthritis once or twice a day for her pain     CKD stage 3  - Found to have a positive atypical p-ANCA 1: 1280, negative CA-3 and MPO.  UA shows no protein or blood.  Creatinine has been fluctuating between 1.1 and 1.3  - Following with nephrology. 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  Remains on allopurinol 300 mg daily.  No recent gout flare     Pt will f/u in 3-4 mos      There is a longitudinal care  relationship with me, the care plan reflects the ongoing nature of the continuous relationship of care, and the medical record indicates that there is ongoing treatment of a serious/complex medical condition which I am currently managing.  is Applicable.      Ivania Cornell MD  2/7/2025  10:41 AM

## 2025-04-28 ENCOUNTER — DOCUMENTATION ONLY (OUTPATIENT)
Dept: INTERNAL MEDICINE CLINIC | Facility: CLINIC | Age: 85
End: 2025-04-28

## 2025-04-28 ENCOUNTER — MED REC SCAN ONLY (OUTPATIENT)
Dept: INTERNAL MEDICINE CLINIC | Facility: CLINIC | Age: 85
End: 2025-04-28

## 2025-05-05 RX ORDER — DILTIAZEM HYDROCHLORIDE 240 MG/1
240 CAPSULE, EXTENDED RELEASE ORAL DAILY
COMMUNITY

## 2025-05-05 RX ORDER — LISINOPRIL 2.5 MG/1
2.5 TABLET ORAL NIGHTLY
COMMUNITY

## 2025-05-05 NOTE — H&P
The above referenced H&P was reviewed by Carlos Beck MD on 5/12/2025, the patient was examined and no significant changes have occurred in the patient's condition since the H&P was performed. The risks, benefits, and alternatives were discussed with the patient and/or the family who consented.  The patient had a screening history (including review of previous problems with anesthesia and history of heavy snoring or sleep apnea)  and exam completed and there are no contraindications to sedation.  ASA designation is ASA 3 - Patient with moderate systemic disease with functional limitations.  Mallampati score: II (hard and soft palate, upper portion of tonsils anduvula visible).

## 2025-05-06 ENCOUNTER — OFFICE VISIT (OUTPATIENT)
Dept: INTERNAL MEDICINE CLINIC | Facility: CLINIC | Age: 85
End: 2025-05-06

## 2025-05-06 ENCOUNTER — LAB ENCOUNTER (OUTPATIENT)
Dept: LAB | Age: 85
End: 2025-05-06
Attending: STUDENT IN AN ORGANIZED HEALTH CARE EDUCATION/TRAINING PROGRAM
Payer: MEDICARE

## 2025-05-06 VITALS
BODY MASS INDEX: 40.25 KG/M2 | DIASTOLIC BLOOD PRESSURE: 54 MMHG | TEMPERATURE: 99 F | WEIGHT: 230 LBS | HEIGHT: 63.5 IN | SYSTOLIC BLOOD PRESSURE: 96 MMHG | OXYGEN SATURATION: 96 % | HEART RATE: 60 BPM | RESPIRATION RATE: 18 BRPM

## 2025-05-06 DIAGNOSIS — R42 LIGHTHEADEDNESS: ICD-10-CM

## 2025-05-06 DIAGNOSIS — I10 BENIGN HYPERTENSION: ICD-10-CM

## 2025-05-06 DIAGNOSIS — I95.9 HYPOTENSION, UNSPECIFIED HYPOTENSION TYPE: Primary | ICD-10-CM

## 2025-05-06 DIAGNOSIS — K21.9 GASTROESOPHAGEAL REFLUX DISEASE: ICD-10-CM

## 2025-05-06 DIAGNOSIS — Z01.818 PRE-OP TESTING: ICD-10-CM

## 2025-05-06 DIAGNOSIS — I51.9 HEART DISEASE, UNSPECIFIED: ICD-10-CM

## 2025-05-06 DIAGNOSIS — R53.83 FATIGUE: Primary | ICD-10-CM

## 2025-05-06 DIAGNOSIS — R06.02 SHORTNESS OF BREATH: ICD-10-CM

## 2025-05-06 LAB
ALBUMIN SERPL-MCNC: 4 G/DL (ref 3.2–4.8)
ALBUMIN/GLOB SERPL: 1.4 {RATIO} (ref 1–2)
ALP LIVER SERPL-CCNC: 98 U/L (ref 55–142)
ALT SERPL-CCNC: 19 U/L (ref 10–49)
ANION GAP SERPL CALC-SCNC: 9 MMOL/L (ref 0–18)
AST SERPL-CCNC: 24 U/L (ref ?–34)
BASOPHILS # BLD AUTO: 0.05 X10(3) UL (ref 0–0.2)
BASOPHILS NFR BLD AUTO: 0.6 %
BILIRUB SERPL-MCNC: 0.8 MG/DL (ref 0.2–1.1)
BUN BLD-MCNC: 35 MG/DL (ref 9–23)
BUN/CREAT SERPL: 21.1 (ref 10–20)
CALCIUM BLD-MCNC: 9.3 MG/DL (ref 8.7–10.4)
CHLORIDE SERPL-SCNC: 97 MMOL/L (ref 98–112)
CO2 SERPL-SCNC: 24 MMOL/L (ref 21–32)
CREAT BLD-MCNC: 1.66 MG/DL (ref 0.55–1.02)
DEPRECATED HBV CORE AB SER IA-ACNC: 95 NG/ML (ref 50–306)
DEPRECATED RDW RBC AUTO: 59.3 FL (ref 35.1–46.3)
EGFRCR SERPLBLD CKD-EPI 2021: 30 ML/MIN/1.73M2 (ref 60–?)
EOSINOPHIL # BLD AUTO: 0.2 X10(3) UL (ref 0–0.7)
EOSINOPHIL NFR BLD AUTO: 2.5 %
ERYTHROCYTE [DISTWIDTH] IN BLOOD BY AUTOMATED COUNT: 15.8 % (ref 11–15)
FASTING STATUS PATIENT QL REPORTED: NO
FOLATE SERPL-MCNC: 40.8 NG/ML (ref 5.4–?)
GLOBULIN PLAS-MCNC: 2.8 G/DL (ref 2–3.5)
GLUCOSE BLD-MCNC: 88 MG/DL (ref 70–99)
HCT VFR BLD AUTO: 33.5 % (ref 35–48)
HGB BLD-MCNC: 10.9 G/DL (ref 12–16)
IMM GRANULOCYTES # BLD AUTO: 0.04 X10(3) UL (ref 0–1)
IMM GRANULOCYTES NFR BLD: 0.5 %
IRON SATN MFR SERPL: 20 % (ref 15–50)
IRON SERPL-MCNC: 57 UG/DL (ref 50–170)
LYMPHOCYTES # BLD AUTO: 1.88 X10(3) UL (ref 1–4)
LYMPHOCYTES NFR BLD AUTO: 23.5 %
MCH RBC QN AUTO: 33.7 PG (ref 26–34)
MCHC RBC AUTO-ENTMCNC: 32.5 G/DL (ref 31–37)
MCV RBC AUTO: 103.7 FL (ref 80–100)
MONOCYTES # BLD AUTO: 0.95 X10(3) UL (ref 0.1–1)
MONOCYTES NFR BLD AUTO: 11.9 %
NEUTROPHILS # BLD AUTO: 4.89 X10 (3) UL (ref 1.5–7.7)
NEUTROPHILS # BLD AUTO: 4.89 X10(3) UL (ref 1.5–7.7)
NEUTROPHILS NFR BLD AUTO: 61 %
NT-PROBNP SERPL-MCNC: 1666 PG/ML (ref ?–450)
OSMOLALITY SERPL CALC.SUM OF ELEC: 277 MOSM/KG (ref 275–295)
PLATELET # BLD AUTO: 219 10(3)UL (ref 150–450)
POTASSIUM SERPL-SCNC: 4.8 MMOL/L (ref 3.5–5.1)
PROT SERPL-MCNC: 6.8 G/DL (ref 5.7–8.2)
RBC # BLD AUTO: 3.23 X10(6)UL (ref 3.8–5.3)
SODIUM SERPL-SCNC: 130 MMOL/L (ref 136–145)
TOTAL IRON BINDING CAPACITY: 286 UG/DL (ref 250–425)
TRANSFERRIN SERPL-MCNC: 223 MG/DL (ref 250–380)
VIT B12 SERPL-MCNC: 725 PG/ML (ref 211–911)
WBC # BLD AUTO: 8 X10(3) UL (ref 4–11)

## 2025-05-06 PROCEDURE — 1126F AMNT PAIN NOTED NONE PRSNT: CPT | Performed by: INTERNAL MEDICINE

## 2025-05-06 PROCEDURE — 1159F MED LIST DOCD IN RCRD: CPT | Performed by: INTERNAL MEDICINE

## 2025-05-06 PROCEDURE — 80053 COMPREHEN METABOLIC PANEL: CPT

## 2025-05-06 PROCEDURE — 85025 COMPLETE CBC W/AUTO DIFF WBC: CPT

## 2025-05-06 PROCEDURE — 36415 COLL VENOUS BLD VENIPUNCTURE: CPT

## 2025-05-06 PROCEDURE — 82607 VITAMIN B-12: CPT

## 2025-05-06 PROCEDURE — 82746 ASSAY OF FOLIC ACID SERUM: CPT

## 2025-05-06 PROCEDURE — 83540 ASSAY OF IRON: CPT

## 2025-05-06 PROCEDURE — 1160F RVW MEDS BY RX/DR IN RCRD: CPT | Performed by: INTERNAL MEDICINE

## 2025-05-06 PROCEDURE — G2211 COMPLEX E/M VISIT ADD ON: HCPCS | Performed by: INTERNAL MEDICINE

## 2025-05-06 PROCEDURE — 84466 ASSAY OF TRANSFERRIN: CPT

## 2025-05-06 PROCEDURE — 83880 ASSAY OF NATRIURETIC PEPTIDE: CPT

## 2025-05-06 PROCEDURE — 82728 ASSAY OF FERRITIN: CPT

## 2025-05-06 PROCEDURE — 99214 OFFICE O/P EST MOD 30 MIN: CPT | Performed by: INTERNAL MEDICINE

## 2025-05-06 RX ORDER — PANTOPRAZOLE SODIUM 40 MG/1
40 TABLET, DELAYED RELEASE ORAL
Qty: 90 TABLET | Refills: 3 | Status: SHIPPED | OUTPATIENT
Start: 2025-05-06

## 2025-05-06 NOTE — PROGRESS NOTES
The following individual(s) verbally consented to be recorded using ambient AI listening technology and understand that they can each withdraw their consent to this listening technology at any point by asking the clinician to turn off or pause the recording:     Patient name: Cris Peterson  Additional names: n/a

## 2025-05-06 NOTE — PROGRESS NOTES
Patient ID: Cris Peterson is a 84 year old female.  Chief Complaint   Patient presents with    Follow - Up     Still feeling lightheaded and dizzy        HISTORY OF PRESENT ILLNESS:   HPI  Patient presents for above.  Here for 1 month follow-up.  She was having hypotensive episodes with dizziness on her last visit.  She has seen cardiology since with adjustments in her spironolactone and the addition of Farxiga.  Does not believe this has made a difference.  She will be going for further test next week including what appears to be a right heart catheterization.    Review of Systems  Ten point review of systems otherwise negative with the exception of HPI and assessment and plan.    MEDICAL HISTORY:   Past Medical History[1]    Past Surgical History[2]    Medications - Current[3]    Allergies:Allergies[4]    Social History     Socioeconomic History    Marital status:      Spouse name: Not on file    Number of children: Not on file    Years of education: Not on file    Highest education level: Not on file   Occupational History    Not on file   Tobacco Use    Smoking status: Never    Smokeless tobacco: Never   Vaping Use    Vaping status: Not on file   Substance and Sexual Activity    Alcohol use: Yes     Alcohol/week: 1.7 standard drinks of alcohol     Types: 2 Glasses of wine per week     Comment: social    Drug use: No    Sexual activity: Not Currently   Other Topics Concern     Service Not Asked    Blood Transfusions Not Asked    Caffeine Concern No    Occupational Exposure Not Asked    Hobby Hazards Not Asked    Sleep Concern Not Asked    Stress Concern Not Asked    Weight Concern Not Asked    Special Diet Not Asked    Back Care Not Asked    Exercise Not Asked    Bike Helmet Not Asked    Seat Belt Not Asked    Self-Exams Not Asked   Social History Narrative    Not on file     Social Drivers of Health     Food Insecurity: No Food Insecurity (4/8/2025)    NCSS - Food Insecurity     Worried About  Running Out of Food in the Last Year: No     Ran Out of Food in the Last Year: No   Transportation Needs: No Transportation Needs (4/8/2025)    NCSS - Transportation     Lack of Transportation: No   Stress: Not on file   Housing Stability: Not At Risk (4/8/2025)    NCSS - Housing/Utilities     Has Housing: Yes     Worried About Losing Housing: No     Unable to Get Utilities: No           PHYSICAL EXAM:     Vitals:    05/06/25 1255   BP: 96/54   Pulse: 60   Resp: 18   Temp: 98.7 °F (37.1 °C)   TempSrc: Temporal   SpO2: 96%   Weight: 230 lb (104.3 kg)   Height: 5' 3.5\" (1.613 m)       Body mass index is 40.1 kg/m².    Physical Exam  Constitutional:       Appearance: Normal appearance.   Eyes:      General: No scleral icterus.  Cardiovascular:      Rate and Rhythm: Normal rate and regular rhythm.      Pulses: Normal pulses.      Heart sounds: Normal heart sounds. No murmur heard.  Pulmonary:      Effort: Pulmonary effort is normal. No respiratory distress.      Breath sounds: Normal breath sounds. No stridor. No wheezing or rhonchi.   Neurological:      Mental Status: She is alert.   Psychiatric:         Mood and Affect: Mood normal.         Behavior: Behavior normal.           ASSESSMENT/PLAN:   1. Hypotension, unspecified hypotension type  Blood pressure still marginally low.  Follow-up with cardiology.  Right heart catheterization next week.    2. Lightheadedness  As per #1.    3. Gastroesophageal reflux disease  pantoprazole 40 MG Oral Tab EC; Take 1 tablet (40 mg total) by mouth before breakfast.  Dispense: 90 tablet; Refill: 3    Return in about 5 months (around 10/6/2025) for Routine Follow-Up.    This note was prepared using Dragon Medical voice recognition dictation software. As a result errors may occur. When identified these errors have been corrected. While every attempt is made to correct errors during dictation discrepancies may still exist.    Jeff Leong MD  5/6/2025       [1]   Past Medical History:    Basalioma    Cancer (HCC)    melanoma on back    Congestive heart disease (HCC)    COPD (chronic obstructive pulmonary disease) (HCC)    Coronary atherosclerosis    Disorder of thyroid    Fracture, patella    repair    Gout    Heart valve disease    High blood pressure    High cholesterol    Hypothyroidism    Nonunion of midfoot arthrodesis (HCC)    Pulmonary HTN (HCC)    Rheumatoid arthritis (HCC)    Sx.7/21/15, CPT.45017, R Triple Arthrodesis/Poss Medializing Calc Arthrodesis/Lapidus/MONA/FDL to Post Tib Transfer, w/, Global Exp.10/19/15    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)    Unspecified essential hypertension    Unspecified sleep apnea   [2]   Past Surgical History:  Procedure Laterality Date    Angiogram  01/01/2013    Cataract      Cholecystectomy      Hand/finger surgery unlisted Right 01/01/2011    thumb surgery    Hip replacement surgery Right     Hysterectomy      SERENITY--fibroid    Knee replacement surgery      total - right and left    Jw localization wire 1 site right (cpt=19281)  01/01/1994    Other surgical history      pins right foot    Surgery - external      tear duct    Tear duct system surg unlisted  01/01/1968    Tonsillectomy     [3]   Current Outpatient Medications:     pantoprazole 40 MG Oral Tab EC, Take 1 tablet (40 mg total) by mouth before breakfast., Disp: 90 tablet, Rfl: 3    dilTIAZem HCl  MG Oral Capsule SR 24 Hr, Take 1 capsule (240 mg total) by mouth daily., Disp: , Rfl:     lisinopril 2.5 MG Oral Tab, Take 1 tablet (2.5 mg total) by mouth at bedtime., Disp: , Rfl:     allopurinol 300 MG Oral Tab, Take 1 tablet (300 mg total) by mouth daily., Disp: 90 tablet, Rfl: 1    FARXIGA 10 MG Oral Tab, Take 1 tablet (10 mg total) by mouth daily., Disp: , Rfl:     spironolactone 25 MG Oral Tab, Take 0.5 tablets (12.5 mg total) by mouth daily., Disp: , Rfl:     Fluocinonide 0.05 % External Solution, Apply 1 Application topically 2 (two)  times daily as needed., Disp: , Rfl:     ketoconazole 2 % External Shampoo, Apply 1 Application topically as needed for Itching., Disp: , Rfl:     estradiol 0.1 MG/GM Vaginal Cream, Place vaginally daily as needed., Disp: , Rfl:     Magnesium Hydroxide (MILK OF MAGNESIA OR), Take 15 mL by mouth daily as needed., Disp: , Rfl:     carboxymethylcellulose 1 % Ophthalmic Gel, Place 1 drop into both eyes 2 (two) times daily as needed., Disp: , Rfl:     methotrexate 2.5 MG Oral Tab, TAKE 4 TABLETS ONCE A WEEK (Patient taking differently: Every Thursday. TAKE 4 TABLETS ONCE A WEEK), Disp: 52 tablet, Rfl: 1    Etanercept (ENBREL SURECLICK) 50 MG/ML Subcutaneous Solution Auto-injector, INJECT 1 PEN UNDER THE SKIN EVERY 7 DAYS (Patient taking differently: Every Thursday. INJECT 1 PEN UNDER THE SKIN EVERY 7 DAYS), Disp: 12 mL, Rfl: 1    hydroxychloroquine 200 MG Oral Tab, Take 2 tablets (400 mg total) by mouth daily., Disp: 180 tablet, Rfl: 3    folic acid 1 MG Oral Tab, Take 1 tablet (1 mg total) by mouth daily., Disp: 90 tablet, Rfl: 3    levothyroxine 125 MCG Oral Tab, Take 1 tablet (125 mcg total) by mouth before breakfast., Disp: 90 tablet, Rfl: 3    fluticasone-salmeterol 100-50 MCG/ACT Inhalation Aerosol Powder, Breath Activated, Inhale 1 puff into the lungs 2 (two) times daily. inhale 1 puff by INHALATION route 2 times every day morning and evening approximately 12 hours apart, Disp: 1 each, Rfl: 5    albuterol 108 (90 Base) MCG/ACT Inhalation Aero Soln, Inhale 2 puffs into the lungs every 6 (six) hours as needed for Wheezing., Disp: 1 each, Rfl: 2    Trospium Chloride ER 60 MG Oral Capsule SR 24 Hr, Take 1 capsule (60 mg total) by mouth daily., Disp: 90 capsule, Rfl: 3    furosemide 40 MG Oral Tab, Take 1 tablet (40 mg total) by mouth 2 (two) times daily. (Patient taking differently: Take 1 tablet (40 mg total) by mouth in the morning and 1 tablet (40 mg total) before bedtime. 40mg  every morning, and 40mg alternating  with 20mg every evening.), Disp: 60 tablet, Rfl: 5    Spacer/Aero-Holding Chambers Does not apply Device, Use with albuterol inhaler, Disp: 1 each, Rfl: 0    atorvastatin 40 MG Oral Tab, Take 1 tablet (40 mg total) by mouth nightly., Disp: , Rfl:     Cholecalciferol (VITAMIN D) 50 MCG (2000 UT) Oral Tab, Take 1 tablet by mouth Every afternoon at 2:00 pm., Disp: , Rfl:     ELIQUIS 5 MG Oral Tab, Take 1 tablet (5 mg total) by mouth 2 (two) times daily., Disp: , Rfl:     Calcium 500-125 MG-UNIT Oral Tab, Take 1 tablet by mouth daily., Disp: , Rfl:     Loperamide HCl 2 MG Oral Cap, Take 1 capsule (2 mg total) by mouth 4 (four) times daily as needed for Diarrhea., Disp: , Rfl:     Probiotic Product (PROBIOTIC-10) Oral Cap, Take 1 tablet by mouth daily., Disp: , Rfl:     acetaminophen (TYLENOL EXTRA STRENGTH) 500 MG Oral Tab, Take 1 tablet (500 mg total) by mouth every 6 (six) hours as needed., Disp: , Rfl:     CENTRUM SILVER OR TABS, Take 1 tablet by mouth daily., Disp: , Rfl:   [4]   Allergies  Allergen Reactions    Erythromycin Base     Pcn [Bicillin L-A]     Lactose DIARRHEA

## 2025-05-07 DIAGNOSIS — M54.2 NECK PAIN: ICD-10-CM

## 2025-05-07 DIAGNOSIS — M06.09 RHEUMATOID ARTHRITIS OF MULTIPLE SITES WITH NEGATIVE RHEUMATOID FACTOR (HCC): ICD-10-CM

## 2025-05-07 RX ORDER — HYDROXYCHLOROQUINE SULFATE 200 MG/1
400 TABLET, FILM COATED ORAL DAILY
Qty: 180 TABLET | Refills: 3 | Status: SHIPPED | OUTPATIENT
Start: 2025-05-07

## 2025-05-07 NOTE — TELEPHONE ENCOUNTER
Patient requests Rx to [facility].      LOV: 2/7/25  Future Appointments   Date Time Provider Department Center   5/12/2025  8:00 AM [facility] IVS RM2 CATH LAB [facility] IVS [facility]   6/23/2025  9:40 AM Ivania Cornell MD [facility]   7/17/2025  9:00 AM Malia Bradley PA UROG [facility]   8/11/2025  1:20 PM Mikal Parks MD [id number] [facility]   9/15/2025 10:45 AM Brittany Alonso MD [facility]   10/8/2025 10:00 [facility]       ASSESSMENT/PLAN:      Seronegative RA- stable   - She is a former patient of Dr. Rose, diagnosed more than 20 years ago  - On Enbrel weekly, methotrexate 4 pills weekly and hydroxychloroquine 400 mg daily  - She states that she sees the eye doctor every year 4/2024, no evidence of Plaquenil toxicity  - Plan to monitor blood work every 3 to 4 months     Cervical disc disease  - refer to PT     B/L LE swelling  - following with cardiology  - on lasix now     Basal cell carcinoma, right shoulder  - s/p removal       Primary osteoarthritis, chronic neck and lower back pain  - X-ray of the neck has shown advanced multilevel degenerative changes.  - She cannot take NSAIDs.  Recommended PT but she deferred that for now  - Advise she can take Tylenol arthritis once or twice a day for her pain     CKD stage 3  - Found to have a positive atypical p-ANCA 1: 1280, negative [place]-3 and MPO.  UA shows no protein or blood.  Creatinine has been fluctuating between 1.1 and 1.3  - Following with nephrology. 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  Remains on allopurinol 300 mg daily.  No recent gout flare     Pt will f/u in 3-4 mos      There is a longitudinal care relationship with me, the care plan reflects the ongoing nature of the continuous relationship of care, and the medical record indicates that there is ongoing treatment of a serious/complex medical condition which I am  currently managing.  is Applicable.      Ivania Cornell MD  2/7/2025  10:41 AM

## 2025-05-12 ENCOUNTER — HOSPITAL ENCOUNTER (OUTPATIENT)
Dept: INTERVENTIONAL RADIOLOGY/VASCULAR | Facility: HOSPITAL | Age: 85
Discharge: HOME | End: 2025-05-12
Attending: STUDENT IN AN ORGANIZED HEALTH CARE EDUCATION/TRAINING PROGRAM | Admitting: INTERNAL MEDICINE
Payer: MEDICARE

## 2025-05-12 VITALS
BODY MASS INDEX: 39.69 KG/M2 | HEART RATE: 61 BPM | DIASTOLIC BLOOD PRESSURE: 77 MMHG | OXYGEN SATURATION: 99 % | TEMPERATURE: 99 F | WEIGHT: 224 LBS | HEIGHT: 63 IN | RESPIRATION RATE: 15 BRPM | SYSTOLIC BLOOD PRESSURE: 91 MMHG

## 2025-05-12 DIAGNOSIS — Z01.818 PRE-OP TESTING: Primary | ICD-10-CM

## 2025-05-12 DIAGNOSIS — I48.0 PAROXYSMAL ATRIAL FIBRILLATION (HCC): ICD-10-CM

## 2025-05-12 DIAGNOSIS — I50.33 ACUTE ON CHRONIC HEART FAILURE WITH PRESERVED EJECTION FRACTION (HFPEF) (HCC): ICD-10-CM

## 2025-05-12 LAB
AVOX TOTAL HEMOGLOBIN CONCENTRATION: 9.7 G/DL (ref 12–16)
COHGB MFR BLD: 57.2 % (ref 65–80)

## 2025-05-12 PROCEDURE — 93451 RIGHT HEART CATH: CPT | Performed by: INTERNAL MEDICINE

## 2025-05-12 RX ORDER — HEPARIN SODIUM 1000 [USP'U]/ML
INJECTION, SOLUTION INTRAVENOUS; SUBCUTANEOUS
Status: DISCONTINUED
Start: 2025-05-12 | End: 2025-05-12 | Stop reason: WASHOUT

## 2025-05-12 RX ORDER — DIPHENHYDRAMINE HYDROCHLORIDE 50 MG/ML
INJECTION, SOLUTION INTRAMUSCULAR; INTRAVENOUS
Status: COMPLETED
Start: 2025-05-12 | End: 2025-05-12

## 2025-05-12 RX ORDER — FUROSEMIDE 40 MG/1
40 TABLET ORAL EVERY MORNING
Status: SHIPPED | COMMUNITY
Start: 2025-05-12

## 2025-05-12 RX ORDER — LIDOCAINE HYDROCHLORIDE 20 MG/ML
INJECTION, SOLUTION EPIDURAL; INFILTRATION; INTRACAUDAL; PERINEURAL
Status: COMPLETED
Start: 2025-05-12 | End: 2025-05-12

## 2025-05-12 RX ORDER — SODIUM CHLORIDE 9 MG/ML
INJECTION, SOLUTION INTRAVENOUS
Status: DISCONTINUED | OUTPATIENT
Start: 2025-05-12 | End: 2025-05-12

## 2025-05-12 RX ORDER — CHLORHEXIDINE GLUCONATE 40 MG/ML
SOLUTION TOPICAL
Status: DISCONTINUED | OUTPATIENT
Start: 2025-05-12 | End: 2025-05-12

## 2025-05-12 RX ORDER — FUROSEMIDE 20 MG/1
20 TABLET ORAL
Status: SHIPPED | COMMUNITY
Start: 2025-05-12

## 2025-05-12 RX ORDER — MIDAZOLAM HYDROCHLORIDE 1 MG/ML
INJECTION INTRAMUSCULAR; INTRAVENOUS
Status: DISCONTINUED
Start: 2025-05-12 | End: 2025-05-12 | Stop reason: WASHOUT

## 2025-05-12 NOTE — DISCHARGE INSTRUCTIONS
HOME CARE INSTRUCTIONS RIGHT HEART ANGIOGRAM      Activity    DO NOT drive after the procedure. You may resume driving the following day according to the nurse or physician’s instructions    Plan on resting and relaxing tonight and tomorrow    Do not lift anything over 10 pounds for the next 24 hours    Avoid repeated stair use and excessive walking for the next 24 hours.   Avoid repetitive squatting/bending motions/exercises for the next 24 hours.     Resume your normal activity after 24 hours, or as instructed by your physician    Avoid drinking alcohol for the next 24 hours     What is Normal?    The procedure site may appear bruised or discolored    The procedure site may be tender to the touch    There may be a small amount of drainage on the bandage     Special Instructions    The bandage is to remain in place for 24 hours    After 24 hours, you must remove the bandage. You should shower after removing the bandage, and wash the procedure site gently with soap and water.   Do NOT apply any powders, ointments or creams to the site for 1 week.    (If you choose to wear a bandage for a few days, make sure it remains clean and dry and that it is changed daily.)     When you should NOTIFY YOUR PHYSICIAN    If you have persistent pain at the procedure site    If you experience signs of a fever, temperature >101o, chills, infection (redness, swelling, thick yellow drainage, or a foul odor from the procedure site)     Other    You may resume your present diet, unless otherwise specified by your physician    You may resume all of your medications as prescribed, unless otherwise directed by your physician. A list of your medications was provided to you at discharge    Please call your physician’s office for a follow-up appointment. You should be seen in 1 to 2 weeks

## 2025-05-12 NOTE — TELEPHONE ENCOUNTER
Dr Alonso- please sign pended order for Albuterol 108, if agreeable    Last office visit: 3/10/25 Follow up: 9/15/25 Last refill: 12/6/24

## 2025-05-12 NOTE — IVS NOTE
DISCHARGE NOTE     Pt is able to sit up and ambulate without difficulty.   Pt voided and tolerated fluids.   Procedural site remains dry and intact with good circulation, motion and sensation.   No signs or symptoms of bleeding/hematoma noted.   Pt denies any pain or discomfort at this time.  IV access removed  Instructions provided, patient/family verbalizes understanding.   Dr. Beck spoke with patient/family post procedure.     Pt discharge via wheelchair to Main Lobby with friend   Pt discharge via stretcher to Nursing Facility     Follow up Appointment: 5/30 3 pm with chun, 6/5/25 with Dr. Keenan 2 pm     New Prescription: lasix rx changed

## 2025-05-12 NOTE — PROCEDURES
[facility]  part of [facility] cardiac Cath Procedure Note  Cris Peterson Patient Status:  Outpatient    1940 MRN [id number]   Location [facility] INTERVENTIONAL SUITES Attending No att. providers found   Hosp Day # 0 PCP Jeff Leong MD       Cardiologist: Carlos Beck MD  Primary Proceduralist: Carlos Beck MD  Procedure Performed: RHC  Date of Procedure: 2025     Pre procedure diagnosis: Diastolic dysfunction: Dyspnea on exertion  Post procedure diagnosis: As above    Summary of findings: Mild pulmonary hypertension with normal pulmonary capillary wedge pressure.  Mildly reduced cardiac index.      Hemodynamics:   RA 3 mmHg  RV 41/2 mmHg  PA 38/3 mean 17 mmHg  PCW 4 mmHg    CO 4.7 L/min by Karson/ CI 2.3 L/min/m² by Karson  SVR 1496 dynes/  dynes    Ao sat 94%  PA sat 57%      Assessment:  Mild pulmonary hypertension  Normal cardiac pulmonary capillary wedge pressure  Mild reduced cardiac index      Recommendations:  Decrease diuresis, BMP in 1 week      Description of Procedure:   After written informed consent was obtained from the patient, patient was brought to the cardiac catheterization laboratory.  Patient was prepped and draped in the usual sterile fashion. Lidocaine 1% was used to infiltrate the right antecubital fossa under ultrasound guidance, micropuncture needle and 5 fr sheath was introduced into the right IJ vein. 7 fr sheath placed, CCO swan advanced to RA, RV, PA and wedge position obtaining pressures as described above.      Specimen sent to: No specimen collected  Estimated blood loss: 10 cc  Closure: Manual      IV was maintained by RN and no sedation was provided.    Carlos Beck MD  25

## 2025-05-13 RX ORDER — ALBUTEROL SULFATE 90 UG/1
2 INHALANT RESPIRATORY (INHALATION) EVERY 6 HOURS PRN
Qty: 8.5 G | Refills: 2 | Status: SHIPPED | OUTPATIENT
Start: 2025-05-13

## 2025-06-11 ENCOUNTER — MED REC SCAN ONLY (OUTPATIENT)
Dept: INTERNAL MEDICINE CLINIC | Facility: CLINIC | Age: 85
End: 2025-06-11

## 2025-06-11 ENCOUNTER — TELEPHONE (OUTPATIENT)
Dept: CARDIOLOGY CLINIC | Facility: HOSPITAL | Age: 85
End: 2025-06-11

## 2025-06-11 NOTE — TELEPHONE ENCOUNTER
Received message from Dr. Keenan at [facility] to see Cris for IV iron injections. Called and left message to call and schedule.

## 2025-06-12 DIAGNOSIS — I50.30 HEART FAILURE WITH PRESERVED EJECTION FRACTION, UNSPECIFIED HF CHRONICITY (HCC): Primary | ICD-10-CM

## 2025-06-13 LAB
AMB EXT BUN: 35 MG/DL
AMB EXT CALCIUM: 9.3
AMB EXT CARBON DIOXIDE: 20
AMB EXT CHLORIDE: 107
AMB EXT CMP ALT: 15 U/L
AMB EXT CMP AST: 27 U/L
AMB EXT CREATININE: 1.4 MG/DL
AMB EXT EGFR NON-AA: 35.7
AMB EXT GLUCOSE: 87 MG/DL
AMB EXT POSTASSIUM: 4.5 MMOL/L
AMB EXT SODIUM: 139 MMOL/L
AMB EXT TOTAL PROTEIN: 6.4

## 2025-06-16 ENCOUNTER — OFFICE VISIT (OUTPATIENT)
Dept: CARDIOLOGY CLINIC | Facility: HOSPITAL | Age: 85
End: 2025-06-16
Attending: NURSE PRACTITIONER
Payer: MEDICARE

## 2025-06-16 VITALS
BODY MASS INDEX: 40 KG/M2 | DIASTOLIC BLOOD PRESSURE: 65 MMHG | SYSTOLIC BLOOD PRESSURE: 110 MMHG | HEART RATE: 54 BPM | OXYGEN SATURATION: 96 % | WEIGHT: 224.5 LBS

## 2025-06-16 DIAGNOSIS — N18.32 STAGE 3B CHRONIC KIDNEY DISEASE (HCC): ICD-10-CM

## 2025-06-16 DIAGNOSIS — I50.30 HEART FAILURE WITH PRESERVED EJECTION FRACTION, UNSPECIFIED HF CHRONICITY (HCC): Primary | ICD-10-CM

## 2025-06-16 DIAGNOSIS — I48.0 PAROXYSMAL ATRIAL FIBRILLATION (HCC): ICD-10-CM

## 2025-06-16 DIAGNOSIS — D50.9 IRON DEFICIENCY ANEMIA, UNSPECIFIED IRON DEFICIENCY ANEMIA TYPE: ICD-10-CM

## 2025-06-16 DIAGNOSIS — J44.9 CHRONIC OBSTRUCTIVE PULMONARY DISEASE, UNSPECIFIED COPD TYPE (HCC): ICD-10-CM

## 2025-06-16 PROCEDURE — 96374 THER/PROPH/DIAG INJ IV PUSH: CPT

## 2025-06-16 PROCEDURE — 99215 OFFICE O/P EST HI 40 MIN: CPT

## 2025-06-16 RX ORDER — FUROSEMIDE 40 MG/1
TABLET ORAL
COMMUNITY
Start: 2025-06-16

## 2025-06-16 NOTE — PATIENT INSTRUCTIONS
Increase lasix to 40mg in am and 20mg in pm    [facility]er    Follow up with Dr. Keenan as directed    Follow up with Dr. Leong as directed    Please call the heart failure clinic if you notice a weight gain of 3 pounds overnight or 5 pounds or more in 5 days.      Monitor yourself for signs and symptoms of fluid overload, increased shortness of breath, difficulty breathing when laying down etc. Call the clinic if any of these signs develop at ph: [phone]    Call if having any dizziness, lightheadedness, heart racing, palpitations, chest pain, shortness of breath, coughing, swelling, weight gain or worsening symptoms.     32-64 ounces of fluid daily    Less than 2000 mg salt/sodium daily. Common high sodium foods include frozen dinners, soups (not homemade), some cereal, vegetable juice, canned vegetables, lunch meats, processed meats like hotdogs, sausage, capone, pepperoni, soy sauce, pre-packaged rice or potatoes. Please remember to read nutrition labels for sodium content.

## 2025-06-16 NOTE — PROGRESS NOTES
[facility]    Cris Peterson Patient Status:  Outpatient    1940 MRN [id number]   Location [facility] MD Dr. Shlomo Win       Cris Peterson is a 84 year old female who presents to clinic for CHF and iron infusions.     Patient Saw Dr. Keenan 25 for follow up for RHC. Lisinopril stopped due to low BP and dizziness. Diuretics decreased to furosemide 40mg daily was on 40/20 daily.       Problem List:  HFpEF  HTN  Morbid obesity  CKD stage III  COPD  Paroxysmal atrial fibrillation    Subjective:  Patient arrives to the clinic alone. Patient reports she was feeling increased NICOLE over the weekend. She called [facility] and was instructed to take an additional furosemide 40mg that day. She reports still some shortness of breath with exertion, but symptoms improved. Reports increased swelling in feet. Feels like her shoes feel tighter. She gets shortness of breath with minimal exertion. She has stopped doing things such as carrying her laundry to the basement. She gets a lot of meals from friends and family around her. She lives alone but has a son and daughter near by. She reports feeling fatigue. Denies chest pain, palpitations. Does still have some lightheadedness when getting up in the morning, but improved since stopping losartan.     Review of Systems:  Constitutional: negative for fatigue, chills or fever  Respiratory: negative for cough,  negative hemoptysis and wheezing  Cardiovascular: negative for chest pain, exertional chest pressure/discomfort, near-syncope, orthopnea and palpitations  Gastrointestinal: negative for abdominal pain, diarrhea, melena, nausea and vomiting  Hematologic/lymphatic: negative  Musculoskeletal: negative for muscle weakness and myalgias    Objective:  Lab Results   Component Value Date/Time    WBC 8.0 2025 01:54 PM    HGB 10.9 (L) 2025 01:54 PM    HCT 33.5 (L) 2025 01:54 PM    .0 2025 01:54 PM     CREATSERUM 1.66 (H) 05/06/2025 01:54 PM    BUN 35 (H) 05/06/2025 01:54 PM     (L) 05/06/2025 01:54 PM    K 4.8 05/06/2025 01:54 PM    CL 97 (L) 05/06/2025 01:54 PM    CO2 24.0 05/06/2025 01:54 PM    GLU 88 05/06/2025 01:54 PM    CA 9.3 05/06/2025 01:54 PM    ALB 4.0 05/06/2025 01:54 PM    ALKPHO 98 05/06/2025 01:54 PM    BILT 0.8 05/06/2025 01:54 PM    TP 6.8 05/06/2025 01:54 PM    AST 24 05/06/2025 01:54 PM    ALT 19 05/06/2025 01:54 PM    INR 1.37 (H) 02/18/2022 08:57 AM    PTP 17.1 (H) 02/18/2022 08:57 AM    T4F 1.8 (H) 01/30/2024 09:59 AM    TSH 0.564 04/08/2025 11:08 AM    ESRML 13 02/04/2025 08:22 AM    CRP <0.40 02/04/2025 08:22 AM    PHOS 3.8 07/23/2024 11:59 AM    CK 37 05/03/2021 06:48 AM    B12 725 05/06/2025 01:54 PM       Clinical labs drawn: BMP, proBNP - not drawn, recently done per [facility], reviewed results      /68 (BP Location: Left arm)   Pulse 58   Wt 224 lb 8 oz (101.8 kg)   SpO2 96%   BMI 39.77 kg/m²     Date Clinic Weight Home Weight Discharge weight/other office weight Intervention   6/5/25   222    6/16/25 224                          General appearance: alert, appears stated age, and cooperative  Neck: no JVD  Lungs: clear to auscultation bilaterally  Chest wall: no tenderness  Heart: regular rate and rhythm  Abdomen: soft, non-tender; bowel sounds normal; no masses,  no organomegaly  Extremities: edema 2+ bilateral LE  Pulses: 2+ and symmetric  Neurologic: Grossly normal    Diagnostic Tests:  ECHO  3/5/25: EF 72%. No WMA, LA mildly enlarged, RV mildly enlarged, mild MR    CATH  5/12/25  Cardiologist: Carlos Beck MD  Primary Proceduralist: Carlos Beck MD  Procedure Performed: RHC  Date of Procedure: 5/12/2025   Pre procedure diagnosis: Diastolic dysfunction: Dyspnea on exertion  Post procedure diagnosis: As above  Summary of findings: Mild pulmonary hypertension with normal pulmonary capillary wedge pressure.  Mildly reduced cardiac index.  Hemodynamics:   RA 3 mmHg  RV  41/2 mmHg  PA 38/3 mean 17 mmHg  PCW 4 mmHg  CO 4.7 L/min by Karson/ CI 2.3 L/min/m² by Karson  SVR 1496 dynes/  dynes  Ao sat 94%  PA sat 57%  Assessment:  Mild pulmonary hypertension  Normal cardiac pulmonary capillary wedge pressure  Mild reduced cardiac index  Recommendations:  Decrease diuresis, BMP in 1 week      Education:  Patient and family instructed at length regarding clinic procedures, hours, purpose of clinic visits, sodium restricted diet, low sodium foods, fluid restrictions, daily weights, medication regimen s/s heart failure exacerbation and when to call APN/clinic. Patient and family receptive.    Assessment:  HFpEF  -EF 72%  -on farxiga 10mg daily, spironolactone 12.5mg daily  -on furosemide 40mg daily - recently decreased from 40/20  -lisinoprol stopped due to low BP and dizziess, symptoms improved  -Renal function stable BUN/Cr 35/1.40, K 4.5, Na 139 on 6/13/25  -pro-BNP  849 on 6/13 improved  -Daily weights, call for weight gain of 3lbs overnight and/or 5lbs in 5 days  -low salt diet  -fluid restriction 32-64 ounces daily  -[facility] week for IV venofer  -Follow up with cardiologist Dr. Keenan as directed    Iron deficiency anemia  -Ferritin 95, iron saturtation 20%  -received first dose of IV venofer today #1 of 3 doses    Paroxysmal atrial fibrillation  -on diltiazem 240mg daily  -anticoagulated on eliquis    CKD stage III  -kidney function stable, recent blood test reviewed from 6/13 BUN/Cr 35/1.40    COPD  -on albuterol PRN  -on advair BID       Plan:  Increase lasix to 40mg in am and 20mg in pm    [facility] venofer    Follow up with Dr. Keenan as directed    Follow up with Dr. Leong as directed    Please call the heart failure clinic if you notice a weight gain of 3 pounds overnight or 5 pounds or more in 5 days.      Monitor yourself for signs and symptoms of fluid overload, increased shortness of breath, difficulty  breathing when laying down etc. Call the clinic if any of these signs develop at ph: [phone]    Call if having any dizziness, lightheadedness, heart racing, palpitations, chest pain, shortness of breath, coughing, swelling, weight gain or worsening symptoms.     32-64 ounces of fluid daily    Less than 2000 mg salt/sodium daily. Common high sodium foods include frozen dinners, soups (not homemade), some cereal, vegetable juice, canned vegetables, lunch meats, processed meats like hotdogs, sausage, capone, pepperoni, soy sauce, pre-packaged rice or potatoes. Please remember to read nutrition labels for sodium content.       I spent 43 minutes reviewing the chart, documentation, providing counseling, coordination of care and education related specifically to heart failure.     Current Medications[1]     SHAMIKA Salgado  6/16/2025               [1]    furosemide 40 MG Oral Tab Take 1 tablet (40 mg total) by mouth every morning AND 0.5 tablets (20 mg total) every evening.      ALBUTEROL 108 (90 Base) MCG/ACT Inhalation Aero Soln Inhale 2 puffs into the lungs every 6 hours as needed for wheezing. 8.5 g 2    HYDROXYCHLOROQUINE 200 MG Oral Tab TAKE 2 TABLETS DAILY 180 tablet 3    pantoprazole 40 MG Oral Tab EC Take 1 tablet (40 mg total) by mouth before breakfast. 90 tablet 3    dilTIAZem HCl  MG Oral Capsule SR 24 Hr Take 1 capsule (240 mg total) by mouth daily.      allopurinol 300 MG Oral Tab Take 1 tablet (300 mg total) by mouth daily. 90 tablet 1    FARXIGA 10 MG Oral Tab Take 1 tablet (10 mg total) by mouth in the morning.      spironolactone 25 MG Oral Tab Take 0.5 tablets (12.5 mg total) by mouth in the morning.      ketoconazole 2 % External Shampoo Apply 1 Application topically as needed for Itching.      estradiol 0.1 MG/GM Vaginal Cream Place vaginally daily as needed.      Magnesium Hydroxide (MILK OF MAGNESIA OR) Take 15 mL by mouth daily as needed.      methotrexate 2.5 MG Oral Tab TAKE 4  TABLETS ONCE A WEEK 52 tablet 1    Etanercept (ENBREL SURECLICK) 50 MG/ML Subcutaneous Solution Auto-injector INJECT 1 PEN UNDER THE SKIN EVERY 7 DAYS 12 mL 1    folic acid 1 MG Oral Tab Take 1 tablet (1 mg total) by mouth daily. 90 tablet 3    levothyroxine 125 MCG Oral Tab Take 1 tablet (125 mcg total) by mouth before breakfast. 90 tablet 3    fluticasone-salmeterol 100-50 MCG/ACT Inhalation Aerosol Powder, Breath Activated Inhale 1 puff into the lungs 2 (two) times daily. inhale 1 puff by INHALATION route 2 times every day morning and evening approximately 12 hours apart 1 each 5    Trospium Chloride ER 60 MG Oral Capsule SR 24 Hr Take 1 capsule (60 mg total) by mouth daily. 90 capsule 3    atorvastatin 40 MG Oral Tab Take 1 tablet (40 mg total) by mouth nightly.      Cholecalciferol (VITAMIN D) 50 MCG (2000 UT) Oral Tab Take 1 tablet by mouth Every afternoon at 2:00 pm.      ELIQUIS 5 MG Oral Tab Take 1 tablet (5 mg total) by mouth in the morning and 1 tablet (5 mg total) before bedtime.      Calcium 500-125 MG-UNIT Oral Tab Take 1 tablet by mouth in the morning.      Loperamide HCl 2 MG Oral Cap Take 1 capsule (2 mg total) by mouth as needed in the morning and 1 capsule (2 mg total) as needed at noon and 1 capsule (2 mg total) as needed in the evening and 1 capsule (2 mg total) as needed before bedtime for Diarrhea.      Probiotic Product (PROBIOTIC-10) Oral Cap Take 1 tablet by mouth in the morning.      acetaminophen (TYLENOL EXTRA STRENGTH) 500 MG Oral Tab Take 1 tablet (500 mg total) by mouth every 6 (six) hours as needed.      CENTRUM SILVER OR TABS Take 1 tablet by mouth in the morning.

## 2025-06-16 NOTE — PROGRESS NOTES
Cris arrives into clinic ambulating with Isiah walker. Referred by Dr. Andujar to receive IV venofer.    Subjective: Cris reports getting short of breath with walking, gets tired easily. Limits her activities and has missed a few events recently. Checks blood pressures at home ranges 100/69  up to 120 this am. Took extra lasix this weekend but notes has been eating bad. She states she wears compression stockings 3-4 days a week.    Weight:  Today 224.5 lb   Home 223 lb [facility] visit 6/5: 222 lb  Labs completed:  at [facility] 6/13/25 External result console updated. NT-proBNP 849  Device:  CardioMEMS Interrogation N/A  IV placed:  NO  Measurements:  RLE Calf: 16.5\" Ankle: 10\"   LLE Calf: 17: Ankle:10.5\"  Medications given venofer 200 mg IV push. Dose 1 of 3.     Patient and medication assessed. Discussed with APN. Treatments completed leg measurements, IV medication administration.  Repeat blood pressure stable.  Medications given venofer 200 mg IV push. Extensive education of disease management including wearing compression stocking when she goes out and is on her feet a lot.  Reviewed AVS instructions. Patient verbalized understanding.

## 2025-06-18 ENCOUNTER — TELEPHONE (OUTPATIENT)
Dept: RHEUMATOLOGY | Facility: CLINIC | Age: 85
End: 2025-06-18

## 2025-06-18 NOTE — PROGRESS NOTES
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  I explained that SPF 30 sunscreens block approximately 97 percent of the sun's harmful rays.  Sunscreens should be applied at least 15 minutes prior to expected sun exposure and then every 2 hours after that as long as sun exposure continues. If swimming or exercising sunscreen should be reapplied every 45 minutes to an hour after getting wet or sweating.  One ounce, or the equivalent of a shot glass full of sunscreen, is adequate to protect the skin not covered by a bathing suit. I also recommended a lip balm with a sunscreen as well. Sun protective clothing can be used in lieu of sunscreen but must be worn the entire time you are exposed to the sun's rays. independent and compliant with comprehensive HEP to maintain progress achieved in PT: reports complaince      Plan: pt has2 more sessions per previous POC and will see how she does I discussed importance of doing HEP to maintain gains     Date: 8/16/2021

## 2025-06-19 DIAGNOSIS — I50.30 HEART FAILURE WITH PRESERVED EJECTION FRACTION, UNSPECIFIED HF CHRONICITY (HCC): Primary | ICD-10-CM

## 2025-06-20 ENCOUNTER — MED REC SCAN ONLY (OUTPATIENT)
Dept: INTERNAL MEDICINE CLINIC | Facility: CLINIC | Age: 85
End: 2025-06-20

## 2025-06-23 ENCOUNTER — TELEPHONE (OUTPATIENT)
Dept: CARDIOLOGY CLINIC | Facility: HOSPITAL | Age: 85
End: 2025-06-23

## 2025-06-23 ENCOUNTER — OFFICE VISIT (OUTPATIENT)
Age: 85
End: 2025-06-23

## 2025-06-23 ENCOUNTER — OFFICE VISIT (OUTPATIENT)
Dept: CARDIOLOGY CLINIC | Facility: HOSPITAL | Age: 85
End: 2025-06-23
Attending: NURSE PRACTITIONER
Payer: MEDICARE

## 2025-06-23 VITALS
RESPIRATION RATE: 16 BRPM | HEART RATE: 70 BPM | WEIGHT: 223 LBS | BODY MASS INDEX: 39.51 KG/M2 | SYSTOLIC BLOOD PRESSURE: 120 MMHG | HEIGHT: 63 IN | DIASTOLIC BLOOD PRESSURE: 68 MMHG

## 2025-06-23 VITALS
SYSTOLIC BLOOD PRESSURE: 103 MMHG | BODY MASS INDEX: 40 KG/M2 | WEIGHT: 223.88 LBS | HEART RATE: 59 BPM | RESPIRATION RATE: 16 BRPM | OXYGEN SATURATION: 95 % | DIASTOLIC BLOOD PRESSURE: 62 MMHG

## 2025-06-23 DIAGNOSIS — D50.9 IRON DEFICIENCY ANEMIA, UNSPECIFIED IRON DEFICIENCY ANEMIA TYPE: Primary | ICD-10-CM

## 2025-06-23 DIAGNOSIS — Z51.81 THERAPEUTIC DRUG MONITORING: ICD-10-CM

## 2025-06-23 DIAGNOSIS — M1A.9XX1 CHRONIC TOPHACEOUS GOUT OF RIGHT FOOT: ICD-10-CM

## 2025-06-23 DIAGNOSIS — I50.30 HEART FAILURE WITH PRESERVED EJECTION FRACTION, UNSPECIFIED HF CHRONICITY (HCC): ICD-10-CM

## 2025-06-23 DIAGNOSIS — M06.09 RHEUMATOID ARTHRITIS OF MULTIPLE SITES WITH NEGATIVE RHEUMATOID FACTOR (HCC): Primary | ICD-10-CM

## 2025-06-23 LAB
ANION GAP SERPL CALC-SCNC: 8 MMOL/L (ref 0–18)
BUN BLD-MCNC: 30 MG/DL (ref 9–23)
BUN/CREAT SERPL: 18.8 (ref 10–20)
CALCIUM BLD-MCNC: 9.5 MG/DL (ref 8.7–10.4)
CHLORIDE SERPL-SCNC: 106 MMOL/L (ref 98–112)
CO2 SERPL-SCNC: 23 MMOL/L (ref 21–32)
CREAT BLD-MCNC: 1.6 MG/DL (ref 0.55–1.02)
EGFRCR SERPLBLD CKD-EPI 2021: 31 ML/MIN/1.73M2 (ref 60–?)
FASTING STATUS PATIENT QL REPORTED: NO
GLUCOSE BLD-MCNC: 104 MG/DL (ref 70–99)
OSMOLALITY SERPL CALC.SUM OF ELEC: 290 MOSM/KG (ref 275–295)
POTASSIUM SERPL-SCNC: 3.9 MMOL/L (ref 3.5–5.1)
SODIUM SERPL-SCNC: 137 MMOL/L (ref 136–145)

## 2025-06-23 PROCEDURE — 1125F AMNT PAIN NOTED PAIN PRSNT: CPT | Performed by: INTERNAL MEDICINE

## 2025-06-23 PROCEDURE — 99215 OFFICE O/P EST HI 40 MIN: CPT

## 2025-06-23 PROCEDURE — 96374 THER/PROPH/DIAG INJ IV PUSH: CPT

## 2025-06-23 PROCEDURE — 36415 COLL VENOUS BLD VENIPUNCTURE: CPT

## 2025-06-23 PROCEDURE — 99214 OFFICE O/P EST MOD 30 MIN: CPT | Performed by: INTERNAL MEDICINE

## 2025-06-23 PROCEDURE — G2211 COMPLEX E/M VISIT ADD ON: HCPCS | Performed by: INTERNAL MEDICINE

## 2025-06-23 PROCEDURE — 80048 BASIC METABOLIC PNL TOTAL CA: CPT

## 2025-06-23 RX ORDER — FUROSEMIDE 40 MG/1
TABLET ORAL
COMMUNITY
Start: 2025-06-23

## 2025-06-23 NOTE — PROGRESS NOTES
Subjective:  Cris is here ,using her walker, rushing from previous appts.  States she continues to feel  tired all the time. Not able to do activities she would like.   Weight:  Today - 223.9#   Home - no  Last visit 224#   Labs completed :   by RN - karen   Device: n/a    IV placed:  - no   Measurements :  RLE calf- 16.5' ankle- 9.5\"     LLE calf- 17.5' ankle- 10.5\"    Lungs clear,  Non pitting edema BLE,   Venofer 200 mg IVP given dose #2/3.     Patient and medication assessed. Discussed with APN. Treatments completed- venipuncture, ivp medication, physical assessment.  Medications given- venofer 200 mg ivp . Extensive education of disease management including- benefit of consistent activity.  Reviewed AVS instructions. Patient verbalized understanding.

## 2025-06-23 NOTE — TELEPHONE ENCOUNTER
Called patient and reviewed instructions to decrease furosemide to 40mg daily and an additional 20mg three days a week. This is due to decrease in renal function. See office visit today for further details.

## 2025-06-23 NOTE — PATIENT INSTRUCTIONS
You were seen today for rheumatoid arthritis  Joints overall are stable  With the worsening fatigue and shortness of breath try stopping methotrexate and folic acid to see how you do  If your joint pain worsens then go back on methotrexate  For now continue Enbrel and hydroxychloroquine  Blood work in 6 months  Can see me in 6 months

## 2025-06-23 NOTE — PROGRESS NOTES
Cris Peterson is a 84 year old female.    HPI:     Chief Complaint   Patient presents with    Rheumatoid Arthritis    Medication Follow-Up       I had the pleasure of seeing Cris Peterson on 6/23/2025 for follow up Seropositive RA and Gout    Current medications:  Enbrel weekly  Methotrexate 4 pills weekly   mg daily   Allopurinal 300 mg daily  Blood work:  +ITZEL 1:160, Neg BETTY panel  RF 51, +atypical p-ANCA 1:1280  UA 5.3 (Oct 2021)    Interval History:  This is a 81 yo F with hx of HTN, HLD, Hypothyroid, Atrial fibrillation on eliquis, COPD, B/L TKR, R THR, R foot surgery, R thumb surgery, Gout  presents to establish care for seronegative RA.  She is a former patient of Dr. Rose.  She was diagnosed with RA many years ago, about 20 years ago.  Currently on Enbrel weekly, methotrexate 4 pills weekly and hydroxychloroquine 400 mg daily.  Joints are stable.  No active swelling.  She does have chronic lower back pain.  X-rays have showed moderate scoliosis and moderate to severe spondylosis.  She also has discomfort in her neck.  X-ray showed advanced multilevel degenerative changes mostly in C5-C6.  She has full range of motion in her shoulders.  She was admitted in April 2021 for an acutely inflamed right third toe which was originally felt to be infected.  Surgery was done and tophi was removed.  Her uric acid was 9.1 at that time.  Now on allopurinol 300 mg daily.  Gout has been stable.  Has had no gout flares.  She reports shortness of breath.  Following with cardiology.    2/2/2024:  Presents for f/u of RA and Gout  Also has CKD following with nephology  Has chronic changes in hands but minimal pain. Has weakness in the hands  Continues to have pain in the neck and across the shoulders  No n/t or shooting for the pain  No recent gout flares     6/3/2024:  Presents for f/u of RA and Gout  No recent gout flare  Also has CKD following with nephology  Has chronic changes in hands but minimal pain. Has  weakness in the hands  Recent blood work showing increased ESR 47.  Normal LFTs.  Creatinine 1.14  Seen by ophthalmology 4/23/2024, no evidence of Plaquenil toxicity  Had increased swelling in both legs.  She also has atrial fibrillation and CKD stage III.  She is now on Lasix and following with cardiology. She will be getting right heart cath on Thursday  She reports some SOB and weight gain   Also had basal cell carcinoma on right shoulder and it was removed last Thursday     10/4/2024:  Presents for f/u of RA and Gout  No recent gout flare  Also has CKD following with nephology  Blood work shows creatinine stable at 1.13.  Normal LFTs.  Normal inflammation marker  Having some more neck pain and pain across the shoulders and mid back  Also pain in the knees  Feet are painful  It is also getting harder to move, she is more stiff overall  Legs are swollen, she is on a diuretic   She was suppose to have tear duct surgery but postponed due to abnormal EKG- Atrial flutter but this is chronic, she saw cardiology and they said everything was ok  She will starting PT for movement     2/7/2025:  Presents for f/u of RA and Gout  No recent gout flare  Also has CKD following with nephology  She is on Enbrel weekly, methotrexate and hydroxychloroquine  Recent blood work showing normal inflammation markers.  Creatinine stable around 1.3  Having worsening wrist pain, no swelling in the wrists   Also having neck and shoulder pain which has been chronic. CT neck in 2022 showed partial fusion and moderated disc disease  No recent falls     6/23/2025:  Presents for f/u of RA and Gout  No recent gout flare  Also has CKD following with nephology  She is on Enbrel weekly, methotrexate and hydroxychloroquine  Recent blood work showing normal inflammation markers.  Creatinine stable around 1.4  She has been short of breath, CT chest was normal, no ILD  She had a right heart cath done in May showing mild pulmonary HTN,   She has low iron  so they will start Fe infusions, has one last week  Has trouble walking due to back pain and shortness of breath  Having some b/l shoulder pain  Minimal pain in hands and wrists  Feet have been really cold, has to wear socks at night   Also having a lot of fatigue                   HISTORY:  Past Medical History:    Basalioma    Cancer (HCC)    melanoma on back    Congestive heart disease (HCC)    COPD (chronic obstructive pulmonary disease) (HCC)    Coronary atherosclerosis    Disorder of thyroid    Fracture, patella    repair    Gout    Heart valve disease    High blood pressure    High cholesterol    Hypothyroidism    Nonunion of midfoot arthrodesis (HCC)    Pulmonary HTN (HCC)    Rheumatoid arthritis (HCC)    Sx.7/21/15, CPT.81092, R Triple Arthrodesis/Poss Medializing Calc Arthrodesis/Lapidus/MONA/FDL to Post Tib Transfer, w/, Global Exp.10/19/15    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      ALBUTEROL 108 (90 Base) MCG/ACT Inhalation Aero Soln Inhale 2 puffs into the lungs every 6 hours as needed for wheezing. 8.5 g 2    HYDROXYCHLOROQUINE 200 MG Oral Tab TAKE 2 TABLETS DAILY 180 tablet 3    pantoprazole 40 MG Oral Tab EC Take 1 tablet (40 mg total) by mouth before breakfast. 90 tablet 3    dilTIAZem HCl  MG Oral Capsule SR 24 Hr Take 1 capsule (240 mg total) by mouth daily.      allopurinol 300 MG Oral Tab Take 1 tablet (300 mg total) by mouth daily. 90 tablet 1    FARXIGA 10 MG Oral Tab Take 1 tablet (10 mg total) by mouth in the morning.      spironolactone 25 MG Oral Tab Take 0.5 tablets (12.5 mg total) by  mouth in the morning.      ketoconazole 2 % External Shampoo Apply 1 Application topically as needed for Itching.      estradiol 0.1 MG/GM Vaginal Cream Place vaginally daily as needed.      Magnesium Hydroxide (MILK OF MAGNESIA OR) Take 15 mL by mouth daily as needed.      methotrexate 2.5 MG Oral Tab TAKE 4 TABLETS ONCE A WEEK 52 tablet 1    Etanercept (ENBREL SURECLICK) 50 MG/ML Subcutaneous Solution Auto-injector INJECT 1 PEN UNDER THE SKIN EVERY 7 DAYS 12 mL 1    folic acid 1 MG Oral Tab Take 1 tablet (1 mg total) by mouth daily. 90 tablet 3    levothyroxine 125 MCG Oral Tab Take 1 tablet (125 mcg total) by mouth before breakfast. 90 tablet 3    fluticasone-salmeterol 100-50 MCG/ACT Inhalation Aerosol Powder, Breath Activated Inhale 1 puff into the lungs 2 (two) times daily. inhale 1 puff by INHALATION route 2 times every day morning and evening approximately 12 hours apart 1 each 5    Trospium Chloride ER 60 MG Oral Capsule SR 24 Hr Take 1 capsule (60 mg total) by mouth daily. 90 capsule 3    Spacer/Aero-Holding Chambers Does not apply Device Use with albuterol inhaler 1 each 0    atorvastatin 40 MG Oral Tab Take 1 tablet (40 mg total) by mouth nightly.      Cholecalciferol (VITAMIN D) 50 MCG (2000 UT) Oral Tab Take 1 tablet by mouth Every afternoon at 2:00 pm.      ELIQUIS 5 MG Oral Tab Take 1 tablet (5 mg total) by mouth in the morning and 1 tablet (5 mg total) before bedtime.      Calcium 500-125 MG-UNIT Oral Tab Take 1 tablet by mouth in the morning.      Loperamide HCl 2 MG Oral Cap Take 1 capsule (2 mg total) by mouth as needed in the morning and 1 capsule (2 mg total) as needed at noon and 1 capsule (2 mg total) as needed in the evening and 1 capsule (2 mg total) as needed before bedtime for Diarrhea.      Probiotic Product (PROBIOTIC-10) Oral Cap Take 1 tablet by mouth in the morning.      acetaminophen (TYLENOL EXTRA STRENGTH) 500 MG Oral Tab Take 1 tablet (500 mg total) by mouth every 6 (six) hours  as needed.      CENTRUM SILVER OR TABS Take 1 tablet by mouth in the morning.       .cmed  Allergies:  Allergies   Allergen Reactions    Erythromycin Base     Pcn [Bicillin L-A]     Lactose DIARRHEA         ROS:   All other ROS are negative.     PHYSICAL EXAM:   GEN: AAOx3, NAD  HEENT: EOMI, PERRLA, no injection or icterus, oral mucosa moist, no oral lesions. No lymphadenopathy. No facial rash  CVS: RRR, no murmurs rubs or gallops. Equal 2+ distal pulses.   LUNGS: CTAB, no increased work of breathing  ABDOMEN:  soft NT/ND, +BS, no HSM  SKIN: No rashes or skin lesions. No nail findings  MSK:  Chronic synovitis noted in the wrist  Drew-neck deformities of the right hand.  Able to make a full fist  Full range of motion in the shoulders  3+edema b/l LE  NEURO: Cranial nerves II-XII intact grossly. 5/5 strength throughout in both upper and lower extremities, sensation intact.  PSYCH: normal mood       LABS:       Component      Latest Ref Rng 2/11/2022   MYELOPEROX ANTIBODIES, IGG      0 - 19 AU/mL 0    SERINE PROTEASE3, IGG      0 - 19 AU/mL 0    ANCA IFA Titer      <1:20  1:1280 (H)    ANCA IFA Pattern      None Detected  Atypical p-ANCA !    ITZEL Titer/Pattern      <80  160 !    Reviewed By: Kathleen Galloway M.D.    Anti-Sjogren's A      Negative  Negative    Anti-Sjogren's B      Negative  Negative    Anti-Smith Antibody      Negative  Negative    Anti-Corea/RNP Antibody      Negative  Negative    RHEUMATOID FACTOR      <15 IU/mL 51 (H)    COMPLEMENT C4      10.0 - 40.0 mg/dL 32.1    ITZEL SCREEN WITH REFLEX (S)      Negative  Positive !    COMPLEMENT C3      90.0 - 180.0 mg/dL 117.0    Anti Double Strand DNA      <10  <10       Component      Latest Ref Rng 4/21/2022   MYELOPEROX ANTIBODIES, IGG      0 - 19 AU/mL 0    SERINE PROTEASE3, IGG      0 - 19 AU/mL 0    ANCA IFA Titer      <1:20  1:1280 (H)    ANCA IFA Pattern      None Detected  Atypical p-ANCA !    ITZEL Titer/Pattern      <80      Reviewed By:      Anti-Sjogren's A      Negative      Anti-Sjogren's B      Negative      Anti-Smith Antibody      Negative      Anti-Corea/RNP Antibody      Negative      RHEUMATOID FACTOR      <15 IU/mL     COMPLEMENT C4      10.0 - 40.0 mg/dL     ITZEL SCREEN WITH REFLEX (S)      Negative      COMPLEMENT C3      90.0 - 180.0 mg/dL     Anti Double Strand DNA      <10            Imaging:     XR cervical 2022:  CONCLUSION:   1. There is widening of the atlantodental interval on flexion, suggesting ligamentous instability.      2. Advanced multilevel degenerative changes throughout the cervical spine, most notably at the C5-C6 level.      ASSESSMENT/PLAN:     Seronegative RA- stable   - She is a former patient of Dr. Rose, diagnosed more than 20 years ago  - On Enbrel weekly and hydroxychloroquine 400 mg daily  - with her worsening fatigue plan to start methotrexate.  Advised that methotrexate can cause a lot of fatigue.  If her joint pain worsens off methotrexate she will let me know  - She states that she sees the eye doctor every year 6/2025, no evidence of Plaquenil toxicity  - Plan to monitor blood work every 3 to 4 months    Shortness of breath  - Had high resolution CT which was essentially normal.  - s/p right heart cath May 2025 showing some mild pulmonary hypertension  - She has some anemia, now on iron infusions    B/L LE swelling  - following with cardiology  - on lasix now    Basal cell carcinoma, right shoulder  - s/p removal       Primary osteoarthritis, chronic neck and lower back pain  - X-ray of the neck has shown advanced multilevel degenerative changes.  - She cannot take NSAIDs.  Recommended PT but she deferred that for now  - Advise she can take Tylenol arthritis once or twice a day for her pain     CKD stage 3  - Found to have a positive atypical p-ANCA 1: 1280, negative IA-3 and MPO.  UA shows no protein or blood.  Creatinine has been fluctuating between 1.1 and 1.3  - Following with nephrology. CKD  presumably secondary to hypertensive disease    Chronic gout- stable  - Last uric acid in 2021 was 5.3.  Remains on allopurinol 300 mg daily.  No recent gout flare    Pt will f/u in 6 mos     There is a longitudinal care relationship with me, the care plan reflects the ongoing nature of the continuous relationship of care, and the medical record indicates that there is ongoing treatment of a serious/complex medical condition which I am currently managing.  is Applicable.     Ivania Cornell MD  6/23/2025  9:52 AM

## 2025-06-26 DIAGNOSIS — I50.30 HEART FAILURE WITH PRESERVED EJECTION FRACTION, UNSPECIFIED HF CHRONICITY (HCC): Primary | ICD-10-CM

## 2025-06-30 ENCOUNTER — OFFICE VISIT (OUTPATIENT)
Dept: CARDIOLOGY CLINIC | Facility: HOSPITAL | Age: 85
End: 2025-06-30
Attending: NURSE PRACTITIONER
Payer: MEDICARE

## 2025-06-30 VITALS
DIASTOLIC BLOOD PRESSURE: 59 MMHG | OXYGEN SATURATION: 96 % | HEART RATE: 58 BPM | BODY MASS INDEX: 40 KG/M2 | RESPIRATION RATE: 16 BRPM | SYSTOLIC BLOOD PRESSURE: 117 MMHG | WEIGHT: 225.19 LBS

## 2025-06-30 DIAGNOSIS — D50.9 IRON DEFICIENCY ANEMIA, UNSPECIFIED IRON DEFICIENCY ANEMIA TYPE: Primary | ICD-10-CM

## 2025-06-30 DIAGNOSIS — I50.30 HEART FAILURE WITH PRESERVED EJECTION FRACTION, UNSPECIFIED HF CHRONICITY (HCC): ICD-10-CM

## 2025-06-30 LAB
ANION GAP SERPL CALC-SCNC: 12 MMOL/L (ref 0–18)
BUN BLD-MCNC: 27 MG/DL (ref 9–23)
BUN/CREAT SERPL: 18.8 (ref 10–20)
CALCIUM BLD-MCNC: 9.2 MG/DL (ref 8.7–10.4)
CHLORIDE SERPL-SCNC: 105 MMOL/L (ref 98–112)
CO2 SERPL-SCNC: 24 MMOL/L (ref 21–32)
CREAT BLD-MCNC: 1.44 MG/DL (ref 0.55–1.02)
EGFRCR SERPLBLD CKD-EPI 2021: 36 ML/MIN/1.73M2 (ref 60–?)
FASTING STATUS PATIENT QL REPORTED: NO
GLUCOSE BLD-MCNC: 111 MG/DL (ref 70–99)
OSMOLALITY SERPL CALC.SUM OF ELEC: 298 MOSM/KG (ref 275–295)
POTASSIUM SERPL-SCNC: 3.7 MMOL/L (ref 3.5–5.1)
SODIUM SERPL-SCNC: 141 MMOL/L (ref 136–145)

## 2025-06-30 PROCEDURE — 36415 COLL VENOUS BLD VENIPUNCTURE: CPT

## 2025-06-30 PROCEDURE — 80048 BASIC METABOLIC PNL TOTAL CA: CPT

## 2025-06-30 PROCEDURE — 99215 OFFICE O/P EST HI 40 MIN: CPT

## 2025-06-30 PROCEDURE — 96374 THER/PROPH/DIAG INJ IV PUSH: CPT

## 2025-06-30 NOTE — PATIENT INSTRUCTIONS
Continue same medications     Make sure you are drinking your fluid restrictiion, in this heat    Call for weight gain 3#/day or 5#/day

## 2025-06-30 NOTE — PROGRESS NOTES
Here for RN visit for venofer  Subjective: Cris states she felt good for a few days and now feels tired and worn out.   Weight:  Today - 225.2 #   Home - no  Last visit - 223#  Labs completed :   by RN - BMP   Device:  n/a   IV placed:  -no   Measurements :  RLE calf- 16.25\" ankle- 9.5'     LLE calf- 17.25\" ankle- 10.25\" .  Lungs slightly diminished  Non pitting edema BLE L>R  Venofer 200 mg IVP dose #3/3 given      Patient and medication assessed. Discussed with APN. Treatments completed- physical assessment,leg measurements, venipuncture, IVP medication.  Medications given- venofer 200 mg IVP . Extensive education of disease management including- drinking enough on thes hot days..  Reviewed AVS instructions. Patient verbalized understanding.

## 2025-07-02 DIAGNOSIS — I50.30 HEART FAILURE WITH PRESERVED EJECTION FRACTION, UNSPECIFIED HF CHRONICITY (HCC): Primary | ICD-10-CM

## 2025-07-07 ENCOUNTER — OFFICE VISIT (OUTPATIENT)
Dept: CARDIOLOGY CLINIC | Facility: HOSPITAL | Age: 85
End: 2025-07-07
Attending: NURSE PRACTITIONER
Payer: MEDICARE

## 2025-07-07 VITALS
BODY MASS INDEX: 41 KG/M2 | WEIGHT: 228.81 LBS | SYSTOLIC BLOOD PRESSURE: 116 MMHG | DIASTOLIC BLOOD PRESSURE: 66 MMHG | OXYGEN SATURATION: 96 % | HEART RATE: 57 BPM

## 2025-07-07 DIAGNOSIS — I48.0 PAROXYSMAL ATRIAL FIBRILLATION (HCC): ICD-10-CM

## 2025-07-07 DIAGNOSIS — I50.30 HEART FAILURE WITH PRESERVED EJECTION FRACTION, UNSPECIFIED HF CHRONICITY (HCC): Primary | ICD-10-CM

## 2025-07-07 DIAGNOSIS — D50.9 IRON DEFICIENCY ANEMIA, UNSPECIFIED IRON DEFICIENCY ANEMIA TYPE: ICD-10-CM

## 2025-07-07 DIAGNOSIS — J44.9 CHRONIC OBSTRUCTIVE PULMONARY DISEASE, UNSPECIFIED COPD TYPE (HCC): ICD-10-CM

## 2025-07-07 DIAGNOSIS — I50.32 CHRONIC HEART FAILURE WITH PRESERVED EJECTION FRACTION (HCC): Primary | ICD-10-CM

## 2025-07-07 DIAGNOSIS — I50.32 CHRONIC HEART FAILURE WITH PRESERVED EJECTION FRACTION (HCC): ICD-10-CM

## 2025-07-07 LAB
ANION GAP SERPL CALC-SCNC: 12 MMOL/L (ref 0–18)
BUN BLD-MCNC: 24 MG/DL (ref 9–23)
BUN/CREAT SERPL: 17.8 (ref 10–20)
CALCIUM BLD-MCNC: 9.2 MG/DL (ref 8.7–10.4)
CHLORIDE SERPL-SCNC: 106 MMOL/L (ref 98–112)
CO2 SERPL-SCNC: 23 MMOL/L (ref 21–32)
CREAT BLD-MCNC: 1.35 MG/DL (ref 0.55–1.02)
EGFRCR SERPLBLD CKD-EPI 2021: 39 ML/MIN/1.73M2 (ref 60–?)
FASTING STATUS PATIENT QL REPORTED: NO
GLUCOSE BLD-MCNC: 112 MG/DL (ref 70–99)
OSMOLALITY SERPL CALC.SUM OF ELEC: 297 MOSM/KG (ref 275–295)
POTASSIUM SERPL-SCNC: 3.9 MMOL/L (ref 3.5–5.1)
SODIUM SERPL-SCNC: 141 MMOL/L (ref 136–145)

## 2025-07-07 PROCEDURE — 99214 OFFICE O/P EST MOD 30 MIN: CPT | Performed by: NURSE PRACTITIONER

## 2025-07-07 RX ORDER — FUROSEMIDE 40 MG/1
TABLET ORAL
COMMUNITY
Start: 2025-07-07

## 2025-07-07 NOTE — PROGRESS NOTES
[facility]    Cris Peterson Patient Status:  Outpatient    1940 MRN [id number]   Location [facility] MD Dr. Shlomo Win       Cris Peterson is a 84 year old female who presents to clinic for CHF and iron infusions.     Patient Saw Dr. Keenan 25 for follow up for RHC. Lisinopril stopped due to low BP and dizziness. Diuretics decreased to furosemide 40mg daily was on 40/20 daily.       Problem List:  HFpEF  HTN  Morbid obesity  CKD stage III  COPD  Paroxysmal atrial fibrillation    Subjective:  Patient arrives to the clinic alone. Reports feeling more short of breath. Increased swelling in legs. Reports shoes are fitting tight. Denies chest pain, palpitations, lightheadedness, dizziness.     Review of Systems:  Constitutional: negative for fatigue, chills or fever  Respiratory: negative for cough,  negative hemoptysis and wheezing  Cardiovascular: negative for chest pain, exertional chest pressure/discomfort, near-syncope, orthopnea and palpitations  Gastrointestinal: negative for abdominal pain, diarrhea, melena, nausea and vomiting  Hematologic/lymphatic: negative  Musculoskeletal: negative for muscle weakness and myalgias    Objective:  Lab Results   Component Value Date/Time    WBC 8.0 2025 01:54 PM    HGB 10.9 (L) 2025 01:54 PM    HCT 33.5 (L) 2025 01:54 PM    .0 2025 01:54 PM    CREATSERUM 1.44 (H) 2025 09:39 AM    BUN 27 (H) 2025 09:39 AM     2025 09:39 AM    K 3.7 2025 09:39 AM     2025 09:39 AM    CO2 24.0 2025 09:39 AM     (H) 2025 09:39 AM    CA 9.2 2025 09:39 AM    ALB 4.0 2025 01:54 PM    ALKPHO 98 2025 01:54 PM    BILT 0.8 2025 01:54 PM    TP 6.4 2025 12:00 AM    AST 27 2025 12:00 AM    ALT 15 2025 12:00 AM    INR 1.37 (H) 2022 08:57 AM    PTP 17.1 (H) 2022 08:57 AM    T4F 1.8 (H) 2024 09:59 AM     TSH 0.564 04/08/2025 11:08 AM    ESRML 13 02/04/2025 08:22 AM    CRP <0.40 02/04/2025 08:22 AM    PHOS 3.8 07/23/2024 11:59 AM    CK 37 05/03/2021 06:48 AM    B12 725 05/06/2025 01:54 PM       Clinical labs drawn: BMP, proBNP - not drawn, recently done per [facility], reviewed results      /66 (BP Location: Right arm)   Pulse 57   Wt 228 lb 12.8 oz (103.8 kg)   SpO2 96%   BMI 40.53 kg/m²     Date Clinic Weight Home Weight Discharge weight/other office weight Intervention   6/5/25   222    6/16/25 224      7/7/25 228                   General appearance: alert, appears stated age, and cooperative  Neck: no JVD  Lungs: clear to auscultation bilaterally  Chest wall: no tenderness  Heart: regular rate and rhythm  Abdomen: soft, non-tender; bowel sounds normal; no masses,  no organomegaly  Extremities: edema 2+ bilateral LE  Pulses: 2+ and symmetric  Neurologic: Grossly normal    Diagnostic Tests:  ECHO  3/5/25: EF 72%. No WMA, LA mildly enlarged, RV mildly enlarged, mild MR    CATH  5/12/25  Cardiologist: Carlos Beck MD  Primary Proceduralist: Carlos Beck MD  Procedure Performed: RHC  Date of Procedure: 5/12/2025   Pre procedure diagnosis: Diastolic dysfunction: Dyspnea on exertion  Post procedure diagnosis: As above  Summary of findings: Mild pulmonary hypertension with normal pulmonary capillary wedge pressure.  Mildly reduced cardiac index.  Hemodynamics:   RA 3 mmHg  RV 41/2 mmHg  PA 38/3 mean 17 mmHg  PCW 4 mmHg  CO 4.7 L/min by Karson/ CI 2.3 L/min/m² by Karson  SVR 1496 dynes/  dynes  Ao sat 94%  PA sat 57%  Assessment:  Mild pulmonary hypertension  Normal cardiac pulmonary capillary wedge pressure  Mild reduced cardiac index  Recommendations:  Decrease diuresis, BMP in 1 week      Education:  Patient and family instructed at length regarding clinic procedures, hours, purpose of clinic visits, sodium restricted diet, low sodium foods, fluid restrictions, daily weights, medication regimen s/s heart  failure exacerbation and when to call APN/clinic. Patient and family receptive.    Assessment:  HFpEF  -EF 72%  -on farxiga 10mg daily, spironolactone 12.5mg daily  -on furosemide 40mg daily and 20mg in pm three times a week  -lisinoprol stopped due to low BP and dizziess, symptoms improved  -Renal function stable BUN/Cr 24/1.35, K 3.9, Na 141  -pro-BNP  849 on 6/13 improved  -weight trending up, increased swelling in legs  -increase furosemide to 40mg bid x2 days, then 40mg in am and 20mg in pm daily  -Daily weights, call for weight gain of 3lbs overnight and/or 5lbs in 5 days  -low salt diet  -fluid restriction 32-64 ounces daily  -[facility] in [facility] week  -Follow up with cardiologist Dr. Keenan as directed    Iron deficiency anemia  -Ferritin 95, iron saturtation 20%  -received first dose of IV venofer today #3 of 3 doses    Paroxysmal atrial fibrillation  -on diltiazem 240mg daily  -anticoagulated on eliquis    CKD stage III  -kidney function stable    COPD  -on albuterol PRN  -on advair BID       Plan:  Increase lasix to 40mg twice a day for 2 days, then take 40mg in am and 20mg in pm daily    [facility]    Follow up with Dr. Keenan as directed    Follow up with Dr. Leong as directed    Please call the heart failure clinic if you notice a weight gain of 3 pounds overnight or 5 pounds or more in 5 days.      Monitor yourself for signs and symptoms of fluid overload, increased shortness of breath, difficulty breathing when laying down etc. Call the clinic if any of these signs develop at ph: [phone]    Call if having any dizziness, lightheadedness, heart racing, palpitations, chest pain, shortness of breath, coughing, swelling, weight gain or worsening symptoms.     32-64 ounces of fluid daily    Less than 2000 mg salt/sodium daily. Common high sodium foods include frozen dinners, soups (not homemade), some cereal, vegetable juice, canned vegetables, lunch  meats, processed meats like hotdogs, sausage, capone, pepperoni, soy sauce, pre-packaged rice or potatoes. Please remember to read nutrition labels for sodium content.       I spent 44 minutes reviewing the chart, documentation, providing counseling, coordination of care and education related specifically to heart failure.     Current Medications[1]     SHAMIKA Salgado  7/7/2025               [1]    furosemide 40 MG Oral Tab Take 1 tablet (40 mg total) by mouth every morning AND 0.5 tablets (20 mg total) 3 (three) times a week. Take 40mg in am and an additional 20mg three days a week on Monday, Wednesday, friday.      ALBUTEROL 108 (90 Base) MCG/ACT Inhalation Aero Soln Inhale 2 puffs into the lungs every 6 hours as needed for wheezing. 8.5 g 2    HYDROXYCHLOROQUINE 200 MG Oral Tab TAKE 2 TABLETS DAILY 180 tablet 3    pantoprazole 40 MG Oral Tab EC Take 1 tablet (40 mg total) by mouth before breakfast. 90 tablet 3    dilTIAZem HCl  MG Oral Capsule SR 24 Hr Take 1 capsule (240 mg total) by mouth daily.      allopurinol 300 MG Oral Tab Take 1 tablet (300 mg total) by mouth daily. 90 tablet 1    FARXIGA 10 MG Oral Tab Take 1 tablet (10 mg total) by mouth in the morning.      spironolactone 25 MG Oral Tab Take 0.5 tablets (12.5 mg total) by mouth in the morning.      ketoconazole 2 % External Shampoo Apply 1 Application topically as needed for Itching.      estradiol 0.1 MG/GM Vaginal Cream Place vaginally daily as needed.      Magnesium Hydroxide (MILK OF MAGNESIA OR) Take 15 mL by mouth daily as needed.      Etanercept (ENBREL SURECLICK) 50 MG/ML Subcutaneous Solution Auto-injector INJECT 1 PEN UNDER THE SKIN EVERY 7 DAYS 12 mL 1    levothyroxine 125 MCG Oral Tab Take 1 tablet (125 mcg total) by mouth before breakfast. 90 tablet 3    fluticasone-salmeterol 100-50 MCG/ACT Inhalation Aerosol Powder, Breath Activated Inhale 1 puff into the lungs 2 (two) times daily. inhale 1 puff by INHALATION route 2 times  every day morning and evening approximately 12 hours apart 1 each 5    Trospium Chloride ER 60 MG Oral Capsule SR 24 Hr Take 1 capsule (60 mg total) by mouth daily. 90 capsule 3    atorvastatin 40 MG Oral Tab Take 1 tablet (40 mg total) by mouth nightly.      ELIQUIS 5 MG Oral Tab Take 1 tablet (5 mg total) by mouth in the morning and 1 tablet (5 mg total) before bedtime.      Calcium 500-125 MG-UNIT Oral Tab Take 1 tablet by mouth in the morning. (Patient taking differently: Take 1 tablet by mouth in the morning.)      Loperamide HCl 2 MG Oral Cap Take 1 capsule (2 mg total) by mouth as needed in the morning and 1 capsule (2 mg total) as needed at noon and 1 capsule (2 mg total) as needed in the evening and 1 capsule (2 mg total) as needed before bedtime for Diarrhea.      Probiotic Product (PROBIOTIC-10) Oral Cap Take 1 tablet by mouth in the morning.      acetaminophen (TYLENOL EXTRA STRENGTH) 500 MG Oral Tab Take 1 tablet (500 mg total) by mouth every 6 (six) hours as needed.      CENTRUM SILVER OR TABS Take 1 tablet by mouth in the morning.

## 2025-07-07 NOTE — PROGRESS NOTES
Subjective: Cris reports being swollen, she notes she felt good, like she got a \"boost\" after the 2nd dose of iron but not after the third dose. Notes she had to rest between watering flowers. Wears compressions 3 out of 4 days a week. Denies chest pain or heart racing. No pain out of the ordinary.    Weight:  Today 228.8 lb   Home 126 lb Last visit 223 lb  Labs completed: by RN bmp  Device:  CardioMEMS Interrogation n/a  IV placed:  no  Measurements:  RLE calf: 18\" ankle: 11\"    LLE calf: 18\" ankle: 11\"    Patient and medication assessed. Discussed with APN. Treatments completed venipuncture, IV venofer.  Medications given NONE. Extensive education of disease management including wearing compression stocking during the day and off at night.  Reviewed AVS instructions. Patient verbalized understanding.

## 2025-07-07 NOTE — PATIENT INSTRUCTIONS
Increase lasix to 40mg twice a day for 3 days, then take 40mg in am and 20mg in pm daily    [facility]    Follow up with Dr. Keenan as directed    Follow up with Dr. Leong as directed    Please call the heart failure clinic if you notice a weight gain of 3 pounds overnight or 5 pounds or more in 5 days.      Monitor yourself for signs and symptoms of fluid overload, increased shortness of breath, difficulty breathing when laying down etc. Call the clinic if any of these signs develop at ph: [phone]    Call if having any dizziness, lightheadedness, heart racing, palpitations, chest pain, shortness of breath, coughing, swelling, weight gain or worsening symptoms.     32-64 ounces of fluid daily    Less than 2000 mg salt/sodium daily. Common high sodium foods include frozen dinners, soups (not homemade), some cereal, vegetable juice, canned vegetables, lunch meats, processed meats like hotdogs, sausage, capone, pepperoni, soy sauce, pre-packaged rice or potatoes. Please remember to read nutrition labels for sodium content.

## 2025-07-08 ENCOUNTER — OFFICE VISIT (OUTPATIENT)
Dept: INTERNAL MEDICINE CLINIC | Facility: CLINIC | Age: 85
End: 2025-07-08

## 2025-07-08 VITALS
OXYGEN SATURATION: 98 % | HEART RATE: 62 BPM | BODY MASS INDEX: 40.54 KG/M2 | TEMPERATURE: 98 F | HEIGHT: 63 IN | WEIGHT: 228.81 LBS | SYSTOLIC BLOOD PRESSURE: 120 MMHG | RESPIRATION RATE: 20 BRPM | DIASTOLIC BLOOD PRESSURE: 60 MMHG

## 2025-07-08 DIAGNOSIS — E66.01 MORBID OBESITY WITH BMI OF 40.0-44.9, ADULT (HCC): Primary | ICD-10-CM

## 2025-07-08 PROCEDURE — 1126F AMNT PAIN NOTED NONE PRSNT: CPT | Performed by: INTERNAL MEDICINE

## 2025-07-08 PROCEDURE — 1159F MED LIST DOCD IN RCRD: CPT | Performed by: INTERNAL MEDICINE

## 2025-07-08 PROCEDURE — G2211 COMPLEX E/M VISIT ADD ON: HCPCS | Performed by: INTERNAL MEDICINE

## 2025-07-08 PROCEDURE — 99214 OFFICE O/P EST MOD 30 MIN: CPT | Performed by: INTERNAL MEDICINE

## 2025-07-08 PROCEDURE — 1160F RVW MEDS BY RX/DR IN RCRD: CPT | Performed by: INTERNAL MEDICINE

## 2025-07-08 NOTE — PROGRESS NOTES
Patient ID: Cris Peterson is a 84 year old female.  Chief Complaint   Patient presents with    Weight Check     Discuss weight        HISTORY OF PRESENT ILLNESS:   HPI  History of Present Illness  Cris Peterson is an 84 year old female who presents with swelling and medication management issues.    She has been experiencing swelling in her legs and feet, which makes it difficult to put on shoes. The swelling has impacted her daily activities, and she is concerned about frequent bathroom visits if she receives a diuretic shot.    Her furosemide regimen was initially decreased to once a day, then adjusted to a half dose every other day. Recently, she was instructed to take two doses for two to three days, followed by a half dose daily. She is currently taking 20 mg of furosemide.    She completed three doses of iron treatment at a special care facility.    She was told by her cardiologist that she may benefit from a GLP-1 for weight loss.      Review of Systems  Ten point review of systems otherwise negative with the exception of HPI and assessment and plan.    MEDICAL HISTORY:   Past Medical History[1]    Past Surgical History[2]    Medications - Current[3]    Allergies:Allergies[4]    Social History     Socioeconomic History    Marital status:      Spouse name: Not on file    Number of children: Not on file    Years of education: Not on file    Highest education level: Not on file   Occupational History    Not on file   Tobacco Use    Smoking status: Never    Smokeless tobacco: Never   Vaping Use    Vaping status: Not on file   Substance and Sexual Activity    Alcohol use: Yes     Alcohol/week: 1.7 standard drinks of alcohol     Types: 2 Glasses of wine per week     Comment: social    Drug use: No    Sexual activity: Not Currently   Other Topics Concern     Service Not Asked    Blood Transfusions Not Asked    Caffeine Concern No    Occupational Exposure Not Asked    Hobby Hazards Not Asked    Sleep  Concern Not Asked    Stress Concern Not Asked    Weight Concern Not Asked    Special Diet Not Asked    Back Care Not Asked    Exercise Not Asked    Bike Helmet Not Asked    Seat Belt Not Asked    Self-Exams Not Asked   Social History Narrative    Not on file     Social Drivers of Health     Food Insecurity: No Food Insecurity (4/8/2025)    NCSS - Food Insecurity     Worried About Running Out of Food in the Last Year: No     Ran Out of Food in the Last Year: No   Transportation Needs: No Transportation Needs (4/8/2025)    NCSS - Transportation     Lack of Transportation: No   Stress: Not on file   Housing Stability: Not At Risk (4/8/2025)    NCSS - Housing/Utilities     Has Housing: Yes     Worried About Losing Housing: No     Unable to Get Utilities: No           PHYSICAL EXAM:     Vitals:    07/08/25 1105   BP: 120/60   Pulse: 62   Resp: 20   Temp: 98 °F (36.7 °C)   TempSrc: Temporal   SpO2: 98%   Weight: 228 lb 12.8 oz (103.8 kg)   Height: 5' 3\" (1.6 m)       Body mass index is 40.53 kg/m².    Physical Exam  Constitutional:       Appearance: Normal appearance. She is obese.   Eyes:      General: No scleral icterus.  Cardiovascular:      Rate and Rhythm: Normal rate and regular rhythm.      Pulses: Normal pulses.      Heart sounds: Normal heart sounds. No murmur heard.  Pulmonary:      Effort: Pulmonary effort is normal. No respiratory distress.      Breath sounds: Normal breath sounds. No stridor. No wheezing or rhonchi.   Neurological:      Mental Status: She is alert.   Psychiatric:         Mood and Affect: Mood normal.         Behavior: Behavior normal.             ASSESSMENT/PLAN:   1. Morbid obesity with BMI of 40.0-44.9, adult ([facility])  [facility] - Carly Bolivar  [facility]    Return if symptoms worsen or fail to improve.    This note was prepared using Dragon Medical voice recognition dictation software. As a result errors may occur. When identified these errors  have been corrected. While every attempt is made to correct errors during dictation discrepancies may still exist.    Jeff Leong MD  7/8/2025       [1]   Past Medical History:   Basalioma    Cancer (HCC)    melanoma on back    Congestive heart disease (HCC)    COPD (chronic obstructive pulmonary disease) (HCC)    Coronary atherosclerosis    Disorder of thyroid    Fracture, patella    repair    Gout    Heart valve disease    High blood pressure    High cholesterol    Hypothyroidism    Nonunion of midfoot arthrodesis (HCC)    Pulmonary HTN (HCC)    Rheumatoid arthritis (HCC)    Sx.7/21/15, CPT.79037, R Triple Arthrodesis/Poss Medializing Calc Arthrodesis/Lapidus/MONA/FDL to Post Tib Transfer, w/, Global Exp.10/19/15    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)    Unspecified essential hypertension    Unspecified sleep apnea   [2]   Past Surgical History:  Procedure Laterality Date    Angiogram  01/01/2013    Cataract      Cholecystectomy      Hand/finger surgery unlisted Right 01/01/2011    thumb surgery    Hip replacement surgery Right     Hysterectomy      SERENITY--fibroid    Knee replacement surgery      total - right and left    Jw localization wire 1 site right (cpt=19281)  01/01/1994    Other surgical history      pins right foot    Surgery - external      tear duct    Tear duct system surg unlisted  01/01/1968    Tonsillectomy     [3]   Current Outpatient Medications:     furosemide 40 MG Oral Tab, Take 1 tablet (40 mg total) by mouth every morning AND 0.5 tablets (20 mg total) every evening., Disp: , Rfl:     ALBUTEROL 108 (90 Base) MCG/ACT Inhalation Aero Soln, Inhale 2 puffs into the lungs every 6 hours as needed for wheezing., Disp: 8.5 g, Rfl: 2    HYDROXYCHLOROQUINE 200 MG Oral Tab, TAKE 2 TABLETS DAILY, Disp: 180 tablet, Rfl: 3    pantoprazole 40 MG Oral Tab EC, Take 1 tablet (40 mg total) by mouth before breakfast., Disp: 90 tablet, Rfl: 3    dilTIAZem HCl ER  240 MG Oral Capsule SR 24 Hr, Take 1 capsule (240 mg total) by mouth daily., Disp: , Rfl:     allopurinol 300 MG Oral Tab, Take 1 tablet (300 mg total) by mouth daily., Disp: 90 tablet, Rfl: 1    FARXIGA 10 MG Oral Tab, Take 1 tablet (10 mg total) by mouth in the morning., Disp: , Rfl:     spironolactone 25 MG Oral Tab, Take 0.5 tablets (12.5 mg total) by mouth in the morning., Disp: , Rfl:     ketoconazole 2 % External Shampoo, Apply 1 Application topically as needed for Itching., Disp: , Rfl:     estradiol 0.1 MG/GM Vaginal Cream, Place vaginally daily as needed., Disp: , Rfl:     Magnesium Hydroxide (MILK OF MAGNESIA OR), Take 15 mL by mouth daily as needed., Disp: , Rfl:     Etanercept (ENBREL SURECLICK) 50 MG/ML Subcutaneous Solution Auto-injector, INJECT 1 PEN UNDER THE SKIN EVERY 7 DAYS, Disp: 12 mL, Rfl: 1    levothyroxine 125 MCG Oral Tab, Take 1 tablet (125 mcg total) by mouth before breakfast., Disp: 90 tablet, Rfl: 3    fluticasone-salmeterol 100-50 MCG/ACT Inhalation Aerosol Powder, Breath Activated, Inhale 1 puff into the lungs 2 (two) times daily. inhale 1 puff by INHALATION route 2 times every day morning and evening approximately 12 hours apart, Disp: 1 each, Rfl: 5    Trospium Chloride ER 60 MG Oral Capsule SR 24 Hr, Take 1 capsule (60 mg total) by mouth daily., Disp: 90 capsule, Rfl: 3    Spacer/Aero-Holding Chambers Does not apply Device, Use with albuterol inhaler, Disp: 1 each, Rfl: 0    atorvastatin 40 MG Oral Tab, Take 1 tablet (40 mg total) by mouth nightly., Disp: , Rfl:     ELIQUIS 5 MG Oral Tab, Take 1 tablet (5 mg total) by mouth in the morning and 1 tablet (5 mg total) before bedtime., Disp: , Rfl:     Calcium 500-125 MG-UNIT Oral Tab, Take 1 tablet by mouth in the morning. (Patient taking differently: Take 1 tablet by mouth in the morning.), Disp: , Rfl:     Loperamide HCl 2 MG Oral Cap, Take 1 capsule (2 mg total) by mouth as needed in the morning and 1 capsule (2 mg total) as needed  at noon and 1 capsule (2 mg total) as needed in the evening and 1 capsule (2 mg total) as needed before bedtime for Diarrhea., Disp: , Rfl:     Probiotic Product (PROBIOTIC-10) Oral Cap, Take 1 tablet by mouth in the morning., Disp: , Rfl:     acetaminophen (TYLENOL EXTRA STRENGTH) 500 MG Oral Tab, Take 1 tablet (500 mg total) by mouth every 6 (six) hours as needed., Disp: , Rfl:     CENTRUM SILVER OR TABS, Take 1 tablet by mouth in the morning., Disp: , Rfl:     Cholecalciferol (VITAMIN D) 50 MCG (2000 UT) Oral Tab, Take 1 tablet by mouth Every afternoon at 2:00 pm. (Patient not taking: Reported on 7/8/2025), Disp: , Rfl:   [4]   Allergies  Allergen Reactions    Erythromycin Base     Pcn [Bicillin L-A]     Lactose DIARRHEA

## 2025-07-15 ENCOUNTER — OFFICE VISIT (OUTPATIENT)
Dept: CARDIOLOGY CLINIC | Facility: HOSPITAL | Age: 85
End: 2025-07-15
Attending: NURSE PRACTITIONER
Payer: MEDICARE

## 2025-07-15 ENCOUNTER — LAB ENCOUNTER (OUTPATIENT)
Dept: LAB | Facility: HOSPITAL | Age: 85
End: 2025-07-15
Attending: NURSE PRACTITIONER
Payer: MEDICARE

## 2025-07-15 VITALS
HEART RATE: 51 BPM | BODY MASS INDEX: 40 KG/M2 | SYSTOLIC BLOOD PRESSURE: 122 MMHG | OXYGEN SATURATION: 93 % | DIASTOLIC BLOOD PRESSURE: 78 MMHG | WEIGHT: 226.63 LBS

## 2025-07-15 DIAGNOSIS — D50.9 IRON DEFICIENCY ANEMIA, UNSPECIFIED IRON DEFICIENCY ANEMIA TYPE: ICD-10-CM

## 2025-07-15 DIAGNOSIS — I50.32 CHRONIC HEART FAILURE WITH PRESERVED EJECTION FRACTION (HCC): ICD-10-CM

## 2025-07-15 DIAGNOSIS — I50.32 CHRONIC HEART FAILURE WITH PRESERVED EJECTION FRACTION (HCC): Primary | ICD-10-CM

## 2025-07-15 DIAGNOSIS — I48.0 PAROXYSMAL ATRIAL FIBRILLATION (HCC): ICD-10-CM

## 2025-07-15 LAB
ANION GAP SERPL CALC-SCNC: 10 MMOL/L (ref 0–18)
BUN BLD-MCNC: 21 MG/DL (ref 9–23)
BUN/CREAT SERPL: 14.5 (ref 10–20)
CALCIUM BLD-MCNC: 9 MG/DL (ref 8.7–10.4)
CHLORIDE SERPL-SCNC: 105 MMOL/L (ref 98–112)
CO2 SERPL-SCNC: 26 MMOL/L (ref 21–32)
CREAT BLD-MCNC: 1.45 MG/DL (ref 0.55–1.02)
EGFRCR SERPLBLD CKD-EPI 2021: 35 ML/MIN/1.73M2 (ref 60–?)
FASTING STATUS PATIENT QL REPORTED: YES
GLUCOSE BLD-MCNC: 113 MG/DL (ref 70–99)
OSMOLALITY SERPL CALC.SUM OF ELEC: 296 MOSM/KG (ref 275–295)
POTASSIUM SERPL-SCNC: 4 MMOL/L (ref 3.5–5.1)
SODIUM SERPL-SCNC: 141 MMOL/L (ref 136–145)

## 2025-07-15 PROCEDURE — 36415 COLL VENOUS BLD VENIPUNCTURE: CPT

## 2025-07-15 PROCEDURE — 99215 OFFICE O/P EST HI 40 MIN: CPT | Performed by: NURSE PRACTITIONER

## 2025-07-15 PROCEDURE — 80048 BASIC METABOLIC PNL TOTAL CA: CPT

## 2025-07-15 RX ORDER — FUROSEMIDE 10 MG/ML
INJECTION INTRAMUSCULAR; INTRAVENOUS
Status: COMPLETED
Start: 2025-07-15 | End: 2025-07-15

## 2025-07-15 RX ORDER — POTASSIUM CHLORIDE 1500 MG/1
TABLET, EXTENDED RELEASE ORAL
Status: COMPLETED
Start: 2025-07-15 | End: 2025-07-15

## 2025-07-15 RX ADMIN — FUROSEMIDE 40 MG: 10 INJECTION INTRAMUSCULAR; INTRAVENOUS at 10:05:00

## 2025-07-15 RX ADMIN — POTASSIUM CHLORIDE 20 MEQ: 1500 TABLET, EXTENDED RELEASE ORAL at 10:10:00

## 2025-07-15 NOTE — PROGRESS NOTES
Cris ambulates into clinic with Rolator walker.  Subjective: Cris states she had a couple of spells yesterday of being light headed, reports it was very slight. Reports went out with family for dinner last night. She noted she had more swelling, bloated feeling Thursday or Friday last week, feels better today. Not wearing compressions today, wears 3-4 days a week.     Weight:  Today 226.6 lb  Home 222.5 lb   Last visit 228.8 lb  Labs completed: at lab BMP  Device:  CardioMEMS Interrogation N/A  IV placed:  NO  Measurements:  RLE calf: 16.75\" ankle: 10.75\"  LLE calf: 17.25\" ankle: 11.25\"  Medication given lasix 40 mg IV push, potassium 20 meq    Increased lasix to 40 mg bid for 3 days as directed, now back to 40 mg am/20 mg pm.    Patient and medication assessed. Discussed with APN. Treatments completed leg measurements, oral and IV medication administration, repeat blood pressure stable. Medication given lasix 40 mg IV push, potassium 20 meq.  Extensive education on disease management including try wearing compressions stockings on days when she goes out.  Reviewed AVS instructions. Patient verbalized understanding.

## 2025-07-15 NOTE — PATIENT INSTRUCTIONS
Continue current medications    [facility]    Follow up with Dr. Keenan as directed    Follow up with Dr. Leong as directed    Please call the heart failure clinic if you notice a weight gain of 3 pounds overnight or 5 pounds or more in 5 days.      Monitor yourself for signs and symptoms of fluid overload, increased shortness of breath, difficulty breathing when laying down etc. Call the clinic if any of these signs develop at ph: [phone]    Call if having any dizziness, lightheadedness, heart racing, palpitations, chest pain, shortness of breath, coughing, swelling, weight gain or worsening symptoms.     32-64 ounces of fluid daily    Less than 2000 mg salt/sodium daily. Common high sodium foods include frozen dinners, soups (not homemade), some cereal, vegetable juice, canned vegetables, lunch meats, processed meats like hotdogs, sausage, capone, pepperoni, soy sauce, pre-packaged rice or potatoes. Please remember to read nutrition labels for sodium content.

## 2025-07-15 NOTE — PROGRESS NOTES
[facility]    Cris Peterson Patient Status:  Outpatient    1940 MRN [id number]   Location [facility] MD Dr. Shlomo Win       Cris Peterson is a 84 year old female who presents to clinic for CHF and iron infusions.     Patient Saw Dr. Keenan 25 for follow up for RHC. Lisinopril stopped due to low BP and dizziness. Diuretics decreased to furosemide 40mg daily was on 40/20 daily.       Problem List:  HFpEF  HTN  Morbid obesity  CKD stage III  COPD  Paroxysmal atrial fibrillation    Subjective:  Patient arrives to the clinic alone. Reports feeling slightly better since last week. Some shortness of breath when walking longer distances. Denies cough. Swelling stable in legs. Does admit to increased bloating. Denies chest pain, palpitations, lightheadedness, dizziness.     Review of Systems:  Constitutional: negative for fatigue, chills or fever  Respiratory: negative for cough,  negative hemoptysis and wheezing  Cardiovascular: negative for chest pain, exertional chest pressure/discomfort, near-syncope, orthopnea and palpitations  Gastrointestinal: negative for abdominal pain, diarrhea, melena, nausea and vomiting  Hematologic/lymphatic: negative  Musculoskeletal: negative for muscle weakness and myalgias    Objective:  Lab Results   Component Value Date/Time    WBC 8.0 2025 01:54 PM    HGB 10.9 (L) 2025 01:54 PM    HCT 33.5 (L) 2025 01:54 PM    .0 2025 01:54 PM    CREATSERUM 1.45 (H) 07/15/2025 08:36 AM    BUN 21 07/15/2025 08:36 AM     07/15/2025 08:36 AM    K 4.0 07/15/2025 08:36 AM     07/15/2025 08:36 AM    CO2 26.0 07/15/2025 08:36 AM     (H) 07/15/2025 08:36 AM    CA 9.0 07/15/2025 08:36 AM    ALB 4.0 2025 01:54 PM    ALKPHO 98 2025 01:54 PM    BILT 0.8 2025 01:54 PM    TP 6.4 2025 12:00 AM    AST 27 2025 12:00 AM    ALT 15 2025 12:00 AM    INR 1.37 (H) 2022  08:57 AM    PTP 17.1 (H) 02/18/2022 08:57 AM    T4F 1.8 (H) 01/30/2024 09:59 AM    TSH 0.564 04/08/2025 11:08 AM    ESRML 13 02/04/2025 08:22 AM    CRP <0.40 02/04/2025 08:22 AM    PHOS 3.8 07/23/2024 11:59 AM    CK 37 05/03/2021 06:48 AM    B12 725 05/06/2025 01:54 PM       Clinical labs drawn: BMP, proBNP - not drawn, recently done per [facility], reviewed results      /78   Pulse 51   Wt 226 lb 9.6 oz (102.8 kg)   SpO2 93%   BMI 40.14 kg/m²     Date Clinic Weight Home Weight Discharge weight/other office weight Intervention   6/5/25   222    6/16/25 224      7/7/25 228      7/15/25 226            General appearance: alert, appears stated age, and cooperative  Neck: no JVD  Lungs: clear to auscultation bilaterally  Chest wall: no tenderness  Heart: regular rate and rhythm  Abdomen: soft, non-tender; bowel sounds normal; no masses,  no organomegaly  Extremities: edema 2+ bilateral LE  Pulses: 2+ and symmetric  Neurologic: Grossly normal    Diagnostic Tests:  ECHO  3/5/25: EF 72%. No WMA, LA mildly enlarged, RV mildly enlarged, mild MR    CATH  5/12/25  Cardiologist: Carlos Beck MD  Primary Proceduralist: Carlos Beck MD  Procedure Performed: RHC  Date of Procedure: 5/12/2025   Pre procedure diagnosis: Diastolic dysfunction: Dyspnea on exertion  Post procedure diagnosis: As above  Summary of findings: Mild pulmonary hypertension with normal pulmonary capillary wedge pressure.  Mildly reduced cardiac index.  Hemodynamics:   RA 3 mmHg  RV 41/2 mmHg  PA 38/3 mean 17 mmHg  PCW 4 mmHg  CO 4.7 L/min by Karson/ CI 2.3 L/min/m² by Karson  SVR 1496 dynes/  dynes  Ao sat 94%  PA sat 57%  Assessment:  Mild pulmonary hypertension  Normal cardiac pulmonary capillary wedge pressure  Mild reduced cardiac index  Recommendations:  Decrease diuresis, BMP in 1 week      Education:  Patient and family instructed at length regarding clinic procedures, hours, purpose of clinic visits, sodium restricted diet, low sodium foods,  fluid restrictions, daily weights, medication regimen s/s heart failure exacerbation and when to call APN/clinic. Patient and family receptive.    Assessment:  HFpEF  -EF 72%  -on farxiga 10mg daily, spironolactone 12.5mg daily  -on furosemide 40mg daily and 20mg in pm daily  -lisinoprol stopped due to low BP and dizziess, symptoms improved  -Renal function stable BUN/Cr 21/1.45, K 4.0, Na 141  -pro-BNP  849 on 6/13 improved  -weight down only 2 lbs after increasing diuretics  -feeling bloated, weight still up overall, swelling in legs  -lasix 40mg IVP given in office  -Daily weights, call for weight gain of 3lbs overnight and/or 5lbs in 5 days  -low salt diet  -fluid restriction 32-64 ounces daily  -[facility]  -Follow up with cardiologist Dr. Keenan as directed    Iron deficiency anemia  -Ferritin 95, iron saturtation 20%  -received last dose of IV venofer on 6/30/25 #3 of 3 doses    Paroxysmal atrial fibrillation  -on diltiazem 240mg daily  -anticoagulated on eliquis    CKD stage III  -kidney function stable    COPD  -on albuterol PRN  -on advair BID       Plan:  Continue current medications    [facility]    Follow up with Dr. Keenan as directed    Follow up with Dr. Leong as directed    Please call the heart failure clinic if you notice a weight gain of 3 pounds overnight or 5 pounds or more in 5 days.      Monitor yourself for signs and symptoms of fluid overload, increased shortness of breath, difficulty breathing when laying down etc. Call the clinic if any of these signs develop at ph: [phone]    Call if having any dizziness, lightheadedness, heart racing, palpitations, chest pain, shortness of breath, coughing, swelling, weight gain or worsening symptoms.     32-64 ounces of fluid daily    Less than 2000 mg salt/sodium daily. Common high sodium foods include frozen dinners, soups (not homemade), some cereal, vegetable juice, canned  vegetables, lunch meats, processed meats like hotdogs, sausage, capone, pepperoni, soy sauce, pre-packaged rice or potatoes. Please remember to read nutrition labels for sodium content.       I spent 46 minutes reviewing the chart, documentation, providing counseling, coordination of care and education related specifically to heart failure.     Current Medications[1]     Fior Lyle, APRDENISSE  7/15/2025               [1]    furosemide 40 MG Oral Tab Take 1 tablet (40 mg total) by mouth every morning AND 0.5 tablets (20 mg total) every evening.      ALBUTEROL 108 (90 Base) MCG/ACT Inhalation Aero Soln Inhale 2 puffs into the lungs every 6 hours as needed for wheezing. 8.5 g 2    HYDROXYCHLOROQUINE 200 MG Oral Tab TAKE 2 TABLETS DAILY 180 tablet 3    pantoprazole 40 MG Oral Tab EC Take 1 tablet (40 mg total) by mouth before breakfast. 90 tablet 3    dilTIAZem HCl  MG Oral Capsule SR 24 Hr Take 1 capsule (240 mg total) by mouth daily.      allopurinol 300 MG Oral Tab Take 1 tablet (300 mg total) by mouth daily. 90 tablet 1    FARXIGA 10 MG Oral Tab Take 1 tablet (10 mg total) by mouth in the morning.      spironolactone 25 MG Oral Tab Take 0.5 tablets (12.5 mg total) by mouth in the morning.      ketoconazole 2 % External Shampoo Apply 1 Application topically as needed for Itching.      estradiol 0.1 MG/GM Vaginal Cream Place vaginally daily as needed.      Magnesium Hydroxide (MILK OF MAGNESIA OR) Take 15 mL by mouth daily as needed.      Etanercept (ENBREL SURECLICK) 50 MG/ML Subcutaneous Solution Auto-injector INJECT 1 PEN UNDER THE SKIN EVERY 7 DAYS 12 mL 1    levothyroxine 125 MCG Oral Tab Take 1 tablet (125 mcg total) by mouth before breakfast. 90 tablet 3    fluticasone-salmeterol 100-50 MCG/ACT Inhalation Aerosol Powder, Breath Activated Inhale 1 puff into the lungs 2 (two) times daily. inhale 1 puff by INHALATION route 2 times every day morning and evening approximately 12 hours apart 1 each 5    Trospium  Chloride ER 60 MG Oral Capsule SR 24 Hr Take 1 capsule (60 mg total) by mouth daily. 90 capsule 3    atorvastatin 40 MG Oral Tab Take 1 tablet (40 mg total) by mouth nightly.      ELIQUIS 5 MG Oral Tab Take 1 tablet (5 mg total) by mouth in the morning and 1 tablet (5 mg total) before bedtime.      Loperamide HCl 2 MG Oral Cap Take 1 capsule (2 mg total) by mouth as needed in the morning and 1 capsule (2 mg total) as needed at noon and 1 capsule (2 mg total) as needed in the evening and 1 capsule (2 mg total) as needed before bedtime for Diarrhea.      Probiotic Product (PROBIOTIC-10) Oral Cap Take 1 tablet by mouth in the morning.      acetaminophen (TYLENOL EXTRA STRENGTH) 500 MG Oral Tab Take 1 tablet (500 mg total) by mouth every 6 (six) hours as needed.      CENTRUM SILVER OR TABS Take 1 tablet by mouth in the morning.

## 2025-07-17 ENCOUNTER — OFFICE VISIT (OUTPATIENT)
Dept: UROLOGY | Facility: HOSPITAL | Age: 85
End: 2025-07-17
Attending: PHYSICIAN ASSISTANT
Payer: MEDICARE

## 2025-07-17 VITALS — BODY MASS INDEX: 40.4 KG/M2 | WEIGHT: 228 LBS | HEIGHT: 63 IN | RESPIRATION RATE: 18 BRPM

## 2025-07-17 DIAGNOSIS — N39.41 URGE URINARY INCONTINENCE: Primary | ICD-10-CM

## 2025-07-17 DIAGNOSIS — N95.2 POSTMENOPAUSAL ATROPHIC VAGINITIS: ICD-10-CM

## 2025-07-17 DIAGNOSIS — N81.84 PELVIC MUSCLE WASTING: ICD-10-CM

## 2025-07-17 DIAGNOSIS — R35.1 NOCTURIA: ICD-10-CM

## 2025-07-17 DIAGNOSIS — N39.3 FEMALE STRESS INCONTINENCE: ICD-10-CM

## 2025-07-17 PROCEDURE — 99212 OFFICE O/P EST SF 10 MIN: CPT

## 2025-07-17 RX ORDER — ESTRADIOL 0.1 MG/G
CREAM VAGINAL
Qty: 42.5 G | Refills: 3 | Status: SHIPPED | OUTPATIENT
Start: 2025-07-17

## 2025-07-17 NOTE — PROGRESS NOTES
Patient presents to follow up UUI, yearly exam    Currently taking trospium ER 60 mg  Denies SEs  Reports +improvement but still bothered by nocturia  Doesn't limit PM fluids  Reports chronic BLE swelling, typically wears compression socks    Reports KATIE    Denies bulge sx    Hasn't used vag estrogen in past 3 months  Bowels constipated  Denies current UTI s/sx    Urogynecology Summary:  Urogynecology Summary  Prolapse: No  KATIE: Yes (with laugh, cough, sneeze. KATIE >UUI)  Urge Incontinence: Yes  Nocturia Frequency: 3  Frequency: 2 - 3 hours  Incomplete emptying: Yes (Double voids)  Constipation: Yes (Bowel regimen reviewed. Handout given.)  Wears pad day?: 2  Wears Pad Night?: 2  Activities are limited by UI/POP?: No  Currently Sexually Active: No    Vitals:  Resp 18   Ht 63\"   Wt 228 lb (103.4 kg)   BMI 40.39 kg/m²     GENERAL EXAM:  GENERAL: alert & oriented, NAD  HEENT: NC/AT, sclera without injection  SKIN: no rashes  LUNGS:  without increased respiratory effort  ABDOMEN: soft, non-tender, non-distended, no masses appreciated  EXT: trace BLE edema    PELVIC EXAM: MUNA Hogue, present for exam as a chaperone  External Genitalia: Normal appearance for age. + atrophy, no lesions  Urethra: + atrophy, non tender  Bladder: no fullness, non tender  Vagina: + atrophy, no lesions   Cervix and uterus: surgically absent  Adnexa: non palpable.   Perineum: non tender  Anus: no hemorrhoids  Rectum: deferred     PELVIS FLOOR NEUROMUSCULAR FUNCTION:  Strength:  1  Perineal Sensation:  Normal        PELVIC SUPPORT:  Juliette:  0  Ant:  0  Post:  1-2  CST:  negative  UVJ: not hypermobile    Impression/Plan:    ICD-10-CM    1. Urge urinary incontinence  N39.41 PHYSICAL THERAPY - [place] Locations      2. Nocturia  R35.1 PHYSICAL THERAPY - [place] Locations      3. Postmenopausal atrophic vaginitis  N95.2       4. Pelvic muscle wasting  N81.84 PHYSICAL THERAPY - [place] Locations     estradiol (ESTRACE) 0.1 MG/GM Vaginal Cream       5. Female stress incontinence  N39.3 PHYSICAL THERAPY - [facility]          Discussion Items:   Bowel management reviewed. Discussed using fiber daily w/ addition of miralax as needed.    Discussed mgmt of vulvovaginal atrophy with vaginal estrogen cream. Reviewed associated benefits, risks, alternatives, and goals. Recommend low dose 2-3x/week treatment.    Discussed limiting PM fluids and elevating LE in evening to minimize nocturia    Discussed dietary and behavioral modifications for mgmt of urinary symptoms.  Discussed weight management and benefits of weight loss on urinary symptoms.  Reviewed AUA/SUFU guidelines on mgmt of non-neurogenic OAB.  Discussed pharmacologic and nonpharmacologic mgmt options of urinary symptoms - reviewed risks, benefits, alternatives, and goals of treatment.  Discussed specific risks related to OAB meds including, but not limited to dry mouth, constipation, blurry vision, cognitive changes, and BP elevation.    Discussed management options for KATIE including expectant management, pelvic floor PT, pessary, and surgery.  Discussed risks and benefits associated with each option.    Treatment Plan:  Start PFPT  Resume vag estrogen cream as prescribed  Continue trospium ER 60 mg daily on empty stomach  Fluid management  Bowel regimen  Bladder diet/drill  Pelvic floor exercises  Call with s/sx of UTI    All questions answered  She understands and agrees to plan    Return in about 4 months (around 11/17/2025) for PFPT f/u.    Malia Bradley PA-C    Note to patient: The 21st Century Cures Act makes medical notes like these available to patients in the interest of transparency.  However, be advised this is a medical document.  It is intended as peer to peer communication.  It is written in medical language and may contain abbreviations or verbiage that are unfamiliar.  It may appear blunt or direct.  Medical documents are intended to carry relevant information, facts as evident, and  the clinical opinion of the practitioner.

## 2025-07-22 DIAGNOSIS — I50.32 CHRONIC HEART FAILURE WITH PRESERVED EJECTION FRACTION (HCC): Primary | ICD-10-CM

## 2025-07-23 ENCOUNTER — LAB ENCOUNTER (OUTPATIENT)
Dept: LAB | Facility: HOSPITAL | Age: 85
End: 2025-07-23
Attending: NURSE PRACTITIONER

## 2025-07-23 ENCOUNTER — OFFICE VISIT (OUTPATIENT)
Dept: CARDIOLOGY CLINIC | Facility: HOSPITAL | Age: 85
End: 2025-07-23
Attending: NURSE PRACTITIONER
Payer: MEDICARE

## 2025-07-23 VITALS
HEART RATE: 58 BPM | DIASTOLIC BLOOD PRESSURE: 69 MMHG | SYSTOLIC BLOOD PRESSURE: 115 MMHG | WEIGHT: 225.69 LBS | OXYGEN SATURATION: 93 % | BODY MASS INDEX: 40 KG/M2

## 2025-07-23 DIAGNOSIS — I50.32 CHRONIC HEART FAILURE WITH PRESERVED EJECTION FRACTION (HCC): ICD-10-CM

## 2025-07-23 DIAGNOSIS — N18.32 STAGE 3B CHRONIC KIDNEY DISEASE (HCC): ICD-10-CM

## 2025-07-23 DIAGNOSIS — N18.4 STAGE 4 CHRONIC KIDNEY DISEASE (HCC): ICD-10-CM

## 2025-07-23 DIAGNOSIS — I50.30 HEART FAILURE WITH PRESERVED EJECTION FRACTION, UNSPECIFIED HF CHRONICITY (HCC): ICD-10-CM

## 2025-07-23 DIAGNOSIS — I48.0 PAROXYSMAL ATRIAL FIBRILLATION (HCC): ICD-10-CM

## 2025-07-23 DIAGNOSIS — J44.9 CHRONIC OBSTRUCTIVE PULMONARY DISEASE, UNSPECIFIED COPD TYPE (HCC): ICD-10-CM

## 2025-07-23 DIAGNOSIS — I50.30 HEART FAILURE WITH PRESERVED EJECTION FRACTION, UNSPECIFIED HF CHRONICITY (HCC): Primary | ICD-10-CM

## 2025-07-23 LAB
ANION GAP SERPL CALC-SCNC: 11 MMOL/L (ref 0–18)
BUN BLD-MCNC: 23 MG/DL (ref 9–23)
BUN/CREAT SERPL: 14.7 (ref 10–20)
CALCIUM BLD-MCNC: 9.3 MG/DL (ref 8.7–10.4)
CHLORIDE SERPL-SCNC: 106 MMOL/L (ref 98–112)
CO2 SERPL-SCNC: 23 MMOL/L (ref 21–32)
CREAT BLD-MCNC: 1.56 MG/DL (ref 0.55–1.02)
EGFRCR SERPLBLD CKD-EPI 2021: 32 ML/MIN/1.73M2 (ref 60–?)
FASTING STATUS PATIENT QL REPORTED: YES
GLUCOSE BLD-MCNC: 114 MG/DL (ref 70–99)
NT-PROBNP SERPL-MCNC: 1083 PG/ML (ref ?–450)
OSMOLALITY SERPL CALC.SUM OF ELEC: 295 MOSM/KG (ref 275–295)
POTASSIUM SERPL-SCNC: 4.6 MMOL/L (ref 3.5–5.1)
SODIUM SERPL-SCNC: 140 MMOL/L (ref 136–145)

## 2025-07-23 PROCEDURE — 99214 OFFICE O/P EST MOD 30 MIN: CPT | Performed by: NURSE PRACTITIONER

## 2025-07-23 PROCEDURE — 36415 COLL VENOUS BLD VENIPUNCTURE: CPT

## 2025-07-23 PROCEDURE — 80048 BASIC METABOLIC PNL TOTAL CA: CPT

## 2025-07-23 PROCEDURE — 83880 ASSAY OF NATRIURETIC PEPTIDE: CPT

## 2025-07-23 NOTE — PROGRESS NOTES
Cris ambulates into clinic rolator walker.  Subjective: Cris reports needing to sit after walking in from handicap parking. Denies chest pain or shortness of breath, she notes she had increased swelling yesterday after not wearing compressions and elevating her feet. She reports no significant urine output after IV diuretic.    Weight:  Today 225.7 lb   Home 221.7 lb Last visit 226 lb  Labs completed: at lab BMP  Device:  CardioMEMS Interrogation N/A  IV placed:  NO  Measurements:RLE Calf: 17 Ankle: 10.25\"  LLE Calf: 17.75\" Ankle: 11\"    Patient and medication assessed. Discussed with APN. Treatments completed leg measurements. Medication given NONE.  Extensive education on disease management including avoiding/limiting sodium rich foods in diet.  Reviewed AVS instructions. Patient verbalized understanding.

## 2025-07-23 NOTE — PROGRESS NOTES
[facility]    Cris Peterson Patient Status:  Outpatient    1940 MRN [id number]   Location [facility] MD Dr. Shlomo Win       Cris Peterson is a 84 year old female who presents to clinic for CHF and iron infusions.     Patient Saw Dr. Keenan 25 for follow up for RHC. Lisinopril stopped due to low BP and dizziness. Diuretics decreased to furosemide 40mg daily was on 40/20 daily.       Problem List:  HFpEF  HTN  Morbid obesity  CKD stage III  COPD  Paroxysmal atrial fibrillation    Subjective:  Patient arrives to the clinic alone. Doesn't feel like the IV diuretics made any difference. Shortness of breath with activity stable. Swelling in legs stable. Denies increase in symptoms. Denies increase energy. Denies chest pain, palpitations, lightheadedness, dizziness.     Review of Systems:  Constitutional: negative for fatigue, chills or fever  Respiratory: negative for cough,  negative hemoptysis and wheezing  Cardiovascular: negative for chest pain, exertional chest pressure/discomfort, near-syncope, orthopnea and palpitations  Gastrointestinal: negative for abdominal pain, diarrhea, melena, nausea and vomiting  Hematologic/lymphatic: negative  Musculoskeletal: negative for muscle weakness and myalgias    Objective:  Lab Results   Component Value Date/Time    WBC 8.0 2025 01:54 PM    HGB 10.9 (L) 2025 01:54 PM    HCT 33.5 (L) 2025 01:54 PM    .0 2025 01:54 PM    CREATSERUM 1.56 (H) 2025 08:34 AM    BUN 23 2025 08:34 AM     2025 08:34 AM    K 4.6 2025 08:34 AM     2025 08:34 AM    CO2 23.0 2025 08:34 AM     (H) 2025 08:34 AM    CA 9.3 2025 08:34 AM    ALB 4.0 2025 01:54 PM    ALKPHO 98 2025 01:54 PM    BILT 0.8 2025 01:54 PM    TP 6.4 2025 12:00 AM    AST 27 2025 12:00 AM    ALT 15 2025 12:00 AM    INR 1.37 (H) 2022 08:57  AM    PTP 17.1 (H) 02/18/2022 08:57 AM    T4F 1.8 (H) 01/30/2024 09:59 AM    TSH 0.564 04/08/2025 11:08 AM    ESRML 13 02/04/2025 08:22 AM    CRP <0.40 02/04/2025 08:22 AM    PHOS 3.8 07/23/2024 11:59 AM    CK 37 05/03/2021 06:48 AM    B12 725 05/06/2025 01:54 PM       Clinical labs drawn: BMP, proBNP - not drawn, recently done per [facility], reviewed results      /69 (BP Location: Right arm)   Pulse 58   Wt 225 lb 11.2 oz (102.4 kg)   SpO2 93%   BMI 39.98 kg/m²     Date Clinic Weight Home Weight Discharge weight/other office weight Intervention   6/5/25   222    6/16/25 224      7/7/25 228      7/15/25 226      7/23/25 225            General appearance: alert, appears stated age, and cooperative  Neck: no JVD  Lungs: clear to auscultation bilaterally  Chest wall: no tenderness  Heart: regular rate and rhythm  Abdomen: soft, non-tender; bowel sounds normal; no masses,  no organomegaly  Extremities: edema 2+ bilateral LE  Pulses: 2+ and symmetric  Neurologic: Grossly normal    Diagnostic Tests:  ECHO  3/5/25: EF 72%. No WMA, LA mildly enlarged, RV mildly enlarged, mild MR    CATH  5/12/25  Cardiologist: Carlos Beck MD  Primary Proceduralist: Carlos Beck MD  Procedure Performed: RHC  Date of Procedure: 5/12/2025   Pre procedure diagnosis: Diastolic dysfunction: Dyspnea on exertion  Post procedure diagnosis: As above  Summary of findings: Mild pulmonary hypertension with normal pulmonary capillary wedge pressure.  Mildly reduced cardiac index.  Hemodynamics:   RA 3 mmHg  RV 41/2 mmHg  PA 38/3 mean 17 mmHg  PCW 4 mmHg  CO 4.7 L/min by Karson/ CI 2.3 L/min/m² by Karson  SVR 1496 dynes/  dynes  Ao sat 94%  PA sat 57%  Assessment:  Mild pulmonary hypertension  Normal cardiac pulmonary capillary wedge pressure  Mild reduced cardiac index  Recommendations:  Decrease diuresis, BMP in 1 week      Education:  Patient and family instructed at length regarding clinic procedures, hours, purpose of clinic visits,  sodium restricted diet, low sodium foods, fluid restrictions, daily weights, medication regimen s/s heart failure exacerbation and when to call APN/clinic. Patient and family receptive.    Assessment:  HFpEF  -EF 72%  -on farxiga 10mg daily, spironolactone 12.5mg daily  -on furosemide 40mg daily and 20mg in pm daily  -lisinoprol stopped due to low BP and dizziess, symptoms improved  -Renal function stable BUN/Cr 23/1.56, K 4.6, Na 140  -pro-BNP  1,083 on 7/23  -weight stable despite receiving IV diuretics  -continue current medications  -Daily weights, call for weight gain of 3lbs overnight and/or 5lbs in 5 days  -low salt diet  -fluid restriction 32-64 ounces daily  -Follow [facility] in [facility] in september  -Follow up with cardiologist Dr. Keenan as directed, December 12/12  -Follow up with Dr. Beck, 9/6    Iron deficiency anemia  -Ferritin 95, iron saturtation 20%  -received last dose of IV venofer on 6/30/25 #3 of 3 doses    Paroxysmal atrial fibrillation  -on diltiazem 240mg daily  -anticoagulated on eliquis    CKD stage III  -kidney function stable    COPD  -on albuterol PRN  -on advair BID       Plan:  Continue current medications    [facility] to [facility] in september    Follow up with Dr. Keenan as directed    Follow up with Dr. Leong as directed    Please call the heart failure clinic if you notice a weight gain of 3 pounds overnight or 5 pounds or more in 5 days.      Monitor yourself for signs and symptoms of fluid overload, increased shortness of breath, difficulty breathing when laying down etc. Call the clinic if any of these signs develop at ph: [phone]    Call if having any dizziness, lightheadedness, heart racing, palpitations, chest pain, shortness of breath, coughing, swelling, weight gain or worsening symptoms.     32-64 ounces of fluid daily    Less than 2000 mg salt/sodium daily. Common high sodium foods include frozen dinners, soups (not homemade), some cereal, vegetable  juice, canned vegetables, lunch meats, processed meats like hotdogs, sausage, capone, pepperoni, soy sauce, pre-packaged rice or potatoes. Please remember to read nutrition labels for sodium content.       I spent 51 minutes reviewing the chart, documentation, providing counseling, coordination of care and education related specifically to heart failure.     Current Medications[1]     SHAMIKA Salgado  7/23/2025               [1]    estradiol (ESTRACE) 0.1 MG/GM Vaginal Cream Apply 0.5 gram vaginally 2-3 times per week. 42.5 g 3    furosemide 40 MG Oral Tab Take 1 tablet (40 mg total) by mouth every morning AND 0.5 tablets (20 mg total) every evening.      ALBUTEROL 108 (90 Base) MCG/ACT Inhalation Aero Soln Inhale 2 puffs into the lungs every 6 hours as needed for wheezing. 8.5 g 2    HYDROXYCHLOROQUINE 200 MG Oral Tab TAKE 2 TABLETS DAILY 180 tablet 3    pantoprazole 40 MG Oral Tab EC Take 1 tablet (40 mg total) by mouth before breakfast. 90 tablet 3    dilTIAZem HCl  MG Oral Capsule SR 24 Hr Take 1 capsule (240 mg total) by mouth daily.      allopurinol 300 MG Oral Tab Take 1 tablet (300 mg total) by mouth daily. 90 tablet 1    FARXIGA 10 MG Oral Tab Take 1 tablet (10 mg total) by mouth in the morning.      spironolactone 25 MG Oral Tab Take 0.5 tablets (12.5 mg total) by mouth in the morning.      ketoconazole 2 % External Shampoo Apply 1 Application topically as needed for Itching.      Magnesium Hydroxide (MILK OF MAGNESIA OR) Take 15 mL by mouth daily as needed.      levothyroxine 125 MCG Oral Tab Take 1 tablet (125 mcg total) by mouth before breakfast. 90 tablet 3    fluticasone-salmeterol 100-50 MCG/ACT Inhalation Aerosol Powder, Breath Activated Inhale 1 puff into the lungs 2 (two) times daily. inhale 1 puff by INHALATION route 2 times every day morning and evening approximately 12 hours apart 1 each 5    Trospium Chloride ER 60 MG Oral Capsule SR 24 Hr Take 1 capsule (60 mg total) by mouth daily.  90 capsule 3    Spacer/Aero-Holding Chambers Does not apply Device Use with albuterol inhaler 1 each 0    atorvastatin 40 MG Oral Tab Take 1 tablet (40 mg total) by mouth nightly.      Cholecalciferol (VITAMIN D) 50 MCG (2000 UT) Oral Tab Take 1 tablet by mouth Every afternoon at 2:00 pm.      ELIQUIS 5 MG Oral Tab Take 1 tablet (5 mg total) by mouth in the morning and 1 tablet (5 mg total) before bedtime.      Calcium 500-125 MG-UNIT Oral Tab Take 1 tablet by mouth in the morning.      Loperamide HCl 2 MG Oral Cap Take 1 capsule (2 mg total) by mouth as needed in the morning and 1 capsule (2 mg total) as needed at noon and 1 capsule (2 mg total) as needed in the evening and 1 capsule (2 mg total) as needed before bedtime for Diarrhea.      Probiotic Product (PROBIOTIC-10) Oral Cap Take 1 tablet by mouth in the morning.      acetaminophen (TYLENOL EXTRA STRENGTH) 500 MG Oral Tab Take 1 tablet (500 mg total) by mouth every 6 (six) hours as needed.      CENTRUM SILVER OR TABS Take 1 tablet by mouth in the morning.

## 2025-07-23 NOTE — PATIENT INSTRUCTIONS
Continue current medications    Return to [facility] in september    Follow up with Dr. Keenan as directed    Follow up with Dr. Leong as directed    Please call the heart failure clinic if you notice a weight gain of 3 pounds overnight or 5 pounds or more in 5 days.      Monitor yourself for signs and symptoms of fluid overload, increased shortness of breath, difficulty breathing when laying down etc. Call the clinic if any of these signs develop at ph: [phone]    Call if having any dizziness, lightheadedness, heart racing, palpitations, chest pain, shortness of breath, coughing, swelling, weight gain or worsening symptoms.     32-64 ounces of fluid daily    Less than 2000 mg salt/sodium daily. Common high sodium foods include frozen dinners, soups (not homemade), some cereal, vegetable juice, canned vegetables, lunch meats, processed meats like hotdogs, sausage, capone, pepperoni, soy sauce, pre-packaged rice or potatoes. Please remember to read nutrition labels for sodium content.   
Unknown

## 2025-08-11 ENCOUNTER — OFFICE VISIT (OUTPATIENT)
Dept: NEPHROLOGY | Facility: CLINIC | Age: 85
End: 2025-08-11

## 2025-08-11 VITALS
HEIGHT: 63 IN | WEIGHT: 225 LBS | BODY MASS INDEX: 39.87 KG/M2 | SYSTOLIC BLOOD PRESSURE: 119 MMHG | DIASTOLIC BLOOD PRESSURE: 74 MMHG | HEART RATE: 61 BPM

## 2025-08-11 DIAGNOSIS — N18.32 STAGE 3B CHRONIC KIDNEY DISEASE (HCC): Primary | ICD-10-CM

## 2025-08-11 DIAGNOSIS — I10 PRIMARY HYPERTENSION: ICD-10-CM

## 2025-08-11 DIAGNOSIS — E66.01 MORBID OBESITY WITH BMI OF 40.0-44.9, ADULT (HCC): ICD-10-CM

## 2025-08-11 DIAGNOSIS — M06.09 RHEUMATOID ARTHRITIS OF MULTIPLE SITES WITH NEGATIVE RHEUMATOID FACTOR (HCC): ICD-10-CM

## 2025-08-11 DIAGNOSIS — D84.9 IMMUNOSUPPRESSED STATUS (HCC): ICD-10-CM

## 2025-08-11 PROCEDURE — 1126F AMNT PAIN NOTED NONE PRSNT: CPT | Performed by: INTERNAL MEDICINE

## 2025-08-11 PROCEDURE — 99214 OFFICE O/P EST MOD 30 MIN: CPT | Performed by: INTERNAL MEDICINE

## 2025-08-13 ENCOUNTER — OFFICE VISIT (OUTPATIENT)
Dept: OTOLARYNGOLOGY | Facility: CLINIC | Age: 85
End: 2025-08-13

## 2025-08-13 VITALS — BODY MASS INDEX: 39.87 KG/M2 | WEIGHT: 225 LBS | HEIGHT: 63 IN

## 2025-08-13 DIAGNOSIS — H61.309 STENOSIS OF EXTERNAL EAR CANAL: ICD-10-CM

## 2025-08-13 DIAGNOSIS — H91.90 HEARING LOSS, UNSPECIFIED HEARING LOSS TYPE, UNSPECIFIED LATERALITY: ICD-10-CM

## 2025-08-13 DIAGNOSIS — H61.23 BILATERAL IMPACTED CERUMEN: Primary | ICD-10-CM

## 2025-08-13 PROCEDURE — 69210 REMOVE IMPACTED EAR WAX UNI: CPT | Performed by: STUDENT IN AN ORGANIZED HEALTH CARE EDUCATION/TRAINING PROGRAM

## 2025-08-13 PROCEDURE — 1160F RVW MEDS BY RX/DR IN RCRD: CPT | Performed by: STUDENT IN AN ORGANIZED HEALTH CARE EDUCATION/TRAINING PROGRAM

## 2025-08-13 PROCEDURE — 1159F MED LIST DOCD IN RCRD: CPT | Performed by: STUDENT IN AN ORGANIZED HEALTH CARE EDUCATION/TRAINING PROGRAM

## 2025-08-13 PROCEDURE — 99212 OFFICE O/P EST SF 10 MIN: CPT | Performed by: STUDENT IN AN ORGANIZED HEALTH CARE EDUCATION/TRAINING PROGRAM

## 2025-08-13 RX ORDER — DILTIAZEM HYDROCHLORIDE 240 MG/1
240 CAPSULE, COATED, EXTENDED RELEASE ORAL DAILY
COMMUNITY
Start: 2025-07-08

## 2025-08-21 ENCOUNTER — TELEPHONE (OUTPATIENT)
Dept: INTERNAL MEDICINE CLINIC | Facility: CLINIC | Age: 85
End: 2025-08-21

## 2025-08-25 ENCOUNTER — OFFICE VISIT (OUTPATIENT)
Dept: SURGERY | Facility: CLINIC | Age: 85
End: 2025-08-25

## 2025-08-25 VITALS
WEIGHT: 222 LBS | BODY MASS INDEX: 40.85 KG/M2 | HEART RATE: 49 BPM | DIASTOLIC BLOOD PRESSURE: 68 MMHG | HEIGHT: 61.9 IN | SYSTOLIC BLOOD PRESSURE: 123 MMHG

## 2025-08-25 DIAGNOSIS — I50.32 CHRONIC HEART FAILURE WITH PRESERVED EJECTION FRACTION (HCC): ICD-10-CM

## 2025-08-25 DIAGNOSIS — E78.5 DYSLIPIDEMIA: ICD-10-CM

## 2025-08-25 DIAGNOSIS — R32 URINARY INCONTINENCE, UNSPECIFIED TYPE: ICD-10-CM

## 2025-08-25 DIAGNOSIS — G47.33 OBSTRUCTIVE SLEEP APNEA ON CPAP: Primary | ICD-10-CM

## 2025-08-25 DIAGNOSIS — M06.09 RHEUMATOID ARTHRITIS OF MULTIPLE SITES WITH NEGATIVE RHEUMATOID FACTOR (HCC): ICD-10-CM

## 2025-08-25 DIAGNOSIS — J44.9 CHRONIC OBSTRUCTIVE PULMONARY DISEASE, UNSPECIFIED COPD TYPE (HCC): ICD-10-CM

## 2025-08-25 DIAGNOSIS — E66.813 OBESITY, CLASS III, BMI 40-49.9 (MORBID OBESITY): ICD-10-CM

## 2025-08-25 DIAGNOSIS — N18.32 STAGE 3B CHRONIC KIDNEY DISEASE (HCC): ICD-10-CM

## 2025-08-25 DIAGNOSIS — I48.0 PAROXYSMAL ATRIAL FIBRILLATION (HCC): ICD-10-CM

## 2025-08-25 DIAGNOSIS — I27.20 PULMONARY HYPERTENSION (HCC): ICD-10-CM

## 2025-08-25 DIAGNOSIS — I70.0 ATHEROSCLEROSIS OF AORTA: ICD-10-CM

## 2025-08-25 DIAGNOSIS — I10 ESSENTIAL HYPERTENSION: ICD-10-CM

## 2025-08-25 DIAGNOSIS — Z51.81 THERAPEUTIC DRUG MONITORING: ICD-10-CM

## 2025-08-25 RX ORDER — TIRZEPATIDE 2.5 MG/.5ML
2.5 INJECTION, SOLUTION SUBCUTANEOUS WEEKLY
Qty: 2 ML | Refills: 0 | Status: SHIPPED | OUTPATIENT
Start: 2025-08-25 | End: 2025-09-16

## 2025-08-27 DIAGNOSIS — M06.09 RHEUMATOID ARTHRITIS OF MULTIPLE SITES WITH NEGATIVE RHEUMATOID FACTOR (HCC): ICD-10-CM

## 2025-08-27 RX ORDER — MEDROXYPROGESTERONE ACETATE 150 MG/ML
1 INJECTION, SUSPENSION INTRAMUSCULAR
Qty: 12 EACH | Refills: 1 | Status: SHIPPED | OUTPATIENT
Start: 2025-08-27

## 2025-08-28 ENCOUNTER — DOCUMENTATION ONLY (OUTPATIENT)
Dept: SURGERY | Facility: CLINIC | Age: 85
End: 2025-08-28

## (undated) DIAGNOSIS — I10 ESSENTIAL HYPERTENSION: ICD-10-CM

## (undated) DIAGNOSIS — K21.9 GASTROESOPHAGEAL REFLUX DISEASE: ICD-10-CM

## (undated) DEVICE — SUPER SPONGES,MEDIUM: Brand: KERLIX

## (undated) DEVICE — SUTURE ETHILON 4-0 1667G

## (undated) DEVICE — CULTURE TUBE ANAEROBIC

## (undated) DEVICE — BANDAGE ROLL,100% COTTON, 6 PLY, LARGE: Brand: KERLIX

## (undated) DEVICE — NON-ADHERENT STRIPS,OIL EMULSION: Brand: CURITY

## (undated) DEVICE — CONTAINER SPEC STR 4OZ GRY LID

## (undated) DEVICE — ENCORE® LATEX ACCLAIM SIZE 7, STERILE LATEX POWDER-FREE SURGICAL GLOVE: Brand: ENCORE

## (undated) DEVICE — UNDYED BRAIDED (POLYGLACTIN 910), SYNTHETIC ABSORBABLE SUTURE: Brand: COATED VICRYL

## (undated) DEVICE — ENCORE® LATEX ACCLAIM SIZE 6.5, STERILE LATEX POWDER-FREE SURGICAL GLOVE: Brand: ENCORE

## (undated) DEVICE — LOWER EXTREMITY: Brand: MEDLINE INDUSTRIES, INC.

## (undated) DEVICE — TRAY SKIN PREP PVP-1

## (undated) DEVICE — STANDARD HYPODERMIC NEEDLE,POLYPROPYLENE HUB: Brand: MONOJECT

## (undated) DEVICE — SUCTION CANISTER, 3000CC,SAFELINER: Brand: DEROYAL

## (undated) DEVICE — SOL  .9 1000ML BTL

## (undated) DEVICE — CULTURE COLLECT/TRANSPORT SYS

## (undated) NOTE — LETTER
October 28, 2019     [facility]      Dear Boston Fearing:    Below are the results from your recent visit:    Your Vitamin B12 level was normal.   Your kidney function is stable.    Your liver, electrolytes, and thyroid

## (undated) NOTE — MR AVS SNAPSHOT
[address]  150 [facility] [address]  [phone]  [phone]               Thank you for choosing us for your health care visit with Tommie Nascimento PT.   We are glad to serve you and happy to provide you with this s [facility] [facility] Visit By Therapist with Bryson Andrews 72 in [address] [place] [address] ([address] [facility],[address])    150 [facility] [phone]           Please arrive at your scheduled appointment time.

## (undated) NOTE — LETTER
1/14/2021              Read Spectpeyton Peterson        [address]        [address] [facility] [address] [address]         To Whom It May Concern,    Barbiemathew Dudley may end her quarantine as of today, 1/14/2021.       Sincerely,    Nicolasa Tomlin MD  [address] [place] [address]

## (undated) NOTE — MR AVS SNAPSHOT
[address]  [address] [facility] [address]  [phone]  [phone]               Thank you for choosing us for your health care visit with Silverio Zambrano PT.   We are glad to serve you and happy to provide you with this s [place] [facility] Visit By Therapist with Bryson Vivar 72 in [address] [place] [address] ([address] [facility])    150 [facility] [phone]           Please arrive at your scheduled appointment time.

## (undated) NOTE — MR AVS SNAPSHOT
[address]  150 Located within [facility] [address]  [phone]  [phone]               Thank you for choosing us for your health care visit with Juan Alberto Lema PT.   We are glad to serve you and happy to provide you with this s Please arrive at your scheduled appointment time. Wear comfortable, loose fitting clothing.             Apr 19, 2017 10:45 AM   [place] [facility] Visit By Therapist with Bryson Blackman in [facility]or

## (undated) NOTE — MR AVS SNAPSHOT
[address]  150 [facility] [address]  [phone]  [phone]               Thank you for choosing us for your health care visit with Carlos Garcia PT.   We are glad to serve you and happy to provide you with this s office, you can view your past visit information in [employer] by going to Visits < Visit Summaries. Aionex questions? Call [phone] for help. [employer]ex is NOT to be used for urgent needs. For medical emergencies, dial 911.

## (undated) NOTE — LETTER
[address], [place]  Authorization for Invasive Procedures  1.  I hereby authorize Dr. Fran Franco , my physician and whomever may be designated as the doctor's assistant, to perform the following operation and/or procedure:  *** on Sarah Brumfield reactions, hemolytic reactions, transmission of disease such as hepatitis, AIDS, cytomegalovirus (CMV), and flluid overload.  In the event that I wish to have autologous transfusions of my own blood, or a directed donor transfusion, I will discuss this with ________________________________________________ Date: _________Time: _________    Responsible person in case of minor or unconscious: _____________________________Relationship: ____________     Witness Signature: _________________________________________

## (undated) NOTE — MR AVS SNAPSHOT
[facility]  [address] [place] [address] [phone]               Thank you for choosing us for your health care visit with Deena Ocampo. Clarita Lopez MD.  We are glad to serve you and happy to provide you with this summary of your visit. Follow-up Instructions     Return in about 1 year (around 5/1/2018).          Reason for Today's Visit     Follow - Up     Apnea           Instructions and Information about Your Health     None      Allergies as of May 01, 2017     [facility] Commonly known as:  SYNTHROID, LEVOTHROID           lisinopril 20 MG Tabs   Take 1 tablet (20 mg total) by mouth daily.    Commonly known as:  PRINIVIL,ZESTRIL           Loperamide HCl 2 MG Caps   Commonly known as:  IMODIUM           methotrexate 2.5 MG Ta ascend/descend 1 flight of stairs reciprocally with use of UUE on handrail-NOT MET  5.  Pt will be independent and compliant with comprehensive HEP to maintain progress achieved in PT-NOT MET        MyChart     Visit MyChart  You can access your MyChart to

## (undated) NOTE — LETTER
12/30/2019              Mahamed Price        [address]        [facility] [address]         Dear Tommy Dale,    I reviewed your CPAP data download . It looks excellent. I look forward to seeing you at your next schedule appointment.       Sincerely,

## (undated) NOTE — MR AVS SNAPSHOT
[facility]  [address] [place] [address], [address] [facility] [address]  [address] [place] [address] [phone]               Thank you for choosing us for your health care visit with Jim Lund MD.  We are glad to serve you and happy to provide you with this summary of y Fax:  [phone]         Referral Orders      Normal Orders This Visit    Eduar Lauren (INTERNAL) [[id number] CPT(R)]  Order #:  [id number]                  **REFERRAL REQUEST**    Your physician has referred you to a specialist.  Your physician or [facility], [place] [address]                                                Jaylene in [facility] in [address] [facility], [address]    [facility], [address] [place] [address] Take two daily. Commonly known as:  PLAQUENIL           * Levothyroxine Sodium 100 MCG Tabs   Take 1 tablet (100 mcg total) by mouth daily.  Take with the 112 mcg tab for a total of 212 mg daily   What changed:    - how much to take  - how to take this  - view more details from this visit by going to [email or web address]. If you've recently had a stay at the Hospital you can access your discharge instructions in [employer] by going to Visits < Admission Summaries.  If you've been to the Emergency Depar

## (undated) NOTE — MR AVS SNAPSHOT
[address]  150 [facility] [address]  [phone]               Thank you for choosing us for your health care visit with Silverio Zambrano PT.   We are glad to serve you and happy to provide you with this s Follow Up Visit with Margy Kelly MD   [facility], [address] [address]   [phone]            Jun 26, 2017  1:40 PM   Medicare Annual with L

## (undated) NOTE — MR AVS SNAPSHOT
[place]  [address]  [phone]               Thank you for choosing us for your health care visit with Rodrigo Saleem MD.  We are glad to serve you and happy to provide you with this summary [place] [facility] Visit By Therapist with Bryson Suh 72 in [address] [place] [address] ([address] [facility],[address])    150 [facility] [phone]           Please arrive at your scheduled appointment time. for a total of 212 mg daily   What changed:    - how much to take  - how to take this  - additional instructions   Commonly known as:  SYNTHROID           * Levothyroxine Sodium 112 MCG Tabs   Take 1 tablet (112 mcg total) by mouth before breakfast. Take w and decrease risk of falls on uneven surfaces such as grass  2. Pt will perform 4-Item DGI with score of 10/12 or greater with least restrictive AD to demonstrate ability to ambulate safely in crowded and busy environments  3.  Pt will be able to squat to p walking, light jogging, cycling, swimming, etc.) for a goal of at least 150 minutes per week. Moderation of alcohol consumption Men: limit to <= 2 drinks* per day. Women and lighter weight persons: limit to <= 1 drink* per day.               Visit EDWARD

## (undated) NOTE — MR AVS SNAPSHOT
[place]  [address]  [phone]               Thank you for choosing us for your health care visit with Lizzette Alas MD.  We are glad to serve you and happy to provide you with this summary of Jaylene in [facility] [address] ([address] [facility],[address])    150 [facility] [address]           Please arrive at your scheduled appointment time. Wear comfortable, loose fitting clothing.               Reason for Today's Vi * Levothyroxine Sodium 112 MCG Tabs   Take 1 tablet (112 mcg total) by mouth before breakfast. Take with the 100 mcg tab for a total of 212 mcg daily. What changed:  Another medication with the same name was changed.  Make sure you understand how and whe restrictive AD to demonstrate ability to ambulate safely in crowded and busy environments-NOT  MET  3. Pt will be able to squat to  light objects around the house without loss of stability-NOT MET  4.  Pt will improve functional hip strength to demon

## (undated) NOTE — MR AVS SNAPSHOT
[address]  150 [facility] [address]  [phone]  [phone]               Thank you for choosing us for your health care visit with Rachel Ann PT.   We are glad to serve you and happy to provide you with this s [facility] [facility] Visit By Therapist with Bryson Espinoza 72 in [employer] ([address] [facility],[address])    [address] [place] [address] [phone]           Please arrive at your scheduled appointment time.

## (undated) NOTE — LETTER
[facility], [place]  Authorization for Invasive Procedures  1.  I hereby authorize Dr. Vera Wilcox , my physician and whomever may be designated as the doctor's assistant, to perform the following operation and/or procedure: Robson Rehman risks that can occur: fever and allergic reactions, hemolytic reactions, transmission of disease such as hepatitis, AIDS, cytomegalovirus (CMV), and flluid overload.  In the event that I wish to have autologous transfusions of my own blood, or a directed do of my physician.      Signature of Patient:  ________________________________________________ Date: _________Time: _________    Responsible person in case of minor or unconscious: _____________________________Relationship: ____________     Witness Signature

## (undated) NOTE — MR AVS SNAPSHOT
[address]  150 [facility] [address]  [phone]               Thank you for choosing us for your health care visit with Marino Doherty PT.   We are glad to serve you and happy to provide you with this s Exam - New Patient with Dipika Granger MD   [facility], [address] Eli[address] [address]   [phone]            May 09, 2017 11:30 AM   [facility]

## (undated) NOTE — MR AVS SNAPSHOT
[address]  150 [facility] [address]  [phone]  [phone]               Thank you for choosing us for your health care visit with Florecita Gilman PT.   We are glad to serve you and happy to provide you with this s loose fitting clothing.             Mar 17, 2017 10:45 AM   [place] [facility] Visit By Therapist with Bryson Garcia in [facility] ([address] [facility],[address])    [address] [phone]

## (undated) NOTE — MR AVS SNAPSHOT
[address] [address]  [phone]               Thank you for choosing us for your health care visit with Brissa Dillard MD.  We are glad to serve you and happy to provide you with this sum D-5000 5000 units Tabs   Generic drug:  Cholecalciferol   Take  by mouth. Take 1 cap. every day           Etanercept 50 MG/ML Soaj   Inject 50 mg into the skin once a week.    Commonly known as:  ENBREL SURECLICK           folic acid 1 MG Tabs   Take one d Take 1 tablet (5 mg total) by mouth nightly as needed for Sleep. Commonly known as:  AMBIEN           * Notice: This list has 2 medication(s) that are the same as other medications prescribed for you.  Read the directions carefully, and ask your doctor o Return in about 3 months (around 5/20/2017). MyChart     Visit [employer]  You can access your [employer] to more actively manage your health care and view more details from this visit by going to [email or web address].   If you've recently had a

## (undated) NOTE — MR AVS SNAPSHOT
[address]  150 [facility] [address]  [phone]  [phone]               Thank you for choosing us for your health care visit with Rachel Ann PT.   We are glad to serve you and happy to provide you with this s loose fitting clothing.             Mar 22, 2017  9:20 AM   Consult with Uma Maldonado MD   [facility], [address] ([place])    [address]   [phone]            Mar

## (undated) NOTE — MR AVS SNAPSHOT
[address]  150 [facility] [address]  [phone]  [phone]               Thank you for choosing us for your health care visit with Angeline Zhong PT.   We are glad to serve you and happy to provide you with this s [facility] Visit By Therapist with Bryson Blackman 71 in [address] [place] [address] ([address] [facility],[address])    150 [facility] [phone]           Please arrive at your scheduled appointment time.

## (undated) NOTE — MR AVS SNAPSHOT
[address]  [address] [facility] [address]  [phone]  [phone]               Thank you for choosing us for your health care visit with Florecita Gilman PT.   We are glad to serve you and happy to provide you with this s [email or web address]. If you've recently had a stay at the Hospital you can access your discharge instructions in [employer] by going to Visits < Admission Summaries.  If you've been to the Emergency Department or your doctor's office, you can view yo

## (undated) NOTE — MR AVS SNAPSHOT
[address] [address]  [phone]               Thank you for choosing us for your health care visit with Harshal Dewitt MD.  We are glad to serve you and happy to provide you with this sum Allergies as of May 22, 2017     Erythromycin Base     Pcn [Bicillin L-A]                 Today's Vital Signs     BP Pulse Height Weight BMI    147/71 mmHg 47 5' 4\" (1.626 m) 245 lb 11.2 oz (111.449 kg) 42.15 kg/m2         Current Medications          Thi TAKE ONE TABLET BY MOUTH EVERY MORNING BEFORE BREAKFAST   Commonly known as:  PROTONIX           Pravastatin Sodium 40 MG Tabs   Take 1 tablet by mouth  nightly.    Commonly known as:  PRAVACHOL           Tolterodine Tartrate ER 4 MG Cp24   Take 1 capsule b office, you can view your past visit information in [employer] by going to Visits < Visit Summaries. [employer] questions? Call [phone] for help. [employer] is NOT to be used for urgent needs. For medical emergencies, dial 911.         Educational Inform 150 minutes per week. Moderation of alcohol consumption Men: limit to <= 2 drinks* per day. Women and lighter weight persons: limit to <= 1 drink* per day.                       Healthy Diet and Regular Exercise  [facility] [address] [place] [address] for

## (undated) NOTE — LETTER
09/23/21    Serge Webber  [address]      Dear Poornima Travis records indicate that you have outstanding lab work and or testing that was ordered for you and has not yet been completed: Due before appointment with Dr. Michaela Pérez

## (undated) NOTE — LETTER
08/13/18        Esthela Landaverde  [address]      Dear Christy Nicolas records indicate that you have outstanding lab work and or testing that was ordered for you and has not yet been completed:          BMP    To provide you with t

## (undated) NOTE — MR AVS SNAPSHOT
[address] [address]  [phone]               Thank you for choosing us for your health care visit with Stacy Coleman MD.  We are glad to serve you and happy to provide you with this summar [facility]lene in [facility] ([address] [facility])    150 [facility] [phone]           Please arrive at your scheduled appointment time. Wear comfortable, loose fitting clothing.               Reason for Today's Vi for a total of 212 mg daily   What changed:    - how much to take  - how to take this  - additional instructions   Commonly known as:  SYNTHROID           * Levothyroxine Sodium 112 MCG Tabs   Take 1 tablet (112 mcg total) by mouth before breakfast. Take w - azithromycin 250 MG Tabs            Health Goals discussed Today        Last Edited       Therapy Goals 3/8/2017  2:04 PM by Fouzia Burgos, PT     Goal Comments    1.  Pt will demonstrate improved SLS to >3 seconds ARMINDA to promote safety and decre ? Place light switches within reach of your bed and a night light between the bedroom and bathroom. ? Get up slowly from lying down or sitting if you get dizzy. ? Keep a working flashlight near your bed.   STAIRS:  ? Keep stairwells well lit with light sw

## (undated) NOTE — MR AVS SNAPSHOT
[address]  150 [facility] [address]  [phone]  [phone]               Thank you for choosing us for your health care visit with Ehsan Hudson PT.   We are glad to serve you and happy to provide you with this s Please arrive at your scheduled appointment time. Wear comfortable, loose fitting clothing.             May 01, 2017  9:30 AM   Exam - Established with MD Ricarda Levin [address] ([facility]